# Patient Record
Sex: FEMALE | Race: WHITE | NOT HISPANIC OR LATINO | URBAN - METROPOLITAN AREA
[De-identification: names, ages, dates, MRNs, and addresses within clinical notes are randomized per-mention and may not be internally consistent; named-entity substitution may affect disease eponyms.]

---

## 2011-09-01 RX ORDER — DUREZOL 0.5 MG/ML
1 EMULSION OPHTHALMIC
Qty: 0 | Refills: 0 | DISCHARGE
Start: 2011-09-01

## 2011-09-01 RX ORDER — DIAZEPAM 5 MG
0.5 TABLET ORAL
Qty: 0 | Refills: 0 | DISCHARGE
Start: 2011-09-01

## 2017-01-18 ENCOUNTER — OUTPATIENT (OUTPATIENT)
Dept: OUTPATIENT SERVICES | Facility: HOSPITAL | Age: 82
LOS: 1 days | Discharge: HOME | End: 2017-01-18

## 2017-01-18 DIAGNOSIS — C50.912 MALIGNANT NEOPLASM OF UNSPECIFIED SITE OF LEFT FEMALE BREAST: ICD-10-CM

## 2017-01-19 ENCOUNTER — RESULT REVIEW (OUTPATIENT)
Age: 82
End: 2017-01-19

## 2017-01-20 ENCOUNTER — LABORATORY RESULT (OUTPATIENT)
Age: 82
End: 2017-01-20

## 2017-01-20 ENCOUNTER — APPOINTMENT (OUTPATIENT)
Dept: HEMATOLOGY ONCOLOGY | Facility: CLINIC | Age: 82
End: 2017-01-20

## 2017-01-20 VITALS
BODY MASS INDEX: 23.58 KG/M2 | OXYGEN SATURATION: 96 % | HEART RATE: 63 BPM | RESPIRATION RATE: 16 BRPM | DIASTOLIC BLOOD PRESSURE: 84 MMHG | SYSTOLIC BLOOD PRESSURE: 170 MMHG | WEIGHT: 150.58 LBS | TEMPERATURE: 208.22 F

## 2017-01-20 DIAGNOSIS — E78.5 HYPERLIPIDEMIA, UNSPECIFIED: ICD-10-CM

## 2017-01-20 DIAGNOSIS — R73.9 HYPERGLYCEMIA, UNSPECIFIED: ICD-10-CM

## 2017-01-20 DIAGNOSIS — F32.9 MAJOR DEPRESSIVE DISORDER, SINGLE EPISODE, UNSPECIFIED: ICD-10-CM

## 2017-01-20 LAB
25(OH)D3 SERPL-MCNC: 41.9 NG/ML
ALBUMIN SERPL ELPH-MCNC: 3.8 G/DL
ALP BLD-CCNC: 103 U/L
ALT SERPL-CCNC: 20 U/L
ANION GAP SERPL CALC-SCNC: 11 MMOL/L
AST SERPL-CCNC: 23 U/L
BILIRUB SERPL-MCNC: 0.4 MG/DL
BUN SERPL-MCNC: 21 MG/DL
CALCIUM SERPL-MCNC: 9.3 MG/DL
CHLORIDE SERPL-SCNC: 98 MMOL/L
CHOLEST SERPL-MCNC: 199 MG/DL
CHOLEST/HDLC SERPL: 3.4 RATIO
CO2 SERPL-SCNC: 28 MMOL/L
CREAT SERPL-MCNC: 0.72 MG/DL
GLUCOSE BS SERPL-MCNC: 83 MG/DL
GLUCOSE SERPL-MCNC: 90 MG/DL
HCT VFR BLD CALC: 33.9 % — LOW (ref 34.5–45)
HDLC SERPL-MCNC: 59 MG/DL
HGB BLD-MCNC: 11.2 G/DL — LOW (ref 11.5–15.5)
LDLC SERPL CALC-MCNC: 128 MG/DL
MCHC RBC-ENTMCNC: 29.2 PG — SIGNIFICANT CHANGE UP (ref 27–34)
MCHC RBC-ENTMCNC: 32.9 GM/DL — SIGNIFICANT CHANGE UP (ref 32–36)
MCV RBC AUTO: 88.8 FL — SIGNIFICANT CHANGE UP (ref 80–100)
PLATELET # BLD AUTO: 219 K/UL — SIGNIFICANT CHANGE UP (ref 150–400)
POTASSIUM SERPL-SCNC: 4.6 MMOL/L
PROT SERPL-MCNC: 7.2 G/DL
RBC # BLD: 3.82 M/UL — SIGNIFICANT CHANGE UP (ref 3.8–5.2)
RBC # FLD: 13.2 % — SIGNIFICANT CHANGE UP (ref 10.3–14.5)
SODIUM SERPL-SCNC: 137 MMOL/L
T3RU NFR SERPL: 1.01 INDEX
T4 SERPL-MCNC: 8.6 UG/DL
TRIGL SERPL-MCNC: 60 MG/DL
TSH SERPL-ACNC: 1.31 UIU/ML
VIT B12 SERPL-MCNC: 576 PG/ML
WBC # BLD: 5.4 K/UL — SIGNIFICANT CHANGE UP (ref 3.8–10.5)
WBC # FLD AUTO: 5.4 K/UL — SIGNIFICANT CHANGE UP (ref 3.8–10.5)

## 2017-01-23 LAB — HBA1C MFR BLD HPLC: 5.6 %

## 2017-01-28 ENCOUNTER — APPOINTMENT (OUTPATIENT)
Dept: CT IMAGING | Facility: IMAGING CENTER | Age: 82
End: 2017-01-28

## 2017-01-28 ENCOUNTER — OUTPATIENT (OUTPATIENT)
Dept: OUTPATIENT SERVICES | Facility: HOSPITAL | Age: 82
LOS: 1 days | End: 2017-01-28
Payer: MEDICARE

## 2017-01-28 DIAGNOSIS — R94.8 ABNORMAL RESULTS OF FUNCTION STUDIES OF OTHER ORGANS AND SYSTEMS: ICD-10-CM

## 2017-01-28 DIAGNOSIS — J45.909 UNSPECIFIED ASTHMA, UNCOMPLICATED: ICD-10-CM

## 2017-01-28 PROCEDURE — 71250 CT THORAX DX C-: CPT

## 2017-02-03 ENCOUNTER — OTHER (OUTPATIENT)
Age: 82
End: 2017-02-03

## 2017-05-23 ENCOUNTER — FORM ENCOUNTER (OUTPATIENT)
Age: 82
End: 2017-05-23

## 2017-05-23 ENCOUNTER — OUTPATIENT (OUTPATIENT)
Dept: OUTPATIENT SERVICES | Facility: HOSPITAL | Age: 82
LOS: 1 days | End: 2017-05-23
Payer: MEDICARE

## 2017-05-23 ENCOUNTER — APPOINTMENT (OUTPATIENT)
Dept: ULTRASOUND IMAGING | Facility: IMAGING CENTER | Age: 82
End: 2017-05-23

## 2017-05-23 ENCOUNTER — APPOINTMENT (OUTPATIENT)
Dept: MAMMOGRAPHY | Facility: IMAGING CENTER | Age: 82
End: 2017-05-23

## 2017-05-23 DIAGNOSIS — C50.912 MALIGNANT NEOPLASM OF UNSPECIFIED SITE OF LEFT FEMALE BREAST: ICD-10-CM

## 2017-05-23 PROCEDURE — G0279: CPT

## 2017-05-23 PROCEDURE — 76641 ULTRASOUND BREAST COMPLETE: CPT

## 2017-05-23 PROCEDURE — 77066 DX MAMMO INCL CAD BI: CPT

## 2017-07-25 ENCOUNTER — OUTPATIENT (OUTPATIENT)
Dept: OUTPATIENT SERVICES | Facility: HOSPITAL | Age: 82
LOS: 1 days | Discharge: HOME | End: 2017-07-25

## 2017-07-25 DIAGNOSIS — C50.912 MALIGNANT NEOPLASM OF UNSPECIFIED SITE OF LEFT FEMALE BREAST: ICD-10-CM

## 2017-07-28 ENCOUNTER — RESULT REVIEW (OUTPATIENT)
Age: 82
End: 2017-07-28

## 2017-07-28 ENCOUNTER — APPOINTMENT (OUTPATIENT)
Dept: HEMATOLOGY ONCOLOGY | Facility: CLINIC | Age: 82
End: 2017-07-28
Payer: MEDICARE

## 2017-07-28 VITALS
DIASTOLIC BLOOD PRESSURE: 80 MMHG | BODY MASS INDEX: 23.82 KG/M2 | WEIGHT: 152.12 LBS | TEMPERATURE: 207.14 F | SYSTOLIC BLOOD PRESSURE: 140 MMHG | HEART RATE: 60 BPM | OXYGEN SATURATION: 97 % | RESPIRATION RATE: 16 BRPM

## 2017-07-28 LAB
ALBUMIN SERPL ELPH-MCNC: 4 G/DL
ALP BLD-CCNC: 103 U/L
ALT SERPL-CCNC: 20 U/L
ANION GAP SERPL CALC-SCNC: 14 MMOL/L
AST SERPL-CCNC: 20 U/L
BILIRUB SERPL-MCNC: 0.4 MG/DL
BUN SERPL-MCNC: 18 MG/DL
CALCIUM SERPL-MCNC: 9.9 MG/DL
CEA SERPL-MCNC: 2.3 NG/ML
CHLORIDE SERPL-SCNC: 100 MMOL/L
CO2 SERPL-SCNC: 27 MMOL/L
CREAT SERPL-MCNC: 0.8 MG/DL
GLUCOSE SERPL-MCNC: 92 MG/DL
HCT VFR BLD CALC: 33.9 % — LOW (ref 34.5–45)
HGB BLD-MCNC: 11.7 G/DL — SIGNIFICANT CHANGE UP (ref 11.5–15.5)
MCHC RBC-ENTMCNC: 30.8 PG — SIGNIFICANT CHANGE UP (ref 27–34)
MCHC RBC-ENTMCNC: 34.5 G/DL — SIGNIFICANT CHANGE UP (ref 32–36)
MCV RBC AUTO: 89.3 FL — SIGNIFICANT CHANGE UP (ref 80–100)
PLATELET # BLD AUTO: 255 K/UL — SIGNIFICANT CHANGE UP (ref 150–400)
POTASSIUM SERPL-SCNC: 5.2 MMOL/L
PROT SERPL-MCNC: 7.7 G/DL
RBC # BLD: 3.79 M/UL — LOW (ref 3.8–5.2)
RBC # FLD: 13.2 % — SIGNIFICANT CHANGE UP (ref 10.3–14.5)
SODIUM SERPL-SCNC: 141 MMOL/L
WBC # BLD: 5.7 K/UL — SIGNIFICANT CHANGE UP (ref 3.8–10.5)
WBC # FLD AUTO: 5.7 K/UL — SIGNIFICANT CHANGE UP (ref 3.8–10.5)

## 2017-07-28 PROCEDURE — 99215 OFFICE O/P EST HI 40 MIN: CPT

## 2017-07-28 RX ORDER — CLARITHROMYCIN 500 MG/1
500 TABLET, FILM COATED ORAL
Qty: 20 | Refills: 0 | Status: DISCONTINUED | COMMUNITY
Start: 2017-04-13

## 2017-07-28 RX ORDER — CEFUROXIME AXETIL 500 MG/1
500 TABLET ORAL
Qty: 20 | Refills: 0 | Status: DISCONTINUED | COMMUNITY
Start: 2017-07-14

## 2017-08-01 LAB — CANCER AG27-29 SERPL-ACNC: 27.9 U/ML

## 2017-08-14 ENCOUNTER — FORM ENCOUNTER (OUTPATIENT)
Age: 82
End: 2017-08-14

## 2017-08-15 ENCOUNTER — APPOINTMENT (OUTPATIENT)
Dept: MRI IMAGING | Facility: IMAGING CENTER | Age: 82
End: 2017-08-15
Payer: MEDICARE

## 2017-08-15 ENCOUNTER — OUTPATIENT (OUTPATIENT)
Dept: OUTPATIENT SERVICES | Facility: HOSPITAL | Age: 82
LOS: 1 days | End: 2017-08-15
Payer: MEDICARE

## 2017-08-15 DIAGNOSIS — C50.912 MALIGNANT NEOPLASM OF UNSPECIFIED SITE OF LEFT FEMALE BREAST: ICD-10-CM

## 2017-08-15 DIAGNOSIS — M54.2 CERVICALGIA: ICD-10-CM

## 2017-08-15 DIAGNOSIS — R51 HEADACHE: ICD-10-CM

## 2017-08-15 PROCEDURE — 72156 MRI NECK SPINE W/O & W/DYE: CPT

## 2017-08-15 PROCEDURE — A9585: CPT

## 2017-08-15 PROCEDURE — 72156 MRI NECK SPINE W/O & W/DYE: CPT | Mod: 26

## 2017-08-15 PROCEDURE — 70553 MRI BRAIN STEM W/O & W/DYE: CPT | Mod: 26

## 2017-08-15 PROCEDURE — 70553 MRI BRAIN STEM W/O & W/DYE: CPT

## 2017-08-22 DIAGNOSIS — C50.919 MALIGNANT NEOPLASM OF UNSPECIFIED SITE OF UNSPECIFIED FEMALE BREAST: ICD-10-CM

## 2017-08-22 DIAGNOSIS — R51 HEADACHE: ICD-10-CM

## 2017-08-29 ENCOUNTER — INPATIENT (INPATIENT)
Facility: HOSPITAL | Age: 82
LOS: 2 days | Discharge: HOME | DRG: 392 | End: 2017-09-01
Attending: INTERNAL MEDICINE | Admitting: INTERNAL MEDICINE
Payer: MEDICARE

## 2017-08-29 VITALS
WEIGHT: 149.91 LBS | RESPIRATION RATE: 16 BRPM | OXYGEN SATURATION: 99 % | TEMPERATURE: 97 F | SYSTOLIC BLOOD PRESSURE: 185 MMHG | DIASTOLIC BLOOD PRESSURE: 65 MMHG | HEART RATE: 63 BPM

## 2017-08-29 PROCEDURE — 99285 EMERGENCY DEPT VISIT HI MDM: CPT | Mod: 25

## 2017-08-29 RX ORDER — SODIUM CHLORIDE 9 MG/ML
1000 INJECTION INTRAMUSCULAR; INTRAVENOUS; SUBCUTANEOUS ONCE
Qty: 0 | Refills: 0 | Status: COMPLETED | OUTPATIENT
Start: 2017-08-29 | End: 2017-08-29

## 2017-08-29 NOTE — ED PROVIDER NOTE - PSH
History of Breast Biopsy  [place] lymph node biopsy  History of Cataract Surgery  Left eye  S/P Breast Lumpectomy    S/P Lumpectomy of Breast  Left Breast  S/P Nasal Polypectomy    Status Post Tonsillectomy

## 2017-08-29 NOTE — ED ADULT NURSE NOTE - PSH
History of Breast Biopsy  [place] lymph node biopsy  History of Cataract Surgery  Left eye  S/P Breast Lumpectomy    S/P Lumpectomy of Breast  Left Breast  S/P Nasal Polypectomy    Status Post Tonsillectomy

## 2017-08-29 NOTE — ED PROVIDER NOTE - MEDICAL DECISION MAKING DETAILS
81yo F with PMH of IBS, Breast CA, Asthma, h/o C. diff p/w diarrhea x6days. Patient has h/o past c.diff infections and recent abx use. Patient endorsing abd cramps but abd exam is benign. Will check CBC/CMP, C. diff/Stool Cx 81yo F with PMH of IBS, Breast CA, Asthma, h/o C. diff p/w diarrhea x6days. Patient has h/o past c.diff infections and recent abx use. Patient endorsing abd cramps but no other alarm symnptoms. Will check CBC/CMP, C. diff/Stool Cx. Given Abd tenderness, will check CT A/P and reassess

## 2017-08-29 NOTE — ED PROVIDER NOTE - OBJECTIVE STATEMENT
Diarrhea x6 days,   normally BM every other day  decreased appetite  initially water now soft  abd cramping, chills    10 day abx course, finished one month ago  h/o C. diff, several years ago 83yo F with PMH of IBS, Asthma, h/o Breast CA, Anxiety, and GERD presenting with Diarrhea x6 days. Symptoms have been on and off, assoiciated with abd cramping, chills, and decreased appetite. BMs initially watery now soft. Patient denies melena, BRBPR, N/V. Crow has IBS, normally BMs are every other day. Patient states she has had C.diff infection in the past (several years ago). She has not been hospitalized recently but had a 10day course of Ceftin one month ago for a respiratory infection.

## 2017-08-29 NOTE — ED PROVIDER NOTE - ATTENDING CONTRIBUTION TO CARE
82F hx IBS, prior C. diff, anxiety, GERD, Asthma, Breast Ca presents with multiple episodes of watery stool x approx 6 days associated with mild left-sided abdominal pain.  Stool not foul-smelling.  Denies fever/chills, melena/hematochezia, nausea/vomiting, urinary symptoms.  No recent hospitalization, but recently completed course of ceftin.  VSS.  dry mucus membranes.  mild left-sided ttp, no rebound or guarding.  concern for diverticulitis v colitis +/- c diff.  will obtain labs including lytes, stool for c. diff.  ivf, contact isolation.  obtain ct a/p, reassess

## 2017-08-29 NOTE — ED ADULT NURSE NOTE - PMH
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

## 2017-08-29 NOTE — ED ADULT NURSE NOTE - OBJECTIVE STATEMENT
82 y.o. Female presents to the ED accompanied by daughter c/o diarrhea. Hx Breast CA - s/p L lumpectomy - pink band on; IBS, mitral valve prolapse. Pt reports having diarrhea since last sat. Today pt had diarrhea x3 - denies any blood in stool. Pt's daughter reports pt had some soup on Wednesday and felt sick after soup. Pt's daughter is unsure if related. Pt reports having "cramp spasms" that "comes and goes" in lower abdomen. Denies any CP, SOB, N/V, urinary complications, fever/chills. A&Ox3. Pt is in no current distress. Comfort and safety provided.

## 2017-08-29 NOTE — ED PROVIDER NOTE - PROGRESS NOTE DETAILS
CT A/P showing mild thickening of colon concerning for distention vs colitis. Given ongoing diarrhea, will check Bld Cx's and start ABx. [facility], accepted by Ada HERNANDEZ

## 2017-08-30 DIAGNOSIS — F41.9 ANXIETY DISORDER, UNSPECIFIED: ICD-10-CM

## 2017-08-30 DIAGNOSIS — I34.1 NONRHEUMATIC MITRAL (VALVE) PROLAPSE: ICD-10-CM

## 2017-08-30 DIAGNOSIS — H20.9 UNSPECIFIED IRIDOCYCLITIS: ICD-10-CM

## 2017-08-30 DIAGNOSIS — R19.7 DIARRHEA, UNSPECIFIED: ICD-10-CM

## 2017-08-30 DIAGNOSIS — F32.9 MAJOR DEPRESSIVE DISORDER, SINGLE EPISODE, UNSPECIFIED: ICD-10-CM

## 2017-08-30 DIAGNOSIS — K52.9 NONINFECTIVE GASTROENTERITIS AND COLITIS, UNSPECIFIED: ICD-10-CM

## 2017-08-30 DIAGNOSIS — Z29.9 ENCOUNTER FOR PROPHYLACTIC MEASURES, UNSPECIFIED: ICD-10-CM

## 2017-08-30 LAB
ALBUMIN SERPL ELPH-MCNC: 4.1 G/DL — SIGNIFICANT CHANGE UP (ref 3.3–5)
ALP SERPL-CCNC: 90 U/L — SIGNIFICANT CHANGE UP (ref 40–120)
ALT FLD-CCNC: 15 U/L RC — SIGNIFICANT CHANGE UP (ref 10–45)
ANION GAP SERPL CALC-SCNC: 12 MMOL/L — SIGNIFICANT CHANGE UP (ref 5–17)
APPEARANCE UR: CLEAR — SIGNIFICANT CHANGE UP
AST SERPL-CCNC: 19 U/L — SIGNIFICANT CHANGE UP (ref 10–40)
BASOPHILS # BLD AUTO: 0.1 K/UL — SIGNIFICANT CHANGE UP (ref 0–0.2)
BASOPHILS NFR BLD AUTO: 1.2 % — SIGNIFICANT CHANGE UP (ref 0–2)
BILIRUB SERPL-MCNC: 0.5 MG/DL — SIGNIFICANT CHANGE UP (ref 0.2–1.2)
BILIRUB UR-MCNC: NEGATIVE — SIGNIFICANT CHANGE UP
BUN SERPL-MCNC: 11 MG/DL — SIGNIFICANT CHANGE UP (ref 7–23)
CALCIUM SERPL-MCNC: 9.5 MG/DL — SIGNIFICANT CHANGE UP (ref 8.4–10.5)
CHLORIDE SERPL-SCNC: 99 MMOL/L — SIGNIFICANT CHANGE UP (ref 96–108)
CO2 SERPL-SCNC: 26 MMOL/L — SIGNIFICANT CHANGE UP (ref 22–31)
COLOR SPEC: SIGNIFICANT CHANGE UP
COMMENT - URINE: SIGNIFICANT CHANGE UP
CREAT SERPL-MCNC: 0.66 MG/DL — SIGNIFICANT CHANGE UP (ref 0.5–1.3)
DIFF PNL FLD: ABNORMAL
EOSINOPHIL # BLD AUTO: 0.2 K/UL — SIGNIFICANT CHANGE UP (ref 0–0.5)
EOSINOPHIL NFR BLD AUTO: 2.9 % — SIGNIFICANT CHANGE UP (ref 0–6)
EPI CELLS # UR: SIGNIFICANT CHANGE UP /HPF
GAS PNL BLDV: SIGNIFICANT CHANGE UP
GLUCOSE SERPL-MCNC: 94 MG/DL — SIGNIFICANT CHANGE UP (ref 70–99)
GLUCOSE UR QL: NEGATIVE — SIGNIFICANT CHANGE UP
HCT VFR BLD CALC: 35 % — SIGNIFICANT CHANGE UP (ref 34.5–45)
HGB BLD-MCNC: 11.9 G/DL — SIGNIFICANT CHANGE UP (ref 11.5–15.5)
KETONES UR-MCNC: NEGATIVE — SIGNIFICANT CHANGE UP
LEUKOCYTE ESTERASE UR-ACNC: ABNORMAL
LIDOCAIN IGE QN: 37 U/L — SIGNIFICANT CHANGE UP (ref 7–60)
LYMPHOCYTES # BLD AUTO: 2.7 K/UL — SIGNIFICANT CHANGE UP (ref 1–3.3)
LYMPHOCYTES # BLD AUTO: 40.4 % — SIGNIFICANT CHANGE UP (ref 13–44)
MAGNESIUM SERPL-MCNC: 1.9 MG/DL — SIGNIFICANT CHANGE UP (ref 1.6–2.6)
MCHC RBC-ENTMCNC: 31.4 PG — SIGNIFICANT CHANGE UP (ref 27–34)
MCHC RBC-ENTMCNC: 34.1 GM/DL — SIGNIFICANT CHANGE UP (ref 32–36)
MCV RBC AUTO: 92.3 FL — SIGNIFICANT CHANGE UP (ref 80–100)
MONOCYTES # BLD AUTO: 0.7 K/UL — SIGNIFICANT CHANGE UP (ref 0–0.9)
MONOCYTES NFR BLD AUTO: 10.5 % — SIGNIFICANT CHANGE UP (ref 2–14)
NEUTROPHILS # BLD AUTO: 3 K/UL — SIGNIFICANT CHANGE UP (ref 1.8–7.4)
NEUTROPHILS NFR BLD AUTO: 45.1 % — SIGNIFICANT CHANGE UP (ref 43–77)
NITRITE UR-MCNC: NEGATIVE — SIGNIFICANT CHANGE UP
PH UR: 6 — SIGNIFICANT CHANGE UP (ref 5–8)
PHOSPHATE SERPL-MCNC: 3 MG/DL — SIGNIFICANT CHANGE UP (ref 2.5–4.5)
PLATELET # BLD AUTO: 222 K/UL — SIGNIFICANT CHANGE UP (ref 150–400)
POTASSIUM SERPL-MCNC: 3.8 MMOL/L — SIGNIFICANT CHANGE UP (ref 3.5–5.3)
POTASSIUM SERPL-SCNC: 3.8 MMOL/L — SIGNIFICANT CHANGE UP (ref 3.5–5.3)
PROT SERPL-MCNC: 8.2 G/DL — SIGNIFICANT CHANGE UP (ref 6–8.3)
PROT UR-MCNC: NEGATIVE — SIGNIFICANT CHANGE UP
RBC # BLD: 3.79 M/UL — LOW (ref 3.8–5.2)
RBC # FLD: 13.1 % — SIGNIFICANT CHANGE UP (ref 10.3–14.5)
RBC CASTS # UR COMP ASSIST: SIGNIFICANT CHANGE UP /HPF (ref 0–2)
SODIUM SERPL-SCNC: 137 MMOL/L — SIGNIFICANT CHANGE UP (ref 135–145)
SP GR SPEC: 1 — LOW (ref 1.01–1.02)
UROBILINOGEN FLD QL: NEGATIVE — SIGNIFICANT CHANGE UP
WBC # BLD: 6.7 K/UL — SIGNIFICANT CHANGE UP (ref 3.8–10.5)
WBC # FLD AUTO: 6.7 K/UL — SIGNIFICANT CHANGE UP (ref 3.8–10.5)
WBC UR QL: SIGNIFICANT CHANGE UP /HPF (ref 0–5)

## 2017-08-30 PROCEDURE — 99223 1ST HOSP IP/OBS HIGH 75: CPT | Mod: AI

## 2017-08-30 PROCEDURE — 74177 CT ABD & PELVIS W/CONTRAST: CPT | Mod: 26

## 2017-08-30 RX ORDER — ONDANSETRON 8 MG/1
4 TABLET, FILM COATED ORAL EVERY 6 HOURS
Qty: 0 | Refills: 0 | Status: DISCONTINUED | OUTPATIENT
Start: 2017-08-30 | End: 2017-09-01

## 2017-08-30 RX ORDER — FAMOTIDINE 10 MG/ML
20 INJECTION INTRAVENOUS DAILY
Qty: 0 | Refills: 0 | Status: DISCONTINUED | OUTPATIENT
Start: 2017-08-30 | End: 2017-09-01

## 2017-08-30 RX ORDER — ACETAMINOPHEN 500 MG
650 TABLET ORAL EVERY 6 HOURS
Qty: 0 | Refills: 0 | Status: DISCONTINUED | OUTPATIENT
Start: 2017-08-30 | End: 2017-09-01

## 2017-08-30 RX ORDER — METRONIDAZOLE 500 MG
500 TABLET ORAL ONCE
Qty: 0 | Refills: 0 | Status: DISCONTINUED | OUTPATIENT
Start: 2017-08-30 | End: 2017-08-30

## 2017-08-30 RX ORDER — PIPERACILLIN AND TAZOBACTAM 4; .5 G/20ML; G/20ML
3.38 INJECTION, POWDER, LYOPHILIZED, FOR SOLUTION INTRAVENOUS ONCE
Qty: 0 | Refills: 0 | Status: COMPLETED | OUTPATIENT
Start: 2017-08-30 | End: 2017-08-30

## 2017-08-30 RX ORDER — NADOLOL 80 MG/1
10 TABLET ORAL AT BEDTIME
Qty: 0 | Refills: 0 | Status: DISCONTINUED | OUTPATIENT
Start: 2017-08-30 | End: 2017-09-01

## 2017-08-30 RX ORDER — FLUOXETINE HCL 10 MG
60 CAPSULE ORAL DAILY
Qty: 0 | Refills: 0 | Status: DISCONTINUED | OUTPATIENT
Start: 2017-08-30 | End: 2017-09-01

## 2017-08-30 RX ORDER — HEPARIN SODIUM 5000 [USP'U]/ML
5000 INJECTION INTRAVENOUS; SUBCUTANEOUS EVERY 12 HOURS
Qty: 0 | Refills: 0 | Status: DISCONTINUED | OUTPATIENT
Start: 2017-08-30 | End: 2017-09-01

## 2017-08-30 RX ORDER — FLUTICASONE PROPIONATE 50 MCG
1 SPRAY, SUSPENSION NASAL DAILY
Qty: 0 | Refills: 0 | Status: DISCONTINUED | OUTPATIENT
Start: 2017-08-30 | End: 2017-09-01

## 2017-08-30 RX ORDER — SODIUM CHLORIDE 9 MG/ML
1000 INJECTION INTRAMUSCULAR; INTRAVENOUS; SUBCUTANEOUS
Qty: 0 | Refills: 0 | Status: COMPLETED | OUTPATIENT
Start: 2017-08-30 | End: 2017-08-30

## 2017-08-30 RX ORDER — METRONIDAZOLE 500 MG
500 TABLET ORAL ONCE
Qty: 0 | Refills: 0 | Status: COMPLETED | OUTPATIENT
Start: 2017-08-30 | End: 2017-08-30

## 2017-08-30 RX ADMIN — HEPARIN SODIUM 5000 UNIT(S): 5000 INJECTION INTRAVENOUS; SUBCUTANEOUS at 17:43

## 2017-08-30 RX ADMIN — NADOLOL 10 MILLIGRAM(S): 80 TABLET ORAL at 21:09

## 2017-08-30 RX ADMIN — PIPERACILLIN AND TAZOBACTAM 200 GRAM(S): 4; .5 INJECTION, POWDER, LYOPHILIZED, FOR SOLUTION INTRAVENOUS at 05:53

## 2017-08-30 RX ADMIN — SODIUM CHLORIDE 125 MILLILITER(S): 9 INJECTION INTRAMUSCULAR; INTRAVENOUS; SUBCUTANEOUS at 06:47

## 2017-08-30 RX ADMIN — SODIUM CHLORIDE 1000 MILLILITER(S): 9 INJECTION INTRAMUSCULAR; INTRAVENOUS; SUBCUTANEOUS at 00:19

## 2017-08-30 RX ADMIN — Medication 100 MILLIGRAM(S): at 04:54

## 2017-08-30 RX ADMIN — Medication 60 MILLIGRAM(S): at 12:57

## 2017-08-30 RX ADMIN — FAMOTIDINE 20 MILLIGRAM(S): 10 INJECTION INTRAVENOUS at 12:58

## 2017-08-30 NOTE — H&P ADULT - HISTORY OF PRESENT ILLNESS
Patient is an 82 year old female with PMH of IBS, Asthma, h/o Breast CA s/p lumpectomy and radiation, Anxiety, and GERD presenting with Diarrhea for the past six days.   Patient reports  intermittent loose stools , at times more formed and at times watery, fouls smelling, non bloody , associated with left sided abdominal cramping, moderate in severity, non radiating.  She has poor appetites and decreased po intake during this time. Symptoms are different than her usual IBS symptoms , and were not relieved with valium. she has no epigastric pain, no melena. She has no fevers no chills.  She reports no sick contacts and no recent travel.  She reports s a remote history of C.diff infection (several years ago) no recent  hospitalization , but recently completed a 10day course of Ceftin  for a UI about one month prior to admission.     istop reviewed Reference #: [id number] patient on valium 5mg last filled in June '17

## 2017-08-30 NOTE — ED ADULT NURSE REASSESSMENT NOTE - NS ED NURSE REASSESS COMMENT FT1
Ambulated to bathroom with 1 assist
As per Dr. Palomo, hold on drawing blood cultures.
Pt was given CT contrast drink.
Pt is in no current distress. Comfort and safety provided.

## 2017-08-30 NOTE — CHART NOTE - NSCHARTNOTEFT_GEN_A_CORE
Pt seen and examined at bedside.   Feeling a little better. On clear liquid diet.    Admitted for 1 week of intermittent lower abdominal cramps, diarrhea, foul smelling. Started after eating Chicken soup from a restaurant last week.    This am, diarrhea improving. Ate clear liquid diet.   C.diff neg. CT abdomen with mild left-sided colitis.  Hx of IBS. In the past, seen by Dr. Campbell GI at [facility].   Will consult GI [facility] [place].     Diet advance as tolerated.  Restart home Prilosec for GERD.     Please refer to the H&P done today for details.    - Dr. JODY Jackman ([facility])  - [phone] Pt seen and examined at bedside.   Feeling a little better. On clear liquid diet.    Admitted for 1 week of intermittent lower abdominal cramps, diarrhea, foul smelling. Started after eating Chicken soup from a restaurant last week.    This am, diarrhea improving. Ate clear liquid diet.   C.diff neg. CT abdomen with mild left-sided colitis.  Hx of IBS. In the past, seen by Dr. Campbell GI at [facility].   Will consult GI [facility].     Dx: Gastroenteritis vs. IBS vs. colitis    Diet advance as tolerated.  Restart home Prilosec for GERD.     Please refer to the H&P done today for details.    - Dr. JODY Jackman ([facility])  - [phone]

## 2017-08-30 NOTE — H&P ADULT - ASSESSMENT
82F w/ Anxiety, IBS , breast can s/p lumpectomy and radiation, MVP , p/w diarrhea x 6 day, CT abdomen with prominent wall of transverse colon.

## 2017-08-30 NOTE — H&P ADULT - NSHPPHYSICALEXAM_GEN_ALL_CORE
Vital Signs Last 24 Hrs  T(C): 36.4 (30 Aug 2017 05:57), Max: 36.5 (29 Aug 2017 23:02)  T(F): 97.6 (30 Aug 2017 05:57), Max: 97.7 (29 Aug 2017 23:02)  HR: 58 (30 Aug 2017 05:57) (58 - 63)  BP: 128/88 (30 Aug 2017 05:57) (128/88 - 185/65)  BP(mean): --  RR: 18 (30 Aug 2017 05:57) (16 - 18)  SpO2: 95% (30 Aug 2017 05:57) (95% - 99%)    GENERAL: No acute distress, well-developed  HEAD:  Atraumatic, Normocephalic  ENT: EOMI, PERRLA, conjunctiva and sclera clear,  moist mucosa no pharyngeal erythema or exudates   NECK: supple , no JVD   CHEST/LUNG: Clear to auscultation bilaterally; No wheeze, equal breath sounds bilaterally   BACK: No spinal tenderness,  No CVA tenderness   HEART: Regular rate and rhythm; No murmurs, rubs, or gallops  ABDOMEN: Soft, Nontender, Nondistended; Bowel sounds present  EXTREMITIES:  No clubbing, cyanosis, or edema  MSK: No joint swelling or effusions, ROM intact   Vasc: 2+ pulses throughout   PSYCH: Normal behavior/affect  NEUROLOGY: AAOx3, non-focal, cranial nerves intact  SKIN: Normal color, No rashes or lesions

## 2017-08-30 NOTE — CONSULT NOTE ADULT - SUBJECTIVE AND OBJECTIVE BOX
82 year old female with PMH of anxiety, IBS, and remote history of CDiff states she was in [facility] until one week ago when she developed intermittent abdominal cramps followed by foul smelling loose stools. Initially she felt diarrhea was related to her Irritable Bowel Syndrome however quickly realized it was more prolonged in duration and foul smelling than her usual diarrhea. She took some immodium but cramps and diarrhea recurred several days later so she came to ED for evaluation. + recent abx: Ceftin for a respiratory infection.  She has no fevers no chills.  She reports no sick contacts and no recent travel. No n/v. No blood in stool  She reports s a remote history of C.diff infection (several years ago) no recent  hospitalization , but recently completed a 10day course of Ceftin  for a UI about one month prior to admission.     Last colonoscopy ~ 4-5 years ago but does not recall details      PAST MEDICAL & SURGICAL HISTORY:  Hypertension  Nasal Polyp  Hiatal Hernia  Irritable Bowel Syndrome  Uveitis  Asthma  Clinical Depression  Anxiety  Mitral Valve Prolapse  Irritable Bowel Syndrome  GERD (Gastroesophageal Reflux Disease)  Breast Cancer: 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  History of Breast Biopsy: [place] lymph node biopsy  History of Cataract Surgery: Left eye  S/P Nasal Polypectomy  Status Post Tonsillectomy  S/P Lumpectomy of Breast: Left Breast  S/P Breast Lumpectomy      MEDICATIONS  (STANDING):  heparin  Injectable 5000 Unit(s) SubCutaneous every 12 hours  famotidine    Tablet 20 milliGRAM(s) Oral daily  nadolol 10 milliGRAM(s) Oral at bedtime  FLUoxetine 60 milliGRAM(s) Oral daily    MEDICATIONS  (PRN):  acetaminophen   Tablet. 650 milliGRAM(s) Oral every 6 hours PRN Mild Pain (1 - 3)  ondansetron Injectable 4 milliGRAM(s) IV Push every 6 hours PRN Nausea  diazepam    Tablet 5 milliGRAM(s) Oral daily PRN anxiety, spasms      Allergies    ciprofloxacin (Other)  codeine (Nausea; Other)  fluorescein ophthalmic (Hives; Rash; Flushing (Skin))  latex (Anaphylaxis)  Levaquin (Nausea; Other)  SULFA (Anaphylaxis)  tetracycline (Anaphylaxis)    Intolerances        Review of Systems:    General:  No wt loss, fevers, chills, night sweats,fatigue,   CV:  No pain, palpitatioins, hypo/hypertension  Resp:  No dyspnea, cough, tachypnea, wheezing  GI:  see HPI  :  No pain, bleeding, incontinence, nocturia  Muscle:  No pain, weakness  Neuro:  No weakness, tingling, memory problems  Psych:  No fatigue, insomnia, mood problems, depression  Endocrine:  No polyuria, polydypsia, cold/heat intolerance  Heme:  No petechiae, ecchymosis, easy bruisability  Skin:  No rash, tattoos, scars, edema      Vital Signs Last 24 Hrs  T(C): 37.1 (30 Aug 2017 07:31), Max: 37.1 (30 Aug 2017 07:31)  T(F): 98.7 (30 Aug 2017 07:), Max: 98.7 (30 Aug 2017 07:)  HR: 58 (30 Aug 2017 07:) (58 - 63)  BP: 144/79 (30 Aug 2017 07:) (128/88 - 185/65)  BP(mean): --  RR: 18 (30 Aug 2017 07:) (16 - 18)  SpO2: 99% (30 Aug 2017 07:) (95% - 99%)    PHYSICAL EXAM:    Constitutional: NAD, elderly female  Neck: No LAD, supple  Respiratory: CTA and P  Cardiovascular: S1 and S2, RRR, no M  Gastrointestinal: BS+, soft, NT/ND, neg HSM,  Extremities: No peripheral edema, neg clubing, cyanosis  Vascular: 2+ peripheral pulses  Neurological: A/O x 3, no focal deficits  Psychiatric: Normal mood, normal affect  Skin: No rashes      LABS:                        11.9   6.7   )-----------( 222      ( 30 Aug 2017 00:18 )             35.0     08-30    137  |  99  |  11  ----------------------------<  94  3.8   |  26  |  0.66    Ca    9.5      30 Aug 2017 00:18  Phos  3.0     08-30  Mg     1.9     08-30    TPro  8.2  /  Alb  4.1  /  TBili  0.5  /  DBili  x   /  AST  19  /  ALT  15  /  AlkPhos  90  08-30      Urinalysis Basic - ( 30 Aug 2017 00:26 )    Color: x / Appearance: Clear / S.005 / pH: x  Gluc: x / Ketone: Negative  / Bili: Negative / Urobili: Negative   Blood: x / Protein: Negative / Nitrite: Negative   Leuk Esterase: Small / RBC: 0-2 /HPF / WBC 3-5 /HPF   Sq Epi: x / Non Sq Epi: OCC /HPF / Bacteria: x      LIVER FUNCTIONS - ( 30 Aug 2017 00:18 )  Alb: 4.1 g/dL / Pro: 8.2 g/dL / ALK PHOS: 90 U/L / ALT: 15 U/L RC / AST: 19 U/L / GGT: x           Lipase, Serum: 37 U/L (17 @ 00:18)        RADIOLOGY & ADDITIONAL TESTS:

## 2017-08-30 NOTE — CONSULT NOTE ADULT - ASSESSMENT
83 yo F w/ sigmoid colitis likely infectious  F/U stool studies  Agree w/ antibiotics  Diet as tolerated - lactose free  Avoid Immodium  Will follow 81 yo F w/ sigmoid colitis likely infectious  F/U stool studies  Diet as tolerated - lactose free  Avoid Immodium  Will follow 81 yo F w/ L sided colitis likely infectious vs ? ischemic. CT findings not definitive   F/U stool studies  Diet as tolerated - lactose free  Avoid Immodium  Will follow

## 2017-08-30 NOTE — H&P ADULT - NSHPSOCIALHISTORY_GEN_ALL_CORE
Social History:    Marital Status:  (   )    (   ) Single    (   )    (  x)   Occupation:   Lives with: ( x ) alone  (  ) children   (  ) spouse   (  ) parents  (  ) other    Substance Use (street drugs): ( x ) never used  (  ) other:  Tobacco Usage:  ( x  ) never smoked   (   ) former smoker   (   ) current smoker  (     ) pack year  (        ) last cigarette date  Alcohol Usage: no etoh use

## 2017-08-30 NOTE — H&P ADULT - PSH
History of Breast Biopsy  [place] lymph node biopsy  History of Cataract Surgery  Left eye  S/P Breast Lumpectomy    S/P Lumpectomy of Breast  Left Breast  S/P Nasal Polypectomy    Status Post Tonsillectomy

## 2017-08-30 NOTE — H&P ADULT - PROBLEM SELECTOR PLAN 1
c diff negative, labs without  leukocytosis,  findings on CT scan not definitive, patient received zosyn and flagyl in ED , will hold further antibiotics and monitor clinically, if symptoms persist would obtain GI input.  Will provide IV hydration until tolerating Po intake   - GI consult to be arranged by day team   - advance diet as tolerated   - f/u stool cultures   - f/u  blood cultures obtained by ED c diff negative, labs without  leukocytosis,  findings on CT scan not definitive, patient received zosyn and flagyl in ED , will hold further antibiotics and monitor clinically, if symptoms persist would obtain GI input.  Will provide IV hydration until tolerating Po intake; Given recent diagnosed with uveitis would consider IBD ,will therefore  check inflammatory markers      - GI consult to be arranged by day team   - advance diet as tolerated   - f/u stool cultures   - f/u  blood cultures obtained by ED  - f/u CRP and sed rate

## 2017-08-30 NOTE — H&P ADULT - NSHPLABSRESULTS_GEN_ALL_CORE
Labs personally reviewed:                          11.9   6.7   )-----------( 222      ( 30 Aug 2017 00:18 )             35.0     08-30    137  |  99  |  11  ----------------------------<  94  3.8   |  26  |  0.66    Ca    9.5      30 Aug 2017 00:18  Phos  3.0     08-  Mg     1.9     -    TPro  8.2  /  Alb  4.1  /  TBili  0.5  /  DBili  x   /  AST  19  /  ALT  15  /  AlkPhos  90  08-        LIVER FUNCTIONS - ( 30 Aug 2017 00:18 )  Alb: 4.1 g/dL / Pro: 8.2 g/dL / ALK PHOS: 90 U/L / ALT: 15 U/L RC / AST: 19 U/L / GGT: x             Urinalysis Basic - ( 30 Aug 2017 00:26 )    Color: x / Appearance: Clear / S.005 / pH: x  Gluc: x / Ketone: Negative  / Bili: Negative / Urobili: Negative   Blood: x / Protein: Negative / Nitrite: Negative   Leuk Esterase: Small / RBC: 0-2 /HPF / WBC 3-5 /HPF   Sq Epi: x / Non Sq Epi: OCC /HPF / Bacteria: x      CAPILLARY BLOOD GLUCOSE          Imaging:  CT abdomen pelvis personally - Prominent wall of the distal transverse, descending and rectosigmoid colon without significant  inflammatory change, which may represent partial distention versus mild  colitis.      EKG ordered

## 2017-08-30 NOTE — H&P ADULT - NSHPREVIEWOFSYSTEMS_GEN_ALL_CORE
CONSTITUTIONAL: No weakness, fevers or chills  EYES/ENT: No visual changes;  No dysphagia  NECK: No pain or stiffness  RESPIRATORY: No cough, wheezing, hemoptysis; No shortness of breath  CARDIOVASCULAR: No chest pain or palpitations; No lower extremity edema  EXTREMITIES: no le edema, cyanosis, clubbing  GASTROINTESTINAL:+ abdominal pain. No nausea, vomiting, or hematemesis; + diarrhea . No melena or hematochezia.  BACK: No back pain  GENITOURINARY: No dysuria, frequency or hematuria  NEUROLOGICAL: No numbness or weakness  MSK: no joint swelling or pain  SKIN: No itching, burning, rashes, or lesions   PSYCH: no agitation  All other review of systems is negative unless indicated above.

## 2017-08-31 DIAGNOSIS — T78.40XA ALLERGY, UNSPECIFIED, INITIAL ENCOUNTER: ICD-10-CM

## 2017-08-31 LAB
ALBUMIN SERPL ELPH-MCNC: 3.6 G/DL — SIGNIFICANT CHANGE UP (ref 3.3–5)
ALP SERPL-CCNC: 85 U/L — SIGNIFICANT CHANGE UP (ref 40–120)
ALT FLD-CCNC: 14 U/L RC — SIGNIFICANT CHANGE UP (ref 10–45)
ANION GAP SERPL CALC-SCNC: 13 MMOL/L — SIGNIFICANT CHANGE UP (ref 5–17)
AST SERPL-CCNC: 19 U/L — SIGNIFICANT CHANGE UP (ref 10–40)
BILIRUB SERPL-MCNC: 0.5 MG/DL — SIGNIFICANT CHANGE UP (ref 0.2–1.2)
BUN SERPL-MCNC: 6 MG/DL — LOW (ref 7–23)
CALCIUM SERPL-MCNC: 9.2 MG/DL — SIGNIFICANT CHANGE UP (ref 8.4–10.5)
CHLORIDE SERPL-SCNC: 100 MMOL/L — SIGNIFICANT CHANGE UP (ref 96–108)
CO2 SERPL-SCNC: 24 MMOL/L — SIGNIFICANT CHANGE UP (ref 22–31)
CREAT SERPL-MCNC: 0.64 MG/DL — SIGNIFICANT CHANGE UP (ref 0.5–1.3)
GLUCOSE SERPL-MCNC: 97 MG/DL — SIGNIFICANT CHANGE UP (ref 70–99)
HCT VFR BLD CALC: 35.1 % — SIGNIFICANT CHANGE UP (ref 34.5–45)
HGB BLD-MCNC: 11.8 G/DL — SIGNIFICANT CHANGE UP (ref 11.5–15.5)
MCHC RBC-ENTMCNC: 30.9 PG — SIGNIFICANT CHANGE UP (ref 27–34)
MCHC RBC-ENTMCNC: 33.6 GM/DL — SIGNIFICANT CHANGE UP (ref 32–36)
MCV RBC AUTO: 91.9 FL — SIGNIFICANT CHANGE UP (ref 80–100)
PLATELET # BLD AUTO: 207 K/UL — SIGNIFICANT CHANGE UP (ref 150–400)
POTASSIUM SERPL-MCNC: 3.7 MMOL/L — SIGNIFICANT CHANGE UP (ref 3.5–5.3)
POTASSIUM SERPL-SCNC: 3.7 MMOL/L — SIGNIFICANT CHANGE UP (ref 3.5–5.3)
PROT SERPL-MCNC: 7.5 G/DL — SIGNIFICANT CHANGE UP (ref 6–8.3)
RBC # BLD: 3.82 M/UL — SIGNIFICANT CHANGE UP (ref 3.8–5.2)
RBC # FLD: 13 % — SIGNIFICANT CHANGE UP (ref 10.3–14.5)
SODIUM SERPL-SCNC: 137 MMOL/L — SIGNIFICANT CHANGE UP (ref 135–145)
WBC # BLD: 5 K/UL — SIGNIFICANT CHANGE UP (ref 3.8–10.5)
WBC # FLD AUTO: 5 K/UL — SIGNIFICANT CHANGE UP (ref 3.8–10.5)

## 2017-08-31 RX ORDER — DIPHENHYDRAMINE HCL 50 MG
25 CAPSULE ORAL ONCE
Qty: 0 | Refills: 0 | Status: COMPLETED | OUTPATIENT
Start: 2017-08-31 | End: 2017-08-31

## 2017-08-31 RX ADMIN — NADOLOL 10 MILLIGRAM(S): 80 TABLET ORAL at 21:14

## 2017-08-31 RX ADMIN — HEPARIN SODIUM 5000 UNIT(S): 5000 INJECTION INTRAVENOUS; SUBCUTANEOUS at 04:59

## 2017-08-31 RX ADMIN — Medication 25 MILLIGRAM(S): at 08:21

## 2017-08-31 RX ADMIN — Medication 40 MILLIGRAM(S): at 13:23

## 2017-08-31 NOTE — PROGRESS NOTE ADULT - ASSESSMENT
82 F w/ Anxiety, IBS , breast can s/p lumpectomy and radiation, MVP, p/w diarrhea x 6 day, CT abdomen with prominent wall of transverse colon.
83 yo F w/ possible L sided colitis likely infectious- diarrhea abd. pain now resolved.  currently asymptomatic.  Stool Cx, C diff negative  Possible allergic reaction to Abx- already D/C'd  No need for further Abx at this time.  Regular diet.  Will sign off- please call with additional questions.

## 2017-08-31 NOTE — PROGRESS NOTE ADULT - SUBJECTIVE AND OBJECTIVE BOX
Patient is a 82y old  Female who presents with a chief complaint of Diarrhea x 6 days (30 Aug 2017 06:34)      SUBJECTIVE / OVERNIGHT EVENTS:  abdominal pain and diarrhea improving, but feels throat swelling, lips swelling.  no cp, no sob.  overall feels weak.  no nv/d.       Vital Signs Last 24 Hrs  T(C): 36.7 (31 Aug 2017 07:52), Max: 36.7 (30 Aug 2017 21:13)  T(F): 98.1 (31 Aug 2017 07:52), Max: 98.1 (31 Aug 2017 07:52)  HR: 64 (31 Aug 2017 07:52) (58 - 65)  BP: 164/85 (31 Aug 2017 07:52) (153/77 - 164/85)  BP(mean): --  RR: 18 (31 Aug 2017 07:52) (18 - 18)  SpO2: 96% (31 Aug 2017 07:52) (96% - 97%)  I&O's Summary    30 Aug 2017 07:01  -  31 Aug 2017 07:00  --------------------------------------------------------  IN: 360 mL / OUT: 0 mL / NET: 360 mL        PHYSICAL EXAM:  GENERAL: NAD, well-developed, AAOx3  HEAD:  Atraumatic, Normocephalic  EYES: EOMI, PERRLA, conjunctiva and sclera clear  NECK: Supple, No JVD  CHEST/LUNG: Clear to auscultation bilaterally; No wheeze  HEART: Regular rate and rhythm; No murmurs, rubs, or gallops  ABDOMEN: Soft, Nontender, Nondistended; Bowel sounds present  EXTREMITIES:  2+ Peripheral Pulses, No clubbing, cyanosis, or edema  SKIN: No rashes or lesions, lip with mild edema     LABS:                        11.9   6.7   )-----------( 222      ( 30 Aug 2017 00:18 )             35.0         137  |  99  |  11  ----------------------------<  94  3.8   |  26  |  0.66    Ca    9.5      30 Aug 2017 00:18  Phos  3.0     08-30  Mg     1.9     08-30    TPro  8.2  /  Alb  4.1  /  TBili  0.5  /  DBili  x   /  AST  19  /  ALT  15  /  AlkPhos  90  08-30      CAPILLARY BLOOD GLUCOSE            Urinalysis Basic - ( 30 Aug 2017 00:26 )    Color: x / Appearance: Clear / S.005 / pH: x  Gluc: x / Ketone: Negative  / Bili: Negative / Urobili: Negative   Blood: x / Protein: Negative / Nitrite: Negative   Leuk Esterase: Small / RBC: 0-2 /HPF / WBC 3-5 /HPF   Sq Epi: x / Non Sq Epi: OCC /HPF / Bacteria: x        RADIOLOGY & ADDITIONAL TESTS:    Imaging Personally Reviewed:  [x] YES  [ ] NO    Consultant(s) Notes Reviewed:  [x] YES  [ ] NO      MEDICATIONS  (STANDING):  heparin  Injectable 5000 Unit(s) SubCutaneous every 12 hours  famotidine    Tablet 20 milliGRAM(s) Oral daily  nadolol 10 milliGRAM(s) Oral at bedtime  FLUoxetine 60 milliGRAM(s) Oral daily  fluticasone propionate 50 MICROgram(s)/spray Nasal Spray 1 Spray(s) Both Nostrils daily  methylPREDNISolone sodium succinate Injectable 40 milliGRAM(s) IV Push once    MEDICATIONS  (PRN):  acetaminophen   Tablet. 650 milliGRAM(s) Oral every 6 hours PRN Mild Pain (1 - 3)  ondansetron Injectable 4 milliGRAM(s) IV Push every 6 hours PRN Nausea  diazepam    Tablet 5 milliGRAM(s) Oral daily PRN anxiety, spasms      Care Discussed with Consultants/Other Providers [x] YES  [ ] NO    HEALTH ISSUES - PROBLEM Dx:  Need for prophylactic measure: Need for prophylactic measure  Uveitis: Uveitis  Depression: Depression  Anxiety: Anxiety  Mitral Valve Prolapse: Mitral Valve Prolapse  Diarrhea: Diarrhea Patient is a 82y old  Female who presents with a chief complaint of Diarrhea x 6 days (30 Aug 2017 06:34)      SUBJECTIVE / OVERNIGHT EVENTS:  abdominal pain and diarrhea improving, but feels throat swelling, lips swelling.  no cp, no sob.  overall feels weak.  no nv/d.       Vital Signs Last 24 Hrs  T(C): 36.7 (31 Aug 2017 07:52), Max: 36.7 (30 Aug 2017 21:13)  T(F): 98.1 (31 Aug 2017 07:52), Max: 98.1 (31 Aug 2017 07:52)  HR: 64 (31 Aug 2017 07:52) (58 - 65)  BP: 164/85 (31 Aug 2017 07:52) (153/77 - 164/85)  BP(mean): --  RR: 18 (31 Aug 2017 07:52) (18 - 18)  SpO2: 96% (31 Aug 2017 07:52) (96% - 97%)  I&O's Summary    30 Aug 2017 07:01  -  31 Aug 2017 07:00  --------------------------------------------------------  IN: 360 mL / OUT: 0 mL / NET: 360 mL        PHYSICAL EXAM:  GENERAL: NAD, well-developed, AAOx3  HEAD:  Atraumatic, Normocephalic  EYES: EOMI, PERRLA, conjunctiva and sclera clear  NECK: Supple, No JVD, no throat erythema, no throat edema, no tongue edema  CHEST/LUNG: Clear to auscultation bilaterally; No wheeze  HEART: Regular rate and rhythm; No murmurs, rubs, or gallops  ABDOMEN: Soft, Nontender, Nondistended; Bowel sounds present  EXTREMITIES:  2+ Peripheral Pulses, No clubbing, cyanosis, or edema  SKIN: No rashes or lesions, lip with mild edema     LABS:                        11.9   6.7   )-----------( 222      ( 30 Aug 2017 00:18 )             35.0     08-30    137  |  99  |  11  ----------------------------<  94  3.8   |  26  |  0.66    Ca    9.5      30 Aug 2017 00:18  Phos  3.0     08-30  Mg     1.9     08-30    TPro  8.2  /  Alb  4.1  /  TBili  0.5  /  DBili  x   /  AST  19  /  ALT  15  /  AlkPhos  90  08-30      CAPILLARY BLOOD GLUCOSE            Urinalysis Basic - ( 30 Aug 2017 00:26 )    Color: x / Appearance: Clear / S.005 / pH: x  Gluc: x / Ketone: Negative  / Bili: Negative / Urobili: Negative   Blood: x / Protein: Negative / Nitrite: Negative   Leuk Esterase: Small / RBC: 0-2 /HPF / WBC 3-5 /HPF   Sq Epi: x / Non Sq Epi: OCC /HPF / Bacteria: x        RADIOLOGY & ADDITIONAL TESTS:    Imaging Personally Reviewed:  [x] YES  [ ] NO    Consultant(s) Notes Reviewed:  [x] YES  [ ] NO      MEDICATIONS  (STANDING):  heparin  Injectable 5000 Unit(s) SubCutaneous every 12 hours  famotidine    Tablet 20 milliGRAM(s) Oral daily  nadolol 10 milliGRAM(s) Oral at bedtime  FLUoxetine 60 milliGRAM(s) Oral daily  fluticasone propionate 50 MICROgram(s)/spray Nasal Spray 1 Spray(s) Both Nostrils daily  methylPREDNISolone sodium succinate Injectable 40 milliGRAM(s) IV Push once    MEDICATIONS  (PRN):  acetaminophen   Tablet. 650 milliGRAM(s) Oral every 6 hours PRN Mild Pain (1 - 3)  ondansetron Injectable 4 milliGRAM(s) IV Push every 6 hours PRN Nausea  diazepam    Tablet 5 milliGRAM(s) Oral daily PRN anxiety, spasms      Care Discussed with Consultants/Other Providers [x] YES  [ ] NO    HEALTH ISSUES - PROBLEM Dx:  Need for prophylactic measure: Need for prophylactic measure  Uveitis: Uveitis  Depression: Depression  Anxiety: Anxiety  Mitral Valve Prolapse: Mitral Valve Prolapse  Diarrhea: Diarrhea

## 2017-08-31 NOTE — PROGRESS NOTE ADULT - PROBLEM SELECTOR PLAN 2
continue nadolol sensation throat edema, lip swelling.   No new meds other than Zosyn she received in ED and contrast for CT chest.  pt s/p Benadryl. Will give Solumedrol x 1. Monitor closely.

## 2017-08-31 NOTE — PROGRESS NOTE ADULT - SUBJECTIVE AND OBJECTIVE BOX
INTERVAL HPI/OVERNIGHT EVENTS:  No further abdominal pain or diarrhea.  Tolerating PO diet.  Reports lip swelling and tight throat overnight. s/p IV benadryl    MEDICATIONS  (STANDING):  heparin  Injectable 5000 Unit(s) SubCutaneous every 12 hours  famotidine    Tablet 20 milliGRAM(s) Oral daily  nadolol 10 milliGRAM(s) Oral at bedtime  FLUoxetine 60 milliGRAM(s) Oral daily  fluticasone propionate 50 MICROgram(s)/spray Nasal Spray 1 Spray(s) Both Nostrils daily  methylPREDNISolone sodium succinate Injectable 40 milliGRAM(s) IV Push once    MEDICATIONS  (PRN):  acetaminophen   Tablet. 650 milliGRAM(s) Oral every 6 hours PRN Mild Pain (1 - 3)  ondansetron Injectable 4 milliGRAM(s) IV Push every 6 hours PRN Nausea  diazepam    Tablet 5 milliGRAM(s) Oral daily PRN anxiety, spasms      Allergies    ciprofloxacin (Other)  codeine (Nausea; Other)  fluorescein ophthalmic (Hives; Rash; Flushing (Skin))  latex (Anaphylaxis)  Levaquin (Nausea; Other)  SULFA (Anaphylaxis)  tetracycline (Anaphylaxis)            Vital Signs Last 24 Hrs  T(C): 37 (31 Aug 2017 12:30), Max: 37 (31 Aug 2017 12:30)  T(F): 98.6 (31 Aug 2017 12:30), Max: 98.6 (31 Aug 2017 12:30)  HR: 61 (31 Aug 2017 12:30) (58 - 65)  BP: 146/72 (31 Aug 2017 12:30) (146/72 - 164/85)  BP(mean): --  RR: 18 (31 Aug 2017 12:30) (18 - 18)  SpO2: 95% (31 Aug 2017 12:30) (95% - 97%)    PHYSICAL EXAM:      Constitutional: NAD, well-developed  HEENT: anicteric  Respiratory: clear anteriorly; no stridor  Gastrointestinal: soft; NTND  Extremities: No peripheral edema, neg clubing, cyanosis  Neurological: A/O x 3, no focal deficits  Psychiatric: Normal mood, normal affect    LABS:                        11.9   6.7   )-----------( 222      ( 30 Aug 2017 00:18 )             35.0     Hemoglobin: 11.9 g/dL (08-30 @ 00:18)      08-30    137  |  99  |  11  ----------------------------<  94  3.8   |  26  |  0.66    Ca    9.5      30 Aug 2017 00:18  Phos  3.0     08-30  Mg     1.9     08-30    TPro  8.2  /  Alb  4.1  /  TBili  0.5  /  DBili  x   /  AST  19  /  ALT  15  /  AlkPhos  90  08-30    Bilirubin Total, Serum: 0.5 mg/dL (08-30 @ 00:18)      Aspartate Aminotransferase (AST/SGOT): 19 U/L (08-30 @ 00:18)    Alanine Aminotransferase (ALT/SGPT): 15 U/L RC (08-30 @ 00:18)        Lipase, Serum: 37 U/L (08-30 @ 00:18)    RADIOLOGY & ADDITIONAL TESTS:

## 2017-08-31 NOTE — PROGRESS NOTE ADULT - PROBLEM SELECTOR PLAN 1
c diff negative, labs without  leukocytosis,  findings on CT scan not definitive, patient received zosyn and flagyl in ED, will hold further antibiotics and monitor clinically.  GI eval appreciated.  advance diet as tolerated   f/u stool cultures   f/u  blood cultures obtained by ED

## 2017-09-01 ENCOUNTER — TRANSCRIPTION ENCOUNTER (OUTPATIENT)
Age: 82
End: 2017-09-01

## 2017-09-01 VITALS
RESPIRATION RATE: 18 BRPM | OXYGEN SATURATION: 97 % | TEMPERATURE: 97 F | DIASTOLIC BLOOD PRESSURE: 64 MMHG | SYSTOLIC BLOOD PRESSURE: 113 MMHG | HEART RATE: 57 BPM

## 2017-09-01 PROCEDURE — 83690 ASSAY OF LIPASE: CPT

## 2017-09-01 PROCEDURE — 99285 EMERGENCY DEPT VISIT HI MDM: CPT | Mod: 25

## 2017-09-01 PROCEDURE — 87046 STOOL CULTR AEROBIC BACT EA: CPT

## 2017-09-01 PROCEDURE — 96374 THER/PROPH/DIAG INJ IV PUSH: CPT | Mod: XU

## 2017-09-01 PROCEDURE — 80053 COMPREHEN METABOLIC PANEL: CPT

## 2017-09-01 PROCEDURE — 84132 ASSAY OF SERUM POTASSIUM: CPT

## 2017-09-01 PROCEDURE — 85014 HEMATOCRIT: CPT

## 2017-09-01 PROCEDURE — 84295 ASSAY OF SERUM SODIUM: CPT

## 2017-09-01 PROCEDURE — 82435 ASSAY OF BLOOD CHLORIDE: CPT

## 2017-09-01 PROCEDURE — 82947 ASSAY GLUCOSE BLOOD QUANT: CPT

## 2017-09-01 PROCEDURE — 87449 NOS EACH ORGANISM AG IA: CPT

## 2017-09-01 PROCEDURE — 87045 FECES CULTURE AEROBIC BACT: CPT

## 2017-09-01 PROCEDURE — 87324 CLOSTRIDIUM AG IA: CPT

## 2017-09-01 PROCEDURE — 83605 ASSAY OF LACTIC ACID: CPT

## 2017-09-01 PROCEDURE — 81001 URINALYSIS AUTO W/SCOPE: CPT

## 2017-09-01 PROCEDURE — 82803 BLOOD GASES ANY COMBINATION: CPT

## 2017-09-01 PROCEDURE — G0378: CPT

## 2017-09-01 PROCEDURE — 87040 BLOOD CULTURE FOR BACTERIA: CPT

## 2017-09-01 PROCEDURE — 82330 ASSAY OF CALCIUM: CPT

## 2017-09-01 PROCEDURE — 85027 COMPLETE CBC AUTOMATED: CPT

## 2017-09-01 PROCEDURE — 74177 CT ABD & PELVIS W/CONTRAST: CPT

## 2017-09-01 PROCEDURE — 84100 ASSAY OF PHOSPHORUS: CPT

## 2017-09-01 PROCEDURE — 83735 ASSAY OF MAGNESIUM: CPT

## 2017-09-01 RX ORDER — DIPHENHYDRAMINE HCL 50 MG
1 CAPSULE ORAL
Qty: 9 | Refills: 0
Start: 2017-09-01 | End: 2017-09-04

## 2017-09-01 RX ORDER — DIPHENHYDRAMINE HCL 50 MG
25 CAPSULE ORAL EVERY 6 HOURS
Qty: 0 | Refills: 0 | Status: DISCONTINUED | OUTPATIENT
Start: 2017-09-01 | End: 2017-09-01

## 2017-09-01 RX ADMIN — FAMOTIDINE 20 MILLIGRAM(S): 10 INJECTION INTRAVENOUS at 11:21

## 2017-09-01 RX ADMIN — Medication 25 MILLIGRAM(S): at 11:21

## 2017-09-01 NOTE — CHART NOTE - NSCHARTNOTEFT_GEN_A_CORE
Pt seen and examined at bedside. Feels well.   No chest pain, no shortness of breath.   No overnight event.   No N/V/D. No abdominal pain.   Tolerating diet. Resolved diarrhea.   lip edema resolved.   Want to go home.    d/c planning.    - Dr. JODY Jackman ([facility])  - [phone]

## 2017-09-01 NOTE — DISCHARGE NOTE ADULT - PLAN OF CARE
improved resolved with conservative measures . follow up with PMD stable with valium stable with flonase stable with Nadol

## 2017-09-01 NOTE — DISCHARGE NOTE ADULT - CARE PROVIDER_API CALL
Criselda Cardona), Internal Medicine  [address] [place] [address]  Phone: [phone]  Fax: [phone]

## 2017-09-01 NOTE — DISCHARGE NOTE ADULT - HOSPITAL COURSE
81 y/o F w/ PMHx of IBS, HTN, Asthma, h/o Breast CA s/p lumpectomy and radiation, Anxiety, and GERD presenting with Diarrhea for the past 6 days.  8/30: abd pain possible due to colitis --f/u stool studies ( CT [place] showing possible colitis)           GI following.     8/31: ABD pain resolved --diet advanced.            Allergic reaction possible from Zosyn vs contast ---IV steriod +benadryl given  9/1/17 pt feeling better and follow up with pmd [facility] Discharge Summary     82 F w/ Anxiety, IBS, breast Ca s/p lumpectomy and radiation, MVP, p/w diarrhea x 6 day, CT abdomen with prominent wall of transverse colon.   GI symptoms started after eating chicken soup.  Suspect gastroenteritis.   c diff negative, labs without leukocytosis. Findings on CT scan not definitive. Pt was monitored off antibiotics. [facility] was on the case.   Advance diet as tolerated. Pt was able to tolerate regular diet prior to discharge.    Of note, pt was noted to have sensation of throat edema/itchness, lower lip swelling. Suspected etiology could be either IV contrast nor Zosyn.   No new meds other than Zosyn she received in ED and contrast for CT chest.  Pt was treated with Benadryl and one dose of IV Solumedrolcontinue nadolol with resolution of symptoms. Pt was advised to visit Allergy/Immunologist given numerous allergies in her profile.    More than 30 mins were spent evaluating patient and coordinating care for discharge.

## 2017-09-01 NOTE — DISCHARGE NOTE ADULT - CARE PLAN
Principal Discharge DX:	Colitis  Goal:	improved  Instructions for follow-up, activity and diet:	resolved with conservative measures .  Secondary Diagnosis:	Breast cancer  Instructions for follow-up, activity and diet:	follow up with PMD  Secondary Diagnosis:	Anxiety  Instructions for follow-up, activity and diet:	stable with valium  Secondary Diagnosis:	Asthma  Instructions for follow-up, activity and diet:	stable with flonase  Secondary Diagnosis:	Essential hypertension  Instructions for follow-up, activity and diet:	stable with Nadol

## 2017-09-01 NOTE — DISCHARGE NOTE ADULT - PATIENT PORTAL LINK FT
“You can access the [facility] Patient Portal, offered by [facility], by registering with the following website: [email or web address]”

## 2017-09-01 NOTE — DISCHARGE NOTE ADULT - MEDICATION SUMMARY - MEDICATIONS TO TAKE
I will START or STAY ON the medications listed below when I get home from the hospital:    Prilosec 20 mg oral delayed release capsule  -- 1   once a day   -- Indication: For gerd    Nasacort AQ 55 mcg/inh nasal spray  -- 1   2 times a day   -- Indication: For Allergies    diazepam 5 mg oral tablet  -- 1 tab(s) by mouth once a day, As Needed  -- 1/2 tablet 3 times a day as needed  -- Indication: For Anxiety    Durezol  -- 1 gtt optical  2 times a day   -- one drop each eye  -- Indication: For Eye    Tylenol 325 mg oral tablet  -- 2 tab(s) by mouth every 4 hours, As Needed  -- Indication: For Mild pain    FLUoxetine 20 mg oral capsule  -- 3 cap(s) by mouth once a day  -- Indication: For Depression    diphenhydrAMINE 25 mg oral capsule  -- 1 cap(s) by mouth every 8 hours, As Needed, Rash and/or Itching  -- Indication: For Allergic state, initial encounter    nadolol  -- 10 milligram(s) by mouth once a day (at bedtime)  -- Indication: For htn

## 2017-09-02 LAB
CULTURE RESULTS: SIGNIFICANT CHANGE UP
SPECIMEN SOURCE: SIGNIFICANT CHANGE UP

## 2017-09-04 LAB
CULTURE RESULTS: SIGNIFICANT CHANGE UP
CULTURE RESULTS: SIGNIFICANT CHANGE UP
SPECIMEN SOURCE: SIGNIFICANT CHANGE UP
SPECIMEN SOURCE: SIGNIFICANT CHANGE UP

## 2018-01-10 ENCOUNTER — OUTPATIENT (OUTPATIENT)
Dept: OUTPATIENT SERVICES | Facility: HOSPITAL | Age: 83
LOS: 1 days | Discharge: HOME | End: 2018-01-10

## 2018-01-10 DIAGNOSIS — C50.912 MALIGNANT NEOPLASM OF UNSPECIFIED SITE OF LEFT FEMALE BREAST: ICD-10-CM

## 2018-01-12 ENCOUNTER — RESULT REVIEW (OUTPATIENT)
Age: 83
End: 2018-01-12

## 2018-01-12 ENCOUNTER — APPOINTMENT (OUTPATIENT)
Dept: HEMATOLOGY ONCOLOGY | Facility: CLINIC | Age: 83
End: 2018-01-12
Payer: MEDICARE

## 2018-01-12 VITALS
WEIGHT: 152.34 LBS | DIASTOLIC BLOOD PRESSURE: 66 MMHG | BODY MASS INDEX: 23.86 KG/M2 | OXYGEN SATURATION: 98 % | TEMPERATURE: 208.94 F | SYSTOLIC BLOOD PRESSURE: 132 MMHG | HEART RATE: 57 BPM | RESPIRATION RATE: 16 BRPM

## 2018-01-12 DIAGNOSIS — D17.1 BENIGN LIPOMATOUS NEOPLASM OF SKIN AND SUBCUTANEOUS TISSUE OF TRUNK: ICD-10-CM

## 2018-01-12 LAB
HCT VFR BLD CALC: 32.4 % — LOW (ref 34.5–45)
HGB BLD-MCNC: 11.2 G/DL — LOW (ref 11.5–15.5)
MCHC RBC-ENTMCNC: 30.8 PG — SIGNIFICANT CHANGE UP (ref 27–34)
MCHC RBC-ENTMCNC: 34.5 G/DL — SIGNIFICANT CHANGE UP (ref 32–36)
MCV RBC AUTO: 89.4 FL — SIGNIFICANT CHANGE UP (ref 80–100)
PLATELET # BLD AUTO: 215 K/UL — SIGNIFICANT CHANGE UP (ref 150–400)
RBC # BLD: 3.62 M/UL — LOW (ref 3.8–5.2)
RBC # FLD: 12.9 % — SIGNIFICANT CHANGE UP (ref 10.3–14.5)
WBC # BLD: 5.7 K/UL — SIGNIFICANT CHANGE UP (ref 3.8–10.5)
WBC # FLD AUTO: 5.7 K/UL — SIGNIFICANT CHANGE UP (ref 3.8–10.5)

## 2018-01-12 PROCEDURE — 99214 OFFICE O/P EST MOD 30 MIN: CPT

## 2018-01-16 LAB
ALBUMIN SERPL ELPH-MCNC: 3.8 G/DL
ALP BLD-CCNC: 102 U/L
ALT SERPL-CCNC: 27 U/L
ANION GAP SERPL CALC-SCNC: 11 MMOL/L
AST SERPL-CCNC: 29 U/L
BILIRUB SERPL-MCNC: 0.4 MG/DL
BUN SERPL-MCNC: 21 MG/DL
CALCIUM SERPL-MCNC: 9.1 MG/DL
CHLORIDE SERPL-SCNC: 98 MMOL/L
CO2 SERPL-SCNC: 27 MMOL/L
CREAT SERPL-MCNC: 0.71 MG/DL
GLUCOSE SERPL-MCNC: 85 MG/DL
POTASSIUM SERPL-SCNC: 4.5 MMOL/L
PROT SERPL-MCNC: 7.1 G/DL
SODIUM SERPL-SCNC: 136 MMOL/L

## 2018-06-19 ENCOUNTER — FORM ENCOUNTER (OUTPATIENT)
Age: 83
End: 2018-06-19

## 2018-06-20 ENCOUNTER — OUTPATIENT (OUTPATIENT)
Dept: OUTPATIENT SERVICES | Facility: HOSPITAL | Age: 83
LOS: 1 days | End: 2018-06-20
Payer: MEDICARE

## 2018-06-20 ENCOUNTER — APPOINTMENT (OUTPATIENT)
Dept: MAMMOGRAPHY | Facility: IMAGING CENTER | Age: 83
End: 2018-06-20
Payer: MEDICARE

## 2018-06-20 DIAGNOSIS — Z17.1 ESTROGEN RECEPTOR NEGATIVE STATUS [ER-]: ICD-10-CM

## 2018-06-20 DIAGNOSIS — C50.912 MALIGNANT NEOPLASM OF UNSPECIFIED SITE OF LEFT FEMALE BREAST: ICD-10-CM

## 2018-06-20 PROCEDURE — 77067 SCR MAMMO BI INCL CAD: CPT | Mod: 26

## 2018-06-20 PROCEDURE — 77063 BREAST TOMOSYNTHESIS BI: CPT

## 2018-06-20 PROCEDURE — 77067 SCR MAMMO BI INCL CAD: CPT

## 2018-06-20 PROCEDURE — 77063 BREAST TOMOSYNTHESIS BI: CPT | Mod: 26

## 2018-06-20 NOTE — ED ADULT NURSE NOTE - CAS DISCH CONDITION
Pharmacy needs clarification of the medication and dosage please.  Thank you.  
Spoke to pharmacy and clarified Flagyl is BID with #14      
Stable

## 2018-07-13 ENCOUNTER — OUTPATIENT (OUTPATIENT)
Dept: OUTPATIENT SERVICES | Facility: HOSPITAL | Age: 83
LOS: 1 days | Discharge: HOME | End: 2018-07-13

## 2018-07-13 DIAGNOSIS — C50.912 MALIGNANT NEOPLASM OF UNSPECIFIED SITE OF LEFT FEMALE BREAST: ICD-10-CM

## 2018-07-19 ENCOUNTER — LABORATORY RESULT (OUTPATIENT)
Age: 83
End: 2018-07-19

## 2018-07-19 ENCOUNTER — RESULT REVIEW (OUTPATIENT)
Age: 83
End: 2018-07-19

## 2018-07-19 ENCOUNTER — APPOINTMENT (OUTPATIENT)
Dept: HEMATOLOGY ONCOLOGY | Facility: CLINIC | Age: 83
End: 2018-07-19
Payer: MEDICARE

## 2018-07-19 VITALS
WEIGHT: 154.32 LBS | BODY MASS INDEX: 24.17 KG/M2 | HEART RATE: 58 BPM | SYSTOLIC BLOOD PRESSURE: 120 MMHG | TEMPERATURE: 208.76 F | DIASTOLIC BLOOD PRESSURE: 70 MMHG | RESPIRATION RATE: 16 BRPM

## 2018-07-19 DIAGNOSIS — R49.0 DYSPHONIA: ICD-10-CM

## 2018-07-19 LAB
HCT VFR BLD CALC: 32 % — LOW (ref 34.5–45)
HGB BLD-MCNC: 10.8 G/DL — LOW (ref 11.5–15.5)
MCHC RBC-ENTMCNC: 30.1 PG — SIGNIFICANT CHANGE UP (ref 27–34)
MCHC RBC-ENTMCNC: 33.7 G/DL — SIGNIFICANT CHANGE UP (ref 32–36)
MCV RBC AUTO: 89.2 FL — SIGNIFICANT CHANGE UP (ref 80–100)
PLATELET # BLD AUTO: 186 K/UL — SIGNIFICANT CHANGE UP (ref 150–400)
RBC # BLD: 3.59 M/UL — LOW (ref 3.8–5.2)
RBC # FLD: 12.7 % — SIGNIFICANT CHANGE UP (ref 10.3–14.5)
WBC # BLD: 4.8 K/UL — SIGNIFICANT CHANGE UP (ref 3.8–10.5)
WBC # FLD AUTO: 4.8 K/UL — SIGNIFICANT CHANGE UP (ref 3.8–10.5)

## 2018-07-19 PROCEDURE — 99215 OFFICE O/P EST HI 40 MIN: CPT

## 2018-07-23 LAB
ALBUMIN SERPL ELPH-MCNC: 3.7 G/DL
ALP BLD-CCNC: 92 U/L
ALT SERPL-CCNC: 15 U/L
ANION GAP SERPL CALC-SCNC: 13 MMOL/L
AST SERPL-CCNC: 19 U/L
BILIRUB SERPL-MCNC: 0.4 MG/DL
BUN SERPL-MCNC: 14 MG/DL
CALCIUM SERPL-MCNC: 9.1 MG/DL
CANCER AG27-29 SERPL-ACNC: 22.4 U/ML
CEA SERPL-MCNC: 2.1 NG/ML
CHLORIDE SERPL-SCNC: 100 MMOL/L
CO2 SERPL-SCNC: 27 MMOL/L
CREAT SERPL-MCNC: 0.64 MG/DL
GLUCOSE SERPL-MCNC: 71 MG/DL
POTASSIUM SERPL-SCNC: 4.3 MMOL/L
PROT SERPL-MCNC: 7 G/DL
SODIUM SERPL-SCNC: 140 MMOL/L

## 2018-09-04 ENCOUNTER — APPOINTMENT (OUTPATIENT)
Dept: PHYSICAL MEDICINE AND REHAB | Facility: CLINIC | Age: 83
End: 2018-09-04

## 2018-11-20 ENCOUNTER — APPOINTMENT (OUTPATIENT)
Dept: PHYSICAL MEDICINE AND REHAB | Facility: CLINIC | Age: 83
End: 2018-11-20
Payer: MEDICARE

## 2018-11-20 VITALS — HEART RATE: 57 BPM | SYSTOLIC BLOOD PRESSURE: 160 MMHG | DIASTOLIC BLOOD PRESSURE: 80 MMHG | OXYGEN SATURATION: 94 %

## 2018-11-20 DIAGNOSIS — M54.2 CERVICALGIA: ICD-10-CM

## 2018-11-20 PROCEDURE — 99204 OFFICE O/P NEW MOD 45 MIN: CPT

## 2018-11-30 ENCOUNTER — FORM ENCOUNTER (OUTPATIENT)
Age: 83
End: 2018-11-30

## 2018-12-01 ENCOUNTER — APPOINTMENT (OUTPATIENT)
Dept: MRI IMAGING | Facility: IMAGING CENTER | Age: 83
End: 2018-12-01
Payer: MEDICARE

## 2018-12-01 ENCOUNTER — OUTPATIENT (OUTPATIENT)
Dept: OUTPATIENT SERVICES | Facility: HOSPITAL | Age: 83
LOS: 1 days | End: 2018-12-01
Payer: MEDICARE

## 2018-12-01 DIAGNOSIS — M54.2 CERVICALGIA: ICD-10-CM

## 2018-12-01 DIAGNOSIS — R51 HEADACHE: ICD-10-CM

## 2018-12-01 PROCEDURE — 72141 MRI NECK SPINE W/O DYE: CPT | Mod: 26

## 2018-12-01 PROCEDURE — 70551 MRI BRAIN STEM W/O DYE: CPT | Mod: 26

## 2018-12-01 PROCEDURE — 72141 MRI NECK SPINE W/O DYE: CPT

## 2018-12-01 PROCEDURE — 70551 MRI BRAIN STEM W/O DYE: CPT

## 2018-12-10 ENCOUNTER — OTHER (OUTPATIENT)
Age: 83
End: 2018-12-10

## 2019-01-04 ENCOUNTER — APPOINTMENT (OUTPATIENT)
Dept: PHYSICAL MEDICINE AND REHAB | Facility: CLINIC | Age: 84
End: 2019-01-04

## 2019-01-12 ENCOUNTER — OUTPATIENT (OUTPATIENT)
Dept: OUTPATIENT SERVICES | Facility: HOSPITAL | Age: 84
LOS: 1 days | Discharge: HOME | End: 2019-01-12

## 2019-01-12 DIAGNOSIS — C50.912 MALIGNANT NEOPLASM OF UNSPECIFIED SITE OF LEFT FEMALE BREAST: ICD-10-CM

## 2019-01-17 ENCOUNTER — APPOINTMENT (OUTPATIENT)
Dept: HEMATOLOGY ONCOLOGY | Facility: CLINIC | Age: 84
End: 2019-01-17
Payer: MEDICARE

## 2019-01-17 VITALS
DIASTOLIC BLOOD PRESSURE: 72 MMHG | OXYGEN SATURATION: 99 % | SYSTOLIC BLOOD PRESSURE: 180 MMHG | TEMPERATURE: 208.22 F | WEIGHT: 157.63 LBS | RESPIRATION RATE: 16 BRPM | BODY MASS INDEX: 24.69 KG/M2 | HEART RATE: 57 BPM

## 2019-01-17 PROCEDURE — 99213 OFFICE O/P EST LOW 20 MIN: CPT

## 2019-01-17 NOTE — REVIEW OF SYSTEMS
[Shortness Of Breath] : shortness of breath [Negative] : Allergic/Immunologic [Abdominal Pain] : no abdominal pain [FreeTextEntry2] : Still  tired and weak occasionally, depression has improved,  still has  occasional spurts of energy. S/P flu vaccination 12/2018. [FreeTextEntry3] : Saw ophthalmologist Dr Maldonado 11/15/2018 for evaluation of her bilateral cataract. Surgery on hold. Next f/u 03/2018 [FreeTextEntry4] : Last saw  ENT Dr Sood 04/2018 for f/u  and management of sinus infection, plus gait and balance, exam stable [FreeTextEntry6] : Occasional  cough she attributes to seasonal allergies and her sinuses. [FreeTextEntry7] : Last colonoscopy 7 years 6 months  ago, no polyps found. Appetite has improved since last seen. [FreeTextEntry8] : Last GYN exam 2/2014, no bleeding or spotting. [FreeTextEntry9] : H/O arthritis. Worsening pain posterior neck radiating to skull has improved on PT [de-identified] : Last whole body skin check was 7 years  ago. [de-identified] : Depressed over 's death has improved since last seen.

## 2019-01-17 NOTE — HISTORY OF PRESENT ILLNESS
[Disease: _____________________] : Disease: [unfilled] [T: ___] : T[unfilled] [N: ___] : N[unfilled] [M: ___] : M[unfilled] [AJCC Stage: ____] : AJCC Stage: [unfilled] [de-identified] : The patient presented in 2011, age 75, any mammogram with an abnormal screening mammogram.\par \par \par \par In April 21, 2011 patient had an ultrasound-guided biopsy of the left breast which was positive for poorly differentiated infiltrating ductal carcinoma with a Bre score of 9/9 which was healing negative, DC negative, HER-2/radha positive by fish (3.4).\par \par \par \par On May 11, 2011 Dr. Hilton Koroma performed a lumpectomy and sentinel lymph node biopsy at [facility]. The patient had a single focus of infiltrating ductal carcinoma measuring 0.4 cm with a [place] score of 9/9. There were 2 negative sentinel lymph nodes\par \par \par \par As part of the patient's staging she underwent a PET CT scan performed by Dr. Cooper Nagy which demonstrated metabolically active round densities in left para-aortic region felt to be suspicious for possible lymphoma. There was also a tiny right axillary lymph node adjacent to the rib cage and activity in the subpectoral region of the left.\par \par \par \par The patient received one cycle of Taxotere Cytoxan and Herceptin on June 22, 2011. This led to admission to [facility] with neutropenia and fever. The patient one additional dose of single agent Herceptin.\par \par \par \par The patient transferred her care to this office.On September 20, 2011 FNA of the right axillary lymph node demonstrated a pleomorphic population of lymphocytes and was negative for carcinoma. On October 19, 2011 she started weekly Taxol Herceptin which she was unable to tolerate after the second dose. She continued Herceptin every 3 weeks through June 25, 2012  \par The patient received radiation therapy under the supervision of Dr. Margaret Durbin at [facility] which was completed on January 31, 2012.  .\par \par \par \par The PET/CT findings have remained unchanged. [de-identified] : Poorly  differentiated infiltrating ductal carcinoma ER negative MA negative HER-2/radha positive [de-identified] : The patient has been 7  years 9  months post breast cancer diagnosis.\par \par 06/20/2018 - BILATERAL BREAST MAMMOGRAM AND SONOGRAM - BI RADS 2\par \par Last saw her PCP Criselda Rodrigues 11/1/2018 for treatment  of now resolved  pneumonia, treated with Z-pack..  Was again treated  for "head cold" 11/13/2018, seen again for f/u 12/14/2018 was  treated with a second round of antibiotics which gave her abdominal pain.The patient state she was advised by her PCP to make a f/u appointment with her pulmonologist.\par \par Last saw pulmonologist Dr Ofelia Mcgowan 09/20/2016 for f/u RUL nodule seen on PET CT dated back  01/04/2016.The patient states  she was advised 02/03/2017 that her  f/u  CT CHEST W/O CON dated 01/28/2017 was stable.The patient states she will make a f/u appointment.\par \par Last saw cardiologist  Dr Whitfield 02/12/2018 for f/u and management of MVP, exam stable. The patient is S/P Herceptin. Has f/u appointment  02/2019\par \par The patient report  decreased  pain in her neck since last seen,  currently in  pt twice weekly, to complete 02/2019.\par The patient is associating her neck pain  to her fall 02/2016, when she sustained injury to head and neck, also feels her arthritis is exacerbating the pain.\par \par The patient states her  intermittent hoarse voice since [id number] has improved, feels it's from her allergies and her sinuses. \par \par Still  mildly depressed  over the death of her  and death  of son in law 10/31/2016.\par \par Overall the patient feels depression has improved since last seen.\par \par No other interval event since last visit.\par \par \par

## 2019-01-17 NOTE — PHYSICAL EXAM
[Ambulatory and capable of all self care but unable to carry out any work activities] : Status 2- Ambulatory and capable of all self care but unable to carry out any work activities. Up and about more than 50% of waking hours [Normal] : normal appearance, no rash, nodules, vesicles, ulcers, erythema [de-identified] : Elderly, appears chronically ill [de-identified] : 5/6 JIMMY, loudest at [employer] [de-identified] : Bilateral varicose veins,no pretibial  edema bilaterally, no calf tenderness. [de-identified] : Asymmetry, right breast larger than left. Right breast: no masses.  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 [de-identified] : Appears depressed

## 2019-03-16 ENCOUNTER — EMERGENCY (EMERGENCY)
Facility: HOSPITAL | Age: 84
LOS: 1 days | End: 2019-03-16
Attending: EMERGENCY MEDICINE
Payer: MEDICARE

## 2019-03-16 VITALS
HEART RATE: 60 BPM | RESPIRATION RATE: 16 BRPM | SYSTOLIC BLOOD PRESSURE: 180 MMHG | OXYGEN SATURATION: 98 % | DIASTOLIC BLOOD PRESSURE: 95 MMHG

## 2019-03-16 VITALS
TEMPERATURE: 98 F | OXYGEN SATURATION: 99 % | DIASTOLIC BLOOD PRESSURE: 91 MMHG | HEART RATE: 75 BPM | SYSTOLIC BLOOD PRESSURE: 180 MMHG | RESPIRATION RATE: 20 BRPM

## 2019-03-16 LAB
ALBUMIN SERPL ELPH-MCNC: 3.8 G/DL — SIGNIFICANT CHANGE UP (ref 3.3–5)
ALP SERPL-CCNC: 86 U/L — SIGNIFICANT CHANGE UP (ref 40–120)
ALT FLD-CCNC: 39 U/L — SIGNIFICANT CHANGE UP (ref 10–45)
ANION GAP SERPL CALC-SCNC: 18 MMOL/L — HIGH (ref 5–17)
APPEARANCE UR: CLEAR — SIGNIFICANT CHANGE UP
AST SERPL-CCNC: 49 U/L — HIGH (ref 10–40)
BACTERIA # UR AUTO: 0 — SIGNIFICANT CHANGE UP
BASE EXCESS BLDV CALC-SCNC: 2.9 MMOL/L — HIGH (ref -2–2)
BASOPHILS # BLD AUTO: 0 K/UL — SIGNIFICANT CHANGE UP (ref 0–0.2)
BASOPHILS NFR BLD AUTO: 0.1 % — SIGNIFICANT CHANGE UP (ref 0–2)
BILIRUB SERPL-MCNC: 0.6 MG/DL — SIGNIFICANT CHANGE UP (ref 0.2–1.2)
BILIRUB UR-MCNC: NEGATIVE — SIGNIFICANT CHANGE UP
BUN SERPL-MCNC: 15 MG/DL — SIGNIFICANT CHANGE UP (ref 7–23)
CA-I SERPL-SCNC: 1.15 MMOL/L — SIGNIFICANT CHANGE UP (ref 1.12–1.3)
CALCIUM SERPL-MCNC: 9.5 MG/DL — SIGNIFICANT CHANGE UP (ref 8.4–10.5)
CHLORIDE BLDV-SCNC: 104 MMOL/L — SIGNIFICANT CHANGE UP (ref 96–108)
CHLORIDE SERPL-SCNC: 98 MMOL/L — SIGNIFICANT CHANGE UP (ref 96–108)
CO2 BLDV-SCNC: 26 MMOL/L — SIGNIFICANT CHANGE UP (ref 22–30)
CO2 SERPL-SCNC: 21 MMOL/L — LOW (ref 22–31)
COLOR SPEC: SIGNIFICANT CHANGE UP
CREAT SERPL-MCNC: 0.65 MG/DL — SIGNIFICANT CHANGE UP (ref 0.5–1.3)
DIFF PNL FLD: ABNORMAL
EOSINOPHIL # BLD AUTO: 0 K/UL — SIGNIFICANT CHANGE UP (ref 0–0.5)
EOSINOPHIL NFR BLD AUTO: 0.6 % — SIGNIFICANT CHANGE UP (ref 0–6)
EPI CELLS # UR: 0 /HPF — SIGNIFICANT CHANGE UP
GAS PNL BLDV: 133 MMOL/L — LOW (ref 136–145)
GAS PNL BLDV: SIGNIFICANT CHANGE UP
GAS PNL BLDV: SIGNIFICANT CHANGE UP
GLUCOSE BLDV-MCNC: 85 MG/DL — SIGNIFICANT CHANGE UP (ref 70–99)
GLUCOSE SERPL-MCNC: 89 MG/DL — SIGNIFICANT CHANGE UP (ref 70–99)
GLUCOSE UR QL: NEGATIVE — SIGNIFICANT CHANGE UP
HCO3 BLDV-SCNC: 25 MMOL/L — SIGNIFICANT CHANGE UP (ref 21–29)
HCT VFR BLD CALC: 34.7 % — SIGNIFICANT CHANGE UP (ref 34.5–45)
HCT VFR BLDA CALC: 36 % — LOW (ref 39–50)
HGB BLD CALC-MCNC: 11.7 G/DL — SIGNIFICANT CHANGE UP (ref 11.5–15.5)
HGB BLD-MCNC: 11.6 G/DL — SIGNIFICANT CHANGE UP (ref 11.5–15.5)
HYALINE CASTS # UR AUTO: 0 /LPF — SIGNIFICANT CHANGE UP (ref 0–2)
KETONES UR-MCNC: ABNORMAL
LACTATE BLDV-MCNC: 2 MMOL/L — SIGNIFICANT CHANGE UP (ref 0.7–2)
LEUKOCYTE ESTERASE UR-ACNC: NEGATIVE — SIGNIFICANT CHANGE UP
LYMPHOCYTES # BLD AUTO: 1.8 K/UL — SIGNIFICANT CHANGE UP (ref 1–3.3)
LYMPHOCYTES # BLD AUTO: 34.5 % — SIGNIFICANT CHANGE UP (ref 13–44)
MAGNESIUM SERPL-MCNC: 1.7 MG/DL — SIGNIFICANT CHANGE UP (ref 1.6–2.6)
MCHC RBC-ENTMCNC: 29.5 PG — SIGNIFICANT CHANGE UP (ref 27–34)
MCHC RBC-ENTMCNC: 33.3 GM/DL — SIGNIFICANT CHANGE UP (ref 32–36)
MCV RBC AUTO: 88.6 FL — SIGNIFICANT CHANGE UP (ref 80–100)
MONOCYTES # BLD AUTO: 0.6 K/UL — SIGNIFICANT CHANGE UP (ref 0–0.9)
MONOCYTES NFR BLD AUTO: 11.4 % — SIGNIFICANT CHANGE UP (ref 2–14)
NEUTROPHILS # BLD AUTO: 2.9 K/UL — SIGNIFICANT CHANGE UP (ref 1.8–7.4)
NEUTROPHILS NFR BLD AUTO: 53.5 % — SIGNIFICANT CHANGE UP (ref 43–77)
NITRITE UR-MCNC: NEGATIVE — SIGNIFICANT CHANGE UP
PCO2 BLDV: 30 MMHG — LOW (ref 35–50)
PH BLDV: 7.53 — HIGH (ref 7.35–7.45)
PH UR: 6 — SIGNIFICANT CHANGE UP (ref 5–8)
PHOSPHATE SERPL-MCNC: 2.4 MG/DL — LOW (ref 2.5–4.5)
PLATELET # BLD AUTO: 231 K/UL — SIGNIFICANT CHANGE UP (ref 150–400)
PO2 BLDV: 24 MMHG — LOW (ref 25–45)
POTASSIUM BLDV-SCNC: 3.8 MMOL/L — SIGNIFICANT CHANGE UP (ref 3.5–5.3)
POTASSIUM SERPL-MCNC: 3.8 MMOL/L — SIGNIFICANT CHANGE UP (ref 3.5–5.3)
POTASSIUM SERPL-SCNC: 3.8 MMOL/L — SIGNIFICANT CHANGE UP (ref 3.5–5.3)
PROT SERPL-MCNC: 7.6 G/DL — SIGNIFICANT CHANGE UP (ref 6–8.3)
PROT UR-MCNC: NEGATIVE — SIGNIFICANT CHANGE UP
RAPID RVP RESULT: SIGNIFICANT CHANGE UP
RBC # BLD: 3.92 M/UL — SIGNIFICANT CHANGE UP (ref 3.8–5.2)
RBC # FLD: 12.7 % — SIGNIFICANT CHANGE UP (ref 10.3–14.5)
RBC CASTS # UR COMP ASSIST: 2 /HPF — SIGNIFICANT CHANGE UP (ref 0–4)
SAO2 % BLDV: 47 % — LOW (ref 67–88)
SODIUM SERPL-SCNC: 137 MMOL/L — SIGNIFICANT CHANGE UP (ref 135–145)
SP GR SPEC: 1.01 — SIGNIFICANT CHANGE UP (ref 1.01–1.02)
UROBILINOGEN FLD QL: NEGATIVE — SIGNIFICANT CHANGE UP
WBC # BLD: 5.4 K/UL — SIGNIFICANT CHANGE UP (ref 3.8–10.5)
WBC # FLD AUTO: 5.4 K/UL — SIGNIFICANT CHANGE UP (ref 3.8–10.5)
WBC UR QL: 1 /HPF — SIGNIFICANT CHANGE UP (ref 0–5)

## 2019-03-16 PROCEDURE — 81001 URINALYSIS AUTO W/SCOPE: CPT

## 2019-03-16 PROCEDURE — 82330 ASSAY OF CALCIUM: CPT

## 2019-03-16 PROCEDURE — 93005 ELECTROCARDIOGRAM TRACING: CPT

## 2019-03-16 PROCEDURE — 71045 X-RAY EXAM CHEST 1 VIEW: CPT

## 2019-03-16 PROCEDURE — 96374 THER/PROPH/DIAG INJ IV PUSH: CPT

## 2019-03-16 PROCEDURE — 71045 X-RAY EXAM CHEST 1 VIEW: CPT | Mod: 26

## 2019-03-16 PROCEDURE — 83605 ASSAY OF LACTIC ACID: CPT

## 2019-03-16 PROCEDURE — 87086 URINE CULTURE/COLONY COUNT: CPT

## 2019-03-16 PROCEDURE — 84100 ASSAY OF PHOSPHORUS: CPT

## 2019-03-16 PROCEDURE — 80053 COMPREHEN METABOLIC PANEL: CPT

## 2019-03-16 PROCEDURE — 84132 ASSAY OF SERUM POTASSIUM: CPT

## 2019-03-16 PROCEDURE — 82435 ASSAY OF BLOOD CHLORIDE: CPT

## 2019-03-16 PROCEDURE — 85014 HEMATOCRIT: CPT

## 2019-03-16 PROCEDURE — 87486 CHLMYD PNEUM DNA AMP PROBE: CPT

## 2019-03-16 PROCEDURE — 83735 ASSAY OF MAGNESIUM: CPT

## 2019-03-16 PROCEDURE — 99284 EMERGENCY DEPT VISIT MOD MDM: CPT | Mod: 25

## 2019-03-16 PROCEDURE — 84295 ASSAY OF SERUM SODIUM: CPT

## 2019-03-16 PROCEDURE — 87633 RESP VIRUS 12-25 TARGETS: CPT

## 2019-03-16 PROCEDURE — 74176 CT ABD & PELVIS W/O CONTRAST: CPT | Mod: 26

## 2019-03-16 PROCEDURE — 85027 COMPLETE CBC AUTOMATED: CPT

## 2019-03-16 PROCEDURE — 87581 M.PNEUMON DNA AMP PROBE: CPT

## 2019-03-16 PROCEDURE — 82803 BLOOD GASES ANY COMBINATION: CPT

## 2019-03-16 PROCEDURE — 87798 DETECT AGENT NOS DNA AMP: CPT

## 2019-03-16 PROCEDURE — 74176 CT ABD & PELVIS W/O CONTRAST: CPT

## 2019-03-16 PROCEDURE — 82947 ASSAY GLUCOSE BLOOD QUANT: CPT

## 2019-03-16 PROCEDURE — 93010 ELECTROCARDIOGRAM REPORT: CPT

## 2019-03-16 RX ORDER — ONDANSETRON 8 MG/1
1 TABLET, FILM COATED ORAL
Qty: 12 | Refills: 0
Start: 2019-03-16 | End: 2019-03-18

## 2019-03-16 RX ORDER — IPRATROPIUM/ALBUTEROL SULFATE 18-103MCG
3 AEROSOL WITH ADAPTER (GRAM) INHALATION ONCE
Qty: 0 | Refills: 0 | Status: COMPLETED | OUTPATIENT
Start: 2019-03-16 | End: 2019-03-16

## 2019-03-16 RX ORDER — ONDANSETRON 8 MG/1
4 TABLET, FILM COATED ORAL ONCE
Qty: 0 | Refills: 0 | Status: COMPLETED | OUTPATIENT
Start: 2019-03-16 | End: 2019-03-16

## 2019-03-16 RX ORDER — SODIUM CHLORIDE 9 MG/ML
1000 INJECTION, SOLUTION INTRAVENOUS ONCE
Qty: 0 | Refills: 0 | Status: COMPLETED | OUTPATIENT
Start: 2019-03-16 | End: 2019-03-16

## 2019-03-16 RX ADMIN — ONDANSETRON 4 MILLIGRAM(S): 8 TABLET, FILM COATED ORAL at 17:17

## 2019-03-16 RX ADMIN — SODIUM CHLORIDE 1000 MILLILITER(S): 9 INJECTION, SOLUTION INTRAVENOUS at 14:58

## 2019-03-16 NOTE — ED PROVIDER NOTE - OBJECTIVE STATEMENT
82 yo female with PMHx of Asthma, GERD, MVP, breast CA, Depression/anxiety p/w fevers/chills, vomiting and diarrhea.  The patient reports that she was woke out of sleep 3 days ago with nausea/vomiting.  Nausea/vomiting associated with subjective fevers/chills, generalized weakness and diarrhea.  Diarrhea started 2 days ago and was initially watery, but has since become more formed.  Patient endorses that most of her family has been sick with similar symptoms.  Denies recent travel or antibiotic use. Denies CP, dysuria, but does endorse some mild SOB, no cough.

## 2019-03-16 NOTE — ED ADULT NURSE NOTE - NSIMPLEMENTINTERV_GEN_ALL_ED
Implemented All Fall Risk Interventions:  [place] to call system. Call bell, personal items and telephone within reach. Instruct patient to call for assistance. Room bathroom lighting operational. Non-slip footwear when patient is off stretcher. Physically safe environment: no spills, clutter or unnecessary equipment. Stretcher in lowest position, wheels locked, appropriate side rails in place. Provide visual cue, wrist band, yellow gown, etc. Monitor gait and stability. Monitor for mental status changes and reorient to person, place, and time. Review medications for side effects contributing to fall risk. Reinforce activity limits and safety measures with patient and family.

## 2019-03-16 NOTE — ED ADULT NURSE NOTE - OBJECTIVE STATEMENT
82 y/o female with PMH MVP IBS HTN depression anxiety BIBEMS presenting to ED for N/V/D x 2 days. As per family: "Flu like symptoms have been going around the entire family. She's been having green vomit & diarrhea since thursday. She hasn't been able to keep food down." As per pt, "I get a pressure in my belly every once in a while and then I pass gas or go to the bathroom and it feels better. A week ago, I got up to go to the kitchen in the middle of the night and felt like I was going to pass out so I lowered myself to the floor." Upon exam pt A&Ox3 gross neuro intact, lungs cta bilaterally, no difficulty speaking in complete sentences, pt anxious at this time, breathing tachypneic,  s1s2 heart sounds heard, abdomen soft nontender nondistended, skin intact +bowel sounds in all 4 quadrants, bruising noted to left eyebrow, . Pt denies chest pain, sob, ha, urinary symptoms, hematuria 82 y/o female with PMH MVP IBS HTN depression anxiety BIBEMS presenting to ED for N/V/D x 2 days. As per family: "Flu like symptoms have been going around the entire family. She's been having green vomit & diarrhea since thursday. She hasn't been able to keep food down." As per pt, "I get a pressure in my belly every once in a while and then I pass gas or go to the bathroom and it feels better. A week ago, I got up to go to the kitchen in the middle of the night and felt like I was going to pass out so I lowered myself to the floor." Upon exam pt A&Ox3 gross neuro intact, lungs cta bilaterally, no difficulty speaking in complete sentences, pt anxious at this time, breathing tachypneic,  s1s2 heart sounds heard, abdomen soft nontender nondistended, skin intact +bowel sounds in all 4 quadrants, bruising noted to left eyebrow, . Pt denies blood in stool/vomit, dark stools/vomit, chest pain, sob, ha, urinary symptoms, hematuria

## 2019-03-16 NOTE — ED PROVIDER NOTE - CLINICAL SUMMARY MEDICAL DECISION MAKING FREE TEXT BOX
82 yo female with PMHx of Asthma, GERD, MVP, breast CA, Depression/anxiety p/w fevers/chills, vomiting and diarrhea.  +Sick contacts at home with similar symptoms.  No recent travel.  No recent abx.  Looks nontoxic.  Generalized abd tenderness.  Will obtain bloodwork, CT abd, IV hydration and re-evaluation ZR 84 yo female with PMHx of Asthma, GERD, MVP, breast CA, Depression/anxiety p/w fevers/chills, vomiting and diarrhea.  +Sick contacts at home with similar symptoms.  No recent travel.  No recent abx.  Looks nontoxic.  Generalized abd tenderness.  Will obtain blood work, CT abd, IV hydration and re-evaluation ZR

## 2019-03-16 NOTE — ED PROVIDER NOTE - PHYSICAL EXAMINATION
Gen: AAO x 3, NAD  Skin: No rashes or lesions  HEENT: NC/AT, PERRLA, EOMI, MMM  Resp: unlabored CTAB  Cardiac: rrr s1s2, +systolic murmur  GI: ND, +BS, Soft, diffusely TTP, worse LLQ.    Ext: no pedal edema, FROM in all extremities  Neuro: no focal deficits

## 2019-03-16 NOTE — ED PROVIDER NOTE - NSFOLLOWUPINSTRUCTIONS_ED_ALL_ED_FT
1.  Stay well hydrated  2.  Eat a bland diet (toast, rice, banana etc)  3.  Follow up with your PMD in 2-3 days.  Bring a copy of your results with you to your appointment  4.  Take Zofran 4mgs every 6 hrs as needed for nausea  5.  Return to the ER for worsening abdominal pain, persistent diarrhea/nausea/vomiting, fevers/chills or any other concerning symptoms

## 2019-03-16 NOTE — ED PROVIDER NOTE - CARE PLAN
Principal Discharge DX:	Generalized abdominal pain Principal Discharge DX:	Generalized abdominal pain  Assessment and plan of treatment:	1.  Stay well hydrated  2.  Eat a bland diet (toast, rice, banana etc)  3.  Follow up with your PMD in 2-3 days.  Bring a copy of your results with you to your appointment  4.  Take Zofran 4mgs every 6 hrs as needed for nausea  5.  Return to the ER for worsening abdominal pain, persistent diarrhea/nausea/vomiting, fevers/chills or any other concerning symptoms

## 2019-03-16 NOTE — ED PROVIDER NOTE - PROGRESS NOTE DETAILS
Patient's labs unremarkable.  CT without acute intra-abdominal pathology.  mild edema/fibrotic process and lung nodule on CT noted, patient made aware and reports that this is known and she has a follow up with a pulmonologist already scheduled.  Patient feeling well.  Tolerating PO.  Agreeable with plan for discharge with close outpatient follow up.  Strict return precautions provided. -Castillo Luz PA-C

## 2019-03-16 NOTE — ED ADULT NURSE REASSESSMENT NOTE - NS ED NURSE REASSESS COMMENT FT1
Pt remains stable in ED, breathing even unlabored at this time, pt reports "I'm feeling more at ease with the TV on." NAD. Awaits CT abdomen.

## 2019-03-17 LAB
CULTURE RESULTS: NO GROWTH — SIGNIFICANT CHANGE UP
SPECIMEN SOURCE: SIGNIFICANT CHANGE UP

## 2019-03-28 ENCOUNTER — APPOINTMENT (OUTPATIENT)
Dept: PULMONOLOGY | Facility: CLINIC | Age: 84
End: 2019-03-28
Payer: MEDICARE

## 2019-03-28 VITALS
HEART RATE: 69 BPM | SYSTOLIC BLOOD PRESSURE: 170 MMHG | TEMPERATURE: 97.3 F | BODY MASS INDEX: 23.81 KG/M2 | WEIGHT: 152 LBS | OXYGEN SATURATION: 99 % | RESPIRATION RATE: 16 BRPM | DIASTOLIC BLOOD PRESSURE: 85 MMHG

## 2019-03-28 PROCEDURE — 99214 OFFICE O/P EST MOD 30 MIN: CPT

## 2019-03-28 NOTE — DISCUSSION/SUMMARY
[FreeTextEntry1] : Ms. Santana  has  a  recent  diagnosis  of stage I   left breast cancer treated  with  chemotherapy  and  radiation with recent  PET scan  without  evidence  of local and distant recurrence.\par \par Physical exam positive  for  oropharynx erythema  and  edematous  nasal mucosae. Lungs are clear.\par \par CT Chest  in October 2015 found  to have  a stable anterior  mediastinal lymph node which has been in similar   appearance since  6/24/2011. Mild  new patchy opacity  in  the  posterior  right  upper  lobe  new  compared  with prior  study done  in 12/2014.  There  is  mild  bilateral  dependent  atelectasis.  A  5  mm  right lower  lobe  pulmonary  nodule  is  not  significantly  changed  in  appearance  when compared  with  the  prior  study 12/2014. Patient  does not have  any current   clinical  evidence  suggestive of lung  involvement. Her chronic  cough has been related  to  long  lasting   nasal polyps  with post- nasal drip as well as  gastroesophageal reflux.\par Plan.\par \par 1.-Right lower  lobe  5 mm pulmonary lobe \par   No further follow up required\par 2.- New patchy opacity posterior  right upper  lobe \par -F/U in 3 months\par 3.-Nasal Polyps  with  postnasal drip\par - Continue fluticasone 50 mcg nasal twice a day.\par 4.- Gastroesophageal reflux.\par -Continue  Omeprazol 20 mg  daily\par 5.- Breast cancer  stage  I\par - Continue  Hem/Onc  follow up\par \par f/U appt in 3 months.

## 2019-03-28 NOTE — REVIEW OF SYSTEMS
[Fatigue] : fatigue [Poor Appetite] : poor appetite [Nasal Congestion] : nasal congestion [Postnasal Drip] : postnasal drip [Sinus Problems] : sinus problems [Cough] : cough [Negative] : Endocrine [Fever] : no fever [Chills] : no chills [Recent Wt Gain (___ Lbs)] : no recent weight gain [Recent Wt Loss (___ Lbs)] : no recent weight loss [Dry Eyes] : no dryness of the eyes [Eye Irritation] : no ~T irritation of the eyes [Ear Disturbance] : no ear disturbance [Epistaxis] : no nosebleeds [Sore Throat] : no sore throat [Dry Mouth] : no dry mouth [Mouth Ulcers] : no mouth ulcers [Sputum] : not coughing up ~M sputum [Hemoptysis] : no hemoptysis [Dyspnea] : no dyspnea [Chest Tightness] : no chest tightness [Pleuritic Pain] : no pleuritic pain [Frequent URIs] : no frequent upper respiratory infections [Wheezing] : no wheezing [Hypertension] : no ~T hypertension [Orthopnea] : no orthopnea [Dysrhythmia] : no dysrhythmia [Murmurs] : no murmurs were heard [Palpitations] : no palpitations [Hay Fever] : no hay fever [Nocturia] : no nocturia [Frequency] : no change in urinary frequency [Urgency] : no feelings of urinary urgency [Dysuria] : no dysuria [Trauma] : no ~T physical trauma [Fracture] : no fracture [Back Pain] : ~T no back pain [Myalgias] : no myalgias [Arthralgias] : no arthralgias [Raynaud] : no Raynaud's phenomenon was observed [Scleroderma] : no scleroderma

## 2019-03-28 NOTE — PHYSICAL EXAM
[General Appearance - Well Developed] : well developed [Normal Appearance] : normal appearance [General Appearance - Well Nourished] : well nourished [General Appearance - In No Acute Distress] : no acute distress [Normal Conjunctiva] : the conjunctiva exhibited no abnormalities [Neck Appearance] : the appearance of the neck was normal [Neck Cervical Mass (___cm)] : no neck mass was observed [Jugular Venous Distention Increased] : there was no jugular-venous distention [Heart Rate And Rhythm] : heart rate and rhythm were normal [Heart Sounds] : normal S1 and S2 [Murmurs] : no murmurs present [Edema] : no peripheral edema present [] : no respiratory distress [Auscultation Breath Sounds / Voice Sounds] : lungs were clear to auscultation bilaterally [Chest Palpation] : palpation of the chest revealed no abnormalities [Lungs Percussion] : the lungs were normal to percussion [Bowel Sounds] : normal bowel sounds [Abdomen Soft] : soft [Abdomen Tenderness] : non-tender [Abdomen Mass (___ Cm)] : no abdominal mass palpated [Abnormal Walk] : normal gait [Skin Color & Pigmentation] : normal skin color and pigmentation [Skin Turgor] : normal skin turgor [Normal Oral Mucosa] : normal oral mucosa [No Oral Cyanosis] : no oral cyanosis [Nail Clubbing] : no clubbing of the fingernails [Cyanosis, Localized] : no localized cyanosis [Motor Exam] : the motor exam was normal [No Focal Deficits] : no focal deficits [Oriented To Time, Place, And Person] : oriented to person, place, and time [FreeTextEntry1] : Cobblestone  appearance  in the oropharynx

## 2019-03-28 NOTE — ASSESSMENT
[FreeTextEntry1] : 83F hx IBS, Breast CA s/p lumpectomy and chemoRT (2011), anxiety, GERD, who comes for follow up visit. Recently had CXR done in 3/2019, which was clear. Has some patchy opacities in left lower. TIDWELL may be multifactorial, including possible obstructive lung disease (had history of asthma as child) and anxiety.\par - will obtain CT Chest\par - PFTs\par \par d/w Dr. Mcgowan

## 2019-03-28 NOTE — END OF VISIT
[] : Fellow [Time Spent: ___ minutes] : I have spent [unfilled] minutes of face to face time with the patient [FreeTextEntry2] : I confirmed relevant history, physical exam findings and reviewed data with fellow. [FreeTextEntry1] : es

## 2019-03-28 NOTE — HISTORY OF PRESENT ILLNESS
[FreeTextEntry1] : 83F hx IBS, Breast CA s/p lumpectomy and chemoRT (2011), anxiety, GERD, who comes for follow up visit. In November 2018 had been feeling fatigued, unwell and had nonproductive cough (though pt attributes cough to post-nasal drip). CXR done by PMD, which showed patchy L sided opacities. Repeat CXR done one month later, which was read as similar to prior. Pt unsure if antibiotics were given. Has had flu and stomach virus, which prompted a visit to the ED. CT Abd at that time was done, which showed  subsegmental bibasilar atelectasis. Peripheral linear opacities which may be pulm edema vs. ?fibrosis. 3 mm RLL nodule. Repeat CXR done around 3/2019 also shows clear lungs.\par \par Pt states that TIDWELL has gotten worse over the past year. She also recently has had several stressors causing anxiety such as moving in with her daughter. States that dyspnea happens when she feels "rushed." Otherwise denies fevers, chills, chest pain, abdominal pain, wheezing, SOB at rest, nausea/vomiting, diarrhea, b/l LE edema, dizziness. Had asthma as a child. Does not have any inhalers at home.\par \par CT Chest 1/2017: RUL 1.2 cm mixed/solid GGO opacity (unchanged compared to 1/4/2016) but demonstrates decreased solid component compared to 10/6/2015. Subpleural reticular opacities no traction or honeycombing.  \par PET 5/2016: patchy mixed solid/GGO in post RUL, not significantly changed from prior (1/4/2016) \par PFTs 11/2015: FEV1/FVC 75%, FEV1 81%, FVC 81%, no bronchodilatory testing, TLC 72%, DLCO 65%

## 2019-04-04 ENCOUNTER — OUTPATIENT (OUTPATIENT)
Dept: OUTPATIENT SERVICES | Facility: HOSPITAL | Age: 84
LOS: 1 days | End: 2019-04-04
Payer: MEDICARE

## 2019-04-04 ENCOUNTER — APPOINTMENT (OUTPATIENT)
Dept: CT IMAGING | Facility: IMAGING CENTER | Age: 84
End: 2019-04-04
Payer: MEDICARE

## 2019-04-04 DIAGNOSIS — J18.9 PNEUMONIA, UNSPECIFIED ORGANISM: ICD-10-CM

## 2019-04-04 PROCEDURE — 71250 CT THORAX DX C-: CPT | Mod: 26

## 2019-04-04 PROCEDURE — 71250 CT THORAX DX C-: CPT

## 2019-04-08 ENCOUNTER — APPOINTMENT (OUTPATIENT)
Dept: PULMONOLOGY | Facility: CLINIC | Age: 84
End: 2019-04-08
Payer: MEDICARE

## 2019-04-08 PROCEDURE — 94010 BREATHING CAPACITY TEST: CPT

## 2019-04-08 PROCEDURE — 94729 DIFFUSING CAPACITY: CPT

## 2019-04-08 PROCEDURE — 94726 PLETHYSMOGRAPHY LUNG VOLUMES: CPT

## 2019-06-26 ENCOUNTER — FORM ENCOUNTER (OUTPATIENT)
Age: 84
End: 2019-06-26

## 2019-06-27 ENCOUNTER — OUTPATIENT (OUTPATIENT)
Dept: OUTPATIENT SERVICES | Facility: HOSPITAL | Age: 84
LOS: 1 days | End: 2019-06-27
Payer: MEDICARE

## 2019-06-27 ENCOUNTER — APPOINTMENT (OUTPATIENT)
Dept: MAMMOGRAPHY | Facility: IMAGING CENTER | Age: 84
End: 2019-06-27
Payer: MEDICARE

## 2019-06-27 DIAGNOSIS — C50.912 MALIGNANT NEOPLASM OF UNSPECIFIED SITE OF LEFT FEMALE BREAST: ICD-10-CM

## 2019-06-27 PROCEDURE — 77067 SCR MAMMO BI INCL CAD: CPT

## 2019-06-27 PROCEDURE — 77063 BREAST TOMOSYNTHESIS BI: CPT

## 2019-06-27 PROCEDURE — 77063 BREAST TOMOSYNTHESIS BI: CPT | Mod: 26

## 2019-06-27 PROCEDURE — 77067 SCR MAMMO BI INCL CAD: CPT | Mod: 26

## 2019-10-22 ENCOUNTER — APPOINTMENT (OUTPATIENT)
Dept: CT IMAGING | Facility: IMAGING CENTER | Age: 84
End: 2019-10-22
Payer: MEDICARE

## 2019-10-22 ENCOUNTER — OUTPATIENT (OUTPATIENT)
Dept: OUTPATIENT SERVICES | Facility: HOSPITAL | Age: 84
LOS: 1 days | End: 2019-10-22
Payer: MEDICARE

## 2019-10-22 DIAGNOSIS — R91.8 OTHER NONSPECIFIC ABNORMAL FINDING OF LUNG FIELD: ICD-10-CM

## 2019-10-22 PROCEDURE — 71250 CT THORAX DX C-: CPT

## 2019-10-22 PROCEDURE — 71250 CT THORAX DX C-: CPT | Mod: 26

## 2019-12-05 ENCOUNTER — APPOINTMENT (OUTPATIENT)
Dept: PULMONOLOGY | Facility: CLINIC | Age: 84
End: 2019-12-05
Payer: MEDICARE

## 2019-12-05 VITALS
DIASTOLIC BLOOD PRESSURE: 74 MMHG | HEART RATE: 63 BPM | SYSTOLIC BLOOD PRESSURE: 166 MMHG | BODY MASS INDEX: 24.8 KG/M2 | RESPIRATION RATE: 16 BRPM | WEIGHT: 158 LBS | OXYGEN SATURATION: 94 % | HEIGHT: 67 IN

## 2019-12-05 VITALS — DIASTOLIC BLOOD PRESSURE: 80 MMHG | SYSTOLIC BLOOD PRESSURE: 150 MMHG

## 2019-12-05 PROCEDURE — 99214 OFFICE O/P EST MOD 30 MIN: CPT

## 2019-12-05 NOTE — REVIEW OF SYSTEMS
[Fatigue] : fatigue [Poor Appetite] : poor appetite [Nasal Congestion] : nasal congestion [Postnasal Drip] : postnasal drip [Sinus Problems] : sinus problems [Cough] : cough [Negative] : Sleep Disorder [Fever] : no fever [Chills] : no chills [Recent Wt Gain (___ Lbs)] : no recent weight gain [Dry Eyes] : no dryness of the eyes [Recent Wt Loss (___ Lbs)] : no recent weight loss [Eye Irritation] : no ~T irritation of the eyes [Ear Disturbance] : no ear disturbance [Epistaxis] : no nosebleeds [Sore Throat] : no sore throat [Dry Mouth] : no dry mouth [Mouth Ulcers] : no mouth ulcers [Sputum] : not coughing up ~M sputum [Hemoptysis] : no hemoptysis [Dyspnea] : no dyspnea [Chest Tightness] : no chest tightness [Pleuritic Pain] : no pleuritic pain [Wheezing] : no wheezing [Frequent URIs] : no frequent upper respiratory infections [Hypertension] : no ~T hypertension [Orthopnea] : no orthopnea [Dysrhythmia] : no dysrhythmia [Murmurs] : no murmurs were heard [Palpitations] : no palpitations [Hay Fever] : no hay fever [Nocturia] : no nocturia [Frequency] : no change in urinary frequency [Dysuria] : no dysuria [Urgency] : no feelings of urinary urgency [Trauma] : no ~T physical trauma [Fracture] : no fracture [Back Pain] : ~T no back pain [Myalgias] : no myalgias [Arthralgias] : no arthralgias [Raynaud] : no Raynaud's phenomenon was observed [Scleroderma] : no scleroderma

## 2019-12-05 NOTE — ASSESSMENT
[FreeTextEntry1] : 84 year old woman with history of breast cancer status post lumpectomy and chemo/RT in 2011, anxiety, GERD and postnasal congestion with abnormal chest CT and persistent cough.  Cough is most likely secondary to postnasal drip and nasal polyps and GERD.  CT is stable.\par \par Plan:\par \par Continue fluticasone, Prilosec.  F/U with ENT.\par Repeat Chest CT in May, 2020.  (ordered)

## 2019-12-05 NOTE — HISTORY OF PRESENT ILLNESS
[FreeTextEntry1] : 84F hx IBS, Breast CA s/p lumpectomy and chemoRT (2011), anxiety, GERD, abnormal chest CT that I am following, who comes for follow up visit for evaluation of persistent cough. \par \par CT Chest 1/2017: RUL 1.2 cm mixed/solid GGO opacity (unchanged compared to 1/4/2016) but demonstrates decreased solid component compared to 10/6/2015. Subpleural reticular opacities no traction or honeycombing.  \par PET 5/2016: patchy mixed solid/GGO in post RUL, not significantly changed from prior (1/4/2016) \par PFTs 11/2015: FEV1/FVC 75%, FEV1 81%, FVC 81%, no bronchodilatory testing, TLC 72%, DLCO 65% \par \par Most recent CT Chest shows slight decrease in size of cystic lesion- reviewed by me personally and test performed 10/22/19.\par \par still experiencing cough, which is likely postnasal, has nasal polyps and surgery was recommended by ENT. Still taking Prilosec daily.  Accompanied by daughter today.

## 2019-12-05 NOTE — PHYSICAL EXAM
[General Appearance - Well Nourished] : well nourished [General Appearance - Well Developed] : well developed [Normal Appearance] : normal appearance [General Appearance - In No Acute Distress] : no acute distress [Neck Appearance] : the appearance of the neck was normal [Normal Conjunctiva] : the conjunctiva exhibited no abnormalities [Neck Cervical Mass (___cm)] : no neck mass was observed [Jugular Venous Distention Increased] : there was no jugular-venous distention [Murmurs] : no murmurs present [Heart Sounds] : normal S1 and S2 [Heart Rate And Rhythm] : heart rate and rhythm were normal [Edema] : no peripheral edema present [Auscultation Breath Sounds / Voice Sounds] : lungs were clear to auscultation bilaterally [] : no respiratory distress [Lungs Percussion] : the lungs were normal to percussion [Chest Palpation] : palpation of the chest revealed no abnormalities [Abdomen Tenderness] : non-tender [Abdomen Soft] : soft [Bowel Sounds] : normal bowel sounds [Abdomen Mass (___ Cm)] : no abdominal mass palpated [Abnormal Walk] : normal gait [Motor Exam] : the motor exam was normal [No Focal Deficits] : no focal deficits [Oriented To Time, Place, And Person] : oriented to person, place, and time [Skin Color & Pigmentation] : normal skin color and pigmentation [Skin Turgor] : normal skin turgor [Normal Oral Mucosa] : normal oral mucosa [No Oral Cyanosis] : no oral cyanosis [Nail Clubbing] : no clubbing of the fingernails [Cyanosis, Localized] : no localized cyanosis [I] : I [Erythema] : erythema of the pharynx [FreeTextEntry1] : Cobblestone  appearance  in the oropharynx

## 2019-12-05 NOTE — CONSULT LETTER
[Dear  ___] : Dear  [unfilled], [Consult Letter:] : I had the pleasure of evaluating your patient, [unfilled]. [Please see my note below.] : Please see my note below. [Consult Closing:] : Thank you very much for allowing me to participate in the care of this patient.  If you have any questions, please do not hesitate to contact me. [Sincerely,] : Sincerely, [FreeTextEntry2] : Criselda Cardona MD\par  [phone]

## 2020-06-19 ENCOUNTER — OUTPATIENT (OUTPATIENT)
Dept: OUTPATIENT SERVICES | Facility: HOSPITAL | Age: 85
LOS: 1 days | Discharge: HOME | End: 2020-06-19

## 2020-06-19 DIAGNOSIS — C50.912 MALIGNANT NEOPLASM OF UNSPECIFIED SITE OF LEFT FEMALE BREAST: ICD-10-CM

## 2020-06-25 ENCOUNTER — APPOINTMENT (OUTPATIENT)
Dept: HEMATOLOGY ONCOLOGY | Facility: CLINIC | Age: 85
End: 2020-06-25

## 2020-07-27 NOTE — ED PROVIDER NOTE - PRINCIPAL DIAGNOSIS
"  Woke up with symptoms?: no    Recent bleeding noted: no  Does the patient take any Blood Thinners? no  Medications: No relevant medications      Past Medical History:   Past Medical History:   Diagnosis Date    Arthritis     Hyperlipidemia     Hypertension     Open angle with borderline findings, low risk, bilateral 8/3/2018       Past Surgical History: no major surgeries within the last 2 weeks    Family History: no relevant history    Social History: no smoking, no drinking, no drugs    Allergies: Aspirin No relevant allergies    Review of Systems   Constitutional: Negative for appetite change, chills and fever.   HENT: Negative for congestion and sore throat.    Eyes: Negative for discharge and itching.   Respiratory: Negative for apnea and shortness of breath.    Cardiovascular: Negative for chest pain and palpitations.   Gastrointestinal: Negative for abdominal pain and anal bleeding.   Endocrine: Negative for cold intolerance and polydipsia.   Genitourinary: Negative for dysuria and hematuria.   Musculoskeletal: Negative for joint swelling and myalgias.   Skin: Negative for color change and rash.   Neurological: Negative for tremors.   Psychiatric/Behavioral: Negative for hallucinations and self-injury.     Objective:   Vitals: Blood pressure (!) 186/90, pulse 91, temperature 98.1 °F (36.7 °C), temperature source Oral, resp. rate 16, height 6' 2" (1.88 m), SpO2 98 %.     CT READ: No . Not available.    Physical Exam  Constitutional:       General: He is not in acute distress.  HENT:      Head: Atraumatic.      Right Ear: External ear normal.      Left Ear: External ear normal.   Eyes:      General: No scleral icterus.     Conjunctiva/sclera: Conjunctivae normal.   Neck:      Musculoskeletal: Normal range of motion.   Pulmonary:      Effort: Pulmonary effort is normal.   Abdominal:      General: There is no distension.      Tenderness: There is no guarding.   Musculoskeletal: Normal range of motion.       "   General: No deformity.   Skin:     General: Skin is warm and dry.   Neurological:      Mental Status: He is alert.            Generalized abdominal pain

## 2020-10-11 NOTE — REVIEW OF SYSTEMS
[Shortness Of Breath] : shortness of breath [Negative] : Allergic/Immunologic [Abdominal Pain] : no abdominal pain [FreeTextEntry2] : Still  tired and weak occasionally, depression has improved,  still has  occasional spurts of energy. S/P flu vaccination 12/2018. [FreeTextEntry3] : Saw ophthalmologist Dr Maldonado 11/15/2018 for evaluation of her bilateral cataract. Surgery on hold. Next f/u 03/2018 [FreeTextEntry4] : Last saw  ENT Dr Sood 04/2018 for f/u  and management of sinus infection, plus gait and balance, exam stable [FreeTextEntry6] : Occasional  cough she attributes to seasonal allergies and her sinuses. [FreeTextEntry7] : Last colonoscopy 7 years 6 months  ago, no polyps found. Appetite has improved since last seen. [FreeTextEntry8] : Last GYN exam 2/2014, no bleeding or spotting. [FreeTextEntry9] : H/O arthritis. Worsening pain posterior neck radiating to skull has improved on PT [de-identified] : Last whole body skin check was 7 years  ago. [de-identified] : Depressed over 's death has improved since last seen.

## 2020-10-11 NOTE — HISTORY OF PRESENT ILLNESS
[Disease: _____________________] : Disease: [unfilled] [N: ___] : N[unfilled] [T: ___] : T[unfilled] [M: ___] : M[unfilled] [AJCC Stage: ____] : AJCC Stage: [unfilled] [de-identified] : The patient presented in 2011, age 75, any mammogram with an abnormal screening mammogram.\par \par \par \par In April 21, 2011 patient had an ultrasound-guided biopsy of the left breast which was positive for poorly differentiated infiltrating ductal carcinoma with a Bre score of 9/9 which was healing negative, AL negative, HER-2/radha positive by fish (3.4).\par \par \par \par On May 11, 2011 Dr. Hilton Koroma performed a lumpectomy and sentinel lymph node biopsy at [facility]. The patient had a single focus of infiltrating ductal carcinoma measuring 0.4 cm with a [place] score of 9/9. There were 2 negative sentinel lymph nodes\par \par \par \par As part of the patient's staging she underwent a PET CT scan performed by Dr. Cooper Nagy which demonstrated metabolically active round densities in left para-aortic region felt to be suspicious for possible lymphoma. There was also a tiny right axillary lymph node adjacent to the rib cage and activity in the subpectoral region of the left.\par \par \par \par The patient received one cycle of Taxotere Cytoxan and Herceptin on June 22, 2011. This led to admission to [facility] with neutropenia and fever. The patient one additional dose of single agent Herceptin.\par \par \par \par The patient transferred her care to this office.On September 20, 2011 FNA of the right axillary lymph node demonstrated a pleomorphic population of lymphocytes and was negative for carcinoma. On October 19, 2011 she started weekly Taxol Herceptin which she was unable to tolerate after the second dose. She continued Herceptin every 3 weeks through June 25, 2012  \par The patient received radiation therapy under the supervision of Dr. Margaret Durbin at [facility] which was completed on January 31, 2012.  .\par \par \par \par The PET/CT findings have remained unchanged. [de-identified] : Poorly  differentiated infiltrating ductal carcinoma ER negative [place] negative HER-2/radha positive [de-identified] : The patient has been 9  years  2  months post breast cancer diagnosis.\par \par 06/27/2018 - BILATERAL BREAST MAMMOGRAM  BI RADS 2. Next f/u --------------\par \par Last saw her PCP Criselda Rodrigues 11/1/2018 for treatment  of now resolved  pneumonia, treated with Z-pack..  Was again treated  for "head cold" 11/13/2018, seen again for f/u 12/14/2018 was  treated with a second round of antibiotics which gave her abdominal pain.The patient state she was advised by her PCP to make a f/u appointment with her pulmonologist.----------------------------\par \par Last saw pulmonologist Dr Ofelia Mcgowan 12/05/2019  for f/u abnormal chest CT  dated 10/22/2029 which shows slight decrease in size of cystic lesion and persistent cough. The patient was advised to Continue fluticasone, Prilosec,  F/U with ENT, and to Repeat Chest CT in May, 2020,(ordered)----------------\par \par Last saw cardiologist  Dr Whitfield 02/12/2018 for f/u and management of MVP, exam stable. The patient is S/P Herceptin. Has f/u appointment  02/2019-------------------\par \par The patient report  decreased  pain in her neck since last seen,  currently in  pt twice weekly, to complete 02/2019.------------\par \par The patient is associating her neck pain  to her fall 02/2016, when she sustained injury to head and neck, also feels her arthritis is exacerbating the pain.-----------------\par \par The patient states her  intermittent hoarse voice since 042018 has improved, feels it's from her allergies and her sinuses. ----------\par \par Still  mildly depressed  over the death of her  and death  of son in law 10/31/2016.-------------\par \par Overall the patient feels depression has improved since last seen.\par \par No other interval event since last visit.\par \par \par

## 2020-11-05 ENCOUNTER — OUTPATIENT (OUTPATIENT)
Dept: OUTPATIENT SERVICES | Facility: HOSPITAL | Age: 85
LOS: 1 days | End: 2020-11-05
Payer: MEDICARE

## 2020-11-05 ENCOUNTER — RESULT REVIEW (OUTPATIENT)
Age: 85
End: 2020-11-05

## 2020-11-05 ENCOUNTER — APPOINTMENT (OUTPATIENT)
Dept: MAMMOGRAPHY | Facility: IMAGING CENTER | Age: 85
End: 2020-11-05
Payer: MEDICARE

## 2020-11-05 ENCOUNTER — APPOINTMENT (OUTPATIENT)
Dept: CT IMAGING | Facility: IMAGING CENTER | Age: 85
End: 2020-11-05
Payer: MEDICARE

## 2020-11-05 DIAGNOSIS — Z00.8 ENCOUNTER FOR OTHER GENERAL EXAMINATION: ICD-10-CM

## 2020-11-05 DIAGNOSIS — R91.8 OTHER NONSPECIFIC ABNORMAL FINDING OF LUNG FIELD: ICD-10-CM

## 2020-11-05 DIAGNOSIS — C50.912 MALIGNANT NEOPLASM OF UNSPECIFIED SITE OF LEFT FEMALE BREAST: ICD-10-CM

## 2020-11-05 PROCEDURE — 77067 SCR MAMMO BI INCL CAD: CPT

## 2020-11-05 PROCEDURE — 77063 BREAST TOMOSYNTHESIS BI: CPT | Mod: 26

## 2020-11-05 PROCEDURE — 77067 SCR MAMMO BI INCL CAD: CPT | Mod: 26

## 2020-11-05 PROCEDURE — 71250 CT THORAX DX C-: CPT | Mod: 26

## 2020-11-05 PROCEDURE — 71250 CT THORAX DX C-: CPT

## 2020-11-05 PROCEDURE — 77063 BREAST TOMOSYNTHESIS BI: CPT

## 2020-11-19 ENCOUNTER — APPOINTMENT (OUTPATIENT)
Dept: PULMONOLOGY | Facility: CLINIC | Age: 85
End: 2020-11-19
Payer: MEDICARE

## 2020-11-19 ENCOUNTER — LABORATORY RESULT (OUTPATIENT)
Age: 85
End: 2020-11-19

## 2020-11-19 VITALS
OXYGEN SATURATION: 96 % | HEART RATE: 64 BPM | WEIGHT: 145 LBS | TEMPERATURE: 97.2 F | DIASTOLIC BLOOD PRESSURE: 83 MMHG | BODY MASS INDEX: 22.71 KG/M2 | SYSTOLIC BLOOD PRESSURE: 166 MMHG | RESPIRATION RATE: 17 BRPM

## 2020-11-19 PROCEDURE — 99214 OFFICE O/P EST MOD 30 MIN: CPT

## 2020-11-19 NOTE — HISTORY OF PRESENT ILLNESS
[FreeTextEntry1] : 84F hx IBS, Breast CA s/p lumpectomy and chemoRT (2011), anxiety, GERD, abnormal chest CT that I am following, who comes for follow up visit for evaluation of persistent cough. \par \par CT Chest 1/2017: RUL 1.2 cm mixed/solid GGO opacity (unchanged compared to 1/4/2016) but demonstrates decreased solid component compared to 10/6/2015. Subpleural reticular opacities no traction or honeycombing.  \par PET 5/2016: patchy mixed solid/GGO in post RUL, not significantly changed from prior (1/4/2016) \par PFTs 11/2015: FEV1/FVC 75%, FEV1 81%, FVC 81%, no bronchodilatory testing, TLC 72%, DLCO 65% \par \par Most recent CT Chest shows slight decrease in size of cystic lesion- reviewed by me personally and test performed 10/22/19.\par \par still experiencing cough, which is likely postnasal, has nasal polyps and surgery was recommended by ENT. Still taking Prilosec daily.  Accompanied by daughter today.  \par \connor Comes in for follow up today accompanied by daughter.  She had a follow up chest Ct in 11/2010 which showed increase in RUL cavitary opacity.  IN addition she has persistent cough which is productive.  She has lost about 10lbs in the last year.  Also experiencing night sweats.  Does not know of history of positive PPD or TB exposure.  She has slightly worse anemia at 10 on recent labs.

## 2020-11-19 NOTE — PHYSICAL EXAM
[No Acute Distress] : no acute distress [Normal Appearance] : normal appearance [No Neck Mass] : no neck mass [Normal Rate/Rhythm] : normal rate/rhythm [Normal S1, S2] : normal s1, s2 [No Murmurs] : no murmurs [No Resp Distress] : no resp distress [Clear to Auscultation Bilaterally] : clear to auscultation bilaterally [No Abnormalities] : no abnormalities [Normal Gait] : normal gait [No Clubbing] : no clubbing [No Cyanosis] : no cyanosis [No Edema] : no edema [Normal Color/ Pigmentation] : normal color/ pigmentation [Oriented x3] : oriented x3 [Normal Affect] : normal affect

## 2020-11-19 NOTE — ASSESSMENT
[FreeTextEntry1] : 84 year old woman with history of breast cancer status post lumpectomy and chemo/RT in 2011, anxiety, GERD and postnasal congestion with increasing RUL cavity, persistent cough, weight loss and night sweats.  I am concerned about an infectious process, potentially TB.  Malignancy is also in the differential.\par \par ON Exam today BP is slightly elevated.  Oxygen saturation is 96% at room air at rest.  LUngs are clear.  there is no cervical, supraclavicular adenopathy.  HEart sounds WNL.\par \par Plan:\par 1- Sputum for AFB and c and S requested.  Gave her 2 addition sputum cups for samples in event first one is negative.\par 2- quant gold and CBC sent\par If sputum is negative x 3 she will require a bronchoscopy for sampling.  Discussed with patient and daughter and they understand and agree.

## 2020-11-20 LAB
BASOPHILS # BLD AUTO: 0.03 K/UL
BASOPHILS NFR BLD AUTO: 0.4 %
EOSINOPHIL # BLD AUTO: 0.08 K/UL
EOSINOPHIL NFR BLD AUTO: 1.1 %
HCT VFR BLD CALC: 32.6 %
HGB BLD-MCNC: 10.1 G/DL
IMM GRANULOCYTES NFR BLD AUTO: 0.1 %
LYMPHOCYTES # BLD AUTO: 2.41 K/UL
LYMPHOCYTES NFR BLD AUTO: 33.8 %
MAN DIFF?: NORMAL
MCHC RBC-ENTMCNC: 28.2 PG
MCHC RBC-ENTMCNC: 31 GM/DL
MCV RBC AUTO: 91.1 FL
MONOCYTES # BLD AUTO: 0.79 K/UL
MONOCYTES NFR BLD AUTO: 11.1 %
NEUTROPHILS # BLD AUTO: 3.81 K/UL
NEUTROPHILS NFR BLD AUTO: 53.5 %
PLATELET # BLD AUTO: 258 K/UL
RBC # BLD: 3.58 M/UL
RBC # FLD: 14.7 %
WBC # FLD AUTO: 7.13 K/UL

## 2020-12-01 LAB
BACTERIA SPT CULT: ABNORMAL
M TB IFN-G BLD-IMP: ABNORMAL
QUANTIFERON TB PLUS MITOGEN MINUS NIL: 0.1 IU/ML
QUANTIFERON TB PLUS NIL: 0.02 IU/ML
QUANTIFERON TB PLUS TB1 MINUS NIL: 0 IU/ML
QUANTIFERON TB PLUS TB2 MINUS NIL: 0.01 IU/ML

## 2020-12-14 ENCOUNTER — APPOINTMENT (OUTPATIENT)
Dept: INFECTIOUS DISEASE | Facility: CLINIC | Age: 85
End: 2020-12-14
Payer: MEDICARE

## 2020-12-14 VITALS
HEART RATE: 60 BPM | RESPIRATION RATE: 18 BRPM | BODY MASS INDEX: 22.76 KG/M2 | HEIGHT: 67 IN | OXYGEN SATURATION: 96 % | WEIGHT: 145 LBS | DIASTOLIC BLOOD PRESSURE: 84 MMHG | TEMPERATURE: 97.3 F | SYSTOLIC BLOOD PRESSURE: 154 MMHG

## 2020-12-14 DIAGNOSIS — H20.9 UNSPECIFIED IRIDOCYCLITIS: ICD-10-CM

## 2020-12-14 DIAGNOSIS — Z85.3 PERSONAL HISTORY OF MALIGNANT NEOPLASM OF BREAST: ICD-10-CM

## 2020-12-14 DIAGNOSIS — A49.01 METHICILLIN SUSCEPTIBLE STAPHYLOCOCCUS AUREUS INFECTION, UNSPECIFIED SITE: ICD-10-CM

## 2020-12-14 PROCEDURE — 99072 ADDL SUPL MATRL&STAF TM PHE: CPT

## 2020-12-14 PROCEDURE — 99204 OFFICE O/P NEW MOD 45 MIN: CPT

## 2020-12-14 NOTE — ASSESSMENT
[FreeTextEntry1] : This is a 84 yo F with pmh Breast cancerm s/p lumpectomy and chemo/RT, anxiety, GERD, IBS, nasal polyps, who presents today for chronic cough, and positive sputum cultures for MSSA and SEAMUS.  \par \par She multiple drug allergies which makes treating the MSSA challenging.\par \par She is not able to tolerate any oral options. Zyvox would be an option but she is on fluoxetine and I would be hesitant to use the two together.\par \par IV vancomycin would be an option for her as well but I'm not sure she would be willing to do home IV antibiotics.\par \par I would like to start by repeating her cultures to start.\par \par SEAMUS should likely be treated as she has  cavitary disease.  She seems very sensitive to medications though and she may have a hard time tolerating SEAMUS meds.\par \par She would need sensitivities checked to see if amikacin remains an option.\par I will discuss this treatment with her pulmonologist first to determine treatment plan. \par \par Time spent 45 minutes. \par \par

## 2020-12-14 NOTE — HISTORY OF PRESENT ILLNESS
[FreeTextEntry1] : This is a 86 yo F with pmh Breast cancerm s/p lumpectomy and chemo/RT, anxiety, GERD, IBS, nasal polyps, who presents today for chronic cough, and positive sputum cultures.\par \par She presents with her daughter, Juan Daniel, today.\par \par She was referred to pulmonary for abnormal chest xray.  She was seen in the ER for GI illness and CT a/p was done which showed bibasilar atelectasis.  \par \par She was referred to pulmonary for this abnormal CT scan. \par \par She feels weak, has daily cough of productive sputum.  She has night sweats and 10 lb weight loss.  \par \par She was referred to ID today b/c sputum was collected and grew MSSA and SEAMUS.\par \par CT chest 1/2017: RUL 1.2 cm mixed/solid GGO (unchanged from 1/4/2016), but decreased solid component compared to 10/6/2015. \par \par CT chest 4/2019: 1.8 cm opacity containing cystic component in RUL, increased in size.\par \par CT  chest 10/2019: RUL peripheral segment cystic nodule measures 1.6 x 1.1 cm, not significantly changed from 4/2019 but mildly increased from 1/20/2017.  On thin section imaging, this has the appearance of focal dilatation of an airway with impaction rather than a solid nodule.  \par \par CT chest 11/5/2020: 2.1 x 2 cm nodular opacity containing cavitation in the RUL has increased in size when compared to previous exams. Patchy ground glass and reticular opacities in the peripheral aspect of both lungs are unchanged when compared to previous exam. Finding suggests pulmonary fibrosis of uncertain etiology. \par \par Multiple drug allergies including zosyn, cephalosporins, sulfa, quinolones, tetracycline\par

## 2020-12-14 NOTE — REVIEW OF SYSTEMS
[Feeling Tired] : feeling tired [Recent Weight Loss (___ Lbs)] : recent [unfilled] ~Ulb weight loss [As Noted in HPI] : as noted in HPI [Cough] : cough [Sputum] : coughing up ~M sputum [Negative] : Psychiatric [FreeTextEntry3] : uveitis

## 2020-12-14 NOTE — CONSULT LETTER
[Dear  ___] : Dear  [unfilled], [Consult Letter:] : I had the pleasure of evaluating your patient, [unfilled]. [Please see my note below.] : Please see my note below. [Consult Closing:] : Thank you very much for allowing me to participate in the care of this patient.  If you have any questions, please do not hesitate to contact me. [Sincerely,] : Sincerely, [FreeTextEntry2] : Dr. Ofelia Mcgowan [FreeTextEntry3] : \par Criselda Handy MD\par  of Medicine\par Division of Infectious Diseases\par [facility] [employer] [facility] [employer] [facility]\par [address] [place] [address] DrFrancesco\[place] [address]\par Tel: [phone]\par Fax: [phone]\par

## 2020-12-14 NOTE — PHYSICAL EXAM
[General Appearance - Alert] : alert [General Appearance - In No Acute Distress] : in no acute distress [General Appearance - Well-Appearing] : healthy appearing [Sclera] : the sclera and conjunctiva were normal [PERRL With Normal Accommodation] : pupils were equal in size, round, reactive to light [Outer Ear] : the ears and nose were normal in appearance [Neck Appearance] : the appearance of the neck was normal [] : no respiratory distress [Auscultation Breath Sounds / Voice Sounds] : lungs were clear to auscultation bilaterally [Heart Rate And Rhythm] : heart rate was normal and rhythm regular [Heart Sounds] : normal S1 and S2 [FreeTextEntry1] : +murmur [Edema] : there was no peripheral edema [Bowel Sounds] : normal bowel sounds [Abdomen Soft] : soft [Abdomen Tenderness] : non-tender [Costovertebral Angle Tenderness] : no CVA tenderness [Cervical Lymph Nodes Enlarged Posterior Bilaterally] : posterior cervical [Cervical Lymph Nodes Enlarged Anterior Bilaterally] : anterior cervical [Supraclavicular Lymph Nodes Enlarged Bilaterally] : supraclavicular [Musculoskeletal - Swelling] : no joint swelling [Skin Lesions] : no skin lesions [No Focal Deficits] : no focal deficits [Oriented To Time, Place, And Person] : oriented to person, place, and time [Affect] : the affect was normal

## 2020-12-15 LAB
ALBUMIN SERPL ELPH-MCNC: 3.9 G/DL
ALP BLD-CCNC: 103 U/L
ALT SERPL-CCNC: 14 U/L
ANION GAP SERPL CALC-SCNC: 11 MMOL/L
AST SERPL-CCNC: 18 U/L
BILIRUB SERPL-MCNC: 0.5 MG/DL
BUN SERPL-MCNC: 18 MG/DL
CALCIUM SERPL-MCNC: 9.2 MG/DL
CHLORIDE SERPL-SCNC: 98 MMOL/L
CO2 SERPL-SCNC: 27 MMOL/L
CREAT SERPL-MCNC: 0.96 MG/DL
GLUCOSE SERPL-MCNC: 89 MG/DL
M TB IFN-G BLD-IMP: ABNORMAL
POTASSIUM SERPL-SCNC: 4.8 MMOL/L
PROT SERPL-MCNC: 7.3 G/DL
QUANTIFERON TB PLUS MITOGEN MINUS NIL: 0.1 IU/ML
QUANTIFERON TB PLUS NIL: 0.01 IU/ML
QUANTIFERON TB PLUS TB1 MINUS NIL: 0 IU/ML
QUANTIFERON TB PLUS TB2 MINUS NIL: 0 IU/ML
SODIUM SERPL-SCNC: 136 MMOL/L

## 2020-12-21 ENCOUNTER — LABORATORY RESULT (OUTPATIENT)
Age: 85
End: 2020-12-21

## 2020-12-29 LAB — BACTERIA SPT CULT: ABNORMAL

## 2021-01-15 LAB
ACID FAST STN SPT: ABNORMAL
ACID FAST STN SPT: ABNORMAL

## 2021-01-22 LAB — FUNGUS SPT CULT: ABNORMAL

## 2021-01-25 ENCOUNTER — APPOINTMENT (OUTPATIENT)
Dept: INFECTIOUS DISEASE | Facility: CLINIC | Age: 86
End: 2021-01-25
Payer: MEDICARE

## 2021-01-25 VITALS
BODY MASS INDEX: 23.07 KG/M2 | SYSTOLIC BLOOD PRESSURE: 152 MMHG | DIASTOLIC BLOOD PRESSURE: 75 MMHG | HEART RATE: 63 BPM | WEIGHT: 147 LBS | OXYGEN SATURATION: 96 % | RESPIRATION RATE: 18 BRPM | TEMPERATURE: 97 F | HEIGHT: 67 IN

## 2021-01-25 PROCEDURE — 99072 ADDL SUPL MATRL&STAF TM PHE: CPT

## 2021-01-25 PROCEDURE — 99215 OFFICE O/P EST HI 40 MIN: CPT

## 2021-01-25 NOTE — HISTORY OF PRESENT ILLNESS
[FreeTextEntry1] : This is a 86 yo F with pmh Breast cancer s/p lumpectomy and chemo/RT 2011, anxiety, GERD, IBS, nasal polyps, who presents today for chronic cough, and positive sputum cultures for MAC and MSSA. \par \par She presents with her daughter, Juan Daniel, today for f/up. \par \par Initially in 2015, she was referred to pulmonary for abnormal chest imaging.  She was seen in the ER for GI illness and CT a/p was done which showed bibasilar atelectasis.  \par \par She was referred to pulmonary for this abnormal CT scan. \par \par She feels weak, had daily cough of productive sputum.  She has night sweats and 10 lb weight loss. Since last visit gained 2 lbs and cough is improved. Doesn't have as many fits. Does not cough every day now. \par \par She was referred to ID today b/c sputum was collected and grew MSSA and SEAMUS.\par \par CT chest 1/2017: RUL 1.2 cm mixed/solid GGO (unchanged from 1/4/2016), but decreased solid component compared to 10/6/2015. \par \par CT chest 4/2019: 1.8 cm opacity containing cystic component in RUL, increased in size.\par \par CT  chest 10/2019: RUL peripheral segment cystic nodule measures 1.6 x 1.1 cm, not significantly changed from 4/2019 but mildly increased from 1/20/2017.  On thin section imaging, this has the appearance of focal dilatation of an airway with impaction rather than a solid nodule.  \par \par CT chest 11/5/2020: 2.1 x 2 cm nodular opacity containing cavitation in the RUL has increased in size when compared to previous exams. Patchy ground glass and reticular opacities in the peripheral aspect of both lungs are unchanged when compared to previous exam. Finding suggests pulmonary fibrosis of uncertain etiology. \par \par Multiple drug allergies including zosyn, cephalosporins, sulfa, quinolones, tetracycline\par \connor Missed an appt a few weeks ago because she felt nausea.\connor Was too weak to come back then. 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

## 2021-01-25 NOTE — ASSESSMENT
[FreeTextEntry1] : This is a 86 yo F with pmh Breast cancerm s/p lumpectomy and chemo/RT, anxiety, GERD, IBS, nasal polyps, who presents today for chronic cough, and positive sputum cultures for MSSA and SEAMUS.  \par \par She multiple drug allergies which makes treating the MSSA challenging.\par \par She is not able to tolerate any oral options. 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  She seems very sensitive to medications though and she may have a hard time tolerating MAC meds. Spent extended time going over meds and side effects. She is anxious and tearful about taking medications and says she is not sure it is suitable for her quality of life.  She is very hesitant, but will consider the treatment after speaking with her primary physician and pulmonary physician. \par \par She would need sensitivities checked to see if amikacin remains an option. Have asked micro lab to check sensitivity.\par \par If she does not want treatment, a focus on mucus clearing may be beneficial.\par \par I discussed with Dr. Mcgowan of pulmonary. \par Pt will think about it and d/s her primary doctor and Dr. Mcgowan as well. \par \par Doses would be:\par 147 lbs\par azithro 250 mg po daily\par ethambutol 800 (slightly under dosed)\par rifampin 600 daily - start 300 mg and tirate up.\par Possible IV amikacin if sensitive\par \par Time spent 45 minutes.

## 2021-01-25 NOTE — PHYSICAL EXAM
[General Appearance - Alert] : alert [General Appearance - In No Acute Distress] : in no acute distress [General Appearance - Well-Appearing] : healthy appearing [Sclera] : the sclera and conjunctiva were normal [PERRL With Normal Accommodation] : pupils were equal in size, round, reactive to light [Outer Ear] : the ears and nose were normal in appearance [Neck Appearance] : the appearance of the neck was normal [] : no respiratory distress [Auscultation Breath Sounds / Voice Sounds] : lungs were clear to auscultation bilaterally [Heart Rate And Rhythm] : heart rate was normal and rhythm regular [Heart Sounds] : normal S1 and S2 [Edema] : there was no peripheral edema [Bowel Sounds] : normal bowel sounds [Abdomen Soft] : soft [Abdomen Tenderness] : non-tender [Costovertebral Angle Tenderness] : no CVA tenderness [Cervical Lymph Nodes Enlarged Posterior Bilaterally] : posterior cervical [Cervical Lymph Nodes Enlarged Anterior Bilaterally] : anterior cervical [Supraclavicular Lymph Nodes Enlarged Bilaterally] : supraclavicular [Musculoskeletal - Swelling] : no joint swelling [Skin Lesions] : no skin lesions [No Focal Deficits] : no focal deficits [Oriented To Time, Place, And Person] : oriented to person, place, and time [Affect] : the affect was normal [FreeTextEntry1] : +murmur

## 2021-01-25 NOTE — CONSULT LETTER
[Dear  ___] : Dear  [unfilled], [Consult Letter:] : I had the pleasure of evaluating your patient, [unfilled]. [Please see my note below.] : Please see my note below. [Consult Closing:] : Thank you very much for allowing me to participate in the care of this patient.  If you have any questions, please do not hesitate to contact me. [Sincerely,] : Sincerely, [FreeTextEntry3] : \par Criselda Handy MD\par  of Medicine\par Division of Infectious Diseases\par [facility] [employer] [facility]\par [address] [place] [address] DrFrancesco\[place] [address]\par Tel: [phone]\par Fax: [phone]\par  [FreeTextEntry2] : Dr. Ofelia Mcgowan [DrFrancesco  ___] : Dr. ALDRIDGE

## 2021-02-04 ENCOUNTER — APPOINTMENT (OUTPATIENT)
Dept: PULMONOLOGY | Facility: CLINIC | Age: 86
End: 2021-02-04
Payer: MEDICARE

## 2021-02-04 VITALS
WEIGHT: 148.6 LBS | HEART RATE: 60 BPM | BODY MASS INDEX: 23.32 KG/M2 | DIASTOLIC BLOOD PRESSURE: 87 MMHG | HEIGHT: 67 IN | TEMPERATURE: 97.8 F | SYSTOLIC BLOOD PRESSURE: 151 MMHG | OXYGEN SATURATION: 98 %

## 2021-02-04 PROCEDURE — 99072 ADDL SUPL MATRL&STAF TM PHE: CPT

## 2021-02-04 PROCEDURE — 99214 OFFICE O/P EST MOD 30 MIN: CPT

## 2021-02-04 PROCEDURE — 94799 UNLISTED PULMONARY SVC/PX: CPT

## 2021-02-04 NOTE — HISTORY OF PRESENT ILLNESS
[FreeTextEntry1] : 84F hx IBS, Breast CA s/p lumpectomy and chemoRT (2011), anxiety, GERD, abnormal chest CT that I am following, who comes for follow up visit for evaluation of persistent cough and persistent RUL cavitary opacity in the lung which was noted to be enlarging.  \par Data:\par CT Chest 1/2017: RUL 1.2 cm mixed/solid GGO opacity (unchanged compared to 1/4/2016) but demonstrates decreased solid component compared to 10/6/2015. Subpleural reticular opacities no traction or honeycombing.  \par PET 5/2016: patchy mixed solid/GGO in post RUL, not significantly changed from prior (1/4/2016) \par PFTs 11/2015: FEV1/FVC 75%, FEV1 81%, FVC 81%, no bronchodilatory testing, TLC 72%, DLCO 65% \par CT Chest 11/15/20:  2.1x2cm nodular opacity with central cavitation in the posterior segment of the RUL that has increased in size when compared with prior CT.  Bilateral GG and reticular opacities are without change.  \par Sputum culture 12/21/20: numerous staph aureus and MAC by DNA probe.\par Quant gold has been indeterminate x 2\par \par on VS today afebrile and weight is stable to increased slightly.  continues to have fatigue, poor appetite, sweats, coughing  producing mucous.  No fever.  She does not want to start antibiotics for MAC.   She is willing to try airway clearance techniques.  \par

## 2021-02-04 NOTE — CONSULT LETTER
[Dear  ___] : Dear  [unfilled], [Consult Letter:] : I had the pleasure of evaluating your patient, [unfilled]. [Please see my note below.] : Please see my note below. [Consult Closing:] : Thank you very much for allowing me to participate in the care of this patient.  If you have any questions, please do not hesitate to contact me. [Sincerely,] : Sincerely, [FreeTextEntry2] : Dr. Criselda Cardona\par  [phone]

## 2021-02-04 NOTE — PHYSICAL EXAM
[No Acute Distress] : no acute distress [Normal Appearance] : normal appearance [No Neck Mass] : no neck mass [Normal Rate/Rhythm] : normal rate/rhythm [No Murmurs] : no murmurs [No Resp Distress] : no resp distress [Rales] : rales [No Abnormalities] : no abnormalities [Normal Gait] : normal gait [No Clubbing] : no clubbing [No Cyanosis] : no cyanosis [No Edema] : no edema [Normal Color/ Pigmentation] : normal color/ pigmentation [No Focal Deficits] : no focal deficits [Oriented x3] : oriented x3 [Normal Affect] : normal affect [TextBox_68] : azra ochoa

## 2021-02-04 NOTE — ASSESSMENT
[FreeTextEntry1] : 84 year old woman with history of breast cancer status post lumpectomy and chemo/RT in 2011, anxiety, GERD and postnasal congestion with increasing RUL cavity, persistent cough, weight loss and night sweats.  She has grown staph and MAC on sputum culture, most recently in December.  Malignancy is also in the differential.\par \par ON Exam today BP is slightly elevated.  Oxygen saturation is 98% at room air at rest.  LUngs are mostly clear.  there is no cervical, supraclavicular adenopathy.  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  STaph is likely a colonizer as she has not clinically worsened and she has not been treated for several weeks.  She is intolerant of many antibiotics that would be used to treat the STaph.\par She is hesitant to start treatment for MAC as it involves at least 3 antibioitcs and for a prolonged course- 1 year or more.  She appears stable clinically without further weight loss although she is experiencing night sweats.  She met with Dr. Handy from ID, and has been fully informed about the regimen and side effects.\par \par 2- mucous production/bronchiectasis- have ordered hypertonic saline with albuterol and airway clearance valve twice daily\par \par F/U in 3 months to reassess effects of airway clearance.  Will likely order repeat CT at that time.  \par 
67 yo female hx of anxiety and facial tick since age 18 BIBA 2/2 brief confusion started around 930 pm and lasted for about 3 hours as per EMS and . As per , patient was confused and kept repeating herself. EMS also noted patient kept asking the same questions multiple when they saw her. Patient is back to her baseline upon ED arrival.  also denies incontinence and seizure like activity at home.  Denies HA/Dizziness/slur speech/extremities weakness and numbness/ facial numbness and weakness. Denies Fever/chill/recent illness/coughing/chest pain/sob/abd pain/n/v/d/extremities pain/urinary sxs. Denies SI/HI/Hallucinations

## 2021-03-22 ENCOUNTER — OUTPATIENT (OUTPATIENT)
Dept: OUTPATIENT SERVICES | Facility: HOSPITAL | Age: 86
LOS: 1 days | Discharge: HOME | End: 2021-03-22

## 2021-03-22 DIAGNOSIS — C50.912 MALIGNANT NEOPLASM OF UNSPECIFIED SITE OF LEFT FEMALE BREAST: ICD-10-CM

## 2021-03-25 ENCOUNTER — APPOINTMENT (OUTPATIENT)
Dept: HEMATOLOGY ONCOLOGY | Facility: CLINIC | Age: 86
End: 2021-03-25
Payer: MEDICARE

## 2021-03-25 VITALS
SYSTOLIC BLOOD PRESSURE: 153 MMHG | DIASTOLIC BLOOD PRESSURE: 75 MMHG | HEIGHT: 66.97 IN | WEIGHT: 146.14 LBS | TEMPERATURE: 97.7 F | BODY MASS INDEX: 22.94 KG/M2 | RESPIRATION RATE: 17 BRPM | OXYGEN SATURATION: 97 % | HEART RATE: 66 BPM

## 2021-03-25 PROCEDURE — 99072 ADDL SUPL MATRL&STAF TM PHE: CPT

## 2021-03-25 PROCEDURE — 99215 OFFICE O/P EST HI 40 MIN: CPT

## 2021-04-19 NOTE — ED ADULT NURSE NOTE - NS ED NURSE DISCH DISPOSITION
[facility] Neurology Outpatient Progress Note  Date of service: 4/19/2021    Patient here to follow up regarding seizures. Has no seizure since last visit, she has been on lamictal 200 mg bid (took both together nightly). .   no convulsive seizure reported since 2 Motor strength: 5/5 all extremities  Tone: normal  DTRs: 2+ symmetric  Coordination: normal  Sensory: intact  Gait: normal  Romberg: negative  Neck: supple    Test reviewed on 4/19/2021    A/P:   Seizure disorder (HCC)  (primary encounter diagnosis): com Admitted

## 2021-05-06 ENCOUNTER — APPOINTMENT (OUTPATIENT)
Dept: PULMONOLOGY | Facility: CLINIC | Age: 86
End: 2021-05-06
Payer: MEDICARE

## 2021-05-06 VITALS
TEMPERATURE: 97.3 F | HEIGHT: 66 IN | OXYGEN SATURATION: 98 % | WEIGHT: 147 LBS | HEART RATE: 64 BPM | BODY MASS INDEX: 23.63 KG/M2 | SYSTOLIC BLOOD PRESSURE: 164 MMHG | DIASTOLIC BLOOD PRESSURE: 82 MMHG

## 2021-05-06 VITALS — DIASTOLIC BLOOD PRESSURE: 84 MMHG | SYSTOLIC BLOOD PRESSURE: 154 MMHG

## 2021-05-06 PROCEDURE — 99072 ADDL SUPL MATRL&STAF TM PHE: CPT

## 2021-05-06 PROCEDURE — 99215 OFFICE O/P EST HI 40 MIN: CPT

## 2021-05-06 NOTE — HISTORY OF PRESENT ILLNESS
[FreeTextEntry1] : 86F hx IBS, Breast CA s/p lumpectomy and chemoRT (2011), anxiety, GERD, abnormal chest CT that I am following, who comes for follow up visit for evaluation of persistent cough and persistent RUL cavitary opacity in the lung which was noted to be enlarging. \par Comes in for follow up today.  Last seen in 11/2020- at that time she was started on an airway clearance regimen which has helped her.  She initially coughed up a lot of mucous and now she no longer has a cough.  She denies wheezing.  WEight is stable.  NO infectious symptoms.  \par  \par Data:\par CT Chest 1/2017: RUL 1.2 cm mixed/solid GGO opacity (unchanged compared to 1/4/2016) but demonstrates decreased solid component compared to 10/6/2015. Subpleural reticular opacities no traction or honeycombing.  \par PET 5/2016: patchy mixed solid/GGO in post RUL, not significantly changed from prior (1/4/2016) \par PFTs 11/2015: FEV1/FVC 75%, FEV1 81%, FVC 81%, no bronchodilatory testing, TLC 72%, DLCO 65% \par CT Chest 11/15/20:  2.1x2cm nodular opacity with central cavitation in the posterior segment of the RUL that has increased in size when compared with prior CT.  Bilateral GG and reticular opacities are without change.  \par Sputum culture 12/21/20: numerous staph aureus and MAC by DNA probe.\par Quant gold has been indeterminate x 2\par \par

## 2021-05-06 NOTE — PHYSICAL EXAM
[No Acute Distress] : no acute distress [Normal Appearance] : normal appearance [No Neck Mass] : no neck mass [Normal Rate/Rhythm] : normal rate/rhythm [Normal S1, S2] : normal s1, s2 [No Murmurs] : no murmurs [No Resp Distress] : no resp distress [Clear to Auscultation Bilaterally] : clear to auscultation bilaterally [No Abnormalities] : no abnormalities [Normal Gait] : normal gait [No Clubbing] : no clubbing [No Cyanosis] : no cyanosis [No Edema] : no edema

## 2021-05-06 NOTE — ASSESSMENT
[FreeTextEntry1] : 86 year old woman with history of breast cancer status post lumpectomy and chemo/RT in 2011, anxiety, GERD and postnasal congestion with increasing RUL cavity in an area of focal bronchiectasis.  She is improved after a clearance regimen inving albuterol, hypertonic saline and the Aerobika valve.    She has grown staph and MAC on sputum culture, most recently in December.  Malignancy is also in the differential. She has been hesitant to undergo diagnostic workup and has not wanted to start treatment for MAC.  \par \par ON Exam today BP is slightly elevated.  Oxygen saturation is 98% at room air at rest.  LUngs are clear.  there is no cervical, supraclavicular adenopathy.  HEart sounds WNL. There is no edema.  \par \par Plan:\par 1- RUL cavity- She is clinically stable at this time, no longer coughing or producing sputum. \par \par 2- mucous production/bronchiectasis- Recommended that she continue airway clearance techniques.  REviewed protocol and have asked Renate Lee from PFT lab to sit with her today to reinforce the regimen.  \par \par 3- F/U Chest CT ordered.  \par

## 2021-05-21 LAB — ACID FAST STN SPT: ABNORMAL

## 2021-05-25 ENCOUNTER — APPOINTMENT (OUTPATIENT)
Dept: CT IMAGING | Facility: IMAGING CENTER | Age: 86
End: 2021-05-25
Payer: MEDICARE

## 2021-05-25 ENCOUNTER — OUTPATIENT (OUTPATIENT)
Dept: OUTPATIENT SERVICES | Facility: HOSPITAL | Age: 86
LOS: 1 days | End: 2021-05-25
Payer: MEDICARE

## 2021-05-25 DIAGNOSIS — J47.9 BRONCHIECTASIS, UNCOMPLICATED: ICD-10-CM

## 2021-05-25 DIAGNOSIS — R93.89 ABNORMAL FINDINGS ON DIAGNOSTIC IMAGING OF OTHER SPECIFIED BODY STRUCTURES: ICD-10-CM

## 2021-05-25 PROCEDURE — 71250 CT THORAX DX C-: CPT | Mod: 26

## 2021-05-25 PROCEDURE — 71250 CT THORAX DX C-: CPT

## 2021-07-08 ENCOUNTER — APPOINTMENT (OUTPATIENT)
Dept: PULMONOLOGY | Facility: CLINIC | Age: 86
End: 2021-07-08
Payer: MEDICARE

## 2021-07-08 VITALS
DIASTOLIC BLOOD PRESSURE: 77 MMHG | SYSTOLIC BLOOD PRESSURE: 156 MMHG | HEART RATE: 60 BPM | OXYGEN SATURATION: 99 % | TEMPERATURE: 97.3 F | BODY MASS INDEX: 23.78 KG/M2 | HEIGHT: 66 IN | WEIGHT: 148 LBS

## 2021-07-08 DIAGNOSIS — Z86.19 PERSONAL HISTORY OF OTHER INFECTIOUS AND PARASITIC DISEASES: ICD-10-CM

## 2021-07-08 PROCEDURE — 99214 OFFICE O/P EST MOD 30 MIN: CPT

## 2021-07-08 PROCEDURE — 99072 ADDL SUPL MATRL&STAF TM PHE: CPT

## 2021-07-08 NOTE — ASSESSMENT
[FreeTextEntry1] : 86 year old woman with history of breast cancer status post lumpectomy and chemo/RT in 2011, anxiety, GERD and postnasal congestion with increasing RUL cavity in an area of focal bronchiectasis.  She is improved after a clearance regimen inving albuterol, hypertonic saline and the Aerobika valve.    She has grown staph and MAC on sputum culture, most recently in December.  Malignancy is also in the differential. She has been hesitant to undergo diagnostic workup and has not wanted to start treatment for MAC. Recent Chest CT  from 5/25/21 shows increasing lung opacity with cavitation and dilated airway which is increased in size and density.  She does not have infectious symptoms.  \par \par ON Exam today BP is slightly elevated.  Oxygen saturation is 98% at room air at rest.  LUngs are clear.  there is no cervical, supraclavicular adenopathy.  HEart sounds WNL. There is no edema.  \par \par Plan:\par 1- RUL cavity- concerned that this process represents malignancy.  Discussed at length with patient and two daughters and she wants to proceed with bronchoscopy.  Have reached out to IP Dr. Mccallum to review CT and schedule procedure.  \par \par 2- mucous production/bronchiectasis- Recommended that she continue airway clearance techniques.  \par \par Will advise further after procedure.

## 2021-07-08 NOTE — HISTORY OF PRESENT ILLNESS
[FreeTextEntry1] : 86F hx IBS, Breast CA s/p lumpectomy and chemoRT (2011), anxiety, GERD, abnormal chest CT that I am following, who comes for follow up visit for evaluation of persistent cough and persistent RUL cavitary opacity in the lung which was noted to be enlarging. \par Last seen in the office on 5/6/21- at that time felt better after using airway clearance regimen.  \par  \par Data:\par CT Chest 1/2017: RUL 1.2 cm mixed/solid GGO opacity (unchanged compared to 1/4/2016) but demonstrates decreased solid component compared to 10/6/2015. Subpleural reticular opacities no traction or honeycombing.  \par PET 5/2016: patchy mixed solid/GGO in post RUL, not significantly changed from prior (1/4/2016) \par PFTs 11/2015: FEV1/FVC 75%, FEV1 81%, FVC 81%, no bronchodilatory testing, TLC 72%, DLCO 65% \par CT Chest 11/15/20:  2.1x2cm nodular opacity with central cavitation in the posterior segment of the RUL that has increased in size when compared with prior CT.  Bilateral GG and reticular opacities are without change.  \par Recent CT Chest performed on 5/25/21 and shows 2.7 x 2 cm opacity in the posterior segment of the RUL which has increased in size compared to prior exams.  I personally reviewed the CT and agree with description and impression.  \par Sputum culture 12/21/20: numerous staph aureus and MAC by DNA probe.\par Quant gold has been indeterminate x 2\par \par Accompanied by both daughters today.  She is feeling well overall.  Using her clearance device with albuterol and hypertonic slaine.  Advised to use it while nebulizing both meds to eliminate one step.  Has been hyperventilating.  Denies fever, no purulent sputum.  Balance issues

## 2021-07-26 ENCOUNTER — APPOINTMENT (OUTPATIENT)
Dept: PULMONOLOGY | Facility: CLINIC | Age: 86
End: 2021-07-26
Payer: MEDICARE

## 2021-07-26 VITALS
RESPIRATION RATE: 16 BRPM | HEIGHT: 66 IN | OXYGEN SATURATION: 98 % | WEIGHT: 148 LBS | HEART RATE: 58 BPM | SYSTOLIC BLOOD PRESSURE: 143 MMHG | DIASTOLIC BLOOD PRESSURE: 78 MMHG | BODY MASS INDEX: 23.78 KG/M2 | TEMPERATURE: 97.2 F

## 2021-07-26 DIAGNOSIS — R94.8 ABNORMAL RESULTS OF FUNCTION STUDIES OF OTHER ORGANS AND SYSTEMS: ICD-10-CM

## 2021-07-26 PROCEDURE — 99215 OFFICE O/P EST HI 40 MIN: CPT

## 2021-08-03 NOTE — END OF VISIT
[] : Fellow [FreeTextEntry3] : \par Interventional Pulmonology Attending Addendum\par \par Patient seen and examined during the office visit with the fellow. In summary, this is a 85 yo F w/ IBS, Breast CA s/p lumpectomy and chemoRT (2011), anxiety, GERD, abnormal chest CT who presents to IP clinic for further evaluation of abnormal Chest CT demonstrate growing RUL cavitary lesion. Patient was referred for josé miguel bronch with biopsy of the lesion. \par \par The diagnostic yield of navigation assisted bronchoscopy with biopsy as well as EBUS with biopsy was discussed with the patient. The risk and benefits of bronchoscopy with navigation assisted transbronchial biopsy including risk of bleeding and  risk pneumothorax was discussed with the patient and they demonstrated understanding. In case the lesion is suspicious for malignancy on preliminary or there is mediastinal or hilar adenopathy, the patient will need staging of the mediastinum with EBUS guided TBNA in the same procedure. The risk and benefits of EBUS including the risk of bleeding and risk of pneumothorax was discussed with the patient and he demonstrated understanding. We will also send the tissue/BAL for culture to assess for infectious etiology during the procedure. The patient is agreeable to proceed with a navigation assisted bronchoscopy with biopsy of the lung lesion and EBUS with TBNA. \par \par Patient will discuss with family further and let us know if she agrees to proceed. If agree, Will need COVID swab or proof of covid vaccine and presurgical testing prior to scheduling the procedure. The office will co-ordinate testing and pre-surgical appointment.\par \par Slava Angeles MD\par Director of Interventional Pulmonology & Bronchoscopy\par . \par Division of Pulmonary, Critical Care & Sleep Medicine\par [facility].\par \par \par \par \par \par \par \par  [Time Spent: ___ minutes] : I have spent [unfilled] minutes of time on the encounter. [>50% of the face to face encounter time was spent on counseling and/or coordination of care for ___] : Greater than 50% of the face to face encounter time was spent on counseling and/or coordination of care for [unfilled]

## 2021-08-03 NOTE — HISTORY OF PRESENT ILLNESS
[TextBox_4] : Interventional Pulmonology Note\par \par Patient is an 85 yo F w/ IBS, Breast CA s/p lumpectomy and chemoRT (2011), anxiety, GERD, abnormal chest CT who presents to IP clinic for further evaluation of abnormal Chest CT demonstrate growing RUL cavitary lesion. \par \par CT Chest 1/2017: RUL 1.2 cm mixed/solid GGO opacity (unchanged compared to 1/4/2016) but demonstrates decreased solid component compared to 10/6/2015. Subpleural reticular opacities no traction or honeycombing. \par CT Chest 11/15/20: 2.1x2cm nodular opacity with central cavitation in the posterior segment of the RUL that has increased in size when compared with prior CT. Bilateral GG and reticular opacities are without change. \par Recent CT Chest performed on 5/25/21 and shows 2.7 x 2 cm opacity in the posterior segment of the RUL which has increased in size compared to prior exams.\par \par Sputum cultures 12/21/20 with numerous staph aureus and MAC by DNA probe. Quant Gold Indeterminate x 2\par \par Denies any fevers, chill. Endorses stable respiratory status and sputum with compliance of airway clearance and nebulizer treatments.

## 2021-08-03 NOTE — ASSESSMENT
[FreeTextEntry1] : Patient is an 87 yo F w/ IBS, Breast CA s/p lumpectomy and chemoRT (2011), anxiety, GERD, abnormal chest CT who presents to IP clinic for further evaluation of abnormal Chest CT demonstrate growing RUL cavitary lesion. \par \par # RUL cavitary lesion - Recent CT Chest performed on 5/25/21 and shows 2.7 x 2 cm opacity with cavitation in the posterior segment of the RUL which has increased in size compared to prior exams. Ddx includes malignancy vs MAC\par - WIll arrange for Navigational bronchoscopy/BUS and BAL\par - Procedure as well as risks/benefits explained to patient in depth\par - Patient instructed to obtain medial clearance for procedure\par \par Jorden Shau, F3\par d/w Dr. Angeles

## 2021-10-07 ENCOUNTER — APPOINTMENT (OUTPATIENT)
Dept: PULMONOLOGY | Facility: CLINIC | Age: 86
End: 2021-10-07
Payer: MEDICARE

## 2021-10-07 VITALS
BODY MASS INDEX: 23.78 KG/M2 | DIASTOLIC BLOOD PRESSURE: 66 MMHG | HEART RATE: 65 BPM | OXYGEN SATURATION: 95 % | HEIGHT: 66 IN | TEMPERATURE: 97.2 F | SYSTOLIC BLOOD PRESSURE: 138 MMHG | WEIGHT: 148 LBS

## 2021-10-07 DIAGNOSIS — J06.9 ACUTE UPPER RESPIRATORY INFECTION, UNSPECIFIED: ICD-10-CM

## 2021-10-07 PROCEDURE — 99215 OFFICE O/P EST HI 40 MIN: CPT

## 2021-10-07 NOTE — HISTORY OF PRESENT ILLNESS
[FreeTextEntry1] : 86F hx IBS, Breast CA s/p lumpectomy and chemoRT (2011), anxiety, GERD, abnormal chest CT that I am following, who comes for follow up visit for evaluation of persistent cough and persistent RUL cavitary opacity in the lung which was noted to be enlarging. \par Last seen in the office on 5/6/21- at that time felt better after using airway clearance regimen.  \par  \par Data:\par CT Chest 1/2017: RUL 1.2 cm mixed/solid GGO opacity (unchanged compared to 1/4/2016) but demonstrates decreased solid component compared to 10/6/2015. Subpleural reticular opacities no traction or honeycombing.  \par PET 5/2016: patchy mixed solid/GGO in post RUL, not significantly changed from prior (1/4/2016) \par PFTs 11/2015: FEV1/FVC 75%, FEV1 81%, FVC 81%, no bronchodilatory testing, TLC 72%, DLCO 65% \par CT Chest 11/15/20:  2.1x2cm nodular opacity with central cavitation in the posterior segment of the RUL that has increased in size when compared with prior CT.  Bilateral GG and reticular opacities are without change.  \par Recent CT Chest performed on 5/25/21 and shows 2.7 x 2 cm opacity in the posterior segment of the RUL which has increased in size compared to prior exams.  I personally reviewed the CT and agree with description and impression.  \par Sputum culture 12/21/20: numerous staph aureus and MAC by DNA probe.\par Quant gold has been indeterminate x 2\par \par Accompanied by  daughter today.  She is not feeling well for last week.  Reports subjective fever, chills, body aches, cough mucous production.  Started an Azithromycin yesterday by PCP.  Could not be seen and came to see me for urgent visit.

## 2021-10-07 NOTE — ASSESSMENT
[FreeTextEntry1] : 86 year old woman with history of breast cancer status post lumpectomy and chemo/RT in 2011, anxiety, GERD and postnasal congestion with increasing RUL cavity in an area of focal bronchiectasis.  She is improved after a clearance regimen inving albuterol, hypertonic saline and the Aerobika valve.    She has grown staph and MAC on sputum culture, most recently in December.  Malignancy is also in the differential. She has been hesitant to undergo diagnostic workup and has not wanted to start treatment for MAC. Recent Chest CT  from 5/25/21 shows increasing lung opacity with cavitation and dilated airway which is increased in size and density. Recently saw Dr. Angeles for EBUS biopsy.\par \par Comes in today for acute visit for URI symptoms\par \par ON Exam today VSS.  Oxygen saturation is 97% at room air at rest.  LUngs are clear.  there is no cervical, supraclavicular adenopathy.  HEart sounds WNL. There is no edema.  \par \par Plan:\par continue Azithromycin,  tolerating it.  Doubt pneumonia as lungs are clear VSS.\par LIkely viral infection- rule out COVID\par RVP sent, CBC, metabolic profile.\par ADvised to go to eD if symptoms worsen.

## 2021-10-07 NOTE — PHYSICAL EXAM
[Normal Appearance] : normal appearance [No Neck Mass] : no neck mass [Normal Rate/Rhythm] : normal rate/rhythm [Normal S1, S2] : normal s1, s2 [No Murmurs] : no murmurs [No Resp Distress] : no resp distress [No Acc Muscle Use] : no acc muscle use [Clear to Auscultation Bilaterally] : clear to auscultation bilaterally [Normal Gait] : normal gait [No Clubbing] : no clubbing [No Cyanosis] : no cyanosis [No Edema] : no edema [Normal Color/ Pigmentation] : normal color/ pigmentation [No Focal Deficits] : no focal deficits [Oriented x3] : oriented x3 [Normal Affect] : normal affect [TextBox_2] : appears ill

## 2021-10-08 LAB
ALBUMIN SERPL ELPH-MCNC: 3.6 G/DL
ALP BLD-CCNC: 109 U/L
ALT SERPL-CCNC: 11 U/L
ANION GAP SERPL CALC-SCNC: 15 MMOL/L
ANION GAP SERPL CALC-SCNC: 15 MMOL/L
APTT BLD: 30 SEC
AST SERPL-CCNC: 16 U/L
BASOPHILS # BLD AUTO: 0.03 K/UL
BASOPHILS # BLD AUTO: 0.04 K/UL
BASOPHILS NFR BLD AUTO: 0.6 %
BASOPHILS NFR BLD AUTO: 0.6 %
BILIRUB SERPL-MCNC: 0.5 MG/DL
BUN SERPL-MCNC: 17 MG/DL
BUN SERPL-MCNC: 22 MG/DL
CALCIUM SERPL-MCNC: 9.1 MG/DL
CALCIUM SERPL-MCNC: 9.4 MG/DL
CHLORIDE SERPL-SCNC: 97 MMOL/L
CHLORIDE SERPL-SCNC: 98 MMOL/L
CO2 SERPL-SCNC: 22 MMOL/L
CO2 SERPL-SCNC: 23 MMOL/L
CREAT SERPL-MCNC: 0.81 MG/DL
CREAT SERPL-MCNC: 0.88 MG/DL
EOSINOPHIL # BLD AUTO: 0.12 K/UL
EOSINOPHIL # BLD AUTO: 0.18 K/UL
EOSINOPHIL NFR BLD AUTO: 2.2 %
EOSINOPHIL NFR BLD AUTO: 2.5 %
GLUCOSE SERPL-MCNC: 83 MG/DL
GLUCOSE SERPL-MCNC: 94 MG/DL
HCT VFR BLD CALC: 30.9 %
HCT VFR BLD CALC: 33.6 %
HGB BLD-MCNC: 10.1 G/DL
HGB BLD-MCNC: 10.4 G/DL
IMM GRANULOCYTES NFR BLD AUTO: 0.2 %
IMM GRANULOCYTES NFR BLD AUTO: 0.3 %
INR PPP: 1.08 RATIO
LYMPHOCYTES # BLD AUTO: 1.72 K/UL
LYMPHOCYTES # BLD AUTO: 1.81 K/UL
LYMPHOCYTES NFR BLD AUTO: 24.1 %
LYMPHOCYTES NFR BLD AUTO: 33.3 %
MAN DIFF?: NORMAL
MAN DIFF?: NORMAL
MCHC RBC-ENTMCNC: 28.2 PG
MCHC RBC-ENTMCNC: 28.9 PG
MCHC RBC-ENTMCNC: 31 GM/DL
MCHC RBC-ENTMCNC: 32.7 GM/DL
MCV RBC AUTO: 88.5 FL
MCV RBC AUTO: 91.1 FL
MONOCYTES # BLD AUTO: 0.59 K/UL
MONOCYTES # BLD AUTO: 0.67 K/UL
MONOCYTES NFR BLD AUTO: 10.9 %
MONOCYTES NFR BLD AUTO: 9.4 %
NEUTROPHILS # BLD AUTO: 2.87 K/UL
NEUTROPHILS # BLD AUTO: 4.5 K/UL
NEUTROPHILS NFR BLD AUTO: 52.8 %
NEUTROPHILS NFR BLD AUTO: 63.1 %
PLATELET # BLD AUTO: 243 K/UL
PLATELET # BLD AUTO: 258 K/UL
POTASSIUM SERPL-SCNC: 4.5 MMOL/L
POTASSIUM SERPL-SCNC: 5.3 MMOL/L
PROT SERPL-MCNC: 7.5 G/DL
PT BLD: 12.7 SEC
RAPID RVP RESULT: NOT DETECTED
RBC # BLD: 3.49 M/UL
RBC # BLD: 3.69 M/UL
RBC # FLD: 14.6 %
RBC # FLD: 14.6 %
SARS-COV-2 RNA PNL RESP NAA+PROBE: NOT DETECTED
SODIUM SERPL-SCNC: 135 MMOL/L
SODIUM SERPL-SCNC: 135 MMOL/L
WBC # FLD AUTO: 5.43 K/UL
WBC # FLD AUTO: 7.13 K/UL

## 2022-01-24 ENCOUNTER — NON-APPOINTMENT (OUTPATIENT)
Age: 87
End: 2022-01-24

## 2022-01-26 ENCOUNTER — NON-APPOINTMENT (OUTPATIENT)
Age: 87
End: 2022-01-26

## 2022-02-14 ENCOUNTER — RX RENEWAL (OUTPATIENT)
Age: 87
End: 2022-02-14

## 2022-02-18 ENCOUNTER — INPATIENT (INPATIENT)
Facility: HOSPITAL | Age: 87
LOS: 6 days | Discharge: HOME HEALTH | DRG: 312 | End: 2022-02-25
Attending: INTERNAL MEDICINE | Admitting: INTERNAL MEDICINE
Payer: MEDICARE

## 2022-02-18 ENCOUNTER — APPOINTMENT (OUTPATIENT)
Dept: ULTRASOUND IMAGING | Facility: IMAGING CENTER | Age: 87
End: 2022-02-18

## 2022-02-18 ENCOUNTER — APPOINTMENT (OUTPATIENT)
Dept: MAMMOGRAPHY | Facility: IMAGING CENTER | Age: 87
End: 2022-02-18

## 2022-02-18 VITALS
DIASTOLIC BLOOD PRESSURE: 70 MMHG | WEIGHT: 147.05 LBS | TEMPERATURE: 97 F | HEART RATE: 61 BPM | RESPIRATION RATE: 20 BRPM | SYSTOLIC BLOOD PRESSURE: 151 MMHG | OXYGEN SATURATION: 99 % | HEIGHT: 67 IN

## 2022-02-18 DIAGNOSIS — A31.0 PULMONARY MYCOBACTERIAL INFECTION: ICD-10-CM

## 2022-02-18 DIAGNOSIS — R91.8 OTHER NONSPECIFIC ABNORMAL FINDING OF LUNG FIELD: ICD-10-CM

## 2022-02-18 DIAGNOSIS — R13.10 DYSPHAGIA, UNSPECIFIED: ICD-10-CM

## 2022-02-18 DIAGNOSIS — R55 SYNCOPE AND COLLAPSE: ICD-10-CM

## 2022-02-18 DIAGNOSIS — F41.9 ANXIETY DISORDER, UNSPECIFIED: ICD-10-CM

## 2022-02-18 DIAGNOSIS — R62.7 ADULT FAILURE TO THRIVE: ICD-10-CM

## 2022-02-18 DIAGNOSIS — R78.81 BACTEREMIA: ICD-10-CM

## 2022-02-18 DIAGNOSIS — J45.909 UNSPECIFIED ASTHMA, UNCOMPLICATED: ICD-10-CM

## 2022-02-18 DIAGNOSIS — Z29.9 ENCOUNTER FOR PROPHYLACTIC MEASURES, UNSPECIFIED: ICD-10-CM

## 2022-02-18 LAB
ALBUMIN SERPL ELPH-MCNC: 3.5 G/DL — SIGNIFICANT CHANGE UP (ref 3.3–5)
ALP SERPL-CCNC: 90 U/L — SIGNIFICANT CHANGE UP (ref 40–120)
ALT FLD-CCNC: 7 U/L — LOW (ref 10–45)
ANION GAP SERPL CALC-SCNC: 12 MMOL/L — SIGNIFICANT CHANGE UP (ref 5–17)
APPEARANCE UR: CLEAR — SIGNIFICANT CHANGE UP
APTT BLD: 27.9 SEC — SIGNIFICANT CHANGE UP (ref 27.5–35.5)
AST SERPL-CCNC: 12 U/L — SIGNIFICANT CHANGE UP (ref 10–40)
BACTERIA # UR AUTO: NEGATIVE — SIGNIFICANT CHANGE UP
BASE EXCESS BLDV CALC-SCNC: 4 MMOL/L — HIGH (ref -2–2)
BASOPHILS # BLD AUTO: 0.03 K/UL — SIGNIFICANT CHANGE UP (ref 0–0.2)
BASOPHILS NFR BLD AUTO: 0.6 % — SIGNIFICANT CHANGE UP (ref 0–2)
BILIRUB SERPL-MCNC: 0.4 MG/DL — SIGNIFICANT CHANGE UP (ref 0.2–1.2)
BILIRUB UR-MCNC: NEGATIVE — SIGNIFICANT CHANGE UP
BUN SERPL-MCNC: 18 MG/DL — SIGNIFICANT CHANGE UP (ref 7–23)
CA-I SERPL-SCNC: 1.25 MMOL/L — SIGNIFICANT CHANGE UP (ref 1.15–1.33)
CALCIUM SERPL-MCNC: 9.4 MG/DL — SIGNIFICANT CHANGE UP (ref 8.4–10.5)
CHLORIDE BLDV-SCNC: 101 MMOL/L — SIGNIFICANT CHANGE UP (ref 96–108)
CHLORIDE SERPL-SCNC: 102 MMOL/L — SIGNIFICANT CHANGE UP (ref 96–108)
CO2 BLDV-SCNC: 32 MMOL/L — HIGH (ref 22–26)
CO2 SERPL-SCNC: 23 MMOL/L — SIGNIFICANT CHANGE UP (ref 22–31)
COLOR SPEC: SIGNIFICANT CHANGE UP
CREAT SERPL-MCNC: 0.75 MG/DL — SIGNIFICANT CHANGE UP (ref 0.5–1.3)
CRP SERPL-MCNC: 35 MG/L — HIGH (ref 0–4)
D DIMER BLD IA.RAPID-MCNC: 363 NG/ML DDU — HIGH
DIFF PNL FLD: ABNORMAL
EOSINOPHIL # BLD AUTO: 0.05 K/UL — SIGNIFICANT CHANGE UP (ref 0–0.5)
EOSINOPHIL NFR BLD AUTO: 1 % — SIGNIFICANT CHANGE UP (ref 0–6)
EPI CELLS # UR: 1 /HPF — SIGNIFICANT CHANGE UP
GAS PNL BLDV: 134 MMOL/L — LOW (ref 136–145)
GAS PNL BLDV: SIGNIFICANT CHANGE UP
GAS PNL BLDV: SIGNIFICANT CHANGE UP
GLUCOSE BLDV-MCNC: 94 MG/DL — SIGNIFICANT CHANGE UP (ref 70–99)
GLUCOSE SERPL-MCNC: 91 MG/DL — SIGNIFICANT CHANGE UP (ref 70–99)
GLUCOSE UR QL: NEGATIVE — SIGNIFICANT CHANGE UP
HCO3 BLDV-SCNC: 30 MMOL/L — HIGH (ref 22–29)
HCT VFR BLD CALC: 31.4 % — LOW (ref 34.5–45)
HCT VFR BLDA CALC: 31 % — LOW (ref 34.5–46.5)
HGB BLD CALC-MCNC: 10.3 G/DL — LOW (ref 11.7–16.1)
HGB BLD-MCNC: 9.8 G/DL — LOW (ref 11.5–15.5)
HYALINE CASTS # UR AUTO: 1 /LPF — SIGNIFICANT CHANGE UP (ref 0–2)
IMM GRANULOCYTES NFR BLD AUTO: 0.2 % — SIGNIFICANT CHANGE UP (ref 0–1.5)
INR BLD: 1.18 RATIO — HIGH (ref 0.88–1.16)
KETONES UR-MCNC: NEGATIVE — SIGNIFICANT CHANGE UP
LACTATE BLDV-MCNC: 0.8 MMOL/L — SIGNIFICANT CHANGE UP (ref 0.7–2)
LEUKOCYTE ESTERASE UR-ACNC: ABNORMAL
LYMPHOCYTES # BLD AUTO: 1.7 K/UL — SIGNIFICANT CHANGE UP (ref 1–3.3)
LYMPHOCYTES # BLD AUTO: 33.9 % — SIGNIFICANT CHANGE UP (ref 13–44)
MAGNESIUM SERPL-MCNC: 2 MG/DL — SIGNIFICANT CHANGE UP (ref 1.6–2.6)
MCHC RBC-ENTMCNC: 28.3 PG — SIGNIFICANT CHANGE UP (ref 27–34)
MCHC RBC-ENTMCNC: 31.2 GM/DL — LOW (ref 32–36)
MCV RBC AUTO: 90.8 FL — SIGNIFICANT CHANGE UP (ref 80–100)
MONOCYTES # BLD AUTO: 0.48 K/UL — SIGNIFICANT CHANGE UP (ref 0–0.9)
MONOCYTES NFR BLD AUTO: 9.6 % — SIGNIFICANT CHANGE UP (ref 2–14)
NEUTROPHILS # BLD AUTO: 2.74 K/UL — SIGNIFICANT CHANGE UP (ref 1.8–7.4)
NEUTROPHILS NFR BLD AUTO: 54.7 % — SIGNIFICANT CHANGE UP (ref 43–77)
NITRITE UR-MCNC: NEGATIVE — SIGNIFICANT CHANGE UP
NRBC # BLD: 0 /100 WBCS — SIGNIFICANT CHANGE UP (ref 0–0)
PCO2 BLDV: 52 MMHG — HIGH (ref 39–42)
PH BLDV: 7.37 — SIGNIFICANT CHANGE UP (ref 7.32–7.43)
PH UR: 6.5 — SIGNIFICANT CHANGE UP (ref 5–8)
PHOSPHATE SERPL-MCNC: 3.7 MG/DL — SIGNIFICANT CHANGE UP (ref 2.5–4.5)
PLATELET # BLD AUTO: 223 K/UL — SIGNIFICANT CHANGE UP (ref 150–400)
PO2 BLDV: 18 MMHG — LOW (ref 25–45)
POTASSIUM BLDV-SCNC: 4.2 MMOL/L — SIGNIFICANT CHANGE UP (ref 3.5–5.1)
POTASSIUM SERPL-MCNC: 4.3 MMOL/L — SIGNIFICANT CHANGE UP (ref 3.5–5.3)
POTASSIUM SERPL-SCNC: 4.3 MMOL/L — SIGNIFICANT CHANGE UP (ref 3.5–5.3)
PROCALCITONIN SERPL-MCNC: 0.04 NG/ML — SIGNIFICANT CHANGE UP (ref 0.02–0.1)
PROT SERPL-MCNC: 7.6 G/DL — SIGNIFICANT CHANGE UP (ref 6–8.3)
PROT UR-MCNC: NEGATIVE — SIGNIFICANT CHANGE UP
PROTHROM AB SERPL-ACNC: 14.1 SEC — HIGH (ref 10.6–13.6)
RBC # BLD: 3.46 M/UL — LOW (ref 3.8–5.2)
RBC # FLD: 14.1 % — SIGNIFICANT CHANGE UP (ref 10.3–14.5)
RBC CASTS # UR COMP ASSIST: 1 /HPF — SIGNIFICANT CHANGE UP (ref 0–4)
SAO2 % BLDV: 28.9 % — LOW (ref 67–88)
SARS-COV-2 RNA SPEC QL NAA+PROBE: SIGNIFICANT CHANGE UP
SODIUM SERPL-SCNC: 137 MMOL/L — SIGNIFICANT CHANGE UP (ref 135–145)
SP GR SPEC: 1.01 — SIGNIFICANT CHANGE UP (ref 1.01–1.02)
TROPONIN T, HIGH SENSITIVITY RESULT: 15 NG/L — SIGNIFICANT CHANGE UP (ref 0–51)
TROPONIN T, HIGH SENSITIVITY RESULT: 17 NG/L — SIGNIFICANT CHANGE UP (ref 0–51)
UROBILINOGEN FLD QL: NEGATIVE — SIGNIFICANT CHANGE UP
WBC # BLD: 5.01 K/UL — SIGNIFICANT CHANGE UP (ref 3.8–10.5)
WBC # FLD AUTO: 5.01 K/UL — SIGNIFICANT CHANGE UP (ref 3.8–10.5)
WBC UR QL: 3 /HPF — SIGNIFICANT CHANGE UP (ref 0–5)

## 2022-02-18 PROCEDURE — 71045 X-RAY EXAM CHEST 1 VIEW: CPT | Mod: 26

## 2022-02-18 PROCEDURE — 93010 ELECTROCARDIOGRAM REPORT: CPT

## 2022-02-18 PROCEDURE — 99285 EMERGENCY DEPT VISIT HI MDM: CPT | Mod: 25

## 2022-02-18 PROCEDURE — G1004: CPT

## 2022-02-18 PROCEDURE — 71250 CT THORAX DX C-: CPT | Mod: 26

## 2022-02-18 PROCEDURE — 74176 CT ABD & PELVIS W/O CONTRAST: CPT | Mod: 26

## 2022-02-18 PROCEDURE — 70450 CT HEAD/BRAIN W/O DYE: CPT | Mod: 26,ME

## 2022-02-18 PROCEDURE — 99223 1ST HOSP IP/OBS HIGH 75: CPT

## 2022-02-18 RX ORDER — SODIUM CHLORIDE 9 MG/ML
500 INJECTION INTRAMUSCULAR; INTRAVENOUS; SUBCUTANEOUS ONCE
Refills: 0 | Status: COMPLETED | OUTPATIENT
Start: 2022-02-18 | End: 2022-02-18

## 2022-02-18 RX ORDER — NADOLOL 80 MG/1
10 TABLET ORAL DAILY
Refills: 0 | Status: DISCONTINUED | OUTPATIENT
Start: 2022-02-18 | End: 2022-02-20

## 2022-02-18 RX ORDER — LANOLIN ALCOHOL/MO/W.PET/CERES
3 CREAM (GRAM) TOPICAL AT BEDTIME
Refills: 0 | Status: DISCONTINUED | OUTPATIENT
Start: 2022-02-18 | End: 2022-02-25

## 2022-02-18 RX ORDER — ACETAMINOPHEN 500 MG
650 TABLET ORAL EVERY 6 HOURS
Refills: 0 | Status: DISCONTINUED | OUTPATIENT
Start: 2022-02-18 | End: 2022-02-25

## 2022-02-18 RX ORDER — ONDANSETRON 8 MG/1
4 TABLET, FILM COATED ORAL EVERY 8 HOURS
Refills: 0 | Status: DISCONTINUED | OUTPATIENT
Start: 2022-02-18 | End: 2022-02-25

## 2022-02-18 RX ORDER — FLUOXETINE HCL 10 MG
60 CAPSULE ORAL DAILY
Refills: 0 | Status: DISCONTINUED | OUTPATIENT
Start: 2022-02-18 | End: 2022-02-25

## 2022-02-18 RX ORDER — HEPARIN SODIUM 5000 [USP'U]/ML
5000 INJECTION INTRAVENOUS; SUBCUTANEOUS EVERY 8 HOURS
Refills: 0 | Status: DISCONTINUED | OUTPATIENT
Start: 2022-02-18 | End: 2022-02-25

## 2022-02-18 RX ORDER — CHOLECALCIFEROL (VITAMIN D3) 125 MCG
1000 CAPSULE ORAL
Refills: 0 | Status: DISCONTINUED | OUTPATIENT
Start: 2022-02-18 | End: 2022-02-25

## 2022-02-18 RX ORDER — SODIUM CHLORIDE 9 MG/ML
1000 INJECTION, SOLUTION INTRAVENOUS
Refills: 0 | Status: COMPLETED | OUTPATIENT
Start: 2022-02-18 | End: 2022-02-19

## 2022-02-18 RX ORDER — PREDNISOLONE SODIUM PHOSPHATE 1 %
1 DROPS OPHTHALMIC (EYE)
Refills: 0 | Status: DISCONTINUED | OUTPATIENT
Start: 2022-02-18 | End: 2022-02-25

## 2022-02-18 RX ADMIN — SODIUM CHLORIDE 1000 MILLILITER(S): 9 INJECTION INTRAMUSCULAR; INTRAVENOUS; SUBCUTANEOUS at 16:47

## 2022-02-18 NOTE — ED ADULT NURSE REASSESSMENT NOTE - NS ED NURSE REASSESS COMMENT FT1
Report taken from KENYATTA Ramos, pt assessed, sitting comfortably, NSR on tele, no complaints at this time, vitals stable, pending floor bed

## 2022-02-18 NOTE — CONSULT NOTE ADULT - ASSESSMENT
87 yo F w/ PMH breast cancer s/p lumpectomy, chemoradiation, MVP, MAC lung disease untreated who presents w/ syncope    #Syncope  Unclear etiology, could be orthostatic given patient did not eat on Saturday and felt weak throughout the day. However, she did have her meals today but still had pre-syncope event. No EKG changes noted. CT head showed possible sinusitis, otherwise unremarkable   -Recommend echo  -Tele   -Recommend orthostatics  87 yo F w/ PMH breast cancer s/p lumpectomy, chemoradiation, MVP, MAC lung disease untreated who presents w/ syncope    #Syncope  Unclear etiology, could be orthostatic given patient did not eat on Saturday and felt weak throughout the day. However, she did have her meals today but still had pre-syncope event. No EKG changes noted. CT head showed possible sinusitis, otherwise unremarkable. Concern for possible PE given patient/family endorsed intermittent SOB, will get V/Q scan given contrast allergy   -Recommend echo  -Tele   -Recommend orthostatics

## 2022-02-18 NOTE — ED PROVIDER NOTE - ATTENDING CONTRIBUTION TO CARE
Dr. Goldberg (Attending Physician)  I performed a history and physical exam of the patient and discussed their management with the resident. I reviewed the resident's note and agree with the documented findings and plan of care. My medical decision making and observations are found above.

## 2022-02-18 NOTE — H&P ADULT - PROBLEM SELECTOR PLAN 2
- CXR on admission revealing increasing peripheral opacifications as well as a new linear lung opacity in the RML  - unclear etiology at this time, however ddx includes post radiation changes, vs less likely bacterial pneumonia (no fevers or leukocytosis) vs less likely ABPA (given history of asthma)  - CT chest pending  - sputum cx*** - CXR on admission revealing increasing peripheral opacifications as well as a new linear lung opacity in the RML  - patient reports she has been previously diagnosed with SEAMUS and is supposed to be on antibiotics, however is intolerant of the regimen  - f/u CT chest   - she reports that Dr. Mcgowan is her Pulmonologist  - House Pulm consult in the AM

## 2022-02-18 NOTE — ED ADULT TRIAGE NOTE - CHIEF COMPLAINT QUOTE
Unwitnessed fall 2/13 in the AM, hit head did not go to hospital, hit L shoulder and L side of head, + LOC, -AC. Ongoing weakness since fall. R sided headache today

## 2022-02-18 NOTE — ED ADULT NURSE NOTE - NSICDXPASTSURGICALHX_GEN_ALL_CORE_FT
PAST SURGICAL HISTORY:  History of Breast Biopsy [place] lymph node biopsy    History of Cataract Surgery Left eye    S/P Breast Lumpectomy     S/P Lumpectomy of Breast Left Breast    S/P Nasal Polypectomy     Status Post Tonsillectomy

## 2022-02-18 NOTE — H&P ADULT - PROBLEM SELECTOR PLAN 3
- patient reporting a sensation of food/pills getting stuck in her throat  - unclear etiology at this time, however, there is concern for a possible dysmotility disorder vs ?achalasia  - will consult GI and S/S in the AM   - keep NPO for now   - will start maintenance fluids with D5 1/2 NS at 50cc/hr while NPO

## 2022-02-18 NOTE — PROVIDER CONTACT NOTE (OTHER) - ACTION/TREATMENT ORDERED:
As per provider, orthos were recorded and pt. is positive for orthostatic htn. BP meds held as per provider

## 2022-02-18 NOTE — H&P ADULT - PROBLEM SELECTOR PLAN 1
***  - although PE is on the differential, it is less likely given that patient is saturating well on room air and is not tachycardic   - will obtain TTE   - monitor on tele - patient reporting a syncopal episode last week and multiple presyncopal episodes since then   - although PE is on the differential, it is less likely given that she is saturating well on room air and is not tachycardic   - D-dimer is also 363 which is negative for her age  - will obtain TTE as she has a significant systolic murmur and a valvular problem may be contributing  - EKG revealed a mild sinus bradycardia but was otherwise unremarkable.   - check orthostatics   - check VQ scan   - monitor on tele

## 2022-02-18 NOTE — CONSULT NOTE ADULT - ATTENDING COMMENTS
85 yo F w/ PMH breast cancer s/p lumpectomy, chemoradiation, MVP, MAC lung disease untreated who presents with multiple presyncope and syncope episodes. Found to be orthostatics here. No evidence of conduction disease on EKG or tele. Would stop Nadolol. Please obtain TTE.

## 2022-02-18 NOTE — H&P ADULT - ASSESSMENT
Patient is an 87 yo F with PMHx of IBS, Asthma, h/o Breast CA s/p lumpectomy and radiation, Anxiety, and GERD who presented s/p syncopal episode  Patient is an 85 yo F with PMHx of IBS, Asthma, h/o L Breast CA (s/p lumpectomy and radiation), Anxiety/Depression, SEAMUS (not currently on treatment), MVP and GERD who presented s/p syncopal episode, found to have a linear consolidation in the RML, admitted for syncopal workup. Patient also reporting sensation of food getting stuck in her lower esophagus.

## 2022-02-18 NOTE — ED PROVIDER NOTE - PROGRESS NOTE DETAILS
Daughter, Juan Daniel Cameron ([phone]): Pt ate something new last fri, had terrible indigestion, next day increasing gas/bloating, feeling weak. Saturday night into Sunday AM, pt had terrible feeling, started sweating, fell, was confused afterwards. Mom has Hx of balance issues, can't get up after falling, legs weak 2/2 chemo, has b/l LE neuropathy. After EMS evaluated pt on Sat/Sun, she did not want to go to hospital. Since, having feelings like she's going to fall, seen by PMD earlier this week, hasn't been eating, feels weak, felt it again today and c/o HA so daughters decided to bring her to ED. - Brad Goetz, PGY-1 Attending MD [facility].  Pt signed out to me in stable condition pending trauma scans, CTA scans, TBA for syncope w/u if all neg, 87 yo fem with hx of breast CA who's been feeling SOB with any activity x 1 wk, had syncopal episode and fall 6 days ago, keeps feeling pre-syncopal, + AC?. Defer CTA as pt w/IV contrast allergy. Discussed case w/Dr. Criselda Cardona, will admit to [facility]. Pt stable. - Brad Goetz, PGY-1 Attending MD [facility].  PT with hx of angioedema to contrast dye. There is lingering concern for PE 2/2 reported intermittent SOB and syncopal episode.  Will admit for VQ scan and syncopal eval.

## 2022-02-18 NOTE — CONSULT NOTE ADULT - SUBJECTIVE AND OBJECTIVE BOX
Patient seen and evaluated at bedside    Chief Complaint:    HPI:  87 yo F w/ PMH breast cancer s/p lumpectomy, chemoradiation, MVP, MAC lung disease untreated who presents w/ syncope. Said on Friday after eating, had chest pain/esophageal spasms. Went away eventually but came back after breakfast the next day. Felt generalized weakness throughout the day and did not eat at all given what she described as spasms in the epigastric/midsternal chest region that was mild but constant. Passed out in her dining room, felt dizzy/lighteaded before syncope event. Woke up with her daughter finding her, said she hit her head. Did not feel confused afterwards. EMS came and said her vitals were stable and did not want to go to hospital. Went to her PCP for evaluation who referred her to see a cariologist. Today, she felt pre-syncope symptoms in the shower so decided to come into the hospital. She denied any lightheadedness, dizzinesss, chest pain, SOB, palpitations at this time. She denied smoking, drinking, drug use. Takes her meds as prescribed.     In the ED, VSS.     EKG shows sinus bradycardia, no notable ST changes or ischemic changes     PMHx:   Breast Cancer    GERD (Gastroesophageal Reflux Disease)    Irritable Bowel Syndrome    Mitral Valve Prolapse    Anxiety    Clinical Depression    Asthma    Uveitis    Irritable Bowel Syndrome    Hiatal Hernia    Nasal Polyp    History of Left Breast Biopsy    History of Cataract Surgery    S/P Nasal Polypectomy    Status Post Tonsillectomy    Hypertension        PSHx:   S/P Breast Lumpectomy    S/P Lumpectomy of Breast    Status Post Tonsillectomy    S/P Nasal Polypectomy    History of Cataract Surgery    History of Breast Biopsy        Allergies:  ciprofloxacin (Other)  codeine (Nausea; Other)  contrast media (iodine-based) (Angioedema)  fluorescein ophthalmic (Hives; Rash; Flushing)  latex (Anaphylaxis)  Levaquin (Nausea; Other)  SULFA (Anaphylaxis)  tetracycline (Anaphylaxis)      Home Meds:    Current Medications:       FAMILY HISTORY:      Social History:  Smoking History:  Alcohol Use:  Drug Use:    REVIEW OF SYSTEMS:  Constitutional:     [x ] negative [ ] fevers [ ] chills [ ] weight loss [ ] weight gain  HEENT:                  [x ] negative [ ] dry eyes [ ] eye irritation [ ] postnasal drip [ ] nasal congestion  CV:                         [ x] negative  [ ] chest pain [ ] orthopnea [ ] palpitations [ ] murmur  Resp:                     [x ] negative [ ] cough [ ] shortness of breath [ ] dyspnea [ ] wheezing [ ] sputum [ ]hemoptysis  GI:                          [ x] negative [ ] nausea [ ] vomiting [ ] diarrhea [ ] constipation [ ] abd pain [ ] dysphagia   :                        [ x] negative [ ] dysuria [ ] nocturia [ ] hematuria [ ] increased urinary frequency  Musculoskeletal: [x ] negative [ ] back pain [ ] myalgias [ ] arthralgias [ ] fracture  Skin:                       [ x] negative [ ] rash [ ] itch  Neurological:        [ x] negative [ ] headache [ ] dizziness [ ] syncope [ ] weakness [ ] numbness  Psychiatric:           [ x] negative [ ] anxiety [ ] depression  Endocrine:            [ x] negative [ ] diabetes [ ] thyroid problem  Heme/Lymph:      [ x] negative [ ] anemia [ ] bleeding problem  Allergic/Immune: [ x] negative [ ] itchy eyes [ ] nasal discharge [ ] hives [ ] angioedema    [ x] All other systems negative  [ ] Unable to assess ROS due to      Physical Exam:  T(F): 97.5 (02-18), Max: 97.8 (02-18)  HR: 61 (02-18) (55 - 61)  BP: 172/80 (02-18) (151/70 - 175/69)  RR: 16 (02-18)  SpO2: 100% (02-18)  GENERAL: No acute distress   HEAD:  Atraumatic, Normocephalic  ENT: EOMI, conjunctiva and sclera clear, Neck supple, No JVD, moist mucosa  CHEST/LUNG: Clear to auscultation bilaterally; No wheeze, equal breath sounds bilaterally   HEART: Regular rate and rhythm; No murmurs, rubs, or gallops  ABDOMEN: Soft, Nontender, Nondistended   EXTREMITIES:  No clubbing, cyanosis, or edema  PSYCH: Nl behavior, nl affect  NEUROLOGY: AAOx3, non-focal   SKIN: Normal color, No rashes or lesions  LINES:    Cardiovascular Diagnostic Testing:    ECG: Personally reviewed:    Echo: Personally reviewed:    Stress Testing:    Cath:    Imaging:    CXR: Personally reviewed    Labs: Personally reviewed                        9.8    5.01  )-----------( 223      ( 18 Feb 2022 16:57 )             31.4     02-18    137  |  102  |  18  ----------------------------<  91  4.3   |  23  |  0.75    Ca    9.4      18 Feb 2022 17:02  Phos  3.7     02-18  Mg     2.0     02-18    TPro  7.6  /  Alb  3.5  /  TBili  0.4  /  DBili  x   /  AST  12  /  ALT  7<L>  /  AlkPhos  90  02-18    PT/INR - ( 18 Feb 2022 17:02 )   PT: 14.1 sec;   INR: 1.18 ratio         PTT - ( 18 Feb 2022 17:02 )  PTT:27.9 sec

## 2022-02-18 NOTE — H&P ADULT - NSHPSOCIALHISTORY_GEN_ALL_CORE
Patient denies any tobacco, EtOH, or recreational drug use. Patient denies any current tobacco, EtOH, or recreational drug use.    She does endorse a remote history of cigarette use between the age of [age] when she would smoke 1/2 ppd. No further tobacco use since that time

## 2022-02-18 NOTE — H&P ADULT - HISTORY OF PRESENT ILLNESS
In the ED, patient was found to have HR of 57 and BP of 184/77. Labs revealed a Hgb of 9.8 (from prior of 11.6) but were otherwise unremarkable. CT of the brain was performed which was negative for traumatic injury, however did have findings consistent with sinusitis. A CXR revealed b/l peripheral lung opacities, increased from prior and a linear opacity in the RML. UA obtained was overall unremarkable. Patient was given a 500cc NS bolus and admitted to medicine for further management.  Patient is an 87 yo F with PMHx of IBS, Asthma, h/o L Breast CA (s/p lumpectomy and radiation), Anxiety/Depression, SEAMUS (not currently on treatment), MVP and GERD who presented s/p syncopal episode. Patient reports feeling unwell starting approx 10 days ago. 7 days prior to presentation, she was walking in the kitchen and subsequently synopsized. She awoke with her daughter over her and states she was unconscious for about 1 minute. She did strike the right side of her head. She did not have incontinence of subsequent confusion. SHe was evaluated by EMS and declined transfer to the hospital. Two days later she went to see her PCP and she had a presyncopal episode after leaving their office. She again had a presyncopal episode today which prompted her to present to the ED.     In the ED, patient was found to have HR of 57 and BP of 184/77. Labs revealed a Hgb of 9.8 (from prior of 11.6) but were otherwise unremarkable. CT of the brain was performed which was negative for traumatic injury, however did have findings consistent with sinusitis. A CXR revealed b/l peripheral lung opacities, increased from prior and a linear opacity in the RML. UA obtained was overall unremarkable. Patient was given a 500cc NS bolus and admitted to medicine for further management.

## 2022-02-18 NOTE — ED PROVIDER NOTE - NSICDXPASTMEDICALHX_GEN_ALL_CORE_FT
PAST MEDICAL HISTORY:  Anxiety     Asthma     Breast Cancer 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    Clinical Depression     GERD (Gastroesophageal Reflux Disease)     Hiatal Hernia     Hypertension     Irritable Bowel Syndrome     Irritable Bowel Syndrome     Mitral Valve Prolapse     Nasal Polyp     Uveitis

## 2022-02-18 NOTE — H&P ADULT - PROBLEM SELECTOR PLAN 4
- Diet:   - DVT ppx: Lovenox  - Dispo: pending clinical improvement - CT head revealing opacification of the sinuses with associated polyps in the anterior sinuses  - she states that she was recommended to get surgery, however she declined  - will continue with nasal saline spray and flonase

## 2022-02-18 NOTE — H&P ADULT - NSICDXPASTSURGICALHX_GEN_ALL_CORE_FT
PAST SURGICAL HISTORY:  History of Breast Biopsy [place] lymph node biopsy    History of Cataract Surgery Left eye    S/P Breast Lumpectomy     S/P Lumpectomy of Breast Left Breast    S/P Nasal Polypectomy     Status Post Tonsillectomy

## 2022-02-18 NOTE — ED ADULT NURSE NOTE - OBJECTIVE STATEMENT
85 y/o F w/ pmh of breast ca s/p chemo and lumpectomy, IBS, GERD, hiatal hernia, "balance issues," MAC lung disease untreated, present to ED for weakness and headache. Pt developed chest pain intermittent last Saturday, Saturday night into Sunday morning pt was not feeling well, felt like she was going to pass out, then lost consciousness, hit head in the process, no ACs. Pt was evaluated by EMS and vitals were normal, declined transport to hospital. Since then pt has been feeling increasingly weak, fatigued, unable to complete ADLs, and several moments during the week pt felt like she was going to pass out. Today pt felt like she was going to pass out in the shower, also c/o L-sided HA/head numbness so daughters brought her to ED, on exam pt A&Ox3, breathing spontaneous, unlabored w/o distress on room, generalized weakness, MAEx4, equal sensation, peripheral strong and present, denies fevers, SOB, CP, abd pain, n/v/d/c, hematuria, hematochezia/melena, swelling, seen and eval by adeline HERNANDEZ placed, labs drawn and sent, placed CM NSR

## 2022-02-18 NOTE — ED PROVIDER NOTE - CLINICAL SUMMARY MEDICAL DECISION MAKING FREE TEXT BOX
Dr. Goldberg (Attending Physician)  Pt. with ho breast ca, IBS, htn, MVP pw syncope 6 days ago with fall, multiple episodes of near syncope since. Denies chest pain, shortness of breath. Will CT head, check labs, trop, ua, cxr, likely admit for frequent falls/syncope.

## 2022-02-18 NOTE — ED PROVIDER NOTE - PHYSICAL EXAMINATION
Gen: AAOx3, non-toxic, skinny woman in NAD  Head: NCAT  HEENT: EOMI, oral mucosa moist, normal conjunctiva  Lung: CTAB, no respiratory distress, no wheezes/rhonchi/rales B/L, speaking in full sentences  CV: RRR, no murmurs, rubs or gallops  Abd: soft, NTND, no guarding, no CVA tenderness  MSK: no visible deformities  Neuro: No focal sensory or motor deficits  Skin: Warm, well perfused, no rash  Psych: normal affect.   ~Brad Goetz M.D. Resident

## 2022-02-18 NOTE — ED ADULT NURSE NOTE - NSIMPLEMENTINTERV_GEN_ALL_ED
Implemented All Fall with Harm Risk Interventions:  [place] to call system. Call bell, personal items and telephone within reach. Instruct patient to call for assistance. Room bathroom lighting operational. Non-slip footwear when patient is off stretcher. Physically safe environment: no spills, clutter or unnecessary equipment. Stretcher in lowest position, wheels locked, appropriate side rails in place. Provide visual cue, wrist band, yellow gown, etc. Monitor gait and stability. Monitor for mental status changes and reorient to person, place, and time. Review medications for side effects contributing to fall risk. Reinforce activity limits and safety measures with patient and family. Provide visual clues: red socks.

## 2022-02-18 NOTE — H&P ADULT - NSICDXFAMILYHX_GEN_ALL_CORE_FT
FAMILY HISTORY:  Father  Still living? No  FH: CAD (coronary artery disease), Age at diagnosis: Age Unknown    Mother  Still living? No  FH: CAD (coronary artery disease), Age at diagnosis: Age Unknown  FH: lung cancer, Age at diagnosis: Age Unknown    Sibling  Still living? Unknown  FH: CAD (coronary artery disease), Age at diagnosis: Age Unknown    Aunt  Still living? Unknown  FH: CAD (coronary artery disease), Age at diagnosis: Age Unknown

## 2022-02-18 NOTE — H&P ADULT - PROBLEM SELECTOR PLAN 6
- patient with reported history of Asthma with occasional flares, however she states that she is intolerant of albuterol and is well controlled off of inhalers  - no wheeze or decreased breath sounds on exam  - continue to monitor  - f/u pulm recs

## 2022-02-18 NOTE — ED PROVIDER NOTE - OBJECTIVE STATEMENT
Pt is a 86yoF w/Hx of breast ca s/p chemo and lumpectomy, IBS, GERD, hiatal hernia, "balance issues," MAC lung disease untreated, p/w fall. Pt developed chest pain intermittent last Saturday, Saturday night into Sunday morning pt was not feeling well, felt like she was going to pass out, then lost consciousness, hit head in the process, no ACs. Pt was evaluated by EMS and vitals were normal, declined transport to hospital. Since then pt has been feeling increasingly weak, fatigued, unable to complete ADLs, and several moments during the week pt felt like she was going to pass out. Today pt felt like she was going to pass out in the shower, also c/o L-sided HA/head numbness so daughters brought her to ED for evaluation. She denies fevers, SOB, CP, abd pain, n/v/d/c, hematuria, hematochezia/melena, swelling, focal weakness.     PMD: Criselda Cardona [phone]

## 2022-02-18 NOTE — ED PROVIDER NOTE - NSICDXPASTSURGICALHX_GEN_ALL_CORE_FT
PAST SURGICAL HISTORY:  History of Breast Biopsy [place] lymph node biopsy    History of Cataract Surgery Left eye    S/P Breast Lumpectomy     S/P Lumpectomy of Breast Left Breast    S/P Nasal Polypectomy     Status Post Tonsillectomy

## 2022-02-18 NOTE — ED PROVIDER NOTE - NS ED ROS FT
GENERAL: No fever or chills, EYES: no change in vision, HEENT: no trouble swallowing or speaking, CARDIAC: no chest pain, PULMONARY: no cough or SOB, GI: no abdominal pain, no nausea, no vomiting, no diarrhea or constipation, : No changes in urination, SKIN: no rashes, NEURO: +headache,  MSK: No joint pain     All other ROS negative unless otherwise specified in HPI.     ~Brad Goetz M.D. Resident

## 2022-02-18 NOTE — H&P ADULT - NSHPLABSRESULTS_GEN_ALL_CORE
ICU Vital Signs Last 24 Hrs  T(C): 36.6 (18 Feb 2022 21:50), Max: 36.6 (18 Feb 2022 16:27)  T(F): 97.8 (18 Feb 2022 21:50), Max: 97.8 (18 Feb 2022 16:27)  HR: 57 (18 Feb 2022 21:50) (55 - 61)  BP: 184/77 (18 Feb 2022 21:50) (151/70 - 184/77)  BP(mean): --  ABP: --  ABP(mean): --  RR: 17 (18 Feb 2022 21:50) (16 - 20)  SpO2: 98% (18 Feb 2022 21:50) (98% - 100%)        Personally reviewed available labs, imaging and ekg  [1]  CBC Full  -  ( 18 Feb 2022 16:57 )  WBC Count : 5.01 K/uL  RBC Count : 3.46 M/uL  Hemoglobin : 9.8 g/dL  Hematocrit : 31.4 %  Platelet Count - Automated : 223 K/uL  Mean Cell Volume : 90.8 fl  Mean Cell Hemoglobin : 28.3 pg  Mean Cell Hemoglobin Concentration : 31.2 gm/dL  Auto Neutrophil # : 2.74 K/uL  Auto Lymphocyte # : 1.70 K/uL  Auto Monocyte # : 0.48 K/uL  Auto Eosinophil # : 0.05 K/uL  Auto Basophil # : 0.03 K/uL  Auto Neutrophil % : 54.7 %  Auto Lymphocyte % : 33.9 %  Auto Monocyte % : 9.6 %  Auto Eosinophil % : 1.0 %  Auto Basophil % : 0.6 %    02-18    137  |  102  |  18  ----------------------------<  91  4.3   |  23  |  0.75    Ca    9.4      18 Feb 2022 17:02  Phos  3.7     02-18  Mg     2.0     02-18    TPro  7.6  /  Alb  3.5  /  TBili  0.4  /  DBili  x   /  AST  12  /  ALT  7<L>  /  AlkPhos  90  02-18    PT/INR - ( 18 Feb 2022 17:02 )   PT: 14.1 sec;   INR: 1.18 ratio         PTT - ( 18 Feb 2022 17:02 )  PTT:27.9 sec  Imaging:  [ 2] I independently reviewed CXR and no lobar opacificaiton is appreciated  [ 2] I independetly reviewed CT head and no territorial hemorrhage is appreciated  EKG:  [2] I independently reviewed EKG/cardiac tracing and NSR appreciated    Review of old records:  [2] I personally reviewed previous records which identified ICU Vital Signs Last 24 Hrs  T(C): 36.6 (18 Feb 2022 21:50), Max: 36.6 (18 Feb 2022 16:27)  T(F): 97.8 (18 Feb 2022 21:50), Max: 97.8 (18 Feb 2022 16:27)  HR: 57 (18 Feb 2022 21:50) (55 - 61)  BP: 184/77 (18 Feb 2022 21:50) (151/70 - 184/77)  BP(mean): --  ABP: --  ABP(mean): --  RR: 17 (18 Feb 2022 21:50) (16 - 20)  SpO2: 98% (18 Feb 2022 21:50) (98% - 100%)        Personally reviewed available labs, imaging and ekg  [1]  CBC Full  -  ( 18 Feb 2022 16:57 )  WBC Count : 5.01 K/uL  RBC Count : 3.46 M/uL  Hemoglobin : 9.8 g/dL  Hematocrit : 31.4 %  Platelet Count - Automated : 223 K/uL  Mean Cell Volume : 90.8 fl  Mean Cell Hemoglobin : 28.3 pg  Mean Cell Hemoglobin Concentration : 31.2 gm/dL  Auto Neutrophil # : 2.74 K/uL  Auto Lymphocyte # : 1.70 K/uL  Auto Monocyte # : 0.48 K/uL  Auto Eosinophil # : 0.05 K/uL  Auto Basophil # : 0.03 K/uL  Auto Neutrophil % : 54.7 %  Auto Lymphocyte % : 33.9 %  Auto Monocyte % : 9.6 %  Auto Eosinophil % : 1.0 %  Auto Basophil % : 0.6 %    02-18    137  |  102  |  18  ----------------------------<  91  4.3   |  23  |  0.75    Ca    9.4      18 Feb 2022 17:02  Phos  3.7     02-18  Mg     2.0     02-18    TPro  7.6  /  Alb  3.5  /  TBili  0.4  /  DBili  x   /  AST  12  /  ALT  7<L>  /  AlkPhos  90  02-18    PT/INR - ( 18 Feb 2022 17:02 )   PT: 14.1 sec;   INR: 1.18 ratio         PTT - ( 18 Feb 2022 17:02 )  PTT:27.9 sec  Imaging:  [ 2] I independently reviewed CXR and a linear opacification is present in the RML   [ 2] I independetly reviewed CT head and no territorial hemorrhage is appreciated  EKG:  [2] I independently reviewed EKG/cardiac tracing and sinus bradycardia is appreciated    Review of old records:  [2] I personally reviewed previous records

## 2022-02-19 DIAGNOSIS — J32.9 CHRONIC SINUSITIS, UNSPECIFIED: ICD-10-CM

## 2022-02-19 LAB
ALBUMIN SERPL ELPH-MCNC: 3.5 G/DL — SIGNIFICANT CHANGE UP (ref 3.3–5)
ALP SERPL-CCNC: 91 U/L — SIGNIFICANT CHANGE UP (ref 40–120)
ALT FLD-CCNC: 8 U/L — LOW (ref 10–45)
ANION GAP SERPL CALC-SCNC: 12 MMOL/L — SIGNIFICANT CHANGE UP (ref 5–17)
APTT BLD: 28.6 SEC — SIGNIFICANT CHANGE UP (ref 27.5–35.5)
AST SERPL-CCNC: 14 U/L — SIGNIFICANT CHANGE UP (ref 10–40)
BILIRUB SERPL-MCNC: 0.4 MG/DL — SIGNIFICANT CHANGE UP (ref 0.2–1.2)
BUN SERPL-MCNC: 12 MG/DL — SIGNIFICANT CHANGE UP (ref 7–23)
CALCIUM SERPL-MCNC: 9.3 MG/DL — SIGNIFICANT CHANGE UP (ref 8.4–10.5)
CHLORIDE SERPL-SCNC: 101 MMOL/L — SIGNIFICANT CHANGE UP (ref 96–108)
CO2 SERPL-SCNC: 24 MMOL/L — SIGNIFICANT CHANGE UP (ref 22–31)
CREAT SERPL-MCNC: 0.66 MG/DL — SIGNIFICANT CHANGE UP (ref 0.5–1.3)
CULTURE RESULTS: SIGNIFICANT CHANGE UP
ERYTHROCYTE [SEDIMENTATION RATE] IN BLOOD: 80 MM/HR — HIGH (ref 0–20)
GLUCOSE SERPL-MCNC: 94 MG/DL — SIGNIFICANT CHANGE UP (ref 70–99)
HCT VFR BLD CALC: 30.1 % — LOW (ref 34.5–45)
HGB BLD-MCNC: 9.7 G/DL — LOW (ref 11.5–15.5)
INR BLD: 1.37 RATIO — HIGH (ref 0.88–1.16)
MCHC RBC-ENTMCNC: 28.2 PG — SIGNIFICANT CHANGE UP (ref 27–34)
MCHC RBC-ENTMCNC: 32.2 GM/DL — SIGNIFICANT CHANGE UP (ref 32–36)
MCV RBC AUTO: 87.5 FL — SIGNIFICANT CHANGE UP (ref 80–100)
NRBC # BLD: 0 /100 WBCS — SIGNIFICANT CHANGE UP (ref 0–0)
PLATELET # BLD AUTO: 203 K/UL — SIGNIFICANT CHANGE UP (ref 150–400)
POTASSIUM SERPL-MCNC: 4 MMOL/L — SIGNIFICANT CHANGE UP (ref 3.5–5.3)
POTASSIUM SERPL-SCNC: 4 MMOL/L — SIGNIFICANT CHANGE UP (ref 3.5–5.3)
PROT SERPL-MCNC: 7.7 G/DL — SIGNIFICANT CHANGE UP (ref 6–8.3)
PROTHROM AB SERPL-ACNC: 16.2 SEC — HIGH (ref 10.6–13.6)
RBC # BLD: 3.44 M/UL — LOW (ref 3.8–5.2)
RBC # FLD: 14 % — SIGNIFICANT CHANGE UP (ref 10.3–14.5)
SODIUM SERPL-SCNC: 137 MMOL/L — SIGNIFICANT CHANGE UP (ref 135–145)
SPECIMEN SOURCE: SIGNIFICANT CHANGE UP
WBC # BLD: 4.69 K/UL — SIGNIFICANT CHANGE UP (ref 3.8–10.5)
WBC # FLD AUTO: 4.69 K/UL — SIGNIFICANT CHANGE UP (ref 3.8–10.5)

## 2022-02-19 PROCEDURE — 78582 LUNG VENTILAT&PERFUS IMAGING: CPT | Mod: 26

## 2022-02-19 PROCEDURE — 99233 SBSQ HOSP IP/OBS HIGH 50: CPT

## 2022-02-19 PROCEDURE — 93306 TTE W/DOPPLER COMPLETE: CPT | Mod: 26

## 2022-02-19 PROCEDURE — 99223 1ST HOSP IP/OBS HIGH 75: CPT

## 2022-02-19 RX ORDER — FLUTICASONE PROPIONATE 50 MCG
1 SPRAY, SUSPENSION NASAL
Refills: 0 | Status: DISCONTINUED | OUTPATIENT
Start: 2022-02-19 | End: 2022-02-25

## 2022-02-19 RX ORDER — SODIUM CHLORIDE 0.65 %
1 AEROSOL, SPRAY (ML) NASAL
Refills: 0 | Status: DISCONTINUED | OUTPATIENT
Start: 2022-02-19 | End: 2022-02-25

## 2022-02-19 RX ORDER — DUREZOL 0.5 MG/ML
1 EMULSION OPHTHALMIC
Refills: 0 | Status: DISCONTINUED | OUTPATIENT
Start: 2022-02-19 | End: 2022-02-25

## 2022-02-19 RX ADMIN — Medication 1000 UNIT(S): at 06:00

## 2022-02-19 RX ADMIN — SODIUM CHLORIDE 50 MILLILITER(S): 9 INJECTION, SOLUTION INTRAVENOUS at 00:41

## 2022-02-19 RX ADMIN — HEPARIN SODIUM 5000 UNIT(S): 5000 INJECTION INTRAVENOUS; SUBCUTANEOUS at 06:01

## 2022-02-19 RX ADMIN — DUREZOL 1 DROP(S): 0.5 EMULSION OPHTHALMIC at 18:20

## 2022-02-19 RX ADMIN — Medication 1 SPRAY(S): at 06:01

## 2022-02-19 RX ADMIN — HEPARIN SODIUM 5000 UNIT(S): 5000 INJECTION INTRAVENOUS; SUBCUTANEOUS at 21:13

## 2022-02-19 RX ADMIN — HEPARIN SODIUM 5000 UNIT(S): 5000 INJECTION INTRAVENOUS; SUBCUTANEOUS at 15:25

## 2022-02-19 RX ADMIN — Medication 1 SPRAY(S): at 18:14

## 2022-02-19 RX ADMIN — Medication 1 SPRAY(S): at 18:15

## 2022-02-19 RX ADMIN — Medication 60 MILLIGRAM(S): at 18:13

## 2022-02-19 NOTE — CONSULT NOTE ADULT - SUBJECTIVE AND OBJECTIVE BOX
[facility]        Patient is a 86y old  Female who presents with a chief complaint of Syncopal Episode (2022 11:44)    Excerpt from H&P,"  HPI:  Patient is an 85 yo F with PMHx of IBS, Asthma, h/o L Breast CA (s/p lumpectomy and radiation), Anxiety/Depression, SEAMUS (not currently on treatment), MVP and GERD who presented s/p syncopal episode. Patient reports feeling unwell starting approx 10 days ago. 7 days prior to presentation, she was walking in the kitchen and subsequently synopsized. She awoke with her daughter over her and states she was unconscious for about 1 minute. She did strike the right side of her head. She did not have incontinence of subsequent confusion. SHe was evaluated by EMS and declined transfer to the hospital. Two days later she went to see her PCP and she had a presyncopal episode after leaving their office. She again had a presyncopal episode today which prompted her to present to the ED.     In the ED, patient was found to have HR of 57 and BP of 184/77. Labs revealed a Hgb of 9.8 (from prior of 11.6) but were otherwise unremarkable. CT of the brain was performed which was negative for traumatic injury, however did have findings consistent with sinusitis. A CXR revealed b/l peripheral lung opacities, increased from prior and a linear opacity in the RML. UA obtained was overall unremarkable. Patient was given a 500cc NS bolus and admitted to medicine for further management.  (2022 22:13)           *****PAST MEDICAL / Surgical  HISTORY:  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    GERD (Gastroesophageal Reflux Disease)    Irritable Bowel Syndrome    Mitral Valve Prolapse    Anxiety    Clinical Depression    Asthma    Uveitis    Irritable Bowel Syndrome    Hiatal Hernia    Nasal Polyp    Hypertension    S/P Breast Lumpectomy    S/P Lumpectomy of Breast  Left Breast    Status Post Tonsillectomy    S/P Nasal Polypectomy    History of Cataract Surgery  Left eye    History of Breast Biopsy  [place] lymph node biopsy             *****FAMILY HISTORY:  FAMILY HISTORY:  FH: CAD (coronary artery disease) (Father, Mother, Sibling, Aunt)    FH: lung cancer (Mother)             *****SOCIAL HISTORY:  Alcohol: None  Smoking: None         *****ALLERGIES:   Allergies    cefdinir (Unknown)  ciprofloxacin (Other)  codeine (Nausea; Other)  contrast media (iodine-based) (Angioedema)  fluorescein ophthalmic (Hives; Rash; Flushing)  lactose (Unknown)  latex (Anaphylaxis)  Levaquin (Nausea; Other)  lidocaine (Unknown)  SULFA (Anaphylaxis)  tetracycline (Anaphylaxis)    Intolerances    Augmentin (Stomach Upset)           *****MEDICATIONS: current medication reviewed and documented.   MEDICATIONS  (STANDING):  cholecalciferol 1000 Unit(s) Oral two times a day  difluprednate 0.05% Ophthalmic Emulsion 1 Drop(s) Left EYE two times a day  FLUoxetine 60 milliGRAM(s) Oral daily  fluticasone propionate 50 MICROgram(s)/spray Nasal Spray 1 Spray(s) Both Nostrils two times a day  heparin   Injectable 5000 Unit(s) SubCutaneous every 8 hours  nadolol 10 milliGRAM(s) Oral daily  prednisoLONE acetate 1% Suspension 1 Drop(s) Left EYE two times a day  sodium chloride 0.65% Nasal 1 Spray(s) Both Nostrils four times a day    MEDICATIONS  (PRN):  acetaminophen     Tablet .. 650 milliGRAM(s) Oral every 6 hours PRN Temp greater or equal to 38C (100.4F), Mild Pain (1 - 3)  aluminum hydroxide/magnesium hydroxide/simethicone Suspension 30 milliLiter(s) Oral every 4 hours PRN Dyspepsia  melatonin 3 milliGRAM(s) Oral at bedtime PRN Insomnia  ondansetron Injectable 4 milliGRAM(s) IV Push every 8 hours PRN Nausea and/or Vomiting           *****REVIEW OF SYSTEM:  GEN: no fever, no chills, no pain  RESP: no SOB, no cough, no sputum  CVS: no chest pain, no palpitations, no edema  GI: no abdominal pain, no nausea, no vomiting, no constipation, no diarrhea  : no dysurea, no frequency, no hematurea  Neuro: no headache, no dizziness  PSYCH: no anxiety, no depression  Derm : no itching, no rash         *****VITAL SIGNS:  T(C): 36.7 (22 @ 11:41), Max: 36.7 (22 @ 11:41)  HR: 54 (22 @ 11:41) (54 - 61)  BP: 154/69 (22 @ 11:41) (153/80 - 184/77)  RR: 18 (22 @ 11:41) (16 - 18)  SpO2: 97% (22 @ 11:41) (96% - 100%)  Wt(kg): --     @ 07:01  -   @ 07:00  --------------------------------------------------------  IN: 350 mL / OUT: 0 mL / NET: 350 mL     @ 07:01  -   @ 17:20  --------------------------------------------------------  IN: 480 mL / OUT: 0 mL / NET: 480 mL             *****PHYSICAL EXAM:   Alert oriented x 3   Attention comprehension are fair. Able to name, repeat, read without any difficulty.   Able to follow 3 step commands.     EOMI fundi not visualized,  VFF to confrontration  No facial asymmetry   Tongue is midline   Palate elevates symmetrically   Moving all 4 ext symmetrically no pronator drift   Reflexes are symmetric throughout   sensation is grossly symmetric  Gait : not assessed.  B/L down going toes               *****LAB AND IMAGIN.7    4.69  )-----------( 203      ( 2022 06:52 )             30.1                   137  |  101  |  12  ----------------------------<  94  4.0   |  24  |  0.66    Ca    9.3      2022 06:52  Phos  3.7       Mg     2.0         TPro  7.7  /  Alb  3.5  /  TBili  0.4  /  DBili  x   /  AST  14  /  ALT  8<L>  /  AlkPhos  91      PT/INR - ( 2022 06:52 )   PT: 16.2 sec;   INR: 1.37 ratio         PTT - ( 2022 06:52 )  PTT:28.6 sec                        Urinalysis Basic - ( 2022 17:48 )    Color: Light Yellow / Appearance: Clear / S.014 / pH: x  Gluc: x / Ketone: Negative  / Bili: Negative / Urobili: Negative   Blood: x / Protein: Negative / Nitrite: Negative   Leuk Esterase: Small / RBC: 1 /hpf / WBC 3 /HPF   Sq Epi: x / Non Sq Epi: 1 /hpf / Bacteria: Negative        [All pertinent recent Imaging reports reviewed]         *****A S S E S S M E N T   A N D   P L A N :        Problem/Recommendations 1:        Problem/Recommendations 2:         pt at risk for developing delirium, therefore please institute the following preventative measures if possible          - initiating early mobilization          -minimizing "tethers" - IV, oxygen, catheters, etc          -avoiding   sedatives          -maintaining hydration/nutrition          -avoid anticholinergics - diphenhydramine, etc          -pain control          -sleep wake cycle regulation; avoid day time somnolence           -supportive environment    ___________________________  Will follow with you.  Thank you,  Melissa Pérez MD  Diplomate of the American Board of Neurology and Psychiatry.  Diplomate of the American Board of Vascular Neurology.   [facility] ([facility]), [facility]   Ph: [phone]    Differential diagnosis and plan of care discussed with patient after the evaluation.   Advanced care planning options discussed.   Pain assessed and judicious use of narcotics when appropriate was discussed.  Importance of Fall prevention discussed.  Counseling on Smoking and Alcohol cessation was offered when appropriate.  Counseling on Diet, exercise, and medication compliance was done.   83 minutes spent on the total encounter;  more than 50 % of the visit was spent on counseling  and or coordinating care by the attending physician.    Thank you for allowing me to participate in the care of this delbert patient. Please do not hesitate to call me if you have any questions.     This and subsequent notes  will  inherently be subject to errors including those of syntax and substitutions which may escape proofreading. In such instances original meaning may be extrapolated by contextual derivation.    [facility]        Patient is a 86y old  Female who presents with a chief complaint of Syncopal Episode (2022 11:44)    Excerpt from H&P,"  Patient is an 87 yo F with PMHx of IBS, Asthma, h/o L Breast CA (s/p lumpectomy and radiation), Anxiety/Depression, SEAMUS (not currently on treatment), MVP and GERD who presented s/p syncopal episode. Patient reports feeling unwell starting approx 10 days ago. 7 days prior to presentation, she was walking in the kitchen and subsequently synopsized. She awoke with her daughter over her and states she was unconscious for about 1 minute. She did strike the right side of her head. She did not have incontinence of subsequent confusion. SHe was evaluated by EMS and declined transfer to the hospital. Two days later she went to see her PCP and she had a presyncopal episode after leaving their office. She again had a presyncopal episode today which prompted her to present to the ED.     In the ED, patient was found to have HR of 57 and BP of 184/77. Labs revealed a Hgb of 9.8 (from prior of 11.6) but were otherwise unremarkable. CT of the brain was performed which was negative for traumatic injury, however did have findings consistent with sinusitis. A CXR revealed b/l peripheral lung opacities, increased from prior and a linear opacity in the RML. UA obtained was overall unremarkable. Patient was given a 500cc NS bolus and admitted to medicine for further management.  (2022 22:13)           *****PAST MEDICAL / Surgical  HISTORY:  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    GERD (Gastroesophageal Reflux Disease)    Irritable Bowel Syndrome    Mitral Valve Prolapse    Anxiety    Clinical Depression    Asthma    Uveitis    Irritable Bowel Syndrome    Hiatal Hernia    Nasal Polyp    Hypertension    S/P Breast Lumpectomy    S/P Lumpectomy of Breast  Left Breast    Status Post Tonsillectomy    S/P Nasal Polypectomy    History of Cataract Surgery  Left eye    History of Breast Biopsy  [place] lymph node biopsy             *****FAMILY HISTORY:  FAMILY HISTORY:  FH: CAD (coronary artery disease) (Father, Mother, Sibling, Aunt)    FH: lung cancer (Mother)             *****SOCIAL HISTORY:  Alcohol: None  Smoking: None         *****ALLERGIES:   Allergies    cefdinir (Unknown)  ciprofloxacin (Other)  codeine (Nausea; Other)  contrast media (iodine-based) (Angioedema)  fluorescein ophthalmic (Hives; Rash; Flushing)  lactose (Unknown)  latex (Anaphylaxis)  Levaquin (Nausea; Other)  lidocaine (Unknown)  SULFA (Anaphylaxis)  tetracycline (Anaphylaxis)    Intolerances    Augmentin (Stomach Upset)           *****MEDICATIONS: current medication reviewed and documented.   MEDICATIONS  (STANDING):  cholecalciferol 1000 Unit(s) Oral two times a day  difluprednate 0.05% Ophthalmic Emulsion 1 Drop(s) Left EYE two times a day  FLUoxetine 60 milliGRAM(s) Oral daily  fluticasone propionate 50 MICROgram(s)/spray Nasal Spray 1 Spray(s) Both Nostrils two times a day  heparin   Injectable 5000 Unit(s) SubCutaneous every 8 hours  nadolol 10 milliGRAM(s) Oral daily  prednisoLONE acetate 1% Suspension 1 Drop(s) Left EYE two times a day  sodium chloride 0.65% Nasal 1 Spray(s) Both Nostrils four times a day    MEDICATIONS  (PRN):  acetaminophen     Tablet .. 650 milliGRAM(s) Oral every 6 hours PRN Temp greater or equal to 38C (100.4F), Mild Pain (1 - 3)  aluminum hydroxide/magnesium hydroxide/simethicone Suspension 30 milliLiter(s) Oral every 4 hours PRN Dyspepsia  melatonin 3 milliGRAM(s) Oral at bedtime PRN Insomnia  ondansetron Injectable 4 milliGRAM(s) IV Push every 8 hours PRN Nausea and/or Vomiting           *****REVIEW OF SYSTEM:  GEN: no fever, no chills, no pain  RESP: no SOB, no cough, no sputum  CVS: no chest pain, no palpitations, no edema  GI: no abdominal pain, no nausea, no vomiting, no constipation, no diarrhea  : no dysurea, no frequency, no hematurea  Neuro: no headache, no dizziness  PSYCH: no anxiety, no depression  Derm : no itching, no rash         *****VITAL SIGNS:  T(C): 36.7 (22 @ 11:41), Max: 36.7 (22 @ 11:41)  HR: 54 (22 @ 11:41) (54 - 61)  BP: 154/69 (22 @ 11:41) (153/80 - 184/77)  RR: 18 (22 @ 11:41) (16 - 18)  SpO2: 97% (22 @ 11:41) (96% - 100%)  Wt(kg): --     @ 07:01  -   @ 07:00  --------------------------------------------------------  IN: 350 mL / OUT: 0 mL / NET: 350 mL     @ 07:01  -   @ 17:20  --------------------------------------------------------  IN: 480 mL / OUT: 0 mL / NET: 480 mL             *****PHYSICAL EXAM:   Alert oriented x 3   Attention comprehension are fair. Able to name, repeat, read without any difficulty.   Able to follow 3 step commands.     EOMI fundi not visualized,  VFF to confrontation  No facial asymmetry   Tongue is midline   Moving all 4 ext symmetrically no pronator drift   le with brisk reflexes   Reflexes are symmetric throughout   sensation is grossly symmetric  Gait : not assessed.  B/L down going toes               *****LAB AND IMAGIN.7    4.69  )-----------( 203      ( 2022 06:52 )             30.1                   137  |  101  |  12  ----------------------------<  94  4.0   |  24  |  0.66    Ca    9.3      2022 06:52  Phos  3.7       Mg     2.0         TPro  7.7  /  Alb  3.5  /  TBili  0.4  /  DBili  x   /  AST  14  /  ALT  8<L>  /  AlkPhos  91      PT/INR - ( 2022 06:52 )   PT: 16.2 sec;   INR: 1.37 ratio         PTT - ( 2022 06:52 )  PTT:28.6 sec                        Urinalysis Basic - ( 2022 17:48 )    Color: Light Yellow / Appearance: Clear / S.014 / pH: x  Gluc: x / Ketone: Negative  / Bili: Negative / Urobili: Negative   Blood: x / Protein: Negative / Nitrite: Negative   Leuk Esterase: Small / RBC: 1 /hpf / WBC 3 /HPF   Sq Epi: x / Non Sq Epi: 1 /hpf / Bacteria: Negative        [All pertinent recent Imaging reports reviewed]         *****A S S E S S M E N T   A N D   P L A N :     Excerpt from H&P,"  Patient is an 87 yo F with PMHx of IBS, Asthma, h/o L Breast CA (s/p lumpectomy and radiation), Anxiety/Depression, SEAMUS (not currently on treatment), MVP and GERD who presented s/p syncopal episode. Patient reports feeling unwell starting approx 10 days ago. 7 days prior to presentation, she was walking in the kitchen and subsequently synopsized. She awoke with her daughter over her and states she was unconscious for about 1 minute. She did strike the right side of her head. She did not have incontinence of subsequent confusion. SHe was evaluated by EMS and declined transfer to the hospital. Two days later she went to see her PCP and she had a presyncopal episode after leaving their office. She again had a presyncopal episode today which prompted her to present to the ED.     In the ED, patient was found to have HR of 57 and BP of 184/77. Labs revealed a Hgb of 9.8 (from prior of 11.6) but were otherwise unremarkable. CT of the brain was performed which was negative for traumatic injury, however did have findings consistent with sinusitis. A CXR revealed b/l peripheral lung opacities, increased from prior and a linear opacity in the RML. UA obtained was overall unremarkable. Patient was given a 500cc NS bolus and admitted to medicine for further management.      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   pt tried gabapentin in the past   she doesnt want to try anything else   b12 /folate /tsh      pt at risk for developing delirium, therefore please institute the following preventative measures if possible          - initiating early mobilization          -minimizing "tethers" - IV, oxygen, catheters, etc          -avoiding   sedatives          -maintaining hydration/nutrition          -avoid anticholinergics - diphenhydramine, etc          -pain control          -sleep wake cycle regulation; avoid day time somnolence           -supportive environment    ___________________________  Will follow with you.  Thank you,  Melissa Pérez MD  Diplomate of the American Board of Neurology and Psychiatry.  Diplomate of the American Board of Vascular Neurology.   [facility] [place] [facility] ([facility]), [facility]   Ph: [phone]    Differential diagnosis and plan of care discussed with patient after the evaluation.   Advanced care planning options discussed.   Pain assessed and judicious use of narcotics when appropriate was discussed.  Importance of Fall prevention discussed.  Counseling on Smoking and Alcohol cessation was offered when appropriate.  Counseling on Diet, exercise, and medication compliance was done.   83 minutes spent on the total encounter;  more than 50 % of the visit was spent on counseling  and or coordinating care by the attending physician.    Thank you for allowing me to participate in the care of this delbert patient. Please do not hesitate to call me if you have any questions.     This and subsequent notes  will  inherently be subject to errors including those of syntax and substitutions which may escape proofreading. In such instances original meaning may be extrapolated by contextual derivation.

## 2022-02-19 NOTE — CONSULT NOTE ADULT - TIME BILLING
review of inpatient/outpatient records discussion with daughter, pateint and medical team.
Agree with above NP note.  85 yo F with PMHx of IBS, Asthma, h/o L Breast CA (s/p lumpectomy and radiation), Anxiety/Depression, SEAMUS (not currently on treatment), MVP and GERD who presented s/p syncopal episode    #syncope  -recurrent episodes   -likely secondary to orthostatic hypotension in setting of decreased po intake, hypovolemia, infection  -Check Echo  + orthostatic hypotension 2/18-- sp IVF   -monitor orthostatic bp   -Decrease nadolol to 5 mg daily  -eps f/u    #MVP  -check echo     #SEAMUS   -pulm eval     dvt ppx

## 2022-02-19 NOTE — PROGRESS NOTE ADULT - ASSESSMENT
Patient is an 85 yo F with PMHx of IBS, Asthma, h/o L Breast CA (s/p lumpectomy and radiation), Anxiety/Depression, SEAMUS (not currently on treatment), MVP and GERD who presented s/p syncopal episode, found to have a linear consolidation in the RML, admitted for syncopal workup. Patient also reporting sensation of food getting stuck in her lower esophagus.

## 2022-02-19 NOTE — SWALLOW BEDSIDE ASSESSMENT ADULT - ASR SWALLOW ASPIRATION MONITOR
Monitor for s/s aspiration/laryngeal penetration. If noted:  D/C p.o. intake, provide non-oral nutrition/hydration/meds, and contact this service @ [phone]/change of breathing pattern/cough/gurgly voice/fever/pneumonia/throat clearing/upper respiratory infection

## 2022-02-19 NOTE — SWALLOW BEDSIDE ASSESSMENT ADULT - SWALLOW EVAL: PATIENT/FAMILY GOALS STATEMENT
Patient endorsed having a FEES 20+ years ago with ENT for evaluation of ?nasal polyps. Pt endorsed no changes to diet following instrumental exam.

## 2022-02-19 NOTE — SWALLOW BEDSIDE ASSESSMENT ADULT - H & P REVIEW
Allergies: cefdinir, ciprofloxacin, codeine, latex, sulfa, lactose, lidocaine, levaquin, contrast-media (iodine based), fluorescein, augmentin,/yes

## 2022-02-19 NOTE — SWALLOW BEDSIDE ASSESSMENT ADULT - SLP PERTINENT HISTORY OF CURRENT PROBLEM
Patient is an 85 yo F with PMHx of IBS, Asthma, h/o L Breast CA (s/p lumpectomy and radiation), Anxiety/Depression, SEAMUS (not currently on treatment), MVP and GERD who presented s/p syncopal episode. Patient reports feeling unwell starting approx 10 days ago. 7 days prior to presentation, she was walking in the kitchen and subsequently synopsized. She awoke with her daughter over her and states she was unconscious for about 1 minute. She did strike the right side of her head. She did not have incontinence of subsequent confusion. SHe was evaluated by EMS and declined transfer to the hospital. Two days later she went to see her PCP and she had a presyncopal episode after leaving their office. She again had a presyncopal episode today which prompted her to present to the ED.

## 2022-02-19 NOTE — PHYSICAL THERAPY INITIAL EVALUATION ADULT - PERTINENT HX OF CURRENT PROBLEM, REHAB EVAL
86 y.o. F PMH IBS, Asthma, h/o L Breast CA (s/p lumpectomy & radiation), Anxiety/Depression, SEAMUS, MVP & GERD who presented s/p syncopal episode. Reports feeling unwell starting approx 10 days ago. She was walking in the kitchen & synopsized. Awoke to daughter over her, was unconscious for about 1 minute. She did strike the right side of her head. EMS came & pt declined transfer to hospital. Two days later she went to see her PCP & had presyncopal episode after leaving their office.

## 2022-02-19 NOTE — SWALLOW BEDSIDE ASSESSMENT ADULT - ASR SWALLOW REFERRAL
GI consult pending. Pt endorsed h/o hiatal hernia and GERD. Currently on PPI at home but not helping with globus sensation.

## 2022-02-19 NOTE — PHYSICAL THERAPY INITIAL EVALUATION ADULT - ADDITIONAL COMMENTS
Pt resides in an elevator apartment w/ daughter, no steps to negotiate. PTA pt was independent w/ mobility & used a RW for ambulation.

## 2022-02-19 NOTE — SWALLOW BEDSIDE ASSESSMENT ADULT - SWALLOW EVAL: CURRENT DIET
Clear liquids. D/W NP Selin and RN Merna, unknown reason for clear liquid diet. Cleared to have liquids + solids for swallow evaluation.

## 2022-02-19 NOTE — SWALLOW BEDSIDE ASSESSMENT ADULT - SLP GENERAL OBSERVATIONS
Patient received sitting upright on edge of bed, daughter (Louis) present, on room air, awake/alert, Aox3, and communicative at conversational level. Pt endorsed consuming regular solid/thin liquid diet at home with months of worsening globus sensation across food/pills (pointed to pharynx and sternum). Pt with known hiatal hernia and GERD, on PPI at home. No h/o of dysphagia workup. Pt with a baseline cough not associated with PO intake.

## 2022-02-19 NOTE — CONSULT NOTE ADULT - ASSESSMENT
87 yo F with PMHx of IBS, Asthma, h/o L Breast CA (s/p lumpectomy and radiation), Anxiety/Depression, SEAMUS (not currently on treatment), MVP and GERD who presented s/p syncopal episode 87 yo F with PMHx of IBS, Asthma, h/o L Breast CA (s/p lumpectomy and radiation), Anxiety/Depression, SEAMUS (not currently on treatment), MVP and GERD who presented s/p syncopal episode    #syncope  -possibly secondary to decrease po intake, hypovolemia, infection  -Chest xray noted   -CT head with no cva, + Complete opacification of the visualized maxillary sinuses. Bilateral  anterior ethmoid sinus mucosal thickening.  -Check Echo to eval MVP, lv fx   -ECG with no acute ischemic changes, SB noted with no evidence of HB  -+ orthostatic hypotension 2/18-- sp IVF   -monitor orthostatic bp   -Decrease nadolol to 5 mg daily    #MVP  -check echo     #SEAMUS   -chest xray noted-- pulm eval     dvt ppx

## 2022-02-19 NOTE — SWALLOW BEDSIDE ASSESSMENT ADULT - SWALLOW EVAL: DIAGNOSIS
Patient is an 85 yo F with PMHx of IBS, Asthma, h/o L Breast CA (s/p lumpectomy and radiation), Anxiety/Depression, SEAMUS (not currently on treatment), MVP and GERD who presented s/p syncopal episode. Patient seen today for bedside swallow evaluation due to complaints of occasional worsening globus sensation across food/pills in pharynx and sternum. Pt presents with an oropharyngeal swallow sequence that appears WFL to tolerate a regular texture diet with no overt s/s of laryngeal penetration or aspiration. No reports of globus sensation at time of evaluation. However, due to reports of worsening globus sensation, objective testing warranted.

## 2022-02-19 NOTE — CONSULT NOTE ADULT - SUBJECTIVE AND OBJECTIVE BOX
[facility] DIVISION OF PULMONARY, CRITICAL CARE AND SLEEP MEDICINE  PULMONARY CONSULTATION NOTE  OFFICE NUMBER: [phone]    NAME: ADA ROLLE  MEDICAL RECORD NUMBER: [id number]    CHIEF COMPLAINT: Patient is a 86y old  Female who presents with a chief complaint of Syncopal Episode (19 Feb 2022 17:20)      HISTORY OF PRESENT ILLNESS:   Patient is an 85 yo F with PMHx of IBS, Asthma, h/o L Breast CA (s/p lumpectomy and radiation), Anxiety/Depression, SEAMUS (not currently on treatment), MVP and GERD who presented s/p syncopal episode one week earlier.  Evaluated by EMS, decision made not to bring her to hospital.  She has felt weak at home, short of breath after any activity.  Also reports coughing increasing amounts of sputum that is purulent.  Deneis fever, but has had sweats.      In the ED, patient was found to have HR of 57 and BP of 184/77. Labs revealed a Hgb of 9.8 (from prior of 11.6) but were otherwise unremarkable. CT of the brain was performed which was negative for traumatic injury, however did have findings consistent with sinusitis. A CXR revealed b/l peripheral lung opacities, increased from prior and a linear opacity in the RML. UA obtained was overall unremarkable. Patient was given a 500cc NS bolus and admitted to medicine for further management.     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    GERD (Gastroesophageal Reflux Disease)    Irritable Bowel Syndrome    Mitral Valve Prolapse    Anxiety    Clinical Depression    Asthma    Uveitis    Irritable Bowel Syndrome    Hiatal Hernia    Nasal Polyp    Hypertension    S/P Breast Lumpectomy    S/P Lumpectomy of Breast  Left Breast    Status Post Tonsillectomy    S/P Nasal Polypectomy    History of Cataract Surgery  Left eye    History of Breast Biopsy  [place] lymph node biopsy        ALLERGIES:Allergies    cefdinir (Unknown)  ciprofloxacin (Other)  codeine (Nausea; Other)  contrast media (iodine-based) (Angioedema)  fluorescein ophthalmic (Hives; Rash; Flushing)  lactose (Unknown)  latex (Anaphylaxis)  Levaquin (Nausea; Other)  lidocaine (Unknown)  SULFA (Anaphylaxis)  tetracycline (Anaphylaxis)    Intolerances    Augmentin (Stomach Upset)      HOME MEDICATIONS:     OUTPATIENT PULMONARY DOCTOR:     SOCIAL HISTORY:  TOBACCO USE:  remote as a teen.  no smoking for 70 years    ====================REVIEW OF SYSTEMS=====================================  CONSTITUTIONAL: fatigue  CARDIOVASCULAR: reports palpitations with activity  PULMONARY: cough, productive of purulent sputum, short of breath  GASTROINTESTINAL: has had distention, burping, and feels as if food gets stuck  [x] ALL OTHER REVIEW OF SYSTEMS ARE NEGATIVE   [] UNABLE TO OBTAIN REVIEW OF SYSTEMS DUE TO ______________      ====================PHYSICAL EXAM=========================================    VITALS: ICU Vital Signs Last 24 Hrs  T(C): 36.7 (19 Feb 2022 11:41), Max: 36.7 (19 Feb 2022 11:41)  T(F): 98 (19 Feb 2022 11:41), Max: 98 (19 Feb 2022 11:41)  HR: 54 (19 Feb 2022 11:41) (54 - 61)  BP: 154/69 (19 Feb 2022 11:41) (153/80 - 184/77)  BP(mean): --  ABP: --  ABP(mean): --  RR: 18 (19 Feb 2022 11:41) (16 - 18)  SpO2: 97% (19 Feb 2022 11:41) (96% - 100%)      INTAKE and OUTPUT: I&O's Summary    18 Feb 2022 07:01  -  19 Feb 2022 07:00  --------------------------------------------------------  IN: 350 mL / OUT: 0 mL / NET: 350 mL    19 Feb 2022 07:01  -  19 Feb 2022 18:07  --------------------------------------------------------  IN: 480 mL / OUT: 0 mL / NET: 480 mL    GENERAL: appears weak and fatigued  HEENT:  NECK: no adenopathy  LYMPH NODES:   CARDIOVASCULAR: rrr normal S1S2  PULMONARY: rales in upper right lung,, otherwise clear.  ABDOMEN: soft nontender  SKIN: warm, dry  EXTREMITIES: without edema  NEUROLOGIC: awake alert  PSYCHIATRIC:    ====================MEDICATIONS===========================================  MEDICATIONS  (STANDING):  cholecalciferol 1000 Unit(s) Oral two times a day  difluprednate 0.05% Ophthalmic Emulsion 1 Drop(s) Left EYE two times a day  FLUoxetine 60 milliGRAM(s) Oral daily  fluticasone propionate 50 MICROgram(s)/spray Nasal Spray 1 Spray(s) Both Nostrils two times a day  heparin   Injectable 5000 Unit(s) SubCutaneous every 8 hours  nadolol 10 milliGRAM(s) Oral daily  prednisoLONE acetate 1% Suspension 1 Drop(s) Left EYE two times a day  sodium chloride 0.65% Nasal 1 Spray(s) Both Nostrils four times a day      MEDICATIONS  (PRN):  acetaminophen     Tablet .. 650 milliGRAM(s) Oral every 6 hours PRN Temp greater or equal to 38C (100.4F), Mild Pain (1 - 3)  aluminum hydroxide/magnesium hydroxide/simethicone Suspension 30 milliLiter(s) Oral every 4 hours PRN Dyspepsia  melatonin 3 milliGRAM(s) Oral at bedtime PRN Insomnia  ondansetron Injectable 4 milliGRAM(s) IV Push every 8 hours PRN Nausea and/or Vomiting      ====================LABORATORY RESULTS====================================  CBC Full  -  ( 19 Feb 2022 06:52 )  WBC Count : 4.69 K/uL  RBC Count : 3.44 M/uL  Hemoglobin : 9.7 g/dL  Hematocrit : 30.1 %  Platelet Count - Automated : 203 K/uL  Mean Cell Volume : 87.5 fl  Mean Cell Hemoglobin : 28.2 pg  Mean Cell Hemoglobin Concentration : 32.2 gm/dL  Auto Neutrophil # : x  Auto Lymphocyte # : x  Auto Monocyte # : x  Auto Eosinophil # : x  Auto Basophil # : x  Auto Neutrophil % : x  Auto Lymphocyte % : x  Auto Monocyte % : x  Auto Eosinophil % : x  Auto Basophil % : x                                    02-19    137  |  101  |  12  ----------------------------<  94  4.0   |  24  |  0.66    Ca    9.3      19 Feb 2022 06:52  Phos  3.7     02-18  Mg     2.0     02-18    TPro  7.7  /  Alb  3.5  /  TBili  0.4  /  DBili  x   /  AST  14  /  ALT  8<L>  /  AlkPhos  91  02-19        PT/INR - ( 19 Feb 2022 06:52 )   PT: 16.2 sec;   INR: 1.37 ratio         PTT - ( 19 Feb 2022 06:52 )  PTT:28.6 sec    Creatinine Trend: 0.66<--, 0.75<--      ====================RADIOLOGY and ECHOCARDIOGRAPHY=======================    V/Q low probability for PE  CT CHEST: personally reviewed by me and compared with prior CT from 5/2021.  She has increased reticular markings peripherally unchanged.  opacity in RUL sorrounding dilated airways has progressed.  No new nodules noted.    ECHOCARDIOGRAPHY: LVH, normal RV/LV size and function    POINT OF CARE ULTRASOUND:

## 2022-02-19 NOTE — SWALLOW BEDSIDE ASSESSMENT ADULT - COMMENTS
Continued:  Dysphagia.  - patient reporting a sensation of food/pills getting stuck in her throat  - unclear etiology at this time, however, there is concern for a possible dysmotility disorder vs ?achalasia  - will consult GI and S/S in the AM   - keep NPO for now   Cardiology consult for syncope. possibly secondary to decrease po intake, hypovolemia, infection. Chest xray noted   -CT head with no cva, + Complete opacification of the visualized maxillary sinuses. Bilateral  anterior ethmoid sinus mucosal thickening.    2/18 CXR IMPRESSION: Increased bilateral peripheral lung markings and linear opacity in the right middle lung of uncertain etiology.  CT HEAD IMPRESSION:  No hydrocephalus, acute intracranial hemorrhage, mass effect, or brain   edema.  Complete opacification of the visualized maxillary sinuses. Bilateral   anterior ethmoid sinus mucosal thickening. Correlate for the presence of   sinusitis    *Pt is unknown to this service. Continued:  Dysphagia.  - patient reporting a sensation of food/pills getting stuck in her throat  - unclear etiology at this time, however, there is concern for a possible dysmotility disorder vs ?achalasia  - will consult GI and S/S in the AM   - keep NPO for now   Cardiology consult for syncope. possibly secondary to decrease po intake, hypovolemia, infection. Chest xray noted   -CT head with no cva, + Complete opacification of the visualized maxillary sinuses. Bilateral  anterior ethmoid sinus mucosal thickening.  Sinusitis with nasal polyps. - CT head revealing opacification of the sinuses with associated polyps in the anterior sinuses  - she states that she was recommended to get surgery, however she declined    2/18 CXR IMPRESSION: Increased bilateral peripheral lung markings and linear opacity in the right middle lung of uncertain etiology.  CT HEAD IMPRESSION: No hydrocephalus, acute intracranial hemorrhage, mass effect, or brain edema. Complete opacification of the visualized maxillary sinuses. Bilateral anterior ethmoid sinus mucosal thickening. Correlate for the presence of sinusitis    *Pt is unknown to this service.

## 2022-02-19 NOTE — CONSULT NOTE ADULT - SUBJECTIVE AND OBJECTIVE BOX
CARDIOLOGY CONSULT - Dr. Lee         HPI:  Patient is an 85 yo F with PMHx of IBS, Asthma, h/o L Breast CA (s/p lumpectomy and radiation), Anxiety/Depression, SEAMUS (not currently on treatment), MVP and GERD who presented s/p syncopal episode. Patient reports feeling unwell starting approx 10 days ago. 7 days prior to presentation, she was walking in the kitchen and subsequently synopsized. She awoke with her daughter over her and states she was unconscious for about 1 minute. She did strike the right side of her head. She did not have incontinence of subsequent confusion. SHe was evaluated by EMS and declined transfer to the hospital. Two days later she went to see her PCP and she had a presyncopal episode after leaving their office. She again had a presyncopal episode today which prompted her to present to the ED.     In the ED, patient was found to have HR of 57 and BP of 184/77. Labs revealed a Hgb of 9.8 (from prior of 11.6) but were otherwise unremarkable. CT of the brain was performed which was negative for traumatic injury, however did have findings consistent with sinusitis. A CXR revealed b/l peripheral lung opacities, increased from prior and a linear opacity in the RML. UA obtained was overall unremarkable. Patient was given a 500cc NS bolus and admitted to medicine for further management.        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    GERD (Gastroesophageal Reflux Disease)    Irritable Bowel Syndrome    Mitral Valve Prolapse    Anxiety    Clinical Depression    Asthma    Uveitis    Irritable Bowel Syndrome    Hiatal Hernia    Nasal Polyp    Hypertension    S/P Breast Lumpectomy    S/P Lumpectomy of Breast  Left Breast    Status Post Tonsillectomy    S/P Nasal Polypectomy    History of Cataract Surgery  Left eye    History of Breast Biopsy  [place] lymph node biopsy            PREVIOUS DIAGNOSTIC TESTING:    [ ] Echocardiogram:  [ ]  Catheterization:  [ ] Stress Test:  	    MEDICATIONS:  Home Medications:  diazepam 5 mg oral tablet: 0.5 tab(s) orally once a day    NOTE: Last fill 11/11/2021. Patient has own med supply  (18 Feb 2022 23:33)  Durezol: 1 drop(s) in the left eye 2 times a day (18 Feb 2022 23:33)  Durezol 0.05% ophthalmic emulsion: 1 drop(s) to each affected eye 2 times a day (18 Feb 2022 23:33)  FLUoxetine 20 mg oral capsule: 3 cap(s) orally once a day (18 Feb 2022 23:33)  nadolol: 10 milligram(s) orally once a day (at bedtime)    NOTE: Unable to confirm with secondary source. Patient stated she  has own med supply  (18 Feb 2022 23:33)  Nasacort AQ 55 mcg/inh nasal spray: 1   2 times a day  (18 Feb 2022 23:33)  Prilosec 20 mg oral delayed release capsule: 1   once a day  (18 Feb 2022 23:33)  Vitamin D3 25 mcg (1000 intl units) oral tablet: 1 tab(s) orally 2 times a day (18 Feb 2022 23:33)      MEDICATIONS  (STANDING):  cholecalciferol 1000 Unit(s) Oral two times a day  FLUoxetine 60 milliGRAM(s) Oral daily  fluticasone propionate 50 MICROgram(s)/spray Nasal Spray 1 Spray(s) Both Nostrils two times a day  heparin   Injectable 5000 Unit(s) SubCutaneous every 8 hours  nadolol 10 milliGRAM(s) Oral daily  prednisoLONE acetate 1% Suspension 1 Drop(s) Left EYE two times a day  sodium chloride 0.65% Nasal 1 Spray(s) Both Nostrils four times a day      FAMILY HISTORY:  FH: CAD (coronary artery disease) (Father, Mother, Sibling, Aunt)    FH: lung cancer (Mother)        SOCIAL HISTORY:    [ ] Non-smoker  [ ] Smoker  [ ] Alcohol    Allergies    cefdinir (Unknown)  ciprofloxacin (Other)  codeine (Nausea; Other)  contrast media (iodine-based) (Angioedema)  fluorescein ophthalmic (Hives; Rash; Flushing)  lactose (Unknown)  latex (Anaphylaxis)  Levaquin (Nausea; Other)  lidocaine (Unknown)  SULFA (Anaphylaxis)  tetracycline (Anaphylaxis)    Intolerances    Augmentin (Stomach Upset)  	    REVIEW OF SYSTEMS:  CONSTITUTIONAL: No fever, weight loss, or fatigue  EYES: No eye pain, visual disturbances, or discharge  ENMT:  No difficulty hearing, tinnitus, vertigo; No sinus or throat pain  NECK: No pain or stiffness  RESPIRATORY: No cough, wheezing, chills or hemoptysis; No Shortness of Breath  CARDIOVASCULAR: No chest pain, palpitations, passing out, dizziness, or leg swelling  GASTROINTESTINAL: No abdominal or epigastric pain. No nausea, vomiting, or hematemesis; No diarrhea or constipation. No melena or hematochezia.  GENITOURINARY: No dysuria, frequency, hematuria, or incontinence  NEUROLOGICAL: No headaches, memory loss, loss of strength, numbness, or tremors  SKIN: No itching, burning, rashes, or lesions   	    [ ] All others negative	  [ ] Unable to obtain    PHYSICAL EXAM:  T(C): 36.7 (02-19-22 @ 11:41), Max: 36.7 (02-19-22 @ 11:41)  HR: 54 (02-19-22 @ 11:41) (54 - 61)  BP: 154/69 (02-19-22 @ 11:41) (151/70 - 184/77)  RR: 18 (02-19-22 @ 11:41) (16 - 20)  SpO2: 97% (02-19-22 @ 11:41) (96% - 100%)  Wt(kg): --  I&O's Summary    18 Feb 2022 07:01  -  19 Feb 2022 07:00  --------------------------------------------------------  IN: 350 mL / OUT: 0 mL / NET: 350 mL        Appearance: Normal	  Psychiatry: A & O x 3, Mood & affect appropriate  HEENT:   Normal oral mucosa, PERRL, EOMI	  Lymphatic: No lymphadenopathy  Cardiovascular: Normal S1 S2,RRR, No JVD, No murmurs  Respiratory: Lungs clear to auscultation	  Gastrointestinal:  Soft, Non-tender, + BS	  Skin: No rashes, No ecchymoses, No cyanosis	  Neurologic: Non-focal  Extremities: Normal range of motion, No clubbing, cyanosis or edema  Vascular: Peripheral pulses palpable 2+ bilaterally    TELEMETRY: 	    ECG:  	  RADIOLOGY:  OTHER: 	  	  LABS:	 	    CARDIAC MARKERS:  Troponin T, High Sensitivity Result: 15 ng/L (02-18 @ 18:49)  Troponin T, High Sensitivity Result: 17 ng/L (02-18 @ 17:02)                                  9.7    4.69  )-----------( 203      ( 19 Feb 2022 06:52 )             30.1     02-19    137  |  101  |  12  ----------------------------<  94  4.0   |  24  |  0.66    Ca    9.3      19 Feb 2022 06:52  Phos  3.7     02-18  Mg     2.0     02-18    TPro  7.7  /  Alb  3.5  /  TBili  0.4  /  DBili  x   /  AST  14  /  ALT  8<L>  /  AlkPhos  91  02-19    PT/INR - ( 19 Feb 2022 06:52 )   PT: 16.2 sec;   INR: 1.37 ratio         PTT - ( 19 Feb 2022 06:52 )  PTT:28.6 sec  proBNP:   Lipid Profile:   HgA1c:   TSH:        CARDIOLOGY CONSULT - Dr. Lee         HPI:  Patient is an 85 yo F with PMHx of IBS, Asthma, h/o L Breast CA (s/p lumpectomy and radiation), Anxiety/Depression, SEAMUS (not currently on treatment), MVP and GERD who presented s/p syncopal episode. Patient reports feeling unwell starting approx 10 days ago. 7 days prior to presentation, she was walking in the kitchen and subsequently synopsized. She reported feeling diaphoretic and weak prior to her passing out. She awoke with her daughter over her and states she was unconscious for about 1 minute. She did strike the right side of her head. She did not have incontinence of subsequent confusion. SHe was evaluated by EMS and declined transfer to the hospital. Two days later she went to see her PCP and she had a presyncopal episode after leaving their office. She again had a presyncopal episode today which prompted her to present to the ED.     In the ED, patient was found to have HR of 57 and BP of 184/77. Labs revealed a Hgb of 9.8 (from prior of 11.6) but were otherwise unremarkable. CT of the brain was performed which was negative for traumatic injury, however did have findings consistent with sinusitis. A CXR revealed b/l peripheral lung opacities, increased from prior and a linear opacity in the RML. UA obtained was overall unremarkable. Patient was given a 500cc NS bolus and admitted to medicine for further management.  she reports hx of MVP, denies hx of CHF, CAD or arrhytmias. continues to report weakness , ROS otherwise neg       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    GERD (Gastroesophageal Reflux Disease)    Irritable Bowel Syndrome    Mitral Valve Prolapse    Anxiety    Clinical Depression    Asthma    Uveitis    Irritable Bowel Syndrome    Hiatal Hernia    Nasal Polyp    Hypertension    S/P Breast Lumpectomy    S/P Lumpectomy of Breast  Left Breast    Status Post Tonsillectomy    S/P Nasal Polypectomy    History of Cataract Surgery  Left eye    History of Breast Biopsy  [place] lymph node biopsy            PREVIOUS DIAGNOSTIC TESTING:    [ ] Echocardiogram:  [ ]  Catheterization:  [ ] Stress Test:  	    MEDICATIONS:  Home Medications:  diazepam 5 mg oral tablet: 0.5 tab(s) orally once a day    NOTE: Last fill 11/11/2021. Patient has own med supply  (18 Feb 2022 23:33)  Durezol: 1 drop(s) in the left eye 2 times a day (18 Feb 2022 23:33)  Durezol 0.05% ophthalmic emulsion: 1 drop(s) to each affected eye 2 times a day (18 Feb 2022 23:33)  FLUoxetine 20 mg oral capsule: 3 cap(s) orally once a day (18 Feb 2022 23:33)  nadolol: 10 milligram(s) orally once a day (at bedtime)    NOTE: Unable to confirm with secondary source. Patient stated she  has own med supply  (18 Feb 2022 23:33)  Nasacort AQ 55 mcg/inh nasal spray: 1   2 times a day  (18 Feb 2022 23:33)  Prilosec 20 mg oral delayed release capsule: 1   once a day  (18 Feb 2022 23:33)  Vitamin D3 25 mcg (1000 intl units) oral tablet: 1 tab(s) orally 2 times a day (18 Feb 2022 23:33)      MEDICATIONS  (STANDING):  cholecalciferol 1000 Unit(s) Oral two times a day  FLUoxetine 60 milliGRAM(s) Oral daily  fluticasone propionate 50 MICROgram(s)/spray Nasal Spray 1 Spray(s) Both Nostrils two times a day  heparin   Injectable 5000 Unit(s) SubCutaneous every 8 hours  nadolol 10 milliGRAM(s) Oral daily  prednisoLONE acetate 1% Suspension 1 Drop(s) Left EYE two times a day  sodium chloride 0.65% Nasal 1 Spray(s) Both Nostrils four times a day      FAMILY HISTORY:  FH: CAD (coronary artery disease) (Father, Mother, Sibling, Aunt)    FH: lung cancer (Mother)        SOCIAL HISTORY:    [x ] Non-smoker  [ ] Smoker  [ ] Alcohol    Allergies    cefdinir (Unknown)  ciprofloxacin (Other)  codeine (Nausea; Other)  contrast media (iodine-based) (Angioedema)  fluorescein ophthalmic (Hives; Rash; Flushing)  lactose (Unknown)  latex (Anaphylaxis)  Levaquin (Nausea; Other)  lidocaine (Unknown)  SULFA (Anaphylaxis)  tetracycline (Anaphylaxis)    Intolerances    Augmentin (Stomach Upset)  	    REVIEW OF SYSTEMS:  CONSTITUTIONAL: No fever, weight loss, or fatigue  EYES: No eye pain, visual disturbances, or discharge  ENMT:  No difficulty hearing, tinnitus, vertigo; No sinus or throat pain  NECK: No pain or stiffness  RESPIRATORY: No cough, wheezing, chills or hemoptysis; No Shortness of Breath  CARDIOVASCULAR: No chest pain, palpitations, passing out, dizziness, or leg swelling  GASTROINTESTINAL: No abdominal or epigastric pain. No nausea, vomiting, or hematemesis; No diarrhea or constipation. No melena or hematochezia.  GENITOURINARY: No dysuria, frequency, hematuria, or incontinence  NEUROLOGICAL: No headaches, memory loss, loss of strength, numbness, or tremors  SKIN: No itching, burning, rashes, or lesions   	    [x ] All others negative	  [ ] Unable to obtain    PHYSICAL EXAM:  T(C): 36.7 (02-19-22 @ 11:41), Max: 36.7 (02-19-22 @ 11:41)  HR: 54 (02-19-22 @ 11:41) (54 - 61)  BP: 154/69 (02-19-22 @ 11:41) (151/70 - 184/77)  RR: 18 (02-19-22 @ 11:41) (16 - 20)  SpO2: 97% (02-19-22 @ 11:41) (96% - 100%)  Wt(kg): --  I&O's Summary    18 Feb 2022 07:01  -  19 Feb 2022 07:00  --------------------------------------------------------  IN: 350 mL / OUT: 0 mL / NET: 350 mL        Appearance: Normal	  Psychiatry: A & O x 3, Mood & affect appropriate  HEENT:   Normal oral mucosa, PERRL, EOMI	  Lymphatic: No lymphadenopathy  Cardiovascular: Normal S1 S2,RR + murmur   Respiratory: Lungs clear to auscultation	  Gastrointestinal:  Soft, Non-tender, + BS	  Skin: No rashes, No ecchymoses, No cyanosis	  Neurologic: Non-focal  Extremities: Normal range of motion, No clubbing, cyanosis or edema  Vascular: Peripheral pulses palpable 2+ bilaterally    TELEMETRY: SB /NSR hr 50-60 pac	    ECG:  SB LVH hr 57 bpm	  RADIOLOGY:      < from: Xray Chest 1 View- PORTABLE-Urgent (Xray Chest 1 View- PORTABLE-Urgent .) (02.18.22 @ 15:42) >  IMPRESSION:    Increased bilateral peripheral lung markings and linear opacity in the   right middle lung of uncertain etiology.          < from: CT Head No Cont (02.18.22 @ 15:56) >    IMPRESSION:    No hydrocephalus, acute intracranial hemorrhage, mass effect, or brain   edema.    Complete opacification of the visualized maxillary sinuses. Bilateral   anterior ethmoid sinus mucosal thickening. Correlate for the presence of   sinusitis      < end of copied text >    OTHER: 	  	  LABS:	 	    CARDIAC MARKERS:  Troponin T, High Sensitivity Result: 15 ng/L (02-18 @ 18:49)  Troponin T, High Sensitivity Result: 17 ng/L (02-18 @ 17:02)                                  9.7    4.69  )-----------( 203      ( 19 Feb 2022 06:52 )             30.1     02-19    137  |  101  |  12  ----------------------------<  94  4.0   |  24  |  0.66    Ca    9.3      19 Feb 2022 06:52  Phos  3.7     02-18  Mg     2.0     02-18    TPro  7.7  /  Alb  3.5  /  TBili  0.4  /  DBili  x   /  AST  14  /  ALT  8<L>  /  AlkPhos  91  02-19    PT/INR - ( 19 Feb 2022 06:52 )   PT: 16.2 sec;   INR: 1.37 ratio         PTT - ( 19 Feb 2022 06:52 )  PTT:28.6 sec  proBNP:   Lipid Profile:   HgA1c:   TSH:

## 2022-02-19 NOTE — PROGRESS NOTE ADULT - SUBJECTIVE AND OBJECTIVE BOX
Patient is a 86y old  Female who presents with a chief complaint of Syncopal Episode (2022 18:07)      SUBJECTIVE / OVERNIGHT EVENTS: ptn feels well, has no c/o, VQ neg for PE    MEDICATIONS  (STANDING):  cholecalciferol 1000 Unit(s) Oral two times a day  difluprednate 0.05% Ophthalmic Emulsion 1 Drop(s) Left EYE two times a day  FLUoxetine 60 milliGRAM(s) Oral daily  fluticasone propionate 50 MICROgram(s)/spray Nasal Spray 1 Spray(s) Both Nostrils two times a day  heparin   Injectable 5000 Unit(s) SubCutaneous every 8 hours  nadolol 10 milliGRAM(s) Oral daily  prednisoLONE acetate 1% Suspension 1 Drop(s) Left EYE two times a day  sodium chloride 0.65% Nasal 1 Spray(s) Both Nostrils four times a day    MEDICATIONS  (PRN):  acetaminophen     Tablet .. 650 milliGRAM(s) Oral every 6 hours PRN Temp greater or equal to 38C (100.4F), Mild Pain (1 - 3)  aluminum hydroxide/magnesium hydroxide/simethicone Suspension 30 milliLiter(s) Oral every 4 hours PRN Dyspepsia  melatonin 3 milliGRAM(s) Oral at bedtime PRN Insomnia  ondansetron Injectable 4 milliGRAM(s) IV Push every 8 hours PRN Nausea and/or Vomiting      Vital Signs Last 24 Hrs  T(F): 98 (22 @ 20:00), Max: 98 (22 @ 11:41)  HR: 64 (22 @ 20:00) (54 - 64)  BP: 151/78 (22 @ 20:00) (151/78 - 184/77)  RR: 18 (22 @ 20:00) (17 - 18)  SpO2: 94% (22 @ 20:00) (94% - 98%)  Telemetry:   CAPILLARY BLOOD GLUCOSE        I&O's Summary    2022 07:01  -  2022 07:00  --------------------------------------------------------  IN: 350 mL / OUT: 0 mL / NET: 350 mL    2022 07:01  -  2022 21:25  --------------------------------------------------------  IN: 700 mL / OUT: 0 mL / NET: 700 mL        PHYSICAL EXAM:  GENERAL: NAD, well-developed  HEAD:  Atraumatic, Normocephalic  EYES: EOMI, PERRLA, conjunctiva and sclera clear  NECK: Supple, No JVD  CHEST/LUNG: Clear to auscultation bilaterally; No wheeze  HEART: Regular rate and rhythm; No murmurs, rubs, or gallops  ABDOMEN: Soft, Nontender, Nondistended; Bowel sounds present  EXTREMITIES:  2+ Peripheral Pulses, No clubbing, cyanosis, or edema  PSYCH: AAOx3  NEUROLOGY: non-focal  SKIN: No rashes or lesions    LABS:                        9.7    4.69  )-----------( 203      ( 2022 06:52 )             30.1     02-19    137  |  101  |  12  ----------------------------<  94  4.0   |  24  |  0.66    Ca    9.3      2022 06:52  Phos  3.7     02-18  Mg     2.0     02-18    TPro  7.7  /  Alb  3.5  /  TBili  0.4  /  DBili  x   /  AST  14  /  ALT  8<L>  /  AlkPhos  91  02-19    PT/INR - ( 2022 06:52 )   PT: 16.2 sec;   INR: 1.37 ratio         PTT - ( 2022 06:52 )  PTT:28.6 sec      Urinalysis Basic - ( 2022 17:48 )    Color: Light Yellow / Appearance: Clear / S.014 / pH: x  Gluc: x / Ketone: Negative  / Bili: Negative / Urobili: Negative   Blood: x / Protein: Negative / Nitrite: Negative   Leuk Esterase: Small / RBC: 1 /hpf / WBC 3 /HPF   Sq Epi: x / Non Sq Epi: 1 /hpf / Bacteria: Negative        RADIOLOGY & ADDITIONAL TESTS:    Imaging Personally Reviewed:    Consultant(s) Notes Reviewed:      Care Discussed with Consultants/Other Providers:

## 2022-02-19 NOTE — PATIENT PROFILE ADULT - FALL HARM RISK - HARM RISK INTERVENTIONS

## 2022-02-19 NOTE — CONSULT NOTE ADULT - ASSESSMENT
Mrs. Santana is well known to me from outpatient setting. She is an 86 year old woman with IBS, breast cancer status post lumpectomy and cheoRT, anxiety, gERD.   I have been following her for management of shortness of breath and for RUL process.  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 She also grew MAC on two cultures in november of 2020.  She has refused bronchoscopy in the past.  Could not tolerate an airway clearance regimen.  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  Also grew Staph aureus on the latest culture.  Has been evaluated by Dr. Handy as an outpaitnet.  We agreed to defer treatment given her hesitancy and numerous drug allergies to antibiotics and advanced age.    Now it appears that this process is progressing in the lung.  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    Recommend:  1- sputum for AFB x 3 ( does not need to be isolated as she has grown MAC which is not contagious in the past)  2- sputum for C and s  3- agree with cardiac and neuro evaluation.    Will advise further after above.

## 2022-02-20 LAB
ANION GAP SERPL CALC-SCNC: 12 MMOL/L — SIGNIFICANT CHANGE UP (ref 5–17)
BUN SERPL-MCNC: 14 MG/DL — SIGNIFICANT CHANGE UP (ref 7–23)
CALCIUM SERPL-MCNC: 9.3 MG/DL — SIGNIFICANT CHANGE UP (ref 8.4–10.5)
CHLORIDE SERPL-SCNC: 98 MMOL/L — SIGNIFICANT CHANGE UP (ref 96–108)
CO2 SERPL-SCNC: 25 MMOL/L — SIGNIFICANT CHANGE UP (ref 22–31)
CREAT SERPL-MCNC: 0.82 MG/DL — SIGNIFICANT CHANGE UP (ref 0.5–1.3)
GLUCOSE SERPL-MCNC: 94 MG/DL — SIGNIFICANT CHANGE UP (ref 70–99)
GRAM STN FLD: SIGNIFICANT CHANGE UP
MAGNESIUM SERPL-MCNC: 1.9 MG/DL — SIGNIFICANT CHANGE UP (ref 1.6–2.6)
POTASSIUM SERPL-MCNC: 4.4 MMOL/L — SIGNIFICANT CHANGE UP (ref 3.5–5.3)
POTASSIUM SERPL-SCNC: 4.4 MMOL/L — SIGNIFICANT CHANGE UP (ref 3.5–5.3)
SODIUM SERPL-SCNC: 135 MMOL/L — SIGNIFICANT CHANGE UP (ref 135–145)
SPECIMEN SOURCE: SIGNIFICANT CHANGE UP

## 2022-02-20 PROCEDURE — 99232 SBSQ HOSP IP/OBS MODERATE 35: CPT

## 2022-02-20 PROCEDURE — 99222 1ST HOSP IP/OBS MODERATE 55: CPT | Mod: GC

## 2022-02-20 PROCEDURE — 99233 SBSQ HOSP IP/OBS HIGH 50: CPT

## 2022-02-20 PROCEDURE — 93010 ELECTROCARDIOGRAM REPORT: CPT

## 2022-02-20 RX ORDER — SODIUM CHLORIDE 9 MG/ML
1000 INJECTION INTRAMUSCULAR; INTRAVENOUS; SUBCUTANEOUS
Refills: 0 | Status: DISCONTINUED | OUTPATIENT
Start: 2022-02-20 | End: 2022-02-25

## 2022-02-20 RX ADMIN — Medication 1 SPRAY(S): at 00:21

## 2022-02-20 RX ADMIN — Medication 60 MILLIGRAM(S): at 12:31

## 2022-02-20 RX ADMIN — DUREZOL 1 DROP(S): 0.5 EMULSION OPHTHALMIC at 17:06

## 2022-02-20 RX ADMIN — Medication 650 MILLIGRAM(S): at 16:41

## 2022-02-20 RX ADMIN — HEPARIN SODIUM 5000 UNIT(S): 5000 INJECTION INTRAVENOUS; SUBCUTANEOUS at 21:20

## 2022-02-20 RX ADMIN — HEPARIN SODIUM 5000 UNIT(S): 5000 INJECTION INTRAVENOUS; SUBCUTANEOUS at 05:35

## 2022-02-20 RX ADMIN — Medication 1 SPRAY(S): at 12:32

## 2022-02-20 RX ADMIN — Medication 1 SPRAY(S): at 05:35

## 2022-02-20 RX ADMIN — Medication 1 SPRAY(S): at 16:42

## 2022-02-20 RX ADMIN — Medication 1000 UNIT(S): at 05:35

## 2022-02-20 RX ADMIN — HEPARIN SODIUM 5000 UNIT(S): 5000 INJECTION INTRAVENOUS; SUBCUTANEOUS at 15:02

## 2022-02-20 RX ADMIN — DUREZOL 1 DROP(S): 0.5 EMULSION OPHTHALMIC at 05:35

## 2022-02-20 RX ADMIN — Medication 1 SPRAY(S): at 16:44

## 2022-02-20 RX ADMIN — SODIUM CHLORIDE 100 MILLILITER(S): 9 INJECTION INTRAMUSCULAR; INTRAVENOUS; SUBCUTANEOUS at 11:22

## 2022-02-20 NOTE — PROGRESS NOTE ADULT - ASSESSMENT
Mrs. Santana is well known to me from outpatient setting. She is an 86 year old woman with IBS, breast cancer status post lumpectomy and cheoRT, anxiety, gERD.   I have been following her for management of shortness of breath and for RUL process.  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 She also grew MAC on two cultures in november of 2020.  She has refused bronchoscopy in the past.  Could not tolerate an airway clearance regimen.  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  Also grew Staph aureus on the latest culture.  Has been evaluated by Dr. Handy as an outpaitnet.  We agreed to defer treatment given her hesitancy and numerous drug allergies to antibiotics and advanced age.    Now it appears that this process is progressing in the lung.  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  Admitted with syncopal episode, fatigue and weakness.  Need to rule out cardiac etiology.  Echo shows aortic stenosis which may explain the syncope.        Recommend:  1- sputum for AFB x 3 ( does not need to be isolated as she has grown MAC which is not contagious in the past)  2- sputum for C and s  3-Management of AS as per cardiology.      Will advise further after above.

## 2022-02-20 NOTE — PROGRESS NOTE ADULT - SUBJECTIVE AND OBJECTIVE BOX
EPS Progress Note    S: No overnight events. Still feels dizzy and off.     T(C): 36.7 (02-20-22 @ 11:25), Max: 36.7 (02-19-22 @ 20:00)  HR: 67 (02-20-22 @ 11:25) (62 - 67)  BP: 147/93 (02-20-22 @ 11:25) (145/71 - 151/78)  RR: 18 (02-20-22 @ 11:25) (17 - 18)  SpO2: 99% (02-20-22 @ 11:25) (94% - 99%)  Wt(kg): --     Telemetry: SR          General:  No acute distress      Chest:  Chest is clear to auscultation bilaterally without wheezes, crackles, or rhonchi  Cardiac:  Regular rate and rhythm.  2/6 systolic murmur, no rubs, or gallops heard.  Abdomen:  Soft without rebound or guarding.  Bowel sounds are presnt in all 4 quadrants.  No hepatosplenomegaly  Extremities:  No lower extremity edema is present.  No cyanosis or clubbing       MEDICATIONS  (STANDING):  cholecalciferol 1000 Unit(s) Oral two times a day  difluprednate 0.05% Ophthalmic Emulsion 1 Drop(s) Left EYE two times a day  FLUoxetine 60 milliGRAM(s) Oral daily  fluticasone propionate 50 MICROgram(s)/spray Nasal Spray 1 Spray(s) Both Nostrils two times a day  heparin   Injectable 5000 Unit(s) SubCutaneous every 8 hours  nadolol 10 milliGRAM(s) Oral daily  prednisoLONE acetate 1% Suspension 1 Drop(s) Left EYE two times a day  sodium chloride 0.65% Nasal 1 Spray(s) Both Nostrils four times a day  sodium chloride 0.9%. 1000 milliLiter(s) (100 mL/Hr) IV Continuous <Continuous>    MEDICATIONS  (PRN):  acetaminophen     Tablet .. 650 milliGRAM(s) Oral every 6 hours PRN Temp greater or equal to 38C (100.4F), Mild Pain (1 - 3)  aluminum hydroxide/magnesium hydroxide/simethicone Suspension 30 milliLiter(s) Oral every 4 hours PRN Dyspepsia  melatonin 3 milliGRAM(s) Oral at bedtime PRN Insomnia  ondansetron Injectable 4 milliGRAM(s) IV Push every 8 hours PRN Nausea and/or Vomiting                                                         9.7    4.69  )-----------( 203      ( 19 Feb 2022 06:52 )             30.1     02-20    135  |  98  |  14  ----------------------------<  94  4.4   |  25  |  0.82    Ca    9.3      20 Feb 2022 04:59  Phos  3.7     02-18  Mg     1.9     02-20    TPro  7.7  /  Alb  3.5  /  TBili  0.4  /  DBili  x   /  AST  14  /  ALT  8<L>  /  AlkPhos  91  02-19    PT/INR - ( 19 Feb 2022 06:52 )   PT: 16.2 sec;   INR: 1.37 ratio         PTT - ( 19 Feb 2022 06:52 )  PTT:28.6 sec

## 2022-02-20 NOTE — PROGRESS NOTE ADULT - SUBJECTIVE AND OBJECTIVE BOX
CARDIOLOGY FOLLOW UP NOTE - DR. BRANTLEY    Patient Name: ADA ROLLE  Date of Service: 02-20-22     Patient seen and examined    Subjective:    cv: denies chest pain, dyspnea, palpitations, dizziness  pulmonary: denies cough  GI: denies abdominal pain, nausea, vomiting  vascular/legs: no edema   skin: no rash  ROS: otherwise negative   overnight events:      PHYSICAL EXAM:  T(C): 36.4 (02-20-22 @ 04:00), Max: 36.7 (02-19-22 @ 11:41)  HR: 62 (02-20-22 @ 04:00) (54 - 64)  BP: 145/71 (02-20-22 @ 04:00) (145/71 - 154/69)  RR: 17 (02-20-22 @ 04:00) (17 - 18)  SpO2: 98% (02-20-22 @ 04:00) (94% - 98%)  Wt(kg): --  I&O's Summary    19 Feb 2022 07:01  -  20 Feb 2022 07:00  --------------------------------------------------------  IN: 700 mL / OUT: 0 mL / NET: 700 mL      Daily     Daily     Appearance: Normal	  Cardiovascular: Normal S1 S2,RRR, No JVD,sm  Respiratory: Lungs clear to auscultation	  Gastrointestinal:  Soft, Non-tender, + BS	  Extremities: Normal range of motion, No clubbing, cyanosis or edema      Home Medications:  diazepam 5 mg oral tablet: 0.5 tab(s) orally once a day    NOTE: Last fill 11/11/2021. Patient has own med supply  (18 Feb 2022 23:33)  Durezol: 1 drop(s) in the left eye 2 times a day (18 Feb 2022 23:33)  Durezol 0.05% ophthalmic emulsion: 1 drop(s) to each affected eye 2 times a day (18 Feb 2022 23:33)  FLUoxetine 20 mg oral capsule: 3 cap(s) orally once a day (18 Feb 2022 23:33)  nadolol: 10 milligram(s) orally once a day (at bedtime)    NOTE: Unable to confirm with secondary source. Patient stated she  has own med supply  (18 Feb 2022 23:33)  Nasacort AQ 55 mcg/inh nasal spray: 1   2 times a day  (18 Feb 2022 23:33)  Prilosec 20 mg oral delayed release capsule: 1   once a day  (18 Feb 2022 23:33)  Vitamin D3 25 mcg (1000 intl units) oral tablet: 1 tab(s) orally 2 times a day (18 Feb 2022 23:33)      MEDICATIONS  (STANDING):  cholecalciferol 1000 Unit(s) Oral two times a day  difluprednate 0.05% Ophthalmic Emulsion 1 Drop(s) Left EYE two times a day  FLUoxetine 60 milliGRAM(s) Oral daily  fluticasone propionate 50 MICROgram(s)/spray Nasal Spray 1 Spray(s) Both Nostrils two times a day  heparin   Injectable 5000 Unit(s) SubCutaneous every 8 hours  nadolol 10 milliGRAM(s) Oral daily  prednisoLONE acetate 1% Suspension 1 Drop(s) Left EYE two times a day  sodium chloride 0.65% Nasal 1 Spray(s) Both Nostrils four times a day      TELEMETRY: 	    ECG:  	  RADIOLOGY:   DIAGNOSTIC TESTING:  [ ] Echocardiogram:  [ ] Catheterization:  [ ] Stress Test:    OTHER: 	    LABS:	 	    CARDIAC MARKERS:        Troponin T, High Sensitivity Result: 15 ng/L (02-18 @ 18:49)  Troponin T, High Sensitivity Result: 17 ng/L (02-18 @ 17:02)                                9.7    4.69  )-----------( 203      ( 19 Feb 2022 06:52 )             30.1     02-20    135  |  98  |  14  ----------------------------<  94  4.4   |  25  |  0.82    Ca    9.3      20 Feb 2022 04:59  Phos  3.7     02-18  Mg     1.9     02-20    TPro  7.7  /  Alb  3.5  /  TBili  0.4  /  DBili  x   /  AST  14  /  ALT  8<L>  /  AlkPhos  91  02-19    proBNP:   PT/INR - ( 19 Feb 2022 06:52 )   PT: 16.2 sec;   INR: 1.37 ratio         PTT - ( 19 Feb 2022 06:52 )  PTT:28.6 sec  Lipid Profile:   HgA1c:     Creatinine, Serum: 0.82 mg/dL (02-20-22 @ 04:59)  Creatinine, Serum: 0.66 mg/dL (02-19-22 @ 06:52)  Creatinine, Serum: 0.75 mg/dL (02-18-22 @ 17:02)

## 2022-02-20 NOTE — PROGRESS NOTE ADULT - ASSESSMENT
85 yo F with PMHx of IBS, Asthma, h/o L Breast CA (s/p lumpectomy and radiation), Anxiety/Depression, SEAMUS (not currently on treatment), MVP and GERD who presented s/p syncopal episode    #syncope  -secondary to decrease po intake, hypovolemia, infection  -CT head with no cva  -echo with mod AS, nl lv fxn  + orthostatic hypotension 2/18  -sp IVF, cont hydration  -trend orthostatic bp   -Decrease nadolol to 5 mg daily    #MVP  -echo noted    #SEAMUS   -chest xray noted-- pulm f/u      dvt ppx     35 minutes spent on total encounter; more than 50% of the visit was spent counseling and/or coordinating care by the attending physician.

## 2022-02-20 NOTE — CONSULT NOTE ADULT - ASSESSMENT
87 yo F with PMHx of IBS, Asthma, h/o L Breast CA (s/p lumpectomy and radiation), Anxiety/Depression, SEAMUS (not currently on treatment), MVP and GERD who presented s/p syncopal episode. Echo found to have moderate AS.  ID consult for hx of MAC in sputum AFB*3 in 11, 12/2020. Also had MSSA in sputum culture. Seen by Dr. Handy in ID clinic in 01/2021, decision was made to deferred MAC treatment given pt's multiple drug allergies and questionable improvement quality of life by the treatment.  She has no fever or chills. Chronic cough is stable, however sputum is thicker with more color (green/yellow).  No leukocytosis. Procal=0.04.  CT chest: Interval increase in size and density of posterior segment of the right upper lobe opacity with additional new small nodular opacities. These findings are of uncertain etiology.    #Pulmonary MAC:  - Please send AFB*3 (looking for MAC, no need airbone isolation)  - Please also send sputum culture (given hx of bacterial MSSA in the sputum)  - Hold off MAC treatment    Kris Cordova MD, AAHIVS, PGY5  ID fellow  Please reach out on Teams  [facility] pager ID: [phone] (would prefer to text page for any new consult or question, please include name/location and best call back number)  After 5pm/weekends call ID office directly    d/w Dr. Handy  Recs conveyed to primary team   87 yo F with PMHx of IBS, Asthma, h/o L Breast CA (s/p lumpectomy and radiation), Anxiety/Depression, SEAMUS (not currently on treatment), MVP and GERD who presented s/p syncopal episode. Echo found to have moderate AS.  ID consult for hx of MAC in sputum AFB*3 in 11, 12/2020. Also had MSSA in sputum culture. Seen by Dr. Handy in ID clinic in 01/2021, decision was made to deferred MAC treatment given pt's multiple drug allergies and questionable improvement quality of life by the treatment.  She has no fever or chills. Chronic cough is stable, however sputum is thicker with more color (green/yellow).  No leukocytosis. Procal=0.04.  CT chest: Interval increase in size and density of posterior segment of the right upper lobe opacity with additional new small nodular opacities. These findings are of uncertain etiology.    #Pulmonary MAC:  - Please send AFB*3 (looking for MAC, no need airbone isolation)  - Please also send sputum culture (given hx of bacterial MSSA in the sputum)  - Hold off MAC treatment    Kris Cordova MD, AAHIVS, PGY5  ID fellow  Please reach out on Teams  [facility] pager ID: [phone] (would prefer to text page for any new consult or question, please include name/location and best call back number)  After 5pm/weekends call ID office directly    d/w Dr. Handy  Recs conveyed to primary team

## 2022-02-20 NOTE — PROVIDER CONTACT NOTE (CHANGE IN STATUS NOTIFICATION) - BACKGROUND
85 yo F admitted  for syncope, orthostatic hypotension, dyphagia 87 yo F admitted  for syncope, orthostatic hypotension, dysphagia

## 2022-02-20 NOTE — PROGRESS NOTE ADULT - ASSESSMENT
85 yo F w/ PMH breast cancer s/p lumpectomy, chemoradiation, MVP, MAC lung disease untreated who presents w/ syncope in the setting of positive orthostasis.     #Syncope  - Stop Nadolol  - Moderate AS on Echo  - May benefit from event monitor as outpatient.

## 2022-02-20 NOTE — PROGRESS NOTE ADULT - SUBJECTIVE AND OBJECTIVE BOX
Patient is a 86y old  Female who presents with a chief complaint of Syncopal Episode (2022 13:25)      SUBJECTIVE / OVERNIGHT EVENTS: on and off dizziness , EP recs w DC Nadolol    MEDICATIONS  (STANDING):  cholecalciferol 1000 Unit(s) Oral two times a day  difluprednate 0.05% Ophthalmic Emulsion 1 Drop(s) Left EYE two times a day  FLUoxetine 60 milliGRAM(s) Oral daily  fluticasone propionate 50 MICROgram(s)/spray Nasal Spray 1 Spray(s) Both Nostrils two times a day  heparin   Injectable 5000 Unit(s) SubCutaneous every 8 hours  nadolol 10 milliGRAM(s) Oral daily  prednisoLONE acetate 1% Suspension 1 Drop(s) Left EYE two times a day  sodium chloride 0.65% Nasal 1 Spray(s) Both Nostrils four times a day  sodium chloride 0.9%. 1000 milliLiter(s) (100 mL/Hr) IV Continuous <Continuous>    MEDICATIONS  (PRN):  acetaminophen     Tablet .. 650 milliGRAM(s) Oral every 6 hours PRN Temp greater or equal to 38C (100.4F), Mild Pain (1 - 3)  aluminum hydroxide/magnesium hydroxide/simethicone Suspension 30 milliLiter(s) Oral every 4 hours PRN Dyspepsia  melatonin 3 milliGRAM(s) Oral at bedtime PRN Insomnia  ondansetron Injectable 4 milliGRAM(s) IV Push every 8 hours PRN Nausea and/or Vomiting      Vital Signs Last 24 Hrs  T(F): 98 (22 @ 11:25), Max: 98 (22 @ 20:00)  HR: 67 (22 @ 11:25) (62 - 67)  BP: 147/93 (22 @ 11:25) (145/71 - 151/78)  RR: 18 (22 @ 11:25) (17 - 18)  SpO2: 99% (22 @ 11:25) (94% - 99%)  Telemetry:   CAPILLARY BLOOD GLUCOSE        I&O's Summary    2022 07:01  -  2022 07:00  --------------------------------------------------------  IN: 700 mL / OUT: 0 mL / NET: 700 mL    2022 07:01  -  2022 14:30  --------------------------------------------------------  IN: 480 mL / OUT: 0 mL / NET: 480 mL        PHYSICAL EXAM:  GENERAL: NAD, well-developed  HEAD:  Atraumatic, Normocephalic  EYES: EOMI, PERRLA, conjunctiva and sclera clear  NECK: Supple, No JVD  CHEST/LUNG: Clear to auscultation bilaterally; No wheeze  HEART: Regular rate and rhythm; No murmurs, rubs, or gallops  ABDOMEN: Soft, Nontender, Nondistended; Bowel sounds present  EXTREMITIES:  2+ Peripheral Pulses, No clubbing, cyanosis, or edema  PSYCH: AAOx3  NEUROLOGY: non-focal  SKIN: No rashes or lesions    LABS:                        9.7    4.69  )-----------( 203      ( 2022 06:52 )             30.1     02-20    135  |  98  |  14  ----------------------------<  94  4.4   |  25  |  0.82    Ca    9.3      2022 04:59  Phos  3.7     02-18  Mg     1.9     -20    TPro  7.7  /  Alb  3.5  /  TBili  0.4  /  DBili  x   /  AST  14  /  ALT  8<L>  /  AlkPhos  91  02-19    PT/INR - ( 2022 06:52 )   PT: 16.2 sec;   INR: 1.37 ratio         PTT - ( 2022 06:52 )  PTT:28.6 sec      Urinalysis Basic - ( 2022 17:48 )    Color: Light Yellow / Appearance: Clear / S.014 / pH: x  Gluc: x / Ketone: Negative  / Bili: Negative / Urobili: Negative   Blood: x / Protein: Negative / Nitrite: Negative   Leuk Esterase: Small / RBC: 1 /hpf / WBC 3 /HPF   Sq Epi: x / Non Sq Epi: 1 /hpf / Bacteria: Negative        RADIOLOGY & ADDITIONAL TESTS:    Imaging Personally Reviewed:    Consultant(s) Notes Reviewed:      Care Discussed with Consultants/Other Providers:

## 2022-02-20 NOTE — CONSULT NOTE ADULT - ATTENDING COMMENTS
85 yo F with PMHx of IBS, Asthma, h/o L Breast CA (s/p lumpectomy and radiation), Anxiety/Depression, SEAMUS (not currently on treatment), MVP and GERD who presented s/p syncopal episode. Echo found to have moderate AS.    Progression on CT chest of chronic lung disease. Increased cavitation.  Likely due to SEAMUS.  Also h/o MSSA.    Would check sputm afb x 3 and sputum for culture and sensi.  If MSSA, could consider treating while in hospital.  She has many abx allergies and intolerances and was difficult to treat as outpt.  Can hold off on antimicrobials for now pending cultures.    New AS - w/up as per primary team.    D/w pulmonary as well.  Daughter at bedside.    Criselda Handy MD  [phone] (pager)  [phone] (office)

## 2022-02-20 NOTE — CHART NOTE - NSCHARTNOTEFT_GEN_A_CORE
Notified by RN patient with 7 beats of WCT, patient without c/o CP, palpitation of SOB   /79 HR 65, 02 sat 100% RR 18   EKG was ordered Notified by RN patient with 7 beats of WCT, patient without c/o CP, palpitation of SOB   /79 HR 65, 02 sat 100% RR 18   EKG was ordered, no acute ischemic changes noted   Will continue to monitor

## 2022-02-20 NOTE — PROGRESS NOTE ADULT - SUBJECTIVE AND OBJECTIVE BOX
[facility] DIVISION OF PULMONARY, CRITICAL CARE and SLEEP MEDICINE  PULMONARY PROGRESS NOTE  OFFICE NUMBER: [phone]    PATIENT INFORMATION:  NAME: ADA ROLLE:  MRN: [id number]    CHIEF COMPLAINT: Patient is a 86y old  Female who presents with a chief complaint of Syncopal Episode (20 Feb 2022 10:56)      [x] INITIAL CONSULT, H&P, FAMILY HISTORY and PAST MEDICAL AND SURGICAL HISTORY REVIEWED    OVERNIGHT EVENTS or CHANGES TO HPI:   Having PAT when she walks to the bathroom.  Out of bed, no other events  ========================REVIEW OF SYSTEMS========================  CONSTITUTIONAL: feels not herself, head in a fog, didn't sleep well  CARDIOVASCULAR: denies chest pressure  PULMONARY: coughing up sputum   x[] REMAINING REVIEW OF SYSTEMS NEGATIVE  [] UNABLE TO OBTAIN REVIEW OF SYSTEMS DUE TO _______________    ========================MEDICATIONS=============================  MEDICATIONS  (STANDING):  cholecalciferol 1000 Unit(s) Oral two times a day  difluprednate 0.05% Ophthalmic Emulsion 1 Drop(s) Left EYE two times a day  FLUoxetine 60 milliGRAM(s) Oral daily  fluticasone propionate 50 MICROgram(s)/spray Nasal Spray 1 Spray(s) Both Nostrils two times a day  heparin   Injectable 5000 Unit(s) SubCutaneous every 8 hours  nadolol 10 milliGRAM(s) Oral daily  prednisoLONE acetate 1% Suspension 1 Drop(s) Left EYE two times a day  sodium chloride 0.65% Nasal 1 Spray(s) Both Nostrils four times a day  sodium chloride 0.9%. 1000 milliLiter(s) (100 mL/Hr) IV Continuous <Continuous>      MEDICATIONS  (PRN):  acetaminophen     Tablet .. 650 milliGRAM(s) Oral every 6 hours PRN Temp greater or equal to 38C (100.4F), Mild Pain (1 - 3)  aluminum hydroxide/magnesium hydroxide/simethicone Suspension 30 milliLiter(s) Oral every 4 hours PRN Dyspepsia  melatonin 3 milliGRAM(s) Oral at bedtime PRN Insomnia  ondansetron Injectable 4 milliGRAM(s) IV Push every 8 hours PRN Nausea and/or Vomiting      ========================PHYSICAL EXAM============================    VITALS: ICU Vital Signs Last 24 Hrs  T(C): 36.7 (20 Feb 2022 11:25), Max: 36.7 (19 Feb 2022 20:00)  T(F): 98 (20 Feb 2022 11:25), Max: 98 (19 Feb 2022 20:00)  HR: 67 (20 Feb 2022 11:25) (62 - 67)  BP: 147/93 (20 Feb 2022 11:25) (145/71 - 151/78)  BP(mean): --  ABP: --  ABP(mean): --  RR: 18 (20 Feb 2022 11:25) (17 - 18)  SpO2: 99% (20 Feb 2022 11:25) (94% - 99%)      INTAKE and OUTPUT: I&O's Summary    19 Feb 2022 07:01  -  20 Feb 2022 07:00  --------------------------------------------------------  IN: 700 mL / OUT: 0 mL / NET: 700 mL    GENERAL: appears fatigued  EYES:  EAR/NOSE/MOUTH/THROAT:  NECK:  LYMPH NODES:  CARDIOVASCULAR: RRR  RESPIRATORY: mid right lung field rales posteriorly.  no wheezing  ABDOMEN:  EXTREMITIES without edema  SKIN:  MUSCULOSKELETAL:   NEUROLOGIC: nonfocal  PSYCHIATRIC awake,alert at her baseline    ========================LABORATORY RESULTS AND IMAGING=============                        9.7    4.69  )-----------( 203      ( 19 Feb 2022 06:52 )             30.1                                                    02-20    135  |  98  |  14  ----------------------------<  94  4.4   |  25  |  0.82    Ca    9.3      20 Feb 2022 04:59  Phos  3.7     02-18  Mg     1.9     02-20    TPro  7.7  /  Alb  3.5  /  TBili  0.4  /  DBili  x   /  AST  14  /  ALT  8<L>  /  AlkPhos  91  02-19          Creatinine Trend: 0.82<--, 0.66<--, 0.75<--    No new chest imaging to review    Echo:  Conclusions:  1. Calcified trileaflet aortic valve with decreased  opening. Peak transaortic valve gradient equals 23 mm Hg,  mean transaortic valve gradient equals 10 mm Hg, estimated  aortic valve area equals 1.1 sqcm (by continuity equation  and planimetry), aortic valve velocity time integral equals  56 cm, consistent with moderate aortic stenosis. Minimal  aortic regurgitation.  2. Moderate concentric left ventricular hypertrophy.  3. Endocardium not well visualized; grossly normal left  ventricular systolic function.  4. Normal right ventricular size and systolic function.  ------------------------------------------------------------------------      THANK YOU FOR ALLOWING US TO PARTICIPATE IN THE CARE OF THIS PATIENT

## 2022-02-20 NOTE — CONSULT NOTE ADULT - SUBJECTIVE AND OBJECTIVE BOX
Patient is a 86y old  Female who presents with a chief complaint of Syncopal Episode (2022 14:30)    HPI:  Patient is an 85 yo F with PMHx of IBS, Asthma, h/o L Breast CA (s/p lumpectomy and radiation), Anxiety/Depression, SEAMUS (not currently on treatment), MVP and GERD who presented s/p syncopal episode. Patient reports feeling unwell starting approx 10 days ago. 7 days prior to presentation, she was walking in the kitchen and subsequently synopsized. She awoke with her daughter over her and states she was unconscious for about 1 minute. She did strike the right side of her head. She did not have incontinence of subsequent confusion. SHe was evaluated by EMS and declined transfer to the hospital. Two days later she went to see her PCP and she had a presyncopal episode after leaving their office. She again had a presyncopal episode today which prompted her to present to the ED.     In the ED, patient was found to have HR of 57 and BP of 184/77. Labs revealed a Hgb of 9.8 (from prior of 11.6) but were otherwise unremarkable. CT of the brain was performed which was negative for traumatic injury, however did have findings consistent with sinusitis. A CXR revealed b/l peripheral lung opacities, increased from prior and a linear opacity in the RML. UA obtained was overall unremarkable. Patient was given a 500cc NS bolus and admitted to medicine for further management.  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 Also had MSSA in sputum culture. Seen by Dr. Handy in ID clinic in 2021, decision was made to deferred MAC treatment given pt's multiple drug allergies and questionable improvement quality of life by the treatment.    She has no fever or chills.  Chronic cough is stable, however sputum is thicker with more color (green/yellow).      REVIEW OF SYSTEMS  [  ] ROS unobtainable because:    [ X ] All other systems negative except as noted below    Constitutional:  [ ] fever [ ] chills  [ ] weight loss  [ ]night sweat  [ ]poor appetite/PO intake [ ]fatigue   Skin:  [ ] rash [ ] phlebitis	  Eyes: [ ] icterus [ ] pain  [ ] discharge	  ENMT: [ ] sore throat  [ ] thrush [ ] ulcers [ ] exudates [ ]anosmia  Respiratory: [ ] dyspnea [ ] hemoptysis [ X] cough [X ] sputum	  Cardiovascular:  [ ] chest pain [ ] palpitations [ ] edema	  Gastrointestinal:  [ ] nausea [ ] vomiting [ ] diarrhea [ ] constipation [ ] pain	  Genitourinary:  [ ] dysuria [ ] frequency [ ] hematuria [ ] discharge [ ] flank pain  [ ] incontinence  Musculoskeletal:  [ ] myalgias [ ] arthralgias [ ] arthritis  [ ] back pain  Neurological:  [ ] headache [ ] weakness [ ] seizures  [ ] confusion/altered mental status    prior hospital charts reviewed [V]  primary team notes reviewed [V]  other consultant notes reviewed [V]    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    GERD (Gastroesophageal Reflux Disease)    Irritable Bowel Syndrome    Mitral Valve Prolapse    Anxiety    Clinical Depression    Asthma    Uveitis    Irritable Bowel Syndrome    Hiatal Hernia    Nasal Polyp    Hypertension    S/P Breast Lumpectomy    S/P Lumpectomy of Breast  Left Breast    Status Post Tonsillectomy    S/P Nasal Polypectomy    History of Cataract Surgery  Left eye    History of Breast Biopsy  [place] lymph node biopsy        SOCIAL HISTORY:  - Denied smoking/vaping/alcohol/recreational drug use  - Lives with daughter, no pet  - [place], no recent travel hx    FAMILY HISTORY:  FH: CAD (coronary artery disease) (Father, Mother, Sibling, Aunt)  FH: lung cancer (Mother)      Allergies  cefdinir (Unknown)  ciprofloxacin (Other)  codeine (Nausea; Other)  contrast media (iodine-based) (Angioedema)  fluorescein ophthalmic (Hives; Rash; Flushing)  lactose (Unknown)  latex (Anaphylaxis)  Levaquin (Nausea; Other)  lidocaine (Unknown)  SULFA (Anaphylaxis)  tetracycline (Anaphylaxis)        ANTIMICROBIALS: Off    OTHER MEDS:   MEDICATIONS  (STANDING):  acetaminophen     Tablet .. 650 every 6 hours PRN  aluminum hydroxide/magnesium hydroxide/simethicone Suspension 30 every 4 hours PRN  FLUoxetine 60 daily  heparin   Injectable 5000 every 8 hours  melatonin 3 at bedtime PRN  ondansetron Injectable 4 every 8 hours PRN      VITALS:  Vital Signs Last 24 Hrs  T(F): 98 (22 @ 11:25), Max: 98 (22 @ 11:41)    Vital Signs Last 24 Hrs  HR: 67 (22 @ 11:25) (62 - 67)  BP: 147/93 (22 @ 11:25) (145/71 - 151/78)  RR: 18 (22 @ 11:25)  SpO2: 99% (22 @ 11:25) (94% - 99%)  Wt(kg): --    EXAM:  Physical Exam:  Constitutional:  well preserved, comfortable, on room air  Head/Eyes: no icterus, PERRL, EOMI  ENT:  supple; no thrush  LUNGS:  scattered crackles  CVS:  normal S1, S2, faint systolic murmur?  Abd:  soft, non-tender; non-distended  Ext:  no edema  Vascular:  IV site no erythema tenderness or discharge  MSK:  joints without swelling  Neuro: AAO X 3, non- focal    Labs:                        9.7    4.69  )-----------( 203      ( 2022 06:52 )             30.1         135  |  98  |  14  ----------------------------<  94  4.4   |  25  |  0.82    Ca    9.3      2022 04:59  Phos  3.7       Mg     1.9         TPro  7.7  /  Alb  3.5  /  TBili  0.4  /  DBili  x   /  AST  14  /  ALT  8<L>  /  AlkPhos  91        WBC Trend:  WBC Count: 4.69 (22 @ 06:52)  WBC Count: 5.01 (22 @ 16:57)      Auto Neutrophil #: 2.74 K/uL (22 @ 16:57)      Creatine Trend:  Creatinine, Serum: 0.82 ()  Creatinine, Serum: 0.66 ()  Creatinine, Serum: 0.75 ()      Liver Biochemical Testing Trend:  Alanine Aminotransferase (ALT/SGPT): 8 *L* ()  Alanine Aminotransferase (ALT/SGPT): 7 *L* ()  Aspartate Aminotransferase (AST/SGOT): 14 (22 @ 06:52)  Aspartate Aminotransferase (AST/SGOT): 12 (22 @ 17:02)  Bilirubin Total, Serum: 0.4 ()  Bilirubin Total, Serum: 0.4 ()    Urinalysis Basic - ( 2022 17:48 )  Color: Light Yellow / Appearance: Clear / S.014 / pH: x  Gluc: x / Ketone: Negative  / Bili: Negative / Urobili: Negative   Blood: x / Protein: Negative / Nitrite: Negative   Leuk Esterase: Small / RBC: 1 /hpf / WBC 3 /HPF   Sq Epi: x / Non Sq Epi: 1 /hpf / Bacteria: Negative    MICROBIOLOGY:    Culture - Urine (collected 2022 22:51)  Source: Clean Catch Clean Catch (Midstream)  Final Report:    <10,000 CFU/mL Normal Urogenital Hien      COVID-19 PCR: NotDetec (22 @ 17:02)    Procalcitonin, Serum: 0.04 ()    C-Reactive Protein, Serum: 35 ()    D-Dimer Assay, Quantitative: 363 ()    Troponin T, High Sensitivity Result: 15 ()  Troponin T, High Sensitivity Result: 17 ()    Blood Gas Venous - Lactate: 0.8 ( @ 16:39)      RADIOLOGY:  imaging below personally reviewed    < from: Transthoracic Echocardiogram (22 @ 10:24) >  Conclusions:  1. Calcified trileaflet aortic valve with decreased  opening. Peak transaortic valve gradient equals 23 mm Hg,  mean transaortic valve gradient equals 10 mm Hg, estimated  aortic valve area equals 1.1 sqcm (by continuity equation  and planimetry), aortic valve velocity time integral equals  56 cm, consistent with moderate aortic stenosis. Minimal  aortic regurgitation.  2. Moderate concentric left ventricular hypertrophy.  3. Endocardium not well visualized; grossly normal left  ventricular systolic function.  4. Normal right ventricular size and systolic function.  < end of copied text >      < from: NM Pulmonary Ventilation/Perfusion Scan (22 @ 14:12) >  IMPRESSION: Abnormal ventilation/perfusion lung scan. Low probability of   pulmonary embolus.  < end of copied text >    < from: CT Abdomen and Pelvis w/ Oral Cont (22 @ 23:04) >  IMPRESSION:    Interval increase in size and density of posterior segment of the right   upper lobe opacity with additional new small nodular opacities. These   findings are of uncertain etiology.    No acute findings in the abdomen and pelvis.    < end of copied text >       Patient is a 86y old  Female who presents with a chief complaint of Syncopal Episode (2022 14:30)    HPI:  Patient is an 87 yo F with PMHx of IBS, Asthma, h/o L Breast CA (s/p lumpectomy and radiation), Anxiety/Depression, SEAMUS (not currently on treatment), MVP and GERD who presented s/p syncopal episode. Patient reports feeling unwell starting approx 10 days ago. 7 days prior to presentation, she was walking in the kitchen and subsequently syncopized. She awoke with her daughter over her and states she was unconscious for about 1 minute. She did strike the right side of her head. She did not have incontinence of subsequent confusion. SHe was evaluated by EMS and declined transfer to the hospital. Two days later she went to see her PCP and she had a presyncopal episode after leaving their office. She again had a presyncopal episode today which prompted her to present to the ED.     In the ED, patient was found to have HR of 57 and BP of 184/77. Labs revealed a Hgb of 9.8 (from prior of 11.6) but were otherwise unremarkable. CT of the brain was performed which was negative for traumatic injury, however did have findings consistent with sinusitis. A CXR revealed b/l peripheral lung opacities, increased from prior and a linear opacity in the RML. UA obtained was overall unremarkable. Patient was given a 500cc NS bolus and admitted to medicine for further management.  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 Also had MSSA in sputum culture. Seen by Dr. Handy in ID clinic in 2021, decision was made to deferred MAC treatment given pt's multiple drug allergies and questionable improvement quality of life by the treatment.    She has no fever or chills.  Chronic cough is stable, however sputum is thicker with more color (green/yellow).      REVIEW OF SYSTEMS  [  ] ROS unobtainable because:    [ X ] All other systems negative except as noted below    Constitutional:  [ ] fever [ ] chills  [ ] weight loss  [ ]night sweat  [ ]poor appetite/PO intake [ ]fatigue   Skin:  [ ] rash [ ] phlebitis	  Eyes: [ ] icterus [ ] pain  [ ] discharge	  ENMT: [ ] sore throat  [ ] thrush [ ] ulcers [ ] exudates [ ]anosmia  Respiratory: [ ] dyspnea [ ] hemoptysis [ X] cough [X ] sputum	  Cardiovascular:  [ ] chest pain [ ] palpitations [ ] edema	  Gastrointestinal:  [ ] nausea [ ] vomiting [ ] diarrhea [ ] constipation [ ] pain	  Genitourinary:  [ ] dysuria [ ] frequency [ ] hematuria [ ] discharge [ ] flank pain  [ ] incontinence  Musculoskeletal:  [ ] myalgias [ ] arthralgias [ ] arthritis  [ ] back pain  Neurological:  [ ] headache [ ] weakness [ ] seizures  [ ] confusion/altered mental status    prior hospital charts reviewed [V]  primary team notes reviewed [V]  other consultant notes reviewed [V]    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    GERD (Gastroesophageal Reflux Disease)    Irritable Bowel Syndrome    Mitral Valve Prolapse    Anxiety    Clinical Depression    Asthma    Uveitis    Irritable Bowel Syndrome    Hiatal Hernia    Nasal Polyp    Hypertension    S/P Breast Lumpectomy    S/P Lumpectomy of Breast  Left Breast    Status Post Tonsillectomy    S/P Nasal Polypectomy    History of Cataract Surgery  Left eye    History of Breast Biopsy  [place] lymph node biopsy        SOCIAL HISTORY:  - Denied smoking/vaping/alcohol/recreational drug use  - Lives with daughter, no pet  - [place], no recent travel hx    FAMILY HISTORY:  FH: CAD (coronary artery disease) (Father, Mother, Sibling, Aunt)  FH: lung cancer (Mother)      Allergies  cefdinir (Unknown)  ciprofloxacin (Other)  codeine (Nausea; Other)  contrast media (iodine-based) (Angioedema)  fluorescein ophthalmic (Hives; Rash; Flushing)  lactose (Unknown)  latex (Anaphylaxis)  Levaquin (Nausea; Other)  lidocaine (Unknown)  SULFA (Anaphylaxis)  tetracycline (Anaphylaxis)        ANTIMICROBIALS: Off    OTHER MEDS:   MEDICATIONS  (STANDING):  acetaminophen     Tablet .. 650 every 6 hours PRN  aluminum hydroxide/magnesium hydroxide/simethicone Suspension 30 every 4 hours PRN  FLUoxetine 60 daily  heparin   Injectable 5000 every 8 hours  melatonin 3 at bedtime PRN  ondansetron Injectable 4 every 8 hours PRN      VITALS:  Vital Signs Last 24 Hrs  T(F): 98 (22 @ 11:25), Max: 98 (22 @ 11:41)    Vital Signs Last 24 Hrs  HR: 67 (22 @ 11:25) (62 - 67)  BP: 147/93 (22 @ 11:25) (145/71 - 151/78)  RR: 18 (22 @ 11:25)  SpO2: 99% (22 @ 11:25) (94% - 99%)  Wt(kg): --    EXAM:  Physical Exam:  Constitutional:  well preserved, comfortable, on room air  Head/Eyes: no icterus, PERRL, EOMI  ENT:  supple; no thrush  LUNGS:  scattered crackles  CVS:  normal S1, S2, faint systolic murmur?  Abd:  soft, non-tender; non-distended  Ext:  no edema  Vascular:  IV site no erythema tenderness or discharge  MSK:  joints without swelling  Neuro: AAO X 3, non- focal    Labs:                        9.7    4.69  )-----------( 203      ( 2022 06:52 )             30.1         135  |  98  |  14  ----------------------------<  94  4.4   |  25  |  0.82    Ca    9.3      2022 04:59  Phos  3.7       Mg     1.9         TPro  7.7  /  Alb  3.5  /  TBili  0.4  /  DBili  x   /  AST  14  /  ALT  8<L>  /  AlkPhos  91        WBC Trend:  WBC Count: 4.69 (22 @ 06:52)  WBC Count: 5.01 (22 @ 16:57)      Auto Neutrophil #: 2.74 K/uL (22 @ 16:57)      Creatine Trend:  Creatinine, Serum: 0.82 ()  Creatinine, Serum: 0.66 ()  Creatinine, Serum: 0.75 ()      Liver Biochemical Testing Trend:  Alanine Aminotransferase (ALT/SGPT): 8 *L* ()  Alanine Aminotransferase (ALT/SGPT): 7 *L* ()  Aspartate Aminotransferase (AST/SGOT): 14 (22 @ 06:52)  Aspartate Aminotransferase (AST/SGOT): 12 (22 @ 17:02)  Bilirubin Total, Serum: 0.4 ()  Bilirubin Total, Serum: 0.4 ()    Urinalysis Basic - ( 2022 17:48 )  Color: Light Yellow / Appearance: Clear / S.014 / pH: x  Gluc: x / Ketone: Negative  / Bili: Negative / Urobili: Negative   Blood: x / Protein: Negative / Nitrite: Negative   Leuk Esterase: Small / RBC: 1 /hpf / WBC 3 /HPF   Sq Epi: x / Non Sq Epi: 1 /hpf / Bacteria: Negative    MICROBIOLOGY:    Culture - Urine (collected 2022 22:51)  Source: Clean Catch Clean Catch (Midstream)  Final Report:    <10,000 CFU/mL Normal Urogenital Hien      COVID-19 PCR: NotDetec (22 @ 17:02)    Procalcitonin, Serum: 0.04 ()    C-Reactive Protein, Serum: 35 ()    D-Dimer Assay, Quantitative: 363 ()    Troponin T, High Sensitivity Result: 15 ()  Troponin T, High Sensitivity Result: 17 ()    Blood Gas Venous - Lactate: 0.8 ( @ 16:39)      RADIOLOGY:  imaging below personally reviewed    < from: Transthoracic Echocardiogram (22 @ 10:24) >  Conclusions:  1. Calcified trileaflet aortic valve with decreased  opening. Peak transaortic valve gradient equals 23 mm Hg,  mean transaortic valve gradient equals 10 mm Hg, estimated  aortic valve area equals 1.1 sqcm (by continuity equation  and planimetry), aortic valve velocity time integral equals  56 cm, consistent with moderate aortic stenosis. Minimal  aortic regurgitation.  2. Moderate concentric left ventricular hypertrophy.  3. Endocardium not well visualized; grossly normal left  ventricular systolic function.  4. Normal right ventricular size and systolic function.  < end of copied text >      < from: NM Pulmonary Ventilation/Perfusion Scan (22 @ 14:12) >  IMPRESSION: Abnormal ventilation/perfusion lung scan. Low probability of   pulmonary embolus.  < end of copied text >    < from: CT Abdomen and Pelvis w/ Oral Cont (22 @ 23:04) >  IMPRESSION:    Interval increase in size and density of posterior segment of the right   upper lobe opacity with additional new small nodular opacities. These   findings are of uncertain etiology.    No acute findings in the abdomen and pelvis.    < end of copied text >

## 2022-02-21 LAB
ANION GAP SERPL CALC-SCNC: 9 MMOL/L — SIGNIFICANT CHANGE UP (ref 5–17)
BUN SERPL-MCNC: 15 MG/DL — SIGNIFICANT CHANGE UP (ref 7–23)
CALCIUM SERPL-MCNC: 9 MG/DL — SIGNIFICANT CHANGE UP (ref 8.4–10.5)
CHLORIDE SERPL-SCNC: 102 MMOL/L — SIGNIFICANT CHANGE UP (ref 96–108)
CO2 SERPL-SCNC: 25 MMOL/L — SIGNIFICANT CHANGE UP (ref 22–31)
CREAT SERPL-MCNC: 0.71 MG/DL — SIGNIFICANT CHANGE UP (ref 0.5–1.3)
GLUCOSE SERPL-MCNC: 93 MG/DL — SIGNIFICANT CHANGE UP (ref 70–99)
HCT VFR BLD CALC: 30.1 % — LOW (ref 34.5–45)
HGB BLD-MCNC: 9.5 G/DL — LOW (ref 11.5–15.5)
MAGNESIUM SERPL-MCNC: 1.9 MG/DL — SIGNIFICANT CHANGE UP (ref 1.6–2.6)
MCHC RBC-ENTMCNC: 28.3 PG — SIGNIFICANT CHANGE UP (ref 27–34)
MCHC RBC-ENTMCNC: 31.6 GM/DL — LOW (ref 32–36)
MCV RBC AUTO: 89.6 FL — SIGNIFICANT CHANGE UP (ref 80–100)
NIGHT BLUE STAIN TISS: SIGNIFICANT CHANGE UP
NRBC # BLD: 0 /100 WBCS — SIGNIFICANT CHANGE UP (ref 0–0)
PHOSPHATE SERPL-MCNC: 3.8 MG/DL — SIGNIFICANT CHANGE UP (ref 2.5–4.5)
PLATELET # BLD AUTO: 228 K/UL — SIGNIFICANT CHANGE UP (ref 150–400)
POTASSIUM SERPL-MCNC: 4 MMOL/L — SIGNIFICANT CHANGE UP (ref 3.5–5.3)
POTASSIUM SERPL-SCNC: 4 MMOL/L — SIGNIFICANT CHANGE UP (ref 3.5–5.3)
RBC # BLD: 3.36 M/UL — LOW (ref 3.8–5.2)
RBC # FLD: 14 % — SIGNIFICANT CHANGE UP (ref 10.3–14.5)
SODIUM SERPL-SCNC: 136 MMOL/L — SIGNIFICANT CHANGE UP (ref 135–145)
SPECIMEN SOURCE: SIGNIFICANT CHANGE UP
TSH SERPL-MCNC: 0.72 UIU/ML — SIGNIFICANT CHANGE UP (ref 0.27–4.2)
VIT B12 SERPL-MCNC: 667 PG/ML — SIGNIFICANT CHANGE UP (ref 232–1245)
WBC # BLD: 4.58 K/UL — SIGNIFICANT CHANGE UP (ref 3.8–10.5)
WBC # FLD AUTO: 4.58 K/UL — SIGNIFICANT CHANGE UP (ref 3.8–10.5)

## 2022-02-21 RX ADMIN — HEPARIN SODIUM 5000 UNIT(S): 5000 INJECTION INTRAVENOUS; SUBCUTANEOUS at 05:22

## 2022-02-21 RX ADMIN — HEPARIN SODIUM 5000 UNIT(S): 5000 INJECTION INTRAVENOUS; SUBCUTANEOUS at 13:53

## 2022-02-21 RX ADMIN — DUREZOL 1 DROP(S): 0.5 EMULSION OPHTHALMIC at 05:15

## 2022-02-21 RX ADMIN — Medication 1 SPRAY(S): at 05:22

## 2022-02-21 RX ADMIN — Medication 1 SPRAY(S): at 17:15

## 2022-02-21 RX ADMIN — Medication 1 SPRAY(S): at 05:23

## 2022-02-21 RX ADMIN — DUREZOL 1 DROP(S): 0.5 EMULSION OPHTHALMIC at 17:20

## 2022-02-21 RX ADMIN — Medication 1 SPRAY(S): at 11:10

## 2022-02-21 RX ADMIN — Medication 60 MILLIGRAM(S): at 11:10

## 2022-02-21 RX ADMIN — Medication 1 SPRAY(S): at 23:58

## 2022-02-21 RX ADMIN — HEPARIN SODIUM 5000 UNIT(S): 5000 INJECTION INTRAVENOUS; SUBCUTANEOUS at 21:12

## 2022-02-21 NOTE — PROGRESS NOTE ADULT - SUBJECTIVE AND OBJECTIVE BOX
CARDIOLOGY FOLLOW UP - Dr. Lee  Date of Service: 2/21/22  CC: no cp/sob     Review of Systems:  Constitutional: No fever, weight loss, or fatigue  Respiratory: No cough, wheezing, or hemoptysis, no shortness of breath  Cardiovascular: No chest pain, palpitations, passing out, dizziness, or leg swelling  Gastrointestinal: No abd or epigastric pain.  No nausea, vomiting, or hematemesis; no diarrhea or constipation, no melena or hematochezia  Vascular: no edema       PHYSICAL EXAM:  T(C): 36.8 (02-21-22 @ 11:18), Max: 36.8 (02-21-22 @ 11:18)  HR: 61 (02-21-22 @ 11:18) (58 - 65)  BP: 130/67 (02-21-22 @ 11:18) (130/67 - 167/79)  RR: 18 (02-21-22 @ 11:18) (17 - 18)  SpO2: 98% (02-21-22 @ 11:18) (97% - 100%)  Wt(kg): --  I&O's Summary    20 Feb 2022 07:01  -  21 Feb 2022 07:00  --------------------------------------------------------  IN: 1380 mL / OUT: 0 mL / NET: 1380 mL    21 Feb 2022 07:01  -  21 Feb 2022 12:33  --------------------------------------------------------  IN: 240 mL / OUT: 0 mL / NET: 240 mL        Appearance: Normal	  Cardiovascular: Normal S1 S2,RRR, No JVD, No murmurs  Respiratory: Lungs clear to auscultation	  Gastrointestinal:  Soft, Non-tender, + BS	  Extremities: Normal range of motion, No clubbing, cyanosis or edema      Home Medications:  diazepam 5 mg oral tablet: 0.5 tab(s) orally once a day    NOTE: Last fill 11/11/2021. Patient has own med supply  (18 Feb 2022 23:33)  Durezol: 1 drop(s) in the left eye 2 times a day (18 Feb 2022 23:33)  Durezol 0.05% ophthalmic emulsion: 1 drop(s) to each affected eye 2 times a day (18 Feb 2022 23:33)  FLUoxetine 20 mg oral capsule: 3 cap(s) orally once a day (18 Feb 2022 23:33)  nadolol: 10 milligram(s) orally once a day (at bedtime)    NOTE: Unable to confirm with secondary source. Patient stated she  has own med supply  (18 Feb 2022 23:33)  Nasacort AQ 55 mcg/inh nasal spray: 1   2 times a day  (18 Feb 2022 23:33)  Prilosec 20 mg oral delayed release capsule: 1   once a day  (18 Feb 2022 23:33)  Vitamin D3 25 mcg (1000 intl units) oral tablet: 1 tab(s) orally 2 times a day (18 Feb 2022 23:33)      MEDICATIONS  (STANDING):  cholecalciferol 1000 Unit(s) Oral two times a day  difluprednate 0.05% Ophthalmic Emulsion 1 Drop(s) Left EYE two times a day  FLUoxetine 60 milliGRAM(s) Oral daily  fluticasone propionate 50 MICROgram(s)/spray Nasal Spray 1 Spray(s) Both Nostrils two times a day  heparin   Injectable 5000 Unit(s) SubCutaneous every 8 hours  prednisoLONE acetate 1% Suspension 1 Drop(s) Left EYE two times a day  sodium chloride 0.65% Nasal 1 Spray(s) Both Nostrils four times a day  sodium chloride 0.9%. 1000 milliLiter(s) (100 mL/Hr) IV Continuous <Continuous>      TELEMETRY: NSR, 7 beat WCT noted last night   ECG:  	  RADIOLOGY:   DIAGNOSTIC TESTING:  [ ] Echocardiogram:  [ ]  Catheterization:  [ ] Stress Test:    OTHER: 	    LABS:	 	    Troponin T, High Sensitivity Result: 15 ng/L [0 - 51] (02-18 @ 18:49)  Troponin T, High Sensitivity Result: 17 ng/L [0 - 51] (02-18 @ 17:02)                          9.5    4.58  )-----------( 228      ( 21 Feb 2022 05:16 )             30.1     02-21    136  |  102  |  15  ----------------------------<  93  4.0   |  25  |  0.71    Ca    9.0      21 Feb 2022 05:16  Phos  3.8     02-21  Mg     1.9     02-21

## 2022-02-21 NOTE — PROGRESS NOTE ADULT - ASSESSMENT
87 yo F with PMHx of IBS, Asthma, h/o L Breast CA (s/p lumpectomy and radiation), Anxiety/Depression, SEAMUS (not currently on treatment), MVP and GERD who presented s/p syncopal episode    #syncope  -secondary to decrease po intake, hypovolemia, infection  -CT head with no cva  -echo with mod AS, nl lv fxn  + orthostatic hypotension 2/18  -sp IVF, cont hydration  -trend orthostatic bp   -BB held per EP    #MVP  -echo noted    #SEAMUS   -chest xray noted-- pulm f/u  -pending AFB  -ID f/u       dvt ppx     plan discussed with ACP

## 2022-02-21 NOTE — PROGRESS NOTE ADULT - SUBJECTIVE AND OBJECTIVE BOX
Patient is a 86y old  Female who presents with a chief complaint of Syncopal Episode (20 Feb 2022 15:21)      SUBJECTIVE / OVERNIGHT EVENTS:    MEDICATIONS  (STANDING):  cholecalciferol 1000 Unit(s) Oral two times a day  difluprednate 0.05% Ophthalmic Emulsion 1 Drop(s) Left EYE two times a day  FLUoxetine 60 milliGRAM(s) Oral daily  fluticasone propionate 50 MICROgram(s)/spray Nasal Spray 1 Spray(s) Both Nostrils two times a day  heparin   Injectable 5000 Unit(s) SubCutaneous every 8 hours  prednisoLONE acetate 1% Suspension 1 Drop(s) Left EYE two times a day  sodium chloride 0.65% Nasal 1 Spray(s) Both Nostrils four times a day  sodium chloride 0.9%. 1000 milliLiter(s) (100 mL/Hr) IV Continuous <Continuous>    MEDICATIONS  (PRN):  acetaminophen     Tablet .. 650 milliGRAM(s) Oral every 6 hours PRN Temp greater or equal to 38C (100.4F), Mild Pain (1 - 3)  aluminum hydroxide/magnesium hydroxide/simethicone Suspension 30 milliLiter(s) Oral every 4 hours PRN Dyspepsia  melatonin 3 milliGRAM(s) Oral at bedtime PRN Insomnia  ondansetron Injectable 4 milliGRAM(s) IV Push every 8 hours PRN Nausea and/or Vomiting      Vital Signs Last 24 Hrs  T(F): 97.5 (02-21-22 @ 04:00), Max: 98 (02-20-22 @ 11:25)  HR: 58 (02-21-22 @ 04:00) (58 - 67)  BP: 148/67 (02-21-22 @ 04:00) (147/93 - 167/79)  RR: 17 (02-21-22 @ 04:00) (17 - 18)  SpO2: 97% (02-20-22 @ 20:36) (97% - 100%)  Telemetry:   CAPILLARY BLOOD GLUCOSE        I&O's Summary    20 Feb 2022 07:01  -  21 Feb 2022 07:00  --------------------------------------------------------  IN: 1380 mL / OUT: 0 mL / NET: 1380 mL        PHYSICAL EXAM:  GENERAL: NAD, well-developed  HEAD:  Atraumatic, Normocephalic  EYES: EOMI, PERRLA, conjunctiva and sclera clear  NECK: Supple, No JVD  CHEST/LUNG: Clear to auscultation bilaterally; No wheeze  HEART: Regular rate and rhythm; No murmurs, rubs, or gallops  ABDOMEN: Soft, Nontender, Nondistended; Bowel sounds present  EXTREMITIES:  2+ Peripheral Pulses, No clubbing, cyanosis, or edema  PSYCH: AAOx3  NEUROLOGY: non-focal  SKIN: No rashes or lesions    LABS:                        9.5    4.58  )-----------( 228      ( 21 Feb 2022 05:16 )             30.1     02-21    136  |  102  |  15  ----------------------------<  93  4.0   |  25  |  0.71    Ca    9.0      21 Feb 2022 05:16  Phos  3.8     02-21  Mg     1.9     02-21

## 2022-02-22 LAB
HCT VFR BLD CALC: 31.1 % — LOW (ref 34.5–45)
HGB BLD-MCNC: 9.8 G/DL — LOW (ref 11.5–15.5)
MCHC RBC-ENTMCNC: 28.1 PG — SIGNIFICANT CHANGE UP (ref 27–34)
MCHC RBC-ENTMCNC: 31.5 GM/DL — LOW (ref 32–36)
MCV RBC AUTO: 89.1 FL — SIGNIFICANT CHANGE UP (ref 80–100)
NRBC # BLD: 0 /100 WBCS — SIGNIFICANT CHANGE UP (ref 0–0)
PLATELET # BLD AUTO: 263 K/UL — SIGNIFICANT CHANGE UP (ref 150–400)
RBC # BLD: 3.49 M/UL — LOW (ref 3.8–5.2)
RBC # FLD: 14.1 % — SIGNIFICANT CHANGE UP (ref 10.3–14.5)
WBC # BLD: 5.83 K/UL — SIGNIFICANT CHANGE UP (ref 3.8–10.5)
WBC # FLD AUTO: 5.83 K/UL — SIGNIFICANT CHANGE UP (ref 3.8–10.5)

## 2022-02-22 PROCEDURE — 74230 X-RAY XM SWLNG FUNCJ C+: CPT | Mod: 26

## 2022-02-22 PROCEDURE — 99233 SBSQ HOSP IP/OBS HIGH 50: CPT | Mod: GC

## 2022-02-22 PROCEDURE — 99232 SBSQ HOSP IP/OBS MODERATE 35: CPT

## 2022-02-22 PROCEDURE — 99233 SBSQ HOSP IP/OBS HIGH 50: CPT

## 2022-02-22 RX ADMIN — DUREZOL 1 DROP(S): 0.5 EMULSION OPHTHALMIC at 18:24

## 2022-02-22 RX ADMIN — Medication 60 MILLIGRAM(S): at 12:08

## 2022-02-22 RX ADMIN — Medication 1 SPRAY(S): at 17:46

## 2022-02-22 RX ADMIN — Medication 1 SPRAY(S): at 23:02

## 2022-02-22 RX ADMIN — Medication 1000 UNIT(S): at 17:45

## 2022-02-22 RX ADMIN — Medication 650 MILLIGRAM(S): at 15:35

## 2022-02-22 RX ADMIN — Medication 1 SPRAY(S): at 17:47

## 2022-02-22 RX ADMIN — HEPARIN SODIUM 5000 UNIT(S): 5000 INJECTION INTRAVENOUS; SUBCUTANEOUS at 15:08

## 2022-02-22 RX ADMIN — Medication 650 MILLIGRAM(S): at 15:08

## 2022-02-22 RX ADMIN — DUREZOL 1 DROP(S): 0.5 EMULSION OPHTHALMIC at 05:08

## 2022-02-22 RX ADMIN — HEPARIN SODIUM 5000 UNIT(S): 5000 INJECTION INTRAVENOUS; SUBCUTANEOUS at 22:20

## 2022-02-22 RX ADMIN — Medication 1 SPRAY(S): at 05:08

## 2022-02-22 RX ADMIN — HEPARIN SODIUM 5000 UNIT(S): 5000 INJECTION INTRAVENOUS; SUBCUTANEOUS at 05:09

## 2022-02-22 RX ADMIN — ONDANSETRON 4 MILLIGRAM(S): 8 TABLET, FILM COATED ORAL at 10:06

## 2022-02-22 RX ADMIN — Medication 1 SPRAY(S): at 12:08

## 2022-02-22 NOTE — SWALLOW VFSS/MBS ASSESSMENT ADULT - DELAYED INITIATION OF THE PHARYNGEAL SWALLOW (IN SECONDS)
level of valleculae for cup sips/level of pyriform sinuses for straw sips timely level of valleculae

## 2022-02-22 NOTE — SWALLOW VFSS/MBS ASSESSMENT ADULT - COMMENTS
Continued:  Dysphagia.  - patient reporting a sensation of food/pills getting stuck in her throat  - unclear etiology at this time, however, there is concern for a possible dysmotility disorder vs ?achalasia  - will consult GI and S/S in the AM   - keep NPO for now   Cardiology consult for syncope. possibly secondary to decrease po intake, hypovolemia, infection. Chest xray noted   -CT head with no cva, + Complete opacification of the visualized maxillary sinuses. Bilateral  anterior ethmoid sinus mucosal thickening.  Sinusitis with nasal polyps. - CT head revealing opacification of the sinuses with associated polyps in the anterior sinuses  - she states that she was recommended to get surgery, however she declined    2/18 CXR IMPRESSION: Increased bilateral peripheral lung markings and linear opacity in the right middle lung of uncertain etiology.  CT HEAD IMPRESSION: No hydrocephalus, acute intracranial hemorrhage, mass effect, or brain edema. Complete opacification of the visualized maxillary sinuses. Bilateral anterior ethmoid sinus mucosal thickening. Correlate for the presence of sinusitis.    Pt seen for bedside swallow evaluation 2/19 with recommendations for regular solids/thin liquids. MBS recommended to assess swallow function in setting of worsening globus sensation and occasional "choking" sensation.

## 2022-02-22 NOTE — PROGRESS NOTE ADULT - SUBJECTIVE AND OBJECTIVE BOX
[facility]      Seen earlier today, and examined.  - Today, patient is without complaints.           *****MEDICATIONS: Current medication reviewed and documented.    MEDICATIONS  (STANDING):  cholecalciferol 1000 Unit(s) Oral two times a day  difluprednate 0.05% Ophthalmic Emulsion 1 Drop(s) Left EYE two times a day  FLUoxetine 60 milliGRAM(s) Oral daily  fluticasone propionate 50 MICROgram(s)/spray Nasal Spray 1 Spray(s) Both Nostrils two times a day  heparin   Injectable 5000 Unit(s) SubCutaneous every 8 hours  prednisoLONE acetate 1% Suspension 1 Drop(s) Left EYE two times a day  sodium chloride 0.65% Nasal 1 Spray(s) Both Nostrils four times a day  sodium chloride 0.9%. 1000 milliLiter(s) (100 mL/Hr) IV Continuous <Continuous>    MEDICATIONS  (PRN):  acetaminophen     Tablet .. 650 milliGRAM(s) Oral every 6 hours PRN Temp greater or equal to 38C (100.4F), Mild Pain (1 - 3)  aluminum hydroxide/magnesium hydroxide/simethicone Suspension 30 milliLiter(s) Oral every 4 hours PRN Dyspepsia  melatonin 3 milliGRAM(s) Oral at bedtime PRN Insomnia  ondansetron Injectable 4 milliGRAM(s) IV Push every 8 hours PRN Nausea and/or Vomiting          ***** VITAL SIGNS:  T(F): 97.8 (22 @ 20:09), Max: 98.5 (22 @ 11:17)  HR: 60 (22 @ 20:09) (60 - 71)  BP: 110/62 (22 @ 20:09) (110/62 - 150/74)  RR: 18 (22 @ 20:09) (18 - 18)  SpO2: 98% (22 @ 20:09) (94% - 98%)  Wt(kg): --  ,   I&O's Summary    2022 07:01  -  2022 07:00  --------------------------------------------------------  IN: 790 mL / OUT: 0 mL / NET: 790 mL    2022 07:01  -  2022 20:40  --------------------------------------------------------  IN: 720 mL / OUT: 0 mL / NET: 720 mL             *****PHYSICAL EXAM:   alert oriented x 2 attention comprehension are fair.  Able to name, repeat.   EOmi fundi not visualized   no nystagmus VFF to confrontation  Tongue is midline   Moving all 4 ext spontaneously no drift appreciated    Gait not assessed.            *****LAB AND IMAGIN.8    5.83  )-----------( 263      ( 2022 06:27 )             31.1               02-21    136  |  102  |  15  ----------------------------<  93  4.0   |  25  |  0.71    Ca    9.0      2022 05:16  Phos  3.8     02-  Mg     1.9     -                           [All pertinent recent Imaging/Reports reviewed]           *****A S S E S S M E N T   A N D   P L A N :         Excerpt from H&P,"  Patient is an 87 yo F with PMHx of IBS, Asthma, h/o L Breast CA (s/p lumpectomy and radiation), Anxiety/Depression, SEAMUS (not currently on treatment), MVP and GERD who presented s/p syncopal episode. Patient reports feeling unwell starting approx 10 days ago. 7 days prior to presentation, she was walking in the kitchen and subsequently synopsized. She awoke with her daughter over her and states she was unconscious for about 1 minute. She did strike the right side of her head. She did not have incontinence of subsequent confusion. SHe was evaluated by EMS and declined transfer to the hospital. Two days later she went to see her PCP and she had a presyncopal episode after leaving their office. She again had a presyncopal episode today which prompted her to present to the ED.     In the ED, patient was found to have HR of 57 and BP of 184/77. Labs revealed a Hgb of 9.8 (from prior of 11.6) but were otherwise unremarkable. CT of the brain was performed which was negative for traumatic injury, however did have findings consistent with sinusitis. A CXR revealed b/l peripheral lung opacities, increased from prior and a linear opacity in the RML. UA obtained was overall unremarkable. Patient was given a 500cc NS bolus and admitted to medicine for further management.      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   pt tried gabapentin in the past   she doesnt want to try anything else   b12 /folate /tsh wnl     pt at risk for developing delirium, therefore please institute the following preventative measures if possible          - initiating early mobilization          -minimizing "tethers" - IV, oxygen, catheters, etc          -avoiding   sedatives          -maintaining hydration/nutrition          -avoid anticholinergics - diphenhydramine, etc          -pain control          -sleep wake cycle regulation; avoid day time somnolence           -supportive environment    Thank you for allowing me to participate in the care of this patient. Will continue to follow patient periodically. Please do not hesitate to call me if you have any  questions or if there has been a change in patients neurological status     ________________  Melissa Pérez MD  [facility] Neurological [facility] ([facility]  [phone]      33 minutes spent on total encounter; more than 50 % of the visit was  spent counseling about plan of care, compliance to diet/exercise and medication regimen and or  coordinating care by the attending physician.      It is advised that stroke patients follow up with RIZWAN Jorgensen @ [phone] in 1- 2 weeks.   Others please follow up with Dr. Michael Nissenbaum [phone]

## 2022-02-22 NOTE — PROGRESS NOTE ADULT - SUBJECTIVE AND OBJECTIVE BOX
Patient seen and examined at bedside.    Overnight Events:   No AEON     Denies any chest pain, SOB, lighteadedness, dizziness. No syncope or presyncope events since admission per patient.            Current Meds:  acetaminophen     Tablet .. 650 milliGRAM(s) Oral every 6 hours PRN  aluminum hydroxide/magnesium hydroxide/simethicone Suspension 30 milliLiter(s) Oral every 4 hours PRN  cholecalciferol 1000 Unit(s) Oral two times a day  difluprednate 0.05% Ophthalmic Emulsion 1 Drop(s) Left EYE two times a day  FLUoxetine 60 milliGRAM(s) Oral daily  fluticasone propionate 50 MICROgram(s)/spray Nasal Spray 1 Spray(s) Both Nostrils two times a day  heparin   Injectable 5000 Unit(s) SubCutaneous every 8 hours  melatonin 3 milliGRAM(s) Oral at bedtime PRN  ondansetron Injectable 4 milliGRAM(s) IV Push every 8 hours PRN  prednisoLONE acetate 1% Suspension 1 Drop(s) Left EYE two times a day  sodium chloride 0.65% Nasal 1 Spray(s) Both Nostrils four times a day  sodium chloride 0.9%. 1000 milliLiter(s) IV Continuous <Continuous>      Vitals:  T(F): 98.1 (02-22), Max: 98.5 (02-21)  HR: 65 (02-22) (61 - 65)  BP: 135/70 (02-22) (130/67 - 136/76)  RR: 18 (02-22)  SpO2: 94% (02-22)  I&O's Summary    21 Feb 2022 07:01  -  22 Feb 2022 07:00  --------------------------------------------------------  IN: 790 mL / OUT: 0 mL / NET: 790 mL        Physical Exam:  Appearance: No acute distress   Eyes: EOMI, pink conjunctiva  HEENT: Normal oral mucosa  Cardiovascular: RRR, S1, S2, no murmurs, rubs, or gallops; no edema; no JVD  Respiratory: Clear to auscultation bilaterally  Gastrointestinal: soft, non-tender, non-distended   Musculoskeletal: No clubbing; no joint deformity   Neurologic: Non-focal  Psychiatry: AAOx3, mood & affect appropriate  Skin: No rashes, ecchymoses, or cyanosis                          9.8    5.83  )-----------( 263      ( 22 Feb 2022 06:27 )             31.1     02-21    136  |  102  |  15  ----------------------------<  93  4.0   |  25  |  0.71    Ca    9.0      21 Feb 2022 05:16  Phos  3.8     02-21  Mg     1.9     02-21        CARDIAC MARKERS ( 18 Feb 2022 18:49 )  15 ng/L / x     / x     / x     / x     / x      CARDIAC MARKERS ( 18 Feb 2022 17:02 )  17 ng/L / x     / x     / x     / x     / x                  New ECG(s): Personally reviewed    Echo:    Stress Testing:     Cath:    Imaging:    Interpretation of Telemetry:

## 2022-02-22 NOTE — PROGRESS NOTE ADULT - SUBJECTIVE AND OBJECTIVE BOX
Patient is a 86y old  Female who presents with a chief complaint of Syncopal Episode (22 Feb 2022 13:34)      SUBJECTIVE / OVERNIGHT EVENTS: no new c/o, dizziness has resolved, bradycardia has resolved off BB, orthostasis has resolved, had MBS today, can cont eating a regular diet w thin liquids. DC planning to OTF.     MEDICATIONS  (STANDING):  cholecalciferol 1000 Unit(s) Oral two times a day  difluprednate 0.05% Ophthalmic Emulsion 1 Drop(s) Left EYE two times a day  FLUoxetine 60 milliGRAM(s) Oral daily  fluticasone propionate 50 MICROgram(s)/spray Nasal Spray 1 Spray(s) Both Nostrils two times a day  heparin   Injectable 5000 Unit(s) SubCutaneous every 8 hours  prednisoLONE acetate 1% Suspension 1 Drop(s) Left EYE two times a day  sodium chloride 0.65% Nasal 1 Spray(s) Both Nostrils four times a day  sodium chloride 0.9%. 1000 milliLiter(s) (100 mL/Hr) IV Continuous <Continuous>    MEDICATIONS  (PRN):  acetaminophen     Tablet .. 650 milliGRAM(s) Oral every 6 hours PRN Temp greater or equal to 38C (100.4F), Mild Pain (1 - 3)  aluminum hydroxide/magnesium hydroxide/simethicone Suspension 30 milliLiter(s) Oral every 4 hours PRN Dyspepsia  melatonin 3 milliGRAM(s) Oral at bedtime PRN Insomnia  ondansetron Injectable 4 milliGRAM(s) IV Push every 8 hours PRN Nausea and/or Vomiting      Vital Signs Last 24 Hrs  T(F): 97.6 (02-22-22 @ 14:03), Max: 98.5 (02-21-22 @ 20:32)  HR: 71 (02-22-22 @ 14:03) (64 - 71)  BP: 150/74 (02-22-22 @ 14:03) (123/67 - 150/74)  RR: 18 (02-22-22 @ 14:03) (18 - 18)  SpO2: 96% (02-22-22 @ 14:03) (94% - 97%)  Telemetry:   CAPILLARY BLOOD GLUCOSE        I&O's Summary    21 Feb 2022 07:01  -  22 Feb 2022 07:00  --------------------------------------------------------  IN: 790 mL / OUT: 0 mL / NET: 790 mL    22 Feb 2022 07:01  -  22 Feb 2022 17:45  --------------------------------------------------------  IN: 720 mL / OUT: 0 mL / NET: 720 mL        PHYSICAL EXAM:  GENERAL: NAD, well-developed  HEAD:  Atraumatic, Normocephalic  EYES: EOMI, PERRLA, conjunctiva and sclera clear  NECK: Supple, No JVD  CHEST/LUNG: Clear to auscultation bilaterally; No wheeze  HEART: Regular rate and rhythm; No murmurs, rubs, or gallops  ABDOMEN: Soft, Nontender, Nondistended; Bowel sounds present  EXTREMITIES:  2+ Peripheral Pulses, No clubbing, cyanosis, or edema  PSYCH: AAOx3  NEUROLOGY: non-focal  SKIN: No rashes or lesions    LABS:                        9.8    5.83  )-----------( 263      ( 22 Feb 2022 06:27 )             31.1     02-21    136  |  102  |  15  ----------------------------<  93  4.0   |  25  |  0.71    Ca    9.0      21 Feb 2022 05:16  Phos  3.8     02-21  Mg     1.9     02-21                RADIOLOGY & ADDITIONAL TESTS:    Imaging Personally Reviewed:    Consultant(s) Notes Reviewed:      Care Discussed with Consultants/Other Providers:

## 2022-02-22 NOTE — PROGRESS NOTE ADULT - ASSESSMENT
85 yo F with PMHx of IBS, Asthma, h/o L Breast CA (s/p lumpectomy and radiation), Anxiety/Depression, SEAMUS (not currently on treatment), MVP and GERD who presented s/p syncopal episode. Echo found to have moderate AS.    Progression on CT chest of chronic lung disease. Increased cavitation.  Likely due to SEAMUS.  Also h/o MSSA in sputum.    Would check sputm afb x 2 more samples. 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    Sputum culture with staph. Sensi pending.  Could consider treating while in hospital though she does seem stable and is w/o pneumonia and non toxic.  This is likely colonization.  PICC at home may not be ideal for her.  She has many abx allergies and intolerances and was difficult to treat as outpt.  She is allergic to cephalosporins, tetracycline, sulfa.  Would have to use IV vanco if treated this.    Can hold off on antimicrobials for now.      May need to attempt treatment for MAC but this would be done as outpt.  Would need help from cardiology to be sure pt could tolerate azithromycin. Had some changes on telemetry.  Doubt syncope from MAC or this staph.    New AS - w/up as per primary team and cardiology.      Pt is aware to f/up with me as outpt on discharge.  ID will see intermittently while in hospital.     Criselda Handy MD  [phone] (office) .

## 2022-02-22 NOTE — PROGRESS NOTE ADULT - SUBJECTIVE AND OBJECTIVE BOX
CHIEF COMPLAINT:    Interval Events:    REVIEW OF SYSTEMS:  Constitutional: [ ] negative [ ] fevers [ ] chills [ ] weight loss [ ] weight gain  HEENT: [ ] negative [ ] dry eyes [ ] eye irritation [ ] postnasal drip [ ] nasal congestion  CV: [ ] negative  [ ] chest pain [ ] orthopnea [ ] palpitations [ ] murmur  Resp: [ ] negative [ ] cough [ ] shortness of breath [ ] dyspnea [ ] wheezing [ ] sputum [ ] hemoptysis  GI: [ ] negative [ ] nausea [ ] vomiting [ ] diarrhea [ ] constipation [ ] abd pain [ ] dysphagia   : [ ] negative [ ] dysuria [ ] nocturia [ ] hematuria [ ] increased urinary frequency  Musculoskeletal: [ ] negative [ ] back pain [ ] myalgias [ ] arthralgias [ ] fracture  Skin: [ ] negative [ ] rash [ ] itch  Neurological: [ ] negative [ ] headache [ ] dizziness [ ] syncope [ ] weakness [ ] numbness  Psychiatric: [ ] negative [ ] anxiety [ ] depression  Endocrine: [ ] negative [ ] diabetes [ ] thyroid problem  Hematologic/Lymphatic: [ ] negative [ ] anemia [ ] bleeding problem  Allergic/Immunologic: [ ] negative [ ] itchy eyes [ ] nasal discharge [ ] hives [ ] angioedema  [ ] All other systems negative  [ ] Unable to assess ROS because ________    OBJECTIVE:  ICU Vital Signs Last 24 Hrs  T(C): 36.7 (22 Feb 2022 04:24), Max: 36.9 (21 Feb 2022 20:32)  T(F): 98.1 (22 Feb 2022 04:24), Max: 98.5 (21 Feb 2022 20:32)  HR: 65 (22 Feb 2022 04:24) (61 - 65)  BP: 135/70 (22 Feb 2022 04:24) (130/67 - 136/76)  BP(mean): --  ABP: --  ABP(mean): --  RR: 18 (22 Feb 2022 04:24) (18 - 18)  SpO2: 94% (22 Feb 2022 04:24) (94% - 98%)        02-21 @ 07:01  -  02-22 @ 07:00  --------------------------------------------------------  IN: 790 mL / OUT: 0 mL / NET: 790 mL      CAPILLARY BLOOD GLUCOSE          PHYSICAL EXAM:  General:   HEENT:   Lymph Nodes:  Neck:   Respiratory:   Cardiovascular:   Abdomen:   Extremities:   Skin:   Neurological:  Psychiatry:    HOSPITAL MEDICATIONS:  MEDICATIONS  (STANDING):  cholecalciferol 1000 Unit(s) Oral two times a day  difluprednate 0.05% Ophthalmic Emulsion 1 Drop(s) Left EYE two times a day  FLUoxetine 60 milliGRAM(s) Oral daily  fluticasone propionate 50 MICROgram(s)/spray Nasal Spray 1 Spray(s) Both Nostrils two times a day  heparin   Injectable 5000 Unit(s) SubCutaneous every 8 hours  prednisoLONE acetate 1% Suspension 1 Drop(s) Left EYE two times a day  sodium chloride 0.65% Nasal 1 Spray(s) Both Nostrils four times a day  sodium chloride 0.9%. 1000 milliLiter(s) (100 mL/Hr) IV Continuous <Continuous>    MEDICATIONS  (PRN):  acetaminophen     Tablet .. 650 milliGRAM(s) Oral every 6 hours PRN Temp greater or equal to 38C (100.4F), Mild Pain (1 - 3)  aluminum hydroxide/magnesium hydroxide/simethicone Suspension 30 milliLiter(s) Oral every 4 hours PRN Dyspepsia  melatonin 3 milliGRAM(s) Oral at bedtime PRN Insomnia  ondansetron Injectable 4 milliGRAM(s) IV Push every 8 hours PRN Nausea and/or Vomiting      LABS:                        9.8    5.83  )-----------( 263      ( 22 Feb 2022 06:27 )             31.1     Hgb Trend: 9.8<--, 9.5<--, 9.7<--, 9.8<--  02-21    136  |  102  |  15  ----------------------------<  93  4.0   |  25  |  0.71    Ca    9.0      21 Feb 2022 05:16  Phos  3.8     02-21  Mg     1.9     02-21      Creatinine Trend: 0.71<--, 0.82<--, 0.66<--, 0.75<--            MICROBIOLOGY:     Culture - Sputum (collected 20 Feb 2022 14:06)  Source: .Sputum Sputum  Gram Stain (20 Feb 2022 17:06):    Few polymorphonuclear leukocytes per low power field    Rare Squamous epithelial cells per low power field    Moderate Gram positive cocci in pairs per oil power field  Preliminary Report (21 Feb 2022 07:58):    Normal Respiratory Hien present    Culture - Acid Fast - Sputum w/Smear (collected 20 Feb 2022 12:38)  Source: .Sputum Sputum        RADIOLOGY:  [ ] Reviewed and interpreted by me    PULMONARY FUNCTION TESTS:    EKG: CHIEF COMPLAINT: Syncope    Interval Events: Pt continues to report fatigue. She is pending swallow eval today. She denies any pulmonary issues at this time    REVIEW OF SYSTEMS:  Constitutional: [ ] negative [ ] fevers [ ] chills [ ] weight loss [x ] fatigue  HEENT: [ ] negative [ ] dry eyes [ ] eye irritation [ ] postnasal drip [ ] nasal congestion  CV: [ ] negative  [ ] chest pain [ ] orthopnea [ ] palpitations [ ] murmur  Resp: [ ] negative [ ] cough [ ] shortness of breath [ ] dyspnea [ ] wheezing [ ] sputum [ ] hemoptysis  GI: [ ] negative [ ] nausea [ ] vomiting [ ] diarrhea [ ] constipation [ ] abd pain [ ] dysphagia   : [ ] negative [ ] dysuria [ ] nocturia [ ] hematuria [ ] increased urinary frequency  Musculoskeletal: [ ] negative [ ] back pain [ ] myalgias [ ] arthralgias [ ] fracture  Skin: [ ] negative [ ] rash [ ] itch  Neurological: [ ] negative [ ] headache [ ] dizziness [ ] syncope [ ] weakness [ ] numbness  Psychiatric: [ ] negative [ ] anxiety [ ] depression  Endocrine: [ ] negative [ ] diabetes [ ] thyroid problem  Hematologic/Lymphatic: [ ] negative [ ] anemia [ ] bleeding problem  Allergic/Immunologic: [ ] negative [ ] itchy eyes [ ] nasal discharge [ ] hives [ ] angioedema  [x ] All other systems negative  [ ] Unable to assess ROS because ________    OBJECTIVE:  ICU Vital Signs Last 24 Hrs  T(C): 36.7 (22 Feb 2022 04:24), Max: 36.9 (21 Feb 2022 20:32)  T(F): 98.1 (22 Feb 2022 04:24), Max: 98.5 (21 Feb 2022 20:32)  HR: 65 (22 Feb 2022 04:24) (61 - 65)  BP: 135/70 (22 Feb 2022 04:24) (130/67 - 136/76)  BP(mean): --  ABP: --  ABP(mean): --  RR: 18 (22 Feb 2022 04:24) (18 - 18)  SpO2: 94% (22 Feb 2022 04:24) (94% - 98%)        02-21 @ 07:01  -  02-22 @ 07:00  --------------------------------------------------------  IN: 790 mL / OUT: 0 mL / NET: 790 mL      CAPILLARY BLOOD GLUCOSE      PHYSICAL EXAM:  General: Thin elderly appearing female sitting comfortably in chair  HEENT: Normal sclera  Lymph Nodes: No LAD  Respiratory: Right apex crackles  Cardiovascular: Regular rate and rhythm, systolic ejection murmur  Abdomen: Nontender nondistended  Extremities: No peripheral edema  Skin: No rashes  Neurological: No appreciable neurologic deficits  Psychiatry: Appropriate mood and affect      HOSPITAL MEDICATIONS:  MEDICATIONS  (STANDING):  cholecalciferol 1000 Unit(s) Oral two times a day  difluprednate 0.05% Ophthalmic Emulsion 1 Drop(s) Left EYE two times a day  FLUoxetine 60 milliGRAM(s) Oral daily  fluticasone propionate 50 MICROgram(s)/spray Nasal Spray 1 Spray(s) Both Nostrils two times a day  heparin   Injectable 5000 Unit(s) SubCutaneous every 8 hours  prednisoLONE acetate 1% Suspension 1 Drop(s) Left EYE two times a day  sodium chloride 0.65% Nasal 1 Spray(s) Both Nostrils four times a day  sodium chloride 0.9%. 1000 milliLiter(s) (100 mL/Hr) IV Continuous <Continuous>    MEDICATIONS  (PRN):  acetaminophen     Tablet .. 650 milliGRAM(s) Oral every 6 hours PRN Temp greater or equal to 38C (100.4F), Mild Pain (1 - 3)  aluminum hydroxide/magnesium hydroxide/simethicone Suspension 30 milliLiter(s) Oral every 4 hours PRN Dyspepsia  melatonin 3 milliGRAM(s) Oral at bedtime PRN Insomnia  ondansetron Injectable 4 milliGRAM(s) IV Push every 8 hours PRN Nausea and/or Vomiting      LABS:                        9.8    5.83  )-----------( 263      ( 22 Feb 2022 06:27 )             31.1     Hgb Trend: 9.8<--, 9.5<--, 9.7<--, 9.8<--  02-21    136  |  102  |  15  ----------------------------<  93  4.0   |  25  |  0.71    Ca    9.0      21 Feb 2022 05:16  Phos  3.8     02-21  Mg     1.9     02-21      Creatinine Trend: 0.71<--, 0.82<--, 0.66<--, 0.75<--            MICROBIOLOGY:     Culture - Sputum (collected 20 Feb 2022 14:06)  Source: .Sputum Sputum  Gram Stain (20 Feb 2022 17:06):    Few polymorphonuclear leukocytes per low power field    Rare Squamous epithelial cells per low power field    Moderate Gram positive cocci in pairs per oil power field  Preliminary Report (21 Feb 2022 07:58):    Normal Respiratory Hien present    Culture - Acid Fast - Sputum w/Smear (collected 20 Feb 2022 12:38)  Source: .Sputum Sputum        RADIOLOGY:  [ ] Reviewed and interpreted by me    PULMONARY FUNCTION TESTS:    EKG:

## 2022-02-22 NOTE — PROGRESS NOTE ADULT - ASSESSMENT
Patient is an 87 yo F with PMHx of IBS, Asthma, h/o L Breast CA (s/p lumpectomy and radiation), Anxiety/Depression, SEAMUS (not currently on treatment), MVP and GERD who presented s/p syncopal episode, found to have a linear consolidation in the RML, admitted for syncopal workup. Patient also reporting sensation of food getting stuck in her lower esophagus.

## 2022-02-22 NOTE — PROGRESS NOTE ADULT - SUBJECTIVE AND OBJECTIVE BOX
CARDIOLOGY FOLLOW UP - Dr. Lee  Date of Service: 2/22/22  CC: oob to chair  family at bedside  ambulated in bhatia without dizziness  denies cp, sob, and palpitations     Review of Systems:  Constitutional: No fever, weight loss, or fatigue  Respiratory: No cough, wheezing, or hemoptysis, no shortness of breath  Cardiovascular: No chest pain, palpitations, passing out, dizziness, or leg swelling  Gastrointestinal: No abd or epigastric pain.  No nausea, vomiting, or hematemesis; no diarrhea or constipation, no melena or hematochezia  Vascular: no edema       PHYSICAL EXAM:  T(C): 36.9 (02-22-22 @ 11:17), Max: 36.9 (02-21-22 @ 20:32)  HR: 65 (02-22-22 @ 11:17) (64 - 65)  BP: 123/67 (02-22-22 @ 11:17) (123/67 - 136/76)  RR: 18 (02-22-22 @ 11:17) (18 - 18)  SpO2: 97% (02-22-22 @ 11:17) (94% - 97%)  Wt(kg): --  I&O's Summary    21 Feb 2022 07:01  -  22 Feb 2022 07:00  --------------------------------------------------------  IN: 790 mL / OUT: 0 mL / NET: 790 mL        Appearance: Normal	  Cardiovascular: Normal S1 S2,RRR, No JVD, No murmurs  Respiratory: Lungs clear to auscultation	  Gastrointestinal:  Soft, Non-tender, + BS	  Extremities: Normal range of motion, No clubbing, cyanosis or edema      Home Medications:  diazepam 5 mg oral tablet: 0.5 tab(s) orally once a day    NOTE: Last fill 11/11/2021. Patient has own med supply  (18 Feb 2022 23:33)  Durezol: 1 drop(s) in the left eye 2 times a day (18 Feb 2022 23:33)  Durezol 0.05% ophthalmic emulsion: 1 drop(s) to each affected eye 2 times a day (18 Feb 2022 23:33)  FLUoxetine 20 mg oral capsule: 3 cap(s) orally once a day (18 Feb 2022 23:33)  nadolol: 10 milligram(s) orally once a day (at bedtime)    NOTE: Unable to confirm with secondary source. Patient stated she  has own med supply  (18 Feb 2022 23:33)  Nasacort AQ 55 mcg/inh nasal spray: 1   2 times a day  (18 Feb 2022 23:33)  Prilosec 20 mg oral delayed release capsule: 1   once a day  (18 Feb 2022 23:33)  Vitamin D3 25 mcg (1000 intl units) oral tablet: 1 tab(s) orally 2 times a day (18 Feb 2022 23:33)      MEDICATIONS  (STANDING):  cholecalciferol 1000 Unit(s) Oral two times a day  difluprednate 0.05% Ophthalmic Emulsion 1 Drop(s) Left EYE two times a day  FLUoxetine 60 milliGRAM(s) Oral daily  fluticasone propionate 50 MICROgram(s)/spray Nasal Spray 1 Spray(s) Both Nostrils two times a day  heparin   Injectable 5000 Unit(s) SubCutaneous every 8 hours  prednisoLONE acetate 1% Suspension 1 Drop(s) Left EYE two times a day  sodium chloride 0.65% Nasal 1 Spray(s) Both Nostrils four times a day  sodium chloride 0.9%. 1000 milliLiter(s) (100 mL/Hr) IV Continuous <Continuous>      TELEMETRY: NSR w/ blocked PACs and run of PAT   ECG:  	  RADIOLOGY:   DIAGNOSTIC TESTING:  [ ] Echocardiogram:  [ ]  Catheterization:  [ ] Stress Test:    OTHER: 	    LABS:	 	    Troponin T, High Sensitivity Result: 15 ng/L [0 - 51] (02-18 @ 18:49)  Troponin T, High Sensitivity Result: 17 ng/L [0 - 51] (02-18 @ 17:02)                          9.8    5.83  )-----------( 263      ( 22 Feb 2022 06:27 )             31.1     02-21    136  |  102  |  15  ----------------------------<  93  4.0   |  25  |  0.71    Ca    9.0      21 Feb 2022 05:16  Phos  3.8     02-21  Mg     1.9     02-21

## 2022-02-22 NOTE — SWALLOW VFSS/MBS ASSESSMENT ADULT - LARYNGEAL PENETRATION DURING THE SWALLOW - SILENT
Consecutive cup/straw sips - trace silent shallow laryngeal penetration over the laryngeal surface of the epiglottis with FULL retrieval.

## 2022-02-22 NOTE — SWALLOW VFSS/MBS ASSESSMENT ADULT - SLP GENERAL OBSERVATIONS
Patient received upright in YOMAIRA chair, on room air, awake/alert, Aox3, and communicative at conversational level. Pt able to answer open ended questions and follow multistep directives. Pt noted with nonproductive dry baseline cough.

## 2022-02-22 NOTE — PROGRESS NOTE ADULT - SUBJECTIVE AND OBJECTIVE BOX
86yPatient is a 86y old  Female who presents with a chief complaint of Syncopal Episode (22 Feb 2022 09:37)      Interval history:  Seen in chair.  Feeling okay.  Ate this am.  For speech and swallow eval today  sputum culture negative initially but now reveals staph aureus      Antimicrobials:    MEDICATIONS  (STANDING):  acetaminophen     Tablet .. 650 every 6 hours PRN  aluminum hydroxide/magnesium hydroxide/simethicone Suspension 30 every 4 hours PRN  FLUoxetine 60 daily  heparin   Injectable 5000 every 8 hours  melatonin 3 at bedtime PRN  ondansetron Injectable 4 every 8 hours PRN      Vital Signs Last 24 Hrs  T(F): 98.5 (02-22-22 @ 11:17), Max: 98.5 (02-21-22 @ 20:32)  HR: 65 (02-22-22 @ 11:17)  BP: 123/67 (02-22-22 @ 11:17)  RR: 18 (02-22-22 @ 11:17)  SpO2: 97% (02-22-22 @ 11:17) (94% - 97%)  Wt(kg): --      Physical Exam:  Constitutional:  well preserved, comfortable, on room air  Head/Eyes: no icterus, PERRL, EOMI  ENT:  supple; no thrush  LUNGS:  scattered crackles  CVS:  normal S1, S2, faint systolic murmur?  Abd:  soft, non-tender; non-distended  Ext:  no edema  Vascular:  IV site no erythema tenderness or discharge  MSK:  joints without swelling  Neuro: AAO X 3, non- focal                        9.8    5.83  )-----------( 263      ( 22 Feb 2022 06:27 )             31.1   02-21    136  |  102  |  15  ----------------------------<  93  4.0   |  25  |  0.71    Ca    9.0      21 Feb 2022 05:16  Phos  3.8     02-21  Mg     1.9     02-21            RECENT CULTURES:    Culture - Sputum (collected 20 Feb 2022 14:06)  Source: .Sputum Sputum  Gram Stain (20 Feb 2022 17:06):    Few polymorphonuclear leukocytes per low power field    Rare Squamous epithelial cells per low power field    Moderate Gram positive cocci in pairs per oil power field  Preliminary Report (22 Feb 2022 09:20):    Moderate Staphylococcus aureus    Normal Respiratory Hien present    Culture - Acid Fast - Sputum w/Smear (collected 20 Feb 2022 12:38)  Source: .Sputum Sputum      Radiology:  < from: CT Chest No Cont (02.18.22 @ 22:47) >  IMPRESSION:    Interval increase in size and density of posterior segment of the right   upper lobe opacity with additional new small nodular opacities. These   findings are of uncertain etiology.    No acute findings in the abdomen and pelvis.    < end of copied text >

## 2022-02-22 NOTE — SWALLOW VFSS/MBS ASSESSMENT ADULT - DIAGNOSTIC IMPRESSIONS
Patient is an 87 yo F with PMHx of IBS, Asthma, h/o L Breast CA (s/p lumpectomy and radiation), Anxiety/Depression, SEAMUS (not currently on treatment), MVP and GERD who presented s/p syncopal episode. Patient seen today for MBS due to complaints of occasional worsening globus sensation across food/pills in pharynx and sternum. Pt presents with an oropharyngeal swallow sequence that appears WFL to tolerate a regular texture diet.  Swallow sequence remarkable for slowed, however efficient mastication, delayed oral transit time, mild delay in pharyngeal trigger, and mildly reduced hyolaryngeal elevation. Trace silent shallow laryngeal penetration with full retrieval notable during consecutive cup/straw sips. Esophageal phase notable for mild retention in cervical esophagus. Swallow safety and efficiency preserved. Pt appears to be a low risk for malnutrition/dehydration and low risk for aspiration PNA. Diet modification and behavioral swallow intervention is not indicated at this time. Swallow prognosis is excellent. Recommend Gastroenterology consult to assess suspected esophageal dysmotility.

## 2022-02-22 NOTE — SWALLOW VFSS/MBS ASSESSMENT ADULT - NS SWALLOW VFSS REC ASPIR MON
Monitor for s/s aspiration/laryngeal penetration. If noted:  D/C p.o. intake, provide non-oral nutrition/hydration/meds, and contact this service @ [phone]/change of breathing pattern/cough/gurgly voice/fever/pneumonia/throat clearing/upper respiratory infection

## 2022-02-22 NOTE — PROGRESS NOTE ADULT - ASSESSMENT
85 yo F with PMHx of IBS, Asthma, h/o L Breast CA (s/p lumpectomy and radiation), Anxiety/Depression, SEAMUS (not currently on treatment), MVP and GERD who presented s/p syncopal episode    #syncope  -secondary to decrease po intake, hypovolemia, infection  -CT head with no cva  -echo with mod AS, nl lv fxn  + orthostatic hypotension 2/18  -sp IVF, cont hydration  -pending repeat today   -BB held per EP    #MVP  -echo noted    #SEAMUS   -chest xray noted-- pulm f/u  - AFB x 1 neg   -Sputum culture negative  -ID f/u     dvt ppx     plan discussed with ACP and family at bedside

## 2022-02-22 NOTE — PROGRESS NOTE ADULT - ASSESSMENT
87YO Female PMH IBS, breast cancer s/p lumpectomy and chemo/RT, anxiety, GERD, hx of SEAMUS growth in culture 11 and 12/2020 and recent MSSA in sputum who refused bronch and treatment for SEAMUS. Presenting with syncope, fatigue and weakness. 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      Royal Aguilera DO PGY-5  Pulmonary/Critical Care Fellow   Pager: [phone] (MARILEE), [phone] (NS)  Pulmonary Spectra #[phone] (NS) / [phone] (MARILEE)   85YO Female PMH IBS, breast cancer s/p lumpectomy and chemo/RT, anxiety, GERD, hx of SEAMUS growth in culture 11 and 12/2020 and recent MSSA in sputum who refused bronch and treatment for SEAMUS. Presenting with syncope, fatigue and weakness. 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 Juan M Aguilera DO PGY-5  Pulmonary/Critical Care Fellow   Pager: [phone] (MARILEE), [phone] (NS)  Pulmonary Spectra #[id number] (NS) / [phone] (MARILEE)

## 2022-02-23 ENCOUNTER — TRANSCRIPTION ENCOUNTER (OUTPATIENT)
Age: 87
End: 2022-02-23

## 2022-02-23 LAB
-  AMPICILLIN/SULBACTAM: SIGNIFICANT CHANGE UP
-  CEFAZOLIN: SIGNIFICANT CHANGE UP
-  CLINDAMYCIN: SIGNIFICANT CHANGE UP
-  ERYTHROMYCIN: SIGNIFICANT CHANGE UP
-  GENTAMICIN: SIGNIFICANT CHANGE UP
-  OXACILLIN: SIGNIFICANT CHANGE UP
-  RIFAMPIN: SIGNIFICANT CHANGE UP
-  TETRACYCLINE: SIGNIFICANT CHANGE UP
-  TRIMETHOPRIM/SULFAMETHOXAZOLE: SIGNIFICANT CHANGE UP
-  VANCOMYCIN: SIGNIFICANT CHANGE UP
METHOD TYPE: SIGNIFICANT CHANGE UP
NIGHT BLUE STAIN TISS: SIGNIFICANT CHANGE UP
SARS-COV-2 RNA SPEC QL NAA+PROBE: SIGNIFICANT CHANGE UP
SPECIMEN SOURCE: SIGNIFICANT CHANGE UP

## 2022-02-23 PROCEDURE — 99233 SBSQ HOSP IP/OBS HIGH 50: CPT | Mod: GC

## 2022-02-23 PROCEDURE — 99232 SBSQ HOSP IP/OBS MODERATE 35: CPT

## 2022-02-23 RX ORDER — PANTOPRAZOLE SODIUM 20 MG/1
40 TABLET, DELAYED RELEASE ORAL
Refills: 0 | Status: DISCONTINUED | OUTPATIENT
Start: 2022-02-23 | End: 2022-02-25

## 2022-02-23 RX ADMIN — Medication 1 SPRAY(S): at 06:05

## 2022-02-23 RX ADMIN — HEPARIN SODIUM 5000 UNIT(S): 5000 INJECTION INTRAVENOUS; SUBCUTANEOUS at 06:06

## 2022-02-23 RX ADMIN — DUREZOL 1 DROP(S): 0.5 EMULSION OPHTHALMIC at 06:06

## 2022-02-23 RX ADMIN — Medication 650 MILLIGRAM(S): at 17:22

## 2022-02-23 RX ADMIN — HEPARIN SODIUM 5000 UNIT(S): 5000 INJECTION INTRAVENOUS; SUBCUTANEOUS at 15:19

## 2022-02-23 RX ADMIN — Medication 1 SPRAY(S): at 12:08

## 2022-02-23 RX ADMIN — Medication 1 SPRAY(S): at 17:20

## 2022-02-23 RX ADMIN — DUREZOL 1 DROP(S): 0.5 EMULSION OPHTHALMIC at 17:44

## 2022-02-23 RX ADMIN — Medication 60 MILLIGRAM(S): at 12:07

## 2022-02-23 RX ADMIN — Medication 1000 UNIT(S): at 17:21

## 2022-02-23 NOTE — CONSULT NOTE ADULT - CONSULT REQUESTED DATE/TIME
18-Feb-2022
19-Feb-2022 11:44
23-Feb-2022 16:21
19-Feb-2022 18:08
20-Feb-2022 15:06
20-Feb-2022 15:21

## 2022-02-23 NOTE — CONSULT NOTE ADULT - SUBJECTIVE AND OBJECTIVE BOX
[facility]  Bennie Savage PA-C  [facility] [place] [address]  [phone]      Chief Complaint:  Patient is a 86y old  Female who presents with a chief complaint of Syncopal Episode (23 Feb 2022 14:07)      HPI:Patient is an 85 yo F with PMHx of IBS, Asthma, h/o L Breast CA (s/p lumpectomy and radiation), Anxiety/Depression, SEAMUS (not currently on treatment), MVP and GERD who presented s/p syncopal episode, found to have a linear consolidation in the RML, admitted for syncopal workup. Patient also reporting sensation of food getting stuck in her lower esophagus.    Allergies:  Augmentin (Stomach Upset)  cefdinir (Unknown)  ciprofloxacin (Other)  codeine (Nausea; Other)  contrast media (iodine-based) (Angioedema)  fluorescein ophthalmic (Hives; Rash; Flushing)  lactose (Unknown)  latex (Anaphylaxis)  Levaquin (Nausea; Other)  lidocaine (Unknown)  SULFA (Anaphylaxis)  tetracycline (Anaphylaxis)      Medications:  acetaminophen     Tablet .. 650 milliGRAM(s) Oral every 6 hours PRN  aluminum hydroxide/magnesium hydroxide/simethicone Suspension 30 milliLiter(s) Oral every 4 hours PRN  cholecalciferol 1000 Unit(s) Oral two times a day  difluprednate 0.05% Ophthalmic Emulsion 1 Drop(s) Left EYE two times a day  FLUoxetine 60 milliGRAM(s) Oral daily  fluticasone propionate 50 MICROgram(s)/spray Nasal Spray 1 Spray(s) Both Nostrils two times a day  heparin   Injectable 5000 Unit(s) SubCutaneous every 8 hours  melatonin 3 milliGRAM(s) Oral at bedtime PRN  ondansetron Injectable 4 milliGRAM(s) IV Push every 8 hours PRN  pantoprazole    Tablet 40 milliGRAM(s) Oral two times a day  prednisoLONE acetate 1% Suspension 1 Drop(s) Left EYE two times a day  sodium chloride 0.65% Nasal 1 Spray(s) Both Nostrils four times a day  sodium chloride 0.9%. 1000 milliLiter(s) IV Continuous <Continuous>      PMHX/PSHX:  Breast Cancer    GERD (Gastroesophageal Reflux Disease)    Irritable Bowel Syndrome    Mitral Valve Prolapse    Anxiety    Clinical Depression    Asthma    Uveitis    Irritable Bowel Syndrome    Hiatal Hernia    Nasal Polyp    History of Left Breast Biopsy    History of Cataract Surgery    S/P Nasal Polypectomy    Status Post Tonsillectomy    Hypertension    S/P Breast Lumpectomy    S/P Lumpectomy of Breast    Status Post Tonsillectomy    S/P Nasal Polypectomy    History of Cataract Surgery    History of Breast Biopsy        Family history:  No pertinent family history in first degree relatives    FH: CAD (coronary artery disease) (Father, Mother, Sibling, Aunt)    FH: lung cancer (Mother)        Social History:     ROS:     General:  No wt loss, fevers, chills, night sweats, fatigue,   Eyes:  Good vision, no reported pain  ENT:  No sore throat, pain, runny nose, dysphagia  CV:  No pain, palpitations, hypo/hypertension  Resp:  No dyspnea, cough, tachypnea, wheezing  GI:  No pain, No nausea, No vomiting, No diarrhea, No constipation, No weight loss, No fever, No pruritis, No rectal bleeding, No tarry stools, + dysphagia,  :  No pain, bleeding, incontinence, nocturia  Muscle:  No pain, weakness  Neuro:  No weakness, tingling, memory problems  Psych:  No fatigue, insomnia, mood problems, depression  Endocrine:  No polyuria, polydipsia, cold/heat intolerance  Heme:  No petechiae, ecchymosis, easy bruisability  Skin:  No rash, tattoos, scars, edema      PHYSICAL EXAM:   Vital Signs:  Vital Signs Last 24 Hrs  T(C): 36.8 (23 Feb 2022 11:14), Max: 36.8 (23 Feb 2022 11:14)  T(F): 98.3 (23 Feb 2022 11:14), Max: 98.3 (23 Feb 2022 11:14)  HR: 86 (23 Feb 2022 11:14) (59 - 86)  BP: 116/81 (23 Feb 2022 11:14) (110/62 - 150/69)  BP(mean): --  RR: 18 (23 Feb 2022 11:14) (18 - 18)  SpO2: 98% (23 Feb 2022 11:14) (98% - 98%)  Daily     Daily     GENERAL:  Appears stated age,   HEENT:  NC/AT,    CHEST:  Full & symmetric excursion,   HEART:  Regular rhythm  ABDOMEN:  Soft, non-tender, non-distended,   EXTEREMITIES:  no cyanosis,clubbing or edema  SKIN:  No rash  NEURO:  Alert,    LABS:                        9.8    5.83  )-----------( 263      ( 22 Feb 2022 06:27 )             31.1                     Imaging:

## 2022-02-23 NOTE — PROGRESS NOTE ADULT - SUBJECTIVE AND OBJECTIVE BOX
86yPatient is a 86y old  Female who presents with a chief complaint of Syncopal Episode (22 Feb 2022 09:37)      Interval history:  Seen in chair.  Feeling okay.  sputum reveals staph aureus  Had speech and swallow eval - rec GI eval for possible delayed motility.     Antimicrobials:    MEDICATIONS  (STANDING):  acetaminophen     Tablet .. 650 every 6 hours PRN  aluminum hydroxide/magnesium hydroxide/simethicone Suspension 30 every 4 hours PRN  FLUoxetine 60 daily  heparin   Injectable 5000 every 8 hours  melatonin 3 at bedtime PRN  ondansetron Injectable 4 every 8 hours PRN  pantoprazole    Tablet 40 two times a day        Vital Signs Last 24 Hrs  T(F): 98.3 (02-23-22 @ 11:14), Max: 98.3 (02-23-22 @ 11:14)  HR: 86 (02-23-22 @ 11:14)  BP: 116/81 (02-23-22 @ 11:14)  RR: 18 (02-23-22 @ 11:14)  SpO2: 98% (02-23-22 @ 11:14) (98% - 98%)  Wt(kg): --    Physical Exam:  Constitutional:  well preserved, comfortable, on room air  Head/Eyes: no icterus, PERRL, EOMI  ENT:  supple; no thrush  LUNGS:  scattered crackles  CVS:  normal S1, S2, faint systolic murmur?  Abd:  soft, non-tender; non-distended  Ext:  no edema  Vascular:  IV site no erythema tenderness or discharge  MSK:  joints without swelling  Neuro: AAO X 3, non- focal                                              9.8    5.83  )-----------( 263      ( 22 Feb 2022 06:27 )             31.1   02-21    136  |  102  |  15  ----------------------------<  93  4.0   |  25  |  0.71    Ca    9.0      21 Feb 2022 05:16  Phos  3.8     02-21  Mg     1.9     02-21            RECENT CULTURES:        Culture - Sputum (collected 20 Feb 2022 14:06)  Source: .Sputum Sputum  Gram Stain (20 Feb 2022 17:06):    Few polymorphonuclear leukocytes per low power field    Rare Squamous epithelial cells per low power field    Moderate Gram positive cocci in pairs per oil power field  Preliminary Report (22 Feb 2022 09:20):    Moderate Staphylococcus aureus    Normal Respiratory Hien present    Culture - Acid Fast - Sputum w/Smear (collected 20 Feb 2022 12:38)  Source: .Sputum Sputum      Radiology:  < from: CT Chest No Cont (02.18.22 @ 22:47) >  IMPRESSION:    Interval increase in size and density of posterior segment of the right   upper lobe opacity with additional new small nodular opacities. These   findings are of uncertain etiology.    No acute findings in the abdomen and pelvis.    < end of copied text >

## 2022-02-23 NOTE — PROGRESS NOTE ADULT - SUBJECTIVE AND OBJECTIVE BOX
CARDIOLOGY FOLLOW UP - Dr. Lee  Date of Service: 2/23/22  CC: denies cp, sob, and palpitations   oob to chair no dizziness     Review of Systems:  Constitutional: No fever, weight loss, or fatigue  Respiratory: No cough, wheezing, or hemoptysis, no shortness of breath  Cardiovascular: No chest pain, palpitations, passing out, dizziness, or leg swelling  Gastrointestinal: No abd or epigastric pain.  No nausea, vomiting, or hematemesis; no diarrhea or constipation, no melena or hematochezia  Vascular: no edema       PHYSICAL EXAM:  T(C): 36.8 (02-23-22 @ 11:14), Max: 36.8 (02-23-22 @ 11:14)  HR: 86 (02-23-22 @ 11:14) (59 - 86)  BP: 116/81 (02-23-22 @ 11:14) (110/62 - 150/74)  RR: 18 (02-23-22 @ 11:14) (18 - 18)  SpO2: 98% (02-23-22 @ 11:14) (96% - 98%)  Wt(kg): --  I&O's Summary    22 Feb 2022 07:01  -  23 Feb 2022 07:00  --------------------------------------------------------  IN: 720 mL / OUT: 400 mL / NET: 320 mL    23 Feb 2022 07:01  -  23 Feb 2022 12:13  --------------------------------------------------------  IN: 240 mL / OUT: 0 mL / NET: 240 mL        Appearance: Normal	  Cardiovascular: Normal S1 S2,RRR, No JVD, No murmurs  Respiratory: Lungs clear to auscultation	  Gastrointestinal:  Soft, Non-tender, + BS	  Extremities: Normal range of motion, No clubbing, cyanosis or edema      Home Medications:  diazepam 5 mg oral tablet: 0.5 tab(s) orally once a day    NOTE: Last fill 11/11/2021. Patient has own med supply  (18 Feb 2022 23:33)  Durezol: 1 drop(s) in the left eye 2 times a day (18 Feb 2022 23:33)  Durezol 0.05% ophthalmic emulsion: 1 drop(s) to each affected eye 2 times a day (18 Feb 2022 23:33)  FLUoxetine 20 mg oral capsule: 3 cap(s) orally once a day (18 Feb 2022 23:33)  nadolol: 10 milligram(s) orally once a day (at bedtime)    NOTE: Unable to confirm with secondary source. Patient stated she  has own med supply  (18 Feb 2022 23:33)  Nasacort AQ 55 mcg/inh nasal spray: 1   2 times a day  (18 Feb 2022 23:33)  Prilosec 20 mg oral delayed release capsule: 1   once a day  (18 Feb 2022 23:33)  Vitamin D3 25 mcg (1000 intl units) oral tablet: 1 tab(s) orally 2 times a day (18 Feb 2022 23:33)      MEDICATIONS  (STANDING):  cholecalciferol 1000 Unit(s) Oral two times a day  difluprednate 0.05% Ophthalmic Emulsion 1 Drop(s) Left EYE two times a day  FLUoxetine 60 milliGRAM(s) Oral daily  fluticasone propionate 50 MICROgram(s)/spray Nasal Spray 1 Spray(s) Both Nostrils two times a day  heparin   Injectable 5000 Unit(s) SubCutaneous every 8 hours  prednisoLONE acetate 1% Suspension 1 Drop(s) Left EYE two times a day  sodium chloride 0.65% Nasal 1 Spray(s) Both Nostrils four times a day  sodium chloride 0.9%. 1000 milliLiter(s) (100 mL/Hr) IV Continuous <Continuous>      TELEMETRY: SB/NSR 40-60s 	    ECG:  	  RADIOLOGY:   DIAGNOSTIC TESTING:  [ ] Echocardiogram:  [ ]  Catheterization:  [ ] Stress Test:    OTHER: 	    LABS:	 	    Troponin T, High Sensitivity Result: 15 ng/L [0 - 51] (02-18 @ 18:49)  Troponin T, High Sensitivity Result: 17 ng/L [0 - 51] (02-18 @ 17:02)                          9.8    5.83  )-----------( 263      ( 22 Feb 2022 06:27 )             31.1

## 2022-02-23 NOTE — PROGRESS NOTE ADULT - ATTENDING COMMENTS
85 yo F w/ PMH breast cancer s/p lumpectomy, chemoradiation, MVP, MAC lung disease untreated who presents with multiple presyncope and syncope episodes. Found to be orthostatics here. No evidence of conduction disease on EKG or tele. Would stop Nadolol. Please obtain TTE.
syncopal episodes in setting of GI issues and dehydration. Since episodes were just in this setting I do not think a short term monitor would yield anything (also nothing significant on telemetry while in hospital). If recurrent would consider ILR
Mrs. Santana passed her swallowing assessment today, remains afebrile, without orthostasis after hydration.  Beta blocker discontinued secondary to bradycardia.  One sputum is showing Staph aureus, and AFB smear is negative.  Denies shortness of breath.  Two additional AFB smears are pending and culture may take a few weeks to grow.  Doubt Staph pneumonia as I would expect her to have more infectious signs.  She has many medication intolerances.  Once cultures are final will consider initiating treatment for MAC if she is willing.  Agree with plan as outlined above.
Mrs. Santana passed her swallowing assessment today, remains afebrile, without orthostasis after hydration.  Beta blocker discontinued secondary to bradycardia.  One sputum is showing Staph aureus, and AFB smear is negative.  Denies shortness of breath.  Two additional AFB smears are pending and culture may take a few weeks to grow.  Doubt Staph pneumonia as I would expect her to have more infectious signs.  She has many medication intolerances.  Once cultures are final will consider initiating treatment for MAC if she is willing.  Agree with plan as outlined above.

## 2022-02-23 NOTE — PROGRESS NOTE ADULT - ASSESSMENT
87 yo F with PMHx of IBS, Asthma, h/o L Breast CA (s/p lumpectomy and radiation), Anxiety/Depression, SEAMUS (not currently on treatment), MVP and GERD who presented s/p syncopal episode    #syncope  -secondary to decrease po intake, hypovolemia, infection  -CT head with no cva  -echo with mod AS, nl lv fxn  + orthostatic hypotension 2/18  -sp IVF  -repeat orthostatics much improved, negative   -BB held per EP    #MVP  -echo noted    #SEAMUS   -chest xray noted-- pulm f/u  - AFB x 1 neg   -Sputum culture negative  -ID f/u     dvt ppx     no cv objection for DC  plan discussed with ACP

## 2022-02-23 NOTE — PROGRESS NOTE ADULT - SUBJECTIVE AND OBJECTIVE BOX
[facility]      Seen earlier today, and examined.  - Today, patient is without complaints.           *****MEDICATIONS: Current medication reviewed and documented.    MEDICATIONS  (STANDING):  cholecalciferol 1000 Unit(s) Oral two times a day  difluprednate 0.05% Ophthalmic Emulsion 1 Drop(s) Left EYE two times a day  FLUoxetine 60 milliGRAM(s) Oral daily  fluticasone propionate 50 MICROgram(s)/spray Nasal Spray 1 Spray(s) Both Nostrils two times a day  heparin   Injectable 5000 Unit(s) SubCutaneous every 8 hours  prednisoLONE acetate 1% Suspension 1 Drop(s) Left EYE two times a day  sodium chloride 0.65% Nasal 1 Spray(s) Both Nostrils four times a day  sodium chloride 0.9%. 1000 milliLiter(s) (100 mL/Hr) IV Continuous <Continuous>    MEDICATIONS  (PRN):  acetaminophen     Tablet .. 650 milliGRAM(s) Oral every 6 hours PRN Temp greater or equal to 38C (100.4F), Mild Pain (1 - 3)  aluminum hydroxide/magnesium hydroxide/simethicone Suspension 30 milliLiter(s) Oral every 4 hours PRN Dyspepsia  melatonin 3 milliGRAM(s) Oral at bedtime PRN Insomnia  ondansetron Injectable 4 milliGRAM(s) IV Push every 8 hours PRN Nausea and/or Vomiting          ***** VITAL SIGNS:  T(F): 98.3 (22 @ 11:14), Max: 98.3 (22 @ 11:14)  HR: 86 (22 @ 11:14) (59 - 86)  BP: 116/81 (22 @ 11:14) (110/62 - 150/74)  RR: 18 (22 @ 11:14) (18 - 18)  SpO2: 98% (22 @ 11:14) (96% - 98%)  Wt(kg): --  ,   I&O's Summary    2022 07:01  -  2022 07:00  --------------------------------------------------------  IN: 720 mL / OUT: 400 mL / NET: 320 mL    2022 07:01  -  2022 12:01  --------------------------------------------------------  IN: 240 mL / OUT: 0 mL / NET: 240 mL             *****PHYSICAL EXAM:   alert oriented x 3 attention comprehension are fair.  Able to name, repeat.   EOmi fundi not visualized   no nystagmus VFF to confrontation  Tongue is midline  Palate elevates symmetrically   Moving all 4 ext spontaneously no drift appreciated    Gait not assessed.            *****LAB AND IMAGIN.8    5.83  )-----------( 263      ( 2022 06:27 )             31.1                                          [All pertinent recent Imaging/Reports reviewed]           *****A S S E S S M E N T   A N D   P L A N :   Excerpt from H&P,"  Patient is an 85 yo F with PMHx of IBS, Asthma, h/o L Breast CA (s/p lumpectomy and radiation), Anxiety/Depression, SEAMUS (not currently on treatment), MVP and GERD who presented s/p syncopal episode. Patient reports feeling unwell starting approx 10 days ago. 7 days prior to presentation, she was walking in the kitchen and subsequently synopsized. She awoke with her daughter over her and states she was unconscious for about 1 minute. She did strike the right side of her head. She did not have incontinence of subsequent confusion. SHe was evaluated by EMS and declined transfer to the hospital. Two days later she went to see her PCP and she had a presyncopal episode after leaving their office. She again had a presyncopal episode today which prompted her to present to the ED.     In the ED, patient was found to have HR of 57 and BP of 184/77. Labs revealed a Hgb of 9.8 (from prior of 11.6) but were otherwise unremarkable. CT of the brain was performed which was negative for traumatic injury, however did have findings consistent with sinusitis. A CXR revealed b/l peripheral lung opacities, increased from prior and a linear opacity in the RML. UA obtained was overall unremarkable. Patient was given a 500cc NS bolus and admitted to medicine for further management.      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 Will continue to follow patient periodically. Please do not hesitate to call me if you have any  questions or if there has been a change in patients neurological status     ________________  Melissa Pérez MD  [facility] ([facility]  [phone]      33 minutes spent on total encounter; more than 50 % of the visit was  spent counseling about plan of care, compliance to diet/exercise and medication regimen and or  coordinating care by the attending physician.      It is advised that stroke patients follow up with RIZWAN Jorgensen @ [phone] in 1- 2 weeks.   Others please follow up with Dr. Michael Nissenbaum [phone]

## 2022-02-23 NOTE — PROGRESS NOTE ADULT - SUBJECTIVE AND OBJECTIVE BOX
CHIEF COMPLAINT:    Interval Events:    REVIEW OF SYSTEMS:  Constitutional: [ ] negative [ ] fevers [ ] chills [ ] weight loss [ ] weight gain  HEENT: [ ] negative [ ] dry eyes [ ] eye irritation [ ] postnasal drip [ ] nasal congestion  CV: [ ] negative  [ ] chest pain [ ] orthopnea [ ] palpitations [ ] murmur  Resp: [ ] negative [ ] cough [ ] shortness of breath [ ] dyspnea [ ] wheezing [ ] sputum [ ] hemoptysis  GI: [ ] negative [ ] nausea [ ] vomiting [ ] diarrhea [ ] constipation [ ] abd pain [ ] dysphagia   : [ ] negative [ ] dysuria [ ] nocturia [ ] hematuria [ ] increased urinary frequency  Musculoskeletal: [ ] negative [ ] back pain [ ] myalgias [ ] arthralgias [ ] fracture  Skin: [ ] negative [ ] rash [ ] itch  Neurological: [ ] negative [ ] headache [ ] dizziness [ ] syncope [ ] weakness [ ] numbness  Psychiatric: [ ] negative [ ] anxiety [ ] depression  Endocrine: [ ] negative [ ] diabetes [ ] thyroid problem  Hematologic/Lymphatic: [ ] negative [ ] anemia [ ] bleeding problem  Allergic/Immunologic: [ ] negative [ ] itchy eyes [ ] nasal discharge [ ] hives [ ] angioedema  [ ] All other systems negative  [ ] Unable to assess ROS because ________    OBJECTIVE:  ICU Vital Signs Last 24 Hrs  T(C): 36.7 (23 Feb 2022 04:28), Max: 36.9 (22 Feb 2022 11:17)  T(F): 98.1 (23 Feb 2022 04:28), Max: 98.5 (22 Feb 2022 11:17)  HR: 59 (23 Feb 2022 04:28) (59 - 71)  BP: 150/69 (23 Feb 2022 04:28) (110/62 - 150/74)  BP(mean): --  ABP: --  ABP(mean): --  RR: 18 (23 Feb 2022 04:28) (18 - 18)  SpO2: 98% (23 Feb 2022 04:28) (96% - 98%)        02-22 @ 07:01  -  02-23 @ 07:00  --------------------------------------------------------  IN: 720 mL / OUT: 400 mL / NET: 320 mL      CAPILLARY BLOOD GLUCOSE          PHYSICAL EXAM:  General:   HEENT:   Lymph Nodes:  Neck:   Respiratory:   Cardiovascular:   Abdomen:   Extremities:   Skin:   Neurological:  Psychiatry:    HOSPITAL MEDICATIONS:  MEDICATIONS  (STANDING):  cholecalciferol 1000 Unit(s) Oral two times a day  difluprednate 0.05% Ophthalmic Emulsion 1 Drop(s) Left EYE two times a day  FLUoxetine 60 milliGRAM(s) Oral daily  fluticasone propionate 50 MICROgram(s)/spray Nasal Spray 1 Spray(s) Both Nostrils two times a day  heparin   Injectable 5000 Unit(s) SubCutaneous every 8 hours  prednisoLONE acetate 1% Suspension 1 Drop(s) Left EYE two times a day  sodium chloride 0.65% Nasal 1 Spray(s) Both Nostrils four times a day  sodium chloride 0.9%. 1000 milliLiter(s) (100 mL/Hr) IV Continuous <Continuous>    MEDICATIONS  (PRN):  acetaminophen     Tablet .. 650 milliGRAM(s) Oral every 6 hours PRN Temp greater or equal to 38C (100.4F), Mild Pain (1 - 3)  aluminum hydroxide/magnesium hydroxide/simethicone Suspension 30 milliLiter(s) Oral every 4 hours PRN Dyspepsia  melatonin 3 milliGRAM(s) Oral at bedtime PRN Insomnia  ondansetron Injectable 4 milliGRAM(s) IV Push every 8 hours PRN Nausea and/or Vomiting      LABS:                        9.8    5.83  )-----------( 263      ( 22 Feb 2022 06:27 )             31.1     Hgb Trend: 9.8<--, 9.5<--, 9.7<--, 9.8<--        Creatinine Trend: 0.71<--, 0.82<--, 0.66<--, 0.75<--            MICROBIOLOGY:     Culture - Sputum (collected 20 Feb 2022 14:06)  Source: .Sputum Sputum  Gram Stain (20 Feb 2022 17:06):    Few polymorphonuclear leukocytes per low power field    Rare Squamous epithelial cells per low power field    Moderate Gram positive cocci in pairs per oil power field  Preliminary Report (22 Feb 2022 09:20):    Moderate Staphylococcus aureus    Normal Respiratory Hien present  Organism: Staphylococcus aureus (23 Feb 2022 08:21)  Organism: Staphylococcus aureus (23 Feb 2022 08:21)    Culture - Acid Fast - Sputum w/Smear (collected 20 Feb 2022 12:38)  Source: .Sputum Sputum        RADIOLOGY:  [ ] Reviewed and interpreted by me    PULMONARY FUNCTION TESTS:    EKG: CHIEF COMPLAINT:    Interval Events:  There were no events overnight.    REVIEW OF SYSTEMS:  Constitutional: [ ] negative [ ] fevers [ ] chills [ ] weight loss [ ] weight gain  HEENT: [ ] negative [ ] dry eyes [ ] eye irritation [ ] postnasal drip [ ] nasal congestion  CV: [ ] negative  [ ] chest pain [ ] orthopnea [ ] palpitations [ ] murmur  Resp: [ ] negative [ ] cough [ ] shortness of breath [ ] dyspnea [ ] wheezing [ ] sputum [ ] hemoptysis  GI: [ ] negative [ ] nausea [ ] vomiting [ ] diarrhea [ ] constipation [x ] abd pain [ ] dysphagia  complaining of abdominal discomfort  : [ ] negative [ ] dysuria [ ] nocturia [ ] hematuria [ ] increased urinary frequency  Musculoskeletal: [ ] negative [ ] back pain [ ] myalgias [ ] arthralgias [ ] fracture  Skin: [ ] negative [ ] rash [ ] itch  Neurological: [ ] negative [ ] headache [ ] dizziness [ ] syncope [ ] weakness [ ] numbness  Psychiatric: [ ] negative [ ] anxiety [ ] depression  Endocrine: [ ] negative [ ] diabetes [ ] thyroid problem  Hematologic/Lymphatic: [ ] negative [ ] anemia [ ] bleeding problem  Allergic/Immunologic: [ ] negative [ ] itchy eyes [ ] nasal discharge [ ] hives [ ] angioedema  [x ] All other systems negative  [ ] Unable to assess ROS because ________    OBJECTIVE:  ICU Vital Signs Last 24 Hrs  T(C): 36.7 (23 Feb 2022 04:28), Max: 36.9 (22 Feb 2022 11:17)  T(F): 98.1 (23 Feb 2022 04:28), Max: 98.5 (22 Feb 2022 11:17)  HR: 59 (23 Feb 2022 04:28) (59 - 71)  BP: 150/69 (23 Feb 2022 04:28) (110/62 - 150/74)  BP(mean): --  ABP: --  ABP(mean): --  RR: 18 (23 Feb 2022 04:28) (18 - 18)  SpO2: 98% (23 Feb 2022 04:28) (96% - 98%)        02-22 @ 07:01  -  02-23 @ 07:00  --------------------------------------------------------  IN: 720 mL / OUT: 400 mL / NET: 320 mL      PHYSICAL EXAM:  General: Sitting up in chair, appears well  HEENT:   Lymph Nodes:  Neck:   Respiratory: clear  without wheezing, right mid lung rales  Cardiovascular: RRR  Abdomen:   Extremities: without edema  Skin:   Neurological:  Psychiatry: awake alert    HOSPITAL MEDICATIONS:  MEDICATIONS  (STANDING):  cholecalciferol 1000 Unit(s) Oral two times a day  difluprednate 0.05% Ophthalmic Emulsion 1 Drop(s) Left EYE two times a day  FLUoxetine 60 milliGRAM(s) Oral daily  fluticasone propionate 50 MICROgram(s)/spray Nasal Spray 1 Spray(s) Both Nostrils two times a day  heparin   Injectable 5000 Unit(s) SubCutaneous every 8 hours  prednisoLONE acetate 1% Suspension 1 Drop(s) Left EYE two times a day  sodium chloride 0.65% Nasal 1 Spray(s) Both Nostrils four times a day  sodium chloride 0.9%. 1000 milliLiter(s) (100 mL/Hr) IV Continuous <Continuous>    MEDICATIONS  (PRN):  acetaminophen     Tablet .. 650 milliGRAM(s) Oral every 6 hours PRN Temp greater or equal to 38C (100.4F), Mild Pain (1 - 3)  aluminum hydroxide/magnesium hydroxide/simethicone Suspension 30 milliLiter(s) Oral every 4 hours PRN Dyspepsia  melatonin 3 milliGRAM(s) Oral at bedtime PRN Insomnia  ondansetron Injectable 4 milliGRAM(s) IV Push every 8 hours PRN Nausea and/or Vomiting      LABS:                        9.8    5.83  )-----------( 263      ( 22 Feb 2022 06:27 )             31.1     Hgb Trend: 9.8<--, 9.5<--, 9.7<--, 9.8<--        Creatinine Trend: 0.71<--, 0.82<--, 0.66<--, 0.75<--            MICROBIOLOGY:     Culture - Sputum (collected 20 Feb 2022 14:06)  Source: .Sputum Sputum  Gram Stain (20 Feb 2022 17:06):    Few polymorphonuclear leukocytes per low power field    Rare Squamous epithelial cells per low power field    Moderate Gram positive cocci in pairs per oil power field  Preliminary Report (22 Feb 2022 09:20):    Moderate Staphylococcus aureus    Normal Respiratory Hien present  Organism: Staphylococcus aureus (23 Feb 2022 08:21)  Organism: Staphylococcus aureus (23 Feb 2022 08:21)    Culture - Acid Fast - Sputum w/Smear (collected 20 Feb 2022 12:38)  Source: .Sputum Sputum        RADIOLOGY:  [ ] Reviewed and interpreted by me    PULMONARY FUNCTION TESTS:    EKG:

## 2022-02-23 NOTE — DISCHARGE NOTE PROVIDER - NSDCFUADDAPPT_GEN_ALL_CORE_FT
APPTS ARE READY TO BE MADE: [ x] YES    Best Family or Patient Contact (if needed):    Additional Information about above appointments (if needed):    1:   2:   3:     Other comments or requests:    APPTS ARE READY TO BE MADE: [ x] YES    Best Family or Patient Contact (if needed):    Additional Information about above appointments (if needed):    1:   2:   3:     Other comments or requests:     Patient was provided with Dr. Handy [phone], Dr. Cardona [phone], and Dr. [phone] and was advised to call to schedule follow up within specified time frame. At this time patient declined scheduling assistance.

## 2022-02-23 NOTE — DISCHARGE NOTE PROVIDER - CARE PROVIDER_API CALL
Criselda Handy)  Infectious Disease; Internal Medicine  [address] [place] [address]  Phone: [phone]  Fax: [phone]  Follow Up Time: 1 week   Criselda Handy)  Infectious Disease; Internal Medicine  [address] [place] [address]  Phone: [phone]  Fax: [phone]  Follow Up Time: 1 week    Criselda Cardona)  Internal Medicine  [address]  [place] [address]  Phone: [phone]  Fax: [phone]  Follow Up Time: 1 week    Ofelia Mcgowan)  Critical Care Medicine; Pulmonary Disease  [address] [facility] [address]  [place] [address]  Phone: [phone]  Fax: [phone]  Follow Up Time: 1 week

## 2022-02-23 NOTE — DISCHARGE NOTE PROVIDER - PROVIDER TOKENS
PROVIDER:[TOKEN:[[id number]],FOLLOWUP:[1 week]] PROVIDER:[TOKEN:[[id number]],FOLLOWUP:[1 week]],PROVIDER:[TOKEN:[[id number]],FOLLOWUP:[1 week]],PROVIDER:[TOKEN:[[id number]],FOLLOWUP:[1 week]]

## 2022-02-23 NOTE — DISCHARGE NOTE PROVIDER - CARE PROVIDERS DIRECT ADDRESSES
[email or web address]

## 2022-02-23 NOTE — DISCHARGE NOTE PROVIDER - HOSPITAL COURSE
85 yo F with PMHx of IBS, Asthma, h/o L Breast CA (s/p lumpectomy and radiation), Anxiety/Depression, SEAMUS (not currently on treatment), MVP and GERD who presented s/p syncopal episode. Echo found to have moderate AS.    Progression on CT chest of chronic lung disease. Increased cavitation.  Likely due to SEAMUS.  Also h/o MSSA in sputum.    Would check sputm afb x 2 more samples. 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    Sputum culture with MSSA. She does seem stable and is w/o pneumonia and non toxic.  This is likely colonization.  PICC at home may not be ideal for her.  She has many abx allergies and intolerances and was difficult to treat as outpt.  She is allergic to cephalosporins, tetracycline, sulfa.  Would have to use IV vanco if treated this.    Can hold off on antimicrobials for now.      May need to attempt treatment for MAC but this would be done as outpt.  Would need help from cardiology to be sure pt could tolerate azithromycin. Had some changes on telemetry.  Doubt syncope from MAC or this staph.    New AS - w/up as per primary team and cardiology.    #syncope  -secondary to decrease po intake, hypovolemia, infection  -CT head with no cva  -echo with mod AS, nl lv fxn  + orthostatic hypotension 2/18  -sp IVF  -repeat orthostatics much improved, negative   -BB held per EP    #MVP  -echo noted    #SEAMUS   -chest xray noted-- pulm f/u  - AFB x 2 neg   -Sputum culture negative  -ID f/u     # Dysphagia  -esophagram today  -gi f/u     dvt ppx     no cv objection for DC

## 2022-02-23 NOTE — PROGRESS NOTE ADULT - ASSESSMENT
85YO Female PMH IBS, breast cancer s/p lumpectomy and chemo/RT, anxiety, GERD, hx of SEAMUS growth in culture 11 and 12/2020 and recent MSSA in sputum who refused bronch and treatment for SEAMUS. Presenting with syncope, fatigue and weakness. 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 Juan M Aguilera DO PGY-5  Pulmonary/Critical Care Fellow   Pager: [phone] (MARILEE), [phone] (NS)  Pulmonary Spectra #[id number] (NS) / [phone] (MARILEE)   87YO Female PMH IBS, breast cancer s/p lumpectomy and chemo/RT, anxiety, GERD, hx of SEAMUS growth in culture 11 and 12/2020 and recent MSSA in sputum who refused bronch and treatment for SEAMUS. Presenting with syncope, fatigue and weakness. CT showing progression of opacity in the RUL sorrounding bronchiectatic airways, presumably secondary to MAC    Recs:  - Sputum for AFB negative x1 pending x 2 ( does not need to be isolated as she has grown MAC which is not contagious in the past)  - Please continue to collect sputum culture and AFB  - Sputum culture shows staph aureus, one AFB is negative on smear, culture pending  - Progression of RUL opacities and nodularity seen on most recent CT chest  - Pt remains on room air without leukocytosis and negative procal  - Management of AS per cardiology    Case discussed with Dr. Juan M Aguilera DO PGY-5  Pulmonary/Critical Care Fellow   Pager: [phone] (MARILEE), [phone] (NS)  Pulmonary Spectra #[id number] (NS) / [phone] (MARILEE)

## 2022-02-23 NOTE — CONSULT NOTE ADULT - ASSESSMENT
dysphagia    cont diet as tolerated  SLP harley noted  was on proton pump inhibitor at home, would resume here  check esophagram  d/w patient at length  d/w floor np

## 2022-02-23 NOTE — PROGRESS NOTE ADULT - SUBJECTIVE AND OBJECTIVE BOX
Patient is a 86y old  Female who presents with a chief complaint of Syncopal Episode (23 Feb 2022 16:21)      SUBJECTIVE / OVERNIGHT EVENTS: seen by GI for odynophagia, esophagram is negative, passed MBS for swallow eval. awaiting pulm clearance for DC, orthostatics resolved post IVF, bradycardia resolved post DC BB. this was d/w PCP dr Criselda castañeda.     MEDICATIONS  (STANDING):  cholecalciferol 1000 Unit(s) Oral two times a day  difluprednate 0.05% Ophthalmic Emulsion 1 Drop(s) Left EYE two times a day  FLUoxetine 60 milliGRAM(s) Oral daily  fluticasone propionate 50 MICROgram(s)/spray Nasal Spray 1 Spray(s) Both Nostrils two times a day  heparin   Injectable 5000 Unit(s) SubCutaneous every 8 hours  pantoprazole    Tablet 40 milliGRAM(s) Oral two times a day  prednisoLONE acetate 1% Suspension 1 Drop(s) Left EYE two times a day  sodium chloride 0.65% Nasal 1 Spray(s) Both Nostrils four times a day  sodium chloride 0.9%. 1000 milliLiter(s) (100 mL/Hr) IV Continuous <Continuous>    MEDICATIONS  (PRN):  acetaminophen     Tablet .. 650 milliGRAM(s) Oral every 6 hours PRN Temp greater or equal to 38C (100.4F), Mild Pain (1 - 3)  aluminum hydroxide/magnesium hydroxide/simethicone Suspension 30 milliLiter(s) Oral every 4 hours PRN Dyspepsia  melatonin 3 milliGRAM(s) Oral at bedtime PRN Insomnia  ondansetron Injectable 4 milliGRAM(s) IV Push every 8 hours PRN Nausea and/or Vomiting      Vital Signs Last 24 Hrs  T(F): 98.3 (02-23-22 @ 11:14), Max: 98.3 (02-23-22 @ 11:14)  HR: 86 (02-23-22 @ 11:14) (59 - 86)  BP: 116/81 (02-23-22 @ 11:14) (110/62 - 150/69)  RR: 18 (02-23-22 @ 11:14) (18 - 18)  SpO2: 98% (02-23-22 @ 11:14) (98% - 98%)  Telemetry:   CAPILLARY BLOOD GLUCOSE        I&O's Summary    22 Feb 2022 07:01  -  23 Feb 2022 07:00  --------------------------------------------------------  IN: 720 mL / OUT: 400 mL / NET: 320 mL    23 Feb 2022 07:01  -  23 Feb 2022 16:48  --------------------------------------------------------  IN: 640 mL / OUT: 0 mL / NET: 640 mL        PHYSICAL EXAM:  GENERAL: NAD, well-developed  HEAD:  Atraumatic, Normocephalic  EYES: EOMI, PERRLA, conjunctiva and sclera clear  NECK: Supple, No JVD  CHEST/LUNG: Clear to auscultation bilaterally; No wheeze  HEART: Regular rate and rhythm; No murmurs, rubs, or gallops  ABDOMEN: Soft, Nontender, Nondistended; Bowel sounds present  EXTREMITIES:  2+ Peripheral Pulses, No clubbing, cyanosis, or edema  PSYCH: AAOx3  NEUROLOGY: non-focal  SKIN: No rashes or lesions    LABS:                        9.8    5.83  )-----------( 263      ( 22 Feb 2022 06:27 )             31.1                     RADIOLOGY & ADDITIONAL TESTS:    Imaging Personally Reviewed:    Consultant(s) Notes Reviewed:      Care Discussed with Consultants/Other Providers:

## 2022-02-23 NOTE — PROGRESS NOTE ADULT - ASSESSMENT
85 yo F with PMHx of IBS, Asthma, h/o L Breast CA (s/p lumpectomy and radiation), Anxiety/Depression, SEAMUS (not currently on treatment), MVP and GERD who presented s/p syncopal episode. Echo found to have moderate AS.    Progression on CT chest of chronic lung disease. Increased cavitation.  Likely due to SEAMUS.  Also h/o MSSA in sputum.    Would check sputm afb x 2 more samples. 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    Sputum culture with MSSA. She does seem stable and is w/o pneumonia and non toxic.  This is likely colonization.  PICC at home may not be ideal for her.  She has many abx allergies and intolerances and was difficult to treat as outpt.  She is allergic to cephalosporins, tetracycline, sulfa.  Would have to use IV vanco if treated this.    Can hold off on antimicrobials for now.      May need to attempt treatment for MAC but this would be done as outpt.  Would need help from cardiology to be sure pt could tolerate azithromycin. Had some changes on telemetry.  Doubt syncope from MAC or this staph.    New AS - w/up as per primary team and cardiology.    GI eval as per speech and swallow  Pt is aware to f/up with me as outpt on discharge.      I will sign off at this time but reconsult if needed.     Criselda Handy MD  [phone] (office) .

## 2022-02-23 NOTE — DISCHARGE NOTE PROVIDER - NSDCMRMEDTOKEN_GEN_ALL_CORE_FT
diazepam 5 mg oral tablet: 0.5 tab(s) orally once a day    NOTE: Last fill 11/11/2021. Patient has own med supply   Durezol: 1 drop(s) in the left eye 2 times a day  Durezol 0.05% ophthalmic emulsion: 1 drop(s) to each affected eye 2 times a day  FLUoxetine 20 mg oral capsule: 3 cap(s) orally once a day  nadolol: 10 milligram(s) orally once a day (at bedtime)    NOTE: Unable to confirm with secondary source. Patient stated she  has own med supply   Nasacort AQ 55 mcg/inh nasal spray: 1   2 times a day   Prilosec 20 mg oral delayed release capsule: 1   once a day   Vitamin D3 25 mcg (1000 intl units) oral tablet: 1 tab(s) orally 2 times a day   acetaminophen 325 mg oral tablet: 2 tab(s) orally every 6 hours, As needed, Temp greater or equal to 38C (100.4F), Mild Pain (1 - 3)  aluminum hydroxide-magnesium hydroxide 200 mg-200 mg/5 mL oral suspension: 30 milliliter(s) orally every 4 hours, As needed, Dyspepsia  diazepam 5 mg oral tablet: 0.5 tab(s) orally once a day    NOTE: Last fill 11/11/2021. Patient has own med supply   Durezol 0.05% ophthalmic emulsion: 1 drop(s) to each affected eye 2 times a day  FLUoxetine 20 mg oral capsule: 3 cap(s) orally once a day  fluticasone 50 mcg/inh nasal spray: 1 spray(s) nasal 2 times a day  losartan 25 mg oral tablet: 1 tab(s) orally once a day  melatonin 3 mg oral tablet: 1 tab(s) orally once a day (at bedtime), As needed, Insomnia  Nasacort AQ 55 mcg/inh nasal spray: 1   2 times a day   pantoprazole 40 mg oral delayed release tablet: 1 tab(s) orally 2 times a day  Vitamin D3 25 mcg (1000 intl units) oral tablet: 1 tab(s) orally 2 times a day

## 2022-02-23 NOTE — DISCHARGE NOTE PROVIDER - NSDCCPCAREPLAN_GEN_ALL_CORE_FT
PRINCIPAL DISCHARGE DIAGNOSIS  Diagnosis: Adult failure to thrive  Assessment and Plan of Treatment: Continue current medications      SECONDARY DISCHARGE DIAGNOSES  Diagnosis: Syncope  Assessment and Plan of Treatment: HOME CARE INSTRUCTIONS  Have someone stay with you until you feel stable.  Do not drive, operate machinery, or play sports until your caregiver says it is okay.  Keep all follow-up appointments as directed by your caregiver.   Lie down right away if you start feeling like you might faint. Breathe deeply and steadily. Wait until all the symptoms have passed.Drink enough fluids to keep your urine clear or pale yellow.  If you are taking blood pressure or heart medicine, get up slowly, taking several minutes to sit and then stand. This can reduce dizziness.  SEEK IMMEDIATE MEDICAL CARE IF:  You have a severe headache.  You have unusual pain in the chest, abdomen, or back.  You are bleeding from the mouth or rectum, or you have black or tarry stool.  You have an irregular or very fast heartbeat.  You have pain with breathing.  You have repeated fainting or seizure-like jerking during an episode.  You faint when sitting or lying down.  You have confusion.  You have difficulty walking.  You have severe weakness.  You have vision problems.  If you fainted, call your local emergency services. Do not drive yourself to the hospital    Diagnosis: Mycobacterium avium complex  Assessment and Plan of Treatment: Follow up with pulmonary out patint

## 2022-02-24 DIAGNOSIS — I10 ESSENTIAL (PRIMARY) HYPERTENSION: ICD-10-CM

## 2022-02-24 LAB
CULTURE RESULTS: SIGNIFICANT CHANGE UP
HCT VFR BLD CALC: 29.8 % — LOW (ref 34.5–45)
HGB BLD-MCNC: 9.3 G/DL — LOW (ref 11.5–15.5)
MCHC RBC-ENTMCNC: 28.2 PG — SIGNIFICANT CHANGE UP (ref 27–34)
MCHC RBC-ENTMCNC: 31.2 GM/DL — LOW (ref 32–36)
MCV RBC AUTO: 90.3 FL — SIGNIFICANT CHANGE UP (ref 80–100)
NRBC # BLD: 0 /100 WBCS — SIGNIFICANT CHANGE UP (ref 0–0)
ORGANISM # SPEC MICROSCOPIC CNT: SIGNIFICANT CHANGE UP
ORGANISM # SPEC MICROSCOPIC CNT: SIGNIFICANT CHANGE UP
PLATELET # BLD AUTO: 242 K/UL — SIGNIFICANT CHANGE UP (ref 150–400)
RBC # BLD: 3.3 M/UL — LOW (ref 3.8–5.2)
RBC # FLD: 14.2 % — SIGNIFICANT CHANGE UP (ref 10.3–14.5)
SPECIMEN SOURCE: SIGNIFICANT CHANGE UP
WBC # BLD: 4.74 K/UL — SIGNIFICANT CHANGE UP (ref 3.8–10.5)
WBC # FLD AUTO: 4.74 K/UL — SIGNIFICANT CHANGE UP (ref 3.8–10.5)

## 2022-02-24 PROCEDURE — 74221 X-RAY XM ESOPHAGUS 2CNTRST: CPT | Mod: 26

## 2022-02-24 RX ORDER — LOSARTAN POTASSIUM 100 MG/1
25 TABLET, FILM COATED ORAL DAILY
Refills: 0 | Status: DISCONTINUED | OUTPATIENT
Start: 2022-02-24 | End: 2022-02-25

## 2022-02-24 RX ADMIN — DUREZOL 1 DROP(S): 0.5 EMULSION OPHTHALMIC at 05:28

## 2022-02-24 RX ADMIN — Medication 1 SPRAY(S): at 05:28

## 2022-02-24 RX ADMIN — Medication 1 DROP(S): at 05:29

## 2022-02-24 RX ADMIN — Medication 1 SPRAY(S): at 00:48

## 2022-02-24 RX ADMIN — Medication 1 SPRAY(S): at 23:13

## 2022-02-24 RX ADMIN — PANTOPRAZOLE SODIUM 40 MILLIGRAM(S): 20 TABLET, DELAYED RELEASE ORAL at 11:38

## 2022-02-24 RX ADMIN — HEPARIN SODIUM 5000 UNIT(S): 5000 INJECTION INTRAVENOUS; SUBCUTANEOUS at 18:03

## 2022-02-24 RX ADMIN — Medication 650 MILLIGRAM(S): at 12:07

## 2022-02-24 RX ADMIN — Medication 1 SPRAY(S): at 18:00

## 2022-02-24 RX ADMIN — HEPARIN SODIUM 5000 UNIT(S): 5000 INJECTION INTRAVENOUS; SUBCUTANEOUS at 05:30

## 2022-02-24 RX ADMIN — PANTOPRAZOLE SODIUM 40 MILLIGRAM(S): 20 TABLET, DELAYED RELEASE ORAL at 18:03

## 2022-02-24 RX ADMIN — Medication 1 SPRAY(S): at 11:37

## 2022-02-24 RX ADMIN — DUREZOL 1 DROP(S): 0.5 EMULSION OPHTHALMIC at 18:00

## 2022-02-24 RX ADMIN — Medication 60 MILLIGRAM(S): at 11:35

## 2022-02-24 RX ADMIN — Medication 650 MILLIGRAM(S): at 11:37

## 2022-02-24 RX ADMIN — Medication 1 SPRAY(S): at 18:02

## 2022-02-24 NOTE — PROGRESS NOTE ADULT - SUBJECTIVE AND OBJECTIVE BOX
[facility]  Bennie Savage PA-C  [address] [place] [address]  [phone]      INTERVAL HPI/OVERNIGHT EVENTS:  pt seen and examined, no new events  esophogram showing esophageal dysmotility without stricture or mass    MEDICATIONS  (STANDING):  cholecalciferol 1000 Unit(s) Oral two times a day  difluprednate 0.05% Ophthalmic Emulsion 1 Drop(s) Left EYE two times a day  FLUoxetine 60 milliGRAM(s) Oral daily  fluticasone propionate 50 MICROgram(s)/spray Nasal Spray 1 Spray(s) Both Nostrils two times a day  heparin   Injectable 5000 Unit(s) SubCutaneous every 8 hours  pantoprazole    Tablet 40 milliGRAM(s) Oral two times a day  prednisoLONE acetate 1% Suspension 1 Drop(s) Left EYE two times a day  sodium chloride 0.65% Nasal 1 Spray(s) Both Nostrils four times a day  sodium chloride 0.9%. 1000 milliLiter(s) (100 mL/Hr) IV Continuous <Continuous>    MEDICATIONS  (PRN):  acetaminophen     Tablet .. 650 milliGRAM(s) Oral every 6 hours PRN Temp greater or equal to 38C (100.4F), Mild Pain (1 - 3)  aluminum hydroxide/magnesium hydroxide/simethicone Suspension 30 milliLiter(s) Oral every 4 hours PRN Dyspepsia  melatonin 3 milliGRAM(s) Oral at bedtime PRN Insomnia  ondansetron Injectable 4 milliGRAM(s) IV Push every 8 hours PRN Nausea and/or Vomiting      Allergies    cefdinir (Unknown)  ciprofloxacin (Other)  codeine (Nausea; Other)  contrast media (iodine-based) (Angioedema)  fluorescein ophthalmic (Hives; Rash; Flushing)  lactose (Unknown)  latex (Anaphylaxis)  Levaquin (Nausea; Other)  lidocaine (Unknown)  SULFA (Anaphylaxis)  tetracycline (Anaphylaxis)    Intolerances    Augmentin (Stomach Upset)      ROS:   General:  No wt loss, fevers, chills, night sweats, fatigue,   Eyes:  Good vision, no reported pain  ENT:  No sore throat, pain, runny nose, dysphagia  CV:  No pain, palpitations, hypo/hypertension  Resp:  No dyspnea, cough, tachypnea, wheezing  GI:  No pain, No nausea, No vomiting, No diarrhea, No constipation, No weight loss, No fever, No pruritis, No rectal bleeding, No tarry stools, + dysphagia,  :  No pain, bleeding, incontinence, nocturia  Muscle:  No pain, weakness  Neuro:  No weakness, tingling, memory problems  Psych:  No fatigue, insomnia, mood problems, depression  Endocrine:  No polyuria, polydipsia, cold/heat intolerance  Heme:  No petechiae, ecchymosis, easy bruisability  Skin:  No rash, tattoos, scars, edema      PHYSICAL EXAM:   Vital Signs:  Vital Signs Last 24 Hrs  T(C): 36.6 (24 Feb 2022 11:25), Max: 36.6 (23 Feb 2022 20:02)  T(F): 97.9 (24 Feb 2022 11:25), Max: 97.9 (23 Feb 2022 20:02)  HR: 71 (24 Feb 2022 11:25) (61 - 71)  BP: 176/69 (24 Feb 2022 11:25) (142/61 - 176/69)  BP(mean): --  RR: 18 (24 Feb 2022 11:25) (18 - 18)  SpO2: 97% (24 Feb 2022 11:25) (96% - 98%)  Daily     Daily     GENERAL:  Appears stated age,   HEENT:  NC/AT,    CHEST:  Full & symmetric excursion,   HEART:  Regular rhythm,  ABDOMEN:  Soft, non-tender, non-distended,  EXTEREMITIES:  no cyanosis  SKIN:  No rash  NEURO:  Alert,       LABS:                        9.3    4.74  )-----------( 242      ( 24 Feb 2022 06:26 )             29.8                 RADIOLOGY & ADDITIONAL TESTS:  < from: Xray Esophagram Double Contrast (02.24.22 @ 09:33) >    ACC: [id number] EXAM:  XR ESOPH DBL CON STUDY                          PROCEDURE DATE:  02/24/2022          INTERPRETATION:  CLINICAL INFORMATION: Globus sensation in lower   esophagus. Painful spasms of the esophagus.    TECHNIQUE: Double contrast esophagram.    FLUOROSCOPY: The exam was performed with a low dose, pulsed fluoroscopy   unit.  Time: 2.3 minutes.    FINDINGS:    A  view of the chest demonstrates a right middle lung linear   opacity, a normal cardiac silhouette, and unremarkable bones and soft   tissues.    The patient was given a liquid barium suspension and fluoroscopic images   were obtained.    The esophagus showed irregular contraction indicating dysmotility.    There were no esophageal strictures or structural abnormalities.    A barium tablet was swallowed with delay in the distal esophagus. The   tablet eventually passed with administration of water.      IMPRESSION:    Esophageal dysmotility. No definite stricture or mass

## 2022-02-24 NOTE — PROGRESS NOTE ADULT - SUBJECTIVE AND OBJECTIVE BOX
Patient is a 86y old  Female who presents with a chief complaint of Syncopal Episode (24 Feb 2022 15:56)      SUBJECTIVE / OVERNIGHT EVENTS: no new events, ptn has some esophageal delay, cleared by GI for DC, no intercetion planned. case d/w Dr. Mcgowan from [facility], cleared for DC, case d/w Dr. Handy, ID, cleared for DC. orthostasis resolved, bradycardia resolved, BP noted to be slightly high. will place on Losartan 25 mg daily    MEDICATIONS  (STANDING):  cholecalciferol 1000 Unit(s) Oral two times a day  difluprednate 0.05% Ophthalmic Emulsion 1 Drop(s) Left EYE two times a day  FLUoxetine 60 milliGRAM(s) Oral daily  fluticasone propionate 50 MICROgram(s)/spray Nasal Spray 1 Spray(s) Both Nostrils two times a day  heparin   Injectable 5000 Unit(s) SubCutaneous every 8 hours  pantoprazole    Tablet 40 milliGRAM(s) Oral two times a day  prednisoLONE acetate 1% Suspension 1 Drop(s) Left EYE two times a day  sodium chloride 0.65% Nasal 1 Spray(s) Both Nostrils four times a day  sodium chloride 0.9%. 1000 milliLiter(s) (100 mL/Hr) IV Continuous <Continuous>    MEDICATIONS  (PRN):  acetaminophen     Tablet .. 650 milliGRAM(s) Oral every 6 hours PRN Temp greater or equal to 38C (100.4F), Mild Pain (1 - 3)  aluminum hydroxide/magnesium hydroxide/simethicone Suspension 30 milliLiter(s) Oral every 4 hours PRN Dyspepsia  melatonin 3 milliGRAM(s) Oral at bedtime PRN Insomnia  ondansetron Injectable 4 milliGRAM(s) IV Push every 8 hours PRN Nausea and/or Vomiting      Vital Signs Last 24 Hrs  T(F): 97.9 (02-24-22 @ 11:25), Max: 97.9 (02-23-22 @ 20:02)  HR: 71 (02-24-22 @ 11:25) (61 - 71)  BP: 176/69 (02-24-22 @ 11:25) (142/61 - 176/69)  RR: 18 (02-24-22 @ 11:25) (18 - 18)  SpO2: 97% (02-24-22 @ 11:25) (96% - 98%)  Telemetry:   CAPILLARY BLOOD GLUCOSE        I&O's Summary    23 Feb 2022 07:01  -  24 Feb 2022 07:00  --------------------------------------------------------  IN: 880 mL / OUT: 0 mL / NET: 880 mL    24 Feb 2022 07:01  -  24 Feb 2022 16:26  --------------------------------------------------------  IN: 240 mL / OUT: 0 mL / NET: 240 mL        PHYSICAL EXAM:  GENERAL: NAD, well-developed  HEAD:  Atraumatic, Normocephalic  EYES: EOMI, PERRLA, conjunctiva and sclera clear  NECK: Supple, No JVD  CHEST/LUNG: Clear to auscultation bilaterally; No wheeze  HEART: Regular rate and rhythm; No murmurs, rubs, or gallops  ABDOMEN: Soft, Nontender, Nondistended; Bowel sounds present  EXTREMITIES:  2+ Peripheral Pulses, No clubbing, cyanosis, or edema  PSYCH: AAOx3  NEUROLOGY: non-focal  SKIN: No rashes or lesions    LABS:                        9.3    4.74  )-----------( 242      ( 24 Feb 2022 06:26 )             29.8

## 2022-02-24 NOTE — PROGRESS NOTE ADULT - ASSESSMENT
dysphagia    cont diet as tolerated  SLP eval noted  was on proton pump inhibitor at home, would resume here  esophagram noted for esophogeal dysmotility, no mass or stricture   d/w patient at length  d/w floor np   dysphagia    cont diet as tolerated  SLP eval noted  was on proton pump inhibitor at home, would resume here  esophagram noted for esophogeal dysmotility, no mass or stricture   no GI objection to dc planning with out patient follow up   d/w patient at length  d/w floor np

## 2022-02-24 NOTE — PROGRESS NOTE ADULT - SUBJECTIVE AND OBJECTIVE BOX
[facility]      Seen earlier today, and examined.  - Today, patient is without complaints.           *****MEDICATIONS: Current medication reviewed and documented.    MEDICATIONS  (STANDING):  cholecalciferol 1000 Unit(s) Oral two times a day  difluprednate 0.05% Ophthalmic Emulsion 1 Drop(s) Left EYE two times a day  FLUoxetine 60 milliGRAM(s) Oral daily  fluticasone propionate 50 MICROgram(s)/spray Nasal Spray 1 Spray(s) Both Nostrils two times a day  heparin   Injectable 5000 Unit(s) SubCutaneous every 8 hours  pantoprazole    Tablet 40 milliGRAM(s) Oral two times a day  prednisoLONE acetate 1% Suspension 1 Drop(s) Left EYE two times a day  sodium chloride 0.65% Nasal 1 Spray(s) Both Nostrils four times a day  sodium chloride 0.9%. 1000 milliLiter(s) (100 mL/Hr) IV Continuous <Continuous>    MEDICATIONS  (PRN):  acetaminophen     Tablet .. 650 milliGRAM(s) Oral every 6 hours PRN Temp greater or equal to 38C (100.4F), Mild Pain (1 - 3)  aluminum hydroxide/magnesium hydroxide/simethicone Suspension 30 milliLiter(s) Oral every 4 hours PRN Dyspepsia  melatonin 3 milliGRAM(s) Oral at bedtime PRN Insomnia  ondansetron Injectable 4 milliGRAM(s) IV Push every 8 hours PRN Nausea and/or Vomiting          ***** VITAL SIGNS:  T(F): 97.9 (22 @ 11:25), Max: 97.9 (22 @ 20:02)  HR: 71 (22 @ 11:25) (61 - 71)  BP: 176/69 (22 @ 11:25) (142/61 - 176/69)  RR: 18 (22 @ 11:25) (18 - 18)  SpO2: 97% (22 @ 11:25) (96% - 98%)  Wt(kg): --  ,   I&O's Summary    2022 07:01  -  2022 07:00  --------------------------------------------------------  IN: 880 mL / OUT: 0 mL / NET: 880 mL    2022 07:01  -  2022 15:56  --------------------------------------------------------  IN: 240 mL / OUT: 0 mL / NET: 240 mL             *****PHYSICAL EXAM:   alert oriented x 3 attention comprehension are fair.  Able to name, repeat.   EOmi fundi not visualized   no nystagmus VFF to confrontation  Tongue is midline  Palate elevates symmetrically   Moving all 4 ext spontaneously no drift appreciated    Gait not assessed.            *****LAB AND IMAGIN.3    4.74  )-----------( 242      ( 2022 06:26 )             29.8                   < from: Xray Esophagram Double Contrast (22 @ 09:33) >  The esophagus showed irregular contraction indicating dysmotility.    < end of copied text >                         [All pertinent recent Imaging/Reports reviewed]           *****A S S E S S M E N T   A N D   P L A N :      Excerpt from H&P,"  Patient is an 85 yo F with PMHx of IBS, Asthma, h/o L Breast CA (s/p lumpectomy and radiation), Anxiety/Depression, SEAMUS (not currently on treatment), MVP and GERD who presented s/p syncopal episode. Patient reports feeling unwell starting approx 10 days ago. 7 days prior to presentation, she was walking in the kitchen and subsequently synopsized. She awoke with her daughter over her and states she was unconscious for about 1 minute. She did strike the right side of her head. She did not have incontinence of subsequent confusion. SHe was evaluated by EMS and declined transfer to the hospital. Two days later she went to see her PCP and she had a presyncopal episode after leaving their office. She again had a presyncopal episode today which prompted her to present to the ED.     In the ED, patient was found to have HR of 57 and BP of 184/77. Labs revealed a Hgb of 9.8 (from prior of 11.6) but were otherwise unremarkable. CT of the brain was performed which was negative for traumatic injury, however did have findings consistent with sinusitis. A CXR revealed b/l peripheral lung opacities, increased from prior and a linear opacity in the RML. UA obtained was overall unremarkable. Patient was given a 500cc NS bolus and admitted to medicine for further management.      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 Will continue to follow patient periodically. Please do not hesitate to call me if you have any  questions or if there has been a change in patients neurological status     ________________  Melissa Pérez MD  [facility] ([facility]  [phone]      33 minutes spent on total encounter; more than 50 % of the visit was  spent counseling about plan of care, compliance to diet/exercise and medication regimen and or  coordinating care by the attending physician.      It is advised that stroke patients follow up with RIZWAN Jorgensen @ [phone] in 1- 2 weeks.   Others please follow up with Dr. Michael Nissenbaum [phone]

## 2022-02-24 NOTE — PROGRESS NOTE ADULT - SUBJECTIVE AND OBJECTIVE BOX
CARDIOLOGY FOLLOW UP - Dr. Lee  Date of Service: 2/24/22  CC: no cp/sob     Review of Systems:  Constitutional: No fever, weight loss, or fatigue  Respiratory: No cough, wheezing, or hemoptysis, no shortness of breath  Cardiovascular: No chest pain, palpitations, passing out, dizziness, or leg swelling  Gastrointestinal: No abd or epigastric pain.  No nausea, vomiting, or hematemesis; no diarrhea or constipation, no melena or hematochezia  Vascular: no edema       PHYSICAL EXAM:  T(C): 36.6 (02-24-22 @ 11:25), Max: 36.6 (02-23-22 @ 20:02)  HR: 71 (02-24-22 @ 11:25) (61 - 71)  BP: 142/61 (02-24-22 @ 04:24) (142/61 - 151/79)  RR: 18 (02-24-22 @ 11:25) (18 - 18)  SpO2: 97% (02-24-22 @ 11:25) (96% - 98%)  Wt(kg): --  I&O's Summary    23 Feb 2022 07:01  -  24 Feb 2022 07:00  --------------------------------------------------------  IN: 880 mL / OUT: 0 mL / NET: 880 mL        Appearance: Normal	  Cardiovascular: Normal S1 S2,RRR, No JVD, No murmurs  Respiratory: Lungs clear to auscultation	  Gastrointestinal:  Soft, Non-tender, + BS	  Extremities: Normal range of motion, No clubbing, cyanosis or edema      Home Medications:  diazepam 5 mg oral tablet: 0.5 tab(s) orally once a day    NOTE: Last fill 11/11/2021. Patient has own med supply  (18 Feb 2022 23:33)  Durezol: 1 drop(s) in the left eye 2 times a day (18 Feb 2022 23:33)  Durezol 0.05% ophthalmic emulsion: 1 drop(s) to each affected eye 2 times a day (18 Feb 2022 23:33)  FLUoxetine 20 mg oral capsule: 3 cap(s) orally once a day (18 Feb 2022 23:33)  nadolol: 10 milligram(s) orally once a day (at bedtime)    NOTE: Unable to confirm with secondary source. Patient stated she  has own med supply  (18 Feb 2022 23:33)  Nasacort AQ 55 mcg/inh nasal spray: 1   2 times a day  (18 Feb 2022 23:33)  Prilosec 20 mg oral delayed release capsule: 1   once a day  (18 Feb 2022 23:33)  Vitamin D3 25 mcg (1000 intl units) oral tablet: 1 tab(s) orally 2 times a day (18 Feb 2022 23:33)      MEDICATIONS  (STANDING):  cholecalciferol 1000 Unit(s) Oral two times a day  difluprednate 0.05% Ophthalmic Emulsion 1 Drop(s) Left EYE two times a day  FLUoxetine 60 milliGRAM(s) Oral daily  fluticasone propionate 50 MICROgram(s)/spray Nasal Spray 1 Spray(s) Both Nostrils two times a day  heparin   Injectable 5000 Unit(s) SubCutaneous every 8 hours  pantoprazole    Tablet 40 milliGRAM(s) Oral two times a day  prednisoLONE acetate 1% Suspension 1 Drop(s) Left EYE two times a day  sodium chloride 0.65% Nasal 1 Spray(s) Both Nostrils four times a day  sodium chloride 0.9%. 1000 milliLiter(s) (100 mL/Hr) IV Continuous <Continuous>      TELEMETRY: 	    ECG:  	  RADIOLOGY:   DIAGNOSTIC TESTING:  [ ] Echocardiogram:  [ ]  Catheterization:  [ ] Stress Test:    OTHER: 	    LABS:	 	    Troponin T, High Sensitivity Result: 15 ng/L [0 - 51] (02-18 @ 18:49)  Troponin T, High Sensitivity Result: 17 ng/L [0 - 51] (02-18 @ 17:02)                          9.3    4.74  )-----------( 242      ( 24 Feb 2022 06:26 )             29.8

## 2022-02-24 NOTE — PROGRESS NOTE ADULT - ASSESSMENT
85 yo F with PMHx of IBS, Asthma, h/o L Breast CA (s/p lumpectomy and radiation), Anxiety/Depression, SEAMUS (not currently on treatment), MVP and GERD who presented s/p syncopal episode    #syncope  -secondary to decrease po intake, hypovolemia, infection  -CT head with no cva  -echo with mod AS, nl lv fxn  + orthostatic hypotension 2/18  -sp IVF  -repeat orthostatics much improved, negative   -BB held per EP    #MVP  -echo noted    #SEAMUS   -chest xray noted-- pulm f/u  - AFB x 2 neg   -Sputum culture negative  -ID f/u     # Dysphagia  -esophagram today  -gi f/u     dvt ppx     no cv objection for DC  plan discussed with ACP

## 2022-02-25 ENCOUNTER — TRANSCRIPTION ENCOUNTER (OUTPATIENT)
Age: 87
End: 2022-02-25

## 2022-02-25 VITALS
HEART RATE: 65 BPM | RESPIRATION RATE: 18 BRPM | OXYGEN SATURATION: 97 % | TEMPERATURE: 98 F | SYSTOLIC BLOOD PRESSURE: 110 MMHG | DIASTOLIC BLOOD PRESSURE: 61 MMHG

## 2022-02-25 LAB
ANION GAP SERPL CALC-SCNC: 10 MMOL/L — SIGNIFICANT CHANGE UP (ref 5–17)
BUN SERPL-MCNC: 18 MG/DL — SIGNIFICANT CHANGE UP (ref 7–23)
CALCIUM SERPL-MCNC: 9.4 MG/DL — SIGNIFICANT CHANGE UP (ref 8.4–10.5)
CHLORIDE SERPL-SCNC: 99 MMOL/L — SIGNIFICANT CHANGE UP (ref 96–108)
CO2 SERPL-SCNC: 26 MMOL/L — SIGNIFICANT CHANGE UP (ref 22–31)
CREAT SERPL-MCNC: 0.93 MG/DL — SIGNIFICANT CHANGE UP (ref 0.5–1.3)
GLUCOSE SERPL-MCNC: 92 MG/DL — SIGNIFICANT CHANGE UP (ref 70–99)
HCT VFR BLD CALC: 30.6 % — LOW (ref 34.5–45)
HGB BLD-MCNC: 9.7 G/DL — LOW (ref 11.5–15.5)
MCHC RBC-ENTMCNC: 28.4 PG — SIGNIFICANT CHANGE UP (ref 27–34)
MCHC RBC-ENTMCNC: 31.7 GM/DL — LOW (ref 32–36)
MCV RBC AUTO: 89.5 FL — SIGNIFICANT CHANGE UP (ref 80–100)
NRBC # BLD: 0 /100 WBCS — SIGNIFICANT CHANGE UP (ref 0–0)
PLATELET # BLD AUTO: 265 K/UL — SIGNIFICANT CHANGE UP (ref 150–400)
POTASSIUM SERPL-MCNC: 4.1 MMOL/L — SIGNIFICANT CHANGE UP (ref 3.5–5.3)
POTASSIUM SERPL-SCNC: 4.1 MMOL/L — SIGNIFICANT CHANGE UP (ref 3.5–5.3)
RBC # BLD: 3.42 M/UL — LOW (ref 3.8–5.2)
RBC # FLD: 14.5 % — SIGNIFICANT CHANGE UP (ref 10.3–14.5)
SODIUM SERPL-SCNC: 135 MMOL/L — SIGNIFICANT CHANGE UP (ref 135–145)
WBC # BLD: 5.25 K/UL — SIGNIFICANT CHANGE UP (ref 3.8–10.5)
WBC # FLD AUTO: 5.25 K/UL — SIGNIFICANT CHANGE UP (ref 3.8–10.5)

## 2022-02-25 PROCEDURE — 86140 C-REACTIVE PROTEIN: CPT

## 2022-02-25 PROCEDURE — G1004: CPT

## 2022-02-25 PROCEDURE — 78582 LUNG VENTILAT&PERFUS IMAGING: CPT

## 2022-02-25 PROCEDURE — 82330 ASSAY OF CALCIUM: CPT

## 2022-02-25 PROCEDURE — 84100 ASSAY OF PHOSPHORUS: CPT

## 2022-02-25 PROCEDURE — 71250 CT THORAX DX C-: CPT

## 2022-02-25 PROCEDURE — 85730 THROMBOPLASTIN TIME PARTIAL: CPT

## 2022-02-25 PROCEDURE — 81001 URINALYSIS AUTO W/SCOPE: CPT

## 2022-02-25 PROCEDURE — U0003: CPT

## 2022-02-25 PROCEDURE — 87206 SMEAR FLUORESCENT/ACID STAI: CPT

## 2022-02-25 PROCEDURE — 85027 COMPLETE CBC AUTOMATED: CPT

## 2022-02-25 PROCEDURE — 97116 GAIT TRAINING THERAPY: CPT

## 2022-02-25 PROCEDURE — 87186 SC STD MICRODIL/AGAR DIL: CPT

## 2022-02-25 PROCEDURE — 82803 BLOOD GASES ANY COMBINATION: CPT

## 2022-02-25 PROCEDURE — 84484 ASSAY OF TROPONIN QUANT: CPT

## 2022-02-25 PROCEDURE — 70450 CT HEAD/BRAIN W/O DYE: CPT | Mod: ME

## 2022-02-25 PROCEDURE — 92610 EVALUATE SWALLOWING FUNCTION: CPT

## 2022-02-25 PROCEDURE — 74176 CT ABD & PELVIS W/O CONTRAST: CPT

## 2022-02-25 PROCEDURE — 74221 X-RAY XM ESOPHAGUS 2CNTRST: CPT

## 2022-02-25 PROCEDURE — 83735 ASSAY OF MAGNESIUM: CPT

## 2022-02-25 PROCEDURE — 87086 URINE CULTURE/COLONY COUNT: CPT

## 2022-02-25 PROCEDURE — 97161 PT EVAL LOW COMPLEX 20 MIN: CPT

## 2022-02-25 PROCEDURE — 87015 SPECIMEN INFECT AGNT CONCNTJ: CPT

## 2022-02-25 PROCEDURE — 82607 VITAMIN B-12: CPT

## 2022-02-25 PROCEDURE — 84295 ASSAY OF SERUM SODIUM: CPT

## 2022-02-25 PROCEDURE — 99285 EMERGENCY DEPT VISIT HI MDM: CPT | Mod: 25

## 2022-02-25 PROCEDURE — 84443 ASSAY THYROID STIM HORMONE: CPT

## 2022-02-25 PROCEDURE — 97110 THERAPEUTIC EXERCISES: CPT

## 2022-02-25 PROCEDURE — 82947 ASSAY GLUCOSE BLOOD QUANT: CPT

## 2022-02-25 PROCEDURE — 85652 RBC SED RATE AUTOMATED: CPT

## 2022-02-25 PROCEDURE — 85379 FIBRIN DEGRADATION QUANT: CPT

## 2022-02-25 PROCEDURE — 85025 COMPLETE CBC W/AUTO DIFF WBC: CPT

## 2022-02-25 PROCEDURE — 83605 ASSAY OF LACTIC ACID: CPT

## 2022-02-25 PROCEDURE — A9567: CPT

## 2022-02-25 PROCEDURE — 85014 HEMATOCRIT: CPT

## 2022-02-25 PROCEDURE — 87070 CULTURE OTHR SPECIMN AEROBIC: CPT

## 2022-02-25 PROCEDURE — 71045 X-RAY EXAM CHEST 1 VIEW: CPT

## 2022-02-25 PROCEDURE — 85610 PROTHROMBIN TIME: CPT

## 2022-02-25 PROCEDURE — 80053 COMPREHEN METABOLIC PANEL: CPT

## 2022-02-25 PROCEDURE — 93005 ELECTROCARDIOGRAM TRACING: CPT

## 2022-02-25 PROCEDURE — 85018 HEMOGLOBIN: CPT

## 2022-02-25 PROCEDURE — 36415 COLL VENOUS BLD VENIPUNCTURE: CPT

## 2022-02-25 PROCEDURE — 94640 AIRWAY INHALATION TREATMENT: CPT

## 2022-02-25 PROCEDURE — A9540: CPT

## 2022-02-25 PROCEDURE — U0005: CPT

## 2022-02-25 PROCEDURE — 87116 MYCOBACTERIA CULTURE: CPT

## 2022-02-25 PROCEDURE — 84145 PROCALCITONIN (PCT): CPT

## 2022-02-25 PROCEDURE — 80048 BASIC METABOLIC PNL TOTAL CA: CPT

## 2022-02-25 PROCEDURE — 93306 TTE W/DOPPLER COMPLETE: CPT

## 2022-02-25 PROCEDURE — 82435 ASSAY OF BLOOD CHLORIDE: CPT

## 2022-02-25 PROCEDURE — 84132 ASSAY OF SERUM POTASSIUM: CPT

## 2022-02-25 PROCEDURE — 74230 X-RAY XM SWLNG FUNCJ C+: CPT

## 2022-02-25 RX ORDER — PANTOPRAZOLE SODIUM 20 MG/1
1 TABLET, DELAYED RELEASE ORAL
Qty: 60 | Refills: 0
Start: 2022-02-25 | End: 2022-03-26

## 2022-02-25 RX ORDER — LOSARTAN POTASSIUM 100 MG/1
1 TABLET, FILM COATED ORAL
Qty: 30 | Refills: 0
Start: 2022-02-25 | End: 2022-03-26

## 2022-02-25 RX ORDER — FLUTICASONE PROPIONATE 50 MCG
1 SPRAY, SUSPENSION NASAL
Qty: 3 | Refills: 0
Start: 2022-02-25 | End: 2022-03-26

## 2022-02-25 RX ORDER — LANOLIN ALCOHOL/MO/W.PET/CERES
1 CREAM (GRAM) TOPICAL
Qty: 0 | Refills: 0 | DISCHARGE
Start: 2022-02-25

## 2022-02-25 RX ORDER — ACETAMINOPHEN 500 MG
2 TABLET ORAL
Qty: 0 | Refills: 0 | DISCHARGE
Start: 2022-02-25

## 2022-02-25 RX ADMIN — Medication 1 SPRAY(S): at 12:07

## 2022-02-25 RX ADMIN — Medication 60 MILLIGRAM(S): at 11:46

## 2022-02-25 RX ADMIN — PANTOPRAZOLE SODIUM 40 MILLIGRAM(S): 20 TABLET, DELAYED RELEASE ORAL at 05:38

## 2022-02-25 RX ADMIN — Medication 1000 UNIT(S): at 05:36

## 2022-02-25 RX ADMIN — LOSARTAN POTASSIUM 25 MILLIGRAM(S): 100 TABLET, FILM COATED ORAL at 05:37

## 2022-02-25 RX ADMIN — Medication 1 SPRAY(S): at 05:35

## 2022-02-25 RX ADMIN — DUREZOL 1 DROP(S): 0.5 EMULSION OPHTHALMIC at 06:11

## 2022-02-25 RX ADMIN — HEPARIN SODIUM 5000 UNIT(S): 5000 INJECTION INTRAVENOUS; SUBCUTANEOUS at 05:36

## 2022-02-25 RX ADMIN — HEPARIN SODIUM 5000 UNIT(S): 5000 INJECTION INTRAVENOUS; SUBCUTANEOUS at 13:31

## 2022-02-25 RX ADMIN — Medication 1 DROP(S): at 05:37

## 2022-02-25 NOTE — PROGRESS NOTE ADULT - SUBJECTIVE AND OBJECTIVE BOX
Patient is a 86y old  Female who presents with a chief complaint of Syncopal Episode (25 Feb 2022 13:10)      SUBJECTIVE / OVERNIGHT EVENTS: ptn is awaiting DC home today. BP improved w Losartan 25 mg    MEDICATIONS  (STANDING):  cholecalciferol 1000 Unit(s) Oral two times a day  difluprednate 0.05% Ophthalmic Emulsion 1 Drop(s) Left EYE two times a day  FLUoxetine 60 milliGRAM(s) Oral daily  fluticasone propionate 50 MICROgram(s)/spray Nasal Spray 1 Spray(s) Both Nostrils two times a day  heparin   Injectable 5000 Unit(s) SubCutaneous every 8 hours  losartan 25 milliGRAM(s) Oral daily  pantoprazole    Tablet 40 milliGRAM(s) Oral two times a day  prednisoLONE acetate 1% Suspension 1 Drop(s) Left EYE two times a day  sodium chloride 0.65% Nasal 1 Spray(s) Both Nostrils four times a day  sodium chloride 0.9%. 1000 milliLiter(s) (100 mL/Hr) IV Continuous <Continuous>    MEDICATIONS  (PRN):  acetaminophen     Tablet .. 650 milliGRAM(s) Oral every 6 hours PRN Temp greater or equal to 38C (100.4F), Mild Pain (1 - 3)  aluminum hydroxide/magnesium hydroxide/simethicone Suspension 30 milliLiter(s) Oral every 4 hours PRN Dyspepsia  melatonin 3 milliGRAM(s) Oral at bedtime PRN Insomnia  ondansetron Injectable 4 milliGRAM(s) IV Push every 8 hours PRN Nausea and/or Vomiting      Vital Signs Last 24 Hrs  T(F): 98 (02-25-22 @ 14:43), Max: 98.2 (02-25-22 @ 12:00)  HR: 65 (02-25-22 @ 14:43) (62 - 65)  BP: 110/61 (02-25-22 @ 14:43) (110/61 - 144/78)  RR: 18 (02-25-22 @ 14:43) (18 - 18)  SpO2: 97% (02-25-22 @ 14:43) (95% - 98%)  Telemetry:   CAPILLARY BLOOD GLUCOSE        I&O's Summary    24 Feb 2022 07:01  -  25 Feb 2022 07:00  --------------------------------------------------------  IN: 360 mL / OUT: 0 mL / NET: 360 mL        PHYSICAL EXAM:  GENERAL: NAD, well-developed  HEAD:  Atraumatic, Normocephalic  EYES: EOMI, PERRLA, conjunctiva and sclera clear  NECK: Supple, No JVD  CHEST/LUNG: Clear to auscultation bilaterally; No wheeze  HEART: Regular rate and rhythm; No murmurs, rubs, or gallops  ABDOMEN: Soft, Nontender, Nondistended; Bowel sounds present  EXTREMITIES:  2+ Peripheral Pulses, No clubbing, cyanosis, or edema  PSYCH: AAOx3  NEUROLOGY: non-focal  SKIN: No rashes or lesions    LABS:                        9.7    5.25  )-----------( 265      ( 25 Feb 2022 05:57 )             30.6     02-25    135  |  99  |  18  ----------------------------<  92  4.1   |  26  |  0.93    Ca    9.4      25 Feb 2022 05:57                RADIOLOGY & ADDITIONAL TESTS:    Imaging Personally Reviewed:    Consultant(s) Notes Reviewed:      Care Discussed with Consultants/Other Providers:

## 2022-02-25 NOTE — PROGRESS NOTE ADULT - PROBLEM SELECTOR PROBLEM 2
Mycobacterium avium complex

## 2022-02-25 NOTE — PROGRESS NOTE ADULT - ASSESSMENT
dysphagia    cont diet as tolerated  SLP eval noted  was on proton pump inhibitor at home, would resume here  esophagram noted for esophogeal dysmotility, no mass or stricture   no GI objection to dc planning with out patient follow up   d/w patient at length  d/w floor np

## 2022-02-25 NOTE — PROGRESS NOTE ADULT - TIME-BASED BILLING (NON-CRITICAL CARE)
Time-based billing (NON-critical care)
Note Text (......Xxx Chief Complaint.): This diagnosis correlates with the
Time-based billing (NON-critical care)
Other (Free Text): Recommended general surgeon to remove
Time-based billing (NON-critical care)
Detail Level: Simple

## 2022-02-25 NOTE — PROGRESS NOTE ADULT - PROBLEM SELECTOR PLAN 1
- patient reporting a syncopal episode last week and multiple presyncopal episodes since then   - VQ neg for PE  - D-dimer is also 363 which is negative for her age  - TTE c/w nl EF, MOD AS  - EKG revealed a mild sinus bradycardia but was otherwise unremarkable.   - a single orthostatic check was POSITIVE on 2/18,   - seen by card and EP, now off nadolol 2/2 bradycardia  - no new c/o, dizziness has resolved, bradycardia has resolved off BB, orthostasis has resolved, had MBS today, can cont eating a regular diet w thin liquids. DC planning to OTF.
- patient reporting a syncopal episode last week and multiple presyncopal episodes since then   - VQ neg for PE  - D-dimer is also 363 which is negative for her age  - TTE c/w nl EF, MOD AS  - EKG revealed a mild sinus bradycardia but was otherwise unremarkable.   - a single orthostatic check was POSITIVE on 2/18, need to rpt post IVF  - seen by card and EP, now off nadolol 2/2 bradycardia  - monitor on tele
- patient reporting a syncopal episode last week and multiple presyncopal episodes since then , etiology: volume depletion and bradycardia  - VQ neg for PE  - D-dimer is also 363 which is negative for her age  - TTE c/w nl EF, MOD AS, will need F/U  - EKG revealed a mild sinus bradycardia but was otherwise unremarkable.   - a single orthostatic check was POSITIVE on 2/18, resolved post IVF   - seen by card and EP, now off nadolol 2/2 bradycardia, HR is stable  - no new c/o, dizziness has resolved, bradycardia has resolved off BB, orthostasis has resolved, had MBS, can cont eating a regular diet w thin liquids.   - seen by GI for r/o dysmotility, awaiting Esophagram in am  - DC planning to OTF.
- patient reporting a syncopal episode last week and multiple presyncopal episodes since then   - VQ neg for PE  - D-dimer is also 363 which is negative for her age  - TTE pending  - EKG revealed a mild sinus bradycardia but was otherwise unremarkable.   - check orthostatics   - seen by card and EP  - monitor on tele
- patient reporting a syncopal episode last week and multiple presyncopal episodes since then , etiology: volume depletion and bradycardia  - VQ neg for PE  - D-dimer is also 363 which is negative for her age  - TTE c/w nl EF, MOD AS, will need F/U  - EKG revealed a mild sinus bradycardia but was otherwise unremarkable.   - a single orthostatic check was POSITIVE on 2/18, resolved post IVF   - seen by card and EP, now off nadolol 2/2 bradycardia, HR is stable  - no new c/o, dizziness has resolved, bradycardia has resolved off BB, orthostasis has resolved, had MBS, can cont eating a regular diet w thin liquids.   - seen by GI for r/o dysmotility, s/p Esophagram, has mild dysmotility, no intervention planned  - DC planning to home
- patient reporting a syncopal episode last week and multiple presyncopal episodes since then   - VQ neg for PE  - D-dimer is also 363 which is negative for her age  - TTE c/w nl EF, MOD AS  - EKG revealed a mild sinus bradycardia but was otherwise unremarkable.   - a single orthostatic check was POSITIVE on 2/18, need to rpt post IVF  - seen by card and EP, now off nadolol 2/2 bradycardia  - monitor on tele
- patient reporting a syncopal episode last week and multiple presyncopal episodes since then , etiology: volume depletion and bradycardia  - VQ neg for PE  - D-dimer is also 363 which is negative for her age  - TTE c/w nl EF, MOD AS, will need F/U  - EKG revealed a mild sinus bradycardia but was otherwise unremarkable.   - a single orthostatic check was POSITIVE on 2/18, resolved post IVF   - seen by card and EP, now off nadolol 2/2 bradycardia, HR is stable  - no new c/o, dizziness has resolved, bradycardia has resolved off BB, orthostasis has resolved, had MBS, can cont eating a regular diet w thin liquids.   - seen by GI for r/o dysmotility, s/p Esophagram, has mild dysmotility, no intervention planned  - DC planning to home

## 2022-02-25 NOTE — PROGRESS NOTE ADULT - ASSESSMENT
87 yo F with PMHx of IBS, Asthma, h/o L Breast CA (s/p lumpectomy and radiation), Anxiety/Depression, SEAMUS (not currently on treatment), MVP and GERD who presented s/p syncopal episode    #syncope  -secondary to decrease po intake, hypovolemia, infection  -CT head with no cva  -echo with mod AS, nl lv fxn  + orthostatic hypotension 2/18  -sp IVF  -repeat orthostatics much improved, negative   -BB held per EP    #MVP  -echo noted    #SEAMUS   -chest xray noted-- pulm f/u  - AFB x 2 neg   -Sputum culture negative  -ID f/u     # Dysphagia  -sp esophagram gi f/u     dvt ppx     no cv objection for DC

## 2022-02-25 NOTE — DISCHARGE NOTE NURSING/CASE MANAGEMENT/SOCIAL WORK - NSDCPEFALRISK_GEN_ALL_CORE
For information on Fall & Injury Prevention, visit: [email or web address] OR  [email or web address] OR  https://www.cdc.gov/steadi/patient.html

## 2022-02-25 NOTE — PROGRESS NOTE ADULT - TIME BILLING
review of inpatient/outpatient records discussion with daughter, pateint and medical team.
Agree with above NP note.  cv stable  bb on hold   monitor orthostatics
Agree with above NP note.  cv stable  bb on hold   orthostatics improved  clinically improved  d/c planning
Agree with above NP note.  cv stable  bb on hold   orthostatics improved  clinically improved  d/c planning
Agree with above NP note.  cv stable  bb on hold   echo noted as above  med/eps f/u  hydrate  trend orthostatics
Agree with above NP note.  cv stable  bb on hold   orthostatics improved  clinically improved  d/c planning
personal review of data, discussion with medical team and family at bedside.

## 2022-02-25 NOTE — PROGRESS NOTE ADULT - PROBLEM SELECTOR PLAN 7
- Diet: regular  - DVT ppx: HSQ  - Dispo: pending clinical improvement

## 2022-02-25 NOTE — PROGRESS NOTE ADULT - PROBLEM SELECTOR PROBLEM 5
Anxiety and depression

## 2022-02-25 NOTE — PROGRESS NOTE ADULT - REASON FOR ADMISSION
Syncopal Episode

## 2022-02-25 NOTE — PROGRESS NOTE ADULT - SUBJECTIVE AND OBJECTIVE BOX
[facility]  Bennie Savage PA-C  [address] [place] [address]  [phone]      INTERVAL HPI/OVERNIGHT EVENTS:  pt seen and examined, no new events  tolerating diet but has to eat slowly  no complaints     MEDICATIONS  (STANDING):  cholecalciferol 1000 Unit(s) Oral two times a day  difluprednate 0.05% Ophthalmic Emulsion 1 Drop(s) Left EYE two times a day  FLUoxetine 60 milliGRAM(s) Oral daily  fluticasone propionate 50 MICROgram(s)/spray Nasal Spray 1 Spray(s) Both Nostrils two times a day  heparin   Injectable 5000 Unit(s) SubCutaneous every 8 hours  pantoprazole    Tablet 40 milliGRAM(s) Oral two times a day  prednisoLONE acetate 1% Suspension 1 Drop(s) Left EYE two times a day  sodium chloride 0.65% Nasal 1 Spray(s) Both Nostrils four times a day  sodium chloride 0.9%. 1000 milliLiter(s) (100 mL/Hr) IV Continuous <Continuous>    MEDICATIONS  (PRN):  acetaminophen     Tablet .. 650 milliGRAM(s) Oral every 6 hours PRN Temp greater or equal to 38C (100.4F), Mild Pain (1 - 3)  aluminum hydroxide/magnesium hydroxide/simethicone Suspension 30 milliLiter(s) Oral every 4 hours PRN Dyspepsia  melatonin 3 milliGRAM(s) Oral at bedtime PRN Insomnia  ondansetron Injectable 4 milliGRAM(s) IV Push every 8 hours PRN Nausea and/or Vomiting      Allergies    cefdinir (Unknown)  ciprofloxacin (Other)  codeine (Nausea; Other)  contrast media (iodine-based) (Angioedema)  fluorescein ophthalmic (Hives; Rash; Flushing)  lactose (Unknown)  latex (Anaphylaxis)  Levaquin (Nausea; Other)  lidocaine (Unknown)  SULFA (Anaphylaxis)  tetracycline (Anaphylaxis)    Intolerances    Augmentin (Stomach Upset)      ROS:   General:  No wt loss, fevers, chills, night sweats, fatigue,   Eyes:  Good vision, no reported pain  ENT:  No sore throat, pain, runny nose, dysphagia  CV:  No pain, palpitations, hypo/hypertension  Resp:  No dyspnea, cough, tachypnea, wheezing  GI:  No pain, No nausea, No vomiting, No diarrhea, No constipation, No weight loss, No fever, No pruritis, No rectal bleeding, No tarry stools, + dysphagia,  :  No pain, bleeding, incontinence, nocturia  Muscle:  No pain, weakness  Neuro:  No weakness, tingling, memory problems  Psych:  No fatigue, insomnia, mood problems, depression  Endocrine:  No polyuria, polydipsia, cold/heat intolerance  Heme:  No petechiae, ecchymosis, easy bruisability  Skin:  No rash, tattoos, scars, edema      PHYSICAL EXAM:   Vital Signs Last 24 Hrs  T(C): 36.8 (25 Feb 2022 12:00), Max: 36.8 (25 Feb 2022 12:00)  T(F): 98.2 (25 Feb 2022 12:00), Max: 98.2 (25 Feb 2022 12:00)  HR: 62 (25 Feb 2022 12:00) (62 - 65)  BP: 144/78 (25 Feb 2022 12:00) (116/61 - 144/78)  BP(mean): --  RR: 18 (25 Feb 2022 12:00) (18 - 18)  SpO2: 95% (25 Feb 2022 12:00) (95% - 98%)  Daily     Daily     GENERAL:  Appears stated age,   HEENT:  NC/AT,    CHEST:  Full & symmetric excursion,   HEART:  Regular rhythm,  ABDOMEN:  Soft, non-tender, non-distended,  EXTEREMITIES:  no cyanosis  SKIN:  No rash  NEURO:  Alert,       LABS:                        9.7    5.25  )-----------( 265      ( 25 Feb 2022 05:57 )             30.6   02-25    135  |  99  |  18  ----------------------------<  92  4.1   |  26  |  0.93    Ca    9.4      25 Feb 2022 05:57              RADIOLOGY & ADDITIONAL TESTS:  < from: Xray Esophagram Double Contrast (02.24.22 @ 09:33) >    ACC: [id number] EXAM:  XR ESOPH DBL CON STUDY                          PROCEDURE DATE:  02/24/2022          INTERPRETATION:  CLINICAL INFORMATION: Globus sensation in lower   esophagus. Painful spasms of the esophagus.    TECHNIQUE: Double contrast esophagram.    FLUOROSCOPY: The exam was performed with a low dose, pulsed fluoroscopy   unit.  Time: 2.3 minutes.    FINDINGS:    A  view of the chest demonstrates a right middle lung linear   opacity, a normal cardiac silhouette, and unremarkable bones and soft   tissues.    The patient was given a liquid barium suspension and fluoroscopic images   were obtained.    The esophagus showed irregular contraction indicating dysmotility.    There were no esophageal strictures or structural abnormalities.    A barium tablet was swallowed with delay in the distal esophagus. The   tablet eventually passed with administration of water.      IMPRESSION:    Esophageal dysmotility. No definite stricture or mass

## 2022-02-25 NOTE — PROGRESS NOTE ADULT - PROBLEM SELECTOR PROBLEM 4
Sinusitis with nasal polyps

## 2022-02-25 NOTE — PROGRESS NOTE ADULT - PROBLEM SELECTOR PLAN 3
- patient reporting a sensation of food/pills getting stuck in her throat  - seen by S&S, on regular diet
- patient reporting a sensation of food/pills getting stuck in her throat  - seen by S&S, on regular diet
- patient reporting a sensation of food/pills getting stuck in her throat  - passed MBS, seen by GI, awaiting esophagram
- patient reporting a sensation of food/pills getting stuck in her throat  - seen by S&S, on regular diet
- patient reporting a sensation of food/pills getting stuck in her throat  - passed MBS
- patient reporting a sensation of food/pills getting stuck in her throat  - passed MBS, has mild dysmotility, no intervention planned
- patient reporting a sensation of food/pills getting stuck in her throat  - passed MBS, has mild dysmotility, no intervention planned

## 2022-02-25 NOTE — PROGRESS NOTE ADULT - PROBLEM SELECTOR PLAN 5
- continue home fluoxetine 60

## 2022-02-25 NOTE — PROGRESS NOTE ADULT - PROBLEM SELECTOR PLAN 8
BP slightly elevated, started Losartan 25 mg daily, BP improved.   all above findings and treatment was shared w Dr. Criselda Cardona, ptn's PMD
BP slightly elevated, start Losartan 25 mg daily

## 2022-02-25 NOTE — PROGRESS NOTE ADULT - SUBJECTIVE AND OBJECTIVE BOX
CARDIOLOGY FOLLOW UP - Dr. Lee  DATE OF SERVICE: 2/25/22    CC no cp or sob       REVIEW OF SYSTEMS:  CONSTITUTIONAL: No fever, weight loss, or fatigue  RESPIRATORY: No cough, wheezing, chills or hemoptysis; No Shortness of Breath  CARDIOVASCULAR: No chest pain, palpitations, passing out, dizziness, or leg swelling  GASTROINTESTINAL: No abdominal or epigastric pain. No nausea, vomiting, or hematemesis; No diarrhea or constipation. No melena or hematochezia.  VASCULAR: No edema     PHYSICAL EXAM:  T(C): 36.8 (02-25-22 @ 12:00), Max: 36.8 (02-25-22 @ 12:00)  HR: 62 (02-25-22 @ 12:00) (62 - 65)  BP: 144/78 (02-25-22 @ 12:00) (116/61 - 144/78)  RR: 18 (02-25-22 @ 12:00) (18 - 18)  SpO2: 95% (02-25-22 @ 12:00) (95% - 98%)  Wt(kg): --  I&O's Summary    24 Feb 2022 07:01  -  25 Feb 2022 07:00  --------------------------------------------------------  IN: 360 mL / OUT: 0 mL / NET: 360 mL        Appearance: Normal	  Cardiovascular: Normal S1 S2,RRR +  murmurs  Respiratory: Lungs clear to auscultation	  Gastrointestinal:  Soft, Non-tender, + BS	  Extremities: Normal range of motion, No clubbing, cyanosis or edema      Home Medications:  diazepam 5 mg oral tablet: 0.5 tab(s) orally once a day    NOTE: Last fill 11/11/2021. Patient has own med supply  (18 Feb 2022 23:33)  Durezol: 1 drop(s) in the left eye 2 times a day (18 Feb 2022 23:33)  Durezol 0.05% ophthalmic emulsion: 1 drop(s) to each affected eye 2 times a day (18 Feb 2022 23:33)  FLUoxetine 20 mg oral capsule: 3 cap(s) orally once a day (18 Feb 2022 23:33)  nadolol: 10 milligram(s) orally once a day (at bedtime)    NOTE: Unable to confirm with secondary source. Patient stated she  has own med supply  (18 Feb 2022 23:33)  Nasacort AQ 55 mcg/inh nasal spray: 1   2 times a day  (18 Feb 2022 23:33)  Prilosec 20 mg oral delayed release capsule: 1   once a day  (18 Feb 2022 23:33)  Vitamin D3 25 mcg (1000 intl units) oral tablet: 1 tab(s) orally 2 times a day (18 Feb 2022 23:33)      MEDICATIONS  (STANDING):  cholecalciferol 1000 Unit(s) Oral two times a day  difluprednate 0.05% Ophthalmic Emulsion 1 Drop(s) Left EYE two times a day  FLUoxetine 60 milliGRAM(s) Oral daily  fluticasone propionate 50 MICROgram(s)/spray Nasal Spray 1 Spray(s) Both Nostrils two times a day  heparin   Injectable 5000 Unit(s) SubCutaneous every 8 hours  losartan 25 milliGRAM(s) Oral daily  pantoprazole    Tablet 40 milliGRAM(s) Oral two times a day  prednisoLONE acetate 1% Suspension 1 Drop(s) Left EYE two times a day  sodium chloride 0.65% Nasal 1 Spray(s) Both Nostrils four times a day  sodium chloride 0.9%. 1000 milliLiter(s) (100 mL/Hr) IV Continuous <Continuous>      TELEMETRY: 	    ECG:  	  RADIOLOGY:   DIAGNOSTIC TESTING:  [ ] Echocardiogram:  [ ]  Catheterization:  [ ] Stress Test:    OTHER: 	    LABS:	 	    Troponin T, High Sensitivity Result: 15 ng/L [0 - 51] (02-18 @ 18:49)  Troponin T, High Sensitivity Result: 17 ng/L [0 - 51] (02-18 @ 17:02)                          9.7    5.25  )-----------( 265      ( 25 Feb 2022 05:57 )             30.6     02-25    135  |  99  |  18  ----------------------------<  92  4.1   |  26  |  0.93    Ca    9.4      25 Feb 2022 05:57

## 2022-02-25 NOTE — DISCHARGE NOTE NURSING/CASE MANAGEMENT/SOCIAL WORK - PATIENT PORTAL LINK FT
You can access the FollowMyHealth Patient Portal offered by [facility] by registering at the following website: [email or web address]. By joining [facility]’s FollowMyHealth portal, you will also be able to view your health information using other applications (apps) compatible with our system.

## 2022-02-25 NOTE — PROGRESS NOTE ADULT - PROBLEM SELECTOR PLAN 2
- CXR on admission revealing increasing peripheral opacifications as well as a new linear lung opacity in the RML  - patient reports she has been previously diagnosed with SEAMUS and is supposed to be on antibiotics, however is intolerant of the regimen  - eval by pulm; Dr. Mcgowan appreciated and reviewed.   - ptn is interested in SEAMUS treatment now  - presently no HA, no fever, no SOB
- CXR on admission revealing increasing peripheral opacifications as well as a new linear lung opacity in the RML  - patient reports she has been previously diagnosed with SEAMUS and is supposed to be on antibiotics, however is intolerant of the regimen  - eval by pulm; Dr. Mcgowan appreciated and reviewed.   - ptn is interested in SEAMUS treatment now, AFB x 3 ordered  - presently no HA, no fever, no SOB
- CXR on admission revealing increasing peripheral opacifications as well as a new linear lung opacity in the RML  - patient reports she has been previously diagnosed with SEAMUS and is supposed to be on antibiotics, however is intolerant of the regimen  - eval by pulm and ID appreciated , will need cardiology clearance for outptn treatment with AZITHROMYCIN as per ID  - rpt sputum Cx pos for MSSA, as per ID this doesn't require ABx treatment   - ptn is interested in SEAMUS treatment now, AFB x 3 ordered  - DC planning once cleared by PULM and ID
- CXR on admission revealing increasing peripheral opacifications as well as a new linear lung opacity in the RML  - patient reports she has been previously diagnosed with SEAMUS and is supposed to be on antibiotics, however is intolerant of the regimen  - eval by pulm and ID appreciated , will need cardiology clearance for outptn treatment with AZITHROMYCIN as per ID  - rpt sputum Cx pos for MSSA, as per ID this doesn't require ABx treatment   - ptn is interested in SEAMUS treatment now, sputum for AFB ordered, awaiting 2 more sputums as per PULM. cleared by ID for DC  - DC planning , ptn cleared by Dr. Mcgowan, pulm
- CXR on admission revealing increasing peripheral opacifications as well as a new linear lung opacity in the RML  - patient reports she has been previously diagnosed with SEAMUS and is supposed to be on antibiotics, however is intolerant of the regimen  - eval by pulm; Dr. Mcgowan appreciated and reviewed.   - ptn is interested in SEAMUS treatment now, AFB x 3 ordered  - presently no HA, no fever, no SOB
- CXR on admission revealing increasing peripheral opacifications as well as a new linear lung opacity in the RML  - patient reports she has been previously diagnosed with SEAMUS and is supposed to be on antibiotics, however is intolerant of the regimen  - eval by pulm and ID appreciated , will need cardiology clearance for outptn treatment with AZITHROMYCIN as per ID  - rpt sputum Cx pos for MSSA, as per ID this doesn't require ABx treatment   - ptn is interested in SEAMUS treatment now, sputum for AFB ordered, awaiting 2 more sputums as per PULM. cleared by ID for DC  - DC planning once cleared by PULM
- CXR on admission revealing increasing peripheral opacifications as well as a new linear lung opacity in the RML  - patient reports she has been previously diagnosed with SEAMUS and is supposed to be on antibiotics, however is intolerant of the regimen  - eval by pulm and ID appreciated , will need cardiology clearance for outptn treatment with AZITHROMYCIN as per ID  - rpt sputum Cx pos for MSSA, as per ID this doesn't require ABx treatment   - ptn is interested in SEAMUS treatment now, sputum for AFB ordered, awaiting 2 more sputums as per PULM. cleared by ID for DC  - DC planning , ptn cleared by Dr. Mcgowan, pulm

## 2022-02-25 NOTE — PROGRESS NOTE ADULT - PROBLEM SELECTOR PLAN 4
- CT head revealing opacification of the sinuses with associated polyps in the anterior sinuses  - she states that she was recommended to get surgery, however she declined  - will continue with nasal saline spray and flonase

## 2022-03-21 ENCOUNTER — RESULT REVIEW (OUTPATIENT)
Age: 87
End: 2022-03-21

## 2022-03-21 ENCOUNTER — APPOINTMENT (OUTPATIENT)
Dept: ULTRASOUND IMAGING | Facility: IMAGING CENTER | Age: 87
End: 2022-03-21
Payer: MEDICARE

## 2022-03-21 ENCOUNTER — APPOINTMENT (OUTPATIENT)
Dept: MAMMOGRAPHY | Facility: IMAGING CENTER | Age: 87
End: 2022-03-21
Payer: MEDICARE

## 2022-03-21 ENCOUNTER — OUTPATIENT (OUTPATIENT)
Dept: OUTPATIENT SERVICES | Facility: HOSPITAL | Age: 87
LOS: 1 days | End: 2022-03-21
Payer: MEDICARE

## 2022-03-21 DIAGNOSIS — Z00.8 ENCOUNTER FOR OTHER GENERAL EXAMINATION: ICD-10-CM

## 2022-03-21 PROCEDURE — 77067 SCR MAMMO BI INCL CAD: CPT | Mod: 26

## 2022-03-21 PROCEDURE — 76641 ULTRASOUND BREAST COMPLETE: CPT | Mod: 26,50

## 2022-03-21 PROCEDURE — 76641 ULTRASOUND BREAST COMPLETE: CPT

## 2022-03-21 PROCEDURE — 77063 BREAST TOMOSYNTHESIS BI: CPT | Mod: 26

## 2022-03-21 PROCEDURE — 77067 SCR MAMMO BI INCL CAD: CPT

## 2022-03-21 PROCEDURE — 77063 BREAST TOMOSYNTHESIS BI: CPT

## 2022-03-23 ENCOUNTER — APPOINTMENT (OUTPATIENT)
Dept: HEMATOLOGY ONCOLOGY | Facility: CLINIC | Age: 87
End: 2022-03-23

## 2022-05-27 ENCOUNTER — APPOINTMENT (OUTPATIENT)
Dept: PULMONOLOGY | Facility: CLINIC | Age: 87
End: 2022-05-27
Payer: MEDICARE

## 2022-05-27 ENCOUNTER — TRANSCRIPTION ENCOUNTER (OUTPATIENT)
Age: 87
End: 2022-05-27

## 2022-05-27 ENCOUNTER — LABORATORY RESULT (OUTPATIENT)
Age: 87
End: 2022-05-27

## 2022-05-27 ENCOUNTER — NON-APPOINTMENT (OUTPATIENT)
Age: 87
End: 2022-05-27

## 2022-05-27 DIAGNOSIS — R05.9 COUGH, UNSPECIFIED: ICD-10-CM

## 2022-05-27 DIAGNOSIS — U07.1 COVID-19: ICD-10-CM

## 2022-05-27 PROCEDURE — 99442: CPT

## 2022-05-28 ENCOUNTER — INPATIENT (INPATIENT)
Facility: HOSPITAL | Age: 87
LOS: 4 days | Discharge: HOME | DRG: 178 | End: 2022-06-02
Attending: INTERNAL MEDICINE | Admitting: INTERNAL MEDICINE
Payer: MEDICARE

## 2022-05-28 VITALS
TEMPERATURE: 98 F | HEIGHT: 67 IN | RESPIRATION RATE: 15 BRPM | OXYGEN SATURATION: 95 % | DIASTOLIC BLOOD PRESSURE: 80 MMHG | WEIGHT: 147.05 LBS | SYSTOLIC BLOOD PRESSURE: 150 MMHG | HEART RATE: 70 BPM

## 2022-05-28 DIAGNOSIS — R11.2 NAUSEA WITH VOMITING, UNSPECIFIED: ICD-10-CM

## 2022-05-28 DIAGNOSIS — U07.1 COVID-19: ICD-10-CM

## 2022-05-28 DIAGNOSIS — K21.9 GASTRO-ESOPHAGEAL REFLUX DISEASE WITHOUT ESOPHAGITIS: ICD-10-CM

## 2022-05-28 DIAGNOSIS — F41.9 ANXIETY DISORDER, UNSPECIFIED: ICD-10-CM

## 2022-05-28 LAB
ALBUMIN SERPL ELPH-MCNC: 3.2 G/DL — LOW (ref 3.3–5)
ALP SERPL-CCNC: 108 U/L — SIGNIFICANT CHANGE UP (ref 40–120)
ALT FLD-CCNC: 21 U/L — SIGNIFICANT CHANGE UP (ref 10–45)
ANION GAP SERPL CALC-SCNC: 13 MMOL/L — SIGNIFICANT CHANGE UP (ref 5–17)
AST SERPL-CCNC: 37 U/L — SIGNIFICANT CHANGE UP (ref 10–40)
BASE EXCESS BLDV CALC-SCNC: 3 MMOL/L — HIGH (ref -2–2)
BASOPHILS # BLD AUTO: 0.03 K/UL — SIGNIFICANT CHANGE UP (ref 0–0.2)
BASOPHILS NFR BLD AUTO: 0.7 % — SIGNIFICANT CHANGE UP (ref 0–2)
BILIRUB SERPL-MCNC: 0.3 MG/DL — SIGNIFICANT CHANGE UP (ref 0.2–1.2)
BUN SERPL-MCNC: 20 MG/DL — SIGNIFICANT CHANGE UP (ref 7–23)
CA-I SERPL-SCNC: 1.2 MMOL/L — SIGNIFICANT CHANGE UP (ref 1.15–1.33)
CALCIUM SERPL-MCNC: 8.5 MG/DL — SIGNIFICANT CHANGE UP (ref 8.4–10.5)
CHLORIDE BLDV-SCNC: 98 MMOL/L — SIGNIFICANT CHANGE UP (ref 96–108)
CHLORIDE SERPL-SCNC: 97 MMOL/L — SIGNIFICANT CHANGE UP (ref 96–108)
CO2 BLDV-SCNC: 29 MMOL/L — HIGH (ref 22–26)
CO2 SERPL-SCNC: 23 MMOL/L — SIGNIFICANT CHANGE UP (ref 22–31)
CREAT SERPL-MCNC: 0.72 MG/DL — SIGNIFICANT CHANGE UP (ref 0.5–1.3)
EGFR: 81 ML/MIN/1.73M2 — SIGNIFICANT CHANGE UP
EOSINOPHIL # BLD AUTO: 0 K/UL — SIGNIFICANT CHANGE UP (ref 0–0.5)
EOSINOPHIL NFR BLD AUTO: 0 % — SIGNIFICANT CHANGE UP (ref 0–6)
FLUAV AG NPH QL: SIGNIFICANT CHANGE UP
FLUBV AG NPH QL: SIGNIFICANT CHANGE UP
GAS PNL BLDV: 129 MMOL/L — LOW (ref 136–145)
GAS PNL BLDV: SIGNIFICANT CHANGE UP
GAS PNL BLDV: SIGNIFICANT CHANGE UP
GLUCOSE BLDV-MCNC: 100 MG/DL — HIGH (ref 70–99)
GLUCOSE SERPL-MCNC: 101 MG/DL — HIGH (ref 70–99)
HCO3 BLDV-SCNC: 28 MMOL/L — SIGNIFICANT CHANGE UP (ref 22–29)
HCT VFR BLD CALC: 29.5 % — LOW (ref 34.5–45)
HCT VFR BLDA CALC: 30 % — LOW (ref 34.5–46.5)
HGB BLD CALC-MCNC: 9.9 G/DL — LOW (ref 11.7–16.1)
HGB BLD-MCNC: 9.2 G/DL — LOW (ref 11.5–15.5)
IMM GRANULOCYTES NFR BLD AUTO: 0.2 % — SIGNIFICANT CHANGE UP (ref 0–1.5)
LACTATE BLDV-MCNC: 0.8 MMOL/L — SIGNIFICANT CHANGE UP (ref 0.7–2)
LYMPHOCYTES # BLD AUTO: 0.91 K/UL — LOW (ref 1–3.3)
LYMPHOCYTES # BLD AUTO: 22.6 % — SIGNIFICANT CHANGE UP (ref 13–44)
MCHC RBC-ENTMCNC: 27.5 PG — SIGNIFICANT CHANGE UP (ref 27–34)
MCHC RBC-ENTMCNC: 31.2 GM/DL — LOW (ref 32–36)
MCV RBC AUTO: 88.1 FL — SIGNIFICANT CHANGE UP (ref 80–100)
MONOCYTES # BLD AUTO: 0.6 K/UL — SIGNIFICANT CHANGE UP (ref 0–0.9)
MONOCYTES NFR BLD AUTO: 14.9 % — HIGH (ref 2–14)
NEUTROPHILS # BLD AUTO: 2.47 K/UL — SIGNIFICANT CHANGE UP (ref 1.8–7.4)
NEUTROPHILS NFR BLD AUTO: 61.6 % — SIGNIFICANT CHANGE UP (ref 43–77)
NRBC # BLD: 0 /100 WBCS — SIGNIFICANT CHANGE UP (ref 0–0)
PCO2 BLDV: 43 MMHG — HIGH (ref 39–42)
PH BLDV: 7.42 — SIGNIFICANT CHANGE UP (ref 7.32–7.43)
PLATELET # BLD AUTO: 163 K/UL — SIGNIFICANT CHANGE UP (ref 150–400)
PO2 BLDV: 48 MMHG — HIGH (ref 25–45)
POTASSIUM BLDV-SCNC: 4.2 MMOL/L — SIGNIFICANT CHANGE UP (ref 3.5–5.1)
POTASSIUM SERPL-MCNC: 4.2 MMOL/L — SIGNIFICANT CHANGE UP (ref 3.5–5.3)
POTASSIUM SERPL-SCNC: 4.2 MMOL/L — SIGNIFICANT CHANGE UP (ref 3.5–5.3)
PROT SERPL-MCNC: 7.3 G/DL — SIGNIFICANT CHANGE UP (ref 6–8.3)
RBC # BLD: 3.35 M/UL — LOW (ref 3.8–5.2)
RBC # FLD: 14.7 % — HIGH (ref 10.3–14.5)
RSV RNA NPH QL NAA+NON-PROBE: SIGNIFICANT CHANGE UP
SAO2 % BLDV: 81.1 % — SIGNIFICANT CHANGE UP (ref 67–88)
SARS-COV-2 RNA SPEC QL NAA+PROBE: DETECTED
SODIUM SERPL-SCNC: 133 MMOL/L — LOW (ref 135–145)
WBC # BLD: 4.02 K/UL — SIGNIFICANT CHANGE UP (ref 3.8–10.5)
WBC # FLD AUTO: 4.02 K/UL — SIGNIFICANT CHANGE UP (ref 3.8–10.5)

## 2022-05-28 PROCEDURE — 99285 EMERGENCY DEPT VISIT HI MDM: CPT

## 2022-05-28 PROCEDURE — 71045 X-RAY EXAM CHEST 1 VIEW: CPT | Mod: 26

## 2022-05-28 PROCEDURE — 99223 1ST HOSP IP/OBS HIGH 75: CPT

## 2022-05-28 RX ORDER — FLUOXETINE HCL 10 MG
60 CAPSULE ORAL DAILY
Refills: 0 | Status: DISCONTINUED | OUTPATIENT
Start: 2022-05-29 | End: 2022-06-02

## 2022-05-28 RX ORDER — CHOLECALCIFEROL (VITAMIN D3) 125 MCG
1000 CAPSULE ORAL
Refills: 0 | Status: DISCONTINUED | OUTPATIENT
Start: 2022-05-28 | End: 2022-06-02

## 2022-05-28 RX ORDER — SODIUM CHLORIDE 9 MG/ML
1000 INJECTION INTRAMUSCULAR; INTRAVENOUS; SUBCUTANEOUS ONCE
Refills: 0 | Status: COMPLETED | OUTPATIENT
Start: 2022-05-28 | End: 2022-05-28

## 2022-05-28 RX ORDER — DIAZEPAM 5 MG
0.5 TABLET ORAL
Qty: 0 | Refills: 0 | DISCHARGE

## 2022-05-28 RX ORDER — ACETAMINOPHEN 500 MG
650 TABLET ORAL EVERY 8 HOURS
Refills: 0 | Status: DISCONTINUED | OUTPATIENT
Start: 2022-05-28 | End: 2022-05-29

## 2022-05-28 RX ORDER — ENOXAPARIN SODIUM 100 MG/ML
40 INJECTION SUBCUTANEOUS EVERY 24 HOURS
Refills: 0 | Status: DISCONTINUED | OUTPATIENT
Start: 2022-05-28 | End: 2022-06-02

## 2022-05-28 RX ORDER — KETOROLAC TROMETHAMINE 30 MG/ML
15 SYRINGE (ML) INJECTION ONCE
Refills: 0 | Status: DISCONTINUED | OUTPATIENT
Start: 2022-05-28 | End: 2022-05-28

## 2022-05-28 RX ORDER — PANTOPRAZOLE SODIUM 20 MG/1
40 TABLET, DELAYED RELEASE ORAL
Refills: 0 | Status: DISCONTINUED | OUTPATIENT
Start: 2022-05-28 | End: 2022-06-02

## 2022-05-28 RX ORDER — DUREZOL 0.5 MG/ML
1 EMULSION OPHTHALMIC
Qty: 0 | Refills: 0 | DISCHARGE

## 2022-05-28 RX ORDER — METOPROLOL TARTRATE 50 MG
12.5 TABLET ORAL DAILY
Refills: 0 | Status: DISCONTINUED | OUTPATIENT
Start: 2022-05-29 | End: 2022-06-02

## 2022-05-28 RX ADMIN — Medication 1000 UNIT(S): at 18:21

## 2022-05-28 RX ADMIN — SODIUM CHLORIDE 1000 MILLILITER(S): 9 INJECTION INTRAMUSCULAR; INTRAVENOUS; SUBCUTANEOUS at 10:51

## 2022-05-28 RX ADMIN — ENOXAPARIN SODIUM 40 MILLIGRAM(S): 100 INJECTION SUBCUTANEOUS at 21:44

## 2022-05-28 RX ADMIN — Medication 15 MILLIGRAM(S): at 10:23

## 2022-05-28 RX ADMIN — Medication 15 MILLIGRAM(S): at 10:51

## 2022-05-28 RX ADMIN — SODIUM CHLORIDE 1000 MILLILITER(S): 9 INJECTION INTRAMUSCULAR; INTRAVENOUS; SUBCUTANEOUS at 10:24

## 2022-05-28 NOTE — ED ADULT NURSE NOTE - NSICDXPASTSURGICALHX_GEN_ALL_CORE_FT
PAST SURGICAL HISTORY:  History of Breast Biopsy [place] lymph node biopsy    History of Cataract Surgery Left eye    S/P Breast Lumpectomy     S/P Lumpectomy of Breast Left Breast    S/P Nasal Polypectomy     Status Post Tonsillectomy

## 2022-05-28 NOTE — H&P ADULT - NSICDXPASTSURGICALHX_GEN_ALL_CORE_FT
PAST SURGICAL HISTORY:  History of Breast Biopsy [place] lymph node biopsy    History of Cataract Surgery Left eye    S/P Breast Lumpectomy     S/P Lumpectomy of Breast Left Breast    S/P Nasal Polypectomy     Status Post Tonsillectomy

## 2022-05-28 NOTE — ED PROVIDER NOTE - OBJECTIVE STATEMENT
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 Patient refers symptom onset 2 days ago. Started w body aches, nausea, HA. This AM presented w worsening nausea and emesis x2 non bloody. Diagnosed 1 day ago with covid. Has been experiencing low grade fevers at home and has been taking Tylenol. Last dose 1 hour ago. Denies abdominal pain but of note has been experiencing multiple stools but not diarrhea. Which is not normal for her. [place] she was gonna faint this AM but no LOC. Refers mild SOB.

## 2022-05-28 NOTE — ED PROVIDER NOTE - NS ED ROS FT
CONST: +fever, presyncope, body aches  EYES: no pain, no vision changes  ENT: no sore throat, no ear pain, no change in hearing  CV: no chest pain, no leg swelling  RESP: +cough, sob  ABD: no abdominal pain, no diarrhea. +n/v  : no dysuria, no flank pain, no hematuria  MSK: no back pain, no extremity pain  NEURO: +HA  HEME: no easy bleeding  SKIN:  no rash

## 2022-05-28 NOTE — ED PROVIDER NOTE - PHYSICAL EXAMINATION
GENERAL: Awake, alert, frail woman. O2 sat stable at RA  HEENT: NC/AT, moist mucous membranes  LUNGS: CTAB, no wheezes or crackles. some increased work of breathing while speaking.   CARDIAC: RRR, no m/r/g  ABDOMEN: Soft, non tender, non distended  BACK: No midline spinal tenderness  EXT: No edema, no calf tenderness   NEURO: A&Ox3. Moving all extremities.  SKIN: Warm and dry. No rash. feels warm to touch

## 2022-05-28 NOTE — ED PROVIDER NOTE - NSICDXPASTSURGICALHX_GEN_ALL_CORE_FT
PAST SURGICAL HISTORY:  History of Breast Biopsy [place] lymph node biopsy    History of Cataract Surgery Left eye    S/P Breast Lumpectomy     S/P Lumpectomy of Breast Left Breast    S/P Nasal Polypectomy     Status Post Tonsillectomy

## 2022-05-28 NOTE — H&P ADULT - NSHPLABSRESULTS_GEN_ALL_CORE
Labs and imaging data obtained by the ED personally reviewed.                        9.2    4.02  )-----------( 163      ( 28 May 2022 10:21 )             29.5   05-28  133<L>  |  97  |  20  ----------------------------<  101<H>  4.2   |  23  |  0.72    Ca    8.5      28 May 2022 10:21    TPro  7.3  /  Alb  3.2<L>  /  TBili  0.3  /  DBili  x   /  AST  37  /  ALT  21  /  AlkPhos  108  05-28    Blood Gas Venous - Lactate (05.28.22 @ 10:15)   Blood Gas Venous - Lactate: 0.8 mmol/L     RVP positive for SARS-CoV2    CXR as reported: No focal consolidation    ECG: NSR

## 2022-05-28 NOTE — ED ADULT NURSE REASSESSMENT NOTE - NS ED NURSE REASSESS COMMENT FT1
pt resting in bed, call bell in reach, vitals stable, bed pending, denies c/o, glasses/clothing with pt

## 2022-05-28 NOTE — H&P ADULT - HISTORY OF PRESENT ILLNESS
ED Presenting Vitals: 98.2F, 70bpm, 150/80, 15br/m, 95% on RA  ED Course: s/p NS-1L, Toradol 15mg IVP x1 87yoF w/ PMHx significant for breast Ca (s/p lumpectomy and radiation), MAC (not on tc), MVP, asthma, IBS, anxiety/depression, GERD, who presents from home with 2 days of complaints of myalgia, nausea, NBNB emesis x2, HA, and fever/chills, in setting of being diagnosed w/ COVID 19 as O/P 1 day ago, and symptoms progressively worsening despite self administration of Tylenol with patient describing feeling lightheaded, weakness, and nearly fainting, though no LOC or fall/hitting of her head. She denies CP, palpitations, focal neurologic deficits, vision changes, seizure like movements, bowel/bladder incontinence, tongue biting, SOB/TIDWELL, abd pain, constipation, or have urinary complaints, but does endorse mutiple episodes in a day of formed stools (not diarrhea), which is atypical for her.     ED Presenting Vitals: 98.2F, 70bpm, 150/80, 15br/m, 95% on RA  ED Course: s/p NS-1L, Toradol 15mg IVP x1

## 2022-05-28 NOTE — H&P ADULT - ASSESSMENT
87yoF w/ PMHx significant for breast Ca (s/p lumpectomy and radiation), MAC (not on tc), MVP, asthma, IBS, anxiety/depression, GERD, who presents from home with 2 days of complaints of myalgia, nausea, NBNB emesis x2, HA, and fever/chills, in setting of being diagnosed w/ COVID 19 as O/P 1 day ago, and symptoms progressively worsening despite self administration of Tylenol with patient describing feeling lightheaded, weakness, and nearly fainting.

## 2022-05-28 NOTE — H&P ADULT - NSHPPHYSICALEXAM_GEN_ALL_CORE
Vital Signs Last 24 Hrs  T(F): 98.2 (28 May 2022 14:07), Max: 100.4 (28 May 2022 10:24)  HR: 58 (28 May 2022 14:07) (58 - 70)  BP: 149/76 (28 May 2022 14:07) (124/80 - 150/80)  RR: 18 (28 May 2022 14:07) (15 - 19)  SpO2: 97% (28 May 2022 14:07) (95% - 98%) Vital Signs Last 24 Hrs  T(F): 98.2 (28 May 2022 14:07), Max: 100.4 (28 May 2022 10:24)  HR: 58 (28 May 2022 14:07) (58 - 70)  BP: 149/76 (28 May 2022 14:07) (124/80 - 150/80)  RR: 18 (28 May 2022 14:07) (15 - 19)  SpO2: 97% (28 May 2022 14:07) (95% - 98%)    GEN: elderly man, laying in stretcher in NAD  PSYCH: A&Ox3, mood and affect appear appropriate   SKIN: intact, no e/o rash  NEURO: no focal neurologic deficits appreciated  EYES: PERRL, anicteric  HEAD: NC, AT  NECK: supple, no e/o elevated JVP  RESPI: no accessory muscle use, B/L air entry, CTAB   CARDIO: regular rate/rhythm, no LE edema B/L  ABD: soft, NT, ND  EXT: patient able to move all extremities spontaneously  VASC: peripheral pulses palpated

## 2022-05-28 NOTE — ED PROVIDER NOTE - CLINICAL SUMMARY MEDICAL DECISION MAKING FREE TEXT BOX
86yo female pt who presents today w covid symptoms. +n/v, diarrhea, HA, fevers, body aches since 2 days ago and worsening since. Presyncope and SOB today. Will assess w labs, chest xray. Will give fluids and symptoms management. Will most likely admit

## 2022-05-28 NOTE — ED PROVIDER NOTE - NSICDXPASTMEDICALHX_GEN_ALL_CORE_FT
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

## 2022-05-28 NOTE — ED ADULT NURSE NOTE - OBJECTIVE STATEMENT
Patient  is  alert  and oriented x3. Color is good and  skin warm to touch.  She is c/o weakness, fever, cough and  body aches. Patient  is  tested positive for covid positive.  No  SOB or respiratory  distress noted.

## 2022-05-28 NOTE — H&P ADULT - PROBLEM SELECTOR PLAN 1
- prodrome of symptoms c/w SARS-CoV2 infection  - CXR w/o evidence of infiltrate, patient breathing comfortably on RA  - continue to closely monitor respiratory status, maintaining SpO2 > 92% avoiding hyperoxygenation  - obtain STAT labs to assess inflammatory markers for signs of severe COVID-19: D-dimer, ferritin, LDH, CRP, procalcitonin   - given patient's hx of malignancy she is high risk for VTE: continue Lovenox 40mg SC daily pending titration based on D-Dimer level once obtained  - monitor hemodynamics closely  - incentive spirometry  - monitor hyponatremia (likely hypovolemic in setting of fever and decreased PO intake) s/p IVF in ED; f/u [place]

## 2022-05-28 NOTE — ED PROVIDER NOTE - ATTENDING CONTRIBUTION TO CARE
Private Physician Andres Obregon. Last admit Gillian Bucio 2/22  87y female pmh Anemia, Arthritis, Asthma , Bronchiectasis/ Cavitary lesion of lung,  Gerd, Depression with anxiety, Diabetes ,Hepatitis B carrier , HTN, HLD, Breast Ca,(s/p lumpectomy and radiation),  MVP, Pna, IBS. Pt comes to ED via EMS c/o Private Physician Criselda Cardona pcp, Andres Obregon/[facility]  Last admit Gillian Bucio 2/22  87y female pmh Anemia, Arthritis, Asthma , Bronchiectasis/ Cavitary lesion of lung,  Gerd, Depression with anxiety, Diabetes ,Hepatitis B carrier , HTN, HLD, Breast Ca,(s/p lumpectomy and radiation),  MVP, Pna, IBS. Pt comes to ED via EMS c/o "feeling lously, two days ago, had positive covid test yesterday" Pt now c/o nvd w low grade temp 99.9. No hemoptysis, Near syncope w/o loc. "I had to sit down or I was going to faint" Private Physician Criselda Cardona pcp, Andres Obregon/[facility]  Last admit Gillian Bucio 2/22  87y female pmh Anemia, Arthritis, Asthma , Bronchiectasis/ Cavitary lesion of lung,  Gerd, Depression with anxiety, Diabetes ,Hepatitis B carrier , HTN, HLD, Breast Ca,(s/p lumpectomy and radiation),  MVP, Pna, IBS. Pt comes to ED via EMS c/o "feeling lously, two days ago, had positive covid test yesterday" Pt now c/o nvd w low grade temp 99.9. No hemoptysis, Near syncope w/o loc. "I had to sit down or I was going to faint" PE Eldelry female awake alert looking acutely ill heent normocephalic atraumatic neck supple chest clear anterior & posterior cv no rubs, gallops or murmurs abd soft +bs no mass guarding rebound, neruo no focal defects  Apollo Paniagua MD, Facep

## 2022-05-29 LAB
ALBUMIN SERPL ELPH-MCNC: 3.3 G/DL — SIGNIFICANT CHANGE UP (ref 3.3–5)
ALP SERPL-CCNC: 92 U/L — SIGNIFICANT CHANGE UP (ref 40–120)
ALT FLD-CCNC: 48 U/L — HIGH (ref 10–45)
ANION GAP SERPL CALC-SCNC: 14 MMOL/L — SIGNIFICANT CHANGE UP (ref 5–17)
AST SERPL-CCNC: 63 U/L — HIGH (ref 10–40)
BILIRUB DIRECT SERPL-MCNC: <0.1 MG/DL — SIGNIFICANT CHANGE UP (ref 0–0.3)
BILIRUB INDIRECT FLD-MCNC: SIGNIFICANT CHANGE UP MG/DL (ref 0.2–1)
BILIRUB SERPL-MCNC: 0.2 MG/DL — SIGNIFICANT CHANGE UP (ref 0.2–1.2)
BUN SERPL-MCNC: 14 MG/DL — SIGNIFICANT CHANGE UP (ref 7–23)
CALCIUM SERPL-MCNC: 8.7 MG/DL — SIGNIFICANT CHANGE UP (ref 8.4–10.5)
CHLORIDE SERPL-SCNC: 97 MMOL/L — SIGNIFICANT CHANGE UP (ref 96–108)
CO2 SERPL-SCNC: 23 MMOL/L — SIGNIFICANT CHANGE UP (ref 22–31)
CREAT SERPL-MCNC: 0.8 MG/DL — SIGNIFICANT CHANGE UP (ref 0.5–1.3)
CREAT SERPL-MCNC: 0.8 MG/DL — SIGNIFICANT CHANGE UP (ref 0.5–1.3)
CRP SERPL-MCNC: 7 MG/L — HIGH (ref 0–4)
EGFR: 72 ML/MIN/1.73M2 — SIGNIFICANT CHANGE UP
EGFR: 72 ML/MIN/1.73M2 — SIGNIFICANT CHANGE UP
GLUCOSE SERPL-MCNC: 89 MG/DL — SIGNIFICANT CHANGE UP (ref 70–99)
LDH SERPL L TO P-CCNC: 216 U/L — SIGNIFICANT CHANGE UP (ref 50–242)
MAGNESIUM SERPL-MCNC: 1.7 MG/DL — SIGNIFICANT CHANGE UP (ref 1.6–2.6)
PHOSPHATE SERPL-MCNC: 3 MG/DL — SIGNIFICANT CHANGE UP (ref 2.5–4.5)
POTASSIUM SERPL-MCNC: 3.4 MMOL/L — LOW (ref 3.5–5.3)
POTASSIUM SERPL-SCNC: 3.4 MMOL/L — LOW (ref 3.5–5.3)
PROCALCITONIN SERPL-MCNC: 0.06 NG/ML — SIGNIFICANT CHANGE UP (ref 0.02–0.1)
PROT SERPL-MCNC: 6.9 G/DL — SIGNIFICANT CHANGE UP (ref 6–8.3)
SODIUM SERPL-SCNC: 134 MMOL/L — LOW (ref 135–145)
TROPONIN T, HIGH SENSITIVITY RESULT: 21 NG/L — SIGNIFICANT CHANGE UP (ref 0–51)

## 2022-05-29 PROCEDURE — 99222 1ST HOSP IP/OBS MODERATE 55: CPT

## 2022-05-29 PROCEDURE — 99223 1ST HOSP IP/OBS HIGH 75: CPT | Mod: GC

## 2022-05-29 RX ORDER — REMDESIVIR 5 MG/ML
100 INJECTION INTRAVENOUS EVERY 24 HOURS
Refills: 0 | Status: DISCONTINUED | OUTPATIENT
Start: 2022-05-29 | End: 2022-05-29

## 2022-05-29 RX ORDER — POTASSIUM CHLORIDE 20 MEQ
20 PACKET (EA) ORAL ONCE
Refills: 0 | Status: COMPLETED | OUTPATIENT
Start: 2022-05-29 | End: 2022-05-30

## 2022-05-29 RX ORDER — REMDESIVIR 5 MG/ML
100 INJECTION INTRAVENOUS EVERY 24 HOURS
Refills: 0 | Status: ACTIVE | OUTPATIENT
Start: 2022-05-30 | End: 2022-06-02

## 2022-05-29 RX ORDER — CHLORHEXIDINE GLUCONATE 213 G/1000ML
1 SOLUTION TOPICAL DAILY
Refills: 0 | Status: DISCONTINUED | OUTPATIENT
Start: 2022-05-29 | End: 2022-06-02

## 2022-05-29 RX ORDER — REMDESIVIR 5 MG/ML
200 INJECTION INTRAVENOUS EVERY 24 HOURS
Refills: 0 | Status: COMPLETED | OUTPATIENT
Start: 2022-05-29 | End: 2022-05-29

## 2022-05-29 RX ORDER — REMDESIVIR 5 MG/ML
INJECTION INTRAVENOUS
Refills: 0 | Status: ACTIVE | OUTPATIENT
Start: 2022-05-29 | End: 2022-06-02

## 2022-05-29 RX ORDER — ACETAMINOPHEN 500 MG
650 TABLET ORAL EVERY 6 HOURS
Refills: 0 | Status: DISCONTINUED | OUTPATIENT
Start: 2022-05-29 | End: 2022-06-02

## 2022-05-29 RX ADMIN — CHLORHEXIDINE GLUCONATE 1 APPLICATION(S): 213 SOLUTION TOPICAL at 13:07

## 2022-05-29 RX ADMIN — PANTOPRAZOLE SODIUM 40 MILLIGRAM(S): 20 TABLET, DELAYED RELEASE ORAL at 05:56

## 2022-05-29 RX ADMIN — Medication 1000 UNIT(S): at 05:56

## 2022-05-29 RX ADMIN — Medication 650 MILLIGRAM(S): at 11:25

## 2022-05-29 RX ADMIN — Medication 650 MILLIGRAM(S): at 10:28

## 2022-05-29 RX ADMIN — Medication 650 MILLIGRAM(S): at 22:02

## 2022-05-29 RX ADMIN — REMDESIVIR 500 MILLIGRAM(S): 5 INJECTION INTRAVENOUS at 18:18

## 2022-05-29 RX ADMIN — Medication 650 MILLIGRAM(S): at 21:22

## 2022-05-29 RX ADMIN — ENOXAPARIN SODIUM 40 MILLIGRAM(S): 100 INJECTION SUBCUTANEOUS at 21:22

## 2022-05-29 NOTE — CONSULT NOTE ADULT - SUBJECTIVE AND OBJECTIVE BOX
CHIEF COMPLAINT: Nasuea and vomiting    HPI:  86YO Female PMH Breast Ca (s/p lumpectomy and radiation), MAC (not on tc), MVP, asthma (pt of Dr. Mcgowan), IBS, anxiety/depression, GERD, who presented from home 5/28 with 2 days of complaints of myalgia, nausea, NBNB emesis x2, HA, and fever/chills, in setting of being diagnosed w/ COVID 19 5/27.    She denies CP, palpitations, focal neurologic deficits, vision changes, seizure like movements, bowel/bladder incontinence, tongue biting, SOB/TIDWELL, abd pain, constipation, or have urinary complaints, but does endorse mutiple episodes in a day of formed stools (not diarrhea), which is atypical for her.     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  GERD (Gastroesophageal Reflux Disease)  Irritable Bowel Syndrome  Mitral Valve Prolapse  Anxiety  Clinical Depression  Asthma  Uveitis  Irritable Bowel Syndrome  Hiatal Hernia  Nasal Polyp  Hypertension  COVID-19 vaccine series completed- Moderna  2019 novel coronavirus disease (COVID-19) 5/27/2022  S/P Breast Lumpectomy  S/P Lumpectomy of Breast Left Breast  Status Post Tonsillectomy  S/P Nasal Polypectomy  History of Cataract Surgery Left eye  History of Breast Biopsy [place] lymph node biopsy      FAMILY HISTORY:  FH: CAD (coronary artery disease) (Father, Mother, Sibling, Aunt)  FH: lung cancer (Mother)        SOCIAL HISTORY:  Smoking: [ ] Never Smoked [ ] Former Smoker (__ packs x ___ years) [ ] Current Smoker  (__ packs x ___ years)  Substance Use: [ ] Never Used [ ] Used ____  EtOH Use:  Marital Status: [ ] Single [ ]  [ ]  [ ]   Sexual History:   Occupation:  Recent Travel:  Country of Birth:  Advance Directives:    Allergies    cefdinir (Unknown)  ciprofloxacin (Other)  codeine (Nausea; Other)  contrast media (iodine-based) (Angioedema)  fluorescein ophthalmic (Hives; Rash; Flushing)  lactose (Unknown)  latex (Anaphylaxis)  Levaquin (Nausea; Other)  lidocaine (Unknown)  SULFA (Anaphylaxis)  tetracycline (Anaphylaxis)    Intolerances    Augmentin (Stomach Upset)      HOME MEDICATIONS:  Home Medications:  diazePAM 5 mg oral tablet: 0.5 tab(s) orally , As Needed (28 May 2022 12:53)  Durezol 0.05% ophthalmic emulsion: 1 drop(s) in the left eye 3 times a day (28 May 2022 12:53)  FLUoxetine 20 mg oral capsule: 3 cap(s) orally once a day (28 May 2022 12:53)  Metoprolol Succinate ER 25 mg oral tablet, extended release: 0.5 tab(s) orally once a day (28 May 2022 12:53)  Nasacort AQ 55 mcg/inh nasal spray: 1   2 times a day  (28 May 2022 12:53)  PriLOSEC OTC 20 mg oral delayed release tablet: 1 tab(s) orally once a day (28 May 2022 12:53)  Vitamin D3 25 mcg (1000 intl units) oral tablet: 1 tab(s) orally 2 times a day (28 May 2022 12:53)      REVIEW OF SYSTEMS:  Constitutional: [ ] negative [ ] fevers [ ] chills [ ] weight loss [ ] weight gain  HEENT: [ ] negative [ ] dry eyes [ ] eye irritation [ ] postnasal drip [ ] nasal congestion  CV: [ ] negative  [ ] chest pain [ ] orthopnea [ ] palpitations [ ] murmur  Resp: [ ] negative [ ] cough [ ] shortness of breath [ ] dyspnea [ ] wheezing [ ] sputum [ ] hemoptysis  GI: [ ] negative [ ] nausea [ ] vomiting [ ] diarrhea [ ] constipation [ ] abd pain [ ] dysphagia   : [ ] negative [ ] dysuria [ ] nocturia [ ] hematuria [ ] increased urinary frequency  Musculoskeletal: [ ] negative [ ] back pain [ ] myalgias [ ] arthralgias [ ] fracture  Skin: [ ] negative [ ] rash [ ] itch  Neurological: [ ] negative [ ] headache [ ] dizziness [ ] syncope [ ] weakness [ ] numbness  Psychiatric: [ ] negative [ ] anxiety [ ] depression  Endocrine: [ ] negative [ ] diabetes [ ] thyroid problem  Hematologic/Lymphatic: [ ] negative [ ] anemia [ ] bleeding problem  Allergic/Immunologic: [ ] negative [ ] itchy eyes [ ] nasal discharge [ ] hives [ ] angioedema  [ ] All other systems negative  [ ] Unable to assess ROS because ________    OBJECTIVE:  ICU Vital Signs Last 24 Hrs  T(C): 37.1 (29 May 2022 13:08), Max: 37.1 (28 May 2022 21:29)  T(F): 98.7 (29 May 2022 13:08), Max: 98.7 (28 May 2022 21:29)  HR: 62 (29 May 2022 13:08) (60 - 62)  BP: 132/71 (29 May 2022 13:08) (132/71 - 152/87)  BP(mean): --  ABP: --  ABP(mean): --  RR: 18 (29 May 2022 13:08) (18 - 18)  SpO2: 95% (29 May 2022 13:08) (94% - 96%)        05-28 @ 07:01  -  05-29 @ 07:00  --------------------------------------------------------  IN: 240 mL / OUT: 0 mL / NET: 240 mL      CAPILLARY BLOOD GLUCOSE          PHYSICAL EXAM:  General:   HEENT:   Lymph Nodes:  Neck:   Respiratory:   Cardiovascular:   Abdomen:   Extremities:   Skin:   Neurological:  Psychiatry:    LINES:     HOSPITAL MEDICATIONS:  Standing Meds:  chlorhexidine 2% Cloths 1 Application(s) Topical daily  cholecalciferol 1000 Unit(s) Oral two times a day  enoxaparin Injectable 40 milliGRAM(s) SubCutaneous every 24 hours  FLUoxetine 60 milliGRAM(s) Oral daily  metoprolol succinate ER 12.5 milliGRAM(s) Oral daily  pantoprazole    Tablet 40 milliGRAM(s) Oral before breakfast  remdesivir  IVPB   IV Intermittent   remdesivir  IVPB 200 milliGRAM(s) IV Intermittent every 24 hours      PRN Meds:  acetaminophen     Tablet .. 650 milliGRAM(s) Oral every 6 hours PRN      LABS:                        9.2    4.02  )-----------( 163      ( 28 May 2022 10:21 )             29.5     Hgb Trend: 9.2<--  05-28    133<L>  |  97  |  20  ----------------------------<  101<H>  4.2   |  23  |  0.72    Ca    8.5      28 May 2022 10:21    TPro  7.3  /  Alb  3.2<L>  /  TBili  0.3  /  DBili  x   /  AST  37  /  ALT  21  /  AlkPhos  108  05-28    Creatinine Trend: 0.72<--        Venous Blood Gas:  05-28 @ 10:15  7.42/43/48/28/81.1  VBG Lactate: 0.8      MICROBIOLOGY:       RADIOLOGY:  [ ] Reviewed and interpreted by me    PULMONARY FUNCTION TESTS:    EKG: CHIEF COMPLAINT: Nasuea and vomiting    HPI:  86YO Female PMH Breast Ca (s/p lumpectomy and radiation), MVP, IBS, anxiety/depression, GERD, CT chest with RUL mass and hx of MAC (pt of Dr. Mcgowan, pt has previously declined tx) who presented from home 5/28 with 2 days of complaints of myalgia, nausea, NBNB emesis x2, HA, and fever/chills, in setting of being diagnosed w/ COVID 19 5/27.    She denies CP, palpitations, focal neurologic deficits, vision changes, seizure like movements, bowel/bladder incontinence, tongue biting, SOB/TIDWELL, abd pain, constipation, or have urinary complaints, but does endorse mutiple episodes in a day of formed stools (not diarrhea), which is atypical for her.     As an outpatient she was arranged for Paxlovid but declined as she reported she would likely have too much sensitivity to it as she reports numerous medication sensitivities. She was then set up for monoclonal infusion which was set up for Monday however she deemed her symptoms to much to wait.     In ED she was hemodynamically stable, afebrile and on room air. She was given NS-1L, Toradol 15mg IVP x1. No Leukocytosis was noted. CXR was negative. SARS-CoV-2 PCR was positive.     ID saw pt and deemed she warranted Remdesivir but not dexamethasone as she was not on supplemental o2.       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  GERD (Gastroesophageal Reflux Disease)  Irritable Bowel Syndrome  Mitral Valve Prolapse  Anxiety  Clinical Depression  Asthma  Uveitis  Irritable Bowel Syndrome  Hiatal Hernia  Nasal Polyp  Hypertension  COVID-19 vaccine series completed- Moderna  2019 novel coronavirus disease (COVID-19) 5/27/2022  S/P Breast Lumpectomy  S/P Lumpectomy of Breast Left Breast  Status Post Tonsillectomy  S/P Nasal Polypectomy  History of Cataract Surgery Left eye  History of Breast Biopsy [place] lymph node biopsy      FAMILY HISTORY:  FH: CAD (coronary artery disease) (Father, Mother, Sibling, Aunt)  FH: lung cancer (Mother)        SOCIAL HISTORY:  Smoking: [ ] Never Smoked [ ] Former Smoker (__ packs x ___ years) [ ] Current Smoker  (__ packs x ___ years)  Substance Use: [ ] Never Used [ ] Used ____  EtOH Use:  Marital Status: [ ] Single [ ]  [ ]  [ ]   Sexual History:   Occupation:  Recent Travel:  Country of Birth:  Advance Directives:    Allergies    cefdinir (Unknown)  ciprofloxacin (Other)  codeine (Nausea; Other)  contrast media (iodine-based) (Angioedema)  fluorescein ophthalmic (Hives; Rash; Flushing)  lactose (Unknown)  latex (Anaphylaxis)  Levaquin (Nausea; Other)  lidocaine (Unknown)  SULFA (Anaphylaxis)  tetracycline (Anaphylaxis)    Intolerances    Augmentin (Stomach Upset)      HOME MEDICATIONS:  Home Medications:  diazePAM 5 mg oral tablet: 0.5 tab(s) orally , As Needed (28 May 2022 12:53)  Durezol 0.05% ophthalmic emulsion: 1 drop(s) in the left eye 3 times a day (28 May 2022 12:53)  FLUoxetine 20 mg oral capsule: 3 cap(s) orally once a day (28 May 2022 12:53)  Metoprolol Succinate ER 25 mg oral tablet, extended release: 0.5 tab(s) orally once a day (28 May 2022 12:53)  Nasacort AQ 55 mcg/inh nasal spray: 1   2 times a day  (28 May 2022 12:53)  PriLOSEC OTC 20 mg oral delayed release tablet: 1 tab(s) orally once a day (28 May 2022 12:53)  Vitamin D3 25 mcg (1000 intl units) oral tablet: 1 tab(s) orally 2 times a day (28 May 2022 12:53)      REVIEW OF SYSTEMS:  Constitutional: [ ] negative [ ] fevers [ ] chills [ ] weight loss [ ] weight gain  HEENT: [ ] negative [ ] dry eyes [ ] eye irritation [ ] postnasal drip [ ] nasal congestion  CV: [ ] negative  [ ] chest pain [ ] orthopnea [ ] palpitations [ ] murmur  Resp: [ ] negative [ ] cough [ ] shortness of breath [ ] dyspnea [ ] wheezing [ ] sputum [ ] hemoptysis  GI: [ ] negative [ ] nausea [ ] vomiting [ ] diarrhea [ ] constipation [ ] abd pain [ ] dysphagia   : [ ] negative [ ] dysuria [ ] nocturia [ ] hematuria [ ] increased urinary frequency  Musculoskeletal: [ ] negative [ ] back pain [ ] myalgias [ ] arthralgias [ ] fracture  Skin: [ ] negative [ ] rash [ ] itch  Neurological: [ ] negative [ ] headache [ ] dizziness [ ] syncope [ ] weakness [ ] numbness  Psychiatric: [ ] negative [ ] anxiety [ ] depression  Endocrine: [ ] negative [ ] diabetes [ ] thyroid problem  Hematologic/Lymphatic: [ ] negative [ ] anemia [ ] bleeding problem  Allergic/Immunologic: [ ] negative [ ] itchy eyes [ ] nasal discharge [ ] hives [ ] angioedema  [ ] All other systems negative  [ ] Unable to assess ROS because ________    OBJECTIVE:  ICU Vital Signs Last 24 Hrs  T(C): 37.1 (29 May 2022 13:08), Max: 37.1 (28 May 2022 21:29)  T(F): 98.7 (29 May 2022 13:08), Max: 98.7 (28 May 2022 21:29)  HR: 62 (29 May 2022 13:08) (60 - 62)  BP: 132/71 (29 May 2022 13:08) (132/71 - 152/87)  BP(mean): --  ABP: --  ABP(mean): --  RR: 18 (29 May 2022 13:08) (18 - 18)  SpO2: 95% (29 May 2022 13:08) (94% - 96%)        05-28 @ 07:01  -  05-29 @ 07:00  --------------------------------------------------------  IN: 240 mL / OUT: 0 mL / NET: 240 mL      CAPILLARY BLOOD GLUCOSE          PHYSICAL EXAM:  General:   HEENT:   Lymph Nodes:  Neck:   Respiratory:   Cardiovascular:   Abdomen:   Extremities:   Skin:   Neurological:  Psychiatry:    LINES:     HOSPITAL MEDICATIONS:  Standing Meds:  chlorhexidine 2% Cloths 1 Application(s) Topical daily  cholecalciferol 1000 Unit(s) Oral two times a day  enoxaparin Injectable 40 milliGRAM(s) SubCutaneous every 24 hours  FLUoxetine 60 milliGRAM(s) Oral daily  metoprolol succinate ER 12.5 milliGRAM(s) Oral daily  pantoprazole    Tablet 40 milliGRAM(s) Oral before breakfast  remdesivir  IVPB   IV Intermittent   remdesivir  IVPB 200 milliGRAM(s) IV Intermittent every 24 hours      PRN Meds:  acetaminophen     Tablet .. 650 milliGRAM(s) Oral every 6 hours PRN      LABS:                        9.2    4.02  )-----------( 163      ( 28 May 2022 10:21 )             29.5     Hgb Trend: 9.2<--  05-28    133<L>  |  97  |  20  ----------------------------<  101<H>  4.2   |  23  |  0.72    Ca    8.5      28 May 2022 10:21    TPro  7.3  /  Alb  3.2<L>  /  TBili  0.3  /  DBili  x   /  AST  37  /  ALT  21  /  AlkPhos  108  05-28    Creatinine Trend: 0.72<--        Venous Blood Gas:  05-28 @ 10:15  7.42/43/48/28/81.1  VBG Lactate: 0.8      MICROBIOLOGY:       RADIOLOGY:  [ ] Reviewed and interpreted by me    PULMONARY FUNCTION TESTS:    EKG:

## 2022-05-29 NOTE — CONSULT NOTE ADULT - ATTENDING COMMENTS
86YO Female hx of MAC p/w covid. pt comfortable on ra and denies sx except for cough now. Cont rdv tx. dex if becomes hypoxic. monitor inflammatory markers. cont BD tx
87 f with breast Ca (s/p lumpectomy and radiation), MAC (not on tc), MVP, asthma, IBS, anxiety/depression, developed fever, chills, body ache, N/V, HA and sore throat, tested positive for COVID on 5/27, was told to go for the monocolonal but pt was not feeling well and came to the hospital   pt afebrile, preston WBC on RA, CXR negative    * pt is admitted for COVID so does not qualify for monoclona  * start remdesivir for up to a 5 day course  * monitor the renal function and LFTs while on remdesivir  * if hypoxia then start dexa    The above assessment and plan was discussed with the primary team    Em Li MD  contact on teams  After 5pm and on weekends call [phone]

## 2022-05-29 NOTE — CONSULT NOTE ADULT - ASSESSMENT
87yoF w/ PMHx significant for breast Ca (s/p lumpectomy and radiation), MAC (not on tc), MVP, asthma, IBS, anxiety/depression, GERD, who presents from home with 2 days of complaints of myalgia, nausea, NBNB emesis x2, HA, and fever/chills    ID is consulted for COVID infection  Positive on outpatient on 5/27  Patient is vaccinated and boosted with Moderna 11/2021  CXR no PNA    Afebrile and without leukocytosis  Not indicated for Remdesivir or Decadron as patient is not hypoxic  Not indicated for monoclonal Ab  LIMITATIONS OF AUTHORIZED USE FOR TREATMENT: All monoclonal antibody therapies listed above are not authorized for treatment use in patients:  a) who are hospitalized due to COVID-19, OR  b) who require oxygen therapy due to COVID-19, OR  c) who require an increase in baseline oxygen flow rate due to COVID-19 in those on chronic oxygen therapy due to underlying non-COVID-19 related co-morbidities.    IMPRESSION:    RECOMMENDATIONS:    * THIS IS AN INCOMPLETE NOTE. FINAL RECOMMENDATION WILL BE UPDATED AFTER DISCUSSING WITH ATTENDING *     87yoF w/ PMHx significant for breast Ca (s/p lumpectomy and radiation), MAC (not on tc), MVP, asthma, IBS, anxiety/depression, GERD, who presents from home with 2 days of complaints of myalgia, nausea, NBNB emesis x2, HA, and fever/chills    ID is consulted for COVID infection  Positive on outpatient on 5/27  Patient is vaccinated and boosted with Moderna 11/2021  CXR no PNA    Afebrile and without leukocytosis  Not indicated for Remdesivir or Decadron as patient is not hypoxic  Not indicated for monoclonal Ab  LIMITATIONS OF AUTHORIZED USE FOR TREATMENT: All monoclonal antibody therapies listed above are not authorized for treatment use in patients:  a) who are hospitalized due to COVID-19, OR  b) who require oxygen therapy due to COVID-19, OR  c) who require an increase in baseline oxygen flow rate due to COVID-19 in those on chronic oxygen therapy due to underlying non-COVID-19 related co-morbidities.    IMPRESSION:  COVID infection  Possible UTI    RECOMMENDATIONS:  - Monitor off antibiotics for now  - No indication for Remdesivir and Decadron at this time, can start if patient' O2 < 94% on room air  - No indication for MAB infusion  - Check UA and UCx  - Gentle fluid hydration  - Trend WBC, fever curve, transaminases, creatinine daily  - Will continue to follow      * THIS IS AN INCOMPLETE NOTE. FINAL RECOMMENDATION WILL BE UPDATED AFTER DISCUSSING WITH ATTENDING *     87yoF w/ PMHx significant for breast Ca (s/p lumpectomy and radiation), MAC (not on tc), MVP, asthma, IBS, anxiety/depression, GERD, who presents from home with 2 days of complaints of myalgia, nausea, NBNB emesis x2, HA, and fever/chills    ID is consulted for COVID infection  Positive on outpatient on 5/27  Patient is vaccinated and boosted with Moderna 11/2021  CXR no PNA    Afebrile and without leukocytosis  Will start Remdesivir due to advanced age  Not indicated for Decadron as patient is not hypoxic  Not indicated for monoclonal Ab  LIMITATIONS OF AUTHORIZED USE FOR TREATMENT: All monoclonal antibody therapies listed above are not authorized for treatment use in patients:  a) who are hospitalized due to COVID-19, OR  b) who require oxygen therapy due to COVID-19, OR  c) who require an increase in baseline oxygen flow rate due to COVID-19 in those on chronic oxygen therapy due to underlying non-COVID-19 related co-morbidities.    IMPRESSION:  COVID infection  Possible UTI    RECOMMENDATIONS:  - Start Remdesivir for 5 days  - Monitor transaminases, if > 10x ULN will need to hold Remdesivir  - Monitor off antibiotics for now  - No indication for Decadron at this time, can start if patient' O2 < 94% on room air  - No indication for MAB infusion  - Gentle fluid hydration  - Trend WBC, fever curve, transaminases, creatinine daily  - Will continue to follow      Patient is seen and examined with attending and case is discussed with primary team      Elsa Hinds, DO  PGY4 ID fellow  Please contact me via page or text through [employer] Teams  If after 5PM or on weekends, please call [phone]

## 2022-05-29 NOTE — CONSULT NOTE ADULT - ASSESSMENT
88YO Female PMH Breast Ca (s/p lumpectomy and radiation), MVP, IBS, anxiety/depression, GERD, CT chest with RUL mass and hx of MAC (pt of Dr. Mcgowan, pt has previously declined tx) who presented from home  with 2 days of complaints of myalgia, nausea, NBNB emesis x2, HA, and fever/chills, in setting of being diagnosed w/ COVID-19 .    #Asthma in the setting of COVID-19  - Pt remains stable and nontoxic on room air  - C/w Remdesivir per ID  - Pt is on Albuterol PRN at home can add while in patient  - Pt is on Airway clearance with Hypersal nebs at home for her MAC  - Can hold off on hypersal while inpatient   - Incentive spirometer  - Call with questions  - Pt should have follow up with Dr. Mcgowan on discharge  - Please e-mail [email or web address] to make an appointment for the patient.  Please include the name, , and a phone number for you and the patient.       Case discussed with Dr. Fili Aguilera,  PGY-5  Pulmonary/Critical Care Fellow   Pager: [phone] (MARILEE), [phone] (NS)  Pulmonary Spectra #[id number] (NS) / [phone] (MARILEE)   88YO Female PMH Breast Ca (s/p lumpectomy and radiation), MVP, IBS, anxiety/depression, GERD, CT chest with RUL mass and hx of MAC (pt of Dr. Mcgowan, pt has previously declined tx) who presented from home  with 2 days of complaints of myalgia, nausea, NBNB emesis x2, HA, and fever/chills, in setting of being diagnosed w/ COVID-19 .    #Asthma in the setting of COVID-19  - Pt remains stable and nontoxic on room air  - C/w Remdesivir per ID  - Pt is on Albuterol PRN at home can add while in patient  - Pt is on Airway clearance with Hypersal nebs at home for her MAC  - Can hold off on hypersal while inpatient, please restart with albuterol nebs upon discharge  - Incentive spirometer  - Call with questions  - Pt should have follow up with Dr. Mcgowan on discharge  - Please e-mail [email or web address] to make an appointment for the patient.  Please include the name, , and a phone number for you and the patient.       Case discussed with Dr. Fili Aguilera,  PGY-5  Pulmonary/Critical Care Fellow   Pager: [phone] (MARILEE), [phone] (NS)  Pulmonary Spectra #[id number] (NS) / [phone] (MARILEE)

## 2022-05-29 NOTE — CONSULT NOTE ADULT - SUBJECTIVE AND OBJECTIVE BOX
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 She denies CP, palpitations, focal neurologic deficits, vision changes, seizure like movements, bowel/bladder incontinence, tongue biting, SOB/TIDWELL, abd pain, constipation, or have urinary complaints, but does endorse mutiple episodes in a day of formed stools (not diarrhea), which is atypical for her.     ED Presenting Vitals: 98.2F, 70bpm, 150/80, 15br/m, 95% on RA  ED Course: s/p NS-1L, Toradol 15mg IVP x1 (28 May 2022 15:48)       REVIEW OF SYSTEMS:  CONSTITUTIONAL: No fever or chills  HEENT: No sore throat  RESPIRATORY: No cough, no shortness of breath  CARDIOVASCULAR: No chest pain or palpitations  GASTROINTESTINAL: No abdominal or epigastric pain  GENITOURINARY: No dysuria  NEUROLOGICAL: No headache/dizziness  MSK: No joint pain, erythema, or swelling; no back pain  SKIN: No itching, rashes  All other ROS negative except noted above    Prior hospital charts reviewed [Yes]  Primary team notes reviewed [Yes]  Other consultant notes reviewed [Yes]    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      GERD (Gastroesophageal Reflux Disease)      Irritable Bowel Syndrome      Mitral Valve Prolapse      Anxiety      Clinical Depression      Asthma      Uveitis      Irritable Bowel Syndrome      Hiatal Hernia      Nasal Polyp      Hypertension      COVID-19 vaccine series completed  Moderna      2019 novel coronavirus disease (COVID-19)  5/27/2022      S/P Breast Lumpectomy      S/P Lumpectomy of Breast  Left Breast      Status Post Tonsillectomy      S/P Nasal Polypectomy      History of Cataract Surgery  Left eye      History of Breast Biopsy  [place] lymph node biopsy          SOCIAL HISTORY:  - Born in [place], [place] in 19XX  - Currently working as / Retired  - Lives with _____; no pets  - No recent travel  - Denies tobacco use  - Denies alcohol use  - Denies illicit drug use  - Currently sexually active, has one male/female sexual partner    FAMILY HISTORY:  FH: CAD (coronary artery disease) (Father, Mother, Sibling, Aunt)    FH: lung cancer (Mother)        Allergies:  Augmentin (Stomach Upset)  cefdinir (Unknown)  ciprofloxacin (Other)  codeine (Nausea; Other)  contrast media (iodine-based) (Angioedema)  fluorescein ophthalmic (Hives; Rash; Flushing)  lactose (Unknown)  latex (Anaphylaxis)  Levaquin (Nausea; Other)  lidocaine (Unknown)  SULFA (Anaphylaxis)  tetracycline (Anaphylaxis)      ANTIMICROBIALS:      ANTIMICROBIALS (past 90 days):  MEDICATIONS  (STANDING):        OTHER MEDS:   MEDICATIONS  (STANDING):  acetaminophen     Tablet .. 650 every 8 hours PRN  enoxaparin Injectable 40 every 24 hours  FLUoxetine 60 daily  metoprolol succinate ER 12.5 daily  pantoprazole    Tablet 40 before breakfast      VITALS:  Vital Signs Last 24 Hrs  T(F): 98 (05-29-22 @ 06:20), Max: 100.4 (05-28-22 @ 10:24)    Vital Signs Last 24 Hrs  HR: 60 (05-29-22 @ 06:20) (58 - 70)  BP: 138/78 (05-29-22 @ 06:20) (124/80 - 150/80)  RR: 18 (05-29-22 @ 06:20)  SpO2: 96% (05-29-22 @ 06:20) (94% - 98%)  Wt(kg): --    EXAM:  GENERAL: NAD, lying in bed comfortably  HEAD: No head lesions  EYES: Conjunctiva pink and cornea white  ENT: Normal external ears and nose, no discharges; moist mucous membranes  NECK: Supple, nontender to palpation; no JVD  CHEST/LUNG: Clear to auscultation bilaterally  HEART: S1 S2  ABDOMEN: Soft, nontender, nondistended; normoactive bowel sounds  EXTREMITIES: No clubbing, cyanosis, or petal edema  NERVOUS SYSTEM: Alert and oriented to person, time, place and situation, speech clear. No focal deficits   MSK: No joint erythema, swelling or pain  SKIN: No rashes or lesions, no superficial thrombophlebitis    Labs:                        9.2    4.02  )-----------( 163      ( 28 May 2022 10:21 )             29.5     05-28    133<L>  |  97  |  20  ----------------------------<  101<H>  4.2   |  23  |  0.72    Ca    8.5      28 May 2022 10:21    TPro  7.3  /  Alb  3.2<L>  /  TBili  0.3  /  DBili  x   /  AST  37  /  ALT  21  /  AlkPhos  108  05-28      WBC Trend:  WBC Count: 4.02 (05-28-22 @ 10:21)      Auto Neutrophil #: 2.47 K/uL (05-28-22 @ 10:21)  Auto Neutrophil #: 2.74 K/uL (02-18-22 @ 16:57)      Creatine Trend:  Creatinine, Serum: 0.72 (05-28)      Liver Biochemical Testing Trend:  Alanine Aminotransferase (ALT/SGPT): 21 (05-28)  Alanine Aminotransferase (ALT/SGPT): 8 *L* (02-19)  Alanine Aminotransferase (ALT/SGPT): 7 *L* (02-18)  Aspartate Aminotransferase (AST/SGOT): 37 (05-28-22 @ 10:21)  Aspartate Aminotransferase (AST/SGOT): 14 (02-19-22 @ 06:52)  Aspartate Aminotransferase (AST/SGOT): 12 (02-18-22 @ 17:02)  Bilirubin Total, Serum: 0.3 (05-28)  Bilirubin Total, Serum: 0.4 (02-19)  Bilirubin Total, Serum: 0.4 (02-18)      Trend LDH      Auto Eosinophil %: 0.0 % (05-28-22 @ 10:21)          MICROBIOLOGY:        Culture - Acid Fast - Sputum w/Smear (collected 23 Feb 2022 14:36)  Source: .Sputum Sputum, cup  Final Report:    No acid fast bacilli isolated after 6 weeks.    Culture - Sputum (collected 20 Feb 2022 14:06)  Source: .Sputum Sputum  Final Report:    Moderate Staphylococcus aureus    Normal Respiratory Hien present  Organism: Staphylococcus aureus  Organism: Staphylococcus aureus    Sensitivities:      -  Ampicillin/Sulbactam: S <=8/4      -  Cefazolin: S <=4      -  Clindamycin: S <=0.25      -  Erythromycin: R >4      -  Gentamicin: S <=1 Should not be used as monotherapy      -  Oxacillin: S <=0.25      -  Rifampin: S <=1 Should not be used as monotherapy      -  Tetra/Doxy: S <=1      -  Trimethoprim/Sulfamethoxazole: S <=0.5/9.5      -  Vancomycin: S 2      Method Type: MORENO    Culture - Acid Fast - Sputum w/Smear (collected 20 Feb 2022 12:38)  Source: .Sputum Sputum  Final Report:    No acid fast bacilli isolated after 6 weeks.    Culture - Urine (collected 18 Feb 2022 22:51)  Source: Clean Catch Clean Catch (Midstream)  Final Report:    <10,000 CFU/mL Normal Urogenital Hien      Blood Gas Venous - Lactate: 0.8 (05-28 @ 10:15)        RADIOLOGY:  imaging below personally reviewed    < from: Xray Chest 1 View- PORTABLE-Urgent (Xray Chest 1 View- PORTABLE-Urgent .) (05.28.22 @ 10:08) >  FINDINGS:    Heart size cannot be appropriately assessed in this projection.  The lungs are mildly hyperaerated with interstitial prominence   bilaterally which is unchanged from the prior study. There are no focal   infiltrates.  No pleural effusion or pneumothorax.  No acute osseous abnormalities.    IMPRESSION:  No focal consolidation.    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 She denies CP, palpitations, focal neurologic deficits, vision changes, seizure like movements, bowel/bladder incontinence, tongue biting, SOB/TIDWELL, abd pain, constipation, or have urinary complaints, but does endorse mutiple episodes in a day of formed stools (not diarrhea), which is atypical for her.     ED Presenting Vitals: 98.2F, 70bpm, 150/80, 15br/m, 95% on RA  ED Course: s/p NS-1L, Toradol 15mg IVP x1 (28 May 2022 15:48)       REVIEW OF SYSTEMS:  CONSTITUTIONAL: No fever or chills  HEENT: + sore throat  RESPIRATORY: No cough, no shortness of breath  CARDIOVASCULAR: No chest pain or palpitations  GASTROINTESTINAL: + lower abdominal cramps  GENITOURINARY: No dysuria  NEUROLOGICAL: No headache/dizziness  MSK: No joint pain, erythema, or swelling; no back pain  SKIN: No itching, rashes    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      GERD (Gastroesophageal Reflux Disease)      Irritable Bowel Syndrome      Mitral Valve Prolapse      Anxiety      Clinical Depression      Asthma      Uveitis      Irritable Bowel Syndrome      Hiatal Hernia      Nasal Polyp      Hypertension      COVID-19 vaccine series completed  Moderna      2019 novel coronavirus disease (COVID-19)  5/27/2022      S/P Breast Lumpectomy      S/P Lumpectomy of Breast  Left Breast      Status Post Tonsillectomy      S/P Nasal Polypectomy      History of Cataract Surgery  Left eye      History of Breast Biopsy  [place] lymph node biopsy          SOCIAL HISTORY:  - Lives with her daughter  - No recent travel  - Denies tobacco use  - Denies alcohol use  - Denies illicit drug use    FAMILY HISTORY:  FH: CAD (coronary artery disease) (Father, Mother, Sibling, Aunt)    FH: lung cancer (Mother)        Allergies:  Augmentin (Stomach Upset)  cefdinir (Unknown)  ciprofloxacin (Other)  codeine (Nausea; Other)  contrast media (iodine-based) (Angioedema)  fluorescein ophthalmic (Hives; Rash; Flushing)  lactose (Unknown)  latex (Anaphylaxis)  Levaquin (Nausea; Other)  lidocaine (Unknown)  SULFA (Anaphylaxis)  tetracycline (Anaphylaxis)      ANTIMICROBIALS:      ANTIMICROBIALS (past 90 days):  MEDICATIONS  (STANDING):        OTHER MEDS:   MEDICATIONS  (STANDING):  acetaminophen     Tablet .. 650 every 8 hours PRN  enoxaparin Injectable 40 every 24 hours  FLUoxetine 60 daily  metoprolol succinate ER 12.5 daily  pantoprazole    Tablet 40 before breakfast      VITALS:  Vital Signs Last 24 Hrs  T(F): 98 (05-29-22 @ 06:20), Max: 100.4 (05-28-22 @ 10:24)    Vital Signs Last 24 Hrs  HR: 60 (05-29-22 @ 06:20) (58 - 70)  BP: 138/78 (05-29-22 @ 06:20) (124/80 - 150/80)  RR: 18 (05-29-22 @ 06:20)  SpO2: 96% (05-29-22 @ 06:20) (94% - 98%)  Wt(kg): --    EXAM:  GENERAL: lying in bed, very restless and anxious  HEAD: No head lesions  EYES: Conjunctiva pink and cornea white  ENT: Normal external ears and nose, no discharges; moist mucous membranes; erythematous posterior pharynx  NECK: Supple, nontender to palpation; no JVD  CHEST/LUNG: Clear to auscultation bilaterally  HEART: S1 S2  ABDOMEN: Soft, nondistended, soreness on lower abdomen; normoactive bowel sounds  EXTREMITIES: No clubbing, cyanosis, or petal edema  NERVOUS SYSTEM: Alert and oriented to person, time, place and situation, speech clear. No focal deficits   MSK: No joint erythema, swelling or pain  SKIN: No rashes or lesions, no superficial thrombophlebitis    Labs:                        9.2    4.02  )-----------( 163      ( 28 May 2022 10:21 )             29.5     05-28    133<L>  |  97  |  20  ----------------------------<  101<H>  4.2   |  23  |  0.72    Ca    8.5      28 May 2022 10:21    TPro  7.3  /  Alb  3.2<L>  /  TBili  0.3  /  DBili  x   /  AST  37  /  ALT  21  /  AlkPhos  108  05-28      WBC Trend:  WBC Count: 4.02 (05-28-22 @ 10:21)      Auto Neutrophil #: 2.47 K/uL (05-28-22 @ 10:21)  Auto Neutrophil #: 2.74 K/uL (02-18-22 @ 16:57)      Creatine Trend:  Creatinine, Serum: 0.72 (05-28)      Liver Biochemical Testing Trend:  Alanine Aminotransferase (ALT/SGPT): 21 (05-28)  Alanine Aminotransferase (ALT/SGPT): 8 *L* (02-19)  Alanine Aminotransferase (ALT/SGPT): 7 *L* (02-18)  Aspartate Aminotransferase (AST/SGOT): 37 (05-28-22 @ 10:21)  Aspartate Aminotransferase (AST/SGOT): 14 (02-19-22 @ 06:52)  Aspartate Aminotransferase (AST/SGOT): 12 (02-18-22 @ 17:02)  Bilirubin Total, Serum: 0.3 (05-28)  Bilirubin Total, Serum: 0.4 (02-19)  Bilirubin Total, Serum: 0.4 (02-18)      Trend LDH      Auto Eosinophil %: 0.0 % (05-28-22 @ 10:21)          MICROBIOLOGY:        Culture - Acid Fast - Sputum w/Smear (collected 23 Feb 2022 14:36)  Source: .Sputum Sputum, cup  Final Report:    No acid fast bacilli isolated after 6 weeks.    Culture - Sputum (collected 20 Feb 2022 14:06)  Source: .Sputum Sputum  Final Report:    Moderate Staphylococcus aureus    Normal Respiratory Hien present  Organism: Staphylococcus aureus  Organism: Staphylococcus aureus    Sensitivities:      -  Ampicillin/Sulbactam: S <=8/4      -  Cefazolin: S <=4      -  Clindamycin: S <=0.25      -  Erythromycin: R >4      -  Gentamicin: S <=1 Should not be used as monotherapy      -  Oxacillin: S <=0.25      -  Rifampin: S <=1 Should not be used as monotherapy      -  Tetra/Doxy: S <=1      -  Trimethoprim/Sulfamethoxazole: S <=0.5/9.5      -  Vancomycin: S 2      Method Type: MORENO    Culture - Acid Fast - Sputum w/Smear (collected 20 Feb 2022 12:38)  Source: .Sputum Sputum  Final Report:    No acid fast bacilli isolated after 6 weeks.    Culture - Urine (collected 18 Feb 2022 22:51)  Source: Clean Catch Clean Catch (Midstream)  Final Report:    <10,000 CFU/mL Normal Urogenital Hien      Blood Gas Venous - Lactate: 0.8 (05-28 @ 10:15)        RADIOLOGY:  imaging below personally reviewed    < from: Xray Chest 1 View- PORTABLE-Urgent (Xray Chest 1 View- PORTABLE-Urgent .) (05.28.22 @ 10:08) >  FINDINGS:    Heart size cannot be appropriately assessed in this projection.  The lungs are mildly hyperaerated with interstitial prominence   bilaterally which is unchanged from the prior study. There are no focal   infiltrates.  No pleural effusion or pneumothorax.  No acute osseous abnormalities.    IMPRESSION:  No focal consolidation.    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 She denies CP, palpitations, focal neurologic deficits, vision changes, seizure like movements, bowel/bladder incontinence, tongue biting, SOB/TIDWELL, abd pain, constipation, or have urinary complaints, but does endorse mutiple episodes in a day of formed stools (not diarrhea), which is atypical for her.     ED Presenting Vitals: 98.2F, 70bpm, 150/80, 15br/m, 95% on RA  ED Course: s/p NS-1L, Toradol 15mg IVP x1 (28 May 2022 15:48)       REVIEW OF SYSTEMS:  CONSTITUTIONAL: No fever or chills  Head: no trauma  Eyes: no eye pain  ENT: + sore throat  RESPIRATORY: + cough, no shortness of breath  CARDIOVASCULAR: No chest pain or palpitations  GASTROINTESTINAL: + lower abdominal cramps  GENITOURINARY: No dysuria  NEUROLOGICAL: No headache/dizziness  MSK: No joint pain, erythema, or swelling; no back pain  SKIN: No itching, rashes  Psych: anxiety     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      GERD (Gastroesophageal Reflux Disease)      Irritable Bowel Syndrome      Mitral Valve Prolapse      Anxiety      Clinical Depression      Asthma      Uveitis      Irritable Bowel Syndrome      Hiatal Hernia      Nasal Polyp      Hypertension      COVID-19 vaccine series completed  Moderna      2019 novel coronavirus disease (COVID-19)  5/27/2022      S/P Breast Lumpectomy      S/P Lumpectomy of Breast  Left Breast      Status Post Tonsillectomy      S/P Nasal Polypectomy      History of Cataract Surgery  Left eye      History of Breast Biopsy  [place] lymph node biopsy          SOCIAL HISTORY:  - Lives with her daughter  - No recent travel  - Denies tobacco use  - Denies alcohol use  - Denies illicit drug use    FAMILY HISTORY:  FH: CAD (coronary artery disease) (Father, Mother, Sibling, Aunt)    FH: lung cancer (Mother)        Allergies:  Augmentin (Stomach Upset)  cefdinir (Unknown)  ciprofloxacin (Other)  codeine (Nausea; Other)  contrast media (iodine-based) (Angioedema)  fluorescein ophthalmic (Hives; Rash; Flushing)  lactose (Unknown)  latex (Anaphylaxis)  Levaquin (Nausea; Other)  lidocaine (Unknown)  SULFA (Anaphylaxis)  tetracycline (Anaphylaxis)      ANTIMICROBIALS:      ANTIMICROBIALS (past 90 days):  MEDICATIONS  (STANDING):        OTHER MEDS:   MEDICATIONS  (STANDING):  acetaminophen     Tablet .. 650 every 8 hours PRN  enoxaparin Injectable 40 every 24 hours  FLUoxetine 60 daily  metoprolol succinate ER 12.5 daily  pantoprazole    Tablet 40 before breakfast      VITALS:  Vital Signs Last 24 Hrs  T(F): 98 (05-29-22 @ 06:20), Max: 100.4 (05-28-22 @ 10:24)    Vital Signs Last 24 Hrs  HR: 60 (05-29-22 @ 06:20) (58 - 70)  BP: 138/78 (05-29-22 @ 06:20) (124/80 - 150/80)  RR: 18 (05-29-22 @ 06:20)  SpO2: 96% (05-29-22 @ 06:20) (94% - 98%)  Wt(kg): --    EXAM:  GENERAL: lying in bed, very restless and anxious  HEAD: No head lesions  EYES: Conjunctiva pink and cornea white  ENT: Normal external ears and nose, no discharges; moist mucous membranes; erythematous posterior pharynx  NECK: Supple, nontender to palpation; no JVD  CHEST/LUNG: Clear to auscultation bilaterally  HEART: S1 S2  ABDOMEN: Soft, nondistended, soreness on lower abdomen; normoactive bowel sounds  : no suprapubic or CVA tednerness  EXTREMITIES: No clubbing, cyanosis, or petal edema  NERVOUS SYSTEM: Alert and oriented to person, time, place and situation, speech clear. No focal deficits   MSK: No joint erythema, swelling or pain  SKIN: No rashes or lesions, no superficial thrombophlebitis  Psych: appears anxious   Labs:                        9.2    4.02  )-----------( 163      ( 28 May 2022 10:21 )             29.5     05-28    133<L>  |  97  |  20  ----------------------------<  101<H>  4.2   |  23  |  0.72    Ca    8.5      28 May 2022 10:21    TPro  7.3  /  Alb  3.2<L>  /  TBili  0.3  /  DBili  x   /  AST  37  /  ALT  21  /  AlkPhos  108  05-28      WBC Trend:  WBC Count: 4.02 (05-28-22 @ 10:21)      Auto Neutrophil #: 2.47 K/uL (05-28-22 @ 10:21)  Auto Neutrophil #: 2.74 K/uL (02-18-22 @ 16:57)      Creatine Trend:  Creatinine, Serum: 0.72 (05-28)      Liver Biochemical Testing Trend:  Alanine Aminotransferase (ALT/SGPT): 21 (05-28)  Alanine Aminotransferase (ALT/SGPT): 8 *L* (02-19)  Alanine Aminotransferase (ALT/SGPT): 7 *L* (02-18)  Aspartate Aminotransferase (AST/SGOT): 37 (05-28-22 @ 10:21)  Aspartate Aminotransferase (AST/SGOT): 14 (02-19-22 @ 06:52)  Aspartate Aminotransferase (AST/SGOT): 12 (02-18-22 @ 17:02)  Bilirubin Total, Serum: 0.3 (05-28)  Bilirubin Total, Serum: 0.4 (02-19)  Bilirubin Total, Serum: 0.4 (02-18)      Trend LDH      Auto Eosinophil %: 0.0 % (05-28-22 @ 10:21)          MICROBIOLOGY:        Culture - Acid Fast - Sputum w/Smear (collected 23 Feb 2022 14:36)  Source: .Sputum Sputum, cup  Final Report:    No acid fast bacilli isolated after 6 weeks.    Culture - Sputum (collected 20 Feb 2022 14:06)  Source: .Sputum Sputum  Final Report:    Moderate Staphylococcus aureus    Normal Respiratory Hien present  Organism: Staphylococcus aureus  Organism: Staphylococcus aureus    Sensitivities:      -  Ampicillin/Sulbactam: S <=8/4      -  Cefazolin: S <=4      -  Clindamycin: S <=0.25      -  Erythromycin: R >4      -  Gentamicin: S <=1 Should not be used as monotherapy      -  Oxacillin: S <=0.25      -  Rifampin: S <=1 Should not be used as monotherapy      -  Tetra/Doxy: S <=1      -  Trimethoprim/Sulfamethoxazole: S <=0.5/9.5      -  Vancomycin: S 2      Method Type: MORENO    Culture - Acid Fast - Sputum w/Smear (collected 20 Feb 2022 12:38)  Source: .Sputum Sputum  Final Report:    No acid fast bacilli isolated after 6 weeks.    Culture - Urine (collected 18 Feb 2022 22:51)  Source: Clean Catch Clean Catch (Midstream)  Final Report:    <10,000 CFU/mL Normal Urogenital Hien      Blood Gas Venous - Lactate: 0.8 (05-28 @ 10:15)        RADIOLOGY:  imaging below personally reviewed    < from: Xray Chest 1 View- PORTABLE-Urgent (Xray Chest 1 View- PORTABLE-Urgent .) (05.28.22 @ 10:08) >  FINDINGS:    Heart size cannot be appropriately assessed in this projection.  The lungs are mildly hyperaerated with interstitial prominence   bilaterally which is unchanged from the prior study. There are no focal   infiltrates.  No pleural effusion or pneumothorax.  No acute osseous abnormalities.    IMPRESSION:  No focal consolidation.    < end of copied text >

## 2022-05-30 LAB
A1C WITH ESTIMATED AVERAGE GLUCOSE RESULT: 5.6 % — SIGNIFICANT CHANGE UP (ref 4–5.6)
ALBUMIN SERPL ELPH-MCNC: 3.3 G/DL — SIGNIFICANT CHANGE UP (ref 3.3–5)
ALBUMIN SERPL ELPH-MCNC: 3.3 G/DL — SIGNIFICANT CHANGE UP (ref 3.3–5)
ALP SERPL-CCNC: 96 U/L — SIGNIFICANT CHANGE UP (ref 40–120)
ALP SERPL-CCNC: 99 U/L — SIGNIFICANT CHANGE UP (ref 40–120)
ALT FLD-CCNC: 54 U/L — HIGH (ref 10–45)
ALT FLD-CCNC: 57 U/L — HIGH (ref 10–45)
ANION GAP SERPL CALC-SCNC: 10 MMOL/L — SIGNIFICANT CHANGE UP (ref 5–17)
AST SERPL-CCNC: 67 U/L — HIGH (ref 10–40)
AST SERPL-CCNC: 73 U/L — HIGH (ref 10–40)
BILIRUB DIRECT SERPL-MCNC: <0.1 MG/DL — SIGNIFICANT CHANGE UP (ref 0–0.3)
BILIRUB INDIRECT FLD-MCNC: >0.1 MG/DL — LOW (ref 0.2–1)
BILIRUB SERPL-MCNC: 0.2 MG/DL — SIGNIFICANT CHANGE UP (ref 0.2–1.2)
BILIRUB SERPL-MCNC: 0.2 MG/DL — SIGNIFICANT CHANGE UP (ref 0.2–1.2)
BUN SERPL-MCNC: 15 MG/DL — SIGNIFICANT CHANGE UP (ref 7–23)
CALCIUM SERPL-MCNC: 8.6 MG/DL — SIGNIFICANT CHANGE UP (ref 8.4–10.5)
CHLORIDE SERPL-SCNC: 98 MMOL/L — SIGNIFICANT CHANGE UP (ref 96–108)
CO2 SERPL-SCNC: 24 MMOL/L — SIGNIFICANT CHANGE UP (ref 22–31)
CREAT ?TM UR-MCNC: 68 MG/DL — SIGNIFICANT CHANGE UP
CREAT SERPL-MCNC: 0.75 MG/DL — SIGNIFICANT CHANGE UP (ref 0.5–1.3)
CREAT SERPL-MCNC: 0.83 MG/DL — SIGNIFICANT CHANGE UP (ref 0.5–1.3)
D DIMER BLD IA.RAPID-MCNC: 250 NG/ML DDU — HIGH
EGFR: 69 ML/MIN/1.73M2 — SIGNIFICANT CHANGE UP
EGFR: 77 ML/MIN/1.73M2 — SIGNIFICANT CHANGE UP
ESTIMATED AVERAGE GLUCOSE: 114 MG/DL — SIGNIFICANT CHANGE UP (ref 68–114)
FERRITIN SERPL-MCNC: 154 NG/ML — HIGH (ref 15–150)
GLUCOSE SERPL-MCNC: 83 MG/DL — SIGNIFICANT CHANGE UP (ref 70–99)
HCT VFR BLD CALC: 31.1 % — LOW (ref 34.5–45)
HGB BLD-MCNC: 10.2 G/DL — LOW (ref 11.5–15.5)
MCHC RBC-ENTMCNC: 28.6 PG — SIGNIFICANT CHANGE UP (ref 27–34)
MCHC RBC-ENTMCNC: 32.8 GM/DL — SIGNIFICANT CHANGE UP (ref 32–36)
MCV RBC AUTO: 87.1 FL — SIGNIFICANT CHANGE UP (ref 80–100)
NRBC # BLD: 0 /100 WBCS — SIGNIFICANT CHANGE UP (ref 0–0)
OSMOLALITY SERPL: 272 MOSMOL/KG — LOW (ref 280–301)
OSMOLALITY UR: 364 MOS/KG — SIGNIFICANT CHANGE UP (ref 300–900)
PLATELET # BLD AUTO: 160 K/UL — SIGNIFICANT CHANGE UP (ref 150–400)
POTASSIUM SERPL-MCNC: 4.1 MMOL/L — SIGNIFICANT CHANGE UP (ref 3.5–5.3)
POTASSIUM SERPL-SCNC: 4.1 MMOL/L — SIGNIFICANT CHANGE UP (ref 3.5–5.3)
PROT SERPL-MCNC: 7 G/DL — SIGNIFICANT CHANGE UP (ref 6–8.3)
PROT SERPL-MCNC: 7.3 G/DL — SIGNIFICANT CHANGE UP (ref 6–8.3)
RBC # BLD: 3.57 M/UL — LOW (ref 3.8–5.2)
RBC # FLD: 14.8 % — HIGH (ref 10.3–14.5)
SODIUM SERPL-SCNC: 132 MMOL/L — LOW (ref 135–145)
SODIUM UR-SCNC: 49 MMOL/L — SIGNIFICANT CHANGE UP
WBC # BLD: 3.11 K/UL — LOW (ref 3.8–10.5)
WBC # FLD AUTO: 3.11 K/UL — LOW (ref 3.8–10.5)

## 2022-05-30 RX ADMIN — Medication 12.5 MILLIGRAM(S): at 05:02

## 2022-05-30 RX ADMIN — Medication 650 MILLIGRAM(S): at 12:40

## 2022-05-30 RX ADMIN — Medication 650 MILLIGRAM(S): at 11:31

## 2022-05-30 RX ADMIN — ENOXAPARIN SODIUM 40 MILLIGRAM(S): 100 INJECTION SUBCUTANEOUS at 22:18

## 2022-05-30 RX ADMIN — Medication 1000 UNIT(S): at 05:02

## 2022-05-30 RX ADMIN — CHLORHEXIDINE GLUCONATE 1 APPLICATION(S): 213 SOLUTION TOPICAL at 11:31

## 2022-05-30 RX ADMIN — Medication 1000 UNIT(S): at 18:01

## 2022-05-30 RX ADMIN — REMDESIVIR 500 MILLIGRAM(S): 5 INJECTION INTRAVENOUS at 18:01

## 2022-05-30 RX ADMIN — Medication 20 MILLIEQUIVALENT(S): at 00:42

## 2022-05-30 RX ADMIN — PANTOPRAZOLE SODIUM 40 MILLIGRAM(S): 20 TABLET, DELAYED RELEASE ORAL at 05:03

## 2022-05-30 RX ADMIN — Medication 60 MILLIGRAM(S): at 11:30

## 2022-05-31 LAB
ALBUMIN SERPL ELPH-MCNC: 3.2 G/DL — LOW (ref 3.3–5)
ALP SERPL-CCNC: 95 U/L — SIGNIFICANT CHANGE UP (ref 40–120)
ALT FLD-CCNC: 60 U/L — HIGH (ref 10–45)
ANION GAP SERPL CALC-SCNC: 10 MMOL/L — SIGNIFICANT CHANGE UP (ref 5–17)
AST SERPL-CCNC: 74 U/L — HIGH (ref 10–40)
BILIRUB SERPL-MCNC: 0.2 MG/DL — SIGNIFICANT CHANGE UP (ref 0.2–1.2)
BUN SERPL-MCNC: 15 MG/DL — SIGNIFICANT CHANGE UP (ref 7–23)
CALCIUM SERPL-MCNC: 8.6 MG/DL — SIGNIFICANT CHANGE UP (ref 8.4–10.5)
CHLORIDE SERPL-SCNC: 99 MMOL/L — SIGNIFICANT CHANGE UP (ref 96–108)
CO2 SERPL-SCNC: 24 MMOL/L — SIGNIFICANT CHANGE UP (ref 22–31)
CREAT SERPL-MCNC: 0.86 MG/DL — SIGNIFICANT CHANGE UP (ref 0.5–1.3)
EGFR: 66 ML/MIN/1.73M2 — SIGNIFICANT CHANGE UP
GLUCOSE SERPL-MCNC: 95 MG/DL — SIGNIFICANT CHANGE UP (ref 70–99)
HCT VFR BLD CALC: 32.4 % — LOW (ref 34.5–45)
HGB BLD-MCNC: 10.2 G/DL — LOW (ref 11.5–15.5)
MCHC RBC-ENTMCNC: 27.9 PG — SIGNIFICANT CHANGE UP (ref 27–34)
MCHC RBC-ENTMCNC: 31.5 GM/DL — LOW (ref 32–36)
MCV RBC AUTO: 88.5 FL — SIGNIFICANT CHANGE UP (ref 80–100)
NRBC # BLD: 0 /100 WBCS — SIGNIFICANT CHANGE UP (ref 0–0)
PLATELET # BLD AUTO: 161 K/UL — SIGNIFICANT CHANGE UP (ref 150–400)
POTASSIUM SERPL-MCNC: 4.2 MMOL/L — SIGNIFICANT CHANGE UP (ref 3.5–5.3)
POTASSIUM SERPL-SCNC: 4.2 MMOL/L — SIGNIFICANT CHANGE UP (ref 3.5–5.3)
PROT SERPL-MCNC: 7 G/DL — SIGNIFICANT CHANGE UP (ref 6–8.3)
RBC # BLD: 3.66 M/UL — LOW (ref 3.8–5.2)
RBC # FLD: 15 % — HIGH (ref 10.3–14.5)
SODIUM SERPL-SCNC: 133 MMOL/L — LOW (ref 135–145)
WBC # BLD: 3.77 K/UL — LOW (ref 3.8–10.5)
WBC # FLD AUTO: 3.77 K/UL — LOW (ref 3.8–10.5)

## 2022-05-31 PROCEDURE — 99232 SBSQ HOSP IP/OBS MODERATE 35: CPT

## 2022-05-31 RX ADMIN — Medication 650 MILLIGRAM(S): at 18:13

## 2022-05-31 RX ADMIN — Medication 1000 UNIT(S): at 18:16

## 2022-05-31 RX ADMIN — Medication 1000 UNIT(S): at 05:14

## 2022-05-31 RX ADMIN — CHLORHEXIDINE GLUCONATE 1 APPLICATION(S): 213 SOLUTION TOPICAL at 12:04

## 2022-05-31 RX ADMIN — Medication 650 MILLIGRAM(S): at 18:50

## 2022-05-31 RX ADMIN — Medication 60 MILLIGRAM(S): at 11:42

## 2022-05-31 RX ADMIN — REMDESIVIR 500 MILLIGRAM(S): 5 INJECTION INTRAVENOUS at 18:15

## 2022-05-31 RX ADMIN — Medication 12.5 MILLIGRAM(S): at 05:14

## 2022-05-31 RX ADMIN — PANTOPRAZOLE SODIUM 40 MILLIGRAM(S): 20 TABLET, DELAYED RELEASE ORAL at 05:14

## 2022-05-31 RX ADMIN — ENOXAPARIN SODIUM 40 MILLIGRAM(S): 100 INJECTION SUBCUTANEOUS at 21:52

## 2022-06-01 ENCOUNTER — TRANSCRIPTION ENCOUNTER (OUTPATIENT)
Age: 87
End: 2022-06-01

## 2022-06-01 LAB
ALBUMIN SERPL ELPH-MCNC: 3 G/DL — LOW (ref 3.3–5)
ALP SERPL-CCNC: 93 U/L — SIGNIFICANT CHANGE UP (ref 40–120)
ALT FLD-CCNC: 48 U/L — HIGH (ref 10–45)
ANION GAP SERPL CALC-SCNC: 11 MMOL/L — SIGNIFICANT CHANGE UP (ref 5–17)
AST SERPL-CCNC: 56 U/L — HIGH (ref 10–40)
BILIRUB DIRECT SERPL-MCNC: <0.1 MG/DL — SIGNIFICANT CHANGE UP (ref 0–0.3)
BILIRUB INDIRECT FLD-MCNC: >0.1 MG/DL — LOW (ref 0.2–1)
BILIRUB SERPL-MCNC: 0.2 MG/DL — SIGNIFICANT CHANGE UP (ref 0.2–1.2)
BUN SERPL-MCNC: 16 MG/DL — SIGNIFICANT CHANGE UP (ref 7–23)
CALCIUM SERPL-MCNC: 8.6 MG/DL — SIGNIFICANT CHANGE UP (ref 8.4–10.5)
CHLORIDE SERPL-SCNC: 100 MMOL/L — SIGNIFICANT CHANGE UP (ref 96–108)
CO2 SERPL-SCNC: 23 MMOL/L — SIGNIFICANT CHANGE UP (ref 22–31)
CREAT SERPL-MCNC: 0.74 MG/DL — SIGNIFICANT CHANGE UP (ref 0.5–1.3)
EGFR: 79 ML/MIN/1.73M2 — SIGNIFICANT CHANGE UP
GLUCOSE SERPL-MCNC: 84 MG/DL — SIGNIFICANT CHANGE UP (ref 70–99)
HCT VFR BLD CALC: 31.5 % — LOW (ref 34.5–45)
HGB BLD-MCNC: 10.3 G/DL — LOW (ref 11.5–15.5)
MCHC RBC-ENTMCNC: 28.4 PG — SIGNIFICANT CHANGE UP (ref 27–34)
MCHC RBC-ENTMCNC: 32.7 GM/DL — SIGNIFICANT CHANGE UP (ref 32–36)
MCV RBC AUTO: 86.8 FL — SIGNIFICANT CHANGE UP (ref 80–100)
MRSA PCR RESULT.: SIGNIFICANT CHANGE UP
NRBC # BLD: 0 /100 WBCS — SIGNIFICANT CHANGE UP (ref 0–0)
PLATELET # BLD AUTO: 149 K/UL — LOW (ref 150–400)
POTASSIUM SERPL-MCNC: 3.8 MMOL/L — SIGNIFICANT CHANGE UP (ref 3.5–5.3)
POTASSIUM SERPL-SCNC: 3.8 MMOL/L — SIGNIFICANT CHANGE UP (ref 3.5–5.3)
PROT SERPL-MCNC: 6.7 G/DL — SIGNIFICANT CHANGE UP (ref 6–8.3)
RBC # BLD: 3.63 M/UL — LOW (ref 3.8–5.2)
RBC # FLD: 14.7 % — HIGH (ref 10.3–14.5)
S AUREUS DNA NOSE QL NAA+PROBE: DETECTED
SODIUM SERPL-SCNC: 134 MMOL/L — LOW (ref 135–145)
WBC # BLD: 4.23 K/UL — SIGNIFICANT CHANGE UP (ref 3.8–10.5)
WBC # FLD AUTO: 4.23 K/UL — SIGNIFICANT CHANGE UP (ref 3.8–10.5)

## 2022-06-01 RX ORDER — ONDANSETRON 8 MG/1
4 TABLET, FILM COATED ORAL EVERY 6 HOURS
Refills: 0 | Status: DISCONTINUED | OUTPATIENT
Start: 2022-06-01 | End: 2022-06-02

## 2022-06-01 RX ADMIN — Medication 1000 UNIT(S): at 05:02

## 2022-06-01 RX ADMIN — Medication 650 MILLIGRAM(S): at 12:30

## 2022-06-01 RX ADMIN — Medication 1000 UNIT(S): at 18:39

## 2022-06-01 RX ADMIN — CHLORHEXIDINE GLUCONATE 1 APPLICATION(S): 213 SOLUTION TOPICAL at 11:07

## 2022-06-01 RX ADMIN — Medication 12.5 MILLIGRAM(S): at 05:02

## 2022-06-01 RX ADMIN — Medication 60 MILLIGRAM(S): at 11:06

## 2022-06-01 RX ADMIN — PANTOPRAZOLE SODIUM 40 MILLIGRAM(S): 20 TABLET, DELAYED RELEASE ORAL at 05:02

## 2022-06-01 RX ADMIN — Medication 650 MILLIGRAM(S): at 11:06

## 2022-06-01 RX ADMIN — REMDESIVIR 500 MILLIGRAM(S): 5 INJECTION INTRAVENOUS at 18:38

## 2022-06-01 RX ADMIN — ENOXAPARIN SODIUM 40 MILLIGRAM(S): 100 INJECTION SUBCUTANEOUS at 22:15

## 2022-06-01 RX ADMIN — ONDANSETRON 4 MILLIGRAM(S): 8 TABLET, FILM COATED ORAL at 12:21

## 2022-06-01 NOTE — DISCHARGE NOTE NURSING/CASE MANAGEMENT/SOCIAL WORK - PATIENT PORTAL LINK FT
You can access the FollowMyHealth Patient Portal offered by [facility] by registering at the following website: [email or web address]. By joining [facility]’s FollowMyHealth portal, you will also be able to view your health information using other applications (apps) compatible with our system.

## 2022-06-01 NOTE — PROGRESS NOTE ADULT - PROBLEM SELECTOR PLAN 1
ID eval appreciated  IV RDV
ID/Pul f/up appreciated  IV RDV
ID f/up appreciated  Pul eval appreciated  IV RDV
ID/Pul f/up appreciated  IV RDV

## 2022-06-01 NOTE — PROGRESS NOTE ADULT - PROBLEM SELECTOR PLAN 2
- continue home medications

## 2022-06-01 NOTE — PROGRESS NOTE ADULT - PROBLEM SELECTOR PROBLEM 1
Infection due to severe acute respiratory syndrome coronavirus 2 (SARS-CoV-2)

## 2022-06-01 NOTE — DISCHARGE NOTE NURSING/CASE MANAGEMENT/SOCIAL WORK - NSDCPEFALRISK_GEN_ALL_CORE
For information on Fall & Injury Prevention, visit: [email or web address] OR  [email or web address] OR  https://www.cdc.gov/steadi/patient.html

## 2022-06-02 ENCOUNTER — TRANSCRIPTION ENCOUNTER (OUTPATIENT)
Age: 87
End: 2022-06-02

## 2022-06-02 VITALS
HEART RATE: 67 BPM | OXYGEN SATURATION: 97 % | SYSTOLIC BLOOD PRESSURE: 123 MMHG | RESPIRATION RATE: 18 BRPM | DIASTOLIC BLOOD PRESSURE: 76 MMHG | TEMPERATURE: 98 F

## 2022-06-02 LAB
ALBUMIN SERPL ELPH-MCNC: 3 G/DL — LOW (ref 3.3–5)
ALP SERPL-CCNC: 98 U/L — SIGNIFICANT CHANGE UP (ref 40–120)
ALT FLD-CCNC: 50 U/L — HIGH (ref 10–45)
ANION GAP SERPL CALC-SCNC: 11 MMOL/L — SIGNIFICANT CHANGE UP (ref 5–17)
AST SERPL-CCNC: 58 U/L — HIGH (ref 10–40)
BILIRUB DIRECT SERPL-MCNC: <0.1 MG/DL — SIGNIFICANT CHANGE UP (ref 0–0.3)
BILIRUB INDIRECT FLD-MCNC: >0.2 MG/DL — SIGNIFICANT CHANGE UP (ref 0.2–1)
BILIRUB SERPL-MCNC: 0.3 MG/DL — SIGNIFICANT CHANGE UP (ref 0.2–1.2)
BUN SERPL-MCNC: 19 MG/DL — SIGNIFICANT CHANGE UP (ref 7–23)
CALCIUM SERPL-MCNC: 8.6 MG/DL — SIGNIFICANT CHANGE UP (ref 8.4–10.5)
CHLORIDE SERPL-SCNC: 100 MMOL/L — SIGNIFICANT CHANGE UP (ref 96–108)
CO2 SERPL-SCNC: 23 MMOL/L — SIGNIFICANT CHANGE UP (ref 22–31)
CREAT SERPL-MCNC: 0.69 MG/DL — SIGNIFICANT CHANGE UP (ref 0.5–1.3)
EGFR: 84 ML/MIN/1.73M2 — SIGNIFICANT CHANGE UP
GLUCOSE SERPL-MCNC: 79 MG/DL — SIGNIFICANT CHANGE UP (ref 70–99)
HCT VFR BLD CALC: 31 % — LOW (ref 34.5–45)
HGB BLD-MCNC: 9.8 G/DL — LOW (ref 11.5–15.5)
MCHC RBC-ENTMCNC: 27.9 PG — SIGNIFICANT CHANGE UP (ref 27–34)
MCHC RBC-ENTMCNC: 31.6 GM/DL — LOW (ref 32–36)
MCV RBC AUTO: 88.3 FL — SIGNIFICANT CHANGE UP (ref 80–100)
NRBC # BLD: 0 /100 WBCS — SIGNIFICANT CHANGE UP (ref 0–0)
PLATELET # BLD AUTO: 143 K/UL — LOW (ref 150–400)
POTASSIUM SERPL-MCNC: 4.2 MMOL/L — SIGNIFICANT CHANGE UP (ref 3.5–5.3)
POTASSIUM SERPL-SCNC: 4.2 MMOL/L — SIGNIFICANT CHANGE UP (ref 3.5–5.3)
PROT SERPL-MCNC: 6.9 G/DL — SIGNIFICANT CHANGE UP (ref 6–8.3)
RBC # BLD: 3.51 M/UL — LOW (ref 3.8–5.2)
RBC # FLD: 14.9 % — HIGH (ref 10.3–14.5)
SODIUM SERPL-SCNC: 134 MMOL/L — LOW (ref 135–145)
WBC # BLD: 4.76 K/UL — SIGNIFICANT CHANGE UP (ref 3.8–10.5)
WBC # FLD AUTO: 4.76 K/UL — SIGNIFICANT CHANGE UP (ref 3.8–10.5)

## 2022-06-02 PROCEDURE — 80076 HEPATIC FUNCTION PANEL: CPT

## 2022-06-02 PROCEDURE — 82248 BILIRUBIN DIRECT: CPT

## 2022-06-02 PROCEDURE — 83935 ASSAY OF URINE OSMOLALITY: CPT

## 2022-06-02 PROCEDURE — 84145 PROCALCITONIN (PCT): CPT

## 2022-06-02 PROCEDURE — 87640 STAPH A DNA AMP PROBE: CPT

## 2022-06-02 PROCEDURE — 83036 HEMOGLOBIN GLYCOSYLATED A1C: CPT

## 2022-06-02 PROCEDURE — 82947 ASSAY GLUCOSE BLOOD QUANT: CPT

## 2022-06-02 PROCEDURE — 87637 SARSCOV2&INF A&B&RSV AMP PRB: CPT

## 2022-06-02 PROCEDURE — 82803 BLOOD GASES ANY COMBINATION: CPT

## 2022-06-02 PROCEDURE — 84295 ASSAY OF SERUM SODIUM: CPT

## 2022-06-02 PROCEDURE — 82728 ASSAY OF FERRITIN: CPT

## 2022-06-02 PROCEDURE — 82565 ASSAY OF CREATININE: CPT

## 2022-06-02 PROCEDURE — 87641 MR-STAPH DNA AMP PROBE: CPT

## 2022-06-02 PROCEDURE — 85018 HEMOGLOBIN: CPT

## 2022-06-02 PROCEDURE — 83735 ASSAY OF MAGNESIUM: CPT

## 2022-06-02 PROCEDURE — 99232 SBSQ HOSP IP/OBS MODERATE 35: CPT | Mod: GC

## 2022-06-02 PROCEDURE — 85014 HEMATOCRIT: CPT

## 2022-06-02 PROCEDURE — 82330 ASSAY OF CALCIUM: CPT

## 2022-06-02 PROCEDURE — 80053 COMPREHEN METABOLIC PANEL: CPT

## 2022-06-02 PROCEDURE — 85027 COMPLETE CBC AUTOMATED: CPT

## 2022-06-02 PROCEDURE — 80048 BASIC METABOLIC PNL TOTAL CA: CPT

## 2022-06-02 PROCEDURE — 85379 FIBRIN DEGRADATION QUANT: CPT

## 2022-06-02 PROCEDURE — 71045 X-RAY EXAM CHEST 1 VIEW: CPT

## 2022-06-02 PROCEDURE — 97116 GAIT TRAINING THERAPY: CPT

## 2022-06-02 PROCEDURE — 84100 ASSAY OF PHOSPHORUS: CPT

## 2022-06-02 PROCEDURE — 84300 ASSAY OF URINE SODIUM: CPT

## 2022-06-02 PROCEDURE — 36415 COLL VENOUS BLD VENIPUNCTURE: CPT

## 2022-06-02 PROCEDURE — 97161 PT EVAL LOW COMPLEX 20 MIN: CPT

## 2022-06-02 PROCEDURE — 97110 THERAPEUTIC EXERCISES: CPT

## 2022-06-02 PROCEDURE — 82570 ASSAY OF URINE CREATININE: CPT

## 2022-06-02 PROCEDURE — 84132 ASSAY OF SERUM POTASSIUM: CPT

## 2022-06-02 PROCEDURE — 83930 ASSAY OF BLOOD OSMOLALITY: CPT

## 2022-06-02 PROCEDURE — 85025 COMPLETE CBC W/AUTO DIFF WBC: CPT

## 2022-06-02 PROCEDURE — 86140 C-REACTIVE PROTEIN: CPT

## 2022-06-02 PROCEDURE — 96374 THER/PROPH/DIAG INJ IV PUSH: CPT

## 2022-06-02 PROCEDURE — 83605 ASSAY OF LACTIC ACID: CPT

## 2022-06-02 PROCEDURE — 83615 LACTATE (LD) (LDH) ENZYME: CPT

## 2022-06-02 PROCEDURE — 99285 EMERGENCY DEPT VISIT HI MDM: CPT | Mod: 25

## 2022-06-02 PROCEDURE — 82435 ASSAY OF BLOOD CHLORIDE: CPT

## 2022-06-02 PROCEDURE — 84484 ASSAY OF TROPONIN QUANT: CPT

## 2022-06-02 PROCEDURE — 93005 ELECTROCARDIOGRAM TRACING: CPT

## 2022-06-02 RX ORDER — ACETAMINOPHEN 500 MG
2 TABLET ORAL
Qty: 0 | Refills: 0 | DISCHARGE
Start: 2022-06-02

## 2022-06-02 RX ADMIN — Medication 1000 UNIT(S): at 06:07

## 2022-06-02 RX ADMIN — Medication 12.5 MILLIGRAM(S): at 06:07

## 2022-06-02 RX ADMIN — Medication 60 MILLIGRAM(S): at 12:46

## 2022-06-02 RX ADMIN — CHLORHEXIDINE GLUCONATE 1 APPLICATION(S): 213 SOLUTION TOPICAL at 12:43

## 2022-06-02 RX ADMIN — REMDESIVIR 500 MILLIGRAM(S): 5 INJECTION INTRAVENOUS at 12:43

## 2022-06-02 RX ADMIN — PANTOPRAZOLE SODIUM 40 MILLIGRAM(S): 20 TABLET, DELAYED RELEASE ORAL at 06:07

## 2022-06-02 NOTE — DISCHARGE NOTE PROVIDER - NSDCCPCAREPLAN_GEN_ALL_CORE_FT
PRINCIPAL DISCHARGE DIAGNOSIS  Diagnosis: Infection due to severe acute respiratory syndrome coronavirus 2 (SARS-CoV-2)  Assessment and Plan of Treatment: You have tested POSITIVE for the novel coronavirus (COVID-19).. Please wear a face mask if you are around other individuals.  Please follow up with your primary care physician within 2-3 weeks of your discharge from the hospital. Please take all medications as prescribed. If you experience any worsening or recurrence of your symptoms, particularly worsening or high fever, shortness of breathe, extreme fatigue, or bloody cough please call [phone] immediately or report to the nearest Emergency Department.      SECONDARY DISCHARGE DIAGNOSES  Diagnosis: Near syncope  Assessment and Plan of Treatment: Fainting usually is caused by fear, stress, pain, standing too long, over tired, overheated, going to bathroom, or coughing  Blood pressure can drop if you do not drink enough, blood pressure medication, alcohol, bleeding,  Consult with your doctor about driving  Avoid activity or condition that causes your syncope  Lay down with your feet up when you feel like you might faint  Stay well Hydrated

## 2022-06-02 NOTE — DISCHARGE NOTE PROVIDER - NSDCMRMEDTOKEN_GEN_ALL_CORE_FT
diazePAM 5 mg oral tablet: 0.5 tab(s) orally , As Needed  Durezol 0.05% ophthalmic emulsion: 1 drop(s) in the left eye 3 times a day  FLUoxetine 20 mg oral capsule: 3 cap(s) orally once a day  Metoprolol Succinate ER 25 mg oral tablet, extended release: 0.5 tab(s) orally once a day  Nasacort AQ 55 mcg/inh nasal spray: 1   2 times a day   PriLOSEC OTC 20 mg oral delayed release tablet: 1 tab(s) orally once a day  Vitamin D3 25 mcg (1000 intl units) oral tablet: 1 tab(s) orally 2 times a day   acetaminophen 325 mg oral tablet: 2 tab(s) orally every 6 hours, As needed, Temp greater or equal to 38C (100.4F), Mild Pain (1 - 3)  Durezol 0.05% ophthalmic emulsion: 1 drop(s) in the left eye 3 times a day  FLUoxetine 20 mg oral capsule: 3 cap(s) orally once a day  Metoprolol Succinate ER 25 mg oral tablet, extended release: 0.5 tab(s) orally once a day  Nasacort AQ 55 mcg/inh nasal spray: 1   2 times a day   PriLOSEC OTC 20 mg oral delayed release tablet: 1 tab(s) orally once a day  Vitamin D3 25 mcg (1000 intl units) oral tablet: 1 tab(s) orally 2 times a day

## 2022-06-02 NOTE — PROGRESS NOTE ADULT - PROVIDER SPECIALTY LIST ADULT
Infectious Disease
Pulmonology
Internal Medicine

## 2022-06-02 NOTE — DISCHARGE NOTE PROVIDER - CARE PROVIDER_API CALL
Andres Obregon)  Medicine  Pulmonary Medicine  [address] [place] [address], [place] [address]  Phone: [phone]  Fax: [phone]  Follow Up Time:     Primary Care Physician,   Phone: (   )    -  Fax: (   )    -  Follow Up Time:

## 2022-06-02 NOTE — DISCHARGE NOTE PROVIDER - PROVIDER TOKENS
PROVIDER:[TOKEN:[[id number]]],FREE:[LAST:[Primary Care Physician],PHONE:[(   )    -],FAX:[(   )    -]]

## 2022-06-02 NOTE — PROGRESS NOTE ADULT - REASON FOR ADMISSION
COVID 19 / near syncope

## 2022-06-02 NOTE — DISCHARGE NOTE PROVIDER - NSDCFUADDINST_GEN_ALL_CORE_FT
Make appointment(s) to follow up with your Healthcare Provider(s) - bring all discharge paperwork including discharge medication list to follow up appointment  You are being discharged home with [facility]

## 2022-06-02 NOTE — DISCHARGE NOTE PROVIDER - HOSPITAL COURSE
87 f with breast Ca (s/p lumpectomy and radiation), MAC (not on tc), MVP, asthma, IBS, anxiety/depression, developed fever, chills, body ache, N/V, HA and sore throat, tested positive for COVID on 5/27/22, was told to go for the monoclonal antibody treatment but pt was not feeling well and came to the hospital instead.   Patient was afebrile, normal WBC, no supplemental oxygen requirement, CXR negative  * pt was admitted for COVID so did not  qualify for monoclonal treatment.  * Completed 5 day course of remdesivir. Started 5/29/22  * renal function and LFTs where monitored while on remdesivir  Discharged home following completion of Remdesivir with stable LFT's and BMP

## 2022-06-02 NOTE — PROGRESS NOTE ADULT - SUBJECTIVE AND OBJECTIVE BOX
Patient is a 87y old  Female who presents with a chief complaint of COVID 19 / near syncope (31 May 2022 18:15)      SUBJECTIVE / OVERNIGHT EVENTS:    Events noted.  CONSTITUTIONAL: No fever,  or fatigue  RESPIRATORY: No cough, wheezing,  No shortness of breath  CARDIOVASCULAR: No chest pain, palpitations, dizziness, or leg swelling  GASTROINTESTINAL: No abdominal or epigastric pain. No nausea, vomiting.  NEUROLOGICAL: No headache    MEDICATIONS  (STANDING):  chlorhexidine 2% Cloths 1 Application(s) Topical daily  cholecalciferol 1000 Unit(s) Oral two times a day  enoxaparin Injectable 40 milliGRAM(s) SubCutaneous every 24 hours  FLUoxetine 60 milliGRAM(s) Oral daily  metoprolol succinate ER 12.5 milliGRAM(s) Oral daily  pantoprazole    Tablet 40 milliGRAM(s) Oral before breakfast  remdesivir  IVPB   IV Intermittent   remdesivir  IVPB 100 milliGRAM(s) IV Intermittent every 24 hours    MEDICATIONS  (PRN):  acetaminophen     Tablet .. 650 milliGRAM(s) Oral every 6 hours PRN Temp greater or equal to 38C (100.4F), Mild Pain (1 - 3)        CAPILLARY BLOOD GLUCOSE        I&O's Summary    30 May 2022 07:01  -  31 May 2022 07:00  --------------------------------------------------------  IN: 300 mL / OUT: 600 mL / NET: -300 mL    31 May 2022 07:01  -  31 May 2022 22:57  --------------------------------------------------------  IN: 480 mL / OUT: 0 mL / NET: 480 mL        T(C): 36.7 (05-31-22 @ 21:00), Max: 36.7 (05-31-22 @ 21:00)  HR: 54 (05-31-22 @ 21:00) (54 - 65)  BP: 150/70 (05-31-22 @ 21:00) (130/70 - 160/83)  RR: 18 (05-31-22 @ 21:00) (18 - 18)  SpO2: 97% (05-31-22 @ 21:00) (95% - 97%)    PHYSICAL EXAM:  GENERAL: NAD  NECK: Supple, No JVD  CHEST/LUNG: Clear to auscultation bilaterally; No wheezing.  HEART: Regular rate and rhythm; No murmurs, rubs, or gallops  ABDOMEN: Soft, Nontender, Nondistended; Bowel sounds present  EXTREMITIES:   No edema  NEUROLOGY: AAO X 3      LABS:                        10.2   3.77  )-----------( 161      ( 31 May 2022 07:29 )             32.4     05-31    133<L>  |  99  |  15  ----------------------------<  95  4.2   |  24  |  0.86    Ca    8.6      31 May 2022 07:29    TPro  7.0  /  Alb  3.2<L>  /  TBili  0.2  /  DBili  x   /  AST  74<H>  /  ALT  60<H>  /  AlkPhos  95  05-31            CAPILLARY BLOOD GLUCOSE            RADIOLOGY & ADDITIONAL TESTS:    Imaging Personally Reviewed:    Consultant(s) Notes Reviewed:      Care Discussed with Consultants/Other Providers:    Christ Bernard MD, CMD, FACP    [address] [place] [address]  Office Tel: [phone]  Cell: [phone]  
CHIEF COMPLAINT: COVID-19    Interval Events: Patient seen and examined earlier this AM prior to discharge. She reported that she was doing well. Denied respiratory complaints.    REVIEW OF SYSTEMS:  Constitutional: No fevers or chills.   Eyes: No itching or discharge from the eyes  ENT: No ear pain. No ear discharge. No nasal congestion.  CV: No chest pain. No palpitations. No lightheadedness or dizziness.   Resp: No dyspnea at rest.   GI: No nausea. No vomiting. No diarrhea.  MSK: No joint pain or pain in any extremities  Integumentary: No skin lesions. No pedal edema.  Neurological: No gross motor weakness. No sensory changes.  [x] All other systems negative  [ ] Unable to assess ROS because ________    OBJECTIVE:  ICU Vital Signs Last 24 Hrs  T(C): 36.8 (02 Jun 2022 10:43), Max: 36.9 (01 Jun 2022 21:29)  T(F): 98.3 (02 Jun 2022 10:43), Max: 98.5 (01 Jun 2022 21:29)  HR: 67 (02 Jun 2022 10:43) (63 - 67)  BP: 123/76 (02 Jun 2022 10:43) (123/76 - 158/69)  BP(mean): --  ABP: --  ABP(mean): --  RR: 18 (02 Jun 2022 10:43) (18 - 18)  SpO2: 97% (02 Jun 2022 10:43) (97% - 98%)        06-01 @ 07:01 - 06-02 @ 07:00  --------------------------------------------------------  IN: 700 mL / OUT: 200 mL / NET: 500 mL    06-02 @ 07:01  -  06-02 @ 19:27  --------------------------------------------------------  IN: 700 mL / OUT: 500 mL / NET: 200 mL      CAPILLARY BLOOD GLUCOSE          PHYSICAL EXAM:  General: Awake, alert, oriented X 3.   HEENT: Atraumatic, normocephalic.   Lymph Nodes: No palpable lymphadenopathy  Neck: No JVD. No carotid bruit.   Respiratory: Normal chest expansion  Cardiovascular: S1 S2 normal. No murmurs, rubs or gallops.   Abdomen: Soft, non-tender, non-distended. No organomegaly.  Extremities: Warm to touch. Peripheral pulse palpable. No pedal edema.   Skin: No rashes or skin lesions  Neurological: Motor and sensory examination equal and normal in all four extremities.  Psychiatry: Appropriate mood and affect.    HOSPITAL MEDICATIONS:  MEDICATIONS  (STANDING):  chlorhexidine 2% Cloths 1 Application(s) Topical daily  cholecalciferol 1000 Unit(s) Oral two times a day  enoxaparin Injectable 40 milliGRAM(s) SubCutaneous every 24 hours  FLUoxetine 60 milliGRAM(s) Oral daily  metoprolol succinate ER 12.5 milliGRAM(s) Oral daily  pantoprazole    Tablet 40 milliGRAM(s) Oral before breakfast    MEDICATIONS  (PRN):  acetaminophen     Tablet .. 650 milliGRAM(s) Oral every 6 hours PRN Temp greater or equal to 38C (100.4F), Mild Pain (1 - 3)  ondansetron Injectable 4 milliGRAM(s) IV Push every 6 hours PRN Nausea and/or Vomiting      LABS:                        9.8    4.76  )-----------( 143      ( 02 Jun 2022 06:26 )             31.0     06-02    134<L>  |  100  |  19  ----------------------------<  79  4.2   |  23  |  0.69    Ca    8.6      02 Jun 2022 07:53    TPro  6.9  /  Alb  3.0<L>  /  TBili  0.3  /  DBili  <0.1  /  AST  58<H>  /  ALT  50<H>  /  AlkPhos  98  06-02              MICROBIOLOGY:     RADIOLOGY:  [ ] Reviewed and interpreted by me    Point of Care Ultrasound Findings:    PFT:    EKG:
  Follow Up:  COVID    Interval History: pt afebrile and on RA    ROS:      All other systems negative    Constitutional: no fever, no chills  Cardiovascular:  no chest pain, no palpitation  Respiratory:  improved SOB  GI:  no abd pain, no vomiting, no diarrhea  urinary: no dysuria, no hematuria, no flank pain  musculoskeletal:  no joint pain, no joint swelling  skin:  no rash  neurology:  no headache, no seizure    Allergies  cefdinir (Unknown)  ciprofloxacin (Other)  codeine (Nausea; Other)  contrast media (iodine-based) (Angioedema)  fluorescein ophthalmic (Hives; Rash; Flushing)  lactose (Unknown)  latex (Anaphylaxis)  Levaquin (Nausea; Other)  lidocaine (Unknown)  SULFA (Anaphylaxis)  tetracycline (Anaphylaxis)        ANTIMICROBIALS:  remdesivir  IVPB    remdesivir  IVPB 100 every 24 hours      OTHER MEDS:  acetaminophen     Tablet .. 650 milliGRAM(s) Oral every 6 hours PRN  chlorhexidine 2% Cloths 1 Application(s) Topical daily  cholecalciferol 1000 Unit(s) Oral two times a day  enoxaparin Injectable 40 milliGRAM(s) SubCutaneous every 24 hours  FLUoxetine 60 milliGRAM(s) Oral daily  metoprolol succinate ER 12.5 milliGRAM(s) Oral daily  pantoprazole    Tablet 40 milliGRAM(s) Oral before breakfast      Vital Signs Last 24 Hrs  T(C): 36.6 (31 May 2022 10:56), Max: 36.9 (30 May 2022 20:55)  T(F): 97.9 (31 May 2022 10:56), Max: 98.4 (30 May 2022 20:55)  HR: 65 (31 May 2022 10:56) (58 - 89)  BP: 130/70 (31 May 2022 10:56) (130/70 - 160/83)  BP(mean): --  RR: 18 (31 May 2022 10:56) (18 - 18)  SpO2: 95% (31 May 2022 10:56) (94% - 96%)    Physical Exam:  General:    NAD,  non toxic  Cardio:     regular S1, S2  Respiratory:    clear b/l,    no wheezing  abd:     soft,   BS +,   no tenderness  :   no CVAT,  no suprapubic tenderness,   no  rivers  Musculoskeletal:   no joint swelling  vascular: no phlebitis  Skin:    no rash                        10.2   3.77  )-----------( 161      ( 31 May 2022 07:29 )             32.4       05-31    133<L>  |  99  |  15  ----------------------------<  95  4.2   |  24  |  0.86    Ca    8.6      31 May 2022 07:29    TPro  7.0  /  Alb  3.2<L>  /  TBili  0.2  /  DBili  x   /  AST  74<H>  /  ALT  60<H>  /  AlkPhos  95  05-31          MICROBIOLOGY:  v            RADIOLOGY:  Images independently visualized and reviewed personally, findings as below  < from: Xray Chest 1 View- PORTABLE-Urgent (Xray Chest 1 View- PORTABLE-Urgent .) (05.28.22 @ 10:08) >  IMPRESSION:  No focal consolidation.    < end of copied text >  
Patient is a 87y old  Female who presents with a chief complaint of COVID 19 / near syncope (29 May 2022 08:02)      SUBJECTIVE / OVERNIGHT EVENTS:    Events noted.  CONSTITUTIONAL: No fever,  or fatigue  RESPIRATORY: No cough, wheezing,  No shortness of breath  CARDIOVASCULAR: No chest pain, palpitations, dizziness, or leg swelling  GASTROINTESTINAL: No abdominal or epigastric pain. No nausea, vomiting.  NEUROLOGICAL: No headache    MEDICATIONS  (STANDING):  chlorhexidine 2% Cloths 1 Application(s) Topical daily  cholecalciferol 1000 Unit(s) Oral two times a day  enoxaparin Injectable 40 milliGRAM(s) SubCutaneous every 24 hours  FLUoxetine 60 milliGRAM(s) Oral daily  metoprolol succinate ER 12.5 milliGRAM(s) Oral daily  pantoprazole    Tablet 40 milliGRAM(s) Oral before breakfast  remdesivir  IVPB   IV Intermittent   remdesivir  IVPB 100 milliGRAM(s) IV Intermittent every 24 hours    MEDICATIONS  (PRN):  acetaminophen     Tablet .. 650 milliGRAM(s) Oral every 6 hours PRN Temp greater or equal to 38C (100.4F), Mild Pain (1 - 3)        CAPILLARY BLOOD GLUCOSE        I&O's Summary    28 May 2022 07:01  -  29 May 2022 07:00  --------------------------------------------------------  IN: 240 mL / OUT: 0 mL / NET: 240 mL        T(C): 37.1 (05-29-22 @ 13:08), Max: 37.1 (05-28-22 @ 21:29)  HR: 62 (05-29-22 @ 13:08) (60 - 62)  BP: 132/71 (05-29-22 @ 13:08) (132/71 - 152/87)  RR: 18 (05-29-22 @ 13:08) (18 - 18)  SpO2: 95% (05-29-22 @ 13:08) (94% - 96%)    PHYSICAL EXAM:  GENERAL: NAD  NECK: Supple, No JVD  CHEST/LUNG: Clear to auscultation bilaterally; No wheezing.  HEART: Regular rate and rhythm; No murmurs, rubs, or gallops  ABDOMEN: Soft, Nontender, Nondistended; Bowel sounds present  EXTREMITIES:   No edema  NEUROLOGY: AAO       LABS:                        9.2    4.02  )-----------( 163      ( 28 May 2022 10:21 )             29.5     05-28    133<L>  |  97  |  20  ----------------------------<  101<H>  4.2   |  23  |  0.72    Ca    8.5      28 May 2022 10:21    TPro  7.3  /  Alb  3.2<L>  /  TBili  0.3  /  DBili  x   /  AST  37  /  ALT  21  /  AlkPhos  108  05-28            CAPILLARY BLOOD GLUCOSE            RADIOLOGY & ADDITIONAL TESTS:    Imaging Personally Reviewed:    Consultant(s) Notes Reviewed:      Care Discussed with Consultants/Other Providers:    Christ Bernard MD, CMD, FACP    [address] [place] [address]  Office Tel: [phone]  Cell: [phone]  
Patient is a 87y old  Female who presents with a chief complaint of COVID 19 / near syncope (31 May 2022 18:15)      SUBJECTIVE / OVERNIGHT EVENTS:    Events noted.  CONSTITUTIONAL: No fever,  or fatigue  RESPIRATORY: No cough, wheezing,  No shortness of breath  CARDIOVASCULAR: No chest pain, palpitations, dizziness, or leg swelling  GASTROINTESTINAL: No abdominal or epigastric pain. No nausea, vomiting.  NEUROLOGICAL: No headache    MEDICATIONS  (STANDING):  chlorhexidine 2% Cloths 1 Application(s) Topical daily  cholecalciferol 1000 Unit(s) Oral two times a day  enoxaparin Injectable 40 milliGRAM(s) SubCutaneous every 24 hours  FLUoxetine 60 milliGRAM(s) Oral daily  metoprolol succinate ER 12.5 milliGRAM(s) Oral daily  pantoprazole    Tablet 40 milliGRAM(s) Oral before breakfast  remdesivir  IVPB   IV Intermittent   remdesivir  IVPB 100 milliGRAM(s) IV Intermittent every 24 hours    MEDICATIONS  (PRN):  acetaminophen     Tablet .. 650 milliGRAM(s) Oral every 6 hours PRN Temp greater or equal to 38C (100.4F), Mild Pain (1 - 3)        CAPILLARY BLOOD GLUCOSE        I&O's Summary    30 May 2022 07:01  -  31 May 2022 07:00  --------------------------------------------------------  IN: 300 mL / OUT: 600 mL / NET: -300 mL    31 May 2022 07:01  -  31 May 2022 22:57  --------------------------------------------------------  IN: 480 mL / OUT: 0 mL / NET: 480 mL        T(C): 36.7 (05-31-22 @ 21:00), Max: 36.7 (05-31-22 @ 21:00)  HR: 54 (05-31-22 @ 21:00) (54 - 65)  BP: 150/70 (05-31-22 @ 21:00) (130/70 - 160/83)  RR: 18 (05-31-22 @ 21:00) (18 - 18)  SpO2: 97% (05-31-22 @ 21:00) (95% - 97%)    PHYSICAL EXAM:  GENERAL: NAD  NECK: Supple, No JVD  CHEST/LUNG: Clear to auscultation bilaterally; No wheezing.  HEART: Regular rate and rhythm; No murmurs, rubs, or gallops  ABDOMEN: Soft, Nontender, Nondistended; Bowel sounds present  EXTREMITIES:   No edema  NEUROLOGY: AAO X 3      LABS:                        10.2   3.77  )-----------( 161      ( 31 May 2022 07:29 )             32.4     05-31    133<L>  |  99  |  15  ----------------------------<  95  4.2   |  24  |  0.86    Ca    8.6      31 May 2022 07:29    TPro  7.0  /  Alb  3.2<L>  /  TBili  0.2  /  DBili  x   /  AST  74<H>  /  ALT  60<H>  /  AlkPhos  95  05-31            CAPILLARY BLOOD GLUCOSE            RADIOLOGY & ADDITIONAL TESTS:    Imaging Personally Reviewed:    Consultant(s) Notes Reviewed:      Care Discussed with Consultants/Other Providers:    Christ Bernard MD, CMD, FACP    [address] [place] [address]  Office Tel: [phone]  Cell: [phone]  
Patient is a 87y old  Female who presents with a chief complaint of COVID 19 / near syncope (29 May 2022 14:35)      SUBJECTIVE / OVERNIGHT EVENTS:    Events noted.  CONSTITUTIONAL: No fever,  or fatigue  RESPIRATORY: Cold  CARDIOVASCULAR: No chest pain, palpitations, dizziness, or leg swelling  GASTROINTESTINAL: No abdominal or epigastric pain. No nausea, vomiting.  NEUROLOGICAL: No headache    MEDICATIONS  (STANDING):  chlorhexidine 2% Cloths 1 Application(s) Topical daily  cholecalciferol 1000 Unit(s) Oral two times a day  enoxaparin Injectable 40 milliGRAM(s) SubCutaneous every 24 hours  FLUoxetine 60 milliGRAM(s) Oral daily  metoprolol succinate ER 12.5 milliGRAM(s) Oral daily  pantoprazole    Tablet 40 milliGRAM(s) Oral before breakfast  remdesivir  IVPB   IV Intermittent   remdesivir  IVPB 100 milliGRAM(s) IV Intermittent every 24 hours    MEDICATIONS  (PRN):  acetaminophen     Tablet .. 650 milliGRAM(s) Oral every 6 hours PRN Temp greater or equal to 38C (100.4F), Mild Pain (1 - 3)        CAPILLARY BLOOD GLUCOSE        I&O's Summary    29 May 2022 07:01  -  30 May 2022 07:00  --------------------------------------------------------  IN: 165 mL / OUT: 800 mL / NET: -635 mL    30 May 2022 07:01  -  30 May 2022 19:40  --------------------------------------------------------  IN: 0 mL / OUT: 200 mL / NET: -200 mL        T(C): 36.8 (05-30-22 @ 10:33), Max: 36.8 (05-30-22 @ 04:30)  HR: 60 (05-30-22 @ 11:30) (58 - 70)  BP: 147/73 (05-30-22 @ 11:30) (138/76 - 167/75)  RR: 18 (05-30-22 @ 10:33) (18 - 18)  SpO2: 96% (05-30-22 @ 10:33) (95% - 96%)    PHYSICAL EXAM:    NECK: Supple, No JVD  CHEST/LUNG: Clear to auscultation bilaterally; No wheezing.  HEART: Regular rate and rhythm; No murmurs, rubs, or gallops  ABDOMEN: Soft, Nontender, Nondistended; Bowel sounds present  EXTREMITIES:   No edema  NEUROLOGY: AAO       LABS:                        10.2   3.11  )-----------( 160      ( 30 May 2022 06:25 )             31.1     05-30    x   |  x   |  x   ----------------------------<  x   x    |  x   |  0.75    Ca    8.6      30 May 2022 06:25  Phos  3.0     05-29  Mg     1.7     05-29    TPro  7.3  /  Alb  3.3  /  TBili  0.2  /  DBili  <0.1  /  AST  73<H>  /  ALT  57<H>  /  AlkPhos  99  05-30            CAPILLARY BLOOD GLUCOSE            RADIOLOGY & ADDITIONAL TESTS:    Imaging Personally Reviewed:    Consultant(s) Notes Reviewed:      Care Discussed with Consultants/Other Providers:    Christ Bernard MD, CMD, FACP    [address] [place] [address]  Office Tel: [phone]  Cell: [phone]

## 2022-06-02 NOTE — PROGRESS NOTE ADULT - ATTENDING COMMENTS
Clinically stable.  No pulmonary contraindication to discharge.  Outpatient follow up with Dr. Mcgowan.

## 2022-06-03 ENCOUNTER — TRANSCRIPTION ENCOUNTER (OUTPATIENT)
Age: 87
End: 2022-06-03

## 2022-07-02 ENCOUNTER — INPATIENT (INPATIENT)
Facility: HOSPITAL | Age: 87
LOS: 6 days | Discharge: NURSING, REHAB OR LONG-TERM CARE | DRG: 314 | End: 2022-07-09
Attending: INTERNAL MEDICINE | Admitting: INTERNAL MEDICINE
Payer: MEDICARE

## 2022-07-02 VITALS
RESPIRATION RATE: 16 BRPM | HEART RATE: 54 BPM | TEMPERATURE: 98 F | OXYGEN SATURATION: 98 % | HEIGHT: 67 IN | SYSTOLIC BLOOD PRESSURE: 145 MMHG | DIASTOLIC BLOOD PRESSURE: 78 MMHG

## 2022-07-02 DIAGNOSIS — R41.82 ALTERED MENTAL STATUS, UNSPECIFIED: ICD-10-CM

## 2022-07-02 PROBLEM — Z92.29 PERSONAL HISTORY OF OTHER DRUG THERAPY: Chronic | Status: ACTIVE | Noted: 2022-05-28

## 2022-07-02 PROBLEM — U07.1 COVID-19: Chronic | Status: ACTIVE | Noted: 2022-05-28

## 2022-07-02 LAB
ALBUMIN SERPL ELPH-MCNC: 3.1 G/DL — LOW (ref 3.3–5)
ALP SERPL-CCNC: 98 U/L — SIGNIFICANT CHANGE UP (ref 40–120)
ALT FLD-CCNC: 11 U/L — SIGNIFICANT CHANGE UP (ref 10–45)
ANION GAP SERPL CALC-SCNC: 13 MMOL/L — SIGNIFICANT CHANGE UP (ref 5–17)
APPEARANCE UR: CLEAR — SIGNIFICANT CHANGE UP
AST SERPL-CCNC: 26 U/L — SIGNIFICANT CHANGE UP (ref 10–40)
BACTERIA # UR AUTO: NEGATIVE — SIGNIFICANT CHANGE UP
BASOPHILS # BLD AUTO: 0.04 K/UL — SIGNIFICANT CHANGE UP (ref 0–0.2)
BASOPHILS NFR BLD AUTO: 0.7 % — SIGNIFICANT CHANGE UP (ref 0–2)
BILIRUB SERPL-MCNC: 0.5 MG/DL — SIGNIFICANT CHANGE UP (ref 0.2–1.2)
BILIRUB UR-MCNC: NEGATIVE — SIGNIFICANT CHANGE UP
BUN SERPL-MCNC: 16 MG/DL — SIGNIFICANT CHANGE UP (ref 7–23)
CALCIUM SERPL-MCNC: 9 MG/DL — SIGNIFICANT CHANGE UP (ref 8.4–10.5)
CHLORIDE SERPL-SCNC: 100 MMOL/L — SIGNIFICANT CHANGE UP (ref 96–108)
CO2 SERPL-SCNC: 23 MMOL/L — SIGNIFICANT CHANGE UP (ref 22–31)
COLOR SPEC: SIGNIFICANT CHANGE UP
CREAT SERPL-MCNC: 0.73 MG/DL — SIGNIFICANT CHANGE UP (ref 0.5–1.3)
DIFF PNL FLD: NEGATIVE — SIGNIFICANT CHANGE UP
EGFR: 80 ML/MIN/1.73M2 — SIGNIFICANT CHANGE UP
EOSINOPHIL # BLD AUTO: 0.14 K/UL — SIGNIFICANT CHANGE UP (ref 0–0.5)
EOSINOPHIL NFR BLD AUTO: 2.5 % — SIGNIFICANT CHANGE UP (ref 0–6)
EPI CELLS # UR: 0 /HPF — SIGNIFICANT CHANGE UP
GLUCOSE SERPL-MCNC: 98 MG/DL — SIGNIFICANT CHANGE UP (ref 70–99)
GLUCOSE UR QL: NEGATIVE — SIGNIFICANT CHANGE UP
HCT VFR BLD CALC: 30.7 % — LOW (ref 34.5–45)
HGB BLD-MCNC: 9.8 G/DL — LOW (ref 11.5–15.5)
HYALINE CASTS # UR AUTO: 0 /LPF — SIGNIFICANT CHANGE UP (ref 0–2)
IMM GRANULOCYTES NFR BLD AUTO: 0.4 % — SIGNIFICANT CHANGE UP (ref 0–1.5)
KETONES UR-MCNC: NEGATIVE — SIGNIFICANT CHANGE UP
LEUKOCYTE ESTERASE UR-ACNC: NEGATIVE — SIGNIFICANT CHANGE UP
LYMPHOCYTES # BLD AUTO: 1.76 K/UL — SIGNIFICANT CHANGE UP (ref 1–3.3)
LYMPHOCYTES # BLD AUTO: 31.1 % — SIGNIFICANT CHANGE UP (ref 13–44)
MAGNESIUM SERPL-MCNC: 1.9 MG/DL — SIGNIFICANT CHANGE UP (ref 1.6–2.6)
MCHC RBC-ENTMCNC: 27.9 PG — SIGNIFICANT CHANGE UP (ref 27–34)
MCHC RBC-ENTMCNC: 31.9 GM/DL — LOW (ref 32–36)
MCV RBC AUTO: 87.5 FL — SIGNIFICANT CHANGE UP (ref 80–100)
MONOCYTES # BLD AUTO: 0.77 K/UL — SIGNIFICANT CHANGE UP (ref 0–0.9)
MONOCYTES NFR BLD AUTO: 13.6 % — SIGNIFICANT CHANGE UP (ref 2–14)
NEUTROPHILS # BLD AUTO: 2.93 K/UL — SIGNIFICANT CHANGE UP (ref 1.8–7.4)
NEUTROPHILS NFR BLD AUTO: 51.7 % — SIGNIFICANT CHANGE UP (ref 43–77)
NITRITE UR-MCNC: NEGATIVE — SIGNIFICANT CHANGE UP
NRBC # BLD: 0 /100 WBCS — SIGNIFICANT CHANGE UP (ref 0–0)
PH UR: 6.5 — SIGNIFICANT CHANGE UP (ref 5–8)
PHOSPHATE SERPL-MCNC: 3.8 MG/DL — SIGNIFICANT CHANGE UP (ref 2.5–4.5)
PLATELET # BLD AUTO: 225 K/UL — SIGNIFICANT CHANGE UP (ref 150–400)
POTASSIUM SERPL-MCNC: 4.6 MMOL/L — SIGNIFICANT CHANGE UP (ref 3.5–5.3)
POTASSIUM SERPL-SCNC: 4.6 MMOL/L — SIGNIFICANT CHANGE UP (ref 3.5–5.3)
PROT SERPL-MCNC: 7.5 G/DL — SIGNIFICANT CHANGE UP (ref 6–8.3)
PROT UR-MCNC: NEGATIVE — SIGNIFICANT CHANGE UP
RBC # BLD: 3.51 M/UL — LOW (ref 3.8–5.2)
RBC # FLD: 14.7 % — HIGH (ref 10.3–14.5)
RBC CASTS # UR COMP ASSIST: 5 /HPF — HIGH (ref 0–4)
SARS-COV-2 RNA SPEC QL NAA+PROBE: SIGNIFICANT CHANGE UP
SODIUM SERPL-SCNC: 136 MMOL/L — SIGNIFICANT CHANGE UP (ref 135–145)
SP GR SPEC: 1.01 — SIGNIFICANT CHANGE UP (ref 1.01–1.02)
TSH SERPL-MCNC: 0.7 UIU/ML — SIGNIFICANT CHANGE UP (ref 0.27–4.2)
UROBILINOGEN FLD QL: NEGATIVE — SIGNIFICANT CHANGE UP
WBC # BLD: 5.66 K/UL — SIGNIFICANT CHANGE UP (ref 3.8–10.5)
WBC # FLD AUTO: 5.66 K/UL — SIGNIFICANT CHANGE UP (ref 3.8–10.5)
WBC UR QL: 0 /HPF — SIGNIFICANT CHANGE UP (ref 0–5)

## 2022-07-02 PROCEDURE — 71045 X-RAY EXAM CHEST 1 VIEW: CPT | Mod: 26

## 2022-07-02 PROCEDURE — 93010 ELECTROCARDIOGRAM REPORT: CPT

## 2022-07-02 PROCEDURE — 70450 CT HEAD/BRAIN W/O DYE: CPT | Mod: 26,MA

## 2022-07-02 PROCEDURE — 99285 EMERGENCY DEPT VISIT HI MDM: CPT | Mod: 25

## 2022-07-02 PROCEDURE — 99223 1ST HOSP IP/OBS HIGH 75: CPT

## 2022-07-02 RX ORDER — SODIUM CHLORIDE 9 MG/ML
1000 INJECTION INTRAMUSCULAR; INTRAVENOUS; SUBCUTANEOUS ONCE
Refills: 0 | Status: COMPLETED | OUTPATIENT
Start: 2022-07-02 | End: 2022-07-02

## 2022-07-02 RX ADMIN — SODIUM CHLORIDE 1000 MILLILITER(S): 9 INJECTION INTRAMUSCULAR; INTRAVENOUS; SUBCUTANEOUS at 16:00

## 2022-07-02 NOTE — H&P ADULT - NSICDXPASTSURGICALHX_GEN_ALL_CORE_FT
PAST SURGICAL HISTORY:  History of Breast Biopsy [place] lymph node biopsy    History of Cataract Surgery Left eye    S/P Breast Lumpectomy     S/P Lumpectomy of Breast Left Breast    S/P Nasal Polypectomy     Status Post Tonsillectomy

## 2022-07-02 NOTE — ED PROVIDER NOTE - OBJECTIVE STATEMENT
87 breast Ca (s/p lumpectomy and radiation), MAC (not on tc), MVP, asthma, IBS, anxiety/depression, developed fever, chills, body ache, N/V, HA p/w weakness. Patient sleeping, unable to provide hx, noncontributory, daughter bedside who takes care of patient states that since pt contracted covid 2 months ago she has been having decrease energy, unable to stand, sleeping, not eating. has periods of being ambulatory and talkative but sxs worsening  pt also had an episode of possible OD on her anxiety medication (valium) 3 days ago and was admitted in [facility] and discharged 1 day ago. family concerned given multiple episodes of confusions  otherwise pt denies any fever, abdominal pain, diarrhea, vomiting, rash 87 breast Ca (s/p lumpectomy and radiation), MAC (not on tc), MVP, asthma, IBS, anxiety/depression, Patient sleeping, unable to provide hx, noncontributory, daughter bedside who takes care of patient states that since pt contracted covid 2 months ago she has been having decrease energy, unable to stand, sleeping, not eating. has periods of being ambulatory and talkative but sxs worsening  pt also had an episode of possible OD on her anxiety medication (valium) 3 days ago and was admitted in [facility] and discharged 1 day ago. family concerned given multiple episodes of confusions  otherwise pt denies any fever, abdominal pain, diarrhea, vomiting, rash

## 2022-07-02 NOTE — H&P ADULT - NSHPLABSRESULTS_GEN_ALL_CORE
Personally reviewed old records.  Personally reviewed labs.  Personally reviewed imaging.                        9.8    5.66  )-----------( 225      ( 2022 16:10 )             30.7       07-02    136  |  100  |  16  ----------------------------<  98  4.6   |  23  |  0.73    Ca    9.0      2022 16:10  Phos  3.8     07-02  Mg     1.9     07-    TPro  7.5  /  Alb  3.1<L>  /  TBili  0.5  /  DBili  x   /  AST  26  /  ALT  11  /  AlkPhos  98  07-02            LIVER FUNCTIONS - ( 2022 16:10 )  Alb: 3.1 g/dL / Pro: 7.5 g/dL / ALK PHOS: 98 U/L / ALT: 11 U/L / AST: 26 U/L / GGT: x                 Urinalysis Basic - ( 2022 18:43 )    Color: Light Yellow / Appearance: Clear / S.010 / pH: x  Gluc: x / Ketone: Negative  / Bili: Negative / Urobili: Negative   Blood: x / Protein: Negative / Nitrite: Negative   Leuk Esterase: Negative / RBC: 5 /hpf / WBC 0 /HPF   Sq Epi: x / Non Sq Epi: 0 /hpf / Bacteria: Negative

## 2022-07-02 NOTE — ED ADULT NURSE REASSESSMENT NOTE - NS ED NURSE REASSESS COMMENT FT1
straight cath performed with 2 RN's present while performing sterile technique as per MD order. urine drained 900cc clear yellow urine. Will continue to monitor and assess while offering support and reassurance.

## 2022-07-02 NOTE — H&P ADULT - PROBLEM SELECTOR PLAN 1
- unclear why, poss from hypotension  - no obvious or localizing infectious symptoms  - BP improved after IVF  - CTH neg for acute changes  - cont to monitor for infection

## 2022-07-02 NOTE — ED PROVIDER NOTE - PHYSICAL EXAMINATION
Vital signs reviewed  GENERAL: Patient nontoxic appearing, NAD  HEAD: NCAT  EYES: Anicteric  ENT: MMM  NECK: Supple, non tender  RESPIRATORY: Normal respiratory effort.   CARDIOVASCULAR: Regular rate and rhythm  ABDOMEN: Soft. Nondistended. Nontender.  MUSCULOSKELETAL/EXTREMITIES: Brisk cap refill.   SKIN:  Warm and dry

## 2022-07-02 NOTE — ED PROVIDER NOTE - NSICDXPASTSURGICALHX_GEN_ALL_CORE_FT
PAST SURGICAL HISTORY:  History of Breast Biopsy [place] lymph node biopsy    History of Cataract Surgery Left eye    S/P Breast Lumpectomy     S/P Lumpectomy of Breast Left Breast    S/P Nasal Polypectomy     Status Post Tonsillectomy

## 2022-07-02 NOTE — H&P ADULT - NSHPSOCIALHISTORY_GEN_ALL_CORE
Social History:    Marital Status: (  ) , (  ) Single, (  ) , ( x ) , (  )   # of Children: 3  Lives with: (  ) alone, ( x ) children, (  ) spouse, (  ) parents, (  ) siblings, (  ) friends, (  ) other:   Occupation:     Substance Use/Illicit Drugs: (  ) never used vs other:   Tobacco Usage: ( x ) never smoked, (  ) former smoker, (  ) current smoker and Total Pack-Years:   Last Alcohol Usage/Frequency/Amount/Withdrawal/Hx of Abuse:  none  Foreign travel:   Animal exposure:

## 2022-07-02 NOTE — H&P ADULT - PROBLEM SELECTOR PLAN 5
- will need psych consult in AM, poss add a second agent  - pt also recently accidental valium toxicity. f/u psych as outpt

## 2022-07-02 NOTE — ED ADULT NURSE NOTE - NSIMPLEMENTINTERV_GEN_ALL_ED
Implemented All Fall with Harm Risk Interventions:  [place] to call system. Call bell, personal items and telephone within reach. Instruct patient to call for assistance. Room bathroom lighting operational. Non-slip footwear when patient is off stretcher. Physically safe environment: no spills, clutter or unnecessary equipment. Stretcher in lowest position, wheels locked, appropriate side rails in place. Provide visual cue, wrist band, yellow gown, etc. Monitor gait and stability. Monitor for mental status changes and reorient to person, place, and time. Review medications for side effects contributing to fall risk. Reinforce activity limits and safety measures with patient and family. Provide visual clues: red socks.

## 2022-07-02 NOTE — H&P ADULT - HISTORY OF PRESENT ILLNESS
87F c hx remote breast ca, MAC untreated, MVP, asthma, mod AS, orthostatic hypotension (Feb '22) c/b syncope, anxiety, depression, recent covid (May '22), recent accidental valium toxicity (d/c'd from [facility] 2 days ago), pw confusion, syncope, hypotension.    History from pt, pt's daughter Amaya Cameron at bedside.  Pt has been having progressive weakness, continued poor appetite since covid diagnosis.  87F c hx remote breast ca, MAC untreated, MVP, asthma, mod AS, paroxysmal tachycardia of unknown rhythm, orthostatic hypotension (Feb '22) c/b syncope, anxiety, depression, recent covid (May '22), recent accidental valium toxicity (d/c'd from [facility] 2 days ago), pw confusion, syncope, hypotension.    History from pt, pt's daughter Amaya Cameron at bedside.  Pt has been having continued weakness, poor appetite since covid diagnosis, but had been improving. At baseline, pt ambulates with walker, completes her own ADLs. About 5 days ago, pt was having trouble sleeping, so took additional valium tablets to help her sleep. The following day, pt's daughter arrived home to find pt on floor unarousable. Pt admitted to [facility] where pt was treated for valium toxicity, then discharged 2 days ago. Since discharge, pt has been drastically worse functional. Pt has been passing out on standing, confused, drowsy. Pt would slump and family would have to catch her. Today, pt slept all night, then again from 10AM to afternoon. Pt's daughter checked blood pressure and noted that the systolic number was like "98". Pt reports intermittent foot pain on standing. Pt denies fevers, chills.  Family states pt has not been having bowel movements and has not been urinating. Pt denies any relieving or exacerbating factors. Family also states pt has been having severe depression and was supposed to see a psychiatrist.

## 2022-07-02 NOTE — H&P ADULT - ASSESSMENT
87F c hx remote breast ca, MAC untreated, MVP, asthma, mod AS, paroxysmal tachycardia of unknown rhythm, orthostatic hypotension (Feb '22) c/b syncope, anxiety, depression, recent covid (May '22), recent accidental valium toxicity (d/c'd from [facility] 2 days ago), pw confusion, syncope, hypotension.

## 2022-07-02 NOTE — H&P ADULT - NSHPPHYSICALEXAM_GEN_ALL_CORE
PHYSICAL EXAM:   GENERAL: Alert. Not confused. No acute distress. +thin. Not cachectic. Not obese.  HEAD:  Atraumatic. Normocephalic.  EYES: EOMI. PERRLA. Normal conjunctiva/sclera.  ENT: Neck supple. No JVD. Moist oral mucosa. Not edentulous. No thrush.  LYMPH: Normal supraclavicular/cervical lymph nodes.   CARDIAC: Not tachy, Not alyssa. Regular rhythm. Not irregularly irregular. S1. S2.   LUNG/CHEST: CTAB. BS equal bilaterally. No wheezes. No rales. No rhonchi.  ABDOMEN: Soft. No tenderness. No distension. No fluid wave. Normal bowel sounds.  BACK: No midline/vertebral tenderness. No flank tenderness.  VASCULAR: +2 b/l radial or ulnar pulses. Palpable DP pulses.  EXTREMITIES:  No clubbing. No cyanosis. No edema. Moving all 4.  NEUROLOGY: A&Ox3. Non-focal exam. Cranial nerves intact. Normal speech. Sensation intact.  PSYCH: Normal behavior. Flat affect.  SKIN: No jaundice. No erythema. No rash/lesion.  Vascular Access:     ICU Vital Signs Last 24 Hrs  T(C): 36.7 (02 Jul 2022 14:19), Max: 36.7 (02 Jul 2022 14:19)  T(F): 98.1 (02 Jul 2022 14:19), Max: 98.1 (02 Jul 2022 14:19)  HR: 57 (02 Jul 2022 20:30) (54 - 57)  BP: 174/86 (02 Jul 2022 20:30) (145/78 - 174/86)  BP(mean): --  ABP: --  ABP(mean): --  RR: 16 (02 Jul 2022 20:30) (16 - 16)  SpO2: 96% (02 Jul 2022 20:30) (96% - 98%)      I&O's Summary

## 2022-07-02 NOTE — H&P ADULT - NSHPREVIEWOFSYSTEMS_GEN_ALL_CORE
REVIEW OF SYSTEMS:  CONSTITUTIONAL: +weakness. No fevers. No chills. No rigors. No weight loss. No night sweats. +poor appetite.  EYES: No blurry or double vision. No eye pain.  ENT: No hearing difficulty. No vertigo. No dysphagia. No sore throat.   NECK: No pain. No stiffness/rigidity.  CARDIAC: No chest pain. No palpitations. No lightheadedness. +syncope.  RESPIRATORY: No cough. No SOB.   GASTROINTESTINAL: No abdominal pain. No nausea. No vomiting. No hematemesis. No diarrhea. +constipation.   GENITOURINARY: No dysuria. No frequency. No hesitancy. No hematuria. No oliguria.  NEUROLOGICAL: No numbness/tingling. No focal weakness. No urinary or fecal incontinence. No headache. +unsteady gait.  BACK: No back pain. No flank pain.  EXTREMITIES: No lower extremity edema. Full ROM. No joint pain.  SKIN: No rashes. No itching. No other lesions.  PSYCHIATRIC: +depression. No anxiety.   ALLERGIC: No lip swelling. No hives.  All other review of systems is negative unless indicated above.  Unless indicated above, unable to assess ROS 2/2

## 2022-07-02 NOTE — ED ADULT NURSE NOTE - OBJECTIVE STATEMENT
87 year old female presented to ED via [facility] from home with daughter at bedside with c/o of 1 syncopal episode today witnessed by daughter. as per daughter, pt did not hit head, no LOC, hx frequent falls, recent admission on memorial day for covid. hx HTN, depression, anxiety, GERD, breast ca. pt shows no sign of CP, SOB, nausea/vomiting, numbness/tingling, fever, cough, chills, dizziness, headache, blurred vision, neuro intact. pt a&ox1 (baseline), lung sounds clear, heart rate regular, on cardiac monitor, abdomen soft nontender nondistended to palp. skin intact. IV in right forearm 18G and patent placed by EMS. Will continue to monitor and assess while offering support and reassurance.

## 2022-07-02 NOTE — ED PROVIDER NOTE - CLINICAL SUMMARY MEDICAL DECISION MAKING FREE TEXT BOX
P[place]: 86yo who presents with passing out. multiple medical problems. left the hospital Tuesday. passing out whenever she goes vertical. Need to evaluate for infection, dehydration. rt foot with pain redness at base of 5th, ?gout.

## 2022-07-03 DIAGNOSIS — G93.41 METABOLIC ENCEPHALOPATHY: ICD-10-CM

## 2022-07-03 DIAGNOSIS — F32.9 MAJOR DEPRESSIVE DISORDER, SINGLE EPISODE, UNSPECIFIED: ICD-10-CM

## 2022-07-03 DIAGNOSIS — I95.9 HYPOTENSION, UNSPECIFIED: ICD-10-CM

## 2022-07-03 DIAGNOSIS — R62.7 ADULT FAILURE TO THRIVE: ICD-10-CM

## 2022-07-03 DIAGNOSIS — R55 SYNCOPE AND COLLAPSE: ICD-10-CM

## 2022-07-03 LAB
ALBUMIN SERPL ELPH-MCNC: 3.3 G/DL — SIGNIFICANT CHANGE UP (ref 3.3–5)
ALP SERPL-CCNC: 100 U/L — SIGNIFICANT CHANGE UP (ref 40–120)
ALT FLD-CCNC: 10 U/L — SIGNIFICANT CHANGE UP (ref 10–45)
ANION GAP SERPL CALC-SCNC: 11 MMOL/L — SIGNIFICANT CHANGE UP (ref 5–17)
AST SERPL-CCNC: 16 U/L — SIGNIFICANT CHANGE UP (ref 10–40)
BASOPHILS # BLD AUTO: 0.04 K/UL — SIGNIFICANT CHANGE UP (ref 0–0.2)
BASOPHILS NFR BLD AUTO: 0.8 % — SIGNIFICANT CHANGE UP (ref 0–2)
BILIRUB SERPL-MCNC: 0.5 MG/DL — SIGNIFICANT CHANGE UP (ref 0.2–1.2)
BUN SERPL-MCNC: 13 MG/DL — SIGNIFICANT CHANGE UP (ref 7–23)
CALCIUM SERPL-MCNC: 9.1 MG/DL — SIGNIFICANT CHANGE UP (ref 8.4–10.5)
CHLORIDE SERPL-SCNC: 101 MMOL/L — SIGNIFICANT CHANGE UP (ref 96–108)
CO2 SERPL-SCNC: 24 MMOL/L — SIGNIFICANT CHANGE UP (ref 22–31)
CREAT SERPL-MCNC: 0.67 MG/DL — SIGNIFICANT CHANGE UP (ref 0.5–1.3)
CULTURE RESULTS: NO GROWTH — SIGNIFICANT CHANGE UP
EGFR: 85 ML/MIN/1.73M2 — SIGNIFICANT CHANGE UP
EOSINOPHIL # BLD AUTO: 0 K/UL — SIGNIFICANT CHANGE UP (ref 0–0.5)
EOSINOPHIL NFR BLD AUTO: 0 % — SIGNIFICANT CHANGE UP (ref 0–6)
GLUCOSE SERPL-MCNC: 85 MG/DL — SIGNIFICANT CHANGE UP (ref 70–99)
HCT VFR BLD CALC: 29.9 % — LOW (ref 34.5–45)
HGB BLD-MCNC: 9.5 G/DL — LOW (ref 11.5–15.5)
IMM GRANULOCYTES NFR BLD AUTO: 0.4 % — SIGNIFICANT CHANGE UP (ref 0–1.5)
LYMPHOCYTES # BLD AUTO: 1.37 K/UL — SIGNIFICANT CHANGE UP (ref 1–3.3)
LYMPHOCYTES # BLD AUTO: 26.2 % — SIGNIFICANT CHANGE UP (ref 13–44)
MAGNESIUM SERPL-MCNC: 1.7 MG/DL — SIGNIFICANT CHANGE UP (ref 1.6–2.6)
MCHC RBC-ENTMCNC: 28 PG — SIGNIFICANT CHANGE UP (ref 27–34)
MCHC RBC-ENTMCNC: 31.8 GM/DL — LOW (ref 32–36)
MCV RBC AUTO: 88.2 FL — SIGNIFICANT CHANGE UP (ref 80–100)
MONOCYTES # BLD AUTO: 0.58 K/UL — SIGNIFICANT CHANGE UP (ref 0–0.9)
MONOCYTES NFR BLD AUTO: 11.1 % — SIGNIFICANT CHANGE UP (ref 2–14)
NEUTROPHILS # BLD AUTO: 3.22 K/UL — SIGNIFICANT CHANGE UP (ref 1.8–7.4)
NEUTROPHILS NFR BLD AUTO: 61.5 % — SIGNIFICANT CHANGE UP (ref 43–77)
NRBC # BLD: 0 /100 WBCS — SIGNIFICANT CHANGE UP (ref 0–0)
PHOSPHATE SERPL-MCNC: 3.3 MG/DL — SIGNIFICANT CHANGE UP (ref 2.5–4.5)
PLATELET # BLD AUTO: 211 K/UL — SIGNIFICANT CHANGE UP (ref 150–400)
POTASSIUM SERPL-MCNC: 4 MMOL/L — SIGNIFICANT CHANGE UP (ref 3.5–5.3)
POTASSIUM SERPL-SCNC: 4 MMOL/L — SIGNIFICANT CHANGE UP (ref 3.5–5.3)
PROT SERPL-MCNC: 7.6 G/DL — SIGNIFICANT CHANGE UP (ref 6–8.3)
RBC # BLD: 3.39 M/UL — LOW (ref 3.8–5.2)
RBC # FLD: 14.4 % — SIGNIFICANT CHANGE UP (ref 10.3–14.5)
SODIUM SERPL-SCNC: 136 MMOL/L — SIGNIFICANT CHANGE UP (ref 135–145)
SPECIMEN SOURCE: SIGNIFICANT CHANGE UP
TROPONIN T, HIGH SENSITIVITY RESULT: 18 NG/L — SIGNIFICANT CHANGE UP (ref 0–51)
TSH SERPL-MCNC: 1.2 UIU/ML — SIGNIFICANT CHANGE UP (ref 0.27–4.2)
WBC # BLD: 5.23 K/UL — SIGNIFICANT CHANGE UP (ref 3.8–10.5)
WBC # FLD AUTO: 5.23 K/UL — SIGNIFICANT CHANGE UP (ref 3.8–10.5)

## 2022-07-03 RX ORDER — PANTOPRAZOLE SODIUM 20 MG/1
40 TABLET, DELAYED RELEASE ORAL
Refills: 0 | Status: DISCONTINUED | OUTPATIENT
Start: 2022-07-03 | End: 2022-07-09

## 2022-07-03 RX ORDER — OMEPRAZOLE 10 MG/1
1 CAPSULE, DELAYED RELEASE ORAL
Qty: 0 | Refills: 0 | DISCHARGE

## 2022-07-03 RX ORDER — ENOXAPARIN SODIUM 100 MG/ML
40 INJECTION SUBCUTANEOUS EVERY 24 HOURS
Refills: 0 | Status: DISCONTINUED | OUTPATIENT
Start: 2022-07-03 | End: 2022-07-09

## 2022-07-03 RX ORDER — NADOLOL 80 MG/1
0 TABLET ORAL
Qty: 0 | Refills: 0 | DISCHARGE

## 2022-07-03 RX ORDER — FLUOXETINE HCL 10 MG
60 CAPSULE ORAL DAILY
Refills: 0 | Status: DISCONTINUED | OUTPATIENT
Start: 2022-07-03 | End: 2022-07-09

## 2022-07-03 RX ORDER — AMLODIPINE BESYLATE 2.5 MG/1
5 TABLET ORAL ONCE
Refills: 0 | Status: DISCONTINUED | OUTPATIENT
Start: 2022-07-03 | End: 2022-07-08

## 2022-07-03 RX ORDER — AMLODIPINE BESYLATE 2.5 MG/1
5 TABLET ORAL DAILY
Refills: 0 | Status: DISCONTINUED | OUTPATIENT
Start: 2022-07-04 | End: 2022-07-05

## 2022-07-03 RX ADMIN — Medication 60 MILLIGRAM(S): at 16:00

## 2022-07-03 RX ADMIN — PANTOPRAZOLE SODIUM 40 MILLIGRAM(S): 20 TABLET, DELAYED RELEASE ORAL at 08:23

## 2022-07-03 RX ADMIN — ENOXAPARIN SODIUM 40 MILLIGRAM(S): 100 INJECTION SUBCUTANEOUS at 16:04

## 2022-07-03 NOTE — ED ADULT NURSE REASSESSMENT NOTE - NS ED NURSE REASSESS COMMENT FT1
placed on bed pan; unable to go; resting in bed; oriented x 3; daughter at bedside; await bed assignment

## 2022-07-03 NOTE — PROGRESS NOTE ADULT - SUBJECTIVE AND OBJECTIVE BOX
Patient is a 87y old  Female who presents with a chief complaint of weakness, syncope      SUBJECTIVE / OVERNIGHT EVENTS: ptn is s/p fall, admitted overnight. lives with her daughter, has chronic depression, needs help w some ADL, generally independent. spoke w daughter Renate, want to have PT eval for possible OTF eval once medically cleared. ptn is not verbal with spontaneous speech, but answers appropriately    MEDICATIONS  (STANDING):  amLODIPine   Tablet 5 milliGRAM(s) Oral once  amLODIPine   Tablet 5 milliGRAM(s) Oral daily  enoxaparin Injectable 40 milliGRAM(s) SubCutaneous every 24 hours  FLUoxetine 60 milliGRAM(s) Oral daily  pantoprazole    Tablet 40 milliGRAM(s) Oral before breakfast  senna 2 Tablet(s) Oral at bedtime    MEDICATIONS  (PRN):  polyethylene glycol 3350 17 Gram(s) Oral daily PRN Constipation    Vital Signs Last 24 Hrs        PHYSICAL EXAM:  GENERAL: NAD, well-developed  HEAD:  Atraumatic, Normocephalic  EYES: EOMI, PERRLA, conjunctiva and sclera clear  NECK: Supple, No JVD  CHEST/LUNG: Clear to auscultation bilaterally; No wheeze  HEART: Regular rate and rhythm; No murmurs, rubs, or gallops  ABDOMEN: Soft, Nontender, Nondistended; Bowel sounds present  EXTREMITIES:  2+ Peripheral Pulses, No clubbing, cyanosis, or edema  PSYCH: AAOx3  NEUROLOGY: non-focal  SKIN: No rashes or lesions    LABS:                        9.5    5.23  )-----------( 211      ( 2022 06:39 )             29.9     07-03    136  |  101  |  13  ----------------------------<  85  4.0   |  24  |  0.67    Ca    9.1      2022 06:39  Phos  3.3     07-03  Mg     1.7     07-03    TPro  7.6  /  Alb  3.3  /  TBili  0.5  /  DBili  x   /  AST  16  /  ALT  10  /  AlkPhos  100  07-03          Urinalysis Basic - ( 2022 18:43 )    Color: Light Yellow / Appearance: Clear / S.010 / pH: x  Gluc: x / Ketone: Negative  / Bili: Negative / Urobili: Negative   Blood: x / Protein: Negative / Nitrite: Negative   Leuk Esterase: Negative / RBC: 5 /hpf / WBC 0 /HPF   Sq Epi: x / Non Sq Epi: 0 /hpf / Bacteria: Negative

## 2022-07-04 LAB
ERYTHROCYTE [SEDIMENTATION RATE] IN BLOOD: 81 MM/HR — HIGH (ref 0–20)
T3 SERPL-MCNC: 71 NG/DL — LOW (ref 80–200)
T4 AB SER-ACNC: 7.8 UG/DL — SIGNIFICANT CHANGE UP (ref 4.6–12)
TSH SERPL-MCNC: 1.23 UIU/ML — SIGNIFICANT CHANGE UP (ref 0.27–4.2)

## 2022-07-04 PROCEDURE — 73630 X-RAY EXAM OF FOOT: CPT | Mod: 26,50

## 2022-07-04 PROCEDURE — 93880 EXTRACRANIAL BILAT STUDY: CPT | Mod: 26

## 2022-07-04 PROCEDURE — 72100 X-RAY EXAM L-S SPINE 2/3 VWS: CPT | Mod: 26

## 2022-07-04 RX ORDER — SODIUM CHLORIDE 9 MG/ML
1000 INJECTION, SOLUTION INTRAVENOUS
Refills: 0 | Status: DISCONTINUED | OUTPATIENT
Start: 2022-07-04 | End: 2022-07-06

## 2022-07-04 RX ORDER — POLYETHYLENE GLYCOL 3350 17 G/17G
17 POWDER, FOR SOLUTION ORAL ONCE
Refills: 0 | Status: COMPLETED | OUTPATIENT
Start: 2022-07-04 | End: 2022-07-04

## 2022-07-04 RX ORDER — SENNA PLUS 8.6 MG/1
2 TABLET ORAL AT BEDTIME
Refills: 0 | Status: DISCONTINUED | OUTPATIENT
Start: 2022-07-04 | End: 2022-07-09

## 2022-07-04 RX ORDER — THIAMINE MONONITRATE (VIT B1) 100 MG
100 TABLET ORAL ONCE
Refills: 0 | Status: COMPLETED | OUTPATIENT
Start: 2022-07-04 | End: 2022-07-04

## 2022-07-04 RX ORDER — GABAPENTIN 400 MG/1
100 CAPSULE ORAL
Refills: 0 | Status: DISCONTINUED | OUTPATIENT
Start: 2022-07-04 | End: 2022-07-09

## 2022-07-04 RX ORDER — QUETIAPINE FUMARATE 200 MG/1
25 TABLET, FILM COATED ORAL EVERY 6 HOURS
Refills: 0 | Status: DISCONTINUED | OUTPATIENT
Start: 2022-07-04 | End: 2022-07-09

## 2022-07-04 RX ORDER — POLYETHYLENE GLYCOL 3350 17 G/17G
17 POWDER, FOR SOLUTION ORAL DAILY
Refills: 0 | Status: DISCONTINUED | OUTPATIENT
Start: 2022-07-04 | End: 2022-07-09

## 2022-07-04 RX ORDER — ACETAMINOPHEN 500 MG
650 TABLET ORAL ONCE
Refills: 0 | Status: COMPLETED | OUTPATIENT
Start: 2022-07-04 | End: 2022-07-04

## 2022-07-04 RX ORDER — ACETAMINOPHEN 500 MG
1000 TABLET ORAL ONCE
Refills: 0 | Status: COMPLETED | OUTPATIENT
Start: 2022-07-04 | End: 2022-07-04

## 2022-07-04 RX ADMIN — SODIUM CHLORIDE 75 MILLILITER(S): 9 INJECTION, SOLUTION INTRAVENOUS at 20:58

## 2022-07-04 RX ADMIN — Medication 650 MILLIGRAM(S): at 20:57

## 2022-07-04 RX ADMIN — Medication 400 MILLIGRAM(S): at 05:31

## 2022-07-04 RX ADMIN — Medication 60 MILLIGRAM(S): at 13:19

## 2022-07-04 RX ADMIN — POLYETHYLENE GLYCOL 3350 17 GRAM(S): 17 POWDER, FOR SOLUTION ORAL at 13:21

## 2022-07-04 RX ADMIN — Medication 1000 MILLIGRAM(S): at 06:00

## 2022-07-04 RX ADMIN — ENOXAPARIN SODIUM 40 MILLIGRAM(S): 100 INJECTION SUBCUTANEOUS at 15:56

## 2022-07-04 RX ADMIN — Medication 650 MILLIGRAM(S): at 21:52

## 2022-07-04 RX ADMIN — SODIUM CHLORIDE 75 MILLILITER(S): 9 INJECTION, SOLUTION INTRAVENOUS at 15:56

## 2022-07-04 RX ADMIN — Medication 100 MILLIGRAM(S): at 17:30

## 2022-07-04 RX ADMIN — PANTOPRAZOLE SODIUM 40 MILLIGRAM(S): 20 TABLET, DELAYED RELEASE ORAL at 05:32

## 2022-07-04 RX ADMIN — GABAPENTIN 100 MILLIGRAM(S): 400 CAPSULE ORAL at 17:30

## 2022-07-04 RX ADMIN — AMLODIPINE BESYLATE 5 MILLIGRAM(S): 2.5 TABLET ORAL at 05:31

## 2022-07-04 NOTE — PHYSICAL THERAPY INITIAL EVALUATION ADULT - ADDITIONAL COMMENTS
Pt lives in a house with no stairs to enter and elevator access. Lives with daughter. Uses a rollator for ambulation. Patient was independent with all ADLs and IADLs prior to admission.

## 2022-07-04 NOTE — PROVIDER CONTACT NOTE (OTHER) - SITUATION
B/l dorsalis area redness, edema +1 and right hand cold to touch no discoloration at this time. Pulse in UE and LE are present. Daughter reported last BM on 6/27/22.
87 years old presents with altered mental status, brought in by daughters.

## 2022-07-04 NOTE — PATIENT PROFILE ADULT - BRAND OF FIRST COVID-19 BOOSTER
PPD Placement note  Low Daniels, 35 y.o. female is here today for placement of PPD test  Reason for PPD test: School  Pt taken PPD test before: yes  Verified in allergy area and with patient that they are not allergic to the products PPD is made of (Phenol or Tween). Yes  Is patient taking any oral or IV steroid medication now or have they taken it in the last month? no  Has the patient ever received the BCG vaccine?: no  Has the patient been in recent contact with anyone known or suspected of having active TB disease?: no       Date of exposure (if applicable): N/A       Name of person they were exposed to (if applicable): N/A  Patient's Country of origin?: [place]  O: Alert and oriented in NAD. P:  PPD placed on 3/12/2021. Patient advised to return for reading within 48-72 hours. Moderna

## 2022-07-04 NOTE — PHYSICAL THERAPY INITIAL EVALUATION ADULT - PERTINENT HX OF CURRENT PROBLEM, REHAB EVAL
Pt is a 87F with hx remote breast ca, MAC untreated, MVP, asthma, mod AS, paroxysmal tachycardia of unknown rhythm, orthostatic hypotension (Feb '22) c/b syncope, anxiety, depression, recent covid (May '22), recent accidental valium toxicity (d/c'd from [facility] 2 days ago), p/w confusion, syncope, hypotension. (-) CXR 7/2/22. (-) Head CT 7/2/22.

## 2022-07-04 NOTE — PATIENT PROFILE ADULT - FALL HARM RISK - HARM RISK INTERVENTIONS
Assistance with ambulation/Assistance OOB with selected safe patient handling equipment/Communicate Risk of Fall with Harm to all staff/Reinforce activity limits and safety measures with patient and family/Tailored Fall Risk Interventions/Visual Cue: Yellow wristband and red socks/Bed in lowest position, wheels locked, appropriate side rails in place/Call bell, personal items and telephone in reach/Instruct patient to call for assistance before getting out of bed or chair/Non-slip footwear when patient is out of bed/[place] to call system/Physically safe environment - no spills, clutter or unnecessary equipment/Purposeful Proactive Rounding/Room/bathroom lighting operational, light cord in reach Assistance with ambulation/Assistance OOB with selected safe patient handling equipment/Communicate Risk of Fall with Harm to all staff/Discuss with provider need for PT consult/Monitor gait and stability/Provide patient with walking aids - walker, cane, crutches/Reinforce activity limits and safety measures with patient and family/Tailored Fall Risk Interventions/Visual Cue: Yellow wristband and red socks/Bed in lowest position, wheels locked, appropriate side rails in place/Call bell, personal items and telephone in reach/Instruct patient to call for assistance before getting out of bed or chair/Non-slip footwear when patient is out of bed/[place] to call system/Physically safe environment - no spills, clutter or unnecessary equipment/Purposeful Proactive Rounding/Room/bathroom lighting operational, light cord in reach

## 2022-07-04 NOTE — PROGRESS NOTE ADULT - PROBLEM SELECTOR PLAN 1
- unclear why, poss from hypotension  - no obvious or localizing infectious symptoms  - BP improved after IVF  - CTH neg for acute changes  - both feet are tender on exam, no edema, mild erythema, no warmth. will call ID consult, get xrays, check ESR for R/O PMR

## 2022-07-04 NOTE — PROVIDER CONTACT NOTE (OTHER) - REASON
Pt Right hand is slighter coolder than the left hand. Pt L foot dorsal side has some redness and swelling which appears to have gotten worse per daughter at the bedside
B/l dorsalis area redness, edema +1 , pain to touch and right hand cold and pain to touch no discoloration at this time

## 2022-07-04 NOTE — PROGRESS NOTE ADULT - SUBJECTIVE AND OBJECTIVE BOX
Patient is a 87y old  Female who presents with a chief complaint of weakness, syncope (2022 08:38)      SUBJECTIVE / OVERNIGHT EVENTS: both daughter are present today, ptn still not verbal but responds appropriately. daughters state she has pain when her feet are touched, she always has cold hands and she is always cold. not eating or drinking much. hasnt been the same since covid 2022, has been chronically depressed for years and worse since covid. a week ago was still independent, and after a fall PTA has been delirious.     MEDICATIONS  (STANDING):  amLODIPine   Tablet 5 milliGRAM(s) Oral once  amLODIPine   Tablet 5 milliGRAM(s) Oral daily  dextrose 5% + sodium chloride 0.45%. 1000 milliLiter(s) (75 mL/Hr) IV Continuous <Continuous>  enoxaparin Injectable 40 milliGRAM(s) SubCutaneous every 24 hours  FLUoxetine 60 milliGRAM(s) Oral daily  gabapentin 100 milliGRAM(s) Oral two times a day  pantoprazole    Tablet 40 milliGRAM(s) Oral before breakfast  senna 2 Tablet(s) Oral at bedtime  thiamine Injectable 100 milliGRAM(s) IV Push once    MEDICATIONS  (PRN):  polyethylene glycol 3350 17 Gram(s) Oral daily PRN Constipation  QUEtiapine 25 milliGRAM(s) Oral every 6 hours PRN agitation/happucinations      Vital Signs Last 24 Hrs  T(F): 97.4 (22 @ 11:10), Max: 99.2 (22 @ 04:28)  HR: 58 (22 @ 11:10) (58 - 70)  BP: 142/79 (22 @ 11:10) (127/79 - 168/79)  RR: 17 (22 @ 11:10) (16 - 18)  SpO2: 98% (22 @ 11:10) (94% - 98%)  Telemetry:   CAPILLARY BLOOD GLUCOSE        I&O's Summary      PHYSICAL EXAM:  GENERAL: NAD, well-developed  HEAD:  Atraumatic, Normocephalic  EYES: EOMI, PERRLA, conjunctiva and sclera clear  NECK: Supple, No JVD  CHEST/LUNG: Clear to auscultation bilaterally; No wheeze  HEART: Regular rate and rhythm; No murmurs, rubs, or gallops  ABDOMEN: Soft, Nontender, Nondistended; Bowel sounds present  EXTREMITIES:  2+ Peripheral Pulses, No clubbing, cyanosis, or edema  PSYCH: AAOx3  NEUROLOGY: non-focal  SKIN: No rashes or lesions    LABS:                        9.5    5.23  )-----------( 211      ( 2022 06:39 )             29.9     07-03    136  |  101  |  13  ----------------------------<  85  4.0   |  24  |  0.67    Ca    9.1      2022 06:39  Phos  3.3     07-03  Mg     1.7     07-03    TPro  7.6  /  Alb  3.3  /  TBili  0.5  /  DBili  x   /  AST  16  /  ALT  10  /  AlkPhos  100  07-03          Urinalysis Basic - ( 2022 18:43 )    Color: Light Yellow / Appearance: Clear / S.010 / pH: x  Gluc: x / Ketone: Negative  / Bili: Negative / Urobili: Negative   Blood: x / Protein: Negative / Nitrite: Negative   Leuk Esterase: Negative / RBC: 5 /hpf / WBC 0 /HPF   Sq Epi: x / Non Sq Epi: 0 /hpf / Bacteria: Negative

## 2022-07-04 NOTE — PATIENT PROFILE ADULT - FALL HARM RISK - TYPE OF ASSESSMENT
"Patient : Nenita Rothman Age: 33year old Sex: female   MRN: [id number] Encounter Date: 2/11/2019    E16/16    History     Chief Complaint   Patient presents with   â¢ Dog Bite     HPI  2/11/2019  10:28 PM Nenita Rothman is a 33year old female who presents to the ED for evaluation of left foot injury that began when she was bit by her dog, a cattledog-mra mix. She was wearing socks at the time of the bite. The pt states that the dog has ""resource-sharing\"" issues, and when the pt moved toward the dog, was bit on the affected foot. She states that the dog held on for a time after biting down. She denies fever, chills, coughing, or diarrhea. The pt denies a chance of pregnancy, stating she takes birth control daily. She says her dog is up to date on all vaccinations. She's unsure when her last tetanus booster was. There are no further complaints or modifying factors at this time. Chart Review: I reviewed the patient's medications, allergies, and past medical and surgical history in Epic. Last tetanus booster was 1/10/18. PCP: Rock Pereira MD    Allergies   Allergen Reactions   â¢ Penicillins Other (See Comments)     unsure       Current Discharge Medication List      Prior to Admission Medications    Details   escitalopram (LEXAPRO) 20 MG tablet Take 20 mg by mouth daily. pseudoephedrine (SUDAFED 12 HOUR) 120 MG 12 hr tablet Take 1 tablet by mouth every 12 hours. Qty: 20 tablet, Refills: 0      norgestimate-ethinyl estradiol, triphasic, (TRINESSA, 28,) 0.18/0.215/0.25 MG-35 MCG tablet Take 1 tablet by mouth daily. Qty: 28 tablet, Refills: 12             No pertinent medical history.     Past Surgical History:   Procedure Laterality Date   â¢ KNEE ARTHROSCOPY W/ ACL RECONSTRUCTION Right 2007       Family History   Problem Relation Age of Onset   â¢ Thyroid Mother    â¢ Sleep Apnea Mother        Social History     Tobacco Use   â¢ Smoking status: Never Smoker   â¢ Smokeless tobacco: Never Used   Substance " "Use Topics   â¢ Alcohol use: No     Frequency: Never   â¢ Drug use: No       Review of Systems   Constitutional: Negative for chills and fever. HENT: Negative for congestion and sore throat. Eyes: Negative for visual disturbance. Respiratory: Negative for cough and shortness of breath. Cardiovascular: Negative for chest pain. Gastrointestinal: Negative for diarrhea, nausea and vomiting. Genitourinary: Negative for dysuria. Musculoskeletal: Negative for back pain. Skin: Positive for wound (left foot, dog bite). Negative for rash. Neurological: Negative for dizziness and headaches. Hematological: Does not bruise/bleed easily. Psychiatric/Behavioral: Negative. Negative for agitation. Physical Exam     ED Triage Vitals [02/11/19 2223]   ED Triage Vitals Group      Temp 98.6 Â°F (37 Â°C)      Pulse 111      Resp 16      BP (!) 162/102      SpO2 95 %      EtCO2 mmHg       Height 5' 2"" (1.575 m)      Weight 245 lb 9.5 oz (111.4 kg)      Weight Scale Used ED Estimate       Physical Exam   Constitutional: She is oriented to person, place, and time. She appears well-developed and well-nourished. No distress. HENT:   Head: Normocephalic and atraumatic. Neck: Normal range of motion. Neck supple. Musculoskeletal: Normal range of motion. Left foot: on dorsal surface there are superficial scrape marks, near great and 2nd toe and over the midfoot there is swelling, on sole of the foot there is 1 superficial bite kurtis near the distal arch as well as one puncture wound   Neurological: She is alert and oriented to person, place, and time. Skin: Skin is warm and dry. She is not diaphoretic. Psychiatric: She has a normal mood and affect. ED Course     Procedures    Lab Results     No results found for this visit on 02/11/19.       Radiology Results     Imaging Results          XR Foot 3 + View Left (Final result)  Result time 02/11/19 23:11:27    Final result                 Impression:    " "FINDINGS/IMPRESSION:      No distinct acute fracture or dislocation is visualized. There is no  evidence of radiopaque foreign body. Narrative:    EXAM: XR FOOT 3+ VW LEFT    INDICATION:  dog bite to midfoot (punture in distal arch) - eval for  fracture    COMPARISON:  None. ED Medication Orders (From admission, onward)    None               [facility]  Vitals  Vitals:    02/11/19 2223 02/11/19 2230   BP: (!) 162/102 142/89   Pulse: 111    Resp: 16    Temp: 98.6 Â°F (37 Â°C)    SpO2: 95% 94%   Weight: 111.4 kg    Height: 5' 2"" (1.575 m)        ED Course    11:26 PM  I rechecked the pt, who is resting comfortably after her wound was cleaned. I informed her that she has no evidence of fx per XR of her left foot. I will therefore send her with a 5 day course of prophylactic abx. She will have the wound dressed. I stated that she can shower as usual with the dressing in place, but should take care not to soak the wound. She should return to the ED if worsening redness of the wound presents or if the area becomes warm to the touch, as these are signs of infection. The patient understands and agrees with the current plan of care. All questions were addressed. [facility]  Critical Care time spent on this patient outside of billable procedures:  None    Clinical Impression  ED Diagnosis        Final diagnosis    Dog bite of left foot, initial encounter                The patient was provided with a recommendation to follow up with a primary care provider and obtain reassessment of his/her blood pressure within three months. Follow Up:  Richelle Rebolledo MD  [address]  [phone]      As needed          Summary of your Discharge Medications      Take these Medications      Details   sulfamethoxazole-trimethoprim 800-160 MG per tablet  Commonly known as:  BACTRIM DS   Take 1 tablet by mouth 2 times daily for 5 days.             Pt is discharged to " home/self care in stable condition.        I have reviewed the information recorded by the scribe for accuracy and agree with its contents.    ____________________________________________________________________    Kathryn Morales acting as a scribe for Dr. Logan Pettit # [id number]  Scribe: Kam Topete MD  02/13/19 2987 Admission

## 2022-07-04 NOTE — PROVIDER CONTACT NOTE (OTHER) - ACTION/TREATMENT ORDERED:
LUCY Rojas made aware and reported that will assessed pt at the bedside. LE was marked. Safety maintained.

## 2022-07-04 NOTE — CONSULT NOTE ADULT - SUBJECTIVE AND OBJECTIVE BOX
CARDIOLOGY CONSULT - Dr. Lee  Date of Service:       HPI:  87F c hx remote breast ca, MAC untreated, MVP, asthma, mod AS, paroxysmal tachycardia of unknown rhythm, orthostatic hypotension (Feb '22) c/b syncope, anxiety, depression, recent covid (May '22), recent accidental valium toxicity (d/c'd from [facility] 2 days ago), pw confusion, syncope, hypotension.    History from pt, pt's daughter Amaya Cameron at bedside.  Pt has been having continued weakness, poor appetite since covid diagnosis, but had been improving. At baseline, pt ambulates with walker, completes her own ADLs. About 5 days ago, pt was having trouble sleeping, so took additional valium tablets to help her sleep. The following day, pt's daughter arrived home to find pt on floor unarousable. Pt admitted to [facility] where pt was treated for valium toxicity, then discharged 2 days ago. Since discharge, pt has been drastically worse functional. Pt has been passing out on standing, confused, drowsy. Pt would slump and family would have to catch her. Today, pt slept all night, then again from 10AM to afternoon. Pt's daughter checked blood pressure and noted that the systolic number was like "98". Pt reports intermittent foot pain on standing. Pt denies fevers, chills.  Family states pt has not been having bowel movements and has not been urinating. Pt denies any relieving or exacerbating factors. Family also states pt has been having severe depression and was supposed to see a psychiatrist. (02 Jul 2022 23:00)      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      GERD (Gastroesophageal Reflux Disease)      Irritable Bowel Syndrome      Mitral Valve Prolapse      Anxiety      Clinical Depression      Asthma      Uveitis      Irritable Bowel Syndrome      Hiatal Hernia      Nasal Polyp      Hypertension      COVID-19 vaccine series completed  Moderna      2019 novel coronavirus disease (COVID-19)  5/27/2022      S/P Breast Lumpectomy      S/P Lumpectomy of Breast  Left Breast      Status Post Tonsillectomy      S/P Nasal Polypectomy      History of Cataract Surgery  Left eye      History of Breast Biopsy  [place] lymph node biopsy              PREVIOUS DIAGNOSTIC TESTING:    [ ] Echocardiogram:  [ ]  Catheterization:  [ ] Stress Test:  	    MEDICATIONS:  MEDICATIONS  (STANDING):  amLODIPine   Tablet 5 milliGRAM(s) Oral once  amLODIPine   Tablet 5 milliGRAM(s) Oral daily  enoxaparin Injectable 40 milliGRAM(s) SubCutaneous every 24 hours  FLUoxetine 60 milliGRAM(s) Oral daily  pantoprazole    Tablet 40 milliGRAM(s) Oral before breakfast      FAMILY HISTORY:  FH: CAD (coronary artery disease) (Father, Mother, Sibling, Aunt)    FH: lung cancer (Mother)        SOCIAL HISTORY:    [ ] Non-smoker  [ ] Smoker  [ ] Alcohol    Allergies    cefdinir (Unknown)  ciprofloxacin (Other)  codeine (Nausea; Other)  contrast media (iodine-based) (Angioedema)  fluorescein ophthalmic (Hives; Rash; Flushing)  lactose (Unknown)  latex (Anaphylaxis)  Levaquin (Nausea; Other)  lidocaine (Unknown)  SULFA (Anaphylaxis)  tetracycline (Anaphylaxis)    Intolerances    Augmentin (Stomach Upset)  	    REVIEW OF SYSTEMS:  CONSTITUTIONAL: No fever, weight loss, or fatigue  EYES: No eye pain, visual disturbances, or discharge  ENMT:  No difficulty hearing, tinnitus, vertigo; No sinus or throat pain  NECK: No pain or stiffness  RESPIRATORY: No cough, wheezing, chills or hemoptysis; No Shortness of Breath  CARDIOVASCULAR: No chest pain, palpitations, passing out, dizziness, or leg swelling  GASTROINTESTINAL: No abdominal or epigastric pain. No nausea, vomiting, or hematemesis; No diarrhea or constipation. No melena or hematochezia.  GENITOURINARY: No dysuria, frequency, hematuria, or incontinence  NEUROLOGICAL: No headaches, memory loss, loss of strength, numbness, or tremors  SKIN: No itching, burning, rashes, or lesions   	    [ ] All others negative	  [ ] Unable to obtain    PHYSICAL EXAM:  T(C): 37.3 (07-04-22 @ 04:28), Max: 37.3 (07-04-22 @ 04:28)  HR: 67 (07-04-22 @ 04:28) (67 - 70)  BP: 153/80 (07-04-22 @ 04:28) (127/79 - 168/79)  RR: 18 (07-04-22 @ 04:28) (16 - 18)  SpO2: 94% (07-04-22 @ 04:28) (94% - 98%)  Wt(kg): --  I&O's Summary      Appearance: Normal	  Psychiatry: A & O x 3, Mood & affect appropriate  HEENT:   Normal oral mucosa, PERRL, EOMI	  Lymphatic: No lymphadenopathy  Cardiovascular: Normal S1 S2,RRR, No JVD, No murmurs  Respiratory: Lungs clear to auscultation	  Gastrointestinal:  Soft, Non-tender, + BS	  Skin: No rashes, No ecchymoses, No cyanosis	  Neurologic: Non-focal  Extremities: Normal range of motion, No clubbing, cyanosis or edema  Vascular: Peripheral pulses palpable 2+ bilaterally    TELEMETRY: 	    ECG:  	  RADIOLOGY:  OTHER: 	  	  LABS:	 	    CARDIAC MARKERS:                                  9.5    5.23  )-----------( 211      ( 03 Jul 2022 06:39 )             29.9     07-03    136  |  101  |  13  ----------------------------<  85  4.0   |  24  |  0.67    Ca    9.1      03 Jul 2022 06:39  Phos  3.3     07-03  Mg     1.7     07-03    TPro  7.6  /  Alb  3.3  /  TBili  0.5  /  DBili  x   /  AST  16  /  ALT  10  /  AlkPhos  100  07-03      proBNP:   Lipid Profile:   HgA1c:   TSH:     ASSESSMENT/PLAN: 	        MEDICATIONS  (STANDING):  amLODIPine   Tablet 5 milliGRAM(s) Oral once  amLODIPine   Tablet 5 milliGRAM(s) Oral daily  enoxaparin Injectable 40 milliGRAM(s) SubCutaneous every 24 hours  FLUoxetine 60 milliGRAM(s) Oral daily  pantoprazole    Tablet 40 milliGRAM(s) Oral before breakfast        70 minutes spent on total encounter; more than 50% of the visit was spent counseling and/or coordinating care by the attending physician.

## 2022-07-04 NOTE — CONSULT NOTE ADULT - ASSESSMENT
87F c hx remote breast ca, MAC untreated, MVP, asthma, mod AS, paroxysmal tachycardia of unknown rhythm, orthostatic hypotension (Feb '22) c/b syncope, anxiety, depression, recent covid (May '22), recent accidental valium toxicity (d/c'd from [facility] 2 days ago), pw confusion, syncope, hypotension.    #Syncope-Orthostatic Hypotension  -check echo  -tele  -check orthostatics    # Moderate AS  -check echo    #Atach  -tele    #depression?  -psych eval    d/w daughter at bedside    70 minutes spent on total encounter; more than 50% of the visit was spent counseling and/or coordinating care by the attending physician.

## 2022-07-05 LAB
FOLATE SERPL-MCNC: 12.3 NG/ML — SIGNIFICANT CHANGE UP
VIT B12 SERPL-MCNC: 399 PG/ML — SIGNIFICANT CHANGE UP (ref 232–1245)

## 2022-07-05 PROCEDURE — 93306 TTE W/DOPPLER COMPLETE: CPT | Mod: 26

## 2022-07-05 PROCEDURE — 51703 INSERT BLADDER CATH COMPLEX: CPT

## 2022-07-05 RX ORDER — PREGABALIN 225 MG/1
1000 CAPSULE ORAL ONCE
Refills: 0 | Status: COMPLETED | OUTPATIENT
Start: 2022-07-05 | End: 2022-07-05

## 2022-07-05 RX ORDER — PREGABALIN 225 MG/1
1000 CAPSULE ORAL DAILY
Refills: 0 | Status: DISCONTINUED | OUTPATIENT
Start: 2022-07-05 | End: 2022-07-09

## 2022-07-05 RX ORDER — PHENYLEPHRINE-SHARK LIVER OIL-MINERAL OIL-PETROLATUM RECTAL OINTMENT
OINTMENT (GRAM) RECTAL
Refills: 0 | Status: DISCONTINUED | OUTPATIENT
Start: 2022-07-05 | End: 2022-07-09

## 2022-07-05 RX ADMIN — AMLODIPINE BESYLATE 5 MILLIGRAM(S): 2.5 TABLET ORAL at 06:04

## 2022-07-05 RX ADMIN — GABAPENTIN 100 MILLIGRAM(S): 400 CAPSULE ORAL at 06:04

## 2022-07-05 RX ADMIN — PREGABALIN 1000 MICROGRAM(S): 225 CAPSULE ORAL at 21:58

## 2022-07-05 RX ADMIN — GABAPENTIN 100 MILLIGRAM(S): 400 CAPSULE ORAL at 17:27

## 2022-07-05 RX ADMIN — Medication 60 MILLIGRAM(S): at 12:06

## 2022-07-05 RX ADMIN — SENNA PLUS 2 TABLET(S): 8.6 TABLET ORAL at 21:58

## 2022-07-05 RX ADMIN — ENOXAPARIN SODIUM 40 MILLIGRAM(S): 100 INJECTION SUBCUTANEOUS at 15:27

## 2022-07-05 RX ADMIN — Medication 60 MILLIGRAM(S): at 15:27

## 2022-07-05 RX ADMIN — PANTOPRAZOLE SODIUM 40 MILLIGRAM(S): 20 TABLET, DELAYED RELEASE ORAL at 06:04

## 2022-07-05 RX ADMIN — SODIUM CHLORIDE 75 MILLILITER(S): 9 INJECTION, SOLUTION INTRAVENOUS at 20:15

## 2022-07-05 NOTE — PROGRESS NOTE ADULT - PROBLEM SELECTOR PLAN 1
- resovled. unclear why, poss from hypotension  - no obvious or localizing infectious symptoms  - BP improved after IVF  - CTH neg for acute changes  - both feet are tender on exam, no edema, mild erythema, no warmth.   xrays are neg.  ESR is elev, cannot R/O PMR,  will start a trial of Prednisone and observe in the next 48 hrs  -ptn is awake, alert, MS at baseline, still has diffuse pain, has indwelling rivers for urinary retention, still volume depleted, will cont IVF.  vit b12 is on the low side, will start supplementation. TFTs in range. will d/c Norvasc 2/2 BP on low side

## 2022-07-05 NOTE — PROCEDURE NOTE - ADDITIONAL PROCEDURE DETAILS
called for rivers placement after failed attempt by the primary team. using aseptic technique 14f silicon rivers placed easily and aseptically without event. 400cc of yellow urine drained. tov per primary team

## 2022-07-05 NOTE — PROGRESS NOTE ADULT - SUBJECTIVE AND OBJECTIVE BOX
CARDIOLOGY FOLLOW UP - Dr. Lee  DATE OF SERVICE: 7/5/22     CC no cp or sob   family bedside       REVIEW OF SYSTEMS:  CONSTITUTIONAL: No fever, weight loss, or fatigue  RESPIRATORY: No cough, wheezing, chills or hemoptysis; No Shortness of Breath  CARDIOVASCULAR: No chest pain, palpitations, passing out, dizziness, or leg swelling  GASTROINTESTINAL: No abdominal or epigastric pain. No nausea, vomiting, or hematemesis; No diarrhea or constipation. No melena or hematochezia.  VASCULAR: No edema     PHYSICAL EXAM:  T(C): 37.2 (07-05-22 @ 05:51), Max: 37.6 (07-04-22 @ 21:01)  HR: 64 (07-05-22 @ 05:51) (64 - 68)  BP: 115/65 (07-05-22 @ 05:51) (115/65 - 123/79)  RR: 18 (07-05-22 @ 05:51) (18 - 18)  SpO2: 97% (07-05-22 @ 05:51) (97% - 98%)  Wt(kg): --  I&O's Summary    04 Jul 2022 07:01  -  05 Jul 2022 07:00  --------------------------------------------------------  IN: 1140 mL / OUT: 900 mL / NET: 240 mL        Appearance: Normal	  Cardiovascular: Normal S1 S2,RRR, +  murmurs  Respiratory: Lungs clear to auscultation	  Gastrointestinal:  Soft, Non-tender, + BS	  Extremities: Normal range of motion, No clubbing, cyanosis or edema      Home Medications:  acetaminophen 325 mg oral tablet: 2 tab(s) orally every 6 hours, As needed, Temp greater or equal to 38C (100.4F), Mild Pain (1 - 3) (03 Jul 2022 00:32)  Durezol 0.05% ophthalmic emulsion: 1 drop(s) in the left eye 3 times a day (03 Jul 2022 00:32)  FLUoxetine 20 mg oral capsule: 3 cap(s) orally once a day (03 Jul 2022 00:32)  Metoprolol Succinate ER 25 mg oral tablet, extended release: 0.5 tab(s) orally once a day (03 Jul 2022 00:32)  Nasacort AQ 55 mcg/inh nasal spray: 1   2 times a day  (03 Jul 2022 00:32)  Protonix 40 mg oral delayed release tablet: 1 tab(s) orally once a day (03 Jul 2022 00:32)  Vitamin D3 25 mcg (1000 intl units) oral tablet: 1 tab(s) orally 2 times a day (03 Jul 2022 00:32)      MEDICATIONS  (STANDING):  amLODIPine   Tablet 5 milliGRAM(s) Oral once  amLODIPine   Tablet 5 milliGRAM(s) Oral daily  dextrose 5% + sodium chloride 0.45%. 1000 milliLiter(s) (75 mL/Hr) IV Continuous <Continuous>  enoxaparin Injectable 40 milliGRAM(s) SubCutaneous every 24 hours  FLUoxetine 60 milliGRAM(s) Oral daily  gabapentin 100 milliGRAM(s) Oral two times a day  hemorrhoidal Ointment      pantoprazole    Tablet 40 milliGRAM(s) Oral before breakfast  senna 2 Tablet(s) Oral at bedtime      TELEMETRY: 	    ECG:  	  RADIOLOGY:   < from: VA Duplex Carotid, Bilat (07.04.22 @ 11:09) >  IMPRESSION: No significant hemodynamic stenosis ofeither internal   carotid artery.  Atherosclerosis of the carotid arteries.  Right ECA stenosis.      < end of copied text >    DIAGNOSTIC TESTING:  [ ] Echocardiogram: pENDING   [ ]  Catheterization:  [ ] Stress Test:    OTHER: 	    LABS:	 	    Troponin T, High Sensitivity Result: 18 ng/L [0 - 51] (07-03 @ 06:39)

## 2022-07-05 NOTE — CONSULT NOTE ADULT - SUBJECTIVE AND OBJECTIVE BOX
[facility], Division of Infectious Diseases  LILI Hanks, RUTHANN Perez, RANDALL Ghotra Casimir  [phone]  ([phone] - weekdays after 5pm and weekends)    ADA ROLLE  87y, Female  [id number]    HPI:  Patient is a 87 year old female with PMH of remote breast ca, MAC untreated, MVP, asthma, mod AS, paroxysmal tachycardia of unknown rhythm, orthostatic hypotension (Feb '22) c/b syncope, anxiety, depression, recent COVID (May '22), recent accidental valium toxicity (d/c'd from [facility] 2 days ago) hopresented with confusion, syncope, hypotension. History from pt, pt's daughter Amaya Cameron at bedside. Pt has been having continued weakness, poor appetite since covid diagnosis, but had been improving. At baseline, pt ambulates with walker, completes her own ADLs. About 5 days ago, pt was having trouble sleeping, so took additional valium tablets to help her sleep. The following day, pt's daughter arrived home to find pt on floor unarousable. Pt admitted to [facility] where pt was treated for valium toxicity, then discharged 2 days ago. Since discharge, pt has been drastically worse functional. Pt has been passing out on standing, confused, drowsy. Pt would slump and family would have to catch her. Today, pt slept all night, then again from 10AM to afternoon. Pt's daughter checked blood pressure and noted that the systolic number was like "98". Pt reports intermittent foot pain on standing. Pt denies fevers, chills.  Family states pt has not been having bowel movements and has not been urinating. Pt denies any relieving or exacerbating factors. Family also states pt has been having severe depression and was supposed to see a psychiatrist. (02 Jul 2022 23:00)  Patient seen and examined at bedside this morning. Patient nods to answer simple questions, patients daughter at bedside assisted with history. She states patient had erythema on bilateral feet with swelling and tender to palpation yesterday, noted swelling and pain have resolved today and erythema is nearly all resolved. Patient does not complain of any pain.   ROS: limited review of systems, pertinent positives and negatives as per HPI.    Allergies: cefdinir (Unknown)  ciprofloxacin (Other)  codeine (Nausea; Other)  contrast media (iodine-based) (Angioedema)  fluorescein ophthalmic (Hives; Rash; Flushing)  lactose (Unknown)  latex (Anaphylaxis)  Levaquin (Nausea; Other)  lidocaine (Unknown)  SULFA (Anaphylaxis)  tetracycline (Anaphylaxis)    PMH -- Breast Cancer  GERD (Gastroesophageal Reflux Disease)  Irritable Bowel Syndrome  Mitral Valve Prolapse  Anxiety  Clinical Depression  Asthma  Uveitis  Irritable Bowel Syndrome  Hiatal Hernia  Nasal Polyp  History of Left Breast Biopsy  History of Cataract Surgery  S/P Nasal Polypectomy  Status Post Tonsillectomy  Hypertension  COVID-19 vaccine series completed  2019 novel coronavirus disease (COVID-19)    PSH -- S/P Breast Lumpectomy  S/P Lumpectomy of Breast  Status Post Tonsillectomy  S/P Nasal Polypectomy  History of Cataract Surgery  History of Breast Biopsy    FH -- FH: CAD (coronary artery disease) (Father, Mother, Sibling, Aunt)  FH: lung cancer (Mother)    Social History -- denies tobacco, alcohol or illicit drug use    Physical Exam--  Vital Signs Last 24 Hrs  T(F): 99 (05 Jul 2022 05:51), Max: 99.6 (04 Jul 2022 21:01)  HR: 64 (05 Jul 2022 05:51) (58 - 68)  BP: 115/65 (05 Jul 2022 05:51) (115/65 - 142/79)  RR: 18 (05 Jul 2022 05:51) (17 - 18)  SpO2: 97% (05 Jul 2022 05:51) (97% - 98%)  General: no acute distress  HEENT: NC/AT, EOMI, anicteric, neck supple  Lungs: Clear bilaterally without rales, wheezing or rhonchi  Heart: S1, S2 present, regular rate and rhythm.   Abdomen: Soft. Nondistended. Nontender. BS present.  Neuro: AAOx0, nods intermittently to answer, follows some simple commands   Extremities: No cyanosis or clubbing. No edema. Hand tremor  Skin: Slight erythema on dorsal b/l feet with no TTP, warmth, induration, or swelling    Laboratory & Imaging Data--  CBC:   WBC Count: 5.23 K/uL (07-03-22 @ 06:39)  WBC Count: 5.66 K/uL (07-02-22 @ 16:10)    CMP:     Microbiology: reviewed   Culture - Urine (collected 07-02-22 @ 18:43)  Source: Clean Catch Clean Catch (Midstream)  Final Report (07-03-22 @ 20:39):    No growth    7/2 - COVID-19 PCR - negative    Radiology--reviewed  Xray Lumbar Spine AP + Lateral (07.04.22 @ 16:50) >IMPRESSION: Stable moderate wedge-shaped anterior compression deformity of T12.  Remaining vertebral body heights maintained.  Narrowed L4-L5 and L5-S1 disc spaces again noted. Relatively preserved  remaining vertebral disc spaces.  Unremarkable SI joints. Generalized osteopenia otherwise no discrete lytic or blastic lesions. Uterine fibroid related popcorn type pelvic calcifications again noted.< end of copied text >     Xray Foot AP + Lateral + Oblique, Bilat (07.04.22 @ 16:50) >IMPRESSION: No acute fractures or dislocations. Tarsometatarsal alignment maintained without evidence for a Lisfranc  injury.  Bilateral hammertoe deformities. Otherwise preserved joint spaces and no  joint margin erosions.  Generalized osteopenia otherwise no discrete suspicious lytic or blastic  lesions. Vascular calcifications.    VA Duplex Carotid, Bilat (07.04.22 @ 11:09) >IMPRESSION: No significant hemodynamic stenosis ofeither internal carotid artery. Atherosclerosis of the carotid arteries. Right ECA stenosis. Measurement of carotid stenosis is based on velocity parameters that correlate the residual internal carotid diameter with that of the more distal vessel in accordance with a method such as the [place] American  Symptomatic Carotid Endarterectomy Trial (NASCET).     CT Head No Cont (07.02.22 @ 16:29) >IMPRESSION: No acute intracranial pathology is noted. If the patient has new and persistent symptoms, consider short interval follow-up head CT or brain MRI follow-up if there are no MRI contraindications.     Xray Chest 1 View- PORTABLE-Urgent (Xray Chest 1 View- PORTABLE-Urgent .) (07.02.22 @ 15:40) >IMPRESSION: Clear lungs.    Active Medications--  amLODIPine   Tablet 5 milliGRAM(s) Oral once  amLODIPine   Tablet 5 milliGRAM(s) Oral daily  dextrose 5% + sodium chloride 0.45%. 1000 milliLiter(s) IV Continuous <Continuous>  enoxaparin Injectable 40 milliGRAM(s) SubCutaneous every 24 hours  FLUoxetine 60 milliGRAM(s) Oral daily  gabapentin 100 milliGRAM(s) Oral two times a day  hemorrhoidal Ointment      pantoprazole    Tablet 40 milliGRAM(s) Oral before breakfast  polyethylene glycol 3350 17 Gram(s) Oral daily PRN  QUEtiapine 25 milliGRAM(s) Oral every 6 hours PRN  senna 2 Tablet(s) Oral at bedtime

## 2022-07-05 NOTE — PROGRESS NOTE ADULT - SUBJECTIVE AND OBJECTIVE BOX
Patient is a 87y old  Female who presents with a chief complaint of weakness, syncope (05 Jul 2022 11:49)      SUBJECTIVE / OVERNIGHT EVENTS: ptn is awake, alert, MS at baseline, still has diffuse p[ain, has indwelling rivers for urinary retention, still volume depleted, will cont. esr is elevated. may have PMR, will start a trial of Prednisone and observe in the next 48 hrs. vit b12 is on the low side, will start supplementation. TFTs in range. will drop Norvasc BP on low side    MEDICATIONS  (STANDING):  amLODIPine   Tablet 5 milliGRAM(s) Oral once  amLODIPine   Tablet 5 milliGRAM(s) Oral daily  cyanocobalamin 1000 MICROGram(s) Oral daily  cyanocobalamin Injectable 1000 MICROGram(s) SubCutaneous once  dextrose 5% + sodium chloride 0.45%. 1000 milliLiter(s) (75 mL/Hr) IV Continuous <Continuous>  enoxaparin Injectable 40 milliGRAM(s) SubCutaneous every 24 hours  FLUoxetine 60 milliGRAM(s) Oral daily  gabapentin 100 milliGRAM(s) Oral two times a day  hemorrhoidal Ointment      pantoprazole    Tablet 40 milliGRAM(s) Oral before breakfast  predniSONE   Tablet 60 milliGRAM(s) Oral daily  senna 2 Tablet(s) Oral at bedtime    MEDICATIONS  (PRN):  polyethylene glycol 3350 17 Gram(s) Oral daily PRN Constipation  QUEtiapine 25 milliGRAM(s) Oral every 6 hours PRN agitation/happucinations      Vital Signs Last 24 Hrs  T(F): 98.1 (07-05-22 @ 11:45), Max: 99.6 (07-04-22 @ 21:01)  HR: 61 (07-05-22 @ 11:45) (61 - 68)  BP: 93/59 (07-05-22 @ 11:45) (93/59 - 123/79)  RR: 18 (07-05-22 @ 11:45) (18 - 18)  SpO2: 96% (07-05-22 @ 11:45) (96% - 98%)  Telemetry:   CAPILLARY BLOOD GLUCOSE        I&O's Summary    04 Jul 2022 07:01  -  05 Jul 2022 07:00  --------------------------------------------------------  IN: 1140 mL / OUT: 900 mL / NET: 240 mL    05 Jul 2022 07:01  -  05 Jul 2022 19:13  --------------------------------------------------------  IN: 675 mL / OUT: 450 mL / NET: 225 mL        PHYSICAL EXAM:  GENERAL: NAD, well-developed  HEAD:  Atraumatic, Normocephalic  EYES: EOMI, PERRLA, conjunctiva and sclera clear  NECK: Supple, No JVD  CHEST/LUNG: Clear to auscultation bilaterally; No wheeze  HEART: Regular rate and rhythm; No murmurs, rubs, or gallops  ABDOMEN: Soft, Nontender, Nondistended; Bowel sounds present  EXTREMITIES:  2+ Peripheral Pulses, No clubbing, cyanosis, or edema  PSYCH: AAOx3  NEUROLOGY: non-focal  SKIN: No rashes or lesions    LABS:                    RADIOLOGY & ADDITIONAL TESTS:    Imaging Personally Reviewed:    Consultant(s) Notes Reviewed:      Care Discussed with Consultants/Other Providers:

## 2022-07-05 NOTE — CONSULT NOTE ADULT - ASSESSMENT
Patient is a 87 year old female with PMH of remote breast ca, MAC untreated, MVP, asthma, mod AS, paroxysmal tachycardia of unknown rhythm, orthostatic hypotension (Feb '22) c/b syncope, anxiety, depression, recent COVID (May '22), recent accidental valium toxicity (d/c'd from [facility] 2 days ago) who presented with confusion, syncope, hypotension.  ID consulted to evaluate b/l LE for possible infection.     Foot pain and erythema rule out infection   - 7/4 noted with erythema on bilateral feet with swelling and TTP   - today 7/5 -- swelling and pain have resolved, slight erythema on dorsal feet with no sign of acute infection, no   - Foot xrays reviewed, no acute infectious pathology    - ESR elevated but stable since 2/2022   - would rule out other causes given above improved without antibiotics and has no s/o cellulitis/SSTI   - no other source of infection noted on review or exam    - continue to monitor off antibiotics     Syncope, orthostatic hypotension  Moderate AS   - management per Cardiology and primary teams       ID will sign off at this time but remains available for any further questions/concerns.  Kaushal Palomo M.D.  [facility], Division of Infectious Diseases  [phone]  After 5pm on weekdays and all day on weekends - please call [phone] Patient is a 87 year old female with PMH of remote breast ca, MAC untreated, MVP, asthma, mod AS, paroxysmal tachycardia of unknown rhythm, orthostatic hypotension (Feb '22) c/b syncope, anxiety, depression, recent COVID (May '22), recent accidental valium toxicity (d/c'd from [facility] 2 days ago) who presented with confusion, syncope, hypotension.  ID consulted to evaluate b/l LE for possible infection.     Foot pain and erythema rule out infection   - 7/4 noted with erythema on bilateral feet with swelling and TTP   - today 7/5 -- swelling and pain have resolved, slight erythema on dorsal feet with no sign of acute infection, no   - Foot xrays reviewed, no acute infectious pathology    - ESR elevated but stable since 2/2022   - would rule out other causes given above improved without antibiotics and has no s/o cellulitis/SSTI   - afebrile, WBC wnl, no infectious source noted on review or exam    - continue to monitor off antibiotics     Syncope, orthostatic hypotension  Moderate AS   - management per Cardiology and primary teams       Kaushal Palomo M.D.  [facility], Division of Infectious Diseases  [phone]  After 5pm on weekdays and all day on weekends - please call [phone] Patient is a 87 year old female with PMH of remote breast ca, MAC untreated, MVP, asthma, mod AS, paroxysmal tachycardia of unknown rhythm, orthostatic hypotension (Feb '22) c/b syncope, anxiety, depression, recent COVID (May '22), recent accidental valium toxicity (d/c'd from [facility] 2 days ago) who presented with confusion, syncope, hypotension.  ID consulted to evaluate b/l LE for possible infection.     Foot pain and erythema rule out infection   - 7/4 noted with erythema on bilateral feet with swelling and TTP   - today 7/5 -- swelling and pain have resolved, slight erythema on dorsal feet with no sign of acute infection   - imaging/xrays reviewed, no acute infectious pathology    - ESR elevated but stable since 2/2022   - would rule out other causes given above improved without antibiotics and has no s/o cellulitis/SSTI   - afebrile, WBC wnl, no infectious source noted on review or exam    - continue to monitor off antibiotics     Syncope, orthostatic hypotension  Moderate AS   - management per Cardiology and primary teams       D/w daughter at bedside.   Kaushal Palomo M.D.  [facility], Division of Infectious Diseases  [phone]  After 5pm on weekdays and all day on weekends - please call [phone]

## 2022-07-05 NOTE — CONSULT NOTE ADULT - SUBJECTIVE AND OBJECTIVE BOX
[facility]        Patient is a 87y old  Female who presents with a chief complaint of weakness, syncope (05 Jul 2022 08:32)    Excerpt from H&P,"  HPI:  87F c hx remote breast ca, MAC untreated, MVP, asthma, mod AS, paroxysmal tachycardia of unknown rhythm, orthostatic hypotension (Feb '22) c/b syncope, anxiety, depression, recent covid (May '22), recent accidental valium toxicity (d/c'd from [facility] 2 days ago), pw confusion, syncope, hypotension.    History from pt, pt's daughter Amaya Cameron at bedside.  Pt has been having continued weakness, poor appetite since covid diagnosis, but had been improving. At baseline, pt ambulates with walker, completes her own ADLs. About 5 days ago, pt was having trouble sleeping, so took additional valium tablets to help her sleep. The following day, pt's daughter arrived home to find pt on floor unarousable. Pt admitted to [facility] where pt was treated for valium toxicity, then discharged 2 days ago. Since discharge, pt has been drastically worse functional. Pt has been passing out on standing, confused, drowsy. Pt would slump and family would have to catch her. Today, pt slept all night, then again from 10AM to afternoon. Pt's daughter checked blood pressure and noted that the systolic number was like "98". Pt reports intermittent foot pain on standing. Pt denies fevers, chills.  Family states pt has not been having bowel movements and has not been urinating. Pt denies any relieving or exacerbating factors. Family also states pt has been having severe depression and was supposed to see a psychiatrist. (02 Jul 2022 23:00)           *****PAST MEDICAL / Surgical  HISTORY:  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      GERD (Gastroesophageal Reflux Disease)      Irritable Bowel Syndrome      Mitral Valve Prolapse      Anxiety      Clinical Depression      Asthma      Uveitis      Irritable Bowel Syndrome      Hiatal Hernia      Nasal Polyp      Hypertension      COVID-19 vaccine series completed  Moderna      2019 novel coronavirus disease (COVID-19)  5/27/2022      S/P Breast Lumpectomy      S/P Lumpectomy of Breast  Left Breast      Status Post Tonsillectomy      S/P Nasal Polypectomy      History of Cataract Surgery  Left eye      History of Breast Biopsy  [place] lymph node biopsy               *****FAMILY HISTORY:  FAMILY HISTORY:  FH: CAD (coronary artery disease) (Father, Mother, Sibling, Aunt)    FH: lung cancer (Mother)             *****SOCIAL HISTORY:  Alcohol: None  Smoking: None         *****ALLERGIES:   Allergies    cefdinir (Unknown)  ciprofloxacin (Other)  codeine (Nausea; Other)  contrast media (iodine-based) (Angioedema)  fluorescein ophthalmic (Hives; Rash; Flushing)  lactose (Unknown)  latex (Anaphylaxis)  Levaquin (Nausea; Other)  lidocaine (Unknown)  SULFA (Anaphylaxis)  tetracycline (Anaphylaxis)    Intolerances    Augmentin (Stomach Upset)           *****MEDICATIONS: current medication reviewed and documented.   MEDICATIONS  (STANDING):  amLODIPine   Tablet 5 milliGRAM(s) Oral once  amLODIPine   Tablet 5 milliGRAM(s) Oral daily  dextrose 5% + sodium chloride 0.45%. 1000 milliLiter(s) (75 mL/Hr) IV Continuous <Continuous>  enoxaparin Injectable 40 milliGRAM(s) SubCutaneous every 24 hours  FLUoxetine 60 milliGRAM(s) Oral daily  gabapentin 100 milliGRAM(s) Oral two times a day  hemorrhoidal Ointment      pantoprazole    Tablet 40 milliGRAM(s) Oral before breakfast  senna 2 Tablet(s) Oral at bedtime    MEDICATIONS  (PRN):  polyethylene glycol 3350 17 Gram(s) Oral daily PRN Constipation  QUEtiapine 25 milliGRAM(s) Oral every 6 hours PRN agitation/happucinations           *****REVIEW OF SYSTEM:  GEN: no fever, no chills, no pain  RESP: no SOB, no cough, no sputum  CVS: no chest pain, no palpitations, no edema  GI: no abdominal pain, no nausea, no vomiting, no constipation, no diarrhea  : no dysurea, no frequency, no hematurea  Neuro: no headache, no dizziness  PSYCH: no anxiety, no depression  Derm : no itching, no rash         *****VITAL SIGNS:  T(C): 37.2 (07-05-22 @ 05:51), Max: 37.6 (07-04-22 @ 21:01)  HR: 64 (07-05-22 @ 05:51) (64 - 68)  BP: 115/65 (07-05-22 @ 05:51) (115/65 - 123/79)  RR: 18 (07-05-22 @ 05:51) (18 - 18)  SpO2: 97% (07-05-22 @ 05:51) (97% - 98%)  Wt(kg): --    07-04 @ 07:01  -  07-05 @ 07:00  --------------------------------------------------------  IN: 1140 mL / OUT: 900 mL / NET: 240 mL             *****PHYSICAL EXAM:   Alert oriented x 3   Attention comprehension are fair. Able to name, repeat, read without any difficulty.   Able to follow 3 step commands.     EOMI fundi not visualized,  VFF to confrontration  No facial asymmetry   Tongue is midline   Palate elevates symmetrically   Moving all 4 ext symmetrically no pronator drift   Reflexes are symmetric throughout   sensation is grossly symmetric  Gait : not assessed.  B/L down going toes               *****LAB AND IMAGING:                                                  [All pertinent recent Imaging reports reviewed]         *****A S S E S S M E N T   A N D   P L A N :        Problem/Recommendations 1:        Problem/Recommendations 2:         pt at risk for developing delirium, therefore please institute the following preventative measures if possible          - initiating early mobilization          -minimizing "tethers" - IV, oxygen, catheters, etc          -avoiding   sedatives          -maintaining hydration/nutrition          -avoid anticholinergics - diphenhydramine, etc          -pain control          -sleep wake cycle regulation; avoid day time somnolence           -supportive environment    ___________________________  Will follow with you.  Thank you,  Melissa Pérez MD  Diplomate of the American Board of Neurology and Psychiatry.  Diplomate of the American Board of Vascular Neurology.   [facility] ([facility]), [facility]   Ph: [phone]    Differential diagnosis and plan of care discussed with patient after the evaluation.   Advanced care planning options discussed.   Pain assessed and judicious use of narcotics when appropriate was discussed.  Importance of Fall prevention discussed.  Counseling on Smoking and Alcohol cessation was offered when appropriate.  Counseling on Diet, exercise, and medication compliance was done.   83 minutes spent on the total encounter;  more than 50 % of the visit was spent on counseling  and or coordinating care by the attending physician.    Thank you for allowing me to participate in the care of this delbert patient. Please do not hesitate to call me if you have any questions.     This and subsequent notes  will  inherently be subject to errors including those of syntax and substitutions which may escape proofreading. In such instances original meaning may be extrapolated by contextual derivation.    [facility]), [facility]        Patient is a 87y old  Female who presents with a chief complaint of weakness, syncope (05 Jul 2022 08:32)    Excerpt from H&P,"   87F c hx remote breast ca, MAC untreated, MVP, asthma, mod AS, paroxysmal tachycardia of unknown rhythm, orthostatic hypotension (Feb '22) c/b syncope, anxiety, depression, recent covid (May '22), recent accidental valium toxicity (d/c'd from [facility] 2 days ago), pw confusion, syncope, hypotension.  pt also reports hx of fibromyalgia which was never treated.   pt has chronic hx of generalized pain, discoloration of her feet.   She reports also a remote hx of hepatitis B   pt also with hx of uveitis, spinal stenosis and chemotherapy for breast ca           *****PAST MEDICAL / Surgical  HISTORY:  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      GERD (Gastroesophageal Reflux Disease)      Irritable Bowel Syndrome      Mitral Valve Prolapse      Anxiety      Clinical Depression      Asthma      Uveitis      Irritable Bowel Syndrome      Hiatal Hernia      Nasal Polyp      Hypertension      COVID-19 vaccine series completed  Moderna      2019 novel coronavirus disease (COVID-19)  5/27/2022      S/P Breast Lumpectomy      S/P Lumpectomy of Breast  Left Breast      Status Post Tonsillectomy      S/P Nasal Polypectomy      History of Cataract Surgery  Left eye      History of Breast Biopsy  [place] lymph node biopsy               *****FAMILY HISTORY:  FAMILY HISTORY:  FH: CAD (coronary artery disease) (Father, Mother, Sibling, Aunt)    FH: lung cancer (Mother)             *****SOCIAL HISTORY:  Alcohol: None  Smoking: None         *****ALLERGIES:   Allergies    cefdinir (Unknown)  ciprofloxacin (Other)  codeine (Nausea; Other)  contrast media (iodine-based) (Angioedema)  fluorescein ophthalmic (Hives; Rash; Flushing)  lactose (Unknown)  latex (Anaphylaxis)  Levaquin (Nausea; Other)  lidocaine (Unknown)  SULFA (Anaphylaxis)  tetracycline (Anaphylaxis)    Intolerances    Augmentin (Stomach Upset)           *****MEDICATIONS: current medication reviewed and documented.   MEDICATIONS  (STANDING):  amLODIPine   Tablet 5 milliGRAM(s) Oral once  amLODIPine   Tablet 5 milliGRAM(s) Oral daily  dextrose 5% + sodium chloride 0.45%. 1000 milliLiter(s) (75 mL/Hr) IV Continuous <Continuous>  enoxaparin Injectable 40 milliGRAM(s) SubCutaneous every 24 hours  FLUoxetine 60 milliGRAM(s) Oral daily  gabapentin 100 milliGRAM(s) Oral two times a day  hemorrhoidal Ointment      pantoprazole    Tablet 40 milliGRAM(s) Oral before breakfast  senna 2 Tablet(s) Oral at bedtime    MEDICATIONS  (PRN):  polyethylene glycol 3350 17 Gram(s) Oral daily PRN Constipation  QUEtiapine 25 milliGRAM(s) Oral every 6 hours PRN agitation/happucinations           *****REVIEW OF SYSTEM:  GEN: no fever, no chills, no pain  RESP: no SOB, no cough, no sputum  CVS: no chest pain, no palpitations, no edema  GI: no abdominal pain, no nausea, no vomiting, no constipation, no diarrhea  : no dysurea, no frequency, no hematurea  Neuro: no headache, no dizziness  PSYCH: no anxiety, no depression  Derm : no itching, no rash         *****VITAL SIGNS:  T(C): 37.2 (07-05-22 @ 05:51), Max: 37.6 (07-04-22 @ 21:01)  HR: 64 (07-05-22 @ 05:51) (64 - 68)  BP: 115/65 (07-05-22 @ 05:51) (115/65 - 123/79)  RR: 18 (07-05-22 @ 05:51) (18 - 18)  SpO2: 97% (07-05-22 @ 05:51) (97% - 98%)  Wt(kg): --    07-04 @ 07:01  -  07-05 @ 07:00  --------------------------------------------------------  IN: 1140 mL / OUT: 900 mL / NET: 240 mL             *****PHYSICAL EXAM:   Alert oriented x 3   Attention comprehension are fair. Able to name, repeat, read without any difficulty.   Able to follow 3 step commands.     EOMI fundi not visualized,  VFF to confrontration  No facial asymmetry   Tongue is midline   Palate elevates symmetrically   Moving all 4 ext symmetrically no pronator drift   Reflexes are symmetric throughout   sensation is grossly symmetric  Gait : not assessed.  B/L down going toes               *****LAB AND IMAGING:                                                  [All pertinent recent Imaging reports reviewed]         *****A S S E S S M E N T   A N D   P L A N :   Excerpt from H&P,"   87F c hx remote breast ca, MAC untreated, MVP, asthma, mod AS, paroxysmal tachycardia of unknown rhythm, orthostatic hypotension (Feb '22) c/b syncope, anxiety, depression, recent covid (May '22), recent accidental valium toxicity (d/c'd from [facility] 2 days ago), pw confusion, syncope, hypotension.  pt also reports hx of fibromyalgia which was never treated.   pt has chronic hx of generalized pain, discoloration of her feet.   She reports also a remote hx of hepatitis B   pt also with hx of uveitis, spinal stenosis and chemotherapy for breast ca        Problem/Recommendations 1:  generalized pain of unclear etiology   currently on empiric eneida  cryoglobulin level   immunofxationn       Problem/Recommendations 2: geenralized numbnee  likely multifactorial related to prior hx of chem, spinal stenosis,  hepb related cryoglobulinemia   need emg/ncv outpt  cannot r/o paraneoplastic process givne long sn           ___________________________  Will follow with you.  Thank you,  Melissa Pérez MD  Diplomate of the American Board of Neurology and Psychiatry.  Diplomate of the American Board of Vascular Neurology.   [facility] ([facility]), [facility]   Ph: [phone]    Differential diagnosis and plan of care discussed with patient after the evaluation.   Advanced care planning options discussed.   Pain assessed and judicious use of narcotics when appropriate was discussed.  Importance of Fall prevention discussed.  Counseling on Smoking and Alcohol cessation was offered when appropriate.  Counseling on Diet, exercise, and medication compliance was done.   83 minutes spent on the total encounter;  more than 50 % of the visit was spent on counseling  and or coordinating care by the attending physician.    Thank you for allowing me to participate in the care of this delbert patient. Please do not hesitate to call me if you have any questions.     This and subsequent notes  will  inherently be subject to errors including those of syntax and substitutions which may escape proofreading. In such instances original meaning may be extrapolated by contextual derivation.

## 2022-07-06 LAB
ANION GAP SERPL CALC-SCNC: 10 MMOL/L — SIGNIFICANT CHANGE UP (ref 5–17)
ANION GAP SERPL CALC-SCNC: 9 MMOL/L — SIGNIFICANT CHANGE UP (ref 5–17)
BUN SERPL-MCNC: 12 MG/DL — SIGNIFICANT CHANGE UP (ref 7–23)
BUN SERPL-MCNC: 14 MG/DL — SIGNIFICANT CHANGE UP (ref 7–23)
CALCIUM SERPL-MCNC: 6.5 MG/DL — CRITICAL LOW (ref 8.4–10.5)
CALCIUM SERPL-MCNC: 9.2 MG/DL — SIGNIFICANT CHANGE UP (ref 8.4–10.5)
CHLORIDE SERPL-SCNC: 88 MMOL/L — LOW (ref 96–108)
CHLORIDE SERPL-SCNC: 95 MMOL/L — LOW (ref 96–108)
CO2 SERPL-SCNC: 17 MMOL/L — LOW (ref 22–31)
CO2 SERPL-SCNC: 24 MMOL/L — SIGNIFICANT CHANGE UP (ref 22–31)
CREAT SERPL-MCNC: 0.53 MG/DL — SIGNIFICANT CHANGE UP (ref 0.5–1.3)
CREAT SERPL-MCNC: 0.7 MG/DL — SIGNIFICANT CHANGE UP (ref 0.5–1.3)
EGFR: 84 ML/MIN/1.73M2 — SIGNIFICANT CHANGE UP
EGFR: 90 ML/MIN/1.73M2 — SIGNIFICANT CHANGE UP
GLUCOSE SERPL-MCNC: 1112 MG/DL — CRITICAL HIGH (ref 70–99)
GLUCOSE SERPL-MCNC: 133 MG/DL — HIGH (ref 70–99)
HCT VFR BLD CALC: 22.8 % — LOW (ref 34.5–45)
HCT VFR BLD CALC: 30.3 % — LOW (ref 34.5–45)
HGB BLD-MCNC: 7.1 G/DL — LOW (ref 11.5–15.5)
HGB BLD-MCNC: 9.7 G/DL — LOW (ref 11.5–15.5)
MCHC RBC-ENTMCNC: 27.6 PG — SIGNIFICANT CHANGE UP (ref 27–34)
MCHC RBC-ENTMCNC: 27.6 PG — SIGNIFICANT CHANGE UP (ref 27–34)
MCHC RBC-ENTMCNC: 31.1 GM/DL — LOW (ref 32–36)
MCHC RBC-ENTMCNC: 32 GM/DL — SIGNIFICANT CHANGE UP (ref 32–36)
MCV RBC AUTO: 86.1 FL — SIGNIFICANT CHANGE UP (ref 80–100)
MCV RBC AUTO: 88.7 FL — SIGNIFICANT CHANGE UP (ref 80–100)
NRBC # BLD: 0 /100 WBCS — SIGNIFICANT CHANGE UP (ref 0–0)
NRBC # BLD: 0 /100 WBCS — SIGNIFICANT CHANGE UP (ref 0–0)
PLATELET # BLD AUTO: 186 K/UL — SIGNIFICANT CHANGE UP (ref 150–400)
PLATELET # BLD AUTO: 223 K/UL — SIGNIFICANT CHANGE UP (ref 150–400)
POTASSIUM SERPL-MCNC: 3.1 MMOL/L — LOW (ref 3.5–5.3)
POTASSIUM SERPL-MCNC: 4.3 MMOL/L — SIGNIFICANT CHANGE UP (ref 3.5–5.3)
POTASSIUM SERPL-SCNC: 3.1 MMOL/L — LOW (ref 3.5–5.3)
POTASSIUM SERPL-SCNC: 4.3 MMOL/L — SIGNIFICANT CHANGE UP (ref 3.5–5.3)
RBC # BLD: 2.57 M/UL — LOW (ref 3.8–5.2)
RBC # BLD: 3.52 M/UL — LOW (ref 3.8–5.2)
RBC # FLD: 14.4 % — SIGNIFICANT CHANGE UP (ref 10.3–14.5)
RBC # FLD: 14.6 % — HIGH (ref 10.3–14.5)
SODIUM SERPL-SCNC: 115 MMOL/L — CRITICAL LOW (ref 135–145)
SODIUM SERPL-SCNC: 128 MMOL/L — LOW (ref 135–145)
WBC # BLD: 5.36 K/UL — SIGNIFICANT CHANGE UP (ref 3.8–10.5)
WBC # BLD: 6.32 K/UL — SIGNIFICANT CHANGE UP (ref 3.8–10.5)
WBC # FLD AUTO: 5.36 K/UL — SIGNIFICANT CHANGE UP (ref 3.8–10.5)
WBC # FLD AUTO: 6.32 K/UL — SIGNIFICANT CHANGE UP (ref 3.8–10.5)

## 2022-07-06 RX ADMIN — PREGABALIN 1000 MICROGRAM(S): 225 CAPSULE ORAL at 12:42

## 2022-07-06 RX ADMIN — Medication 60 MILLIGRAM(S): at 06:11

## 2022-07-06 RX ADMIN — SENNA PLUS 2 TABLET(S): 8.6 TABLET ORAL at 22:07

## 2022-07-06 RX ADMIN — PANTOPRAZOLE SODIUM 40 MILLIGRAM(S): 20 TABLET, DELAYED RELEASE ORAL at 06:11

## 2022-07-06 RX ADMIN — Medication 60 MILLIGRAM(S): at 12:42

## 2022-07-06 RX ADMIN — ENOXAPARIN SODIUM 40 MILLIGRAM(S): 100 INJECTION SUBCUTANEOUS at 17:13

## 2022-07-06 RX ADMIN — GABAPENTIN 100 MILLIGRAM(S): 400 CAPSULE ORAL at 17:12

## 2022-07-06 RX ADMIN — GABAPENTIN 100 MILLIGRAM(S): 400 CAPSULE ORAL at 06:11

## 2022-07-06 NOTE — DIETITIAN INITIAL EVALUATION ADULT - NS FNS DIET ORDER
Diet, Regular:   Supplement Feeding Modality:  Oral  Ensure Enlive Cans or Servings Per Day:  1       Frequency:  Two Times a day (07-03-22 @ 01:37) [Active]

## 2022-07-06 NOTE — PROGRESS NOTE ADULT - SUBJECTIVE AND OBJECTIVE BOX
[facility], Division of Infectious Diseases  LILI Hanks Y. Patel, S. Shah, G. Casimir  [phone]  ([phone] - weekdays after 5pm and weekends)    Name: ADA ROLLE  Age/Gender: 87y Female  MRN: [id number]    Interval History:  Patient seen and examined this morning.   Patient with no new complaints, feels hungry.   Notes reviewed. No concerning overnight events  Afebrile. Daughter at bedside.     Allergies: cefdinir (Unknown)  ciprofloxacin (Other)  codeine (Nausea; Other)  contrast media (iodine-based) (Angioedema)  fluorescein ophthalmic (Hives; Rash; Flushing)  lactose (Unknown)  latex (Anaphylaxis)  Levaquin (Nausea; Other)  lidocaine (Unknown)  SULFA (Anaphylaxis)  tetracycline (Anaphylaxis)      Objective:  Vitals:   T(F): 97.6 (07-06-22 @ 04:56), Max: 98.4 (07-05-22 @ 21:19)  HR: 59 (07-06-22 @ 04:56) (59 - 61)  BP: 124/67 (07-06-22 @ 04:56) (93/59 - 124/67)  RR: 18 (07-06-22 @ 04:56) (18 - 18)  SpO2: 100% (07-06-22 @ 04:56) (94% - 100%)  Physical Examination:  General: no acute distress  HEENT: NC/AT, anicteric, neck supple  Respiratory: no acc muscle use, breathing comfortably  Cardiovascular: S1 and S2 present  Gastrointestinal: normal appearing, nondistended  Extremities: no edema, no cyanosis  Skin: slight erythema on b/l dorsal foot, no TTP/warmth or induration    Laboratory Studies:  CBC:                       9.7    6.32  )-----------( 223      ( 06 Jul 2022 10:05 )             30.3     WBC Trend:  6.32 07-06-22 @ 10:05  5.36 07-06-22 @ 07:07  5.23 07-03-22 @ 06:39  5.66 07-02-22 @ 16:10    CMP: 07-06    115<LL>  |  88<L>  |  12  ----------------------------<  1112<HH>  3.1<L>   |  17<L>  |  0.53    Ca    6.5<LL>      06 Jul 2022 07:05      Creatinine, Serum: 0.53 mg/dL (07-06-22 @ 07:05)  Creatinine, Serum: 0.67 mg/dL (07-03-22 @ 06:39)  Creatinine, Serum: 0.73 mg/dL (07-02-22 @ 16:10)    Microbiology: reviewed   Culture - Urine (collected 07-02-22 @ 18:43)  Source: Clean Catch Clean Catch (Midstream)  Final Report (07-03-22 @ 20:39):    No growth    Radiology: reviewed     Medications:  amLODIPine   Tablet 5 milliGRAM(s) Oral once  cyanocobalamin 1000 MICROGram(s) Oral daily  dextrose 5% + sodium chloride 0.45%. 1000 milliLiter(s) IV Continuous <Continuous>  enoxaparin Injectable 40 milliGRAM(s) SubCutaneous every 24 hours  FLUoxetine 60 milliGRAM(s) Oral daily  gabapentin 100 milliGRAM(s) Oral two times a day  hemorrhoidal Ointment      pantoprazole    Tablet 40 milliGRAM(s) Oral before breakfast  polyethylene glycol 3350 17 Gram(s) Oral daily PRN  QUEtiapine 25 milliGRAM(s) Oral every 6 hours PRN  senna 2 Tablet(s) Oral at bedtime

## 2022-07-06 NOTE — DIETITIAN INITIAL EVALUATION ADULT - ORAL INTAKE PTA/DIET HISTORY
visited pt at bedside this morning. per daughter, pt with decreased PO intake since last Tuesday (~1 week). pt admits to be lactose intolerant.

## 2022-07-06 NOTE — DIETITIAN INITIAL EVALUATION ADULT - REASON FOR ADMISSION
Altered mental status  .  Per chart: "87F c hx remote breast ca, MAC untreated, MVP, asthma, mod AS, paroxysmal tachycardia of unknown rhythm, orthostatic hypotension (Feb '22) c/b syncope, anxiety, depression, recent covid (May '22), recent accidental valium toxicity (d/c'd from [facility] 2 days ago), pw confusion, syncope, hypotension."

## 2022-07-06 NOTE — DIETITIAN INITIAL EVALUATION ADULT - NSICDXPASTSURGICALHX_GEN_ALL_CORE_FT
PAST SURGICAL HISTORY:  History of Breast Biopsy [place] lymph node biopsy    History of Cataract Surgery Left eye    S/P Breast Lumpectomy     S/P Lumpectomy of Breast Left Breast    S/P Nasal Polypectomy     Status Post Tonsillectomy

## 2022-07-06 NOTE — DIETITIAN INITIAL EVALUATION ADULT - PERTINENT LABORATORY DATA
07-06    128<L>  |  95<L>  |  14  ----------------------------<  133<H>  4.3   |  24  |  0.70    Ca    9.2      06 Jul 2022 10:05    A1C with Estimated Average Glucose Result: 5.6 % (05-30-22 @ 10:42)    7/5 Vitamin B12 388 (low normal)

## 2022-07-06 NOTE — DIETITIAN INITIAL EVALUATION ADULT - PERSON TAUGHT/METHOD
encouraged PO intake at meals including Ensure oral supplements/verbal instruction/patient instructed/daughter instructed

## 2022-07-06 NOTE — DIETITIAN INITIAL EVALUATION ADULT - REASON INDICATOR FOR ASSESSMENT
consulted for significant decrease in PO intake >3 days PTA. information obtained from pt, pt daughter and granddaughter at bedside, RN and EMR.

## 2022-07-06 NOTE — DIETITIAN INITIAL EVALUATION ADULT - NS FNS WEIGHT CHANGE REASON
pounds ~ 2 months  current dosing weight 132 pounds 7/4/22  Weight per HIE:   5/29/22 146.7 pounds  RD will continue to monitor trends./unintentional

## 2022-07-06 NOTE — PROGRESS NOTE ADULT - PROBLEM SELECTOR PLAN 1
- resovled. unclear why, poss from hypotension  - no obvious or localizing infectious symptoms  - BP improved after IVF  - CTH neg for acute changes  - both feet are tender on exam, no edema, mild erythema, no warmth.   xrays are neg.  ESR is elev, cannot R/O PMR,  ptn feels much better after a single dose of prednisone, denies pain, more alert/awake, able to move her extremities without pain. will [facility] today for TOV  -euvolemic, will dc IVF  - vit b12 is on the low side, will start supplementation. TFTs in range. will d/c Norvasc 2/2 BP on low side

## 2022-07-06 NOTE — DIETITIAN INITIAL EVALUATION ADULT - PERTINENT MEDS FT
MEDICATIONS  (STANDING):  amLODIPine   Tablet 5 milliGRAM(s) Oral once  cyanocobalamin 1000 MICROGram(s) Oral daily  enoxaparin Injectable 40 milliGRAM(s) SubCutaneous every 24 hours  FLUoxetine 60 milliGRAM(s) Oral daily  gabapentin 100 milliGRAM(s) Oral two times a day  hemorrhoidal Ointment      pantoprazole    Tablet 40 milliGRAM(s) Oral before breakfast  senna 2 Tablet(s) Oral at bedtime    MEDICATIONS  (PRN):  polyethylene glycol 3350 17 Gram(s) Oral daily PRN Constipation  QUEtiapine 25 milliGRAM(s) Oral every 6 hours PRN agitation/happucinations

## 2022-07-06 NOTE — PROGRESS NOTE ADULT - SUBJECTIVE AND OBJECTIVE BOX
CARDIOLOGY FOLLOW UP - Dr. Lee  DATE OF SERVICE: 7/6/22     CC no cp or sob      REVIEW OF SYSTEMS:  CONSTITUTIONAL: No fever, weight loss, or fatigue  RESPIRATORY: No cough, wheezing, chills or hemoptysis; No Shortness of Breath  CARDIOVASCULAR: No chest pain, palpitations, passing out, dizziness, or leg swelling  GASTROINTESTINAL: No abdominal or epigastric pain. No nausea, vomiting, or hematemesis; No diarrhea or constipation. No melena or hematochezia.      PHYSICAL EXAM:  T(C): 36.4 (07-06-22 @ 04:56), Max: 36.9 (07-05-22 @ 21:19)  HR: 59 (07-06-22 @ 04:56) (59 - 61)  BP: 124/67 (07-06-22 @ 04:56) (93/59 - 124/67)  RR: 18 (07-06-22 @ 04:56) (18 - 18)  SpO2: 100% (07-06-22 @ 04:56) (94% - 100%)  Wt(kg): --  I&O's Summary    05 Jul 2022 07:01  -  06 Jul 2022 07:00  --------------------------------------------------------  IN: 1975 mL / OUT: 700 mL / NET: 1275 mL    06 Jul 2022 07:01  -  06 Jul 2022 10:56  --------------------------------------------------------  IN: 0 mL / OUT: 900 mL / NET: -900 mL        Appearance: Normal	  Cardiovascular: Normal S1 S2,RRR, No JVD, No murmurs  Respiratory: Lungs clear to auscultation	  Gastrointestinal:  Soft, Non-tender, + BS	  Extremities: Normal range of motion, No clubbing, cyanosis or edema      Home Medications:  acetaminophen 325 mg oral tablet: 2 tab(s) orally every 6 hours, As needed, Temp greater or equal to 38C (100.4F), Mild Pain (1 - 3) (03 Jul 2022 00:32)  Durezol 0.05% ophthalmic emulsion: 1 drop(s) in the left eye 3 times a day (03 Jul 2022 00:32)  FLUoxetine 20 mg oral capsule: 3 cap(s) orally once a day (03 Jul 2022 00:32)  Metoprolol Succinate ER 25 mg oral tablet, extended release: 0.5 tab(s) orally once a day (03 Jul 2022 00:32)  Nasacort AQ 55 mcg/inh nasal spray: 1   2 times a day  (03 Jul 2022 00:32)  Protonix 40 mg oral delayed release tablet: 1 tab(s) orally once a day (03 Jul 2022 00:32)  Vitamin D3 25 mcg (1000 intl units) oral tablet: 1 tab(s) orally 2 times a day (03 Jul 2022 00:32)      MEDICATIONS  (STANDING):  amLODIPine   Tablet 5 milliGRAM(s) Oral once  cyanocobalamin 1000 MICROGram(s) Oral daily  dextrose 5% + sodium chloride 0.45%. 1000 milliLiter(s) (75 mL/Hr) IV Continuous <Continuous>  enoxaparin Injectable 40 milliGRAM(s) SubCutaneous every 24 hours  FLUoxetine 60 milliGRAM(s) Oral daily  gabapentin 100 milliGRAM(s) Oral two times a day  hemorrhoidal Ointment      pantoprazole    Tablet 40 milliGRAM(s) Oral before breakfast  senna 2 Tablet(s) Oral at bedtime      TELEMETRY: 	    ECG:  	  RADIOLOGY:     DIAGNOSTIC TESTING:  [ ] Echocardiogram:  < from: Transthoracic Echocardiogram (07.05.22 @ 14:22) >  EF (Lara Rule): 68 %Doppler Peak Velocity (m/sec):  AoV=2.3  ------------------------------------------------------------------------  Observations:  Mitral Valve: Normal mitral valve.  Aortic Valve/Aorta: Calcified aortic valve with decreased  opening. Peak transaortic valve gradient equals 21 mm Hg,  mean transaortic valve gradient equals 10 mm Hg, estimated  aorticvalve area equals 1.8 sqcm (by continuity equation),  aortic valve velocity time integral equals 50 cm,  consistent with mild aortic stenosis. Minimal aortic  regurgitation.  Peak left ventricular outflow tract  gradient equals 4 mm Hg, mean gradientis equal to 2 mm Hg,  LVOT velocity time integral equals 20 cm.  Aortic Root: 3.6 cm.  LVOT diameter: 2.4 cm.  Left Atrium: Mildly dilated left atrium.  LA volume index =  40 cc/m2.  Left Ventricle: Normal left ventricular systolic function.  No segmental wall motion abnormalities. Basal septal  hypertrophy. Mild diastolic dysfunction (Stage I).  Right Heart: Moderate right atrial enlargement. Normal  right ventricular size and function. Normal tricuspid  valve. Mild tricuspid regurgitation. Normal pulmonic valve.  Pericardium/Pleura: Normal pericardium with no pericardial  effusion.  Hemodynamic: Estimated right atrial pressure is 8 mm Hg.  Estimated right ventricular systolic pressure equals 35 mm  Hg, assuming right atrial pressure equals 8 mm Hg,  consistent with borderline pulmonary hypertension.  ------------------------------------------------------------------------  Conclusions:  1. Calcified aortic valve with decreased opening. Peak  transaortic valve gradient equals 21 mm Hg, mean  transaortic valve gradient equals 10 mm Hg, estimated  aortic valve area equals 1.8 sqcm (by continuity equation),  aortic valve velocity time integral equals 50 cm,  consistent with mild aortic stenosis.  2. Basal septal hypertrophy.  3. Normal left ventricular systolic function. No segmental  wall motion abnormalities.  4. Moderate right atrial enlargement.  5. Normal right ventricular size and function.  ------------------------------------------------------------------------  Confirmed on 7/5/2022 - 17:02:10 by LIV Espino  ------------------------------------------------------------------------    < end of copied text >    [ ]  Catheterization:  [ ] Stress Test:    OTHER: 	    LABS:	 	    Troponin T, High Sensitivity Result: 18 ng/L [0 - 51] (07-03 @ 06:39)                          9.7    6.32  )-----------( 223      ( 06 Jul 2022 10:05 )             30.3     07-06    115<LL>  |  88<L>  |  12  ----------------------------<  1112<HH>  3.1<L>   |  17<L>  |  0.53    Ca    6.5<LL>      06 Jul 2022 07:05

## 2022-07-06 NOTE — PROGRESS NOTE ADULT - SUBJECTIVE AND OBJECTIVE BOX
Patient is a 87y old  Female who presents with a chief complaint of weakness, syncope (05 Jul 2022 19:13)      SUBJECTIVE / OVERNIGHT EVENTS:   MEDICATIONS  (STANDING):  amLODIPine   Tablet 5 milliGRAM(s) Oral once  cyanocobalamin 1000 MICROGram(s) Oral daily  dextrose 5% + sodium chloride 0.45%. 1000 milliLiter(s) (75 mL/Hr) IV Continuous <Continuous>  enoxaparin Injectable 40 milliGRAM(s) SubCutaneous every 24 hours  FLUoxetine 60 milliGRAM(s) Oral daily  gabapentin 100 milliGRAM(s) Oral two times a day  hemorrhoidal Ointment      pantoprazole    Tablet 40 milliGRAM(s) Oral before breakfast  senna 2 Tablet(s) Oral at bedtime    MEDICATIONS  (PRN):  polyethylene glycol 3350 17 Gram(s) Oral daily PRN Constipation  QUEtiapine 25 milliGRAM(s) Oral every 6 hours PRN agitation/happucinations      Vital Signs Last 24 Hrs  T(F): 97.6 (07-06-22 @ 04:56), Max: 98.4 (07-05-22 @ 21:19)  HR: 59 (07-06-22 @ 04:56) (59 - 61)  BP: 124/67 (07-06-22 @ 04:56) (93/59 - 124/67)  RR: 18 (07-06-22 @ 04:56) (18 - 18)  SpO2: 100% (07-06-22 @ 04:56) (94% - 100%)  Telemetry:   CAPILLARY BLOOD GLUCOSE        I&O's Summary    05 Jul 2022 07:01  -  06 Jul 2022 07:00  --------------------------------------------------------  IN: 1975 mL / OUT: 700 mL / NET: 1275 mL    06 Jul 2022 07:01  -  06 Jul 2022 09:48  --------------------------------------------------------  IN: 0 mL / OUT: 650 mL / NET: -650 mL        PHYSICAL EXAM:  GENERAL: NAD, well-developed  HEAD:  Atraumatic, Normocephalic  EYES: EOMI, PERRLA, conjunctiva and sclera clear  NECK: Supple, No JVD  CHEST/LUNG: Clear to auscultation bilaterally; No wheeze  HEART: Regular rate and rhythm; No murmurs, rubs, or gallops  ABDOMEN: Soft, Nontender, Nondistended; Bowel sounds present  EXTREMITIES:  2+ Peripheral Pulses, No clubbing, cyanosis, or edema  PSYCH: AAOx3  NEUROLOGY: non-focal  SKIN: No rashes or lesions    LABS:                        7.1    5.36  )-----------( 186      ( 06 Jul 2022 07:07 )             22.8     07-06    115<LL>  |  88<L>  |  12  ----------------------------<  1112<HH>  3.1<L>   |  17<L>  |  0.53    Ca    6.5<LL>      06 Jul 2022 07:05                RADIOLOGY & ADDITIONAL TESTS:    Imaging Personally Reviewed:    Consultant(s) Notes Reviewed:      Care Discussed with Consultants/Other Providers:   Patient is a 87y old  Female who presents with a chief complaint of weakness, syncope (05 Jul 2022 19:13)      SUBJECTIVE / OVERNIGHT EVENTS: ptn feels much better after a single dose of prednisone, denies pain, more alert/awake, able to move her extremities without pain. [place] [facility] [place] for TOV    MEDICATIONS  (STANDING):  amLODIPine   Tablet 5 milliGRAM(s) Oral once  cyanocobalamin 1000 MICROGram(s) Oral daily  dextrose 5% + sodium chloride 0.45%. 1000 milliLiter(s) (75 mL/Hr) IV Continuous <Continuous>  enoxaparin Injectable 40 milliGRAM(s) SubCutaneous every 24 hours  FLUoxetine 60 milliGRAM(s) Oral daily  gabapentin 100 milliGRAM(s) Oral two times a day  hemorrhoidal Ointment      pantoprazole    Tablet 40 milliGRAM(s) Oral before breakfast  senna 2 Tablet(s) Oral at bedtime    MEDICATIONS  (PRN):  polyethylene glycol 3350 17 Gram(s) Oral daily PRN Constipation  QUEtiapine 25 milliGRAM(s) Oral every 6 hours PRN agitation/happucinations      Vital Signs Last 24 Hrs  T(F): 97.6 (07-06-22 @ 04:56), Max: 98.4 (07-05-22 @ 21:19)  HR: 59 (07-06-22 @ 04:56) (59 - 61)  BP: 124/67 (07-06-22 @ 04:56) (93/59 - 124/67)  RR: 18 (07-06-22 @ 04:56) (18 - 18)  SpO2: 100% (07-06-22 @ 04:56) (94% - 100%)  Telemetry:   CAPILLARY BLOOD GLUCOSE        I&O's Summary    05 Jul 2022 07:01  -  06 Jul 2022 07:00  --------------------------------------------------------  IN: 1975 mL / OUT: 700 mL / NET: 1275 mL    06 Jul 2022 07:01  -  06 Jul 2022 09:48  --------------------------------------------------------  IN: 0 mL / OUT: 650 mL / NET: -650 mL        PHYSICAL EXAM:  GENERAL: NAD, well-developed  HEAD:  Atraumatic, Normocephalic  EYES: EOMI, PERRLA, conjunctiva and sclera clear  NECK: Supple, No JVD  CHEST/LUNG: Clear to auscultation bilaterally; No wheeze  HEART: Regular rate and rhythm; No murmurs, rubs, or gallops  ABDOMEN: Soft, Nontender, Nondistended; Bowel sounds present  EXTREMITIES:  2+ Peripheral Pulses, No clubbing, cyanosis, or edema  PSYCH: AAOx3  NEUROLOGY: non-focal  SKIN: No rashes or lesions    LABS:                        7.1    5.36  )-----------( 186      ( 06 Jul 2022 07:07 )             22.8     07-06    115<LL>  |  88<L>  |  12  ----------------------------<  1112<HH>  3.1<L>   |  17<L>  |  0.53    Ca    6.5<LL>      06 Jul 2022 07:05                RADIOLOGY & ADDITIONAL TESTS:    Imaging Personally Reviewed:    Consultant(s) Notes Reviewed:      Care Discussed with Consultants/Other Providers:

## 2022-07-06 NOTE — DIETITIAN INITIAL EVALUATION ADULT - ADD RECOMMEND
1) Will continue to monitor PO intake, weight, labs, skin, GI status and diet 2) continue ensure oral supplements due to fair PO intake 3) consider speech and swallow consult due to reported issues swallowing pills PTA 4) obtain food preferences PRN to optimize PO intake 5) nutrition risk placed in chart 6) continue vitamin B12 supplementation and consider obtaining repeat level  1) Will continue to monitor PO intake, weight, labs, skin, GI status and diet 2) continue ensure oral supplements due to fair PO intake 3) consider speech and swallow consult due to reported issues swallowing pills PTA 4) obtain food preferences PRN to optimize PO intake 5) nutrition risk placed in chart 6) continue vitamin B12 supplementation and consider obtaining repeat level 7) if PO intake does not improve, consider alternate means of nutrition

## 2022-07-07 LAB
ANION GAP SERPL CALC-SCNC: 8 MMOL/L — SIGNIFICANT CHANGE UP (ref 5–17)
BUN SERPL-MCNC: 18 MG/DL — SIGNIFICANT CHANGE UP (ref 7–23)
CALCIUM SERPL-MCNC: 9.4 MG/DL — SIGNIFICANT CHANGE UP (ref 8.4–10.5)
CHLORIDE SERPL-SCNC: 98 MMOL/L — SIGNIFICANT CHANGE UP (ref 96–108)
CO2 SERPL-SCNC: 25 MMOL/L — SIGNIFICANT CHANGE UP (ref 22–31)
CREAT SERPL-MCNC: 0.65 MG/DL — SIGNIFICANT CHANGE UP (ref 0.5–1.3)
EGFR: 86 ML/MIN/1.73M2 — SIGNIFICANT CHANGE UP
GLUCOSE SERPL-MCNC: 113 MG/DL — HIGH (ref 70–99)
HAV IGM SER-ACNC: SIGNIFICANT CHANGE UP
HBV CORE IGM SER-ACNC: SIGNIFICANT CHANGE UP
HBV SURFACE AG SER-ACNC: SIGNIFICANT CHANGE UP
HCT VFR BLD CALC: 29.1 % — LOW (ref 34.5–45)
HCV AB S/CO SERPL IA: 0.1 S/CO — SIGNIFICANT CHANGE UP (ref 0–0.99)
HCV AB SERPL-IMP: SIGNIFICANT CHANGE UP
HGB BLD-MCNC: 9.3 G/DL — LOW (ref 11.5–15.5)
MCHC RBC-ENTMCNC: 27.4 PG — SIGNIFICANT CHANGE UP (ref 27–34)
MCHC RBC-ENTMCNC: 32 GM/DL — SIGNIFICANT CHANGE UP (ref 32–36)
MCV RBC AUTO: 85.6 FL — SIGNIFICANT CHANGE UP (ref 80–100)
NRBC # BLD: 0 /100 WBCS — SIGNIFICANT CHANGE UP (ref 0–0)
PLATELET # BLD AUTO: 258 K/UL — SIGNIFICANT CHANGE UP (ref 150–400)
POTASSIUM SERPL-MCNC: 3.9 MMOL/L — SIGNIFICANT CHANGE UP (ref 3.5–5.3)
POTASSIUM SERPL-SCNC: 3.9 MMOL/L — SIGNIFICANT CHANGE UP (ref 3.5–5.3)
RBC # BLD: 3.4 M/UL — LOW (ref 3.8–5.2)
RBC # FLD: 14.2 % — SIGNIFICANT CHANGE UP (ref 10.3–14.5)
SARS-COV-2 RNA SPEC QL NAA+PROBE: SIGNIFICANT CHANGE UP
SODIUM SERPL-SCNC: 131 MMOL/L — LOW (ref 135–145)
WBC # BLD: 6.44 K/UL — SIGNIFICANT CHANGE UP (ref 3.8–10.5)
WBC # FLD AUTO: 6.44 K/UL — SIGNIFICANT CHANGE UP (ref 3.8–10.5)

## 2022-07-07 RX ADMIN — GABAPENTIN 100 MILLIGRAM(S): 400 CAPSULE ORAL at 06:39

## 2022-07-07 RX ADMIN — Medication 50 MILLIGRAM(S): at 06:40

## 2022-07-07 RX ADMIN — ENOXAPARIN SODIUM 40 MILLIGRAM(S): 100 INJECTION SUBCUTANEOUS at 17:45

## 2022-07-07 RX ADMIN — Medication 60 MILLIGRAM(S): at 12:17

## 2022-07-07 RX ADMIN — SENNA PLUS 2 TABLET(S): 8.6 TABLET ORAL at 21:17

## 2022-07-07 RX ADMIN — PREGABALIN 1000 MICROGRAM(S): 225 CAPSULE ORAL at 12:16

## 2022-07-07 RX ADMIN — GABAPENTIN 100 MILLIGRAM(S): 400 CAPSULE ORAL at 17:45

## 2022-07-07 RX ADMIN — PANTOPRAZOLE SODIUM 40 MILLIGRAM(S): 20 TABLET, DELAYED RELEASE ORAL at 06:39

## 2022-07-07 NOTE — PROGRESS NOTE ADULT - SUBJECTIVE AND OBJECTIVE BOX
CARDIOLOGY FOLLOW UP - Dr. Lee  DATE OF SERVICE: 7/7/22     CC no cp or sob       REVIEW OF SYSTEMS:  CONSTITUTIONAL: No fever, weight loss, or fatigue  RESPIRATORY: No cough, wheezing, chills or hemoptysis; No Shortness of Breath  CARDIOVASCULAR: No chest pain, palpitations, passing out, dizziness, or leg swelling  GASTROINTESTINAL: No abdominal or epigastric pain. No nausea, vomiting, or hematemesis; No diarrhea or constipation. No melena or hematochezia.  VASCULAR: No edema     PHYSICAL EXAM:  T(C): 36.4 (07-07-22 @ 11:45), Max: 36.7 (07-06-22 @ 14:07)  HR: 59 (07-07-22 @ 05:01) (59 - 62)  BP: 114/69 (07-07-22 @ 11:45) (114/69 - 147/69)  RR: 18 (07-07-22 @ 11:45) (18 - 18)  SpO2: 98% (07-07-22 @ 11:45) (98% - 100%)  Wt(kg): --  I&O's Summary    06 Jul 2022 07:01  -  07 Jul 2022 07:00  --------------------------------------------------------  IN: 120 mL / OUT: 1200 mL / NET: -1080 mL        Appearance: Normal	  Cardiovascular: Normal S1 S2,RRR, No JVD, No murmurs  Respiratory: Lungs clear to auscultation	  Gastrointestinal:  Soft, Non-tender, + BS	  Extremities: Normal range of motion, No clubbing, cyanosis or edema      Home Medications:  acetaminophen 325 mg oral tablet: 2 tab(s) orally every 6 hours, As needed, Temp greater or equal to 38C (100.4F), Mild Pain (1 - 3) (03 Jul 2022 00:32)  Durezol 0.05% ophthalmic emulsion: 1 drop(s) in the left eye 3 times a day (03 Jul 2022 00:32)  FLUoxetine 20 mg oral capsule: 3 cap(s) orally once a day (03 Jul 2022 00:32)  Metoprolol Succinate ER 25 mg oral tablet, extended release: 0.5 tab(s) orally once a day (03 Jul 2022 00:32)  Nasacort AQ 55 mcg/inh nasal spray: 1   2 times a day  (03 Jul 2022 00:32)  Protonix 40 mg oral delayed release tablet: 1 tab(s) orally once a day (03 Jul 2022 00:32)  Vitamin D3 25 mcg (1000 intl units) oral tablet: 1 tab(s) orally 2 times a day (03 Jul 2022 00:32)      MEDICATIONS  (STANDING):  amLODIPine   Tablet 5 milliGRAM(s) Oral once  cyanocobalamin 1000 MICROGram(s) Oral daily  enoxaparin Injectable 40 milliGRAM(s) SubCutaneous every 24 hours  FLUoxetine 60 milliGRAM(s) Oral daily  gabapentin 100 milliGRAM(s) Oral two times a day  hemorrhoidal Ointment      pantoprazole    Tablet 40 milliGRAM(s) Oral before breakfast  predniSONE   Tablet 50 milliGRAM(s) Oral daily  senna 2 Tablet(s) Oral at bedtime      TELEMETRY: 	    ECG:  	  RADIOLOGY:   DIAGNOSTIC TESTING:  [ ] Echocardiogram:  [ ]  Catheterization:  [ ] Stress Test:    OTHER: 	    LABS:	 	    Troponin T, High Sensitivity Result: 18 ng/L [0 - 51] (07-03 @ 06:39)                          9.3    6.44  )-----------( 258      ( 07 Jul 2022 07:13 )             29.1     07-07    131<L>  |  98  |  18  ----------------------------<  113<H>  3.9   |  25  |  0.65    Ca    9.4      07 Jul 2022 07:01

## 2022-07-07 NOTE — PROGRESS NOTE ADULT - SUBJECTIVE AND OBJECTIVE BOX
Patient is a 87y old  Female who presents with a chief complaint of weakness, syncope (07 Jul 2022 12:57)      SUBJECTIVE / OVERNIGHT EVENTS: ptn feels well, urinating on her own. awaiting DC to [facility]    MEDICATIONS  (STANDING):  amLODIPine   Tablet 5 milliGRAM(s) Oral once  cyanocobalamin 1000 MICROGram(s) Oral daily  enoxaparin Injectable 40 milliGRAM(s) SubCutaneous every 24 hours  FLUoxetine 60 milliGRAM(s) Oral daily  gabapentin 100 milliGRAM(s) Oral two times a day  hemorrhoidal Ointment      pantoprazole    Tablet 40 milliGRAM(s) Oral before breakfast  predniSONE   Tablet 50 milliGRAM(s) Oral daily  senna 2 Tablet(s) Oral at bedtime    MEDICATIONS  (PRN):  polyethylene glycol 3350 17 Gram(s) Oral daily PRN Constipation  QUEtiapine 25 milliGRAM(s) Oral every 6 hours PRN agitation/happucinations      Vital Signs Last 24 Hrs  T(F): 97.5 (07-07-22 @ 11:45), Max: 97.5 (07-07-22 @ 05:01)  HR: 59 (07-07-22 @ 05:01) (59 - 62)  BP: 114/69 (07-07-22 @ 11:45) (114/69 - 147/69)  RR: 18 (07-07-22 @ 11:45) (18 - 18)  SpO2: 98% (07-07-22 @ 11:45) (98% - 100%)  Telemetry:   CAPILLARY BLOOD GLUCOSE        I&O's Summary    06 Jul 2022 07:01  -  07 Jul 2022 07:00  --------------------------------------------------------  IN: 120 mL / OUT: 1200 mL / NET: -1080 mL        PHYSICAL EXAM:  GENERAL: NAD, well-developed  HEAD:  Atraumatic, Normocephalic  EYES: EOMI, PERRLA, conjunctiva and sclera clear  NECK: Supple, No JVD  CHEST/LUNG: Clear to auscultation bilaterally; No wheeze  HEART: Regular rate and rhythm; No murmurs, rubs, or gallops  ABDOMEN: Soft, Nontender, Nondistended; Bowel sounds present  EXTREMITIES:  2+ Peripheral Pulses, No clubbing, cyanosis, or edema  PSYCH: AAOx3  NEUROLOGY: non-focal  SKIN: No rashes or lesions    LABS:                        9.3    6.44  )-----------( 258      ( 07 Jul 2022 07:13 )             29.1     07-07    131<L>  |  98  |  18  ----------------------------<  113<H>  3.9   |  25  |  0.65    Ca    9.4      07 Jul 2022 07:01                RADIOLOGY & ADDITIONAL TESTS:    Imaging Personally Reviewed:    Consultant(s) Notes Reviewed:      Care Discussed with Consultants/Other Providers:

## 2022-07-07 NOTE — PROGRESS NOTE ADULT - SUBJECTIVE AND OBJECTIVE BOX
[facility]      Seen earlier today, and examined.  - Today, patient is without complaints.           *****MEDICATIONS: Current medication reviewed and documented.    MEDICATIONS  (STANDING):  cyanocobalamin 1000 MICROGram(s) Oral daily  enoxaparin Injectable 40 milliGRAM(s) SubCutaneous every 24 hours  FLUoxetine 60 milliGRAM(s) Oral daily  gabapentin 100 milliGRAM(s) Oral two times a day  hemorrhoidal Ointment      pantoprazole    Tablet 40 milliGRAM(s) Oral before breakfast  predniSONE   Tablet 50 milliGRAM(s) Oral daily  senna 2 Tablet(s) Oral at bedtime    MEDICATIONS  (PRN):  polyethylene glycol 3350 17 Gram(s) Oral daily PRN Constipation  QUEtiapine 25 milliGRAM(s) Oral every 6 hours PRN agitation/happucinations          ***** VITAL SIGNS:  T(F): 97.9 (22 @ 08:15), Max: 97.9 (22 @ 08:15)  HR: 55 (22 @ 08:15) (55 - 56)  BP: 132/66 (22 @ 08:15) (113/65 - 132/66)  RR: 18 (22 @ 08:15) (18 - 18)  SpO2: 98% (22 @ 08:15) (97% - 98%)  Wt(kg): --  ,   I&O's Summary    2022 07:01  -  2022 07:00  --------------------------------------------------------  IN: 360 mL / OUT: 750 mL / NET: -390 mL             *****PHYSICAL EXAM:   alert oriented x 2 attention comprehension are fair.  Able to name, repeat.   EOmi fundi not visualized   no nystagmus VFF to confrontation  Tongue is midline  Palate elevates symmetrically   Moving all 4 ext spontaneously no drift appreciated    Gait not assessed.            *****LAB AND IMAGIN.3    6.44  )-----------( 258      ( 2022 07:13 )             29.1               07-08    134<L>  |  101  |  28<H>  ----------------------------<  105<H>  4.1   |  24  |  0.96    Ca    9.5      2022 07:13                           [All pertinent recent Imaging/Reports reviewed]           *****A S S E S S M E N T   A N D   P L A N :   Excerpt from H&P,"   87F c hx remote breast ca, MAC untreated, MVP, asthma, mod AS, paroxysmal tachycardia of unknown rhythm, orthostatic hypotension () c/b syncope, anxiety, depression, recent covid (May '22), recent accidental valium toxicity (d/c'd from [facility] 2 days ago), pw confusion, syncope, hypotension.  pt also reports hx of fibromyalgia which was never treated.   pt has chronic hx of generalized pain, discoloration of her feet.   She reports also a remote hx of hepatitis B   pt also with hx of uveitis, spinal stenosis and chemotherapy for breast ca        Problem/Recommendations 1:  generalized pain of unclear etiology  likely consistent with pmr   currently on empiric eneida      Problem/Recommendations 2: generalized numbness  likely multifactorial related to prior hx of chem, spinal stenosis,  hepb related cryoglobulinemia   need emg/ncv outpt  cannot r/o paraneoplastic process given long sn    cryoglobulin level pending   immunofxationn pending   hep b neg        discussed with daughter and pt at bedside at length       Thank you for allowing me to participate in the care of this patient. Will continue to follow patient periodically. Please do not hesitate to call me if you have any  questions or if there has been a change in patients neurological status     ________________  Melissa Pérez MD  [facility] ([facility]  [phone]      33 minutes spent on total encounter; more than 50 % of the visit was  spent counseling about plan of care, compliance to diet/exercise and medication regimen and or  coordinating care by the attending physician.      It is advised that stroke patients follow up with RIZWAN Jorgensen @ [phone] in 1- 2 weeks.   Others please follow up with Dr. Michael Nissenbaum [phone]

## 2022-07-07 NOTE — PROGRESS NOTE ADULT - PROBLEM SELECTOR PLAN 1
- resovled  - ESR is elev, cannot R/O PMR,  ptn feels much better after a single dose of prednisone, denies pain, more alert/awake, able to move her extremities without pain. MS back at baseline, very chatty  -euvolemic, eating well  - urinating well on her own  - vit b12 is on the low side, will start supplementation. TFTs in range.   - will d/c Norvasc 2/2 BP on low side. may need to resume at [facility]

## 2022-07-08 ENCOUNTER — TRANSCRIPTION ENCOUNTER (OUTPATIENT)
Age: 87
End: 2022-07-08

## 2022-07-08 LAB
ANION GAP SERPL CALC-SCNC: 9 MMOL/L — SIGNIFICANT CHANGE UP (ref 5–17)
BUN SERPL-MCNC: 28 MG/DL — HIGH (ref 7–23)
CALCIUM SERPL-MCNC: 9.5 MG/DL — SIGNIFICANT CHANGE UP (ref 8.4–10.5)
CHLORIDE SERPL-SCNC: 101 MMOL/L — SIGNIFICANT CHANGE UP (ref 96–108)
CO2 SERPL-SCNC: 24 MMOL/L — SIGNIFICANT CHANGE UP (ref 22–31)
CREAT SERPL-MCNC: 0.96 MG/DL — SIGNIFICANT CHANGE UP (ref 0.5–1.3)
EGFR: 58 ML/MIN/1.73M2 — LOW
GLUCOSE SERPL-MCNC: 105 MG/DL — HIGH (ref 70–99)
POTASSIUM SERPL-MCNC: 4.1 MMOL/L — SIGNIFICANT CHANGE UP (ref 3.5–5.3)
POTASSIUM SERPL-SCNC: 4.1 MMOL/L — SIGNIFICANT CHANGE UP (ref 3.5–5.3)
SODIUM SERPL-SCNC: 134 MMOL/L — LOW (ref 135–145)

## 2022-07-08 RX ADMIN — PREGABALIN 1000 MICROGRAM(S): 225 CAPSULE ORAL at 13:17

## 2022-07-08 RX ADMIN — GABAPENTIN 100 MILLIGRAM(S): 400 CAPSULE ORAL at 17:52

## 2022-07-08 RX ADMIN — Medication 50 MILLIGRAM(S): at 05:19

## 2022-07-08 RX ADMIN — GABAPENTIN 100 MILLIGRAM(S): 400 CAPSULE ORAL at 05:18

## 2022-07-08 RX ADMIN — PANTOPRAZOLE SODIUM 40 MILLIGRAM(S): 20 TABLET, DELAYED RELEASE ORAL at 05:18

## 2022-07-08 RX ADMIN — ENOXAPARIN SODIUM 40 MILLIGRAM(S): 100 INJECTION SUBCUTANEOUS at 17:52

## 2022-07-08 RX ADMIN — Medication 60 MILLIGRAM(S): at 13:17

## 2022-07-08 RX ADMIN — SENNA PLUS 2 TABLET(S): 8.6 TABLET ORAL at 21:45

## 2022-07-08 NOTE — DISCHARGE NOTE PROVIDER - HOSPITAL COURSE
87F c hx remote breast ca, MAC untreated, MVP, asthma, mod AS, paroxysmal tachycardia of unknown rhythm, orthostatic hypotension (Feb '22) c/b syncope, anxiety, depression, recent covid (May '22), recent accidental valium toxicity (d/c'd from [facility] 2 days ago), pw confusion, syncope, hypotension.     Nutritional Assessment:  · Nutritional Assessment	This patient has been assessed with a concern for Malnutrition and has been determined to have a diagnosis/diagnoses of Severe protein-calorie malnutrition.    This patient is being managed with:   Diet Regular-  Supplement Feeding Modality:  Oral  Ensure Enlive Cans or Servings Per Day:  1       Frequency:  Two Times a day  Entered: Jul  3 2022  1:36AM     Problem/Plan - 1:  ·  Problem: Acute metabolic encephalopathy.   ·  Plan: - resovled  - ESR is elev, cannot R/O PMR,  ptn feels much better after a single dose of prednisone, denies pain, more alert/awake, able to move her extremities without pain. MS back at baseline, very chatty  -euvolemic, eating well  - urinating well on her own  - vit b12 is on the low side, will start supplementation. TFTs in range.   - will d/c Norvasc 2/2 BP on low side. may need to resume at [facility].     Problem/Plan - 2:  ·  Problem: Syncope.   ·  Plan: -2/2 volume depeltion.     Problem/Plan - 3:  ·  Problem: Hypotension.   ·  Plan: - 2/2 volume depletion.     Problem/Plan - 4:  ·  Problem: Failure to thrive in adult.   ·  Plan: - resolved. doing well on prednisone for PMR  - worse after covid in May 2022  - ensure supplementation  - PT/OT  - daughters agree on DNR/DNI, cannot decide on artificial nutrition for now, want acute treatment as necessary.     Problem/Plan - 5:  ·  Problem: Major depression.   ·  Plan: - chronic, cont Prozac 60 mg daily  - pt also recently accidental valium toxicity. ptn' s MS is at baseline, she is doing well after treatment initiated for PMR with Prednisone. no need for psych consult.

## 2022-07-08 NOTE — PROGRESS NOTE ADULT - TIME BILLING
agree with above  cv stable  echo with mild AS and normal LVEF  trend orthostatics  med f/u  dvt ppx
agree with above  cv stable  echo  check orthostatics
agree with above  cv stable  echo with mild AS and normal LVEF  check orthostatics
agree with above  cv stable  echo with mild AS and normal LVEF  trend orthostatics  med f/u  dvt ppx

## 2022-07-08 NOTE — DISCHARGE NOTE PROVIDER - NSDCCPCAREPLAN_GEN_ALL_CORE_FT
PRINCIPAL DISCHARGE DIAGNOSIS  Diagnosis: AMS (altered mental status)  Assessment and Plan of Treatment: Your workup was negative.  Seek urgent medical care for confusion.      SECONDARY DISCHARGE DIAGNOSES  Diagnosis: Syncope  Assessment and Plan of Treatment: No cause was found.  Seek urgent care for dizziness, palpitations or repeat syncope.

## 2022-07-08 NOTE — PROGRESS NOTE ADULT - SUBJECTIVE AND OBJECTIVE BOX
Patient is a 87y old  Female who presents with a chief complaint of weakness, syncope (07 Jul 2022 15:36)      SUBJECTIVE / OVERNIGHT EVENTS: cleared for DC    MEDICATIONS  (STANDING):  amLODIPine   Tablet 5 milliGRAM(s) Oral once  cyanocobalamin 1000 MICROGram(s) Oral daily  enoxaparin Injectable 40 milliGRAM(s) SubCutaneous every 24 hours  FLUoxetine 60 milliGRAM(s) Oral daily  gabapentin 100 milliGRAM(s) Oral two times a day  hemorrhoidal Ointment      pantoprazole    Tablet 40 milliGRAM(s) Oral before breakfast  predniSONE   Tablet 50 milliGRAM(s) Oral daily  senna 2 Tablet(s) Oral at bedtime    MEDICATIONS  (PRN):  polyethylene glycol 3350 17 Gram(s) Oral daily PRN Constipation  QUEtiapine 25 milliGRAM(s) Oral every 6 hours PRN agitation/happucinations      Vital Signs Last 24 Hrs  T(F): 97.4 (07-08-22 @ 01:55), Max: 97.6 (07-07-22 @ 21:05)  HR: 55 (07-08-22 @ 01:55) (55 - 56)  BP: 113/65 (07-08-22 @ 01:55) (113/65 - 118/71)  RR: 18 (07-08-22 @ 01:55) (18 - 18)  SpO2: 97% (07-08-22 @ 01:55) (97% - 98%)  Telemetry:   CAPILLARY BLOOD GLUCOSE        I&O's Summary    07 Jul 2022 07:01  -  08 Jul 2022 07:00  --------------------------------------------------------  IN: 360 mL / OUT: 750 mL / NET: -390 mL        PHYSICAL EXAM:  GENERAL: NAD, well-developed  HEAD:  Atraumatic, Normocephalic  EYES: EOMI, PERRLA, conjunctiva and sclera clear  NECK: Supple, No JVD  CHEST/LUNG: Clear to auscultation bilaterally; No wheeze  HEART: Regular rate and rhythm; No murmurs, rubs, or gallops  ABDOMEN: Soft, Nontender, Nondistended; Bowel sounds present  EXTREMITIES:  2+ Peripheral Pulses, No clubbing, cyanosis, or edema  PSYCH: AAOx3  NEUROLOGY: non-focal  SKIN: No rashes or lesions    LABS:                        9.3    6.44  )-----------( 258      ( 07 Jul 2022 07:13 )             29.1     07-08    134<L>  |  101  |  28<H>  ----------------------------<  105<H>  4.1   |  24  |  0.96    Ca    9.5      08 Jul 2022 07:13                RADIOLOGY & ADDITIONAL TESTS:    Imaging Personally Reviewed:    Consultant(s) Notes Reviewed:      Care Discussed with Consultants/Other Providers:

## 2022-07-08 NOTE — PROGRESS NOTE ADULT - SUBJECTIVE AND OBJECTIVE BOX
[facility]      Seen earlier today, and examined.  - Today, patient is without complaints.  as per daughter she was doing well, just ate lunch and dozed off          *****MEDICATIONS: Current medication reviewed and documented.    MEDICATIONS  (STANDING):  cyanocobalamin 1000 MICROGram(s) Oral daily  enoxaparin Injectable 40 milliGRAM(s) SubCutaneous every 24 hours  FLUoxetine 60 milliGRAM(s) Oral daily  gabapentin 100 milliGRAM(s) Oral two times a day  hemorrhoidal Ointment      pantoprazole    Tablet 40 milliGRAM(s) Oral before breakfast  predniSONE   Tablet 50 milliGRAM(s) Oral daily  senna 2 Tablet(s) Oral at bedtime    MEDICATIONS  (PRN):  polyethylene glycol 3350 17 Gram(s) Oral daily PRN Constipation  QUEtiapine 25 milliGRAM(s) Oral every 6 hours PRN agitation/happucinations          ***** VITAL SIGNS:  T(F): 97.9 (22 @ 08:15), Max: 97.9 (22 @ 08:15)  HR: 55 (22 @ 08:15) (55 - 56)  BP: 132/66 (22 @ 08:15) (113/65 - 132/66)  RR: 18 (22 @ 08:15) (18 - 18)  SpO2: 98% (22 @ 08:15) (97% - 98%)  Wt(kg): --  ,   I&O's Summary    2022 07:01  -  2022 07:00  --------------------------------------------------------  IN: 360 mL / OUT: 750 mL / NET: -390 mL             *****PHYSICAL EXAM: sleeping            *****LAB AND IMAGIN.3    6.44  )-----------( 258      ( 2022 07:13 )             29.1               07-08    134<L>  |  101  |  28<H>  ----------------------------<  105<H>  4.1   |  24  |  0.96    Ca    9.5      2022 07:13                           [All pertinent recent Imaging/Reports reviewed]           *****A S S E S S M E N T   A N D   P L A N :       Excerpt from H&P,"   87F c hx remote breast ca, MAC untreated, MVP, asthma, mod AS, paroxysmal tachycardia of unknown rhythm, orthostatic hypotension () c/b syncope, anxiety, depression, recent covid (May '22), recent accidental valium toxicity (d/c'd from [facility] 2 days ago), pw confusion, syncope, hypotension.  pt also reports hx of fibromyalgia which was never treated.   pt has chronic hx of generalized pain, discoloration of her feet.   She reports also a remote hx of hepatitis B   pt also with hx of uveitis, spinal stenosis and chemotherapy for breast ca        Problem/Recommendations 1:  generalized pain of unclear etiology  likely consistent with pmr   currently on empiric eneida      Problem/Recommendations 2: generalized numbness  likely multifactorial related to prior hx of chem, spinal stenosis,  hepb related cryoglobulinemia   need emg/ncv outpt  cannot r/o paraneoplastic process given long sn    cryoglobulin level pending   immunofxationn pending   hep b neg        discussed with  daughter and pt at bedside at length       Thank you for allowing me to participate in the care of this patient. Will continue to follow patient periodically. Please do not hesitate to call me if you have any  questions or if there has been a change in patients neurological status     ________________  Melissa Pérez MD  [facility] (PN)[facility]  [phone]      33 minutes spent on total encounter; more than 50 % of the visit was  spent counseling about plan of care, compliance to diet/exercise and medication regimen and or  coordinating care by the attending physician.      It is advised that stroke patients follow up with RIZWAN Jorgensen @ [phone] in 1- 2 weeks.   Others please follow up with Dr. Michael Nissenbaum [phone]

## 2022-07-08 NOTE — DISCHARGE NOTE PROVIDER - CARE PROVIDER_API CALL
Jennifer Roberts)  Internal Medicine  [address] [place] [address], [address]  [place] [address]  Phone: [phone]  Fax: [phone]  Follow Up Time: 1 week    Kenneth Lee)  Cardiovascular Disease; Interventional Cardiology; Nuclear Cardiology  [address] [facility] [address]  [place] [address]  Phone: [phone]  Fax: [phone]  Follow Up Time: 1 week    Melissa Pérez  NEUROLOGY  [address] [place] [address]  Phone: [phone]  Fax: [phone]  Follow Up Time: 1 week

## 2022-07-08 NOTE — DISCHARGE NOTE PROVIDER - NSDCMRMEDTOKEN_GEN_ALL_CORE_FT
acetaminophen 325 mg oral tablet: 2 tab(s) orally every 6 hours, As needed, Temp greater or equal to 38C (100.4F), Mild Pain (1 - 3)  Durezol 0.05% ophthalmic emulsion: 1 drop(s) in the left eye 3 times a day  FLUoxetine 20 mg oral capsule: 3 cap(s) orally once a day  Metoprolol Succinate ER 25 mg oral tablet, extended release: 0.5 tab(s) orally once a day  Nasacort AQ 55 mcg/inh nasal spray: 1   2 times a day   Protonix 40 mg oral delayed release tablet: 1 tab(s) orally once a day  Vitamin D3 25 mcg (1000 intl units) oral tablet: 1 tab(s) orally 2 times a day   cyanocobalamin 1000 mcg oral tablet: 1 tab(s) orally once a day  Durezol 0.05% ophthalmic emulsion: 1 drop(s) in the left eye 3 times a day  FLUoxetine 20 mg oral capsule: 3 cap(s) orally once a day  gabapentin 100 mg oral capsule: 1 cap(s) orally 2 times a day  Nasacort AQ 55 mcg/inh nasal spray: 1   2 times a day   pantoprazole 40 mg oral delayed release tablet: 1 tab(s) orally once a day (before a meal)  polyethylene glycol 3350 oral powder for reconstitution: 17 gram(s) orally once a day, As needed, Constipation  predniSONE 50 mg oral tablet: 1 tab(s) orally once a day  Preparation H Cooling 0.25% rectal gel: 1 application rectal 4 times a day  senna leaf extract oral tablet: 2 tab(s) orally once a day (at bedtime)  Vitamin D3 25 mcg (1000 intl units) oral tablet: 1 tab(s) orally 2 times a day

## 2022-07-08 NOTE — DISCHARGE NOTE PROVIDER - PROVIDER TOKENS
PROVIDER:[TOKEN:[[id number]],FOLLOWUP:[1 week]],PROVIDER:[TOKEN:[[id number]],FOLLOWUP:[1 week]],PROVIDER:[TOKEN:[[id number]],FOLLOWUP:[1 week]]

## 2022-07-08 NOTE — PROGRESS NOTE ADULT - SUBJECTIVE AND OBJECTIVE BOX
CARDIOLOGY FOLLOW UP - Dr. Lee  DATE OF SERVICE: 7/8/22     CC no cp or sob       REVIEW OF SYSTEMS:  CONSTITUTIONAL: No fever, weight loss, or fatigue  RESPIRATORY: No cough, wheezing, chills or hemoptysis; No Shortness of Breath  CARDIOVASCULAR: No chest pain, palpitations, passing out, dizziness, or leg swelling  GASTROINTESTINAL: No abdominal or epigastric pain. No nausea, vomiting, or hematemesis; No diarrhea or constipation. No melena or hematochezia.      PHYSICAL EXAM:  T(C): 36.6 (07-08-22 @ 08:15), Max: 36.6 (07-08-22 @ 08:15)  HR: 55 (07-08-22 @ 08:15) (55 - 56)  BP: 132/66 (07-08-22 @ 08:15) (113/65 - 132/66)  RR: 18 (07-08-22 @ 08:15) (18 - 18)  SpO2: 98% (07-08-22 @ 08:15) (97% - 98%)  Wt(kg): --  I&O's Summary    07 Jul 2022 07:01  -  08 Jul 2022 07:00  --------------------------------------------------------  IN: 360 mL / OUT: 750 mL / NET: -390 mL        Appearance: Normal	  Cardiovascular: Normal S1 S2,RRR  Respiratory: Lungs clear to auscultation	  Gastrointestinal:  Soft, Non-tender, + BS	  Extremities: Normal range of motion, No clubbing, cyanosis or edema      Home Medications:  acetaminophen 325 mg oral tablet: 2 tab(s) orally every 6 hours, As needed, Temp greater or equal to 38C (100.4F), Mild Pain (1 - 3) (03 Jul 2022 00:32)  Durezol 0.05% ophthalmic emulsion: 1 drop(s) in the left eye 3 times a day (03 Jul 2022 00:32)  FLUoxetine 20 mg oral capsule: 3 cap(s) orally once a day (03 Jul 2022 00:32)  Metoprolol Succinate ER 25 mg oral tablet, extended release: 0.5 tab(s) orally once a day (03 Jul 2022 00:32)  Nasacort AQ 55 mcg/inh nasal spray: 1   2 times a day  (03 Jul 2022 00:32)  Protonix 40 mg oral delayed release tablet: 1 tab(s) orally once a day (03 Jul 2022 00:32)  Vitamin D3 25 mcg (1000 intl units) oral tablet: 1 tab(s) orally 2 times a day (03 Jul 2022 00:32)      MEDICATIONS  (STANDING):  cyanocobalamin 1000 MICROGram(s) Oral daily  enoxaparin Injectable 40 milliGRAM(s) SubCutaneous every 24 hours  FLUoxetine 60 milliGRAM(s) Oral daily  gabapentin 100 milliGRAM(s) Oral two times a day  hemorrhoidal Ointment      pantoprazole    Tablet 40 milliGRAM(s) Oral before breakfast  predniSONE   Tablet 50 milliGRAM(s) Oral daily  senna 2 Tablet(s) Oral at bedtime      TELEMETRY: 	    ECG:  	  RADIOLOGY:   DIAGNOSTIC TESTING:  [ ] Echocardiogram:  [ ]  Catheterization:  [ ] Stress Test:    OTHER: 	    LABS:	 	    Troponin T, High Sensitivity Result: 18 ng/L [0 - 51] (07-03 @ 06:39)                          9.3    6.44  )-----------( 258      ( 07 Jul 2022 07:13 )             29.1     07-08    134<L>  |  101  |  28<H>  ----------------------------<  105<H>  4.1   |  24  |  0.96    Ca    9.5      08 Jul 2022 07:13

## 2022-07-09 ENCOUNTER — TRANSCRIPTION ENCOUNTER (OUTPATIENT)
Age: 87
End: 2022-07-09

## 2022-07-09 VITALS
HEART RATE: 60 BPM | SYSTOLIC BLOOD PRESSURE: 129 MMHG | OXYGEN SATURATION: 95 % | DIASTOLIC BLOOD PRESSURE: 64 MMHG | RESPIRATION RATE: 18 BRPM | TEMPERATURE: 98 F

## 2022-07-09 PROCEDURE — 81001 URINALYSIS AUTO W/SCOPE: CPT

## 2022-07-09 PROCEDURE — 85025 COMPLETE CBC W/AUTO DIFF WBC: CPT

## 2022-07-09 PROCEDURE — 99285 EMERGENCY DEPT VISIT HI MDM: CPT

## 2022-07-09 PROCEDURE — 87086 URINE CULTURE/COLONY COUNT: CPT

## 2022-07-09 PROCEDURE — 71045 X-RAY EXAM CHEST 1 VIEW: CPT

## 2022-07-09 PROCEDURE — U0003: CPT

## 2022-07-09 PROCEDURE — 83735 ASSAY OF MAGNESIUM: CPT

## 2022-07-09 PROCEDURE — 80053 COMPREHEN METABOLIC PANEL: CPT

## 2022-07-09 PROCEDURE — 85027 COMPLETE CBC AUTOMATED: CPT

## 2022-07-09 PROCEDURE — 85652 RBC SED RATE AUTOMATED: CPT

## 2022-07-09 PROCEDURE — 84436 ASSAY OF TOTAL THYROXINE: CPT

## 2022-07-09 PROCEDURE — 72100 X-RAY EXAM L-S SPINE 2/3 VWS: CPT

## 2022-07-09 PROCEDURE — 82533 TOTAL CORTISOL: CPT

## 2022-07-09 PROCEDURE — 73630 X-RAY EXAM OF FOOT: CPT

## 2022-07-09 PROCEDURE — 80048 BASIC METABOLIC PNL TOTAL CA: CPT

## 2022-07-09 PROCEDURE — 82595 ASSAY OF CRYOGLOBULIN: CPT

## 2022-07-09 PROCEDURE — 93306 TTE W/DOPPLER COMPLETE: CPT

## 2022-07-09 PROCEDURE — 84100 ASSAY OF PHOSPHORUS: CPT

## 2022-07-09 PROCEDURE — 84443 ASSAY THYROID STIM HORMONE: CPT

## 2022-07-09 PROCEDURE — U0005: CPT

## 2022-07-09 PROCEDURE — 84484 ASSAY OF TROPONIN QUANT: CPT

## 2022-07-09 PROCEDURE — 97530 THERAPEUTIC ACTIVITIES: CPT

## 2022-07-09 PROCEDURE — 80074 ACUTE HEPATITIS PANEL: CPT

## 2022-07-09 PROCEDURE — 82607 VITAMIN B-12: CPT

## 2022-07-09 PROCEDURE — 84480 ASSAY TRIIODOTHYRONINE (T3): CPT

## 2022-07-09 PROCEDURE — 93880 EXTRACRANIAL BILAT STUDY: CPT

## 2022-07-09 PROCEDURE — 97162 PT EVAL MOD COMPLEX 30 MIN: CPT

## 2022-07-09 PROCEDURE — 36415 COLL VENOUS BLD VENIPUNCTURE: CPT

## 2022-07-09 PROCEDURE — 82746 ASSAY OF FOLIC ACID SERUM: CPT

## 2022-07-09 PROCEDURE — 70450 CT HEAD/BRAIN W/O DYE: CPT | Mod: MA

## 2022-07-09 RX ORDER — SENNA PLUS 8.6 MG/1
2 TABLET ORAL
Qty: 0 | Refills: 0 | DISCHARGE
Start: 2022-07-09

## 2022-07-09 RX ORDER — PANTOPRAZOLE SODIUM 20 MG/1
1 TABLET, DELAYED RELEASE ORAL
Qty: 0 | Refills: 0 | DISCHARGE

## 2022-07-09 RX ORDER — METOPROLOL TARTRATE 50 MG
0.5 TABLET ORAL
Qty: 0 | Refills: 0 | DISCHARGE

## 2022-07-09 RX ORDER — PANTOPRAZOLE SODIUM 20 MG/1
1 TABLET, DELAYED RELEASE ORAL
Qty: 0 | Refills: 0 | DISCHARGE
Start: 2022-07-09

## 2022-07-09 RX ORDER — PREGABALIN 225 MG/1
1 CAPSULE ORAL
Qty: 0 | Refills: 0 | DISCHARGE
Start: 2022-07-09

## 2022-07-09 RX ORDER — PHENYLEPHRINE-SHARK LIVER OIL-MINERAL OIL-PETROLATUM RECTAL OINTMENT
1 OINTMENT (GRAM) RECTAL
Qty: 1 | Refills: 0
Start: 2022-07-09 | End: 2022-07-15

## 2022-07-09 RX ORDER — GABAPENTIN 400 MG/1
1 CAPSULE ORAL
Qty: 0 | Refills: 0 | DISCHARGE
Start: 2022-07-09

## 2022-07-09 RX ORDER — POLYETHYLENE GLYCOL 3350 17 G/17G
17 POWDER, FOR SOLUTION ORAL
Qty: 0 | Refills: 0 | DISCHARGE
Start: 2022-07-09

## 2022-07-09 RX ADMIN — GABAPENTIN 100 MILLIGRAM(S): 400 CAPSULE ORAL at 06:11

## 2022-07-09 RX ADMIN — PANTOPRAZOLE SODIUM 40 MILLIGRAM(S): 20 TABLET, DELAYED RELEASE ORAL at 06:11

## 2022-07-09 RX ADMIN — Medication 50 MILLIGRAM(S): at 06:11

## 2022-07-09 NOTE — PROGRESS NOTE ADULT - PROBLEM SELECTOR PLAN 1
- resovled  - ESR is elev, cannot R/O PMR,  ptn feels much better after a single dose of prednisone, denies pain, more alert/awake, able to move her extremities without pain. MS back at baseline, very chatty  -euvolemic, eating well  - urinating well on her own  - vit b12 is on the low side, will start supplementation. TFTs in range.   - will d/c Norvasc 2/2 BP on low side. may need to resume at [facility] - resovled  - ESR is elev, cannot R/O PMR,  ptn feels much better after a single dose of prednisone, denies pain, more alert/awake, able to move her extremities without pain. MS back at baseline, very chatty  - will start tapering by 5 mg every 7 days and watch for clinical response, if symptoms start to resume may need to taper slower. may need to be on chronic steroids in the end  -euvolemic, eating well  - urinating well on her own  - vit b12 is on the low side, will start supplementation. TFTs in range.   - will d/c Norvasc 2/2 BP on low side. may need to resume at OTF

## 2022-07-09 NOTE — DISCHARGE NOTE NURSING/CASE MANAGEMENT/SOCIAL WORK - PATIENT PORTAL LINK FT
You can access the FollowMyHealth Patient Portal offered by [facility] by registering at the following website: [email or web address]. By joining [employer]’s FollowMyHealth portal, you will also be able to view your health information using other applications (apps) compatible with our system.

## 2022-07-09 NOTE — PROGRESS NOTE ADULT - PROBLEM SELECTOR PLAN 5
- cont Prozac 60 mg daily  - pt also recently accidental valium toxicity.   - once metabolic encephalopathy has resolved will call psych consult
- chronic, cont Prozac 60 mg daily  - pt also recently accidental valium toxicity. ptn' s MS is at baseline, she is doing well after treatment initiated for PMR with Prednisone. no need for psych consult
- cont Prozac 60 mg daily  - pt also recently accidental valium toxicity.   - once metabolic encephalopathy has resolved will call psych consult
- chronic, cont Prozac 60 mg daily  - pt also recently accidental valium toxicity. ptn' s MS is at baseline, she is doing well after treatment initiated for PMR with Prednisone. no need for psych consult
- chronic, cont Prozac 60 mg daily  - pt also recently accidental valium toxicity. ptn' s MS is at baseline, she is doing well after treatment initiated for PMR with Prednisone. no need for psych consult
- cont Prozac 60 mg daily  - pt also recently accidental valium toxicity.   - once metabolic encephalopathy has resolved will call psych consult
- cont Prozac 60 mg daily  - pt also recently accidental valium toxicity.   - once metabolic encephalopathy has resolved will call psych consult

## 2022-07-09 NOTE — PROGRESS NOTE ADULT - PROBLEM SELECTOR PROBLEM 1
Acute metabolic encephalopathy

## 2022-07-09 NOTE — PROGRESS NOTE ADULT - PROBLEM SELECTOR PLAN 3
- 2/2 volume depletion
- unclear etiology  - check tsh, cortisol  - cont IVF
- unclear etiology  - check tsh, cortisol  - cont IVF
- 2/2 volume depletion
- 2/2 volume depletion
- unclear etiology  - check tsh, cortisol  - cont IVF
- unclear etiology  - check tsh, cortisol  - s/p IVF

## 2022-07-09 NOTE — PROGRESS NOTE ADULT - PROVIDER SPECIALTY LIST ADULT
Cardiology
Internal Medicine
Internal Medicine
Neurology
Cardiology
Cardiology
Infectious Disease
Cardiology
Cardiology
Internal Medicine
Neurology
Internal Medicine

## 2022-07-09 NOTE — PROGRESS NOTE ADULT - ASSESSMENT
ECHO 2/19/22: EF 67%; mild mr, mod as, nl LV sys fx, mod concentric lVH,   ECHO 7/5/22 ef 68%; mild as, nl LV sys fx  Mild diastolic dysfunction (Stage I).    a/p     87F c hx remote breast ca, MAC untreated, MVP, asthma, mod AS, paroxysmal tachycardia of unknown rhythm, orthostatic hypotension (Feb '22) c/b syncope, anxiety, depression, recent covid (May '22), recent accidental valium toxicity (d/c'd from [facility] 2 days ago), pw confusion, syncope, hypotension.    #Syncope-Orthostatic Hypotension  -echo mild as, nl LV sys fx ,  Basal septal hypertrophy.  -orthostatics neg  -carotid doppler No significant hemodynamic stenosis of either internal  carotid artery. Atherosclerosis of the carotid arteries. Right ECA stenosis.  -neuro f/u    # Moderate AS  -echo with mild as    #A tach  -cv stable no events     cont current tx    med f/u  dvt ppx    35 minutes spent on total encounter; more than 50% of the visit was spent counseling and/or coordinating care by the attending physician.  
ECHO 2/19/22: EF 67%; mild mr, mod as, nl LV sys fx, mod concentric lVH,   a/p     87F c hx remote breast ca, MAC untreated, MVP, asthma, mod AS, paroxysmal tachycardia of unknown rhythm, orthostatic hypotension (Feb '22) c/b syncope, anxiety, depression, recent covid (May '22), recent accidental valium toxicity (d/c'd from [facility] 2 days ago), pw confusion, syncope, hypotension.    #Syncope-Orthostatic Hypotension  -check echo  -tele  -check orthostatics  -carotid doppler No significant hemodynamic stenosis ofeither internal  carotid artery. Atherosclerosis of the carotid arteries. Right ECA stenosis.  -neuro work up     # Moderate AS  -check echo    #Atach  -tele    #depression?  -psych eval- work up per med     d/w daughter at bedside
Patient is a 87 year old female with PMH of remote breast ca, MAC untreated, MVP, asthma, mod AS, paroxysmal tachycardia of unknown rhythm, orthostatic hypotension (Feb '22) c/b syncope, anxiety, depression, recent COVID (May '22), recent accidental valium toxicity (d/c'd from [facility] 2 days ago) who presented with confusion, syncope, hypotension.  ID consulted to evaluate b/l LE for possible infection.     Foot pain and erythema, possible PMR, no sign of infection   - 7/4 noted with erythema on bilateral feet with swelling and TTP   - swelling and pain have since resolved, slight erythema on dorsal feet with no sign of acute infection   - imaging/xrays reviewed, no acute infectious pathology    - ESR elevated but stable since 2/2022   - started on prednisone for possible PMR   - continue off antibiotics     Syncope, orthostatic hypotension  Moderate AS   - management per Cardiology and primary teams     D/w daughter at bedside.   ID will sign off at this time but remains available for any further questions/concerns.    Kaushal Palomo M.D.  [facility], Division of Infectious Diseases  [phone]  After 5pm on weekdays and all day on weekends - please call [phone]
ECHO 2/19/22: EF 67%; mild mr, mod as, nl LV sys fx, mod concentric lVH,   ECHO 7/5/22 ef 68%; mild as, nl LV sys fx  Mild diastolic dysfunction (Stage I).    a/p     87F c hx remote breast ca, MAC untreated, MVP, asthma, mod AS, paroxysmal tachycardia of unknown rhythm, orthostatic hypotension (Feb '22) c/b syncope, anxiety, depression, recent covid (May '22), recent accidental valium toxicity (d/c'd from [facility] 2 days ago), pw confusion, syncope, hypotension.    #Syncope-Orthostatic Hypotension  -echo mild as, nl LV sys fx ,  Basal septal hypertrophy.  -tele  -orthostatics still PENDING   -carotid doppler No significant hemodynamic stenosis of either internal  carotid artery. Atherosclerosis of the carotid arteries. Right ECA stenosis.  -neuro work up   -labs noted today-- repeat BMP PENDING     # Moderate AS  -echo with mild as    #Atach  -tele    #depression?  -psych eval- work up per med     d/w daughter at bedside
ECHO 2/19/22: EF 67%; mild mr, mod as, nl LV sys fx, mod concentric lVH,   ECHO 7/5/22 ef 68%; mild as, nl LV sys fx  Mild diastolic dysfunction (Stage I).    a/p     87F c hx remote breast ca, MAC untreated, MVP, asthma, mod AS, paroxysmal tachycardia of unknown rhythm, orthostatic hypotension (Feb '22) c/b syncope, anxiety, depression, recent covid (May '22), recent accidental valium toxicity (d/c'd from [facility] 2 days ago), pw confusion, syncope, hypotension.    #Syncope-Orthostatic Hypotension  -echo mild as, nl LV sys fx ,  Basal septal hypertrophy.  -tele  -orthostatics neg  -carotid doppler No significant hemodynamic stenosis of either internal  carotid artery. Atherosclerosis of the carotid arteries. Right ECA stenosis.  -neuro work up     # Moderate AS  -echo with mild as    #Atach  -cv stable no events     #depression?  -psych eval- work up per med     d/w daughter at bedside
ECHO 2/19/22: EF 67%; mild mr, mod as, nl LV sys fx, mod concentric lVH,   ECHO 7/5/22 ef 68%; mild as, nl LV sys fx  Mild diastolic dysfunction (Stage I).    a/p     87F c hx remote breast ca, MAC untreated, MVP, asthma, mod AS, paroxysmal tachycardia of unknown rhythm, orthostatic hypotension (Feb '22) c/b syncope, anxiety, depression, recent covid (May '22), recent accidental valium toxicity (d/c'd from [facility] 2 days ago), pw confusion, syncope, hypotension.    #Syncope-Orthostatic Hypotension  -echo mild as, nl LV sys fx ,  Basal septal hypertrophy.  -tele  -orthostatics neg  -carotid doppler No significant hemodynamic stenosis of either internal  carotid artery. Atherosclerosis of the carotid arteries. Right ECA stenosis.  -neuro work up     # Moderate AS  -echo with mild as    #Atach  -cv stable no events     #depression?  -psych eval- work up per med     d/w daughter at bedside
87F c hx remote breast ca, MAC untreated, MVP, asthma, mod AS, paroxysmal tachycardia of unknown rhythm, orthostatic hypotension (Feb '22) c/b syncope, anxiety, depression, recent covid (May '22), recent accidental valium toxicity (d/c'd from [facility] 2 days ago), pw confusion, syncope, hypotension.
87F c hx remote breast ca, MAC untreated, MVP, asthma, mod AS, paroxysmal tachycardia of unknown rhythm, orthostatic hypotension (Feb '22) c/b syncope, anxiety, depression, recent covid (May '22), recent accidental valium toxicity (d/c'd from [facility] 2 days ago), pw confusion, syncope, hypotension.
87F c hx remote breast ca, MAC untreated, MVP, asthma, mod AS, paroxysmal tachycardia of unknown rhythm, orthostatic hypotension (Feb '22) c/b syncope, anxiety, depression, recent covid (May '22), recent accidental valium toxicity (d/c'd from [facility] 2 days ago), pw confusion, syncope, hypotension.
87F c hx remote breast ca, MAC untreated, MVP, asthma, mod AS, paroxysmal tachycardia of unknown rhythm, orthostatic hypotension (Feb '22) c/b syncope, anxiety, depression, recent covid (May '22), recent accidental valium toxicity (d/c'd from [facility] 2 days ago), pw confusion, syncope, hypotension.
87F c hx remote breast ca, MAC untreated, MVP, asthma, mod AS, paroxysmal tachycardia of unknown rhythm, orthostatic hypotension (Feb '22) c/b syncope, anxiety, depression, recent covid (May '22), recent accidental valium toxicity (d/c'd from [facility] 2 days ago), pw confusion, syncope, hypotension.
87F c hx remote breast ca, MAC untreated, MVP, asthma, mod AS, paroxysmal tachycardia of unknown rhythm, orthostatic hypotension (Feb '22) c/b syncope, anxiety, depression, recent covid (May '22), recent accidental valium toxicity (d/c'd from [facility] 2 days ago), pw confusion, syncope, hypotension.
87F c hx remote breast ca, MAC untreated, MVP, asthma, mod AS, paroxysmal tachycardia of unknown rhythm, orthostatic hypotension (Feb '22) c/b syncope, anxiety, depression, recent covid (May '22), recent accidental valium toxicity (d/c'd from [facility] 2 days ago), pw confusion, syncope, hypotension.

## 2022-07-09 NOTE — PROGRESS NOTE ADULT - PROBLEM SELECTOR PROBLEM 5
Major depression

## 2022-07-09 NOTE — PROGRESS NOTE ADULT - PROBLEM SELECTOR PLAN 2
- check orthostatics  - check TTE  - holding metoprolol  - BP improved after IVF  - card on board  - fall precaution  - start IVF, strict Is and Os
-2/2 volume depeltion
- check orthostatics  - check TTE  - holding metoprolol  - BP improved after IVF  - card called  - fall precaution
-2/2 volume depeltion
-2/2 volume depeltion

## 2022-07-09 NOTE — PROGRESS NOTE ADULT - REASON FOR ADMISSION
weakness, syncope

## 2022-07-09 NOTE — PROGRESS NOTE ADULT - SUBJECTIVE AND OBJECTIVE BOX
Patient is a 87y old  Female who presents with a chief complaint of weakness, syncope (08 Jul 2022 14:18)      SUBJECTIVE / OVERNIGHT EVENTS: no new events, awaiting OTF placement    MEDICATIONS  (STANDING):  cyanocobalamin 1000 MICROGram(s) Oral daily  enoxaparin Injectable 40 milliGRAM(s) SubCutaneous every 24 hours  FLUoxetine 60 milliGRAM(s) Oral daily  gabapentin 100 milliGRAM(s) Oral two times a day  hemorrhoidal Ointment      pantoprazole    Tablet 40 milliGRAM(s) Oral before breakfast  predniSONE   Tablet 50 milliGRAM(s) Oral daily  senna 2 Tablet(s) Oral at bedtime    MEDICATIONS  (PRN):  polyethylene glycol 3350 17 Gram(s) Oral daily PRN Constipation  QUEtiapine 25 milliGRAM(s) Oral every 6 hours PRN agitation/happucinations      Vital Signs Last 24 Hrs  T(F): 98 (07-09-22 @ 04:48), Max: 98.1 (07-08-22 @ 20:42)  HR: 60 (07-09-22 @ 04:48) (53 - 60)  BP: 129/64 (07-09-22 @ 04:48) (122/66 - 129/64)  RR: 18 (07-09-22 @ 04:48) (18 - 18)  SpO2: 95% (07-09-22 @ 04:48) (95% - 98%)  Telemetry:   CAPILLARY BLOOD GLUCOSE        I&O's Summary    08 Jul 2022 07:01  -  09 Jul 2022 07:00  --------------------------------------------------------  IN: 440 mL / OUT: 200 mL / NET: 240 mL        PHYSICAL EXAM:  GENERAL: NAD, well-developed  HEAD:  Atraumatic, Normocephalic  EYES: EOMI, PERRLA, conjunctiva and sclera clear  NECK: Supple, No JVD  CHEST/LUNG: Clear to auscultation bilaterally; No wheeze  HEART: Regular rate and rhythm; No murmurs, rubs, or gallops  ABDOMEN: Soft, Nontender, Nondistended; Bowel sounds present  EXTREMITIES:  2+ Peripheral Pulses, No clubbing, cyanosis, or edema  PSYCH: AAOx3  NEUROLOGY: non-focal  SKIN: No rashes or lesions    LABS:    07-08    134<L>  |  101  |  28<H>  ----------------------------<  105<H>  4.1   |  24  |  0.96    Ca    9.5      08 Jul 2022 07:13                RADIOLOGY & ADDITIONAL TESTS:    Imaging Personally Reviewed:    Consultant(s) Notes Reviewed:      Care Discussed with Consultants/Other Providers:

## 2022-07-09 NOTE — PROGRESS NOTE ADULT - SUBJECTIVE AND OBJECTIVE BOX
CARDIOLOGY FOLLOW UP NOTE - DR. BRANTLEY    Patient Name: ADA ROLLE  Date of Service: 07-09-22     Patient seen and examined    Subjective:    cv: denies chest pain, dyspnea, palpitations, dizziness  pulmonary: denies cough  GI: denies abdominal pain, nausea, vomiting  vascular/legs: no edema   skin: no rash  ROS: otherwise negative   overnight events:      PHYSICAL EXAM:  T(C): 36.7 (07-09-22 @ 04:48), Max: 36.7 (07-08-22 @ 20:42)  HR: 60 (07-09-22 @ 04:48) (53 - 60)  BP: 129/64 (07-09-22 @ 04:48) (122/66 - 129/64)  RR: 18 (07-09-22 @ 04:48) (18 - 18)  SpO2: 95% (07-09-22 @ 04:48) (95% - 98%)  Wt(kg): --  I&O's Summary    08 Jul 2022 07:01  -  09 Jul 2022 07:00  --------------------------------------------------------  IN: 440 mL / OUT: 200 mL / NET: 240 mL      Daily     Daily     Appearance: Normal	  Cardiovascular: Normal S1 S2,RRR, sm  Respiratory: Lungs clear to auscultation	  Gastrointestinal:  Soft, Non-tender, + BS	  Extremities: Normal range of motion, No clubbing, cyanosis or edema      Home Medications:  cyanocobalamin 1000 mcg oral tablet: 1 tab(s) orally once a day (09 Jul 2022 09:02)  Durezol 0.05% ophthalmic emulsion: 1 drop(s) in the left eye 3 times a day (03 Jul 2022 00:32)  FLUoxetine 20 mg oral capsule: 3 cap(s) orally once a day (09 Jul 2022 09:02)  gabapentin 100 mg oral capsule: 1 cap(s) orally 2 times a day (09 Jul 2022 09:02)  Nasacort AQ 55 mcg/inh nasal spray: 1   2 times a day  (03 Jul 2022 00:32)  pantoprazole 40 mg oral delayed release tablet: 1 tab(s) orally once a day (before a meal) (09 Jul 2022 09:02)  polyethylene glycol 3350 oral powder for reconstitution: 17 gram(s) orally once a day, As needed, Constipation (09 Jul 2022 09:02)  predniSONE 50 mg oral tablet: 1 tab(s) orally once a day (09 Jul 2022 09:02)  senna leaf extract oral tablet: 2 tab(s) orally once a day (at bedtime) (09 Jul 2022 09:02)  Vitamin D3 25 mcg (1000 intl units) oral tablet: 1 tab(s) orally 2 times a day (03 Jul 2022 00:32)      MEDICATIONS  (STANDING):  cyanocobalamin 1000 MICROGram(s) Oral daily  enoxaparin Injectable 40 milliGRAM(s) SubCutaneous every 24 hours  FLUoxetine 60 milliGRAM(s) Oral daily  gabapentin 100 milliGRAM(s) Oral two times a day  hemorrhoidal Ointment      pantoprazole    Tablet 40 milliGRAM(s) Oral before breakfast  senna 2 Tablet(s) Oral at bedtime      TELEMETRY: 	    ECG:  	  RADIOLOGY:   DIAGNOSTIC TESTING:  [ ] Echocardiogram:  [ ] Catheterization:  [ ] Stress Test:    OTHER: 	    LABS:	 	    CARDIAC MARKERS:                  07-08    134<L>  |  101  |  28<H>  ----------------------------<  105<H>  4.1   |  24  |  0.96    Ca    9.5      08 Jul 2022 07:13      proBNP:     Lipid Profile:   HgA1c:     Creatinine, Serum: 0.96 mg/dL (07-08-22 @ 07:13)  Creatinine, Serum: 0.65 mg/dL (07-07-22 @ 07:01)

## 2022-07-09 NOTE — PROGRESS NOTE ADULT - PROBLEM SELECTOR PLAN 4
- resolved. doing well on prednisone for PMR  - worse after covid in May 2022  - ensure supplementation  - PT/OT  - daughters agree on DNR/DNI, cannot decide on artificial nutrition for now, want acute treatment as necessary.
- worse after covid in May 2022  - ensure supplementation  - PT/OT  - daughters agree on DNR/DNI, cannot decide on artificial nutrition for now, want acute treatment as necessary.
- resolved. doing well on prednisone for PMR  - worse after covid in May 2022  - ensure supplementation  - PT/OT  - daughters agree on DNR/DNI, cannot decide on artificial nutrition for now, want acute treatment as necessary.
- worse after covid in May 2022  - ensure supplementation  - PT/OT  - daughters agree on DNR/DNI, cannot decide on artificial nutrition for now, want acute treatment as necessary.
- worse after covid in May 2022  - ensure supplementation  - PT/OT
- resolved. doing well on prednisone for PMR  - worse after covid in May 2022  - ensure supplementation  - PT/OT  - daughters agree on DNR/DNI, cannot decide on artificial nutrition for now, want acute treatment as necessary.
- worse after covid in May 2022  - ensure supplementation  - PT/OT  - daughters agree on DNR/DNI, cannot decide on artificial nutrition for now, want acute treatment as necessary.

## 2022-07-09 NOTE — PROGRESS NOTE ADULT - NUTRITIONAL ASSESSMENT
This patient has been assessed with a concern for Malnutrition and has been determined to have a diagnosis/diagnoses of Severe protein-calorie malnutrition.    This patient is being managed with:   Diet Regular-  Supplement Feeding Modality:  Oral  Ensure Enlive Cans or Servings Per Day:  1       Frequency:  Two Times a day  Entered: Jul  3 2022  1:36AM    

## 2022-07-11 LAB
CORTICOSTEROID BINDING GLOBULIN RESULT: 2.1 MG/DL — SIGNIFICANT CHANGE UP
CORTIS F/TOTAL MFR SERPL: 11 % — SIGNIFICANT CHANGE UP
CORTIS SERPL-MCNC: 13 UG/DL — SIGNIFICANT CHANGE UP
CORTISOL, FREE RESULT: 1.4 UG/DL — SIGNIFICANT CHANGE UP

## 2022-07-12 LAB — CRYOGLOB SERPL-MCNC: NEGATIVE — SIGNIFICANT CHANGE UP

## 2022-07-13 ENCOUNTER — NON-APPOINTMENT (OUTPATIENT)
Age: 87
End: 2022-07-13

## 2022-09-04 ENCOUNTER — INPATIENT (INPATIENT)
Facility: HOSPITAL | Age: 87
LOS: 3 days | Discharge: HOME HEALTH | DRG: 543 | End: 2022-09-08
Attending: INTERNAL MEDICINE | Admitting: INTERNAL MEDICINE
Payer: MEDICARE

## 2022-09-04 VITALS
WEIGHT: 143.96 LBS | DIASTOLIC BLOOD PRESSURE: 92 MMHG | SYSTOLIC BLOOD PRESSURE: 137 MMHG | HEIGHT: 68 IN | HEART RATE: 72 BPM | TEMPERATURE: 98 F | OXYGEN SATURATION: 98 % | RESPIRATION RATE: 16 BRPM

## 2022-09-04 DIAGNOSIS — R10.9 UNSPECIFIED ABDOMINAL PAIN: ICD-10-CM

## 2022-09-04 LAB
ALBUMIN SERPL ELPH-MCNC: 3.2 G/DL — LOW (ref 3.3–5)
ALP SERPL-CCNC: 107 U/L — SIGNIFICANT CHANGE UP (ref 40–120)
ALT FLD-CCNC: 14 U/L — SIGNIFICANT CHANGE UP (ref 10–45)
ANION GAP SERPL CALC-SCNC: 13 MMOL/L — SIGNIFICANT CHANGE UP (ref 5–17)
APPEARANCE UR: CLEAR — SIGNIFICANT CHANGE UP
APTT BLD: 23.6 SEC — LOW (ref 27.5–35.5)
AST SERPL-CCNC: 13 U/L — SIGNIFICANT CHANGE UP (ref 10–40)
BACTERIA # UR AUTO: NEGATIVE — SIGNIFICANT CHANGE UP
BASOPHILS # BLD AUTO: 0.03 K/UL — SIGNIFICANT CHANGE UP (ref 0–0.2)
BASOPHILS NFR BLD AUTO: 0.3 % — SIGNIFICANT CHANGE UP (ref 0–2)
BILIRUB SERPL-MCNC: 0.5 MG/DL — SIGNIFICANT CHANGE UP (ref 0.2–1.2)
BILIRUB UR-MCNC: NEGATIVE — SIGNIFICANT CHANGE UP
BUN SERPL-MCNC: 17 MG/DL — SIGNIFICANT CHANGE UP (ref 7–23)
CALCIUM SERPL-MCNC: 9.3 MG/DL — SIGNIFICANT CHANGE UP (ref 8.4–10.5)
CHLORIDE SERPL-SCNC: 100 MMOL/L — SIGNIFICANT CHANGE UP (ref 96–108)
CO2 SERPL-SCNC: 27 MMOL/L — SIGNIFICANT CHANGE UP (ref 22–31)
COLOR SPEC: COLORLESS — SIGNIFICANT CHANGE UP
CREAT SERPL-MCNC: 0.73 MG/DL — SIGNIFICANT CHANGE UP (ref 0.5–1.3)
DIFF PNL FLD: NEGATIVE — SIGNIFICANT CHANGE UP
EGFR: 80 ML/MIN/1.73M2 — SIGNIFICANT CHANGE UP
EOSINOPHIL # BLD AUTO: 0 K/UL — SIGNIFICANT CHANGE UP (ref 0–0.5)
EOSINOPHIL NFR BLD AUTO: 0 % — SIGNIFICANT CHANGE UP (ref 0–6)
EPI CELLS # UR: 0 /HPF — SIGNIFICANT CHANGE UP
FLUAV AG NPH QL: SIGNIFICANT CHANGE UP
FLUBV AG NPH QL: SIGNIFICANT CHANGE UP
GAS PNL BLDV: SIGNIFICANT CHANGE UP
GLUCOSE SERPL-MCNC: 89 MG/DL — SIGNIFICANT CHANGE UP (ref 70–99)
GLUCOSE UR QL: NEGATIVE — SIGNIFICANT CHANGE UP
HCT VFR BLD CALC: 32.9 % — LOW (ref 34.5–45)
HGB BLD-MCNC: 10.6 G/DL — LOW (ref 11.5–15.5)
HYALINE CASTS # UR AUTO: 0 /LPF — SIGNIFICANT CHANGE UP (ref 0–2)
IMM GRANULOCYTES NFR BLD AUTO: 0.6 % — SIGNIFICANT CHANGE UP (ref 0–1.5)
INR BLD: 1.17 RATIO — HIGH (ref 0.88–1.16)
KETONES UR-MCNC: NEGATIVE — SIGNIFICANT CHANGE UP
LEUKOCYTE ESTERASE UR-ACNC: NEGATIVE — SIGNIFICANT CHANGE UP
LYMPHOCYTES # BLD AUTO: 3.45 K/UL — HIGH (ref 1–3.3)
LYMPHOCYTES # BLD AUTO: 38.9 % — SIGNIFICANT CHANGE UP (ref 13–44)
MAGNESIUM SERPL-MCNC: 1.9 MG/DL — SIGNIFICANT CHANGE UP (ref 1.6–2.6)
MCHC RBC-ENTMCNC: 28.4 PG — SIGNIFICANT CHANGE UP (ref 27–34)
MCHC RBC-ENTMCNC: 32.2 GM/DL — SIGNIFICANT CHANGE UP (ref 32–36)
MCV RBC AUTO: 88.2 FL — SIGNIFICANT CHANGE UP (ref 80–100)
MONOCYTES # BLD AUTO: 1.04 K/UL — HIGH (ref 0–0.9)
MONOCYTES NFR BLD AUTO: 11.7 % — SIGNIFICANT CHANGE UP (ref 2–14)
NEUTROPHILS # BLD AUTO: 4.31 K/UL — SIGNIFICANT CHANGE UP (ref 1.8–7.4)
NEUTROPHILS NFR BLD AUTO: 48.5 % — SIGNIFICANT CHANGE UP (ref 43–77)
NITRITE UR-MCNC: NEGATIVE — SIGNIFICANT CHANGE UP
NRBC # BLD: 0 /100 WBCS — SIGNIFICANT CHANGE UP (ref 0–0)
PH UR: 8 — SIGNIFICANT CHANGE UP (ref 5–8)
PLATELET # BLD AUTO: 284 K/UL — SIGNIFICANT CHANGE UP (ref 150–400)
POTASSIUM SERPL-MCNC: 3.7 MMOL/L — SIGNIFICANT CHANGE UP (ref 3.5–5.3)
POTASSIUM SERPL-SCNC: 3.7 MMOL/L — SIGNIFICANT CHANGE UP (ref 3.5–5.3)
PROT SERPL-MCNC: 6.5 G/DL — SIGNIFICANT CHANGE UP (ref 6–8.3)
PROT UR-MCNC: NEGATIVE — SIGNIFICANT CHANGE UP
PROTHROM AB SERPL-ACNC: 13.5 SEC — HIGH (ref 10.5–13.4)
RBC # BLD: 3.73 M/UL — LOW (ref 3.8–5.2)
RBC # FLD: 16 % — HIGH (ref 10.3–14.5)
RBC CASTS # UR COMP ASSIST: 1 /HPF — SIGNIFICANT CHANGE UP (ref 0–4)
RSV RNA NPH QL NAA+NON-PROBE: SIGNIFICANT CHANGE UP
SARS-COV-2 RNA SPEC QL NAA+PROBE: SIGNIFICANT CHANGE UP
SODIUM SERPL-SCNC: 140 MMOL/L — SIGNIFICANT CHANGE UP (ref 135–145)
SP GR SPEC: 1.01 — LOW (ref 1.01–1.02)
TROPONIN T, HIGH SENSITIVITY RESULT: 40 NG/L — SIGNIFICANT CHANGE UP (ref 0–51)
TROPONIN T, HIGH SENSITIVITY RESULT: 43 NG/L — SIGNIFICANT CHANGE UP (ref 0–51)
UROBILINOGEN FLD QL: NEGATIVE — SIGNIFICANT CHANGE UP
WBC # BLD: 8.88 K/UL — SIGNIFICANT CHANGE UP (ref 3.8–10.5)
WBC # FLD AUTO: 8.88 K/UL — SIGNIFICANT CHANGE UP (ref 3.8–10.5)
WBC UR QL: 0 /HPF — SIGNIFICANT CHANGE UP (ref 0–5)

## 2022-09-04 PROCEDURE — 71045 X-RAY EXAM CHEST 1 VIEW: CPT | Mod: 26

## 2022-09-04 PROCEDURE — 74176 CT ABD & PELVIS W/O CONTRAST: CPT | Mod: 26,MA

## 2022-09-04 PROCEDURE — 99285 EMERGENCY DEPT VISIT HI MDM: CPT

## 2022-09-04 RX ORDER — ACETAMINOPHEN 500 MG
975 TABLET ORAL ONCE
Refills: 0 | Status: COMPLETED | OUTPATIENT
Start: 2022-09-04 | End: 2022-09-04

## 2022-09-04 RX ORDER — POLYETHYLENE GLYCOL 3350 17 G/17G
17 POWDER, FOR SOLUTION ORAL DAILY
Refills: 0 | Status: DISCONTINUED | OUTPATIENT
Start: 2022-09-04 | End: 2022-09-05

## 2022-09-04 RX ORDER — RIVAROXABAN 15 MG-20MG
10 KIT ORAL DAILY
Refills: 0 | Status: DISCONTINUED | OUTPATIENT
Start: 2022-09-04 | End: 2022-09-05

## 2022-09-04 RX ORDER — DUREZOL 0.5 MG/ML
1 EMULSION OPHTHALMIC
Refills: 0 | Status: DISCONTINUED | OUTPATIENT
Start: 2022-09-04 | End: 2022-09-08

## 2022-09-04 RX ORDER — MORPHINE SULFATE 50 MG/1
2 CAPSULE, EXTENDED RELEASE ORAL EVERY 4 HOURS
Refills: 0 | Status: DISCONTINUED | OUTPATIENT
Start: 2022-09-04 | End: 2022-09-08

## 2022-09-04 RX ORDER — DUREZOL 0.5 MG/ML
1 EMULSION OPHTHALMIC
Qty: 0 | Refills: 0 | DISCHARGE

## 2022-09-04 RX ORDER — SENNA PLUS 8.6 MG/1
2 TABLET ORAL AT BEDTIME
Refills: 0 | Status: DISCONTINUED | OUTPATIENT
Start: 2022-09-04 | End: 2022-09-08

## 2022-09-04 RX ORDER — FLUOXETINE HCL 10 MG
60 CAPSULE ORAL DAILY
Refills: 0 | Status: DISCONTINUED | OUTPATIENT
Start: 2022-09-04 | End: 2022-09-08

## 2022-09-04 RX ORDER — CALCIUM CARBONATE 500(1250)
1 TABLET ORAL DAILY
Refills: 0 | Status: DISCONTINUED | OUTPATIENT
Start: 2022-09-04 | End: 2022-09-08

## 2022-09-04 RX ORDER — GABAPENTIN 400 MG/1
100 CAPSULE ORAL
Refills: 0 | Status: DISCONTINUED | OUTPATIENT
Start: 2022-09-04 | End: 2022-09-08

## 2022-09-04 RX ORDER — CHOLECALCIFEROL (VITAMIN D3) 125 MCG
1000 CAPSULE ORAL DAILY
Refills: 0 | Status: DISCONTINUED | OUTPATIENT
Start: 2022-09-04 | End: 2022-09-08

## 2022-09-04 RX ORDER — PANTOPRAZOLE SODIUM 20 MG/1
40 TABLET, DELAYED RELEASE ORAL
Refills: 0 | Status: DISCONTINUED | OUTPATIENT
Start: 2022-09-04 | End: 2022-09-08

## 2022-09-04 RX ORDER — CHOLECALCIFEROL (VITAMIN D3) 125 MCG
1 CAPSULE ORAL
Qty: 0 | Refills: 0 | DISCHARGE

## 2022-09-04 RX ORDER — AMLODIPINE BESYLATE 2.5 MG/1
5 TABLET ORAL DAILY
Refills: 0 | Status: DISCONTINUED | OUTPATIENT
Start: 2022-09-04 | End: 2022-09-08

## 2022-09-04 RX ORDER — GLYCERIN 1 %
1 DROPS OPHTHALMIC (EYE) THREE TIMES A DAY
Refills: 0 | Status: DISCONTINUED | OUTPATIENT
Start: 2022-09-04 | End: 2022-09-04

## 2022-09-04 RX ORDER — LANOLIN ALCOHOL/MO/W.PET/CERES
5 CREAM (GRAM) TOPICAL AT BEDTIME
Refills: 0 | Status: DISCONTINUED | OUTPATIENT
Start: 2022-09-04 | End: 2022-09-08

## 2022-09-04 RX ORDER — METOPROLOL TARTRATE 50 MG
25 TABLET ORAL DAILY
Refills: 0 | Status: DISCONTINUED | OUTPATIENT
Start: 2022-09-04 | End: 2022-09-08

## 2022-09-04 RX ORDER — ACETAMINOPHEN 500 MG
650 TABLET ORAL EVERY 6 HOURS
Refills: 0 | Status: DISCONTINUED | OUTPATIENT
Start: 2022-09-04 | End: 2022-09-08

## 2022-09-04 RX ORDER — FLUTICASONE PROPIONATE 50 MCG
1 SPRAY, SUSPENSION NASAL
Qty: 0 | Refills: 0 | DISCHARGE

## 2022-09-04 RX ORDER — NAPHAZOLINE HCL 0.1 %
1 DROPS OPHTHALMIC (EYE)
Qty: 0 | Refills: 0 | DISCHARGE

## 2022-09-04 RX ORDER — FLUTICASONE PROPIONATE 50 MCG
1 SPRAY, SUSPENSION NASAL
Refills: 0 | Status: DISCONTINUED | OUTPATIENT
Start: 2022-09-04 | End: 2022-09-08

## 2022-09-04 RX ORDER — NAPHAZOLINE HCL 0.1 %
1 DROPS OPHTHALMIC (EYE) THREE TIMES A DAY
Refills: 0 | Status: DISCONTINUED | OUTPATIENT
Start: 2022-09-04 | End: 2022-09-04

## 2022-09-04 RX ORDER — INFLUENZA VIRUS VACCINE 15; 15; 15; 15 UG/.5ML; UG/.5ML; UG/.5ML; UG/.5ML
0.7 SUSPENSION INTRAMUSCULAR ONCE
Refills: 0 | Status: DISCONTINUED | OUTPATIENT
Start: 2022-09-04 | End: 2022-09-08

## 2022-09-04 RX ORDER — SODIUM CHLORIDE 9 MG/ML
1000 INJECTION, SOLUTION INTRAVENOUS
Refills: 0 | Status: DISCONTINUED | OUTPATIENT
Start: 2022-09-04 | End: 2022-09-08

## 2022-09-04 RX ADMIN — GABAPENTIN 100 MILLIGRAM(S): 400 CAPSULE ORAL at 22:52

## 2022-09-04 RX ADMIN — MORPHINE SULFATE 2 MILLIGRAM(S): 50 CAPSULE, EXTENDED RELEASE ORAL at 21:44

## 2022-09-04 RX ADMIN — Medication 975 MILLIGRAM(S): at 23:23

## 2022-09-04 RX ADMIN — Medication 975 MILLIGRAM(S): at 22:53

## 2022-09-04 RX ADMIN — DUREZOL 1 DROP(S): 0.5 EMULSION OPHTHALMIC at 23:50

## 2022-09-04 RX ADMIN — SODIUM CHLORIDE 70 MILLILITER(S): 9 INJECTION, SOLUTION INTRAVENOUS at 23:00

## 2022-09-04 RX ADMIN — SENNA PLUS 2 TABLET(S): 8.6 TABLET ORAL at 22:53

## 2022-09-04 NOTE — ED PROVIDER NOTE - NSICDXPASTSURGICALHX_GEN_ALL_CORE_FT
PAST SURGICAL HISTORY:  History of Breast Biopsy [place] lymph node biopsy    History of Cataract Surgery Left eye    S/P Breast Lumpectomy     S/P Lumpectomy of Breast Left Breast    S/P Nasal Polypectomy     Status Post Tonsillectomy

## 2022-09-04 NOTE — ED PROVIDER NOTE - PROGRESS NOTE DETAILS
Floyd Hein MD: Floyd Hein MD:  Diffuse abdominal pain, admitting for constipation vs. urinary retention with >200 CC urine noted on US, inability to urinate fully

## 2022-09-04 NOTE — H&P ADULT - HISTORY OF PRESENT ILLNESS
87 Y F H/O Breast CA, MAC untreated, MVP, asthma, mod  AS, on prednisone for PMR since July w resolution of diffuse symptoms. Ptenrique was discharged to  Rehab and 3 days ago came home.   About 10 days ago she developed severe back pain, denies trauma,   at present the pain is 10/10, pain radiates from the back  to the abdomen and right hip.  In Addition ptn had been constipated as well. At baseline abc in 5-6K, today >8K  Ptn had PVR of 250 cc here, she was straight cathed. she also had a abd/pelvic CT which revealed severe stool burden and compression Fx in Ls. ptn also points to thoracic area as another point of pain. Ptn had been taper down to prednisone 30 mg from 50 mg 2 mo ago.

## 2022-09-04 NOTE — ED PROVIDER NOTE - NS ED ROS FT
GENERAL: No fever or chills  EYES: No change in vision  HEENT: No trouble swallowing or speaking  CARDIAC: No chest pain  PULMONARY: No cough or SOB  GI: +  abdominal pain, + abdominal distention no nausea or no vomiting, no diarrhea or constipation  : Decreased urinary control,   SKIN: No rashes  NEURO: No headache, no numbness  MSK: No joint pain  Otherwise as HPI or negative.

## 2022-09-04 NOTE — H&P ADULT - NSICDXPASTMEDICALHX_GEN_ALL_CORE_FT
PAST MEDICAL HISTORY:  2019 novel coronavirus disease (COVID-19) 5/27/2022    Anxiety     Asthma     Breast Cancer 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    Clinical Depression     COVID-19 vaccine series completed Moderna    GERD (Gastroesophageal Reflux Disease)     Hiatal Hernia     Hypertension     Irritable Bowel Syndrome     Irritable Bowel Syndrome     Mitral Valve Prolapse     Nasal Polyp     PMR (polymyalgia rheumatica)     Uveitis

## 2022-09-04 NOTE — H&P ADULT - ASSESSMENT
87 Y F H/O Breast CA, MAC untreated, MVP, asthma, mod  AS, on prednisone for PMR since July w resolution of diffuse symptoms. Ptn was discharged to  Rehab and 3 days ago came home.   About 10 days ago she developed severe back pain, denies trauma,   at present the pain is 10/10, pain radiates from the back  to the abdomen and right hip.  In Addition ptn had been constipated as well. At baseline abc in 5-6K, today >8K  Ptn had PVR of 250 cc here, she was straight cathed. she also had a abd/pelvic CT which revealed severe stool burden and compression Fx in Ls. ptn also points to thoracic area as another point of pain. Ptn had been taper down to prednisone 30 mg from 50 mg 2 mo ago.     PLAN:  ABd/Pelvic ct:  L3 compression fx: get dedicated LS and T spine ct,   ptn also c/o pain over Right hip, will get pelvic ct as well.   ptn denies trauma. she has been on steroids however for 2 months. she will need outptn Forteo and alike treatment for OP  pain control w Morphine/Gabapentin/Tylenol  will order TLSO brace  Neuro consutl  GI consult for Bowel regulation

## 2022-09-04 NOTE — ED PROVIDER NOTE - CLINICAL SUMMARY MEDICAL DECISION MAKING FREE TEXT BOX
87 Y F presenting with abdominal distention , 200 cc + urine, primary suspicion for stercolitis, vs. diverticulitis, vs. additioanl acute intraabdominal pathology, common labs, CTAP, urinalysis, re-assess, likely admit pending results.

## 2022-09-04 NOTE — ED ADULT NURSE NOTE - NSICDXPASTSURGICALHX_GEN_ALL_CORE_FT
PAST SURGICAL HISTORY:  History of Breast Biopsy [place] lymph node biopsy    History of Cataract Surgery Left eye    S/P Breast Lumpectomy     S/P Lumpectomy of Breast Left Breast    S/P Nasal Polypectomy     Status Post Tonsillectomy

## 2022-09-04 NOTE — ED ADULT NURSE NOTE - OBJECTIVE STATEMENT
86 y/o AxOx4 F, BIBA complaining of diffuse abd pain. PMH SBO. Pt reports flank and diffuse abdominal pain, generalized but 10/10 severe. Pt has distension and tenderness in abdomen all 4 quadrants. Pt stated pain started Thursday, and has gotten worse since. Pt reports 2 BM today that were samira-colored and solid, however provided no relief. Respirations spontaneous and unlabored. Denies SOB, dyspnea, cough, chest pain, palpitations. No n/v/d. Denies urinary symptoms. Denies fever/chills. No sick contacts. Skin is warm/dry and normal for race. Ambulates at baseline. Daughter at bedside. 20 G RAC placed in ER.

## 2022-09-04 NOTE — ED ADULT NURSE NOTE - NSIMPLEMENTINTERV_GEN_ALL_ED
Implemented All Fall with Harm Risk Interventions:  [place] to call system. Call bell, personal items and telephone within reach. Instruct patient to call for assistance. Room bathroom lighting operational. Non-slip footwear when patient is off stretcher. Physically safe environment: no spills, clutter or unnecessary equipment. Stretcher in lowest position, wheels locked, appropriate side rails in place. Provide visual cue, wrist band, yellow gown, etc. Monitor gait and stability. Monitor for mental status changes and reorient to person, place, and time. Review medications for side effects contributing to fall risk. Reinforce activity limits and safety measures with patient and family. Provide visual clues: red socks.

## 2022-09-04 NOTE — ED PROVIDER NOTE - OBJECTIVE STATEMENT
87 Y F H/O CA, MAC, MVP, asthma, AS, presenting with diffuse abdominal pain, states since discharge experiencing persistent abdominal pain, decreased ability to urinate at home, no focal neurologic findings, denies any loss of sensation, nausea, vomiting, back pain.

## 2022-09-04 NOTE — ED ADULT NURSE REASSESSMENT NOTE - NS ED NURSE REASSESS COMMENT FT1
Attempted to place indwelling catheter x2, RN unsuccessful. MD aware, will attempt to obtain urine 1x more.

## 2022-09-04 NOTE — H&P ADULT - NSICDXPASTSURGICALHX_GEN_ALL_CORE_FT
PAST SURGICAL HISTORY:  History of Breast Biopsy [place] lymph node biopsy    History of Cataract Surgery Left eye    S/P Breast Lumpectomy     S/P Lumpectomy of Breast Left Breast    S/P Nasal Polypectomy     Status Post Tonsillectomy

## 2022-09-04 NOTE — H&P ADULT - NSHPPHYSICALEXAM_GEN_ALL_CORE
T(C): 36.9 (09-04-22 @ 20:45), Max: 36.9 (09-04-22 @ 20:45)  HR: 77 (09-04-22 @ 20:45) (72 - 77)  BP: 133/74 (09-04-22 @ 20:45) (133/74 - 160/80)  RR: 18 (09-04-22 @ 20:45) (16 - 18)  SpO2: 99% (09-04-22 @ 20:45) (98% - 99%)    PHYSICAL EXAM:  GENERAL: NAD, well-developed  HEAD:  Atraumatic, Normocephalic  EYES: EOMI, PERRLA, conjunctiva and sclera clear  NECK: Supple, No JVD  CHEST/LUNG: Clear to auscultation bilaterally; No wheeze  HEART: Regular rate and rhythm; No murmurs, rubs, or gallops  ABDOMEN: Soft, Nontender, Nondistended; Bowel sounds present  EXTREMITIES:  2+ Peripheral Pulses, No clubbing, cyanosis, or edema  PSYCH: AAOx3  NEUROLOGY: non-focal  SKIN: No rashes or lesions

## 2022-09-04 NOTE — ED PROVIDER NOTE - PHYSICAL EXAMINATION
Physical Exam:  General: NAD, Conversive  Eyes: EOMI, Conjunctiva and sclera clear  Neck: No JVD  Lungs: Clear to auscultation bilaterally, no wheeze, no rhonchi  Heart: Normal S1, S2, no murmurs  Abdomen: Mildly distended abdomen, tender to palpation over suprapubic  Extremities: 2+ peripheral pulses, no edema  Psych: AAO X3  Neurologic: Non-focal

## 2022-09-05 LAB
A1C WITH ESTIMATED AVERAGE GLUCOSE RESULT: 5.8 % — HIGH (ref 4–5.6)
CULTURE RESULTS: NO GROWTH — SIGNIFICANT CHANGE UP
ESTIMATED AVERAGE GLUCOSE: 120 MG/DL — HIGH (ref 68–114)
SPECIMEN SOURCE: SIGNIFICANT CHANGE UP
TSH SERPL-MCNC: 0.85 UIU/ML — SIGNIFICANT CHANGE UP (ref 0.27–4.2)

## 2022-09-05 PROCEDURE — 72192 CT PELVIS W/O DYE: CPT | Mod: 26

## 2022-09-05 PROCEDURE — 72131 CT LUMBAR SPINE W/O DYE: CPT | Mod: 26

## 2022-09-05 PROCEDURE — 72128 CT CHEST SPINE W/O DYE: CPT | Mod: 26

## 2022-09-05 PROCEDURE — 76377 3D RENDER W/INTRP POSTPROCES: CPT | Mod: 26

## 2022-09-05 RX ORDER — SOD SULF/SODIUM/NAHCO3/KCL/PEG
1 SOLUTION, RECONSTITUTED, ORAL ORAL ONCE
Refills: 0 | Status: COMPLETED | OUTPATIENT
Start: 2022-09-05 | End: 2022-09-05

## 2022-09-05 RX ORDER — POLYETHYLENE GLYCOL 3350 17 G/17G
17 POWDER, FOR SOLUTION ORAL
Refills: 0 | Status: DISCONTINUED | OUTPATIENT
Start: 2022-09-05 | End: 2022-09-08

## 2022-09-05 RX ORDER — ENOXAPARIN SODIUM 100 MG/ML
40 INJECTION SUBCUTANEOUS EVERY 24 HOURS
Refills: 0 | Status: DISCONTINUED | OUTPATIENT
Start: 2022-09-05 | End: 2022-09-08

## 2022-09-05 RX ADMIN — GABAPENTIN 100 MILLIGRAM(S): 400 CAPSULE ORAL at 18:01

## 2022-09-05 RX ADMIN — MORPHINE SULFATE 2 MILLIGRAM(S): 50 CAPSULE, EXTENDED RELEASE ORAL at 05:31

## 2022-09-05 RX ADMIN — Medication 25 MILLIGRAM(S): at 05:32

## 2022-09-05 RX ADMIN — DUREZOL 1 DROP(S): 0.5 EMULSION OPHTHALMIC at 21:09

## 2022-09-05 RX ADMIN — PANTOPRAZOLE SODIUM 40 MILLIGRAM(S): 20 TABLET, DELAYED RELEASE ORAL at 05:33

## 2022-09-05 RX ADMIN — GABAPENTIN 100 MILLIGRAM(S): 400 CAPSULE ORAL at 05:32

## 2022-09-05 RX ADMIN — DUREZOL 1 DROP(S): 0.5 EMULSION OPHTHALMIC at 11:39

## 2022-09-05 RX ADMIN — Medication 60 MILLIGRAM(S): at 13:53

## 2022-09-05 RX ADMIN — Medication 1000 UNIT(S): at 13:56

## 2022-09-05 RX ADMIN — Medication 1 LITER(S): at 18:23

## 2022-09-05 RX ADMIN — Medication 1 SPRAY(S): at 21:09

## 2022-09-05 RX ADMIN — AMLODIPINE BESYLATE 5 MILLIGRAM(S): 2.5 TABLET ORAL at 18:15

## 2022-09-05 RX ADMIN — Medication 1 SPRAY(S): at 13:39

## 2022-09-05 RX ADMIN — ENOXAPARIN SODIUM 40 MILLIGRAM(S): 100 INJECTION SUBCUTANEOUS at 21:10

## 2022-09-05 RX ADMIN — POLYETHYLENE GLYCOL 3350 17 GRAM(S): 17 POWDER, FOR SOLUTION ORAL at 18:04

## 2022-09-05 RX ADMIN — Medication 1 TABLET(S): at 13:59

## 2022-09-05 NOTE — PROGRESS NOTE ADULT - ASSESSMENT
87 Y F H/O Breast CA, MAC untreated, MVP, asthma, mod  AS, on prednisone for PMR since July w resolution of diffuse symptoms. Ptn was discharged to  Rehab and 3 days ago came home.   About 10 days ago she developed severe back pain, denies trauma,   at present the pain is 10/10, pain radiates from the back  to the abdomen and right hip.  In Addition ptn had been constipated as well. At baseline abc in 5-6K, today >8K  Ptn had PVR of 250 cc here, she was straight cathed. she also had a abd/pelvic CT which revealed severe stool burden and compression Fx in Ls. ptn also points to thoracic area as another point of pain. Ptn had been taper down to prednisone 30 mg from 50 mg 2 mo ago.     PLAN:  9/4: ABd/Pelvic ct:  L3 compression fx: get dedicated LS and T spine ct,   ptn also c/o pain over Right hip, will get pelvic ct as well.   ptn denies trauma. she has been on steroids however for 2 months. she will need outptn Forteo and alike treatment for OP  pain control w Morphine/Gabapentin/Tylenol  will order TLSO brace  Neuro consult  GI consult for Bowel regulation  9/5: seen by GI, NEURO, had Ct T/L/S spine, seen by orthotics, ptn refusing po Xarelto for DVT ppx, wants " the injection"  Ct revealed OLD T9 and T12 compression Fxs and Acute L3 compression Fx  back pain 2/2 acute L3 compression Fx is controlled w Neurontin 100 mg bid,   ptn is awaiting drinking Movieprep to evacuate large stool burden from which she has abd pain and urinary retention.   ptn hasn't needed Morphine since 5 am.   TLSO brace delivered.   PT eval pending.   ptn will need OP treatment on outptn basis.   DVT ppx w sc Lovenox     Implemented All Universal Safety Interventions:  [place] to call system. Call bell, personal items and telephone within reach. Instruct patient to call for assistance. Room bathroom lighting operational. Non-slip footwear when patient is off stretcher. Physically safe environment: no spills, clutter or unnecessary equipment. Stretcher in lowest position, wheels locked, appropriate side rails in place.

## 2022-09-05 NOTE — CONSULT NOTE ADULT - ASSESSMENT
87 year old female with history of breast CA, MAC untreated, MVP, asthma, mod AS, PMR on prednisone presenting from rehab with severe back pain that radiated to abdomen and right hip.    1. Constipation  - large stool burden on CT  - 1 L moviprep and 1 enema ordered  - cw Miralax 17g BID and senna 2 tabs qhs     2. Back pain  - L3 compression fx  - osteopenia, 2/2 steroids for PMR and long term Prilosec use   - prn pain control          Advanced care planning forms were discussed. Code status including forceful chest compressions, defibrillation and intubation were discussed. The risks benefits and alternatives to pertinent gastrointestinal procedures and interventions were discussed in detail and all questions were answered. Duration: 15 Minutes.    Attending supervision statement: I have personally seen and examined the patient. I fully participated in the care of this patient. I have made amendments to the documentation where necessary, and agree with the history, physical exam, and plan as outlined by the ACP.    [address]  Office: [phone]
ECHO 2/19/22: EF 67%; mild mr, mod as, nl LV sys fx, mod concentric lVH,   ECHO 7/5/22 ef 68%; mild as, nl LV sys fx  Mild diastolic dysfunction (Stage I).    a/p     87F c hx remote breast ca, MAC untreated, MVP, asthma, mod AS, paroxysmal tachycardia of unknown rhythm, orthostatic hypotension (Feb '22) c/b syncope, anxiety, depression, recent covid (May '22), recent accidental valium toxicity , pw back pain and constipationonfusion, syncope, hypotension.      #Back Pain  -CT noted  -pain control    #Constipation  -mgmt per med      # Moderate AS  -prior echo with mild as    #A tach  -cv stable no events     cont current tx    med f/u  dvt ppx    70 minutes spent on total encounter; more than 50% of the visit was spent counseling and/or coordinating care by the attending physician.

## 2022-09-05 NOTE — CONSULT NOTE ADULT - REASON FOR ADMISSION
acute compression fractures, multiple, abdominal pain

## 2022-09-05 NOTE — CONSULT NOTE ADULT - SUBJECTIVE AND OBJECTIVE BOX
CARDIOLOGY CONSULT - Dr. Lee  Date of Service: 9/5/22      HPI:  87 Y F H/O Breast CA, MAC untreated, MVP, asthma, mod  AS, on prednisone for PMR since July w resolution of diffuse symptoms. Ptn was discharged to  Rehab and 3 days ago came home.   About 10 days ago she developed severe back pain, denies trauma,   at present the pain is 10/10, pain radiates from the back  to the abdomen and right hip.  In Addition ptn had been constipated as well. At baseline abc in 5-6K, today >8K  Ptn had PVR of 250 cc here, she was straight cathed. she also had a abd/pelvic CT which revealed severe stool burden and compression Fx in Ls. ptn also points to thoracic area as another point of pain. Ptn had been taper down to prednisone 30 mg from 50 mg 2 mo ago.  (04 Sep 2022 21:30)      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      GERD (Gastroesophageal Reflux Disease)      Irritable Bowel Syndrome      Mitral Valve Prolapse      Anxiety      Clinical Depression      Asthma      Uveitis      Irritable Bowel Syndrome      Hiatal Hernia      Nasal Polyp      Hypertension      COVID-19 vaccine series completed  Moderna      2019 novel coronavirus disease (COVID-19)  5/27/2022      PMR (polymyalgia rheumatica)      S/P Breast Lumpectomy      S/P Lumpectomy of Breast  Left Breast      Status Post Tonsillectomy      S/P Nasal Polypectomy      History of Cataract Surgery  Left eye      History of Breast Biopsy  [place] lymph node biopsy              PREVIOUS DIAGNOSTIC TESTING:    [ ] Echocardiogram:  [ ]  Catheterization:  [ ] Stress Test:  	    MEDICATIONS:  MEDICATIONS  (STANDING):  amLODIPine   Tablet 5 milliGRAM(s) Oral daily  calcium carbonate   1250 mG (OsCal) 1 Tablet(s) Oral daily  cholecalciferol 1000 Unit(s) Oral daily  difluprednate 0.05% Ophthalmic Emulsion 1 Drop(s) Left EYE two times a day  FLUoxetine 60 milliGRAM(s) Oral daily  fluticasone propionate 50 MICROgram(s)/spray Nasal Spray 1 Spray(s) Both Nostrils two times a day  gabapentin 100 milliGRAM(s) Oral two times a day  influenza  Vaccine (HIGH DOSE) 0.7 milliLiter(s) IntraMuscular once  metoprolol succinate ER 25 milliGRAM(s) Oral daily  pantoprazole    Tablet 40 milliGRAM(s) Oral before breakfast  polyethylene glycol 3350 17 Gram(s) Oral daily  predniSONE   Tablet 25 milliGRAM(s) Oral daily  rivaroxaban 10 milliGRAM(s) Oral daily  senna 2 Tablet(s) Oral at bedtime  sodium chloride 0.45%. 1000 milliLiter(s) (70 mL/Hr) IV Continuous <Continuous>      FAMILY HISTORY:  FH: CAD (coronary artery disease) (Father, Mother, Sibling, Aunt)    FH: lung cancer (Mother)        SOCIAL HISTORY:    [ ] Non-smoker  [ ] Smoker  [ ] Alcohol    Allergies    cefdinir (Unknown)  ciprofloxacin (Other)  codeine (Nausea; Other)  contrast media (iodine-based) (Angioedema)  fluorescein ophthalmic (Hives; Rash; Flushing)  lactose (Unknown)  latex (Anaphylaxis)  Levaquin (Nausea; Other)  lidocaine (Unknown)  SULFA (Anaphylaxis)  tetracycline (Anaphylaxis)    Intolerances    Augmentin (Stomach Upset)  	    REVIEW OF SYSTEMS:  CONSTITUTIONAL: No fever, weight loss, or fatigue  EYES: No eye pain, visual disturbances, or discharge  ENMT:  No difficulty hearing, tinnitus, vertigo; No sinus or throat pain  NECK: No pain or stiffness  RESPIRATORY: No cough, wheezing, chills or hemoptysis; No Shortness of Breath  CARDIOVASCULAR: No chest pain, palpitations, passing out, dizziness, or leg swelling  GASTROINTESTINAL: No abdominal or epigastric pain. No nausea, vomiting, or hematemesis; No diarrhea or constipation. No melena or hematochezia.  GENITOURINARY: No dysuria, frequency, hematuria, or incontinence  NEUROLOGICAL: No headaches, memory loss, loss of strength, numbness, or tremors  SKIN: No itching, burning, rashes, or lesions   	    [ ] All others negative	  [ ] Unable to obtain    PHYSICAL EXAM:  T(C): 36.6 (09-05-22 @ 09:20), Max: 36.9 (09-04-22 @ 20:45)  HR: 67 (09-05-22 @ 09:20) (67 - 77)  BP: 145/79 (09-05-22 @ 09:20) (131/65 - 160/80)  RR: 18 (09-05-22 @ 09:20) (16 - 18)  SpO2: 97% (09-05-22 @ 09:20) (94% - 100%)  Wt(kg): --  I&O's Summary    04 Sep 2022 07:01  -  05 Sep 2022 07:00  --------------------------------------------------------  IN: 210 mL / OUT: 0 mL / NET: 210 mL    05 Sep 2022 07:01  -  05 Sep 2022 09:34  --------------------------------------------------------  IN: 280 mL / OUT: 0 mL / NET: 280 mL        Appearance: Normal	  Psychiatry: A & O x 3, Mood & affect appropriate  HEENT:   Normal oral mucosa, PERRL, EOMI	  Lymphatic: No lymphadenopathy  Cardiovascular: Normal S1 S2,RRR, No JVD, No murmurs  Respiratory: Lungs clear to auscultation	  Gastrointestinal:  Soft, Non-tender, + BS	  Skin: No rashes, No ecchymoses, No cyanosis	  Neurologic: Non-focal  Extremities: Normal range of motion, No clubbing, cyanosis or edema  Vascular: Peripheral pulses palpable 2+ bilaterally    TELEMETRY: 	    ECG:  	  RADIOLOGY:  OTHER: 	  	  LABS:	 	    CARDIAC MARKERS:                                  10.6   8.88  )-----------( 284      ( 04 Sep 2022 15:34 )             32.9     09-04    140  |  100  |  17  ----------------------------<  89  3.7   |  27  |  0.73    Ca    9.3      04 Sep 2022 15:34  Mg     1.9     09-04    TPro  6.5  /  Alb  3.2<L>  /  TBili  0.5  /  DBili  x   /  AST  13  /  ALT  14  /  AlkPhos  107  09-04    PT/INR - ( 04 Sep 2022 15:34 )   PT: 13.5 sec;   INR: 1.17 ratio         PTT - ( 04 Sep 2022 15:34 )  PTT:23.6 sec  proBNP:   Lipid Profile:   HgA1c:   TSH:     ASSESSMENT/PLAN: 	              70 minutes spent on total encounter; more than 50% of the visit was spent counseling and/or coordinating care by the attending physician.  
[facility]), [facility]        Patient is a 87y old  Female who presents with a chief complaint of acute compression fractures, multiple, abdominal pain (05 Sep 2022 09:34)    Excerpt from H&P,"     87 Y F H/O Breast CA, MAC untreated, MVP, asthma, mod  AS, on prednisone for PMR since  resolution of diffuse symptoms. Ptn was discharged to  Rehab and 3 days ago came home.   About 10 days ago she developed severe back pain, denies trauma,   at present the pain is 10/10, pain radiates from the back  to the abdomen and right hip.  In Addition ptn had been constipated as well. At baseline abc in 5-6K, today >8K  Ptn had PVR of 250 cc here, she was straight cathed. she also had a abd/pelvic CT which revealed severe stool burden and compression Fx in Ls. ptn also points to thoracic area as another point of pain. Ptn had been taper down to prednisone 30 mg from 50 mg 2 mo ago.  (04 Sep           *****PAST MEDICAL / Surgical  HISTORY:  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      GERD (Gastroesophageal Reflux Disease)      Irritable Bowel Syndrome      Mitral Valve Prolapse      Anxiety      Clinical Depression      Asthma      Uveitis      Irritable Bowel Syndrome      Hiatal Hernia      Nasal Polyp      Hypertension      COVID-19 vaccine series completed  Moderna      2019 novel coronavirus disease (COVID-19)  2022      PMR (polymyalgia rheumatica)      S/P Breast Lumpectomy      S/P Lumpectomy of Breast  Left Breast      Status Post Tonsillectomy      S/P Nasal Polypectomy      History of Cataract Surgery  Left eye      History of Breast Biopsy  [place] lymph node biopsy               *****FAMILY HISTORY:  FAMILY HISTORY:  FH: CAD (coronary artery disease) (Father, Mother, Sibling, Aunt)    FH: lung cancer (Mother)             *****SOCIAL HISTORY:  Alcohol: None  Smoking: None         *****ALLERGIES:   Allergies    cefdinir (Unknown)  ciprofloxacin (Other)  codeine (Nausea; Other)  contrast media (iodine-based) (Angioedema)  fluorescein ophthalmic (Hives; Rash; Flushing)  lactose (Unknown)  latex (Anaphylaxis)  Levaquin (Nausea; Other)  lidocaine (Unknown)  SULFA (Anaphylaxis)  tetracycline (Anaphylaxis)    Intolerances    Augmentin (Stomach Upset)           *****MEDICATIONS: current medication reviewed and documented.   MEDICATIONS  (STANDING):  amLODIPine   Tablet 5 milliGRAM(s) Oral daily  calcium carbonate   1250 mG (OsCal) 1 Tablet(s) Oral daily  cholecalciferol 1000 Unit(s) Oral daily  difluprednate 0.05% Ophthalmic Emulsion 1 Drop(s) Left EYE two times a day  FLUoxetine 60 milliGRAM(s) Oral daily  fluticasone propionate 50 MICROgram(s)/spray Nasal Spray 1 Spray(s) Both Nostrils two times a day  gabapentin 100 milliGRAM(s) Oral two times a day  influenza  Vaccine (HIGH DOSE) 0.7 milliLiter(s) IntraMuscular once  metoprolol succinate ER 25 milliGRAM(s) Oral daily  pantoprazole    Tablet 40 milliGRAM(s) Oral before breakfast  polyethylene glycol 3350 17 Gram(s) Oral daily  predniSONE   Tablet 25 milliGRAM(s) Oral daily  rivaroxaban 10 milliGRAM(s) Oral daily  senna 2 Tablet(s) Oral at bedtime  sodium chloride 0.45%. 1000 milliLiter(s) (70 mL/Hr) IV Continuous <Continuous>    MEDICATIONS  (PRN):  acetaminophen     Tablet .. 650 milliGRAM(s) Oral every 6 hours PRN Temp greater or equal to 38C (100.4F), Mild Pain (1 - 3)  melatonin 5 milliGRAM(s) Oral at bedtime PRN Insomnia  morphine  - Injectable 2 milliGRAM(s) IV Push every 4 hours PRN Severe Pain (7 - 10)           *****REVIEW OF SYSTEM:  GEN: no fever, no chills, no pain  RESP: no SOB, no cough, no sputum  CVS: no chest pain, no palpitations, no edema  GI: no abdominal pain, no nausea, no vomiting, no constipation, no diarrhea  : no dysurea, no frequency, no hematurea  Neuro: no headache, no dizziness  PSYCH: no anxiety, no depression  Derm : no itching, no rash         *****VITAL SIGNS:  T(C): 36.6 (22 @ 09:20), Max: 36.9 (22 @ 20:45)  HR: 67 (22 @ 09:20) (67 - 77)  BP: 145/79 (22 @ 09:20) (131/65 - 160/80)  RR: 18 (22 @ 09:20) (16 - 18)  SpO2: 97% (22 @ 09:20) (94% - 100%)  Wt(kg): --     @ 07:01  -   @ 07:00  --------------------------------------------------------  IN: 210 mL / OUT: 0 mL / NET: 210 mL     @ 07:01  -   @ 10:22  --------------------------------------------------------  IN: 280 mL / OUT: 0 mL / NET: 280 mL             *****PHYSICAL EXAM:   Alert oriented x 2   Attention comprehension are fair. Able to name, repeat, read without any difficulty.   Able to follow 3 step commands.     EOMI fundi not visualized,  VFF to confrontration  No facial asymmetry   Tongue is midline    Moving all 4 ext symmetrically antigravity       Gait : not assessed.  B/L down going toes               *****LAB AND IMAGING:                          10.6   8.88  )-----------( 284      ( 04 Sep 2022 15:34 )             32.9                   140  |  100  |  17  ----------------------------<  89  3.7   |  27  |  0.73    Ca    9.3      04 Sep 2022 15:34  Mg     1.9     04    TPro  6.5  /  Alb  3.2<L>  /  TBili  0.5  /  DBili  x   /  AST  13  /  ALT  14  /  AlkPhos  107  09-04    PT/INR - ( 04 Sep 2022 15:34 )   PT: 13.5 sec;   INR: 1.17 ratio         PTT - ( 04 Sep 2022 15:34 )  PTT:23.6 sec                        Urinalysis Basic - ( 04 Sep 2022 17:55 )    Color: Colorless / Appearance: Clear / S.008 / pH: x  Gluc: x / Ketone: Negative  / Bili: Negative / Urobili: Negative   Blood: x / Protein: Negative / Nitrite: Negative   Leuk Esterase: Negative / RBC: 1 /hpf / WBC 0 /HPF   Sq Epi: x / Non Sq Epi: 0 /hpf / Bacteria: Negative        [All pertinent recent Imaging reports reviewed]         *****A S S E S S M E N T   A N D   P L A N :     Excerpt from H&P,"     87 Y F H/O Breast CA, MAC untreated, MVP, asthma, mod  AS, on prednisone for PMR since  resolution of diffuse symptoms. Ptn was discharged to  Rehab and 3 days ago came home.   About 10 days ago she developed severe back pain, denies trauma,   at present the pain is 10/10, pain radiates from the back  to the abdomen and right hip.  In Addition ptn had been constipated as well. At baseline abc in 5-6K, today >8K  Ptn had PVR of 250 cc here, she was straight cathed. she also had a abd/pelvic CT which revealed severe stool burden and compression Fx in Ls. ptn also points to thoracic area as another point of pain. Ptn had been taper down to prednisone 30 mg from 50 mg 2 mo ago.  (04 Sep           Problem/Recommendations 1: back pain   l3 compression fracture noted on ct ab/pelvis  nortryptiline 25 qhs for pain   tlso brace   given osteopenia taper off steroids          Problem/Recommendations 2:lethargy   regulate sleep wake cycle            pt at risk for developing delirium, therefore please institute the following preventative measures if possible          - initiating early mobilization          -minimizing "tethers" - IV, oxygen, catheters, etc          -avoiding   sedatives          -maintaining hydration/nutrition          -avoid anticholinergics - diphenhydramine, etc          -pain control          -sleep wake cycle regulation; avoid day time somnolence           -supportive environment    ___________________________  Will follow with you.  Thank you,  Melissa Pérez MD  Diplomate of the American Board of Neurology and Psychiatry.  Diplomate of the American Board of Vascular Neurology.   [facility] ([facility]), [facility]   Ph: [phone]    Differential diagnosis and plan of care discussed with patient after the evaluation.   Advanced care planning options discussed.   Pain assessed and judicious use of narcotics when appropriate was discussed.  Importance of Fall prevention discussed.  Counseling on Smoking and Alcohol cessation was offered when appropriate.  Counseling on Diet, exercise, and medication compliance was done.   83 minutes spent on the total encounter;  more than 50 % of the visit was spent on counseling  and or coordinating care by the attending physician.    Thank you for allowing me to participate in the care of this delbert patient. Please do not hesitate to call me if you have any questions.     This and subsequent notes  will  inherently be subject to errors including those of syntax and substitutions which may escape proofreading. In such instances original meaning may be extrapolated by contextual derivation.   
Chief Complaint:  Patient is a 87y old  Female who presents with a chief complaint of acute compression fractures, multiple, abdominal pain (05 Sep 2022 10:21)      Date of service: 22 @ 13:08    HPI:    The patient is a 87 year old female with history of breast CA, MAC untreated, MVP, asthma, mod AS, PMR on prednisone presenting from rehab with severe back pain that radiated to abdomen and right hip. Patient denies falls or trauma.   GI consulted for significant stool burden seen on CT. Patient states she has history of IBS and has episodes of constipation. States she does not take laxatives or stool softeners at home. Last BM ~4-5 days ago. Reports abdominal bloating with lower abdomen pain, worse to left side. Hx of GERD, states she has been on Prilosec for years. Last colonoscopy x 5 years ago which was normal. Last EGD, recent does no remember when, where a small hiatal hernia was found. Had an esophogram done in 2022 for painful esophageal spasms, which showed esophageal dysmotility without definite stricture or mass.       Allergies:  Augmentin (Stomach Upset)  cefdinir (Unknown)  ciprofloxacin (Other)  codeine (Nausea; Other)  contrast media (iodine-based) (Angioedema)  fluorescein ophthalmic (Hives; Rash; Flushing)  lactose (Unknown)  latex (Anaphylaxis)  Levaquin (Nausea; Other)  lidocaine (Unknown)  SULFA (Anaphylaxis)  tetracycline (Anaphylaxis)      Home Medications:    Hospital Medications:  acetaminophen     Tablet .. 650 milliGRAM(s) Oral every 6 hours PRN  amLODIPine   Tablet 5 milliGRAM(s) Oral daily  calcium carbonate   1250 mG (OsCal) 1 Tablet(s) Oral daily  cholecalciferol 1000 Unit(s) Oral daily  difluprednate 0.05% Ophthalmic Emulsion 1 Drop(s) Left EYE two times a day  FLUoxetine 60 milliGRAM(s) Oral daily  fluticasone propionate 50 MICROgram(s)/spray Nasal Spray 1 Spray(s) Both Nostrils two times a day  gabapentin 100 milliGRAM(s) Oral two times a day  influenza  Vaccine (HIGH DOSE) 0.7 milliLiter(s) IntraMuscular once  melatonin 5 milliGRAM(s) Oral at bedtime PRN  metoprolol succinate ER 25 milliGRAM(s) Oral daily  morphine  - Injectable 2 milliGRAM(s) IV Push every 4 hours PRN  pantoprazole    Tablet 40 milliGRAM(s) Oral before breakfast  polyethylene glycol 3350 17 Gram(s) Oral daily  polyethylene glycol/electrolyte Solution 1 Liter(s) Oral once  predniSONE   Tablet 25 milliGRAM(s) Oral daily  rivaroxaban 10 milliGRAM(s) Oral daily  saline laxative (FLEET) Rectal Enema 1 Enema Rectal once  senna 2 Tablet(s) Oral at bedtime  sodium chloride 0.45%. 1000 milliLiter(s) IV Continuous <Continuous>      PMHX/PSHX:  Breast Cancer    GERD (Gastroesophageal Reflux Disease)    Irritable Bowel Syndrome    Mitral Valve Prolapse    Anxiety    Clinical Depression    Asthma    Uveitis    Irritable Bowel Syndrome    Hiatal Hernia    Nasal Polyp    History of Left Breast Biopsy    History of Cataract Surgery    S/P Nasal Polypectomy    Status Post Tonsillectomy    Hypertension    COVID-19 vaccine series completed     novel coronavirus disease (COVID-19)    PMR (polymyalgia rheumatica)    S/P Breast Lumpectomy    S/P Lumpectomy of Breast    Status Post Tonsillectomy    S/P Nasal Polypectomy    History of Cataract Surgery    History of Breast Biopsy        Family history:  No pertinent family history in first degree relatives    FH: CAD (coronary artery disease) (Father, Mother, Sibling, Aunt)    FH: lung cancer (Mother)        Social History:   Denies ethanol use.  Denies illicit drug use.    ROS:     General:  No wt loss, fevers, chills, night sweats, fatigue,   Eyes:  Good vision, no reported pain  ENT:  No sore throat, pain, runny nose, dysphagia  CV:  No pain, palpitations, hypo/hypertension  Resp:  No dyspnea, cough, tachypnea, wheezing  GI:  See HPI  :  No pain, bleeding, incontinence, nocturia  Muscle:  No pain, weakness  Neuro:  No weakness, tingling, memory problems  Psych:  No fatigue, insomnia, mood problems, depression  Endocrine:  No polyuria, polydipsia, cold/heat intolerance  Heme:  No petechiae, ecchymosis, easy bruisability  Integumentary:  No rash, edema      PHYSICAL EXAM:     GENERAL:  Appears stated age, well-groomed, well-nourished, no distress  HEENT:  NC/AT,  conjunctivae anicteric, clear and pink,   NECK: supple, trachea midline  CHEST:  Full & symmetric excursion, no increased effort, breath sounds clear  HEART:  Regular rhythm, no JVD  ABDOMEN:  Soft, non-tender, non-distended, normoactive bowel sounds,  no masses , no hepatosplenomegaly  EXTREMITIES:  no cyanosis,clubbing or edema  SKIN:  No rash, erythema, or, ecchymoses, no jaundice  NEURO:  Alert, non-focal, no asterixis  PSYCH: Appropriate affect, oriented to place and time  RECTAL: Deferred      Vital Signs:  Vital Signs Last 24 Hrs  T(C): 36.6 (05 Sep 2022 09:20), Max: 36.9 (04 Sep 2022 20:45)  T(F): 97.9 (05 Sep 2022 09:20), Max: 98.5 (04 Sep 2022 20:45)  HR: 67 (05 Sep 2022 09:20) (67 - 77)  BP: 145/79 (05 Sep 2022 09:20) (131/65 - 160/80)  BP(mean): --  RR: 18 (05 Sep 2022 09:20) (16 - 18)  SpO2: 97% (05 Sep 2022 09:20) (94% - 100%)    Parameters below as of 05 Sep 2022 09:20  Patient On (Oxygen Delivery Method): room air      Daily Height in cm: 172.72 (04 Sep 2022 14:43)    Daily     LABS: Labs personally reviewed by me:                        10.6   8.88  )-----------( 284      ( 04 Sep 2022 15:34 )             32.9     09-04    140  |  100  |  17  ----------------------------<  89  3.7   |  27  |  0.73    Ca    9.3      04 Sep 2022 15:34  Mg     1.9     -04    TPro  6.5  /  Alb  3.2<L>  /  TBili  0.5  /  DBili  x   /  AST  13  /  ALT  14  /  AlkPhos  107  -04    LIVER FUNCTIONS - ( 04 Sep 2022 15:34 )  Alb: 3.2 g/dL / Pro: 6.5 g/dL / ALK PHOS: 107 U/L / ALT: 14 U/L / AST: 13 U/L / GGT: x           PT/INR - ( 04 Sep 2022 15:34 )   PT: 13.5 sec;   INR: 1.17 ratio         PTT - ( 04 Sep 2022 15:34 )  PTT:23.6 sec  Urinalysis Basic - ( 04 Sep 2022 17:55 )    Color: Colorless / Appearance: Clear / S.008 / pH: x  Gluc: x / Ketone: Negative  / Bili: Negative / Urobili: Negative   Blood: x / Protein: Negative / Nitrite: Negative   Leuk Esterase: Negative / RBC: 1 /hpf / WBC 0 /HPF   Sq Epi: x / Non Sq Epi: 0 /hpf / Bacteria: Negative          Imaging personally reviewed by me:        Chief Complaint:  Patient is a 87y old  Female who presents with a chief complaint of acute compression fractures, multiple, abdominal pain (05 Sep 2022 10:21)      Date of service: 22 @ 13:08    HPI:    The patient is a     The patient denies dysphagia, nausea and vomiting, abdominal pain, diarrhea, unintentional weight loss, change in bowel habits or NSAID use.      Allergies:  Augmentin (Stomach Upset)  cefdinir (Unknown)  ciprofloxacin (Other)  codeine (Nausea; Other)  contrast media (iodine-based) (Angioedema)  fluorescein ophthalmic (Hives; Rash; Flushing)  lactose (Unknown)  latex (Anaphylaxis)  Levaquin (Nausea; Other)  lidocaine (Unknown)  SULFA (Anaphylaxis)  tetracycline (Anaphylaxis)      Home Medications:    Hospital Medications:  acetaminophen     Tablet .. 650 milliGRAM(s) Oral every 6 hours PRN  amLODIPine   Tablet 5 milliGRAM(s) Oral daily  calcium carbonate   1250 mG (OsCal) 1 Tablet(s) Oral daily  cholecalciferol 1000 Unit(s) Oral daily  difluprednate 0.05% Ophthalmic Emulsion 1 Drop(s) Left EYE two times a day  FLUoxetine 60 milliGRAM(s) Oral daily  fluticasone propionate 50 MICROgram(s)/spray Nasal Spray 1 Spray(s) Both Nostrils two times a day  gabapentin 100 milliGRAM(s) Oral two times a day  influenza  Vaccine (HIGH DOSE) 0.7 milliLiter(s) IntraMuscular once  melatonin 5 milliGRAM(s) Oral at bedtime PRN  metoprolol succinate ER 25 milliGRAM(s) Oral daily  morphine  - Injectable 2 milliGRAM(s) IV Push every 4 hours PRN  pantoprazole    Tablet 40 milliGRAM(s) Oral before breakfast  polyethylene glycol 3350 17 Gram(s) Oral daily  polyethylene glycol/electrolyte Solution 1 Liter(s) Oral once  predniSONE   Tablet 25 milliGRAM(s) Oral daily  rivaroxaban 10 milliGRAM(s) Oral daily  saline laxative (FLEET) Rectal Enema 1 Enema Rectal once  senna 2 Tablet(s) Oral at bedtime  sodium chloride 0.45%. 1000 milliLiter(s) IV Continuous <Continuous>      PMHX/PSHX:  Breast Cancer    GERD (Gastroesophageal Reflux Disease)    Irritable Bowel Syndrome    Mitral Valve Prolapse    Anxiety    Clinical Depression    Asthma    Uveitis    Irritable Bowel Syndrome    Hiatal Hernia    Nasal Polyp    History of Left Breast Biopsy    History of Cataract Surgery    S/P Nasal Polypectomy    Status Post Tonsillectomy    Hypertension    COVID-19 vaccine series completed     novel coronavirus disease (COVID-19)    PMR (polymyalgia rheumatica)    S/P Breast Lumpectomy    S/P Lumpectomy of Breast    Status Post Tonsillectomy    S/P Nasal Polypectomy    History of Cataract Surgery    History of Breast Biopsy        Family history:  No pertinent family history in first degree relatives    FH: CAD (coronary artery disease) (Father, Mother, Sibling, Aunt)    FH: lung cancer (Mother)        Social History:   Denies ethanol use.  Denies illicit drug use.    ROS:     General:  No wt loss, fevers, chills, night sweats, fatigue,   Eyes:  Good vision, no reported pain  ENT:  No sore throat, pain, runny nose, dysphagia  CV:  No pain, palpitations, hypo/hypertension  Resp:  No dyspnea, cough, tachypnea, wheezing  GI:  See HPI  :  No pain, bleeding, incontinence, nocturia  Muscle:  No pain, weakness  Neuro:  No weakness, tingling, memory problems  Psych:  No fatigue, insomnia, mood problems, depression  Endocrine:  No polyuria, polydipsia, cold/heat intolerance  Heme:  No petechiae, ecchymosis, easy bruisability  Integumentary:  No rash, edema      PHYSICAL EXAM:     GENERAL:  Appears stated age, well-groomed, well-nourished, no distress  HEENT:  NC/AT,  conjunctivae anicteric, clear and pink,   NECK: supple, trachea midline  CHEST:  Full & symmetric excursion, no increased effort, breath sounds clear  HEART:  Regular rhythm, no JVD  ABDOMEN:  Soft, non-tender, non-distended, normoactive bowel sounds,  no masses , no hepatosplenomegaly  EXTREMITIES:  no cyanosis,clubbing or edema  SKIN:  No rash, erythema, or, ecchymoses, no jaundice  NEURO:  Alert, non-focal, no asterixis  PSYCH: Appropriate affect, oriented to place and time  RECTAL: Deferred      Vital Signs:  Vital Signs Last 24 Hrs  T(C): 36.6 (05 Sep 2022 09:20), Max: 36.9 (04 Sep 2022 20:45)  T(F): 97.9 (05 Sep 2022 09:20), Max: 98.5 (04 Sep 2022 20:45)  HR: 67 (05 Sep 2022 09:20) (67 - 77)  BP: 145/79 (05 Sep 2022 09:20) (131/65 - 160/80)  BP(mean): --  RR: 18 (05 Sep 2022 09:20) (16 - 18)  SpO2: 97% (05 Sep 2022 09:20) (94% - 100%)    Parameters below as of 05 Sep 2022 09:20  Patient On (Oxygen Delivery Method): room air      Daily Height in cm: 172.72 (04 Sep 2022 14:43)    Daily     LABS: Labs personally reviewed by me:                        10.6   8.88  )-----------( 284      ( 04 Sep 2022 15:34 )             32.9     09-04    140  |  100  |  17  ----------------------------<  89  3.7   |  27  |  0.73    Ca    9.3      04 Sep 2022 15:34  Mg     1.9     09-04    TPro  6.5  /  Alb  3.2<L>  /  TBili  0.5  /  DBili  x   /  AST  13  /  ALT  14  /  AlkPhos  107  09-04    LIVER FUNCTIONS - ( 04 Sep 2022 15:34 )  Alb: 3.2 g/dL / Pro: 6.5 g/dL / ALK PHOS: 107 U/L / ALT: 14 U/L / AST: 13 U/L / GGT: x           PT/INR - ( 04 Sep 2022 15:34 )   PT: 13.5 sec;   INR: 1.17 ratio         PTT - ( 04 Sep 2022 15:34 )  PTT:23.6 sec  Urinalysis Basic - ( 04 Sep 2022 17:55 )    Color: Colorless / Appearance: Clear / S.008 / pH: x  Gluc: x / Ketone: Negative  / Bili: Negative / Urobili: Negative   Blood: x / Protein: Negative / Nitrite: Negative   Leuk Esterase: Negative / RBC: 1 /hpf / WBC 0 /HPF   Sq Epi: x / Non Sq Epi: 0 /hpf / Bacteria: Negative          Imaging personally reviewed by me:

## 2022-09-06 PROCEDURE — 93010 ELECTROCARDIOGRAM REPORT: CPT

## 2022-09-06 RX ADMIN — Medication 650 MILLIGRAM(S): at 05:02

## 2022-09-06 RX ADMIN — ENOXAPARIN SODIUM 40 MILLIGRAM(S): 100 INJECTION SUBCUTANEOUS at 20:28

## 2022-09-06 RX ADMIN — Medication 1 TABLET(S): at 12:29

## 2022-09-06 RX ADMIN — Medication 650 MILLIGRAM(S): at 06:00

## 2022-09-06 RX ADMIN — SODIUM CHLORIDE 70 MILLILITER(S): 9 INJECTION, SOLUTION INTRAVENOUS at 18:42

## 2022-09-06 RX ADMIN — Medication 25 MILLIGRAM(S): at 12:29

## 2022-09-06 RX ADMIN — Medication 1 SPRAY(S): at 20:28

## 2022-09-06 RX ADMIN — PANTOPRAZOLE SODIUM 40 MILLIGRAM(S): 20 TABLET, DELAYED RELEASE ORAL at 09:25

## 2022-09-06 RX ADMIN — MORPHINE SULFATE 2 MILLIGRAM(S): 50 CAPSULE, EXTENDED RELEASE ORAL at 09:25

## 2022-09-06 RX ADMIN — GABAPENTIN 100 MILLIGRAM(S): 400 CAPSULE ORAL at 20:28

## 2022-09-06 RX ADMIN — DUREZOL 1 DROP(S): 0.5 EMULSION OPHTHALMIC at 09:30

## 2022-09-06 RX ADMIN — GABAPENTIN 100 MILLIGRAM(S): 400 CAPSULE ORAL at 09:26

## 2022-09-06 RX ADMIN — Medication 60 MILLIGRAM(S): at 12:30

## 2022-09-06 RX ADMIN — SODIUM CHLORIDE 70 MILLILITER(S): 9 INJECTION, SOLUTION INTRAVENOUS at 22:05

## 2022-09-06 RX ADMIN — Medication 1000 UNIT(S): at 12:30

## 2022-09-06 RX ADMIN — DUREZOL 1 DROP(S): 0.5 EMULSION OPHTHALMIC at 20:28

## 2022-09-06 RX ADMIN — Medication 1 SPRAY(S): at 09:28

## 2022-09-06 RX ADMIN — AMLODIPINE BESYLATE 5 MILLIGRAM(S): 2.5 TABLET ORAL at 18:42

## 2022-09-06 NOTE — PHYSICAL THERAPY INITIAL EVALUATION ADULT - ORIENTATION, REHAB EVAL
oriented to person, place, time and situation H Plasty Text: Given the location of the defect, shape of the defect and the proximity to free margins a H-plasty was deemed most appropriate for repair.  Using a sterile surgical marker, the appropriate advancement arms of the H-plasty were drawn incorporating the defect and placing the expected incisions within the relaxed skin tension lines where possible. The area thus outlined was incised deep to adipose tissue with a #15 scalpel blade. The skin margins were undermined to an appropriate distance in all directions utilizing iris scissors.  The opposing advancement arms were then advanced into place in opposite direction and anchored with interrupted buried subcutaneous sutures.

## 2022-09-06 NOTE — PHYSICAL THERAPY INITIAL EVALUATION ADULT - PERTINENT HX OF CURRENT PROBLEM, REHAB EVAL
87 Y F H/O Breast CA, MAC untreated, MVP, asthma, mod  AS, on prednisone for PMR since July w resolution of diffuse symptoms. Ptn was discharged to  Rehab and 3 days ago came home.   About 10 days ago she developed severe back pain, denies trauma,   at present the pain is 10/10, pain radiates from the back  to the abdomen and right hip.  In Addition ptn had been constipated as well. At baseline abc in 5-6K, today >8K  Ptn had PVR of 250 cc here, she was straight cathed. she also had a abd/pelvic CT which revealed severe stool burden and compression Fx in Ls. ptn also points to thoracic area as another point of pain. Ct revealed OLD T9 and T12 compression Fxs and Acute L3 compression Fx  back pain 2/2 acute L3 compression Fx is controlled w Neurontin 100 mg bid. TLSO Brace. CT T/L spine- Acute superior endplate compression deformity L3.

## 2022-09-06 NOTE — PROGRESS NOTE ADULT - ASSESSMENT
ECHO 2/19/22: EF 67%; mild mr, mod as, nl LV sys fx, mod concentric lVH,   ECHO 7/5/22 ef 68%; mild as, nl LV sys fx  Mild diastolic dysfunction (Stage I).    a/p     87F c hx remote breast ca, MAC untreated, MVP, asthma, mod AS, paroxysmal tachycardia of unknown rhythm, orthostatic hypotension (Feb '22) c/b syncope, anxiety, depression, recent covid (May '22), recent accidental valium toxicity , pw back pain and constipationonfusion, syncope, hypotension.    #atypical chest pain   -likely GERD related- reports chronic issues with GERD  -CHECK ECG   -recent echo in feb  EF 67%; mild mr, mod as, nl LV sys fx, mod concentric lVH,     #Back Pain  -CT noted  -pain control    #Constipation  -mgmt per med/ GI     # Moderate AS,    -prior echo with mild as    #Atach  -cv stable no events     cont current tx    med f/u  dvt ppx

## 2022-09-06 NOTE — PHYSICAL THERAPY INITIAL EVALUATION ADULT - ADDITIONAL COMMENTS
Pt. lives in apt. with daughter, elevator+.  Patient ambulated with rolling walker independent. pt owns rw, bed side commode at home. no h/o falls.

## 2022-09-06 NOTE — PROGRESS NOTE ADULT - ASSESSMENT
87 year old female with history of breast CA, MAC untreated, MVP, asthma, mod AS, PMR on prednisone presenting from rehab with severe back pain that radiated to abdomen and right hip.    1. Constipation, resolved   - large stool burden on CT  - having BMs s/p 1 L moviprep   - cw Miralax 17g BID and senna 2 tabs qhs     2. Back pain  - L3 compression fx  - osteopenia, 2/2 steroids for PMR and long term Prilosec use   consider dc Prilosec given hx of fractures   - prn pain control    3. Epigastric pain/ left chest pain  -pain reproducible by palpation, likely musculoskeletal  -prn pain meds             Attending supervision statement: I have personally seen and examined the patient. I fully participated in the care of this patient. I have made amendments to the documentation where necessary, and agree with the history, physical exam, and plan as outlined by the ACP.    [address]  Office: [phone]

## 2022-09-06 NOTE — PHYSICAL THERAPY INITIAL EVALUATION ADULT - PATIENT/FAMILY AGREES WITH PLAN
Home with home PT for safety assessment, gait,stair negotiation, balance, & strength training and to return pt to baseline functional mobility status. (call placed to pt's daughter in both nos. no answer). will f/u/yes

## 2022-09-06 NOTE — PHYSICAL THERAPY INITIAL EVALUATION ADULT - PRECAUTIONS/LIMITATIONS, REHAB EVAL
TLSO brace./spinal precautions TLSO brace. Consluted Nadeige NP. per NP, pt cleared for PT/amb wearing TLSO Brace./spinal precautions

## 2022-09-07 ENCOUNTER — TRANSCRIPTION ENCOUNTER (OUTPATIENT)
Age: 87
End: 2022-09-07

## 2022-09-07 LAB — SARS-COV-2 RNA SPEC QL NAA+PROBE: SIGNIFICANT CHANGE UP

## 2022-09-07 RX ADMIN — Medication 1000 UNIT(S): at 12:59

## 2022-09-07 RX ADMIN — Medication 60 MILLIGRAM(S): at 12:58

## 2022-09-07 RX ADMIN — DUREZOL 1 DROP(S): 0.5 EMULSION OPHTHALMIC at 10:16

## 2022-09-07 RX ADMIN — Medication 25 MILLIGRAM(S): at 12:59

## 2022-09-07 RX ADMIN — DUREZOL 1 DROP(S): 0.5 EMULSION OPHTHALMIC at 21:10

## 2022-09-07 RX ADMIN — Medication 1 SPRAY(S): at 10:16

## 2022-09-07 RX ADMIN — AMLODIPINE BESYLATE 5 MILLIGRAM(S): 2.5 TABLET ORAL at 17:31

## 2022-09-07 RX ADMIN — Medication 1 SPRAY(S): at 21:09

## 2022-09-07 RX ADMIN — Medication 1 TABLET(S): at 12:59

## 2022-09-07 RX ADMIN — ENOXAPARIN SODIUM 40 MILLIGRAM(S): 100 INJECTION SUBCUTANEOUS at 21:09

## 2022-09-07 RX ADMIN — GABAPENTIN 100 MILLIGRAM(S): 400 CAPSULE ORAL at 21:09

## 2022-09-07 RX ADMIN — POLYETHYLENE GLYCOL 3350 17 GRAM(S): 17 POWDER, FOR SOLUTION ORAL at 06:31

## 2022-09-07 RX ADMIN — PANTOPRAZOLE SODIUM 40 MILLIGRAM(S): 20 TABLET, DELAYED RELEASE ORAL at 06:30

## 2022-09-07 RX ADMIN — GABAPENTIN 100 MILLIGRAM(S): 400 CAPSULE ORAL at 10:18

## 2022-09-07 NOTE — PROGRESS NOTE ADULT - ASSESSMENT
87 year old female with history of breast CA, MAC untreated, MVP, asthma, mod AS, PMR on prednisone presenting from rehab with severe back pain that radiated to abdomen and right hip.    1. Constipation, resolved   - large stool burden on CT  - having BMs s/p 1 L moviprep   - cw Miralax 17g BID and senna 2 tabs qhs     2. Back pain  - L3 compression fx  - osteopenia, 2/2 steroids for PMR and long term Prilosec use   consider dc Prilosec given hx of fractures   - prn pain control    3. Epigastric pain/ left chest pain  -pain reproducible by palpation, likely musculoskeletal  -prn pain meds   -PPI daily, elective outpt EGD             Attending supervision statement: I have personally seen and examined the patient. I fully participated in the care of this patient. I have made amendments to the documentation where necessary, and agree with the history, physical exam, and plan as outlined by the ACP.    [address]  Office: [phone] 87 year old female with history of breast CA, MAC untreated, MVP, asthma, mod AS, PMR on prednisone presenting from rehab with severe back pain that radiated to abdomen and right hip.    1. Constipation, resolved   - large stool burden on CT  - having BMs s/p 1 L moviprep   - cw Miralax 17g BID and senna 2 tabs qhs     2. Back pain  - L3 compression fx  - osteopenia, 2/2 steroids for PMR and long term Prilosec use   consider dc Prilosec given hx of fractures   - prn pain control    3. Epigastric pain/ left chest pain  -pain reproducible by palpation, likely musculoskeletal  -prn pain meds   -elective outpt EGD             Attending supervision statement: I have personally seen and examined the patient. I fully participated in the care of this patient. I have made amendments to the documentation where necessary, and agree with the history, physical exam, and plan as outlined by the ACP.    [address]  Office: [phone]

## 2022-09-07 NOTE — DISCHARGE NOTE PROVIDER - NSDCMRMEDTOKEN_GEN_ALL_CORE_FT
amLODIPine 5 mg oral tablet: 1 tab(s) orally once a day (in the evening)  Clear Eyes 0.012% ophthalmic solution: 1 drop(s) to each affected eye 3 times a day, As Needed  Durezol 0.05% ophthalmic emulsion: 1 drop(s) in the left eye 2 times a day  Flonase 50 mcg/inh nasal spray: 1 spray(s) in each nostril 2 times a day  FLUoxetine 20 mg oral capsule: 3 cap(s) orally once a day  metoprolol succinate 25 mg oral tablet, extended release: 0.5 tab(s) orally once a day  omeprazole 40 mg oral delayed release capsule: 1 cap(s) orally once a day  predniSONE: 30 milligram(s) orally once a day  NOTE: patient [id number] + 20mg tabs at home  Tylenol 500 mg oral tablet: 2 tab(s) orally 3 times a day  Vitamin D3 25 mcg (1000 intl units) oral tablet: 1 tab(s) orally once a day   amLODIPine 5 mg oral tablet: 1 tab(s) orally once a day (in the evening)  calcium carbonate 1250 mg (500 mg elemental calcium) oral tablet: 1 tab(s) orally once a day  Clear Eyes 0.012% ophthalmic solution: 1 drop(s) to each affected eye 3 times a day, As Needed  Durezol 0.05% ophthalmic emulsion: 1 drop(s) in the left eye 2 times a day  Flonase 50 mcg/inh nasal spray: 1 spray(s) in each nostril 2 times a day  FLUoxetine 20 mg oral capsule: 3 cap(s) orally once a day  gabapentin 100 mg oral capsule: 1 cap(s) orally 2 times a day  melatonin 5 mg oral tablet: 1 tab(s) orally once a day (at bedtime), As needed, Insomnia  metoprolol succinate 25 mg oral tablet, extended release: 1 tab(s) orally once a day  omeprazole 40 mg oral delayed release capsule: 1 cap(s) orally once a day  polyethylene glycol 3350 oral powder for reconstitution: 17 gram(s) orally 2 times a day  predniSONE 5 mg oral tablet: 4 tabs 20mg po x 5 days till 9/13th then 2 tabs ( 10mg po x 5 days ) then 1 tab - 5mg po   senna leaf extract oral tablet: 2 tab(s) orally once a day (at bedtime)  Tylenol 500 mg oral tablet: 2 tab(s) orally 3 times a day  Vitamin D3 25 mcg (1000 intl units) oral tablet: 1 tab(s) orally once a day   amLODIPine 5 mg oral tablet: 1 tab(s) orally once a day (in the evening)  calcium carbonate 1250 mg (500 mg elemental calcium) oral tablet: 1 tab(s) orally once a day  Clear Eyes 0.012% ophthalmic solution: 1 drop(s) to each affected eye 3 times a day, As Needed  Durezol 0.05% ophthalmic emulsion: 1 drop(s) in the left eye 2 times a day  Flonase 50 mcg/inh nasal spray: 1 spray(s) in each nostril 2 times a day  FLUoxetine 20 mg oral capsule: 3 cap(s) orally once a day  gabapentin 100 mg oral capsule: 1 cap(s) orally 2 times a day  melatonin 5 mg oral tablet: 1 tab(s) orally once a day (at bedtime), As needed, Insomnia  metoprolol succinate 25 mg oral tablet, extended release: 1 tab(s) orally once a day  omeprazole 40 mg oral delayed release capsule: 1 cap(s) orally once a day  polyethylene glycol 3350 oral powder for reconstitution: 17 gram(s) orally 2 times a day  predniSONE 10 mg oral tablet: 3 tab(s) orally once a day   Rolling Walker :   senna leaf extract oral tablet: 2 tab(s) orally once a day (at bedtime)  tiZANidine 2 mg oral tablet: 2 tab(s) orally every 8 hours, As Needed -for moderate pain neck pain   Transport Wheelchair : 1   once a day   Tylenol 500 mg oral tablet: 2 tab(s) orally 3 times a day  Vitamin D3 25 mcg (1000 intl units) oral tablet: 1 tab(s) orally once a day  Walker :    amLODIPine 5 mg oral tablet: 1 tab(s) orally once a day (in the evening)  calcium carbonate 1250 mg (500 mg elemental calcium) oral tablet: 1 tab(s) orally once a day  Clear Eyes 0.012% ophthalmic solution: 1 drop(s) to each affected eye 3 times a day, As Needed  Durezol 0.05% ophthalmic emulsion: 1 drop(s) in the left eye 2 times a day  Flonase 50 mcg/inh nasal spray: 1 spray(s) in each nostril 2 times a day  FLUoxetine 20 mg oral capsule: 3 cap(s) orally once a day  gabapentin 100 mg oral capsule: 1 cap(s) orally 2 times a day  melatonin 5 mg oral tablet: 1 tab(s) orally once a day (at bedtime), As needed, Insomnia  metoprolol succinate 25 mg oral tablet, extended release: 1 tab(s) orally once a day  pantoprazole 40 mg oral delayed release tablet: 1 tab(s) orally once a day (before a meal)  polyethylene glycol 3350 oral powder for reconstitution: 17 gram(s) orally 2 times a day  predniSONE 10 mg oral tablet: 3 tab(s) orally once a day   Rolling Walker : once a day   senna leaf extract oral tablet: 2 tab(s) orally once a day (at bedtime)  tiZANidine 2 mg oral tablet: 2 tab(s) orally every 8 hours, As Needed -for moderate pain neck pain   Transport Wheelchair : 1   once a day   Tylenol 500 mg oral tablet: 2 tab(s) orally 3 times a day  Vitamin D3 25 mcg (1000 intl units) oral tablet: 1 tab(s) orally once a day  Walker : once a day

## 2022-09-07 NOTE — DISCHARGE NOTE PROVIDER - CARE PROVIDER_API CALL
Criselda Cardona)  Internal Medicine  [address], [address]  [place] [address]  Phone: [phone]  Fax: [phone]  Established Patient  Follow Up Time: 2 weeks

## 2022-09-07 NOTE — DISCHARGE NOTE PROVIDER - NSFOLLOWUPCLINICS_GEN_ALL_ED_FT
[facility] Rhe[facility]  Rheumatology  [address] [address]  Phone: [phone]  Fax:   Follow Up Time: 1 week

## 2022-09-07 NOTE — PROGRESS NOTE ADULT - ASSESSMENT
87 Y F H/O Breast CA, MAC untreated, MVP, asthma, mod  AS, on prednisone for PMR since July w resolution of diffuse symptoms. Ptn was discharged to  Rehab and 3 days ago came home.   About 10 days ago she developed severe back pain, denies trauma,   at present the pain is 10/10, pain radiates from the back  to the abdomen and right hip.  In Addition ptn had been constipated as well. At baseline abc in 5-6K, today >8K  Ptn had PVR of 250 cc here, she was straight cathed. she also had a abd/pelvic CT which revealed severe stool burden and compression Fx in Ls. ptn also points to thoracic area as another point of pain. Ptn had been taper down to prednisone 30 mg from 50 mg 2 mo ago.     PLAN:  9/4: ABd/Pelvic ct:  L3 compression fx: get dedicated LS and T spine ct,   ptn also c/o pain over Right hip, will get pelvic ct as well.   ptn denies trauma. she has been on steroids however for 2 months. she will need outptn Forteo and alike treatment for OP  pain control w Morphine/Gabapentin/Tylenol  will order TLSO brace  Neuro consult  GI consult for Bowel regulation  9/5: seen by GI, NEURO, had Ct T/L/S spine, seen by orthotics, ptn refusing po Xarelto for DVT ppx, wants " the injection"  Ct revealed OLD T9 and T12 compression Fxs and Acute L3 compression Fx  back pain 2/2 acute L3 compression Fx is controlled w Neurontin 100 mg bid,   ptn is awaiting drinking Movieprep to evacuate large stool burden from which she has abd pain and urinary retention.   ptn hasn't needed Morphine since 5 am.   TLSO brace delivered.   PT eval pending.   ptn will need OP treatment on outptn basis.   DVT ppx w sc Lovenox  9/6: ptn walked w pt today, dc planning home w home pt. she had a large BM, abd pain resolved. seen by GI. ptn has h/o OP, she will need aggressive outptn therapy, doubt PRILOSEC/PPI will harm her at this point. she needs GI protection considering she is on prednisone for PMR. on 9/11 prednisone dose should be lowered to 20 mg. ptn needs to F/U w her PMD Dr. Criselda Cardona post DC. DC planning in am home w home PT  9/7: ptn has no pain, she is comfortable, awaiting DC home in am. RHEUM team notified to make prednisone taper recs prior to DC

## 2022-09-07 NOTE — DISCHARGE NOTE PROVIDER - NSDCFUADDAPPT_GEN_ALL_CORE_FT
Please follow up with your primary care provider within 1-2 weeks of discharge Please follow up with your primary care provider within 1-2 weeks of discharge  Follow up with rheum to taper steroids in 1 week

## 2022-09-07 NOTE — DISCHARGE NOTE PROVIDER - HOSPITAL COURSE
87 Y F H/O Breast CA, MAC untreated, MVP, asthma, mod  AS, on prednisone for PMR since July w resolution of diffuse symptoms. Ptn was discharged to  Rehab and 3 days ago came home.   About 10 days ago she developed severe back pain, denies trauma,   at present the pain is 10/10, pain radiates from the back  to the abdomen and right hip.  In Addition ptn had been constipated as well. At baseline abc in 5-6K, today >8K  Ptn had PVR of 250 cc here, she was straight cathed. she also had a abd/pelvic CT which revealed severe stool burden and compression Fx in Ls. ptn also points to thoracic area as another point of pain. Ptn had been taper down to prednisone 30 mg from 50 mg 2 mo ago.     PLAN:  9/4: ABd/Pelvic ct:  L3 compression fx: get dedicated LS and T spine ct,   ptn also c/o pain over Right hip, will get pelvic ct as well.   ptn denies trauma. she has been on steroids however for 2 months. she will need outptn Forteo and alike treatment for OP  pain control w Morphine/Gabapentin/Tylenol  will order TLSO brace  Neuro consult  GI consult for Bowel regulation  9/5: seen by GI, NEURO, had Ct T/L/S spine, seen by orthotics, ptn refusing po Xarelto for DVT ppx, wants " the injection"  Ct revealed OLD T9 and T12 compression Fxs and Acute L3 compression Fx  back pain 2/2 acute L3 compression Fx is controlled w Neurontin 100 mg bid,   ptn is awaiting drinking Movieprep to evacuate large stool burden from which she has abd pain and urinary retention.   ptn hasn't needed Morphine since 5 am.   TLSO brace delivered.   PT eval pending.   ptn will need OP treatment on outptn basis.   DVT ppx w sc Lovenox  9/6: ptn walked w pt today, dc planning home w home pt. she had a large BM, abd pain resolved. seen by GI. ptn has h/o OP, she will need aggressive outptn therapy, doubt PRILOSEC/PPI will harm her at this point. she needs GI protection considering she is on prednisone for PMR. on 9/11 prednisone dose should be lowered to 20 mg. ptn needs to F/U w her PMD Dr. Criselda Cardona post DC. DC planning in am home w home PT     ** INCOMPLETE - NEEDS MED REC AND COMPLETED CARE PLAN**    88 y/o Female w/ PMHx of Breast Cancer, MAC (untreated), MVP, Mod AS, PMR (on Prednisone since July) presented to the hospital c/o severe back a/w [id number] days prior to presentation. CTAP revealed L3 compression fracture. Dedicated CT TLS spine revealed OLD T9 and T12 compression fxs and acute L3 compression fracture. Patient was seen by neurology and orthotics and TLSO brace ordered and delivered. GI evaluated patient for severe constipation and large stool burden on CT. Placed on aggressive bowel regimen w/ improvement. Also with urinary retention during hospital course required straight cath x1.       Patient seen and examined at the bedside on ______. No significant events on telemetry overnight. AM labs wnl, VSS/HD stable. Denies any complaints at this time. Patient has been medically cleared for discharge as per  ______. Patient has been given appropriate discharge instructions including medication regimen, and follow up. Medications that patient needs refills on (+/- new medications) have been e-prescribed to preferred pharmacy. Patient will f/u with Dr. Criselda Cardona in 1-2 weeks for further management.     Temp HR BP SpO2 RR; VSS  Gen: NAD, A&O x3  Cards: RRR, clear S1 and S2 without murmur  Pulm: CTA B/L without w/r/r  Abd: soft, NT  Ext: no LE edema or ulcerations B/L    ** Per Dr. Bucio, start Prilosec/PPI on d/c and lower Prednisone dose to 20mg on 9/11       87 Y F H/O Breast CA, MAC untreated, MVP, asthma, mod  AS, on prednisone for PMR since July w resolution of diffuse symptoms. Ptn was discharged to  Rehab and 3 days ago came home.   About 10 days ago she developed severe back pain, denies trauma,   at present the pain is 10/10, pain radiates from the back  to the abdomen and right hip.  In Addition ptn had been constipated as well. At baseline abc in 5-6K, today >8K  Ptn had PVR of 250 cc here, she was straight cathed. she also had a abd/pelvic CT which revealed severe stool burden and compression Fx in Ls. ptn also points to thoracic area as another point of pain. Ptn had been taper down to prednisone 30 mg from 50 mg 2 mo ago.     PLAN:  9/4: ABd/Pelvic ct:  L3 compression fx: get dedicated LS and T spine ct,   ptn also c/o pain over Right hip, will get pelvic ct as well.   ptn denies trauma. she has been on steroids however for 2 months. she will need outptn Forteo and alike treatment for OP  pain control w Morphine/Gabapentin/Tylenol  will order TLSO brace  Neuro consult  GI consult for Bowel regulation  9/5: seen by GI, NEURO, had Ct T/L/S spine, seen by orthotics, ptn refusing po Xarelto for DVT ppx, wants " the injection"  Ct revealed OLD T9 and T12 compression Fxs and Acute L3 compression Fx  back pain 2/2 acute L3 compression Fx is controlled w Neurontin 100 mg bid,   ptn is awaiting drinking Movieprep to evacuate large stool burden from which she has abd pain and urinary retention.   ptn hasn't needed Morphine since 5 am.   TLSO brace delivered.   PT eval pending.   ptn will need OP treatment on outptn basis.   DVT ppx w sc Lovenox  9/6: ptn walked w pt today, dc planning home w home pt. she had a large BM, abd pain resolved. seen by GI. ptn has h/o OP, she will need aggressive outptn therapy, doubt PRILOSEC/PPI will harm her at this point. she needs GI protection considering she is on prednisone for PMR. on 9/11 prednisone dose should be lowered to 20 mg. ptn needs to F/U w her PMD Dr. Criselda Cardona post DC. DC planning in am home w home PT  9/7: ptn has no pain, she is comfortable, awaiting DC home in am. RHEUM team notified to make prednisone taper recs prior to DC

## 2022-09-07 NOTE — PROGRESS NOTE ADULT - ASSESSMENT
ECHO 2/19/22: EF 67%; mild mr, mod as, nl LV sys fx, mod concentric lVH,   ECHO 7/5/22 ef 68%; mild as, nl LV sys fx  Mild diastolic dysfunction (Stage I).    a/p     87F c hx remote breast ca, MAC untreated, MVP, asthma, mod AS, paroxysmal tachycardia of unknown rhythm, orthostatic hypotension (Feb '22) c/b syncope, anxiety, depression, recent covid (May '22), recent accidental valium toxicity , pw back pain and constipationonfusion, syncope, hypotension.    #atypical chest pain   -likely GERD / GI related- reports chronic issues with GERD  -ECG with ischemic changes   -recent echo in feb  EF 67%; mild mr, mod as, nl LV sys fx, mod concentric lVH,     #Back Pain  -CT noted  -pain control    #Constipation  -mgmt per med/ GI     # Moderate AS,    -prior echo with mild as    #Atach  -cv stable no events     cont current tx    med f/u  dvt ppx

## 2022-09-07 NOTE — DISCHARGE NOTE PROVIDER - NSDCCPCAREPLAN_GEN_ALL_CORE_FT
PRINCIPAL DISCHARGE DIAGNOSIS  Diagnosis: Abdominal pain  Assessment and Plan of Treatment: You were having severe back/belly pain and constipation prompting you to come to the hospital. You had a CT scan which showed a fracture in your spine as well as constipation. You were seen by our neurology, gastroenterology and orthodontic team. You were given medication to help alleviate the constipation and delivered a TLSO brace for the spine fracture. Please follow up with your primary care provider within 1-2 weeks of discharge.      SECONDARY DISCHARGE DIAGNOSES  Diagnosis: HTN (hypertension)  Assessment and Plan of Treatment: You have high blood pressure. Continue taking your blood pressure medications as prescribed. Eat a heart healthy diet with low salt; exercise regularly (if cleared by your primary care doctor or cardiologist). Maintain a heart healthy weight and include healthy ways to manage stress. If you smoke, quit. If you need assistance to help you stop smoking, please use the following resource: [facility] – ([phone]). Follow up with your primary care doctor to continue having your blood pressure checked on a continual basis.    Diagnosis: PMR (polymyalgia rheumatica)  Assessment and Plan of Treatment: You have known PMR. Please continue taking Prednisone 25mg daily. Follow up with your primary care doctor and the person who manages your PMR within 1-2 weeks of discharge. It is recommended that you decrease the dose of Prednisone to 20mg daily beginning on 9/11/22.

## 2022-09-08 ENCOUNTER — INPATIENT (INPATIENT)
Facility: HOSPITAL | Age: 87
LOS: 7 days | Discharge: HOME | DRG: 516 | End: 2022-09-16
Attending: INTERNAL MEDICINE | Admitting: INTERNAL MEDICINE
Payer: MEDICARE

## 2022-09-08 ENCOUNTER — TRANSCRIPTION ENCOUNTER (OUTPATIENT)
Age: 87
End: 2022-09-08

## 2022-09-08 VITALS
HEIGHT: 68 IN | SYSTOLIC BLOOD PRESSURE: 123 MMHG | RESPIRATION RATE: 16 BRPM | OXYGEN SATURATION: 94 % | HEART RATE: 64 BPM | DIASTOLIC BLOOD PRESSURE: 75 MMHG | WEIGHT: 145.06 LBS | TEMPERATURE: 98 F

## 2022-09-08 VITALS
DIASTOLIC BLOOD PRESSURE: 63 MMHG | RESPIRATION RATE: 18 BRPM | SYSTOLIC BLOOD PRESSURE: 121 MMHG | HEART RATE: 76 BPM | TEMPERATURE: 98 F | OXYGEN SATURATION: 94 %

## 2022-09-08 DIAGNOSIS — M54.9 DORSALGIA, UNSPECIFIED: ICD-10-CM

## 2022-09-08 LAB
ALBUMIN SERPL ELPH-MCNC: 2.9 G/DL — LOW (ref 3.3–5)
ALP SERPL-CCNC: 100 U/L — SIGNIFICANT CHANGE UP (ref 40–120)
ALT FLD-CCNC: 19 U/L — SIGNIFICANT CHANGE UP (ref 10–45)
ANION GAP SERPL CALC-SCNC: 10 MMOL/L — SIGNIFICANT CHANGE UP (ref 5–17)
ANION GAP SERPL CALC-SCNC: 12 MMOL/L — SIGNIFICANT CHANGE UP (ref 5–17)
AST SERPL-CCNC: 29 U/L — SIGNIFICANT CHANGE UP (ref 10–40)
BASOPHILS # BLD AUTO: 0.03 K/UL — SIGNIFICANT CHANGE UP (ref 0–0.2)
BASOPHILS NFR BLD AUTO: 0.4 % — SIGNIFICANT CHANGE UP (ref 0–2)
BILIRUB SERPL-MCNC: 0.2 MG/DL — SIGNIFICANT CHANGE UP (ref 0.2–1.2)
BUN SERPL-MCNC: 14 MG/DL — SIGNIFICANT CHANGE UP (ref 7–23)
BUN SERPL-MCNC: 20 MG/DL — SIGNIFICANT CHANGE UP (ref 7–23)
CALCIUM SERPL-MCNC: 8.7 MG/DL — SIGNIFICANT CHANGE UP (ref 8.4–10.5)
CALCIUM SERPL-MCNC: 9.4 MG/DL — SIGNIFICANT CHANGE UP (ref 8.4–10.5)
CHLORIDE SERPL-SCNC: 100 MMOL/L — SIGNIFICANT CHANGE UP (ref 96–108)
CHLORIDE SERPL-SCNC: 97 MMOL/L — SIGNIFICANT CHANGE UP (ref 96–108)
CO2 SERPL-SCNC: 22 MMOL/L — SIGNIFICANT CHANGE UP (ref 22–31)
CO2 SERPL-SCNC: 28 MMOL/L — SIGNIFICANT CHANGE UP (ref 22–31)
CREAT SERPL-MCNC: 0.55 MG/DL — SIGNIFICANT CHANGE UP (ref 0.5–1.3)
CREAT SERPL-MCNC: 0.94 MG/DL — SIGNIFICANT CHANGE UP (ref 0.5–1.3)
EGFR: 59 ML/MIN/1.73M2 — LOW
EGFR: 89 ML/MIN/1.73M2 — SIGNIFICANT CHANGE UP
EOSINOPHIL # BLD AUTO: 0.15 K/UL — SIGNIFICANT CHANGE UP (ref 0–0.5)
EOSINOPHIL NFR BLD AUTO: 2.1 % — SIGNIFICANT CHANGE UP (ref 0–6)
FLUAV AG NPH QL: SIGNIFICANT CHANGE UP
FLUBV AG NPH QL: SIGNIFICANT CHANGE UP
GLUCOSE SERPL-MCNC: 90 MG/DL — SIGNIFICANT CHANGE UP (ref 70–99)
GLUCOSE SERPL-MCNC: 94 MG/DL — SIGNIFICANT CHANGE UP (ref 70–99)
HCT VFR BLD CALC: 30.1 % — LOW (ref 34.5–45)
HCT VFR BLD CALC: 34.3 % — LOW (ref 34.5–45)
HGB BLD-MCNC: 10.8 G/DL — LOW (ref 11.5–15.5)
HGB BLD-MCNC: 9.5 G/DL — LOW (ref 11.5–15.5)
IMM GRANULOCYTES NFR BLD AUTO: 0.8 % — SIGNIFICANT CHANGE UP (ref 0–1.5)
LYMPHOCYTES # BLD AUTO: 2.69 K/UL — SIGNIFICANT CHANGE UP (ref 1–3.3)
LYMPHOCYTES # BLD AUTO: 38 % — SIGNIFICANT CHANGE UP (ref 13–44)
MCHC RBC-ENTMCNC: 28.3 PG — SIGNIFICANT CHANGE UP (ref 27–34)
MCHC RBC-ENTMCNC: 28.4 PG — SIGNIFICANT CHANGE UP (ref 27–34)
MCHC RBC-ENTMCNC: 31.5 GM/DL — LOW (ref 32–36)
MCHC RBC-ENTMCNC: 31.6 GM/DL — LOW (ref 32–36)
MCV RBC AUTO: 89.9 FL — SIGNIFICANT CHANGE UP (ref 80–100)
MCV RBC AUTO: 90 FL — SIGNIFICANT CHANGE UP (ref 80–100)
MONOCYTES # BLD AUTO: 0.99 K/UL — HIGH (ref 0–0.9)
MONOCYTES NFR BLD AUTO: 14 % — SIGNIFICANT CHANGE UP (ref 2–14)
NEUTROPHILS # BLD AUTO: 3.15 K/UL — SIGNIFICANT CHANGE UP (ref 1.8–7.4)
NEUTROPHILS NFR BLD AUTO: 44.7 % — SIGNIFICANT CHANGE UP (ref 43–77)
NRBC # BLD: 0 /100 WBCS — SIGNIFICANT CHANGE UP (ref 0–0)
NRBC # BLD: 0 /100 WBCS — SIGNIFICANT CHANGE UP (ref 0–0)
PLATELET # BLD AUTO: 259 K/UL — SIGNIFICANT CHANGE UP (ref 150–400)
PLATELET # BLD AUTO: 288 K/UL — SIGNIFICANT CHANGE UP (ref 150–400)
POTASSIUM SERPL-MCNC: 3.7 MMOL/L — SIGNIFICANT CHANGE UP (ref 3.5–5.3)
POTASSIUM SERPL-MCNC: 4.6 MMOL/L — SIGNIFICANT CHANGE UP (ref 3.5–5.3)
POTASSIUM SERPL-SCNC: 3.7 MMOL/L — SIGNIFICANT CHANGE UP (ref 3.5–5.3)
POTASSIUM SERPL-SCNC: 4.6 MMOL/L — SIGNIFICANT CHANGE UP (ref 3.5–5.3)
PROT SERPL-MCNC: 6 G/DL — SIGNIFICANT CHANGE UP (ref 6–8.3)
RBC # BLD: 3.35 M/UL — LOW (ref 3.8–5.2)
RBC # BLD: 3.81 M/UL — SIGNIFICANT CHANGE UP (ref 3.8–5.2)
RBC # FLD: 16.2 % — HIGH (ref 10.3–14.5)
RBC # FLD: 16.3 % — HIGH (ref 10.3–14.5)
RSV RNA NPH QL NAA+NON-PROBE: SIGNIFICANT CHANGE UP
SARS-COV-2 RNA SPEC QL NAA+PROBE: SIGNIFICANT CHANGE UP
SODIUM SERPL-SCNC: 134 MMOL/L — LOW (ref 135–145)
SODIUM SERPL-SCNC: 135 MMOL/L — SIGNIFICANT CHANGE UP (ref 135–145)
WBC # BLD: 7.07 K/UL — SIGNIFICANT CHANGE UP (ref 3.8–10.5)
WBC # BLD: 8 K/UL — SIGNIFICANT CHANGE UP (ref 3.8–10.5)
WBC # FLD AUTO: 7.07 K/UL — SIGNIFICANT CHANGE UP (ref 3.8–10.5)
WBC # FLD AUTO: 8 K/UL — SIGNIFICANT CHANGE UP (ref 3.8–10.5)

## 2022-09-08 PROCEDURE — 87086 URINE CULTURE/COLONY COUNT: CPT

## 2022-09-08 PROCEDURE — 84484 ASSAY OF TROPONIN QUANT: CPT

## 2022-09-08 PROCEDURE — 83036 HEMOGLOBIN GLYCOSYLATED A1C: CPT

## 2022-09-08 PROCEDURE — 94640 AIRWAY INHALATION TREATMENT: CPT

## 2022-09-08 PROCEDURE — 85027 COMPLETE CBC AUTOMATED: CPT

## 2022-09-08 PROCEDURE — 80053 COMPREHEN METABOLIC PANEL: CPT

## 2022-09-08 PROCEDURE — 84295 ASSAY OF SERUM SODIUM: CPT

## 2022-09-08 PROCEDURE — 72192 CT PELVIS W/O DYE: CPT

## 2022-09-08 PROCEDURE — 82803 BLOOD GASES ANY COMBINATION: CPT

## 2022-09-08 PROCEDURE — 99285 EMERGENCY DEPT VISIT HI MDM: CPT

## 2022-09-08 PROCEDURE — 71045 X-RAY EXAM CHEST 1 VIEW: CPT

## 2022-09-08 PROCEDURE — 82435 ASSAY OF BLOOD CHLORIDE: CPT

## 2022-09-08 PROCEDURE — 82330 ASSAY OF CALCIUM: CPT

## 2022-09-08 PROCEDURE — 85730 THROMBOPLASTIN TIME PARTIAL: CPT

## 2022-09-08 PROCEDURE — 81001 URINALYSIS AUTO W/SCOPE: CPT

## 2022-09-08 PROCEDURE — U0005: CPT

## 2022-09-08 PROCEDURE — 93005 ELECTROCARDIOGRAM TRACING: CPT

## 2022-09-08 PROCEDURE — 36415 COLL VENOUS BLD VENIPUNCTURE: CPT

## 2022-09-08 PROCEDURE — 85014 HEMATOCRIT: CPT

## 2022-09-08 PROCEDURE — 72131 CT LUMBAR SPINE W/O DYE: CPT

## 2022-09-08 PROCEDURE — 83605 ASSAY OF LACTIC ACID: CPT

## 2022-09-08 PROCEDURE — 85018 HEMOGLOBIN: CPT

## 2022-09-08 PROCEDURE — 84443 ASSAY THYROID STIM HORMONE: CPT

## 2022-09-08 PROCEDURE — 87637 SARSCOV2&INF A&B&RSV AMP PRB: CPT

## 2022-09-08 PROCEDURE — 76377 3D RENDER W/INTRP POSTPROCES: CPT

## 2022-09-08 PROCEDURE — 97116 GAIT TRAINING THERAPY: CPT

## 2022-09-08 PROCEDURE — 97161 PT EVAL LOW COMPLEX 20 MIN: CPT

## 2022-09-08 PROCEDURE — 96374 THER/PROPH/DIAG INJ IV PUSH: CPT

## 2022-09-08 PROCEDURE — 85025 COMPLETE CBC W/AUTO DIFF WBC: CPT

## 2022-09-08 PROCEDURE — 72128 CT CHEST SPINE W/O DYE: CPT

## 2022-09-08 PROCEDURE — 85610 PROTHROMBIN TIME: CPT

## 2022-09-08 PROCEDURE — 83735 ASSAY OF MAGNESIUM: CPT

## 2022-09-08 PROCEDURE — U0003: CPT

## 2022-09-08 PROCEDURE — 80048 BASIC METABOLIC PNL TOTAL CA: CPT

## 2022-09-08 PROCEDURE — 74176 CT ABD & PELVIS W/O CONTRAST: CPT | Mod: MA

## 2022-09-08 PROCEDURE — 82947 ASSAY GLUCOSE BLOOD QUANT: CPT

## 2022-09-08 PROCEDURE — 97110 THERAPEUTIC EXERCISES: CPT

## 2022-09-08 PROCEDURE — 84132 ASSAY OF SERUM POTASSIUM: CPT

## 2022-09-08 RX ORDER — POLYETHYLENE GLYCOL 3350 17 G/17G
17 POWDER, FOR SOLUTION ORAL
Qty: 0 | Refills: 0 | DISCHARGE
Start: 2022-09-08

## 2022-09-08 RX ORDER — CALCIUM CARBONATE 500(1250)
1 TABLET ORAL
Qty: 30 | Refills: 0
Start: 2022-09-08 | End: 2022-10-07

## 2022-09-08 RX ORDER — PANTOPRAZOLE SODIUM 20 MG/1
1 TABLET, DELAYED RELEASE ORAL
Qty: 30 | Refills: 0
Start: 2022-09-08 | End: 2022-10-07

## 2022-09-08 RX ORDER — METOPROLOL TARTRATE 50 MG
1 TABLET ORAL
Qty: 30 | Refills: 0
Start: 2022-09-08 | End: 2022-10-07

## 2022-09-08 RX ORDER — METOPROLOL TARTRATE 50 MG
0.5 TABLET ORAL
Qty: 0 | Refills: 0 | DISCHARGE

## 2022-09-08 RX ORDER — LANOLIN ALCOHOL/MO/W.PET/CERES
1 CREAM (GRAM) TOPICAL
Qty: 0 | Refills: 0 | DISCHARGE
Start: 2022-09-08

## 2022-09-08 RX ORDER — TIZANIDINE 4 MG/1
2 TABLET ORAL
Qty: 180 | Refills: 0
Start: 2022-09-08 | End: 2022-10-07

## 2022-09-08 RX ORDER — SENNA PLUS 8.6 MG/1
2 TABLET ORAL
Qty: 60 | Refills: 0
Start: 2022-09-08 | End: 2022-10-07

## 2022-09-08 RX ORDER — GABAPENTIN 400 MG/1
1 CAPSULE ORAL
Qty: 60 | Refills: 0
Start: 2022-09-08 | End: 2022-10-07

## 2022-09-08 RX ORDER — OMEPRAZOLE 10 MG/1
1 CAPSULE, DELAYED RELEASE ORAL
Qty: 0 | Refills: 0 | DISCHARGE

## 2022-09-08 RX ADMIN — GABAPENTIN 100 MILLIGRAM(S): 400 CAPSULE ORAL at 08:16

## 2022-09-08 RX ADMIN — Medication 650 MILLIGRAM(S): at 12:52

## 2022-09-08 RX ADMIN — MORPHINE SULFATE 2 MILLIGRAM(S): 50 CAPSULE, EXTENDED RELEASE ORAL at 03:01

## 2022-09-08 RX ADMIN — PANTOPRAZOLE SODIUM 40 MILLIGRAM(S): 20 TABLET, DELAYED RELEASE ORAL at 05:43

## 2022-09-08 RX ADMIN — Medication 1000 UNIT(S): at 12:51

## 2022-09-08 RX ADMIN — MORPHINE SULFATE 2 MILLIGRAM(S): 50 CAPSULE, EXTENDED RELEASE ORAL at 01:58

## 2022-09-08 RX ADMIN — Medication 1 TABLET(S): at 12:52

## 2022-09-08 RX ADMIN — Medication 650 MILLIGRAM(S): at 14:19

## 2022-09-08 RX ADMIN — Medication 25 MILLIGRAM(S): at 12:51

## 2022-09-08 RX ADMIN — DUREZOL 1 DROP(S): 0.5 EMULSION OPHTHALMIC at 08:17

## 2022-09-08 RX ADMIN — Medication 60 MILLIGRAM(S): at 12:51

## 2022-09-08 RX ADMIN — Medication 1 SPRAY(S): at 08:13

## 2022-09-08 RX ADMIN — Medication 30 MILLIGRAM(S): at 22:26

## 2022-09-08 NOTE — PROGRESS NOTE ADULT - REASON FOR ADMISSION
acute compression fractures, multiple, abdominal pain

## 2022-09-08 NOTE — DISCHARGE NOTE NURSING/CASE MANAGEMENT/SOCIAL WORK - NSSCTYPOFSERV_GEN_ALL_CORE
Home care for skilled RN visits, home physical therapy visits, and RN will evaluate for home health aide services. RN will call 1-2 days to schedule visit.

## 2022-09-08 NOTE — PROGRESS NOTE ADULT - PROVIDER SPECIALTY LIST ADULT
Neurology
Internal Medicine
Cardiology
Cardiology
Gastroenterology
Internal Medicine
Internal Medicine
Cardiology
Gastroenterology
Gastroenterology
Internal Medicine

## 2022-09-08 NOTE — PROGRESS NOTE ADULT - TIME BILLING
agree with above  cv stable  pain control  cont current mgmt  dvt ppx
agree with above  cv stable  pain control  med f/u  cont current mgmt  dvt ppx
agree with above  cv stable  pain control  cont current mgmt  dvt ppx

## 2022-09-08 NOTE — DISCHARGE NOTE NURSING/CASE MANAGEMENT/SOCIAL WORK - NSDCDMETYPESERV_GEN_ALL_CORE_FT
Transport wheelchair and rolling walker. Company to follow up with daughter Amaya once approved to schedule delivery.

## 2022-09-08 NOTE — ED PROVIDER NOTE - NSICDXPASTSURGICALHX_GEN_ALL_CORE_FT
PAST SURGICAL HISTORY:  History of Breast Biopsy [place] lymph node biopsy    History of Cataract Surgery Left eye    S/P Breast Lumpectomy     S/P Lumpectomy of Breast Left Breast    S/P Nasal Polypectomy     Status Post Tonsillectomy

## 2022-09-08 NOTE — ED ADULT NURSE NOTE - NSIMPLEMENTINTERV_GEN_ALL_ED
Implemented All Fall with Harm Risk Interventions:  [place] to call system. Call bell, personal items and telephone within reach. Instruct patient to call for assistance. Room bathroom lighting operational. Non-slip footwear when patient is off stretcher. Physically safe environment: no spills, clutter or unnecessary equipment. Stretcher in lowest position, wheels locked, appropriate side rails in place. Provide visual cue, wrist band, yellow gown, etc. Monitor gait and stability. Monitor for mental status changes and reorient to person, place, and time. Review medications for side effects contributing to fall risk. Reinforce activity limits and safety measures with patient and family. Provide visual clues: red socks.

## 2022-09-08 NOTE — CHART NOTE - NSCHARTNOTEFT_GEN_A_CORE
Ms. Santana was seen today for an LSO. She was measured and fit with the brace. The brace was set up to her anatomy. A good fit was achieved. The brace should be worn while in an upright position. This will include both sitting and standing. It is not necessary while in bed. Use and care were explained.    OMAR Mooney.  [facility]  [phone]
Primary team called for consult.   Our fellow saw her earlier today and on interview there was concern for GCA  Prior to rounds with attending patient was discharged today.   I called patients daughter and advised her to bring her back to the ER for continued care. Advised there is concern for GCA   Patients daughter will bring her back to [facility] today either by car or ambulance asap     When she arrives at ER please give patient 30mg solumedrol IV and an additional 30 mg of solumedrol in the AM   Please order temporal artery US for the AM and our team will see her tomorrow.   Please check CBC, COMP QuantiFeron hepatitis B/C acute and hepatitis B core ab total.     Homa Becker MD  [facility], Division of Rheumatology   [phone]  [email or web address]
Due to condition: Acute back pain L3 compression fracture  , patient has a severe mobility limitation and requires a transport wheelchair to assist in daily adls. Patient cannot ambulate safely with a can or a walker. Patient does not have sufficient upper body strength to self propel a standard wheelchair. Patient has a caregiver to assist with maneuvering the wheelchair. Pt has not expressed an unwillingness to use the wheelchair. Patient has ample room in the home for said wheelchair. Use of a transport wheelchair will significantly improve the patients ability to participate in daily adls and the patient will use it on a regular basis in the home .

## 2022-09-08 NOTE — PROGRESS NOTE ADULT - ASSESSMENT
87 year old female with history of breast CA, MAC untreated, MVP, asthma, mod AS, PMR on prednisone presenting from rehab with severe back pain that radiated to abdomen and right hip.    1. Constipation, resolved   - large stool burden on CT  - having BMs s/p 1 L moviprep   - cw Miralax 17g BID and senna 2 tabs qhs     2. Back pain  - L3 compression fx  - osteopenia, 2/2 steroids for PMR and long term Prilosec use   consider dc Prilosec given hx of fractures   - prn pain control    3. Epigastric pain/ left chest pain  -pain reproducible by palpation, likely musculoskeletal  -prn pain meds   -elective outpt EGD     The plan of care was discussed with the physician assistant and modifications were made to the notation where appropriate.   Differential diagnosis and plan of care discussed with patient after the evaluation  35 minutes spent on total encounter of which more than fifty percent of the encounter was spent counseling and/or coordinating care by the attending physician.    [address]  Office: [phone]

## 2022-09-08 NOTE — DISCHARGE NOTE NURSING/CASE MANAGEMENT/SOCIAL WORK - NSDCPEFALRISK_GEN_ALL_CORE
For information on Fall & Injury Prevention, visit: [email or web address] OR  [email or web address] OR  https://www.cdc.gov/steadi/patient.html

## 2022-09-08 NOTE — ED ADULT TRIAGE NOTE - CHIEF COMPLAINT QUOTE
Back pain, pt was told she has broken vertebrae  EMS reports pt was discharged before being able to speak with doctor, doctor wanted pt back

## 2022-09-08 NOTE — PROGRESS NOTE ADULT - ASSESSMENT
87 Y F H/O Breast CA, MAC untreated, MVP, asthma, mod  AS, on prednisone for PMR since July w resolution of diffuse symptoms. Ptn was discharged to  Rehab and 3 days ago came home.   About 10 days ago she developed severe back pain, denies trauma,   at present the pain is 10/10, pain radiates from the back  to the abdomen and right hip.  In Addition ptn had been constipated as well. At baseline abc in 5-6K, today >8K  Ptn had PVR of 250 cc here, she was straight cathed. she also had a abd/pelvic CT which revealed severe stool burden and compression Fx in Ls. ptn also points to thoracic area as another point of pain. Ptn had been taper down to prednisone 30 mg from 50 mg 2 mo ago.     PLAN:  9/4: ABd/Pelvic ct:  L3 compression fx: get dedicated LS and T spine ct,   ptn also c/o pain over Right hip, will get pelvic ct as well.   ptn denies trauma. she has been on steroids however for 2 months. she will need outptn Forteo and alike treatment for OP  pain control w Morphine/Gabapentin/Tylenol  will order TLSO brace  Neuro consult  GI consult for Bowel regulation  9/5: seen by GI, NEURO, had Ct T/L/S spine, seen by orthotics, ptn refusing po Xarelto for DVT ppx, wants " the injection"  Ct revealed OLD T9 and T12 compression Fxs and Acute L3 compression Fx  back pain 2/2 acute L3 compression Fx is controlled w Neurontin 100 mg bid,   ptn is awaiting drinking Movieprep to evacuate large stool burden from which she has abd pain and urinary retention.   ptn hasn't needed Morphine since 5 am.   TLSO brace delivered.   PT eval pending.   ptn will need OP treatment on outptn basis.   DVT ppx w sc Lovenox  9/6: ptn walked w pt today, dc planning home w home pt. she had a large BM, abd pain resolved. seen by GI. ptn has h/o OP, she will need aggressive outptn therapy, doubt PRILOSEC/PPI will harm her at this point. she needs GI protection considering she is on prednisone for PMR. on 9/11 prednisone dose should be lowered to 20 mg. ptn needs to F/U w her PMD Dr. Criselda Cardona post DC. DC planning in am home w home PT  9/7: ptn has no pain, she is comfortable, awaiting DC home in am. RHEUM team notified to make prednisone taper recs prior to DC  9/8: ptn c/o reoccurrence of fatigue and joints hurting and feet hurting after prednisone dose was dropped to 25 mg from 30 mg. rheum consult pending. will resume 30 mg. will need home PT. ptn unable to walk due to weakness and pain, will need a wheelchair at home

## 2022-09-08 NOTE — ED PROVIDER NOTE - NS ED ROS FT
CONSTITUTIONAL: no fevers  HEENT: no dysphagia  CV: no chest pain  RESP: no SOB  GI: no nausea/vomiting  : no dysuria  DERM: no rash  MSK: + back pain  NEURO: no LOC

## 2022-09-08 NOTE — ED ADULT NURSE NOTE - OBJECTIVE STATEMENT
Patient is a 88 y/o female with PMH of anxiety, asthma, breast cancer, depression, GERD, HTN, IBS, MVP presenting to the ED via EMS with c/o back pain. As per EMS, pt was prematurely d/c from [facility] today d/t miscommunication. Pt was contacted and told to return to hospital today. Pt was admitted for multiple acute compression fracture d/t osteoporosis. Pt reports 9/10 back pain at this time. Pt A&Ox4, breathing unlabored, denies SOB, denies chest pain, peripheral pulses intact, skin warm/normal color, pt denies n/v/d, denies urinary symptoms, denies fever, cough, or chills. IV access established, side rails raised, comfort and safety measures maintained.

## 2022-09-08 NOTE — DISCHARGE NOTE NURSING/CASE MANAGEMENT/SOCIAL WORK - PATIENT PORTAL LINK FT
You can access the FollowMyHealth Patient Portal offered by [facility] by registering at the following website: [email or web address]. By joining [facility]’s FollowMyHealth portal, you will also be able to view your health information using other applications (apps) compatible with our system.

## 2022-09-08 NOTE — PROGRESS NOTE ADULT - SUBJECTIVE AND OBJECTIVE BOX
Chief Complaint:  Patient is a 87y old  Female who presents with a chief complaint of acute compression fractures, multiple, abdominal pain (07 Sep 2022 09:52)      Date of service 09-07-22 @ 12:28      Interval Events:   Patient seen and examined.  No abdominal pain, feels slightly bloated.   Tolerating diet.  Having BMs.     Hospital Medications:  acetaminophen     Tablet .. 650 milliGRAM(s) Oral every 6 hours PRN  amLODIPine   Tablet 5 milliGRAM(s) Oral daily  calcium carbonate   1250 mG (OsCal) 1 Tablet(s) Oral daily  cholecalciferol 1000 Unit(s) Oral daily  difluprednate 0.05% Ophthalmic Emulsion 1 Drop(s) Left EYE two times a day  enoxaparin Injectable 40 milliGRAM(s) SubCutaneous every 24 hours  FLUoxetine 60 milliGRAM(s) Oral daily  fluticasone propionate 50 MICROgram(s)/spray Nasal Spray 1 Spray(s) Both Nostrils two times a day  gabapentin 100 milliGRAM(s) Oral two times a day  influenza  Vaccine (HIGH DOSE) 0.7 milliLiter(s) IntraMuscular once  melatonin 5 milliGRAM(s) Oral at bedtime PRN  metoprolol succinate ER 25 milliGRAM(s) Oral daily  morphine  - Injectable 2 milliGRAM(s) IV Push every 4 hours PRN  pantoprazole    Tablet 40 milliGRAM(s) Oral before breakfast  polyethylene glycol 3350 17 Gram(s) Oral two times a day  predniSONE   Tablet 25 milliGRAM(s) Oral daily  saline laxative (FLEET) Rectal Enema 1 Enema Rectal once  senna 2 Tablet(s) Oral at bedtime  sodium chloride 0.45%. 1000 milliLiter(s) IV Continuous <Continuous>        Review of Systems:  General:  No wt loss, fevers, chills, night sweats, fatigue,   Eyes:  Good vision, no reported pain  ENT:  No sore throat, pain, runny nose, dysphagia  CV:  No pain, palpitations, hypo/hypertension  Resp:  No dyspnea, cough, tachypnea, wheezing  GI:  See HPI  :  No pain, bleeding, incontinence, nocturia  Muscle:  No pain, weakness  Neuro:  No weakness, tingling, memory problems  Psych:  No fatigue, insomnia, mood problems, depression  Endocrine:  No polyuria, polydipsia, cold/heat intolerance  Heme:  No petechiae, ecchymosis, easy bruisability  Integumentary:  No rash, edema    PHYSICAL EXAM:   Vital Signs:  Vital Signs Last 24 Hrs  T(C): 36.6 (07 Sep 2022 10:20), Max: 36.9 (06 Sep 2022 16:27)  T(F): 97.8 (07 Sep 2022 10:20), Max: 98.4 (06 Sep 2022 16:27)  HR: 70 (07 Sep 2022 10:20) (64 - 70)  BP: 121/69 (07 Sep 2022 10:20) (102/58 - 147/69)  BP(mean): --  RR: 18 (07 Sep 2022 10:20) (17 - 18)  SpO2: 97% (07 Sep 2022 10:20) (95% - 98%)    Parameters below as of 07 Sep 2022 10:20  Patient On (Oxygen Delivery Method): room air      Daily     Daily       PHYSICAL EXAM:     GENERAL:  Appears stated age, well-groomed, well-nourished, no distress  HEENT:  NC/AT,  conjunctivae anicteric, clear and pink,   NECK: supple, trachea midline  CHEST:  Full & symmetric excursion, no increased effort, breath sounds clear  HEART:  Regular rhythm, no JVD  ABDOMEN:  Soft, non-tender, non-distended, normoactive bowel sounds,  no masses , no hepatosplenomegaly  EXTREMITIES:  no cyanosis,clubbing or edema  SKIN:  No rash, erythema, or, ecchymoses, no jaundice  NEURO:  Alert, non-focal, no asterixis  PSYCH: Appropriate affect, oriented to place and time  RECTAL: Deferred      LABS Personally reviewed by me:                                          10.6   8.88  )-----------( 284      ( 04 Sep 2022 15:34 )             32.9       Imaging personally reviewed by me:          
Patient is a 87y old  Female who presents with a chief complaint of acute compression fractures, multiple, abdominal pain (07 Sep 2022 13:42)      SUBJECTIVE / OVERNIGHT EVENTS: ptn has no pain, she is comfortable, awaiting DC home in am    MEDICATIONS  (STANDING):  amLODIPine   Tablet 5 milliGRAM(s) Oral daily  calcium carbonate   1250 mG (OsCal) 1 Tablet(s) Oral daily  cholecalciferol 1000 Unit(s) Oral daily  difluprednate 0.05% Ophthalmic Emulsion 1 Drop(s) Left EYE two times a day  enoxaparin Injectable 40 milliGRAM(s) SubCutaneous every 24 hours  FLUoxetine 60 milliGRAM(s) Oral daily  fluticasone propionate 50 MICROgram(s)/spray Nasal Spray 1 Spray(s) Both Nostrils two times a day  gabapentin 100 milliGRAM(s) Oral two times a day  influenza  Vaccine (HIGH DOSE) 0.7 milliLiter(s) IntraMuscular once  metoprolol succinate ER 25 milliGRAM(s) Oral daily  pantoprazole    Tablet 40 milliGRAM(s) Oral before breakfast  polyethylene glycol 3350 17 Gram(s) Oral two times a day  predniSONE   Tablet 25 milliGRAM(s) Oral daily  saline laxative (FLEET) Rectal Enema 1 Enema Rectal once  senna 2 Tablet(s) Oral at bedtime  sodium chloride 0.45%. 1000 milliLiter(s) (70 mL/Hr) IV Continuous <Continuous>    MEDICATIONS  (PRN):  acetaminophen     Tablet .. 650 milliGRAM(s) Oral every 6 hours PRN Temp greater or equal to 38C (100.4F), Mild Pain (1 - 3)  melatonin 5 milliGRAM(s) Oral at bedtime PRN Insomnia  morphine  - Injectable 2 milliGRAM(s) IV Push every 4 hours PRN Severe Pain (7 - 10)      Vital Signs Last 24 Hrs  T(F): 98.1 (09-07-22 @ 17:16), Max: 98.2 (09-07-22 @ 00:20)  HR: 67 (09-07-22 @ 17:16) (64 - 70)  BP: 124/71 (09-07-22 @ 17:16) (121/69 - 147/69)  RR: 18 (09-07-22 @ 17:16) (18 - 18)  SpO2: 96% (09-07-22 @ 17:16) (95% - 98%)  Telemetry:   CAPILLARY BLOOD GLUCOSE        I&O's Summary    06 Sep 2022 07:01  -  07 Sep 2022 07:00  --------------------------------------------------------  IN: 2130 mL / OUT: 0 mL / NET: 2130 mL    07 Sep 2022 07:01  -  07 Sep 2022 19:26  --------------------------------------------------------  IN: 320 mL / OUT: 0 mL / NET: 320 mL        PHYSICAL EXAM:  GENERAL: NAD, well-developed  HEAD:  Atraumatic, Normocephalic  EYES: EOMI, PERRLA, conjunctiva and sclera clear  NECK: Supple, No JVD  CHEST/LUNG: Clear to auscultation bilaterally; No wheeze  HEART: Regular rate and rhythm; No murmurs, rubs, or gallops  ABDOMEN: Soft, Nontender, Nondistended; Bowel sounds present  EXTREMITIES:  2+ Peripheral Pulses, No clubbing, cyanosis, or edema  PSYCH: AAOx3  NEUROLOGY: non-focal  SKIN: No rashes or lesions    LABS:                    RADIOLOGY & ADDITIONAL TESTS:    Imaging Personally Reviewed:    Consultant(s) Notes Reviewed:      Care Discussed with Consultants/Other Providers:  
  Chief Complaint:  Patient is a 87y old  Female who presents with a chief complaint of acute compression fractures, multiple, abdominal pain (06 Sep 2022 10:18)      Date of service 22 @ 12:12      Interval Events:   Patient seen and examined.   Endorses pain to epigastric region and left chest.   Having BMs.     Hospital Medications:  acetaminophen     Tablet .. 650 milliGRAM(s) Oral every 6 hours PRN  amLODIPine   Tablet 5 milliGRAM(s) Oral daily  calcium carbonate   1250 mG (OsCal) 1 Tablet(s) Oral daily  cholecalciferol 1000 Unit(s) Oral daily  difluprednate 0.05% Ophthalmic Emulsion 1 Drop(s) Left EYE two times a day  enoxaparin Injectable 40 milliGRAM(s) SubCutaneous every 24 hours  FLUoxetine 60 milliGRAM(s) Oral daily  fluticasone propionate 50 MICROgram(s)/spray Nasal Spray 1 Spray(s) Both Nostrils two times a day  gabapentin 100 milliGRAM(s) Oral two times a day  influenza  Vaccine (HIGH DOSE) 0.7 milliLiter(s) IntraMuscular once  melatonin 5 milliGRAM(s) Oral at bedtime PRN  metoprolol succinate ER 25 milliGRAM(s) Oral daily  morphine  - Injectable 2 milliGRAM(s) IV Push every 4 hours PRN  pantoprazole    Tablet 40 milliGRAM(s) Oral before breakfast  polyethylene glycol 3350 17 Gram(s) Oral two times a day  predniSONE   Tablet 25 milliGRAM(s) Oral daily  saline laxative (FLEET) Rectal Enema 1 Enema Rectal once  senna 2 Tablet(s) Oral at bedtime  sodium chloride 0.45%. 1000 milliLiter(s) IV Continuous <Continuous>        Review of Systems:  General:  No wt loss, fevers, chills, night sweats, fatigue,   Eyes:  Good vision, no reported pain  ENT:  No sore throat, pain, runny nose, dysphagia  CV:  No pain, palpitations, hypo/hypertension  Resp:  No dyspnea, cough, tachypnea, wheezing  GI:  See HPI  :  No pain, bleeding, incontinence, nocturia  Muscle:  No pain, weakness  Neuro:  No weakness, tingling, memory problems  Psych:  No fatigue, insomnia, mood problems, depression  Endocrine:  No polyuria, polydipsia, cold/heat intolerance  Heme:  No petechiae, ecchymosis, easy bruisability  Integumentary:  No rash, edema    PHYSICAL EXAM:   Vital Signs:  Vital Signs Last 24 Hrs  T(C): 36.6 (06 Sep 2022 10:25), Max: 36.8 (05 Sep 2022 13:34)  T(F): 97.9 (06 Sep 2022 10:25), Max: 98.3 (05 Sep 2022 22:28)  HR: 68 (06 Sep 2022 10:25) (68 - 73)  BP: 143/67 (06 Sep 2022 10:25) (132/77 - 145/71)  BP(mean): --  RR: 17 (06 Sep 2022 10:25) (17 - 18)  SpO2: 95% (06 Sep 2022 10:25) (95% - 98%)    Parameters below as of 06 Sep 2022 10:25  Patient On (Oxygen Delivery Method): room air      Daily     Daily       PHYSICAL EXAM:     GENERAL:  Appears stated age, well-groomed, well-nourished, no distress  HEENT:  NC/AT,  conjunctivae anicteric, clear and pink,   NECK: supple, trachea midline  CHEST:  Full & symmetric excursion, no increased effort, breath sounds clear  HEART:  Regular rhythm, no JVD  ABDOMEN:  Soft, non-tender, non-distended, normoactive bowel sounds,  no masses , no hepatosplenomegaly  EXTREMITIES:  no cyanosis,clubbing or edema  SKIN:  No rash, erythema, or, ecchymoses, no jaundice  NEURO:  Alert, non-focal, no asterixis  PSYCH: Appropriate affect, oriented to place and time  RECTAL: Deferred      LABS Personally reviewed by me:                        10.6   8.88  )-----------( 284      ( 04 Sep 2022 15:34 )             32.9       09-04    140  |  100  |  17  ----------------------------<  89  3.7   |  27  |  0.73    Ca    9.3      04 Sep 2022 15:34  Mg     1.9     09-04    TPro  6.5  /  Alb  3.2<L>  /  TBili  0.5  /  DBili  x   /  AST  13  /  ALT  14  /  AlkPhos  107  09-04    LIVER FUNCTIONS - ( 04 Sep 2022 15:34 )  Alb: 3.2 g/dL / Pro: 6.5 g/dL / ALK PHOS: 107 U/L / ALT: 14 U/L / AST: 13 U/L / GGT: x           PT/INR - ( 04 Sep 2022 15:34 )   PT: 13.5 sec;   INR: 1.17 ratio         PTT - ( 04 Sep 2022 15:34 )  PTT:23.6 sec  Urinalysis Basic - ( 04 Sep 2022 17:55 )    Color: Colorless / Appearance: Clear / S.008 / pH: x  Gluc: x / Ketone: Negative  / Bili: Negative / Urobili: Negative   Blood: x / Protein: Negative / Nitrite: Negative   Leuk Esterase: Negative / RBC: 1 /hpf / WBC 0 /HPF   Sq Epi: x / Non Sq Epi: 0 /hpf / Bacteria: Negative                              10.6   8.88  )-----------( 284      ( 04 Sep 2022 15:34 )             32.9       Imaging personally reviewed by me:          
CARDIOLOGY FOLLOW UP - Dr. Lee  DATE OF SERVICE: 9/6/22     CC no sob   co epigastric pain         REVIEW OF SYSTEMS:  CONSTITUTIONAL: No fever, weight loss, or fatigue  RESPIRATORY: No cough, wheezing, chills or hemoptysis; No Shortness of Breath  CARDIOVASCULAR: No chest pain, palpitations, passing out, dizziness, or leg swelling  GASTROINTESTINAL: No abdominal or epigastric pain. No nausea, vomiting, or hematemesis; No diarrhea or constipation. No melena or hematochezia.  VASCULAR: No edema     PHYSICAL EXAM:  T(C): 36.8 (09-06-22 @ 05:41), Max: 36.8 (09-05-22 @ 13:34)  HR: 68 (09-06-22 @ 05:41) (68 - 73)  BP: 137/63 (09-06-22 @ 05:41) (132/77 - 145/71)  RR: 18 (09-06-22 @ 05:41) (18 - 18)  SpO2: 97% (09-06-22 @ 05:41) (95% - 98%)  Wt(kg): --  I&O's Summary    05 Sep 2022 07:01  -  06 Sep 2022 07:00  --------------------------------------------------------  IN: 1620 mL / OUT: 0 mL / NET: 1620 mL        Appearance: Normal	  Cardiovascular: Normal S1 S2,RRR, No JVD, No murmurs  Respiratory: Lungs clear to auscultation	  Gastrointestinal:  Soft, Non-tender, + BS	  Extremities: Normal range of motion, No clubbing, cyanosis or edema      Home Medications:  amLODIPine 5 mg oral tablet: 1 tab(s) orally once a day (in the evening) (04 Sep 2022 21:42)  Clear Eyes 0.012% ophthalmic solution: 1 drop(s) to each affected eye 3 times a day, As Needed (04 Sep 2022 21:42)  Durezol 0.05% ophthalmic emulsion: 1 drop(s) in the left eye 2 times a day (04 Sep 2022 21:42)  Flonase 50 mcg/inh nasal spray: 1 spray(s) in each nostril 2 times a day (04 Sep 2022 21:42)  FLUoxetine 20 mg oral capsule: 3 cap(s) orally once a day (04 Sep 2022 21:42)  metoprolol succinate 25 mg oral tablet, extended release: 0.5 tab(s) orally once a day (04 Sep 2022 21:42)  omeprazole 40 mg oral delayed release capsule: 1 cap(s) orally once a day (04 Sep 2022 21:42)  predniSONE: 30 milligram(s) orally once a day  NOTE: patient [id number] + 20mg tabs at home (04 Sep 2022 21:30)  Tylenol 500 mg oral tablet: 2 tab(s) orally 3 times a day (04 Sep 2022 21:42)  Vitamin D3 25 mcg (1000 intl units) oral tablet: 1 tab(s) orally once a day (04 Sep 2022 21:42)      MEDICATIONS  (STANDING):  amLODIPine   Tablet 5 milliGRAM(s) Oral daily  calcium carbonate   1250 mG (OsCal) 1 Tablet(s) Oral daily  cholecalciferol 1000 Unit(s) Oral daily  difluprednate 0.05% Ophthalmic Emulsion 1 Drop(s) Left EYE two times a day  enoxaparin Injectable 40 milliGRAM(s) SubCutaneous every 24 hours  FLUoxetine 60 milliGRAM(s) Oral daily  fluticasone propionate 50 MICROgram(s)/spray Nasal Spray 1 Spray(s) Both Nostrils two times a day  gabapentin 100 milliGRAM(s) Oral two times a day  influenza  Vaccine (HIGH DOSE) 0.7 milliLiter(s) IntraMuscular once  metoprolol succinate ER 25 milliGRAM(s) Oral daily  pantoprazole    Tablet 40 milliGRAM(s) Oral before breakfast  polyethylene glycol 3350 17 Gram(s) Oral two times a day  predniSONE   Tablet 25 milliGRAM(s) Oral daily  saline laxative (FLEET) Rectal Enema 1 Enema Rectal once  senna 2 Tablet(s) Oral at bedtime  sodium chloride 0.45%. 1000 milliLiter(s) (70 mL/Hr) IV Continuous <Continuous>      TELEMETRY: 	    ECG:  	  RADIOLOGY:   DIAGNOSTIC TESTING:  [ ] Echocardiogram:  [ ]  Catheterization:  [ ] Stress Test:    OTHER: 	    LABS:	 	    Troponin T, High Sensitivity Result: 43 ng/L [0 - 51] (09-04 @ 20:54)  Troponin T, High Sensitivity Result: 40 ng/L [0 - 51] (09-04 @ 15:34)                          10.6   8.88  )-----------( 284      ( 04 Sep 2022 15:34 )             32.9     09-04    140  |  100  |  17  ----------------------------<  89  3.7   |  27  |  0.73    Ca    9.3      04 Sep 2022 15:34  Mg     1.9     09-04    TPro  6.5  /  Alb  3.2<L>  /  TBili  0.5  /  DBili  x   /  AST  13  /  ALT  14  /  AlkPhos  107  09-04    PT/INR - ( 04 Sep 2022 15:34 )   PT: 13.5 sec;   INR: 1.17 ratio         PTT - ( 04 Sep 2022 15:34 )  PTT:23.6 sec        
CARDIOLOGY FOLLOW UP - Dr. Lee  DATE OF SERVICE: 9/7/22     CC no cp or sob   cp abd fullness/discomfortg      REVIEW OF SYSTEMS:  CONSTITUTIONAL: No fever, weight loss, or fatigue  RESPIRATORY: No cough, wheezing, chills or hemoptysis; No Shortness of Breath  CARDIOVASCULAR: No chest pain, palpitations, passing out, dizziness, or leg swelling  GASTROINTESTINAL: No nausea, vomiting, or hematemesis; No diarrhea or constipation. No melena or hematochezia.  VASCULAR: No edema     PHYSICAL EXAM:  T(C): 36.6 (09-07-22 @ 04:55), Max: 36.9 (09-06-22 @ 16:27)  HR: 64 (09-07-22 @ 04:55) (64 - 68)  BP: 147/69 (09-07-22 @ 04:55) (102/58 - 147/69)  RR: 18 (09-07-22 @ 04:55) (17 - 18)  SpO2: 95% (09-07-22 @ 04:55) (95% - 98%)  Wt(kg): --  I&O's Summary    06 Sep 2022 07:01  -  07 Sep 2022 07:00  --------------------------------------------------------  IN: 2130 mL / OUT: 0 mL / NET: 2130 mL        Appearance: Normal	  Cardiovascular: Normal S1 S2,RRR, +  murmurs  Respiratory: Lungs clear to auscultation	  Gastrointestinal:  Soft, Non-tender, + BS	  Extremities: Normal range of motion, No clubbing, cyanosis or edema      Home Medications:  amLODIPine 5 mg oral tablet: 1 tab(s) orally once a day (in the evening) (04 Sep 2022 21:42)  Clear Eyes 0.012% ophthalmic solution: 1 drop(s) to each affected eye 3 times a day, As Needed (04 Sep 2022 21:42)  Durezol 0.05% ophthalmic emulsion: 1 drop(s) in the left eye 2 times a day (04 Sep 2022 21:42)  Flonase 50 mcg/inh nasal spray: 1 spray(s) in each nostril 2 times a day (04 Sep 2022 21:42)  FLUoxetine 20 mg oral capsule: 3 cap(s) orally once a day (04 Sep 2022 21:42)  metoprolol succinate 25 mg oral tablet, extended release: 0.5 tab(s) orally once a day (04 Sep 2022 21:42)  omeprazole 40 mg oral delayed release capsule: 1 cap(s) orally once a day (04 Sep 2022 21:42)  predniSONE: 30 milligram(s) orally once a day  NOTE: patient [id number] + 20mg tabs at home (04 Sep 2022 21:30)  Tylenol 500 mg oral tablet: 2 tab(s) orally 3 times a day (04 Sep 2022 21:42)  Vitamin D3 25 mcg (1000 intl units) oral tablet: 1 tab(s) orally once a day (04 Sep 2022 21:42)      MEDICATIONS  (STANDING):  amLODIPine   Tablet 5 milliGRAM(s) Oral daily  calcium carbonate   1250 mG (OsCal) 1 Tablet(s) Oral daily  cholecalciferol 1000 Unit(s) Oral daily  difluprednate 0.05% Ophthalmic Emulsion 1 Drop(s) Left EYE two times a day  enoxaparin Injectable 40 milliGRAM(s) SubCutaneous every 24 hours  FLUoxetine 60 milliGRAM(s) Oral daily  fluticasone propionate 50 MICROgram(s)/spray Nasal Spray 1 Spray(s) Both Nostrils two times a day  gabapentin 100 milliGRAM(s) Oral two times a day  influenza  Vaccine (HIGH DOSE) 0.7 milliLiter(s) IntraMuscular once  metoprolol succinate ER 25 milliGRAM(s) Oral daily  pantoprazole    Tablet 40 milliGRAM(s) Oral before breakfast  polyethylene glycol 3350 17 Gram(s) Oral two times a day  predniSONE   Tablet 25 milliGRAM(s) Oral daily  saline laxative (FLEET) Rectal Enema 1 Enema Rectal once  senna 2 Tablet(s) Oral at bedtime  sodium chloride 0.45%. 1000 milliLiter(s) (70 mL/Hr) IV Continuous <Continuous>      TELEMETRY: 	    ECG:  	nsr lvh  RADIOLOGY:   DIAGNOSTIC TESTING:  [ ] Echocardiogram:  [ ]  Catheterization:  [ ] Stress Test:    OTHER: 	    LABS:	 	    Troponin T, High Sensitivity Result: 43 ng/L [0 - 51] (09-04 @ 20:54)  Troponin T, High Sensitivity Result: 40 ng/L [0 - 51] (09-04 @ 15:34)                    
INTERVAL HPI/OVERNIGHT EVENTS:    pt seen and examined. daughter at bedside. Complains of feeling slightly boated. Denies n/v. Last BM yesterday. Patient is tolerating a diet.     MEDICATIONS  (STANDING):  amLODIPine   Tablet 5 milliGRAM(s) Oral daily  calcium carbonate   1250 mG (OsCal) 1 Tablet(s) Oral daily  cholecalciferol 1000 Unit(s) Oral daily  difluprednate 0.05% Ophthalmic Emulsion 1 Drop(s) Left EYE two times a day  enoxaparin Injectable 40 milliGRAM(s) SubCutaneous every 24 hours  FLUoxetine 60 milliGRAM(s) Oral daily  fluticasone propionate 50 MICROgram(s)/spray Nasal Spray 1 Spray(s) Both Nostrils two times a day  gabapentin 100 milliGRAM(s) Oral two times a day  influenza  Vaccine (HIGH DOSE) 0.7 milliLiter(s) IntraMuscular once  metoprolol succinate ER 25 milliGRAM(s) Oral daily  pantoprazole    Tablet 40 milliGRAM(s) Oral before breakfast  polyethylene glycol 3350 17 Gram(s) Oral two times a day  predniSONE   Tablet 25 milliGRAM(s) Oral daily  saline laxative (FLEET) Rectal Enema 1 Enema Rectal once  senna 2 Tablet(s) Oral at bedtime  sodium chloride 0.45%. 1000 milliLiter(s) (70 mL/Hr) IV Continuous <Continuous>    MEDICATIONS  (PRN):  acetaminophen     Tablet .. 650 milliGRAM(s) Oral every 6 hours PRN Temp greater or equal to 38C (100.4F), Mild Pain (1 - 3)  melatonin 5 milliGRAM(s) Oral at bedtime PRN Insomnia  morphine  - Injectable 2 milliGRAM(s) IV Push every 4 hours PRN Severe Pain (7 - 10)      Allergies    cefdinir (Unknown)  ciprofloxacin (Other)  codeine (Nausea; Other)  contrast media (iodine-based) (Angioedema)  fluorescein ophthalmic (Hives; Rash; Flushing)  lactose (Unknown)  latex (Anaphylaxis)  Levaquin (Nausea; Other)  lidocaine (Unknown)  SULFA (Anaphylaxis)  tetracycline (Anaphylaxis)    Intolerances    Augmentin (Stomach Upset)      Review of Systems:    General:  No wt loss, fevers, chills, night sweats,fatigue,   Eyes:  Good vision, no reported pain  ENT:  No sore throat, pain, runny nose, dysphagia  CV:  No pain, palpitations, hypo/hypertension  Resp:  No dyspnea, cough, tachypnea, wheezing  GI:  see HPI  :  No pain, bleeding, incontinence, nocturia  Muscle:  No pain, weakness  Neuro:  No weakness, tingling, memory problems  Psych:  No fatigue, insomnia, mood problems, depression  Endocrine:  No polyuria, polydypsia, cold/heat intolerance  Heme:  No petechiae, ecchymosis, easy bruisability  Skin:  No rash, tattoos, scars, edema      Vital Signs Last 24 Hrs  T(C): 36.8 (08 Sep 2022 08:32), Max: 36.9 (07 Sep 2022 21:47)  T(F): 98.3 (08 Sep 2022 08:32), Max: 98.4 (07 Sep 2022 21:47)  HR: 73 (08 Sep 2022 08:32) (67 - 73)  BP: 146/76 (08 Sep 2022 08:32) (124/71 - 154/95)  BP(mean): --  RR: 18 (08 Sep 2022 08:32) (18 - 18)  SpO2: 94% (08 Sep 2022 08:32) (94% - 97%)    Parameters below as of 08 Sep 2022 08:32  Patient On (Oxygen Delivery Method): room air        PHYSICAL EXAM:    GENERAL:  Appears stated age, well-groomed, well-nourished, no distress  HEENT:  NC/AT,  conjunctivae anicteric, clear and pink,   NECK: supple, trachea midline  CHEST:  Full & symmetric excursion, no increased effort, breath sounds clear  HEART:  Regular rhythm, no JVD  ABDOMEN:  Soft, non-tender, non-distended, normoactive bowel sounds,  no masses , no hepatosplenomegaly  EXTREMITIES:  no cyanosis, clubbing or edema  SKIN:  No rash, erythema, or, ecchymoses, no jaundice  NEURO:  Alert, non-focal, no asterixis  PSYCH: Appropriate affect, oriented to place and time    LABS:                        10.8   8.00  )-----------( 288      ( 08 Sep 2022 10:27 )             34.3     09-08    135  |  97  |  14  ----------------------------<  90  3.7   |  28  |  0.55    Ca    9.4      08 Sep 2022 10:27            RADIOLOGY & ADDITIONAL TESTS:  
Patient is a 87y old  Female who presents with a chief complaint of acute compression fractures, multiple, abdominal pain (08 Sep 2022 13:38)      SUBJECTIVE / OVERNIGHT EVENTS: ptn c/o reoccurence of fatigue and joints hurting and feet hurting after prednisone dose was dropped to 25 mg from 30 mg. rheum consult pending. will resume 30 mg. will need home PT    MEDICATIONS  (STANDING):  amLODIPine   Tablet 5 milliGRAM(s) Oral daily  calcium carbonate   1250 mG (OsCal) 1 Tablet(s) Oral daily  cholecalciferol 1000 Unit(s) Oral daily  difluprednate 0.05% Ophthalmic Emulsion 1 Drop(s) Left EYE two times a day  enoxaparin Injectable 40 milliGRAM(s) SubCutaneous every 24 hours  FLUoxetine 60 milliGRAM(s) Oral daily  fluticasone propionate 50 MICROgram(s)/spray Nasal Spray 1 Spray(s) Both Nostrils two times a day  gabapentin 100 milliGRAM(s) Oral two times a day  influenza  Vaccine (HIGH DOSE) 0.7 milliLiter(s) IntraMuscular once  metoprolol succinate ER 25 milliGRAM(s) Oral daily  pantoprazole    Tablet 40 milliGRAM(s) Oral before breakfast  polyethylene glycol 3350 17 Gram(s) Oral two times a day  predniSONE   Tablet 25 milliGRAM(s) Oral daily  saline laxative (FLEET) Rectal Enema 1 Enema Rectal once  senna 2 Tablet(s) Oral at bedtime  sodium chloride 0.45%. 1000 milliLiter(s) (70 mL/Hr) IV Continuous <Continuous>    MEDICATIONS  (PRN):  acetaminophen     Tablet .. 650 milliGRAM(s) Oral every 6 hours PRN Temp greater or equal to 38C (100.4F), Mild Pain (1 - 3)  melatonin 5 milliGRAM(s) Oral at bedtime PRN Insomnia  morphine  - Injectable 2 milliGRAM(s) IV Push every 4 hours PRN Severe Pain (7 - 10)      Vital Signs Last 24 Hrs  T(F): 97.5 (09-08-22 @ 14:32), Max: 98.4 (09-07-22 @ 21:47)  HR: 76 (09-08-22 @ 14:32) (67 - 76)  BP: 121/63 (09-08-22 @ 14:32) (121/63 - 154/95)  RR: 18 (09-08-22 @ 14:32) (18 - 18)  SpO2: 94% (09-08-22 @ 14:32) (94% - 97%)  Telemetry:   CAPILLARY BLOOD GLUCOSE        I&O's Summary    07 Sep 2022 07:01  -  08 Sep 2022 07:00  --------------------------------------------------------  IN: 1400 mL / OUT: 0 mL / NET: 1400 mL    08 Sep 2022 07:01  -  08 Sep 2022 16:30  --------------------------------------------------------  IN: 420 mL / OUT: 450 mL / NET: -30 mL        PHYSICAL EXAM:  GENERAL: NAD, well-developed  HEAD:  Atraumatic, Normocephalic  EYES: EOMI, PERRLA, conjunctiva and sclera clear  NECK: Supple, No JVD  CHEST/LUNG: Clear to auscultation bilaterally; No wheeze  HEART: Regular rate and rhythm; No murmurs, rubs, or gallops  ABDOMEN: Soft, Nontender, Nondistended; Bowel sounds present  EXTREMITIES:  2+ Peripheral Pulses, No clubbing, cyanosis, or edema  PSYCH: AAOx3  NEUROLOGY: non-focal  SKIN: No rashes or lesions    LABS:                        10.8   8.00  )-----------( 288      ( 08 Sep 2022 10:27 )             34.3     09-08    135  |  97  |  14  ----------------------------<  90  3.7   |  28  |  0.55    Ca    9.4      08 Sep 2022 10:27                RADIOLOGY & ADDITIONAL TESTS:    Imaging Personally Reviewed:    Consultant(s) Notes Reviewed:      Care Discussed with Consultants/Other Providers:  
CARDIOLOGY FOLLOW UP - Dr. Lee  DATE OF SERVICE: 9/8/22    CC no cp or sob       REVIEW OF SYSTEMS:  CONSTITUTIONAL: No fever, weight loss, or fatigue  RESPIRATORY: No cough, wheezing, chills or hemoptysis; No Shortness of Breath  CARDIOVASCULAR: No chest pain, palpitations, passing out, dizziness, or leg swelling  GASTROINTESTINAL: No abdominal or epigastric pain. No nausea, vomiting, or hematemesis; No diarrhea or constipation. No melena or hematochezia.  VASCULAR: No edema     PHYSICAL EXAM:  T(C): 36.8 (09-08-22 @ 08:32), Max: 36.9 (09-07-22 @ 21:47)  HR: 73 (09-08-22 @ 08:32) (67 - 73)  BP: 146/76 (09-08-22 @ 08:32) (121/69 - 154/95)  RR: 18 (09-08-22 @ 08:32) (18 - 18)  SpO2: 94% (09-08-22 @ 08:32) (94% - 98%)  Wt(kg): --  I&O's Summary    07 Sep 2022 07:01  -  08 Sep 2022 07:00  --------------------------------------------------------  IN: 1400 mL / OUT: 0 mL / NET: 1400 mL    08 Sep 2022 07:01  -  08 Sep 2022 09:54  --------------------------------------------------------  IN: 280 mL / OUT: 350 mL / NET: -70 mL        Appearance: Normal	  Cardiovascular: Normal S1 S2,RRR,  Respiratory: Lungs clear to auscultation	  Gastrointestinal:  Soft, Non-tender, + BS	  Extremities: Normal range of motion, No clubbing, cyanosis or edema      Home Medications:  amLODIPine 5 mg oral tablet: 1 tab(s) orally once a day (in the evening) (04 Sep 2022 21:42)  Clear Eyes 0.012% ophthalmic solution: 1 drop(s) to each affected eye 3 times a day, As Needed (04 Sep 2022 21:42)  Durezol 0.05% ophthalmic emulsion: 1 drop(s) in the left eye 2 times a day (04 Sep 2022 21:42)  Flonase 50 mcg/inh nasal spray: 1 spray(s) in each nostril 2 times a day (04 Sep 2022 21:42)  FLUoxetine 20 mg oral capsule: 3 cap(s) orally once a day (04 Sep 2022 21:42)  metoprolol succinate 25 mg oral tablet, extended release: 0.5 tab(s) orally once a day (04 Sep 2022 21:42)  omeprazole 40 mg oral delayed release capsule: 1 cap(s) orally once a day (04 Sep 2022 21:42)  predniSONE: 30 milligram(s) orally once a day  NOTE: patient [id number] + 20mg tabs at home (04 Sep 2022 21:30)  Tylenol 500 mg oral tablet: 2 tab(s) orally 3 times a day (04 Sep 2022 21:42)  Vitamin D3 25 mcg (1000 intl units) oral tablet: 1 tab(s) orally once a day (04 Sep 2022 21:42)      MEDICATIONS  (STANDING):  amLODIPine   Tablet 5 milliGRAM(s) Oral daily  calcium carbonate   1250 mG (OsCal) 1 Tablet(s) Oral daily  cholecalciferol 1000 Unit(s) Oral daily  difluprednate 0.05% Ophthalmic Emulsion 1 Drop(s) Left EYE two times a day  enoxaparin Injectable 40 milliGRAM(s) SubCutaneous every 24 hours  FLUoxetine 60 milliGRAM(s) Oral daily  fluticasone propionate 50 MICROgram(s)/spray Nasal Spray 1 Spray(s) Both Nostrils two times a day  gabapentin 100 milliGRAM(s) Oral two times a day  influenza  Vaccine (HIGH DOSE) 0.7 milliLiter(s) IntraMuscular once  metoprolol succinate ER 25 milliGRAM(s) Oral daily  pantoprazole    Tablet 40 milliGRAM(s) Oral before breakfast  polyethylene glycol 3350 17 Gram(s) Oral two times a day  predniSONE   Tablet 25 milliGRAM(s) Oral daily  saline laxative (FLEET) Rectal Enema 1 Enema Rectal once  senna 2 Tablet(s) Oral at bedtime  sodium chloride 0.45%. 1000 milliLiter(s) (70 mL/Hr) IV Continuous <Continuous>      TELEMETRY: 	    ECG:  	  RADIOLOGY:   DIAGNOSTIC TESTING:  [ ] Echocardiogram:  [ ]  Catheterization:  [ ] Stress Test:    OTHER: 	    LABS:	 	    Troponin T, High Sensitivity Result: 43 ng/L [0 - 51] (09-04 @ 20:54)  Troponin T, High Sensitivity Result: 40 ng/L [0 - 51] (09-04 @ 15:34)                    
Patient is a 87y old  Female who presents with a chief complaint of acute compression fractures, multiple, abdominal pain (05 Sep 2022 13:08)      SUBJECTIVE / OVERNIGHT EVENTS: back pain 2/2 acute L3 compression Fx is controlled w Neurontin 100 mg bid, ptn is awaiting drinking Movieprep to evacuate large stool burden from which she has abd pain and urinary retention. ptn hasnt needed Morphine since 5 am. TLSO brace delivered. PT eval pending. ptn will need OP treatment on outptn basis.     MEDICATIONS  (STANDING):  amLODIPine   Tablet 5 milliGRAM(s) Oral daily  calcium carbonate   1250 mG (OsCal) 1 Tablet(s) Oral daily  cholecalciferol 1000 Unit(s) Oral daily  difluprednate 0.05% Ophthalmic Emulsion 1 Drop(s) Left EYE two times a day  FLUoxetine 60 milliGRAM(s) Oral daily  fluticasone propionate 50 MICROgram(s)/spray Nasal Spray 1 Spray(s) Both Nostrils two times a day  gabapentin 100 milliGRAM(s) Oral two times a day  influenza  Vaccine (HIGH DOSE) 0.7 milliLiter(s) IntraMuscular once  metoprolol succinate ER 25 milliGRAM(s) Oral daily  pantoprazole    Tablet 40 milliGRAM(s) Oral before breakfast  polyethylene glycol 3350 17 Gram(s) Oral two times a day  polyethylene glycol/electrolyte Solution 1 Liter(s) Oral once  predniSONE   Tablet 25 milliGRAM(s) Oral daily  saline laxative (FLEET) Rectal Enema 1 Enema Rectal once  senna 2 Tablet(s) Oral at bedtime  sodium chloride 0.45%. 1000 milliLiter(s) (70 mL/Hr) IV Continuous <Continuous>    MEDICATIONS  (PRN):  acetaminophen     Tablet .. 650 milliGRAM(s) Oral every 6 hours PRN Temp greater or equal to 38C (100.4F), Mild Pain (1 - 3)  melatonin 5 milliGRAM(s) Oral at bedtime PRN Insomnia  morphine  - Injectable 2 milliGRAM(s) IV Push every 4 hours PRN Severe Pain (7 - 10)      Vital Signs Last 24 Hrs  T(F): 97.8 (22 @ 16:59), Max: 98.5 (22 @ 20:45)  HR: 73 (22 @ 16:59) (67 - 77)  BP: 132/77 (22 @ 16:59) (131/65 - 145/79)  RR: 18 (22 @ 16:59) (18 - 18)  SpO2: 98% (22 @ 16:59) (94% - 100%)  Telemetry:   CAPILLARY BLOOD GLUCOSE        I&O's Summary    04 Sep 2022 07:  -  05 Sep 2022 07:00  --------------------------------------------------------  IN: 210 mL / OUT: 0 mL / NET: 210 mL    05 Sep 2022 07:01  -  05 Sep 2022 18:06  --------------------------------------------------------  IN: 540 mL / OUT: 0 mL / NET: 540 mL        PHYSICAL EXAM:  GENERAL: NAD, well-developed  HEAD:  Atraumatic, Normocephalic  EYES: EOMI, PERRLA, conjunctiva and sclera clear  NECK: Supple, No JVD  CHEST/LUNG: Clear to auscultation bilaterally; No wheeze  HEART: Regular rate and rhythm; No murmurs, rubs, or gallops  ABDOMEN: Soft, Nontender, Nondistended; Bowel sounds present  EXTREMITIES:  2+ Peripheral Pulses, No clubbing, cyanosis, or edema  PSYCH: AAOx3  NEUROLOGY: non-focal  SKIN: No rashes or lesions    LABS:                        10.6   8.88  )-----------( 284      ( 04 Sep 2022 15:34 )             32.9     04    140  |  100  |  17  ----------------------------<  89  3.7   |  27  |  0.73    Ca    9.3      04 Sep 2022 15:34  Mg     1.9     04    TPro  6.5  /  Alb  3.2<L>  /  TBili  0.5  /  DBili  x   /  AST  13  /  ALT  14  /  AlkPhos  107  09-04    PT/INR - ( 04 Sep 2022 15:34 )   PT: 13.5 sec;   INR: 1.17 ratio         PTT - ( 04 Sep 2022 15:34 )  PTT:23.6 sec      Urinalysis Basic - ( 04 Sep 2022 17:55 )    Color: Colorless / Appearance: Clear / S.008 / pH: x  Gluc: x / Ketone: Negative  / Bili: Negative / Urobili: Negative   Blood: x / Protein: Negative / Nitrite: Negative   Leuk Esterase: Negative / RBC: 1 /hpf / WBC 0 /HPF   Sq Epi: x / Non Sq Epi: 0 /hpf / Bacteria: Negative        RADIOLOGY & ADDITIONAL TESTS:    Imaging Personally Reviewed:    Consultant(s) Notes Reviewed:      Care Discussed with Consultants/Other Providers:  
[facility]      Seen earlier today Date of service   No new neurological symptoms reported. Remains stable neurologically.   - Today, patient is without complaints.          *****MEDICATIONS: Current medication reviewed and documented.    MEDICATIONS  (STANDING):  amLODIPine   Tablet 5 milliGRAM(s) Oral daily  calcium carbonate   1250 mG (OsCal) 1 Tablet(s) Oral daily  cholecalciferol 1000 Unit(s) Oral daily  difluprednate 0.05% Ophthalmic Emulsion 1 Drop(s) Left EYE two times a day  enoxaparin Injectable 40 milliGRAM(s) SubCutaneous every 24 hours  FLUoxetine 60 milliGRAM(s) Oral daily  fluticasone propionate 50 MICROgram(s)/spray Nasal Spray 1 Spray(s) Both Nostrils two times a day  gabapentin 100 milliGRAM(s) Oral two times a day  influenza  Vaccine (HIGH DOSE) 0.7 milliLiter(s) IntraMuscular once  metoprolol succinate ER 25 milliGRAM(s) Oral daily  pantoprazole    Tablet 40 milliGRAM(s) Oral before breakfast  polyethylene glycol 3350 17 Gram(s) Oral two times a day  predniSONE   Tablet 25 milliGRAM(s) Oral daily  saline laxative (FLEET) Rectal Enema 1 Enema Rectal once  senna 2 Tablet(s) Oral at bedtime  sodium chloride 0.45%. 1000 milliLiter(s) (70 mL/Hr) IV Continuous <Continuous>    MEDICATIONS  (PRN):  acetaminophen     Tablet .. 650 milliGRAM(s) Oral every 6 hours PRN Temp greater or equal to 38C (100.4F), Mild Pain (1 - 3)  melatonin 5 milliGRAM(s) Oral at bedtime PRN Insomnia  morphine  - Injectable 2 milliGRAM(s) IV Push every 4 hours PRN Severe Pain (7 - 10)          ***** VITAL SIGNS:   Vital Signs Last 24 Hrs       T(F): 97.7 (09-08-22 @ 06:01), Max: 98.4 (09-07-22 @ 21:47)  HR: 69 (09-08-22 @ 06:01) (67 - 73)  BP: 148/75 (09-08-22 @ 06:01) (121/69 - 154/95)  RR: 18 (09-08-22 @ 06:01) (18 - 18)  SpO2: 97% (09-08-22 @ 06:01) (95% - 98%)  Wt(kg): --  I&O's Summary    07 Sep 2022 07:01  -  08 Sep 2022 07:00  --------------------------------------------------------  IN: 560 mL / OUT: 0 mL / NET: 560 mL             *****PHYSICAL EXAM:   alert oriented x 3 attention comprehension are fair.  Able to name, repeat.   EOmi fundi not visualized   no nystagmus VFF to confrontation  Tongue is midline  Palate elevates symmetrically   Moving all 4 ext spontaneously no drift appreciated    Gait not assessed.            *****LAB AND IMAGING:                                         [All pertinent recent Imaging/Reports reviewed]            *****A S S E S S M E N T   A N D   P L A N :         Excerpt from H&P,"     87 Y F H/O Breast CA, MAC untreated, MVP, asthma, mod  AS, on prednisone for PMR since July w resolution of diffuse symptoms. Ptenrique was discharged to  Rehab and 3 days ago came home.   About 10 days ago she developed severe back pain, denies trauma,   at present the pain is 10/10, pain radiates from the back  to the abdomen and right hip.  In Addition ptn had been constipated as well. At baseline abc in 5-6K, today >8K  Ptn had PVR of 250 cc here, she was straight cathed. she also had a abd/pelvic CT which revealed severe stool burden and compression Fx in Ls. ptn also points to thoracic area as another point of pain. Ptn had been taper down to prednisone 30 mg from 50 mg 2 mo ago.  (04 Sep           Problem/Recommendations 1: back pain   l3 compression fracture noted on ct ab/pelvis   conitnue gabapentin for pain control   tlso brace   given osteopenia taper off steroids          Problem/Recommendations 2:lethargy   regulate sleep wake cycle           Thank you for allowing me to participate in the care of this patient. Will continue to follow patient periodically. Please do not hesitate to call me if you have any  questions or if there has been a change in patients neurological status     ________________  Melissa Pérez MD  [facility] [place] ([facility]  [phone]      33 minutes spent on total encounter; more than 50 % of the visit was  spent counseling about plan of care, compliance to diet/exercise and medication regimen and or  coordinating care by the attending physician.      It is advised that stroke patients follow up with RIZWAN Jorgensen @ [phone] in 1- 2 weeks.   Others please follow up with Dr. Michael Nissenbaum [phone]
Patient is a 87y old  Female who presents with a chief complaint of acute compression fractures, multiple, abdominal pain (06 Sep 2022 12:12)      SUBJECTIVE / OVERNIGHT EVENTS: ptn walked w pt today, dc planning home w home pt. ptn has h/o OP, she will need aggressive outptn therapy, dount PRILOSEC/PPI will harm her at this point. she needs GI protection considering she is on prednisone for PMR. on 9/11 prednisone dose should be lowered to 20 mg. ptn needs to F/U w her PMD Dr. Criselda Cardona    MEDICATIONS  (STANDING):  amLODIPine   Tablet 5 milliGRAM(s) Oral daily  calcium carbonate   1250 mG (OsCal) 1 Tablet(s) Oral daily  cholecalciferol 1000 Unit(s) Oral daily  difluprednate 0.05% Ophthalmic Emulsion 1 Drop(s) Left EYE two times a day  enoxaparin Injectable 40 milliGRAM(s) SubCutaneous every 24 hours  FLUoxetine 60 milliGRAM(s) Oral daily  fluticasone propionate 50 MICROgram(s)/spray Nasal Spray 1 Spray(s) Both Nostrils two times a day  gabapentin 100 milliGRAM(s) Oral two times a day  influenza  Vaccine (HIGH DOSE) 0.7 milliLiter(s) IntraMuscular once  metoprolol succinate ER 25 milliGRAM(s) Oral daily  pantoprazole    Tablet 40 milliGRAM(s) Oral before breakfast  polyethylene glycol 3350 17 Gram(s) Oral two times a day  predniSONE   Tablet 25 milliGRAM(s) Oral daily  saline laxative (FLEET) Rectal Enema 1 Enema Rectal once  senna 2 Tablet(s) Oral at bedtime  sodium chloride 0.45%. 1000 milliLiter(s) (70 mL/Hr) IV Continuous <Continuous>    MEDICATIONS  (PRN):  acetaminophen     Tablet .. 650 milliGRAM(s) Oral every 6 hours PRN Temp greater or equal to 38C (100.4F), Mild Pain (1 - 3)  melatonin 5 milliGRAM(s) Oral at bedtime PRN Insomnia  morphine  - Injectable 2 milliGRAM(s) IV Push every 4 hours PRN Severe Pain (7 - 10)      Vital Signs Last 24 Hrs  T(F): 97.9 (09-06-22 @ 20:14), Max: 98.4 (09-06-22 @ 16:27)  HR: 65 (09-06-22 @ 20:14) (64 - 72)  BP: 127/62 (09-06-22 @ 20:14) (102/58 - 145/71)  RR: 18 (09-06-22 @ 20:14) (17 - 18)  SpO2: 98% (09-06-22 @ 20:14) (95% - 98%)  Telemetry:   CAPILLARY BLOOD GLUCOSE        I&O's Summary    05 Sep 2022 07:01  -  06 Sep 2022 07:00  --------------------------------------------------------  IN: 1620 mL / OUT: 0 mL / NET: 1620 mL    06 Sep 2022 07:01  -  06 Sep 2022 20:27  --------------------------------------------------------  IN: 1290 mL / OUT: 0 mL / NET: 1290 mL        PHYSICAL EXAM:  GENERAL: NAD, well-developed  HEAD:  Atraumatic, Normocephalic  EYES: EOMI, PERRLA, conjunctiva and sclera clear  NECK: Supple, No JVD  CHEST/LUNG: Clear to auscultation bilaterally; No wheeze  HEART: Regular rate and rhythm; No murmurs, rubs, or gallops  ABDOMEN: Soft, Nontender, Nondistended; Bowel sounds present  EXTREMITIES:  2+ Peripheral Pulses, No clubbing, cyanosis, or edema  PSYCH: AAOx3  NEUROLOGY: non-focal  SKIN: No rashes or lesions    LABS:                    RADIOLOGY & ADDITIONAL TESTS:    Imaging Personally Reviewed:    Consultant(s) Notes Reviewed:      Care Discussed with Consultants/Other Providers:

## 2022-09-08 NOTE — ED PROVIDER NOTE - OBJECTIVE STATEMENT
86 yo female hx of PMR, IBS, depression, just from hospital a scant few hours ago, now returns to hospital upon request from endocrine 2/2 ?miscommunication regarding their recc's.  they want temporal artery study, steroids, r/o GCA.  no fevers.

## 2022-09-09 PROBLEM — M35.3 POLYMYALGIA RHEUMATICA: Chronic | Status: ACTIVE | Noted: 2022-09-04

## 2022-09-09 PROCEDURE — 99223 1ST HOSP IP/OBS HIGH 75: CPT | Mod: GC

## 2022-09-09 PROCEDURE — 93998 UNLISTD NONINVAS VASC DX STD: CPT | Mod: 26

## 2022-09-09 PROCEDURE — 93882 EXTRACRANIAL UNI/LTD STUDY: CPT | Mod: 26

## 2022-09-09 RX ORDER — FLUOXETINE HCL 10 MG
60 CAPSULE ORAL DAILY
Refills: 0 | Status: DISCONTINUED | OUTPATIENT
Start: 2022-09-09 | End: 2022-09-16

## 2022-09-09 RX ORDER — CALCIUM CARBONATE 500(1250)
1 TABLET ORAL DAILY
Refills: 0 | Status: DISCONTINUED | OUTPATIENT
Start: 2022-09-09 | End: 2022-09-16

## 2022-09-09 RX ORDER — ENOXAPARIN SODIUM 100 MG/ML
40 INJECTION SUBCUTANEOUS EVERY 24 HOURS
Refills: 0 | Status: DISCONTINUED | OUTPATIENT
Start: 2022-09-09 | End: 2022-09-16

## 2022-09-09 RX ORDER — METOPROLOL TARTRATE 50 MG
25 TABLET ORAL DAILY
Refills: 0 | Status: DISCONTINUED | OUTPATIENT
Start: 2022-09-09 | End: 2022-09-16

## 2022-09-09 RX ORDER — GABAPENTIN 400 MG/1
100 CAPSULE ORAL
Refills: 0 | Status: DISCONTINUED | OUTPATIENT
Start: 2022-09-09 | End: 2022-09-16

## 2022-09-09 RX ORDER — ACETAMINOPHEN 500 MG
650 TABLET ORAL EVERY 8 HOURS
Refills: 0 | Status: DISCONTINUED | OUTPATIENT
Start: 2022-09-09 | End: 2022-09-16

## 2022-09-09 RX ORDER — PANTOPRAZOLE SODIUM 20 MG/1
40 TABLET, DELAYED RELEASE ORAL
Refills: 0 | Status: DISCONTINUED | OUTPATIENT
Start: 2022-09-09 | End: 2022-09-16

## 2022-09-09 RX ORDER — ATOVAQUONE 750 MG/5ML
1500 SUSPENSION ORAL EVERY 24 HOURS
Refills: 0 | Status: DISCONTINUED | OUTPATIENT
Start: 2022-09-09 | End: 2022-09-16

## 2022-09-09 RX ORDER — CHOLECALCIFEROL (VITAMIN D3) 125 MCG
1000 CAPSULE ORAL EVERY 24 HOURS
Refills: 0 | Status: DISCONTINUED | OUTPATIENT
Start: 2022-09-09 | End: 2022-09-16

## 2022-09-09 RX ORDER — ACETAMINOPHEN 500 MG
650 TABLET ORAL ONCE
Refills: 0 | Status: COMPLETED | OUTPATIENT
Start: 2022-09-09 | End: 2022-09-09

## 2022-09-09 RX ORDER — AMLODIPINE BESYLATE 2.5 MG/1
5 TABLET ORAL AT BEDTIME
Refills: 0 | Status: DISCONTINUED | OUTPATIENT
Start: 2022-09-09 | End: 2022-09-16

## 2022-09-09 RX ADMIN — Medication 25 MILLIGRAM(S): at 12:22

## 2022-09-09 RX ADMIN — PANTOPRAZOLE SODIUM 40 MILLIGRAM(S): 20 TABLET, DELAYED RELEASE ORAL at 12:22

## 2022-09-09 RX ADMIN — Medication 650 MILLIGRAM(S): at 06:26

## 2022-09-09 RX ADMIN — GABAPENTIN 100 MILLIGRAM(S): 400 CAPSULE ORAL at 17:53

## 2022-09-09 RX ADMIN — Medication 30 MILLIGRAM(S): at 17:03

## 2022-09-09 RX ADMIN — AMLODIPINE BESYLATE 5 MILLIGRAM(S): 2.5 TABLET ORAL at 21:13

## 2022-09-09 RX ADMIN — Medication 0.5 MILLIGRAM(S): at 07:07

## 2022-09-09 RX ADMIN — ENOXAPARIN SODIUM 40 MILLIGRAM(S): 100 INJECTION SUBCUTANEOUS at 17:53

## 2022-09-09 RX ADMIN — Medication 1000 UNIT(S): at 21:12

## 2022-09-09 RX ADMIN — ATOVAQUONE 1500 MILLIGRAM(S): 750 SUSPENSION ORAL at 17:49

## 2022-09-09 RX ADMIN — Medication 650 MILLIGRAM(S): at 05:22

## 2022-09-09 RX ADMIN — Medication 30 MILLIGRAM(S): at 17:50

## 2022-09-09 RX ADMIN — Medication 60 MILLIGRAM(S): at 12:15

## 2022-09-09 RX ADMIN — Medication 1 TABLET(S): at 12:15

## 2022-09-09 NOTE — CONSULT NOTE ADULT - ASSESSMENT
ECHO 2/19/22: EF 67%; mild mr, mod as, nl LV sys fx, mod concentric lVH,   ECHO 7/5/22 ef 68%; mild as, nl LV sys fx  Mild diastolic dysfunction (Stage I).    a/p     87F c hx remote breast ca, MAC untreated, MVP, asthma, mod AS, paroxysmal tachycardia of unknown rhythm, orthostatic hypotension (Feb '22) c/b syncope, anxiety, depression, recent covid (May '22), recent accidental valium toxicity , recent dx with  acute L3 compression Fx now presenting with PMR symptoms, fatigue and HA; She was seen by rheum and readmitted for IV Medrol 30 mg daily and TA biopsy bc high suspicion of GCA    # PMR symptoms, fatigue and HA  -rheum work up  -r/o GCA  -awaiting [facility] duplex face temp artery    # Moderate AS,    -prior echo with mild as    #Atach  -cv stable no events   -cont current tx    #htn   -c/w meds

## 2022-09-09 NOTE — PATIENT PROFILE ADULT - FALL HARM RISK - HARM RISK INTERVENTIONS

## 2022-09-09 NOTE — CONSULT NOTE ADULT - ASSESSMENT
86 yo F PMH Breast CA, MAC (untreated), MVP, asthma, osteopenia, AS, PMR (dx 7/2022 on steroids), fibromyalgia, spinal stenosis, uveitis presenting w/ back pain found to have non-traumatic compression fracture of L3 spine. Rheumatology consulted for evaluation of PMR, new Osteoporosis, and now concern for GCA    #GCA: Pt w/ complaints of daily headaches, intermittent b/l intermittent blurry vision, and scalp tenderness w/ palpation w/ elevated ESR while on steroids. GCA associated w/ PMR, and can commonly occur while already on steroids. Dopplers of temporal arteries w/o signs of GCA.   -c/w solumedrol 30mg q12  -appreciate vascular surgery consult for temporal artery biopsy as high suspicion for GCA  -appreciate opthalmology consult to evaluate patients blurry vision - if GCA related or hx of uveitis related    #PMR: Diagnosed 7/2022 w/ symptoms of shoulder/hip stiffness and pain that rapidly improved w/ steroids. Was started on prednisone 60mg 7/2022 which has been taper to 30mg qd.   -plan as above  -start mepron 1.5g qd while on high dose steroids (sulfa allergy)    #Osteoporosis: Hx of osteopenia and found to have non-traumatic L3 compression fracture  -would have orthopedic evaluation for any possible intervention such as kyphoplasty  -c/w calcium 500mg qd  -ordered vitamin D 1000mg qd  -obtain vitamin D level (ordered)  -will need to initiate bisphosphonate therapy outpatient     Discussed with Attending Dr. Rosita Ambrocio DO  Rheumatology Fellow  Pager: [phone]  Available on TEAMS 88 yo F PMH Breast CA, MAC (untreated), MVP, asthma, osteopenia, AS, PMR (dx 7/2022 on steroids), fibromyalgia, spinal stenosis, uveitis presenting w/ back pain found to have non-traumatic compression fracture of L3 spine. Rheumatology consulted for evaluation of PMR, new Osteoporosis, and now concern for GCA    #GCA: Pt w/ complaints of daily headaches, intermittent b/l intermittent blurry vision, and scalp tenderness w/ palpation w/ elevated ESR while on steroids. GCA associated w/ PMR, and can commonly occur while already on steroids. Dopplers of temporal arteries w/o signs of GCA.   -c/w solumedrol 30mg q12  -appreciate vascular surgery consult for temporal artery biopsy as high suspicion for GCA   -appreciate opthalmology consult to evaluate patients blurry vision - if GCA related or hx of uveitis related  - given the new compression fracture there is significant risk with use of steroids and therefore would need biopsy whcih is the gold standard diagnostic took for GCA to determine the treatment plan.      #PMR: Diagnosed 7/2022 w/ symptoms of shoulder/hip stiffness and pain that rapidly improved w/ steroids. Was started on prednisone 60mg 7/2022 which has been taper to 30mg qd.   -plan as above  -start mepron 1.5g qd while on high dose steroids (sulfa allergy)    #Osteoporosis: Hx of osteopenia and found to have non-traumatic L3 compression fracture  -would have orthopedic evaluation for any possible intervention such as kyphoplasty  -c/w calcium 500mg qd  -ordered vitamin D 1000mg qd  -obtain vitamin D level (ordered)  -will need to initiate bisphosphonate therapy outpatient     Discussed with Attending Dr. Rosita Ambrocio DO  Rheumatology Fellow  Pager: [phone]  Available on TEAMS

## 2022-09-09 NOTE — CHART NOTE - NSCHARTNOTEFT_GEN_A_CORE
Called by RN as pt crying inconsolably, hitting side rail, upset she was brought back to hospital. Unable to calm down patient. refusing po meds and vitals. Pt A & O X 3, behavior likely anxiety induced. Will give ativan 0.5 X 1 to calm pt. Will continue to monitor pt.     Megan Padilla NP  [phone]

## 2022-09-09 NOTE — H&P ADULT - HISTORY OF PRESENT ILLNESS
87 Y F H/O Breast CA, MAC untreated, MVP, asthma, mod  AS, on prednisone for PMR since July w resolution of diffuse symptoms.Initially started w Prednisone 50 mg, admitted on 9/4 on 30 mg without PMR Sx  Ptn was discharged to  Rehab on 9/1.  About 10 days prior to DC from rehab she developed severe back pain, nontraumatic.   Ptn was admitted on 9/4 w acute L3 compression Fx and severe constipation w large stool burden. Old T9 and T12 compression Fx were discovered as well.  pain was controlled w Neurontin and TLSO brace. She was seen by GI and had multiple large BMs w bowel regimen  Prednisone was dropped to 25 mg from 30 mg and she became more symptomatic w PMR symptoms, fatigue and HA  She was seen by rheum. Ptn was DCed on Prednisone of 30 mg. RHeumatology, Dr. Becker called her back to be admitted for IV Medrol 30 mg daily and TA biopsy bc high suspicion of GCA       87 Y F H/O Breast CA, MAC untreated, MVP, asthma, mod  AS, on prednisone for PMR since July w resolution of diffuse symptoms.Initially started w Prednisone 50 mg, admitted on 9/4 on 30 mg without PMR Sx  Ptn was discharged to  Rehab on 9/1.  About 10 days prior to DC from rehab she developed severe back pain, nontraumatic.   Ptn was admitted on 9/4 w acute L3 compression Fx and severe constipation w large stool burden. Old T9 and T12 compression Fx were discovered as well.  pain was controlled w Neurontin and TLSO brace. She was seen by GI and had multiple large BMs w bowel regimen  Prednisone was dropped to 25 mg from 30 mg and she became more symptomatic w PMR symptoms, fatigue and HA  She was seen by rheum. Ptn was DCed on Prednisone of 30 mg.   RHeumatology, Dr. Becker called her back to be admitted for IV Medrol 30 mg daily and TA biopsy bc high suspicion of GCA

## 2022-09-09 NOTE — PROVIDER CONTACT NOTE (OTHER) - ASSESSMENT
Pt received from 8M, ANM and  at bedside, pt inconsolable, crying, unable to obtain VS. pt w/ increased severe anxiety and paranoia. Upset at lack of previous christie-incontinence care. Stating "you aren't taking care of me or cleaning me, everyone is mad at me, you're keeping them (her family) from me, no one ever comes...". Pt hitting at side rails, herself and staff. Tylenol given for headache.

## 2022-09-09 NOTE — PROVIDER CONTACT NOTE (OTHER) - ACTION/TREATMENT ORDERED:
Med NP Randy came to bedside to assess pt, Ativan 0.5mg IVP ordered and given, Family called and are coming to pt bedside. Pt sleeping at present time. Safety maintained.

## 2022-09-09 NOTE — H&P ADULT - ASSESSMENT
87 Y F H/O Breast CA, MAC untreated, MVP, asthma, mod  AS, on prednisone for PMR since July w resolution of diffuse symptoms.   Initially started w Prednisone 50 mg, admitted on 9/4 on 30 mg without PMR Sx  Ptn was discharged to  Rehab on 9/1.  About 10 days prior to DC from rehab she developed severe back pain, nontraumatic.   Ptn was admitted on 9/4 w acute L3 compression Fx and severe constipation w large stool burden. Old T9 and T12 compression Fx were discovered as well.  pain was controlled w Neurontin and TLSO brace. She was seen by GI and had multiple large BMs w bowel regimen  Prednisone was dropped to 25 mg from 30 mg and she became more symptomatic w PMR symptoms, fatigue and HA  She was seen by rheum. Ptn was DCed on Prednisone of 30 mg.   RHeumatology, Dr. Becker called her back to be admitted for IV Medrol 30 mg q12H and TA biopsy bc high suspicion of GCA    - ptn denies having a HA, general HAs are occipital or frontal, she has no tenderness over palpation of her scalp including temples. in July ptn did not have HAs as one of her presenting symptoms  -awaiting Duplex of TA and vascular called for TA biopsy  -cont outptn meds  - start PCP ppx w Atovaquone  -pain control w Oxycodone prn moderate pain and Morphine prn severe pain: L3 compression Fx pain  -DVT ppx w sc Lovenox

## 2022-09-09 NOTE — CONSULT NOTE ADULT - SUBJECTIVE AND OBJECTIVE BOX
CARDIOLOGY CONSULT - Dr. Lee         HPI:  87 Y F H/O Breast CA, MAC untreated, MVP, asthma, mod  AS, on prednisone for PMR since July w resolution of diffuse symptoms.Initially started w Prednisone 50 mg, admitted on 9/4 on 30 mg without PMR Sx  Ptn was discharged to  Rehab on 9/1.  About 10 days prior to DC from rehab she developed severe back pain, nontraumatic.   Ptn was admitted on 9/4 w acute L3 compression Fx and severe constipation w large stool burden. Old T9 and T12 compression Fx were discovered as well.  pain was controlled w Neurontin and TLSO brace. She was seen by GI and had multiple large BMs w bowel regimen  Prednisone was dropped to 25 mg from 30 mg and she became more symptomatic w PMR symptoms, fatigue and HA  She was seen by rheum. Ptn was DCed on Prednisone of 30 mg. RHeumatology, Dr. Becker called her back to be admitted for IV Medrol 30 mg daily and TA biopsy bc high suspicion of GCA        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      GERD (Gastroesophageal Reflux Disease)      Irritable Bowel Syndrome      Mitral Valve Prolapse      Anxiety      Clinical Depression      Asthma      Uveitis      Irritable Bowel Syndrome      Hiatal Hernia      Nasal Polyp      Hypertension      COVID-19 vaccine series completed  Moderna      2019 novel coronavirus disease (COVID-19)  5/27/2022      PMR (polymyalgia rheumatica)      S/P Breast Lumpectomy      S/P Lumpectomy of Breast  Left Breast      Status Post Tonsillectomy      S/P Nasal Polypectomy      History of Cataract Surgery  Left eye      History of Breast Biopsy  [place] lymph node biopsy              PREVIOUS DIAGNOSTIC TESTING:    [ ] Echocardiogram:  [ ]  Catheterization:  [ ] Stress Test:  	    MEDICATIONS:  Home Medications:  amLODIPine 5 mg oral tablet: 1 tab(s) orally once a day (in the evening) (04 Sep 2022 21:42)  Clear Eyes 0.012% ophthalmic solution: 1 drop(s) to each affected eye 3 times a day, As Needed (04 Sep 2022 21:42)  Durezol 0.05% ophthalmic emulsion: 1 drop(s) in the left eye 2 times a day (04 Sep 2022 21:42)  Flonase 50 mcg/inh nasal spray: 1 spray(s) in each nostril 2 times a day (04 Sep 2022 21:42)  FLUoxetine 20 mg oral capsule: 3 cap(s) orally once a day (04 Sep 2022 21:42)  melatonin 5 mg oral tablet: 1 tab(s) orally once a day (at bedtime), As needed, Insomnia (08 Sep 2022 13:24)  polyethylene glycol 3350 oral powder for reconstitution: 17 gram(s) orally 2 times a day (08 Sep 2022 13:24)  Tylenol 500 mg oral tablet: 2 tab(s) orally 3 times a day (04 Sep 2022 21:42)  Vitamin D3 25 mcg (1000 intl units) oral tablet: 1 tab(s) orally once a day (04 Sep 2022 21:42)      MEDICATIONS  (STANDING):  amLODIPine   Tablet 5 milliGRAM(s) Oral at bedtime  calcium carbonate   1250 mG (OsCal) 1 Tablet(s) Oral daily  FLUoxetine 60 milliGRAM(s) Oral daily  gabapentin 100 milliGRAM(s) Oral two times a day  metoprolol succinate ER 25 milliGRAM(s) Oral daily  pantoprazole    Tablet 40 milliGRAM(s) Oral before breakfast      FAMILY HISTORY:  FH: CAD (coronary artery disease) (Father, Mother, Sibling, Aunt)    FH: lung cancer (Mother)        SOCIAL HISTORY:    [ ] Non-smoker  [ ] Smoker  [ ] Alcohol    Allergies    cefdinir (Unknown)  ciprofloxacin (Other)  codeine (Nausea; Other)  contrast media (iodine-based) (Angioedema)  fluorescein ophthalmic (Hives; Rash; Flushing)  lactose (Unknown)  latex (Anaphylaxis)  Levaquin (Nausea; Other)  lidocaine (Unknown)  SULFA (Anaphylaxis)  tetracycline (Anaphylaxis)    Intolerances    Augmentin (Stomach Upset)  	    REVIEW OF SYSTEMS:  CONSTITUTIONAL: No fever, weight loss, or fatigue  EYES: No eye pain, visual disturbances, or discharge  ENMT:  No difficulty hearing, tinnitus, vertigo; No sinus or throat pain  NECK: No pain or stiffness  RESPIRATORY: No cough, wheezing, chills or hemoptysis; No Shortness of Breath  CARDIOVASCULAR: No chest pain, palpitations, passing out, dizziness, or leg swelling  GASTROINTESTINAL: No abdominal or epigastric pain. No nausea, vomiting, or hematemesis; No diarrhea or constipation. No melena or hematochezia.  GENITOURINARY: No dysuria, frequency, hematuria, or incontinence  NEUROLOGICAL: No headaches, memory loss, loss of strength, numbness, or tremors  SKIN: No itching, burning, rashes, or lesions   	    [ ] All others negative	  [ ] Unable to obtain    PHYSICAL EXAM:  T(C): 36.6 (09-09-22 @ 12:14), Max: 36.9 (09-09-22 @ 02:00)  HR: 76 (09-09-22 @ 12:14) (64 - 76)  BP: 138/80 (09-09-22 @ 12:14) (120/78 - 138/80)  RR: 16 (09-09-22 @ 12:14) (16 - 18)  SpO2: 96% (09-09-22 @ 12:14) (94% - 96%)  Wt(kg): --  I&O's Summary    09 Sep 2022 07:01  -  09 Sep 2022 12:51  --------------------------------------------------------  IN: 400 mL / OUT: 0 mL / NET: 400 mL        Appearance: Normal	  Psychiatry: A & O x 3, Mood & affect appropriate  HEENT:   Normal oral mucosa, PERRL, EOMI	  Lymphatic: No lymphadenopathy  Cardiovascular: Normal S1 S2,RRR, No JVD, No murmurs  Respiratory: Lungs clear to auscultation	  Gastrointestinal:  Soft, Non-tender, + BS	  Skin: No rashes, No ecchymoses, No cyanosis	  Neurologic: Non-focal  Extremities: Normal range of motion, No clubbing, cyanosis or edema  Vascular: Peripheral pulses palpable 2+ bilaterally    TELEMETRY: 	    ECG:  	  RADIOLOGY:  OTHER: 	  	  LABS:	 	    CARDIAC MARKERS:  Troponin T, High Sensitivity Result: 43 ng/L (09-04 @ 20:54)  Troponin T, High Sensitivity Result: 40 ng/L (09-04 @ 15:34)                                  9.5    7.07  )-----------( 259      ( 08 Sep 2022 22:41 )             30.1     09-08    134<L>  |  100  |  20  ----------------------------<  94  4.6   |  22  |  0.94    Ca    8.7      08 Sep 2022 22:41    TPro  6.0  /  Alb  2.9<L>  /  TBili  0.2  /  DBili  x   /  AST  29  /  ALT  19  /  AlkPhos  100  09-08      proBNP:   Lipid Profile:   HgA1c:   TSH:        CARDIOLOGY CONSULT - Dr. Lee         HPI:  87 Y F H/O Breast CA, MAC untreated, MVP, asthma, mod  AS, on prednisone for PMR since July w resolution of diffuse symptoms.Initially started w Prednisone 50 mg, admitted on 9/4 on 30 mg without PMR Sx  Ptn was discharged to  Rehab on 9/1.  About 10 days prior to DC from rehab she developed severe back pain, nontraumatic.   Ptn was admitted on 9/4 w acute L3 compression Fx and severe constipation w large stool burden. Old T9 and T12 compression Fx were discovered as well.  pain was controlled w Neurontin and TLSO brace. She was seen by GI and had multiple large BMs w bowel regimen  Prednisone was dropped to 25 mg from 30 mg and she became more symptomatic w PMR symptoms, fatigue and HA  She was seen by rheum. Ptn was DCed on Prednisone of 30 mg. RHeumatology, Dr. Becker called her back to be admitted for IV Medrol 30 mg daily and TA biopsy bc high suspicion of GCA  pt is known from prior admissions; ECHO 7/5/22 ef 68%; mild as, nl LV sys fx  Mild diastolic dysfunction (Stage I).  ROS otherwise negative        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      GERD (Gastroesophageal Reflux Disease)      Irritable Bowel Syndrome      Mitral Valve Prolapse      Anxiety      Clinical Depression      Asthma      Uveitis      Irritable Bowel Syndrome      Hiatal Hernia      Nasal Polyp      Hypertension      COVID-19 vaccine series completed  Moderna      2019 novel coronavirus disease (COVID-19)  5/27/2022      PMR (polymyalgia rheumatica)      S/P Breast Lumpectomy      S/P Lumpectomy of Breast  Left Breast      Status Post Tonsillectomy      S/P Nasal Polypectomy      History of Cataract Surgery  Left eye      History of Breast Biopsy  [place] lymph node biopsy              PREVIOUS DIAGNOSTIC TESTING:    ECHO 2/19/22: EF 67%; mild mr, mod as, nl LV sys fx, mod concentric lVH,   ECHO 7/5/22 ef 68%; mild as, nl LV sys fx  Mild diastolic dysfunction (Stage I).      MEDICATIONS:  Home Medications:  amLODIPine 5 mg oral tablet: 1 tab(s) orally once a day (in the evening) (04 Sep 2022 21:42)  Clear Eyes 0.012% ophthalmic solution: 1 drop(s) to each affected eye 3 times a day, As Needed (04 Sep 2022 21:42)  Durezol 0.05% ophthalmic emulsion: 1 drop(s) in the left eye 2 times a day (04 Sep 2022 21:42)  Flonase 50 mcg/inh nasal spray: 1 spray(s) in each nostril 2 times a day (04 Sep 2022 21:42)  FLUoxetine 20 mg oral capsule: 3 cap(s) orally once a day (04 Sep 2022 21:42)  melatonin 5 mg oral tablet: 1 tab(s) orally once a day (at bedtime), As needed, Insomnia (08 Sep 2022 13:24)  polyethylene glycol 3350 oral powder for reconstitution: 17 gram(s) orally 2 times a day (08 Sep 2022 13:24)  Tylenol 500 mg oral tablet: 2 tab(s) orally 3 times a day (04 Sep 2022 21:42)  Vitamin D3 25 mcg (1000 intl units) oral tablet: 1 tab(s) orally once a day (04 Sep 2022 21:42)      MEDICATIONS  (STANDING):  amLODIPine   Tablet 5 milliGRAM(s) Oral at bedtime  calcium carbonate   1250 mG (OsCal) 1 Tablet(s) Oral daily  FLUoxetine 60 milliGRAM(s) Oral daily  gabapentin 100 milliGRAM(s) Oral two times a day  metoprolol succinate ER 25 milliGRAM(s) Oral daily  pantoprazole    Tablet 40 milliGRAM(s) Oral before breakfast      FAMILY HISTORY:  FH: CAD (coronary artery disease) (Father, Mother, Sibling, Aunt)    FH: lung cancer (Mother)        SOCIAL HISTORY:    [ ] Non-smoker  [ ] Smoker  [ ] Alcohol    Allergies    cefdinir (Unknown)  ciprofloxacin (Other)  codeine (Nausea; Other)  contrast media (iodine-based) (Angioedema)  fluorescein ophthalmic (Hives; Rash; Flushing)  lactose (Unknown)  latex (Anaphylaxis)  Levaquin (Nausea; Other)  lidocaine (Unknown)  SULFA (Anaphylaxis)  tetracycline (Anaphylaxis)    Intolerances    Augmentin (Stomach Upset)  	    REVIEW OF SYSTEMS:  CONSTITUTIONAL: No fever, weight loss, or fatigue  EYES: No eye pain, visual disturbances, or discharge  ENMT:  No difficulty hearing, tinnitus, vertigo; No sinus or throat pain  NECK: No pain or stiffness  RESPIRATORY: No cough, wheezing, chills or hemoptysis; No Shortness of Breath  CARDIOVASCULAR: No chest pain, palpitations, passing out, dizziness, or leg swelling  GASTROINTESTINAL: No abdominal or epigastric pain. No nausea, vomiting, or hematemesis; No diarrhea or constipation. No melena or hematochezia.  GENITOURINARY: No dysuria, frequency, hematuria, or incontinence  NEUROLOGICAL: No headaches, memory loss, loss of strength, numbness, or tremors  SKIN: No itching, burning, rashes, or lesions   	    [ ] All others negative	  [ ] Unable to obtain    PHYSICAL EXAM:  T(C): 36.6 (09-09-22 @ 12:14), Max: 36.9 (09-09-22 @ 02:00)  HR: 76 (09-09-22 @ 12:14) (64 - 76)  BP: 138/80 (09-09-22 @ 12:14) (120/78 - 138/80)  RR: 16 (09-09-22 @ 12:14) (16 - 18)  SpO2: 96% (09-09-22 @ 12:14) (94% - 96%)  Wt(kg): --  I&O's Summary    09 Sep 2022 07:01  -  09 Sep 2022 12:51  --------------------------------------------------------  IN: 400 mL / OUT: 0 mL / NET: 400 mL        Appearance: Normal	  Psychiatry: A & O x 3, Mood & affect appropriate  HEENT:   Normal oral mucosa, PERRL, EOMI	  Lymphatic: No lymphadenopathy  Cardiovascular: Normal S1 S2,RRR, No JVD, No murmurs  Respiratory: Lungs clear to auscultation	  Gastrointestinal:  Soft, Non-tender, + BS	  Skin: No rashes, No ecchymoses, No cyanosis	  Neurologic: Non-focal  Extremities: Normal range of motion, No clubbing, cyanosis or edema  Vascular: Peripheral pulses palpable 2+ bilaterally    TELEMETRY: 	    ECG:  	  RADIOLOGY:  OTHER: 	  	  LABS:	 	    CARDIAC MARKERS:  Troponin T, High Sensitivity Result: 43 ng/L (09-04 @ 20:54)  Troponin T, High Sensitivity Result: 40 ng/L (09-04 @ 15:34)                                  9.5    7.07  )-----------( 259      ( 08 Sep 2022 22:41 )             30.1     09-08    134<L>  |  100  |  20  ----------------------------<  94  4.6   |  22  |  0.94    Ca    8.7      08 Sep 2022 22:41    TPro  6.0  /  Alb  2.9<L>  /  TBili  0.2  /  DBili  x   /  AST  29  /  ALT  19  /  AlkPhos  100  09-08      proBNP:   Lipid Profile:   HgA1c:   TSH:

## 2022-09-09 NOTE — CONSULT NOTE ADULT - SUBJECTIVE AND OBJECTIVE BOX
ADA ROLLE  [id number]    HPI/Hospital Course: 86 yo F PMH Breast CA, MAC (untreated), MVP, asthma, osteopenia, AS, PMR (dx 7/2022 on steroids), fibromyalgia, spinal stenosis, uveitis presenting w/ back pain found to have compression fracture of L3 spine. Rheumatology consulted for evaluation of PMR and new suspected osteoporosis. Pt also w/ complaints of chronic headache, intermittent b/l intermittent blurry vision, and scalp tenderness w/ concern for GCA. Pt was however discharged home 9/8/22 by primary team, and called to return for evaluation for GCA r/o by rheumatology team 9/8/22. Pt returned overnight, and was given solumedrol 30mg. Dopplers of temporal arteries w/o signs of GCA. Pt started on solumedrol 30mg q12. Vascular consulted for temporal artery biopsy.     #PMR Hx:  -7/2022 presented to [facility] w/ syncope and AMS attributed to orthostatic hypotension. Pt experienced significant shoulder stiffness and hip pain w/ elevated ESR, and was started on prednisone 60mg by IM and w/ rapid improvement in symptoms. Pt diagnosed w/ PMR by IM.   -pt was discharged to rehab where she had her steroids tapered down to 30mg w/ IM    Subjective: Pt says she feels well. Has had daily headaches, however has not had one today. Says her left eye has become red, which she thinks is from her hx of uveitis in past that was causes by an infection.    ROS:  -Admits to daily headaches, intermittent b/l intermittent blurry vision, and scalp tenderness w/ palpation, occasional shoulder and hip stiffness and pain  -Denies jaw claudication, fever, chills, chest pain, sob, neuropathy such as numbness/tingling in extremities, rash, dry eye/mouth, nasal/oral ulcer    MEDICATIONS  (STANDING):  amLODIPine   Tablet 5 milliGRAM(s) Oral at bedtime  atovaquone  Suspension 1500 milliGRAM(s) Oral every 24 hours  calcium carbonate   1250 mG (OsCal) 1 Tablet(s) Oral daily  enoxaparin Injectable 40 milliGRAM(s) SubCutaneous every 24 hours  FLUoxetine 60 milliGRAM(s) Oral daily  gabapentin 100 milliGRAM(s) Oral two times a day  methylPREDNISolone sodium succinate Injectable 30 milliGRAM(s) IV Push every 12 hours  metoprolol succinate ER 25 milliGRAM(s) Oral daily  pantoprazole    Tablet 40 milliGRAM(s) Oral before breakfast    MEDICATIONS  (PRN):  acetaminophen     Tablet .. 650 milliGRAM(s) Oral every 8 hours PRN Mild Pain (1 - 3)      Allergies    cefdinir (Unknown)  ciprofloxacin (Other)  codeine (Nausea; Other)  contrast media (iodine-based) (Angioedema)  fluorescein ophthalmic (Hives; Rash; Flushing)  lactose (Unknown)  latex (Anaphylaxis)  Levaquin (Nausea; Other)  lidocaine (Unknown)  SULFA (Anaphylaxis)  tetracycline (Anaphylaxis)    Intolerances    Augmentin (Stomach Upset)      PERTINENT MEDICATION HISTORY:    MEDICATIONS: toprol, prednisone 30mg qd, vitamin D, fluoxetine    SOCIAL HISTORY: Denies hx of tobacco or alcohol use    FAMILY HISTORY:  FH: CAD (coronary artery disease) (Father, Mother, Sibling, Aunt)    FH: lung cancer (Mother)        Vital Signs Last 24 Hrs  T(C): 36.7 (09 Sep 2022 16:42), Max: 36.9 (09 Sep 2022 02:00)  T(F): 98 (09 Sep 2022 16:42), Max: 98.4 (09 Sep 2022 02:00)  HR: 70 (09 Sep 2022 16:42) (64 - 76)  BP: 127/69 (09 Sep 2022 16:42) (120/78 - 138/80)  BP(mean): --  RR: 18 (09 Sep 2022 16:42) (16 - 18)  SpO2: 96% (09 Sep 2022 16:42) (94% - 96%)    Parameters below as of 09 Sep 2022 16:42  Patient On (Oxygen Delivery Method): room air        Physical Exam:  General: Breathing comfortably on room air, no distress  HEENT: MMM, no oral ulcers  CVS: +S1/S2, RRR  Resp: CTA b/l. No crackles/wheezing  GI: Soft, NT/ND +BS  MSK: 5/5 muscle strength in arms and legs. Adequate ROM in shoulders and hips. No synovitis. No foot or hand drop  Vascular: palpable temporal artery pulses, mild tenderness b/l to palpation  Skin: no visible rashes    LABS:                        9.5    7.07  )-----------( 259      ( 08 Sep 2022 22:41 )             30.1     09-08    134<L>  |  100  |  20  ----------------------------<  94  4.6   |  22  |  0.94    Ca    8.7      08 Sep 2022 22:41    TPro  6.0  /  Alb  2.9<L>  /  TBili  0.2  /  DBili  x   /  AST  29  /  ALT  19  /  AlkPhos  100  09-08          RADIOLOGY & ADDITIONAL STUDIES: Reviewed     ADA ROLLE  [id number]    HPI/Hospital Course: 86 yo F PMH Breast CA, MAC (untreated), MVP, asthma, osteopenia, AS, PMR (dx 7/2022 on steroids), fibromyalgia, spinal stenosis, uveitis presenting w/ back pain found to have compression fracture of L3 spine. Rheumatology consulted for evaluation of PMR and new suspected osteoporosis. Pt also w/ complaints of chronic headache, intermittent b/l intermittent blurry vision, and scalp tenderness w/ concern for GCA. Pt was however discharged home 9/8/22 by primary team, and called to return for evaluation for GCA r/o by rheumatology team 9/8/22. Pt returned overnight, and was given solumedrol 30mg. Dopplers of temporal arteries w/o signs of GCA. Pt started on solumedrol 30mg q12. Vascular consulted for temporal artery biopsy.     #PMR Hx:  -7/2022 presented to [facility] w/ syncope and AMS attributed to orthostatic hypotension. Pt experienced significant shoulder stiffness and hip pain w/ elevated ESR, and was started on prednisone 60mg by IM and w/ rapid improvement in symptoms. Pt diagnosed w/ PMR by IM.   -pt was discharged to rehab where she had her steroids tapered down to 30mg w/ IM    Subjective: Pt says she feels well. Has had daily headaches, however has not had one today. Says her left eye has become red, which she thinks is from her hx of uveitis in past that was causes by an infection.    ROS:  -Admits to daily headaches, intermittent b/l intermittent blurry vision, and scalp tenderness w/ palpation, occasional shoulder and hip stiffness and pain  -Denies jaw claudication, fever, chills, chest pain, sob, neuropathy such as numbness/tingling in extremities, rash, dry eye/mouth, nasal/oral ulcer    MEDICATIONS  (STANDING):  amLODIPine   Tablet 5 milliGRAM(s) Oral at bedtime  atovaquone  Suspension 1500 milliGRAM(s) Oral every 24 hours  calcium carbonate   1250 mG (OsCal) 1 Tablet(s) Oral daily  enoxaparin Injectable 40 milliGRAM(s) SubCutaneous every 24 hours  FLUoxetine 60 milliGRAM(s) Oral daily  gabapentin 100 milliGRAM(s) Oral two times a day  methylPREDNISolone sodium succinate Injectable 30 milliGRAM(s) IV Push every 12 hours  metoprolol succinate ER 25 milliGRAM(s) Oral daily  pantoprazole    Tablet 40 milliGRAM(s) Oral before breakfast    MEDICATIONS  (PRN):  acetaminophen     Tablet .. 650 milliGRAM(s) Oral every 8 hours PRN Mild Pain (1 - 3)      Allergies    cefdinir (Unknown)  ciprofloxacin (Other)  codeine (Nausea; Other)  contrast media (iodine-based) (Angioedema)  fluorescein ophthalmic (Hives; Rash; Flushing)  lactose (Unknown)  latex (Anaphylaxis)  Levaquin (Nausea; Other)  lidocaine (Unknown)  SULFA (Anaphylaxis)  tetracycline (Anaphylaxis)    Intolerances    Augmentin (Stomach Upset)      PERTINENT MEDICATION HISTORY:    MEDICATIONS: toprol, prednisone 30mg qd, vitamin D, fluoxetine    SOCIAL HISTORY: Denies hx of tobacco or alcohol use    FAMILY HISTORY:  FH: CAD (coronary artery disease) (Father, Mother, Sibling, Aunt)    FH: lung cancer (Mother)        Vital Signs Last 24 Hrs  T(C): 36.7 (09 Sep 2022 16:42), Max: 36.9 (09 Sep 2022 02:00)  T(F): 98 (09 Sep 2022 16:42), Max: 98.4 (09 Sep 2022 02:00)  HR: 70 (09 Sep 2022 16:42) (64 - 76)  BP: 127/69 (09 Sep 2022 16:42) (120/78 - 138/80)  RR: 18 (09 Sep 2022 16:42) (16 - 18)  SpO2: 96% (09 Sep 2022 16:42) (94% - 96%)    Physical Exam:  General: Breathing comfortably on room air, no distress  HEENT: MMM, no oral ulcers  CVS: +S1/S2, RRR  Resp: CTA b/l. No crackles/wheezing  GI: Soft, NT/ND +BS  MSK: 5/5 muscle strength in arms and legs. Adequate ROM in shoulders and hips. No synovitis. No foot or hand drop  Vascular: palpable temporal artery pulses, mild tenderness b/l to palpation  Skin: no visible rashes    LABS:                        9.5    7.07  )-----------( 259      ( 08 Sep 2022 22:41 )             30.1     09-08    134<L>  |  100  |  20  ----------------------------<  94  4.6   |  22  |  0.94    Ca    8.7      08 Sep 2022 22:41    TPro  6.0  /  Alb  2.9<L>  /  TBili  0.2  /  DBili  x   /  AST  29  /  ALT  19  /  AlkPhos  100  09-08          RADIOLOGY & ADDITIONAL STUDIES: Reviewed

## 2022-09-09 NOTE — CONSULT NOTE ADULT - TIME BILLING
Agree with above NP note.  a/p   87F c hx remote breast ca, MAC untreated, MVP, asthma, mod AS, paroxysmal tachycardia of unknown rhythm, orthostatic hypotension (Feb '22) c/b syncope, anxiety, depression, recent covid (May '22), recent accidental valium toxicity , recent dx with  acute L3 compression Fx now presenting with PMR symptoms, fatigue and HA; She was seen by rheum and readmitted for IV Medrol 30 mg daily and TA biopsy bc high suspicion of GCA    # PMR symptoms, fatigue and HA  -rheum work up  -r/o GCA  -awaiting pos bx    # Moderate AS,    -prior echo with mild as    #Atach  -cv stable no events   -cont current tx    #htn   -c/w meds

## 2022-09-09 NOTE — CONSULT NOTE ADULT - ATTENDING COMMENTS
PMR  Compression fracture   Symptoms c/w GCA    Steroids as above   While US is negative that is inadequate to r/o GCA.  Patient with significant risk with using steroids given compression fracture.  GCA requires higher doses of steroids and possibly anti-IL6 therapy while PMR requires only low dose steroids   needs temporal artery biopsy   Osteoporosis therapy to be started as outpatient but  discussion of medications started today with the patient.   Orthopedics to see to consider interventions for compression fracture such as kyphoplasty.     Homa Becker MD  [phone]

## 2022-09-09 NOTE — CONSULT NOTE ADULT - SUBJECTIVE AND OBJECTIVE BOX
VASCULAR SURGERY CONSULT NOTE  ------------------------------------------------------------------------------------      HPI: 87 Y F H/O Breast CA, MAC untreated, MVP, asthma, mod  AS, on prednisone for PMR since July w resolution of diffuse symptoms. Initially started w Prednisone 50 mg, admitted on 9/4 on 30 mg without PMR Sx  Ptn was discharged to  Rehab on 9/1. About 10 days prior to DC from rehab she developed severe back pain, nontraumatic.  Pt was admitted on 9/4 w acute L3 compression Fx and severe constipation w large stool burden. Old T9 and T12 compression Fx were discovered as well.  pain was controlled w Neurontin and TLSO brace. She was seen by GI and had multiple large BMs w bowel regimen Prednisone was dropped to 25 mg from 30 mg and she became more symptomatic w PMR symptoms, fatigue and HA. She was seen by rheum. Ptn was DCed on Prednisone of 30 mg.   Rheumatology Dr. Becker called her back to be admitted for IV Medrol 30 mg daily and TA biopsy bc high suspicion of GCA    Vascular surgery is consulted for temporal A biopsy to r/o GCA    ROS: 10-system review is otherwise negative except HPI above.      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      GERD (Gastroesophageal Reflux Disease)      Irritable Bowel Syndrome      Mitral Valve Prolapse      Anxiety      Clinical Depression      Asthma      Uveitis      Irritable Bowel Syndrome      Hiatal Hernia      Nasal Polyp      Hypertension      COVID-19 vaccine series completed  Moderna      2019 novel coronavirus disease (COVID-19)  5/27/2022      PMR (polymyalgia rheumatica)      S/P Breast Lumpectomy      S/P Lumpectomy of Breast  Left Breast      Status Post Tonsillectomy      S/P Nasal Polypectomy      History of Cataract Surgery  Left eye      History of Breast Biopsy  [place] lymph node biopsy        FAMILY HISTORY:  FH: CAD (coronary artery disease) (Father, Mother, Sibling, Aunt)    FH: lung cancer (Mother)      [] Family history not pertinent as reviewed with the patient and family    SOCIAL HISTORY:  ***    ALLERGIES: Augmentin (Stomach Upset)  cefdinir (Unknown)  ciprofloxacin (Other)  codeine (Nausea; Other)  contrast media (iodine-based) (Angioedema)  fluorescein ophthalmic (Hives; Rash; Flushing)  lactose (Unknown)  latex (Anaphylaxis)  Levaquin (Nausea; Other)  lidocaine (Unknown)  SULFA (Anaphylaxis)  tetracycline (Anaphylaxis)      HOME MEDICATIONS: ***    CURRENT MEDICATIONS  MEDICATIONS (STANDING): amLODIPine   Tablet 5 milliGRAM(s) Oral at bedtime  atovaquone  Suspension 1500 milliGRAM(s) Oral every 24 hours  calcium carbonate   1250 mG (OsCal) 1 Tablet(s) Oral daily  enoxaparin Injectable 40 milliGRAM(s) SubCutaneous every 24 hours  FLUoxetine 60 milliGRAM(s) Oral daily  gabapentin 100 milliGRAM(s) Oral two times a day  methylPREDNISolone sodium succinate Injectable 30 milliGRAM(s) IV Push every 12 hours  metoprolol succinate ER 25 milliGRAM(s) Oral daily  pantoprazole    Tablet 40 milliGRAM(s) Oral before breakfast    MEDICATIONS (PRN):acetaminophen     Tablet .. 650 milliGRAM(s) Oral every 8 hours PRN Mild Pain (1 - 3)    --------------------------------------------------------------------------------------------    Vitals:   T(C): 36.7 (09-09-22 @ 16:42), Max: 36.9 (09-09-22 @ 02:00)  HR: 70 (09-09-22 @ 16:42) (64 - 76)  BP: 127/69 (09-09-22 @ 16:42) (120/78 - 138/80)  RR: 18 (09-09-22 @ 16:42) (16 - 18)  SpO2: 96% (09-09-22 @ 16:42) (94% - 96%)  CAPILLARY BLOOD GLUCOSE        CAPILLARY BLOOD GLUCOSE          09-09 @ 07:01  -  09-09 @ 18:42  --------------------------------------------------------  IN:    Oral Fluid: 400 mL  Total IN: 400 mL    OUT:  Total OUT: 0 mL    Total NET: 400 mL        Height (cm): 172.7 (09-08 @ 21:34)  Weight (kg): 65.8 (09-08 @ 21:34)  BMI (kg/m2): 22.1 (09-08 @ 21:34)  BSA (m2): 1.78 (09-08 @ 21:34)    PHYSICAL EXAM: ***  General: NAD, Lying in bed comfortably  Neuro: A+Ox3  HEENT: NC/AT, EOMI  Neck: Soft, supple  Cardio: RRR, nml S1/S2  Resp: Good effort, CTA b/l  Thorax: No chest wall tenderness  Breast: No lesions/masses, no drainage  GI/Abd: Soft, NT/ND, no rebound/guarding, no masses palpated  Vascular: All 4 extremities warm.  Skin: Intact, no breakdown  Lymphatic/Nodes: No palpable lymphadenopathy  Musculoskeletal: All 4 extremities moving spontaneously, no limitations  --------------------------------------------------------------------------------------------    LABS  CBC (09-08 @ 22:41)                              9.5<L>                         7.07    )----------------(  259        44.7  % Neutrophils, 38.0  % Lymphocytes, ANC: 3.15                                30.1<L>  CBC (09-08 @ 10:27)                              10.8<L>                         8.00    )----------------(  288        --    % Neutrophils, --    % Lymphocytes, ANC: --                                  34.3<L>    BMP (09-08 @ 22:41)             134<L>  |  100     |  20    		Ca++ --      Ca 8.7                ---------------------------------( 94    		Mg --                 4.6     |  22      |  0.94  			Ph --      BMP (09-08 @ 10:27)             135     |  97      |  14    		Ca++ --      Ca 9.4                ---------------------------------( 90    		Mg --                 3.7     |  28      |  0.55  			Ph --        LFTs (09-08 @ 22:41)      TPro 6.0 / Alb 2.9<L> / TBili 0.2 / DBili -- / AST 29 / ALT 19 / AlkPhos 100            --------------------------------------------------------------------------------------------    MICROBIOLOGY    -> Catheterized Catheterized Culture (09-04 @ 17:55)     NG    NG    No growth      --------------------------------------------------------------------------------------------    IMAGING  ***    ASSESSMENT: Patient is a 87y old f with ****    PLAN:  ***  -   -   -   -   - Patient seen/examined with attending.  - Plan to be discussed with Attending,   VASCULAR SURGERY CONSULT NOTE  ------------------------------------------------------------------------------------      HPI: 87 Y F H/O Breast CA, MAC untreated, MVP, asthma, mod  AS, on prednisone for PMR since July w resolution of diffuse symptoms. Initially started w Prednisone 50 mg, admitted on 9/4 on 30 mg without PMR Sx  Ptn was discharged to  Rehab on 9/1. About 10 days prior to DC from rehab she developed severe back pain, nontraumatic.  Pt was admitted on 9/4 w acute L3 compression Fx and severe constipation w large stool burden. Old T9 and T12 compression Fx were discovered as well.  pain was controlled w Neurontin and TLSO brace. She was seen by GI and had multiple large BMs w bowel regimen Prednisone was dropped to 25 mg from 30 mg and she became more symptomatic w PMR symptoms, fatigue and HA. She was seen by rheum. Ptn was DCed on Prednisone of 30 mg.   Rheumatology Dr. Becker called her back to be admitted for IV Medrol 30 mg daily and TA biopsy bc high suspicion of GCA    Vascular surgery is consulted for temporal A biopsy to r/o GCA    ROS: 10-system review is otherwise negative except HPI above.      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      GERD (Gastroesophageal Reflux Disease)      Irritable Bowel Syndrome      Mitral Valve Prolapse      Anxiety      Clinical Depression      Asthma      Uveitis      Irritable Bowel Syndrome      Hiatal Hernia      Nasal Polyp      Hypertension      COVID-19 vaccine series completed  Moderna      2019 novel coronavirus disease (COVID-19)  5/27/2022      PMR (polymyalgia rheumatica)      S/P Breast Lumpectomy      S/P Lumpectomy of Breast  Left Breast      Status Post Tonsillectomy      S/P Nasal Polypectomy      History of Cataract Surgery  Left eye      History of Breast Biopsy  [place] lymph node biopsy        FAMILY HISTORY:  FH: CAD (coronary artery disease) (Father, Mother, Sibling, Aunt)    FH: lung cancer (Mother)      [] Family history not pertinent as reviewed with the patient and family    SOCIAL HISTORY:  nonsmoker, denies alcohol use    ALLERGIES: Augmentin (Stomach Upset)  cefdinir (Unknown)  ciprofloxacin (Other)  codeine (Nausea; Other)  contrast media (iodine-based) (Angioedema)  fluorescein ophthalmic (Hives; Rash; Flushing)  lactose (Unknown)  latex (Anaphylaxis)  Levaquin (Nausea; Other)  lidocaine (Unknown)  SULFA (Anaphylaxis)  tetracycline (Anaphylaxis)      HOME MEDICATIONS:   · 	FLUoxetine 20 mg oral capsule: Last Dose Taken:  , 3 cap(s) orally once a day  · 	Durezol 0.05% ophthalmic emulsion: Last Dose Taken:  , 1 drop(s) in the left eye 2 times a day  · 	Vitamin D3 25 mcg (1000 intl units) oral tablet: Last Dose Taken:  , 1 tab(s) orally once a day  · 	predniSONE: Last Dose Taken: 03-Sep-2022 AM, 30 milligram(s) orally once a day  	NOTE: patient [id number] + 20mg tabs at home  · 	amLODIPine 5 mg oral tablet: Last Dose Taken:  , 1 tab(s) orally once a day (in the evening)  · 	omeprazole 40 mg oral delayed release capsule: Last Dose Taken:  , 1 cap(s) orally once a day  · 	metoprolol succinate 25 mg oral tablet, extended release: Last Dose Taken:  , 0.5 tab(s) orally once a day  · 	Flonase 50 mcg/inh nasal spray: Last Dose Taken:  , 1 spray(s) in each nostril 2 times a day  · 	Clear Eyes 0.012% ophthalmic solution: Last Dose Taken:  , 1 drop(s) to each affected eye 3 times a day, As Needed  · 	Tylenol 500 mg oral tablet: Last Dose Taken:  , 2 tab(s) orally 3 times a day    CURRENT MEDICATIONS  MEDICATIONS (STANDING): amLODIPine   Tablet 5 milliGRAM(s) Oral at bedtime  atovaquone  Suspension 1500 milliGRAM(s) Oral every 24 hours  calcium carbonate   1250 mG (OsCal) 1 Tablet(s) Oral daily  enoxaparin Injectable 40 milliGRAM(s) SubCutaneous every 24 hours  FLUoxetine 60 milliGRAM(s) Oral daily  gabapentin 100 milliGRAM(s) Oral two times a day  methylPREDNISolone sodium succinate Injectable 30 milliGRAM(s) IV Push every 12 hours  metoprolol succinate ER 25 milliGRAM(s) Oral daily  pantoprazole    Tablet 40 milliGRAM(s) Oral before breakfast    MEDICATIONS (PRN):acetaminophen     Tablet .. 650 milliGRAM(s) Oral every 8 hours PRN Mild Pain (1 - 3)    --------------------------------------------------------------------------------------------    Vitals:   T(C): 36.7 (09-09-22 @ 16:42), Max: 36.9 (09-09-22 @ 02:00)  HR: 70 (09-09-22 @ 16:42) (64 - 76)  BP: 127/69 (09-09-22 @ 16:42) (120/78 - 138/80)  RR: 18 (09-09-22 @ 16:42) (16 - 18)  SpO2: 96% (09-09-22 @ 16:42) (94% - 96%)  CAPILLARY BLOOD GLUCOSE        CAPILLARY BLOOD GLUCOSE          09-09 @ 07:01  -  09-09 @ 18:42  --------------------------------------------------------  IN:    Oral Fluid: 400 mL  Total IN: 400 mL    OUT:  Total OUT: 0 mL    Total NET: 400 mL        Height (cm): 172.7 (09-08 @ 21:34)  Weight (kg): 65.8 (09-08 @ 21:34)  BMI (kg/m2): 22.1 (09-08 @ 21:34)  BSA (m2): 1.78 (09-08 @ 21:34)    PHYSICAL EXAM:   General: NAD, Lying in bed comfortably  Neuro: A+Ox3  HEENT: NC/AT, EOMI  Neck: Soft, supple  Cardio: RRR  Resp: Good effort, CTA b/l  GI/Abd: Soft, NT/ND, no rebound/guarding, no masses palpated  Vascular: All 4 extremities warm. Palpable b/l DP/PT.   Musculoskeletal: All 4 extremities moving spontaneously, no limitations  --------------------------------------------------------------------------------------------    LABS  CBC (09-08 @ 22:41)                              9.5<L>                         7.07    )----------------(  259        44.7  % Neutrophils, 38.0  % Lymphocytes, ANC: 3.15                                30.1<L>  CBC (09-08 @ 10:27)                              10.8<L>                         8.00    )----------------(  288        --    % Neutrophils, --    % Lymphocytes, ANC: --                                  34.3<L>    BMP (09-08 @ 22:41)             134<L>  |  100     |  20    		Ca++ --      Ca 8.7                ---------------------------------( 94    		Mg --                 4.6     |  22      |  0.94  			Ph --      BMP (09-08 @ 10:27)             135     |  97      |  14    		Ca++ --      Ca 9.4                ---------------------------------( 90    		Mg --                 3.7     |  28      |  0.55  			Ph --        LFTs (09-08 @ 22:41)      TPro 6.0 / Alb 2.9<L> / TBili 0.2 / DBili -- / AST 29 / ALT 19 / AlkPhos 100            --------------------------------------------------------------------------------------------    MICROBIOLOGY    -> Catheterized Catheterized Culture (09-04 @ 17:55)     NG    NG    No growth      --------------------------------------------------------------------------------------------

## 2022-09-09 NOTE — H&P ADULT - NSHPPHYSICALEXAM_GEN_ALL_CORE
T(C): 36.9 (09-09-22 @ 02:00), Max: 36.9 (09-09-22 @ 02:00)  HR: 65 (09-09-22 @ 02:00) (64 - 76)  BP: 130/79 (09-09-22 @ 02:00) (120/78 - 130/79)  RR: 16 (09-09-22 @ 02:00) (16 - 18)  SpO2: 96% (09-09-22 @ 02:00) (94% - 96%)    PHYSICAL EXAM:  GENERAL: NAD, well-developed  HEAD:  Atraumatic, Normocephalic  EYES: EOMI, PERRLA, conjunctiva and sclera clear  NECK: Supple, No JVD  CHEST/LUNG: Clear to auscultation bilaterally; No wheeze  HEART: Regular rate and rhythm; No murmurs, rubs, or gallops  ABDOMEN: Soft, Nontender, Nondistended; Bowel sounds present  EXTREMITIES:  2+ Peripheral Pulses, No clubbing, cyanosis, or edema  PSYCH: AAOx3  NEUROLOGY: non-focal  SKIN: No rashes or lesions

## 2022-09-09 NOTE — H&P ADULT - NSICDXPASTSURGICALHX_GEN_ALL_CORE_FT
PAST SURGICAL HISTORY:  History of Breast Biopsy [place] lymph node biopsy    History of Cataract Surgery Left eye    S/P Breast Lumpectomy     S/P Lumpectomy of Breast Left Breast    S/P Nasal Polypectomy     Status Post Tonsillectomy

## 2022-09-10 LAB
24R-OH-CALCIDIOL SERPL-MCNC: 43.5 NG/ML — SIGNIFICANT CHANGE UP (ref 30–80)
ANION GAP SERPL CALC-SCNC: 12 MMOL/L — SIGNIFICANT CHANGE UP (ref 5–17)
BUN SERPL-MCNC: 21 MG/DL — SIGNIFICANT CHANGE UP (ref 7–23)
CALCIUM SERPL-MCNC: 9.2 MG/DL — SIGNIFICANT CHANGE UP (ref 8.4–10.5)
CHLORIDE SERPL-SCNC: 99 MMOL/L — SIGNIFICANT CHANGE UP (ref 96–108)
CO2 SERPL-SCNC: 25 MMOL/L — SIGNIFICANT CHANGE UP (ref 22–31)
CREAT SERPL-MCNC: 0.79 MG/DL — SIGNIFICANT CHANGE UP (ref 0.5–1.3)
EGFR: 73 ML/MIN/1.73M2 — SIGNIFICANT CHANGE UP
GLUCOSE SERPL-MCNC: 121 MG/DL — HIGH (ref 70–99)
HCT VFR BLD CALC: 32.4 % — LOW (ref 34.5–45)
HGB BLD-MCNC: 10.4 G/DL — LOW (ref 11.5–15.5)
MCHC RBC-ENTMCNC: 28.6 PG — SIGNIFICANT CHANGE UP (ref 27–34)
MCHC RBC-ENTMCNC: 32.1 GM/DL — SIGNIFICANT CHANGE UP (ref 32–36)
MCV RBC AUTO: 89 FL — SIGNIFICANT CHANGE UP (ref 80–100)
NRBC # BLD: 0 /100 WBCS — SIGNIFICANT CHANGE UP (ref 0–0)
PLATELET # BLD AUTO: 267 K/UL — SIGNIFICANT CHANGE UP (ref 150–400)
POTASSIUM SERPL-MCNC: 4.2 MMOL/L — SIGNIFICANT CHANGE UP (ref 3.5–5.3)
POTASSIUM SERPL-SCNC: 4.2 MMOL/L — SIGNIFICANT CHANGE UP (ref 3.5–5.3)
RBC # BLD: 3.64 M/UL — LOW (ref 3.8–5.2)
RBC # FLD: 15.9 % — HIGH (ref 10.3–14.5)
SODIUM SERPL-SCNC: 136 MMOL/L — SIGNIFICANT CHANGE UP (ref 135–145)
WBC # BLD: 7.26 K/UL — SIGNIFICANT CHANGE UP (ref 3.8–10.5)
WBC # FLD AUTO: 7.26 K/UL — SIGNIFICANT CHANGE UP (ref 3.8–10.5)

## 2022-09-10 RX ORDER — DIPHENHYDRAMINE HCL 50 MG
25 CAPSULE ORAL ONCE
Refills: 0 | Status: COMPLETED | OUTPATIENT
Start: 2022-09-10 | End: 2022-09-10

## 2022-09-10 RX ORDER — IPRATROPIUM/ALBUTEROL SULFATE 18-103MCG
3 AEROSOL WITH ADAPTER (GRAM) INHALATION EVERY 6 HOURS
Refills: 0 | Status: DISCONTINUED | OUTPATIENT
Start: 2022-09-10 | End: 2022-09-16

## 2022-09-10 RX ORDER — CHLORHEXIDINE GLUCONATE 213 G/1000ML
15 SOLUTION TOPICAL
Refills: 0 | Status: DISCONTINUED | OUTPATIENT
Start: 2022-09-10 | End: 2022-09-16

## 2022-09-10 RX ADMIN — Medication 30 MILLIGRAM(S): at 05:51

## 2022-09-10 RX ADMIN — Medication 25 MILLIGRAM(S): at 14:21

## 2022-09-10 RX ADMIN — Medication 1 TABLET(S): at 11:52

## 2022-09-10 RX ADMIN — GABAPENTIN 100 MILLIGRAM(S): 400 CAPSULE ORAL at 17:05

## 2022-09-10 RX ADMIN — ATOVAQUONE 1500 MILLIGRAM(S): 750 SUSPENSION ORAL at 17:06

## 2022-09-10 RX ADMIN — Medication 60 MILLIGRAM(S): at 11:52

## 2022-09-10 RX ADMIN — Medication 25 MILLIGRAM(S): at 05:51

## 2022-09-10 RX ADMIN — ENOXAPARIN SODIUM 40 MILLIGRAM(S): 100 INJECTION SUBCUTANEOUS at 17:06

## 2022-09-10 RX ADMIN — Medication 1000 UNIT(S): at 22:13

## 2022-09-10 RX ADMIN — GABAPENTIN 100 MILLIGRAM(S): 400 CAPSULE ORAL at 05:51

## 2022-09-10 RX ADMIN — AMLODIPINE BESYLATE 5 MILLIGRAM(S): 2.5 TABLET ORAL at 22:13

## 2022-09-10 RX ADMIN — PANTOPRAZOLE SODIUM 40 MILLIGRAM(S): 20 TABLET, DELAYED RELEASE ORAL at 05:52

## 2022-09-10 NOTE — PROGRESS NOTE ADULT - SUBJECTIVE AND OBJECTIVE BOX
Patient is a 87y old  Female who presents with a chief complaint of r/o GCA (09 Sep 2022 14:16)      SUBJECTIVE / OVERNIGHT EVENTS: ptn denies having pain. she is calm this am. no agitation overnight    MEDICATIONS  (STANDING):  amLODIPine   Tablet 5 milliGRAM(s) Oral at bedtime  atovaquone  Suspension 1500 milliGRAM(s) Oral every 24 hours  calcium carbonate   1250 mG (OsCal) 1 Tablet(s) Oral daily  cholecalciferol 1000 Unit(s) Oral every 24 hours  enoxaparin Injectable 40 milliGRAM(s) SubCutaneous every 24 hours  FLUoxetine 60 milliGRAM(s) Oral daily  gabapentin 100 milliGRAM(s) Oral two times a day  methylPREDNISolone sodium succinate Injectable 30 milliGRAM(s) IV Push every 12 hours  metoprolol succinate ER 25 milliGRAM(s) Oral daily  pantoprazole    Tablet 40 milliGRAM(s) Oral before breakfast    MEDICATIONS  (PRN):  acetaminophen     Tablet .. 650 milliGRAM(s) Oral every 8 hours PRN Mild Pain (1 - 3)      Vital Signs Last 24 Hrs  T(F): 97.9 (09-10-22 @ 04:55), Max: 98 (09-09-22 @ 16:42)  HR: 70 (09-10-22 @ 04:55) (70 - 76)  BP: 168/75 (09-10-22 @ 04:55) (127/69 - 168/75)  RR: 18 (09-10-22 @ 04:55) (16 - 18)  SpO2: 96% (09-10-22 @ 04:55) (95% - 96%)  Telemetry:   CAPILLARY BLOOD GLUCOSE        I&O's Summary    09 Sep 2022 07:01  -  10 Sep 2022 06:52  --------------------------------------------------------  IN: 700 mL / OUT: 0 mL / NET: 700 mL        PHYSICAL EXAM:  GENERAL: NAD, well-developed  HEAD:  Atraumatic, Normocephalic  EYES: EOMI, PERRLA, conjunctiva and sclera clear  NECK: Supple, No JVD  CHEST/LUNG: Clear to auscultation bilaterally; No wheeze  HEART: Regular rate and rhythm; No murmurs, rubs, or gallops  ABDOMEN: Soft, Nontender, Nondistended; Bowel sounds present  EXTREMITIES:  2+ Peripheral Pulses, No clubbing, cyanosis, or edema  PSYCH: AAOx3  NEUROLOGY: non-focal  SKIN: No rashes or lesions    LABS:                        10.4   7.26  )-----------( 267      ( 10 Sep 2022 06:14 )             32.4     09-08    134<L>  |  100  |  20  ----------------------------<  94  4.6   |  22  |  0.94    Ca    8.7      08 Sep 2022 22:41    TPro  6.0  /  Alb  2.9<L>  /  TBili  0.2  /  DBili  x   /  AST  29  /  ALT  19  /  AlkPhos  100  09-08              RADIOLOGY & ADDITIONAL TESTS:    Imaging Personally Reviewed:    Consultant(s) Notes Reviewed:      Care Discussed with Consultants/Other Providers:

## 2022-09-10 NOTE — PROGRESS NOTE ADULT - ASSESSMENT
87 Y F H/O Breast CA, MAC untreated, MVP, asthma, mod  AS, on prednisone for PMR since July w resolution of diffuse symptoms.   Initially started w Prednisone 50 mg, admitted on 9/4 on 30 mg without PMR Sx  Ptn was discharged to  Rehab on 9/1.  About 10 days prior to DC from rehab she developed severe back pain, nontraumatic.   Ptn was admitted on 9/4 w acute L3 compression Fx and severe constipation w large stool burden. Old T9 and T12 compression Fx were discovered as well.  pain was controlled w Neurontin and TLSO brace. She was seen by GI and had multiple large BMs w bowel regimen  Prednisone was dropped to 25 mg from 30 mg and she became more symptomatic w PMR symptoms, fatigue and HA  She was seen by rheum. Ptn was DCed on Prednisone of 30 mg.   RHeumatology, Dr. Becker called her back to be admitted for IV Medrol 30 mg q12H and TA biopsy bc high suspicion of GCA    - ptn denies having a HA, general HAs are occipital or frontal, she has no tenderness over palpation of her scalp including temples. in July ptn did not have HAs as one of her presenting symptoms  -awaiting Duplex of TA and vascular called for TA biopsy  - seen by vascular and rheum  - on Medrol 30 mg q12H  - back pain is controlled, kyphoplasty is recommended for ptns with ongoing severe back pain 2/2 compression Fx  - she will need aggressive therapy to treat OP on outptn basis  -cont outptn meds  - on PCP ppx w Atovaquone 2/2 sulfa allergy  -pain control w Oxycodone prn moderate pain and Morphine prn severe pain: L3 compression Fx pain. Hasn't needed any  -DVT ppx w sc Lovenox  - ill be away 9/11-9/13, dr ma will cover me.

## 2022-09-10 NOTE — CHART NOTE - NSCHARTNOTEFT_GEN_A_CORE
Rheumatology recommending temporal artery biopsy. Will discuss timing of operation with Dr. Calvillo. Please call with any questions.    [phone]

## 2022-09-10 NOTE — PROGRESS NOTE ADULT - SUBJECTIVE AND OBJECTIVE BOX
CARDIOLOGY FOLLOW UP NOTE - DR. BRANTLEY    Patient Name: ADA ROLLE  Date of Service: 09-10-22 @ 09:09    Patient seen and examined    Subjective:    cv: denies chest pain, dyspnea, palpitations, dizziness  pulmonary: denies cough  GI: denies abdominal pain, nausea, vomiting  vascular/legs: no edema   skin: no rash  ROS: otherwise negative   overnight events:      PHYSICAL EXAM:  T(C): 36.6 (09-10-22 @ 04:55), Max: 36.7 (09-09-22 @ 16:42)  HR: 70 (09-10-22 @ 04:55) (70 - 76)  BP: 168/75 (09-10-22 @ 04:55) (127/69 - 168/75)  RR: 18 (09-10-22 @ 04:55) (16 - 18)  SpO2: 96% (09-10-22 @ 04:55) (95% - 96%)  Wt(kg): --  I&O's Summary    09 Sep 2022 07:01  -  10 Sep 2022 07:00  --------------------------------------------------------  IN: 700 mL / OUT: 0 mL / NET: 700 mL      Daily     Daily     Appearance: Normal	  Cardiovascular: Normal S1 S2,RRR, No JVD, No murmurs  Respiratory: Lungs clear to auscultation	  Gastrointestinal:  Soft, Non-tender, + BS	  Extremities: Normal range of motion, No clubbing, cyanosis or edema      Home Medications:  amLODIPine 5 mg oral tablet: 1 tab(s) orally once a day (in the evening) (04 Sep 2022 21:42)  Clear Eyes 0.012% ophthalmic solution: 1 drop(s) to each affected eye 3 times a day, As Needed (04 Sep 2022 21:42)  Durezol 0.05% ophthalmic emulsion: 1 drop(s) in the left eye 2 times a day (04 Sep 2022 21:42)  Flonase 50 mcg/inh nasal spray: 1 spray(s) in each nostril 2 times a day (04 Sep 2022 21:42)  FLUoxetine 20 mg oral capsule: 3 cap(s) orally once a day (04 Sep 2022 21:42)  melatonin 5 mg oral tablet: 1 tab(s) orally once a day (at bedtime), As needed, Insomnia (08 Sep 2022 13:24)  polyethylene glycol 3350 oral powder for reconstitution: 17 gram(s) orally 2 times a day (08 Sep 2022 13:24)  Tylenol 500 mg oral tablet: 2 tab(s) orally 3 times a day (04 Sep 2022 21:42)  Vitamin D3 25 mcg (1000 intl units) oral tablet: 1 tab(s) orally once a day (04 Sep 2022 21:42)      MEDICATIONS  (STANDING):  amLODIPine   Tablet 5 milliGRAM(s) Oral at bedtime  atovaquone  Suspension 1500 milliGRAM(s) Oral every 24 hours  calcium carbonate   1250 mG (OsCal) 1 Tablet(s) Oral daily  cholecalciferol 1000 Unit(s) Oral every 24 hours  enoxaparin Injectable 40 milliGRAM(s) SubCutaneous every 24 hours  FLUoxetine 60 milliGRAM(s) Oral daily  gabapentin 100 milliGRAM(s) Oral two times a day  methylPREDNISolone sodium succinate Injectable 30 milliGRAM(s) IV Push every 12 hours  metoprolol succinate ER 25 milliGRAM(s) Oral daily  pantoprazole    Tablet 40 milliGRAM(s) Oral before breakfast      TELEMETRY: 	    ECG:  	  RADIOLOGY:   DIAGNOSTIC TESTING:  [ ] Echocardiogram:  [ ] Catheterization:  [ ] Stress Test:    OTHER: 	    LABS:	 	    CARDIAC MARKERS:                                      10.4   7.26  )-----------( 267      ( 10 Sep 2022 06:14 )             32.4     09-10    136  |  99  |  21  ----------------------------<  121<H>  4.2   |  25  |  0.79    Ca    9.2      10 Sep 2022 06:13    TPro  6.0  /  Alb  2.9<L>  /  TBili  0.2  /  DBili  x   /  AST  29  /  ALT  19  /  AlkPhos  100  09-08    proBNP:     Lipid Profile:   HgA1c:     Creatinine, Serum: 0.79 mg/dL (09-10-22 @ 06:13)  Creatinine, Serum: 0.94 mg/dL (09-08-22 @ 22:41)  Creatinine, Serum: 0.55 mg/dL (09-08-22 @ 10:27)

## 2022-09-10 NOTE — PHYSICAL THERAPY INITIAL EVALUATION ADULT - PERTINENT HX OF CURRENT PROBLEM, REHAB EVAL
86 y/o female with PMH of anxiety, asthma, breast cancer, depression, GERD, HTN, IBS, MVP presenting to the ED  with c/o back pain. As per EMS, pt was prematurely d/c from [facility] today d/t miscommunication. Pt was contacted and told to return to hospital today for GCA . Pt was admitted for multiple acute compression fracture d/t osteoporosis

## 2022-09-10 NOTE — CHART NOTE - NSCHARTNOTEFT_GEN_A_CORE
Called by RN to report patient w Chest tightness/ Flushed red cheeks/ pruritis  - Patient seen and examined admits to above   - Patient assessed with Dr. Bucio , patient admits to reaction likely 2/2 Solumedrol   - Solumedrol dc and started back on Prednisone 50mg po qd , Benadryl and nebs ordered - patient reports relief on symptoms    - Will cont to monitor

## 2022-09-10 NOTE — PHYSICAL THERAPY INITIAL EVALUATION ADULT - ADDITIONAL COMMENTS
PTA pt was independent with functional mobility with RW required some assistance with ADLs. Pt lives in an apartment 7 steps to enter, elevator to apartment with her daughter

## 2022-09-10 NOTE — PROGRESS NOTE ADULT - ASSESSMENT
ECHO 2/19/22: EF 67%; mild mr, mod as, nl LV sys fx, mod concentric lVH,   ECHO 7/5/22 ef 68%; mild as, nl LV sys fx  Mild diastolic dysfunction (Stage I).    a/p     87F c hx remote breast ca, MAC untreated, MVP, asthma, mod AS, paroxysmal tachycardia of unknown rhythm, orthostatic hypotension (Feb '22) c/b syncope, anxiety, depression, recent covid (May '22), recent accidental valium toxicity , recent dx with  acute L3 compression Fx now presenting with PMR symptoms, fatigue and HA; She was seen by rheum and readmitted for IV Medrol 30 mg daily and TA biopsy bc high suspicion of GCA    # PMR symptoms, fatigue and HA  -rheum work up  -r/o GCA  -w/u per vasc    # Moderate AS,    -prior echo with mild as    #Atach  -cv stable no events   -cont current tx    #htn   -permissive htn  -c/w meds   -will uptitrate as needed    dvt ppx    35 minutes spent on total encounter; more than 50% of the visit was spent counseling and/or coordinating care by the attending physician.

## 2022-09-11 RX ADMIN — Medication 25 MILLIGRAM(S): at 05:52

## 2022-09-11 RX ADMIN — Medication 650 MILLIGRAM(S): at 06:15

## 2022-09-11 RX ADMIN — ENOXAPARIN SODIUM 40 MILLIGRAM(S): 100 INJECTION SUBCUTANEOUS at 17:34

## 2022-09-11 RX ADMIN — GABAPENTIN 100 MILLIGRAM(S): 400 CAPSULE ORAL at 17:34

## 2022-09-11 RX ADMIN — Medication 50 MILLIGRAM(S): at 05:52

## 2022-09-11 RX ADMIN — GABAPENTIN 100 MILLIGRAM(S): 400 CAPSULE ORAL at 05:52

## 2022-09-11 RX ADMIN — Medication 1 TABLET(S): at 11:02

## 2022-09-11 RX ADMIN — CHLORHEXIDINE GLUCONATE 15 MILLILITER(S): 213 SOLUTION TOPICAL at 22:34

## 2022-09-11 RX ADMIN — PANTOPRAZOLE SODIUM 40 MILLIGRAM(S): 20 TABLET, DELAYED RELEASE ORAL at 05:52

## 2022-09-11 RX ADMIN — Medication 60 MILLIGRAM(S): at 11:02

## 2022-09-11 RX ADMIN — AMLODIPINE BESYLATE 5 MILLIGRAM(S): 2.5 TABLET ORAL at 22:33

## 2022-09-11 RX ADMIN — Medication 1000 UNIT(S): at 22:33

## 2022-09-11 NOTE — CHART NOTE - NSCHARTNOTEFT_GEN_A_CORE
Pt is currently #11 on add on for Temporal Artery Biopsy Tomorrow 9/12 with vascular surgery.     If you have any questions please page Vascular [id number] Pt is currently #11 on add on for Bilateral (Per Rheum) Temporal Artery Biopsy Tomorrow 9/12 with vascular surgery.     If you have any questions please page Vascular [id number] Pt is currently #11 on add on for Bilateral (Per Rheum) Temporal Artery Biopsy Tomorrow 9/12 with vascular surgery.     -NPO at midnight, Midnight IVF  - 4 AM labs.     If you have any questions please page Vascular [phone] Pt is currently #11 on add on for Bilateral (Per Rheum) Temporal Artery Biopsy Tomorrow 9/12 with vascular surgery.     -NPO at midnight, Midnight IVF  - Hold heparin gtt once pt is called to OR.  - 4 AM labs.     If you have any questions please page Vascular [phone] Pt is currently #11 on add on for Bilateral (Per Rheum) Temporal Artery Biopsy Tomorrow 9/12 with vascular surgery.     -NPO at midnight, Midnight IVF  - 4 AM labs.     If you have any questions please page Vascular [phone]

## 2022-09-11 NOTE — PROGRESS NOTE ADULT - SUBJECTIVE AND OBJECTIVE BOX
Patient is a 87y old  Female who presents with a chief complaint of r/o GCA (09 Sep 2022 14:16)    Coverage for Jennifer Brito   SUBJECTIVE / OVERNIGHT EVENTS: Comfortable   Review of Systems  chest pain no  palpitations no  sob no  nausea no  headache no    MEDICATIONS  (STANDING):  albuterol/ipratropium for Nebulization 3 milliLiter(s) Nebulizer every 6 hours  amLODIPine   Tablet 5 milliGRAM(s) Oral at bedtime  atovaquone  Suspension 1500 milliGRAM(s) Oral every 24 hours  calcium carbonate   1250 mG (OsCal) 1 Tablet(s) Oral daily  cholecalciferol 1000 Unit(s) Oral every 24 hours  enoxaparin Injectable 40 milliGRAM(s) SubCutaneous every 24 hours  FLUoxetine 60 milliGRAM(s) Oral daily  gabapentin 100 milliGRAM(s) Oral two times a day  metoprolol succinate ER 25 milliGRAM(s) Oral daily  pantoprazole    Tablet 40 milliGRAM(s) Oral before breakfast  predniSONE   Tablet 50 milliGRAM(s) Oral daily    MEDICATIONS  (PRN):  acetaminophen     Tablet .. 650 milliGRAM(s) Oral every 8 hours PRN Mild Pain (1 - 3)  chlorhexidine 0.12% Liquid 15 milliLiter(s) Swish and Spit two times a day PRN ppx      Vital Signs Last 24 Hrs  T(C): 36.7 (11 Sep 2022 12:01), Max: 36.8 (11 Sep 2022 05:00)  T(F): 98 (11 Sep 2022 12:01), Max: 98.2 (11 Sep 2022 05:00)  HR: 61 (11 Sep 2022 12:01) (60 - 67)  BP: 111/66 (11 Sep 2022 12:01) (111/66 - 146/77)  BP(mean): --  RR: 18 (11 Sep 2022 12:01) (18 - 18)  SpO2: 95% (11 Sep 2022 12:01) (94% - 97%)    Parameters below as of 11 Sep 2022 12:01  Patient On (Oxygen Delivery Method): room air        PHYSICAL EXAM:  GENERAL: NAD, well-developed  HEAD:  Atraumatic, Normocephalic  EYES: EOMI, PERRLA, conjunctiva and sclera clear  NECK: Supple, No JVD  CHEST/LUNG: Clear to auscultation bilaterally; No wheeze  HEART: Regular rate and rhythm; No murmurs, rubs, or gallops  ABDOMEN: Soft, Nontender, Nondistended; Bowel sounds present  EXTREMITIES:  2+ Peripheral Pulses, No clubbing, cyanosis, or edema  PSYCH: AAOx3  NEUROLOGY: non-focal  SKIN: No rashes or lesions    LABS:                        10.4   7.26  )-----------( 267      ( 10 Sep 2022 06:14 )             32.4     09-10    136  |  99  |  21  ----------------------------<  121<H>  4.2   |  25  |  0.79    Ca    9.2      10 Sep 2022 06:13                  RADIOLOGY & ADDITIONAL TESTS:    Imaging Personally Reviewed:    Consultant(s) Notes Reviewed:      Care Discussed with Consultants/Other Providers:

## 2022-09-11 NOTE — PROGRESS NOTE ADULT - SUBJECTIVE AND OBJECTIVE BOX
CARDIOLOGY FOLLOW UP NOTE - DR. BRANTLEY    Patient Name: ADA ROLLE  Date of Service: 09-11-22 @ 09:29    Patient seen and examined  events noted  poss allergic rxn yesterday  resolved with meds    Subjective:    cv: denies chest pain, dyspnea, palpitations, dizziness  pulmonary: denies cough  GI: denies abdominal pain, nausea, vomiting  vascular/legs: no edema   skin: no rash  ROS: otherwise negative   overnight events:      PHYSICAL EXAM:  T(C): 36.8 (09-11-22 @ 08:56), Max: 36.8 (09-10-22 @ 13:08)  HR: 62 (09-11-22 @ 08:56) (60 - 68)  BP: 146/77 (09-11-22 @ 08:56) (132/78 - 146/77)  RR: 18 (09-11-22 @ 08:56) (18 - 18)  SpO2: 94% (09-11-22 @ 08:56) (94% - 98%)  Wt(kg): --  I&O's Summary    10 Sep 2022 07:01  -  11 Sep 2022 07:00  --------------------------------------------------------  IN: 980 mL / OUT: 400 mL / NET: 580 mL      Daily     Daily     Appearance: Normal	  Cardiovascular: Normal S1 S2,RRR, No JVD, No murmurs  Respiratory: Lungs clear to auscultation	  Gastrointestinal:  Soft, Non-tender, + BS	  Extremities: Normal range of motion, No clubbing, cyanosis or edema      Home Medications:  amLODIPine 5 mg oral tablet: 1 tab(s) orally once a day (in the evening) (04 Sep 2022 21:42)  Clear Eyes 0.012% ophthalmic solution: 1 drop(s) to each affected eye 3 times a day, As Needed (04 Sep 2022 21:42)  Durezol 0.05% ophthalmic emulsion: 1 drop(s) in the left eye 2 times a day (04 Sep 2022 21:42)  Flonase 50 mcg/inh nasal spray: 1 spray(s) in each nostril 2 times a day (04 Sep 2022 21:42)  FLUoxetine 20 mg oral capsule: 3 cap(s) orally once a day (04 Sep 2022 21:42)  melatonin 5 mg oral tablet: 1 tab(s) orally once a day (at bedtime), As needed, Insomnia (08 Sep 2022 13:24)  polyethylene glycol 3350 oral powder for reconstitution: 17 gram(s) orally 2 times a day (08 Sep 2022 13:24)  Tylenol 500 mg oral tablet: 2 tab(s) orally 3 times a day (04 Sep 2022 21:42)  Vitamin D3 25 mcg (1000 intl units) oral tablet: 1 tab(s) orally once a day (04 Sep 2022 21:42)      MEDICATIONS  (STANDING):  albuterol/ipratropium for Nebulization 3 milliLiter(s) Nebulizer every 6 hours  amLODIPine   Tablet 5 milliGRAM(s) Oral at bedtime  atovaquone  Suspension 1500 milliGRAM(s) Oral every 24 hours  calcium carbonate   1250 mG (OsCal) 1 Tablet(s) Oral daily  cholecalciferol 1000 Unit(s) Oral every 24 hours  enoxaparin Injectable 40 milliGRAM(s) SubCutaneous every 24 hours  FLUoxetine 60 milliGRAM(s) Oral daily  gabapentin 100 milliGRAM(s) Oral two times a day  metoprolol succinate ER 25 milliGRAM(s) Oral daily  pantoprazole    Tablet 40 milliGRAM(s) Oral before breakfast  predniSONE   Tablet 50 milliGRAM(s) Oral daily      TELEMETRY: 	    ECG:  	  RADIOLOGY:   DIAGNOSTIC TESTING:  [ ] Echocardiogram:  [ ] Catheterization:  [ ] Stress Test:    OTHER: 	    LABS:	 	    CARDIAC MARKERS:                                      10.4   7.26  )-----------( 267      ( 10 Sep 2022 06:14 )             32.4     09-10    136  |  99  |  21  ----------------------------<  121<H>  4.2   |  25  |  0.79    Ca    9.2      10 Sep 2022 06:13      proBNP:     Lipid Profile:   HgA1c:     Creatinine, Serum: 0.79 mg/dL (09-10-22 @ 06:13)  Creatinine, Serum: 0.94 mg/dL (09-08-22 @ 22:41)  Creatinine, Serum: 0.55 mg/dL (09-08-22 @ 10:27)

## 2022-09-11 NOTE — PROGRESS NOTE ADULT - ASSESSMENT
ECHO 2/19/22: EF 67%; mild mr, mod as, nl LV sys fx, mod concentric lVH,   ECHO 7/5/22 ef 68%; mild as, nl LV sys fx  Mild diastolic dysfunction (Stage I).    a/p     87F c hx remote breast ca, MAC untreated, MVP, asthma, mod AS, paroxysmal tachycardia of unknown rhythm, orthostatic hypotension (Feb '22) c/b syncope, anxiety, depression, recent covid (May '22), recent accidental valium toxicity , recent dx with  acute L3 compression Fx now presenting with PMR symptoms, fatigue and HA; She was seen by rheum and readmitted for IV Medrol 30 mg daily and TA biopsy bc high suspicion of GCA    # PMR symptoms, fatigue and HA  -rheum work up  -r/o GCA  -w/u per vasc  -await temp artery bx    # Moderate AS,    -prior echo with mild as    #Atach  -cv stable no events   -cont current tx    #htn   -permissive htn  -c/w meds   -will uptitrate as needed    dvt ppx    35 minutes spent on total encounter; more than 50% of the visit was spent counseling and/or coordinating care by the attending physician.

## 2022-09-11 NOTE — PROGRESS NOTE ADULT - ASSESSMENT
87 Y F H/O Breast CA, MAC untreated, MVP, asthma, mod  AS, on prednisone for PMR since July w resolution of diffuse symptoms.   Initially started w Prednisone 50 mg, admitted on 9/4 on 30 mg without PMR Sx  Ptn was discharged to  Rehab on 9/1.  About 10 days prior to DC from rehab she developed severe back pain, nontraumatic.   Ptn was admitted on 9/4 w acute L3 compression Fx and severe constipation w large stool burden. Old T9 and T12 compression Fx were discovered as well.  pain was controlled w Neurontin and TLSO brace. She was seen by GI and had multiple large BMs w bowel regimen  Prednisone was dropped to 25 mg from 30 mg and she became more symptomatic w PMR symptoms, fatigue and HA  She was seen by rheum. Ptn was DCed on Prednisone of 30 mg.   RHeumatology, Dr. Becker called her back to be admitted for IV Medrol 30 mg q12H and TA biopsy bc high suspicion of GCA    - ptn denies having a HA, general HAs are occipital or frontal, she has no tenderness over palpation of her scalp including temples. in July ptn did not have HAs as one of her presenting symptoms  -awaiting Duplex of TA and vascular called for TA biopsy  - seen by vascular and rheum  - on Medrol 30 mg q12H  - back pain is controlled, kyphoplasty is recommended for ptns with ongoing severe back pain 2/2 compression Fx  - she will need aggressive therapy to treat OP on outptn basis  -cont outptn meds  - on PCP ppx w Atovaquone 2/2 sulfa allergy  -pain control w Oxycodone prn moderate pain and Morphine prn severe pain: L3 compression Fx pain. Hasn't needed any  -DVT ppx w sc Lovenox  -Medically stable. Continue present Rx     Dallas Horner MD phone [phone]

## 2022-09-12 LAB
ANION GAP SERPL CALC-SCNC: 11 MMOL/L — SIGNIFICANT CHANGE UP (ref 5–17)
APTT BLD: 24.6 SEC — LOW (ref 27.5–35.5)
BLD GP AB SCN SERPL QL: NEGATIVE — SIGNIFICANT CHANGE UP
BUN SERPL-MCNC: 24 MG/DL — HIGH (ref 7–23)
CALCIUM SERPL-MCNC: 9.4 MG/DL — SIGNIFICANT CHANGE UP (ref 8.4–10.5)
CHLORIDE SERPL-SCNC: 95 MMOL/L — LOW (ref 96–108)
CO2 SERPL-SCNC: 29 MMOL/L — SIGNIFICANT CHANGE UP (ref 22–31)
CREAT SERPL-MCNC: 0.72 MG/DL — SIGNIFICANT CHANGE UP (ref 0.5–1.3)
EGFR: 81 ML/MIN/1.73M2 — SIGNIFICANT CHANGE UP
GLUCOSE SERPL-MCNC: 75 MG/DL — SIGNIFICANT CHANGE UP (ref 70–99)
HCT VFR BLD CALC: 35.7 % — SIGNIFICANT CHANGE UP (ref 34.5–45)
HGB BLD-MCNC: 11.4 G/DL — LOW (ref 11.5–15.5)
INR BLD: 1 RATIO — SIGNIFICANT CHANGE UP (ref 0.88–1.16)
MCHC RBC-ENTMCNC: 28.6 PG — SIGNIFICANT CHANGE UP (ref 27–34)
MCHC RBC-ENTMCNC: 31.9 GM/DL — LOW (ref 32–36)
MCV RBC AUTO: 89.7 FL — SIGNIFICANT CHANGE UP (ref 80–100)
NRBC # BLD: 0 /100 WBCS — SIGNIFICANT CHANGE UP (ref 0–0)
PLATELET # BLD AUTO: 307 K/UL — SIGNIFICANT CHANGE UP (ref 150–400)
POTASSIUM SERPL-MCNC: 3.9 MMOL/L — SIGNIFICANT CHANGE UP (ref 3.5–5.3)
POTASSIUM SERPL-SCNC: 3.9 MMOL/L — SIGNIFICANT CHANGE UP (ref 3.5–5.3)
PROTHROM AB SERPL-ACNC: 11.5 SEC — SIGNIFICANT CHANGE UP (ref 10.5–13.4)
RBC # BLD: 3.98 M/UL — SIGNIFICANT CHANGE UP (ref 3.8–5.2)
RBC # FLD: 15.9 % — HIGH (ref 10.3–14.5)
RH IG SCN BLD-IMP: POSITIVE — SIGNIFICANT CHANGE UP
SARS-COV-2 RNA SPEC QL NAA+PROBE: SIGNIFICANT CHANGE UP
SODIUM SERPL-SCNC: 135 MMOL/L — SIGNIFICANT CHANGE UP (ref 135–145)
WBC # BLD: 10.1 K/UL — SIGNIFICANT CHANGE UP (ref 3.8–10.5)
WBC # FLD AUTO: 10.1 K/UL — SIGNIFICANT CHANGE UP (ref 3.8–10.5)

## 2022-09-12 RX ADMIN — Medication 650 MILLIGRAM(S): at 17:43

## 2022-09-12 RX ADMIN — Medication 25 MILLIGRAM(S): at 05:36

## 2022-09-12 RX ADMIN — Medication 1000 UNIT(S): at 21:34

## 2022-09-12 RX ADMIN — GABAPENTIN 100 MILLIGRAM(S): 400 CAPSULE ORAL at 17:29

## 2022-09-12 RX ADMIN — Medication 60 MILLIGRAM(S): at 11:12

## 2022-09-12 RX ADMIN — Medication 650 MILLIGRAM(S): at 18:10

## 2022-09-12 RX ADMIN — PANTOPRAZOLE SODIUM 40 MILLIGRAM(S): 20 TABLET, DELAYED RELEASE ORAL at 05:36

## 2022-09-12 RX ADMIN — AMLODIPINE BESYLATE 5 MILLIGRAM(S): 2.5 TABLET ORAL at 21:34

## 2022-09-12 RX ADMIN — Medication 1 TABLET(S): at 11:12

## 2022-09-12 RX ADMIN — Medication 50 MILLIGRAM(S): at 05:35

## 2022-09-12 RX ADMIN — GABAPENTIN 100 MILLIGRAM(S): 400 CAPSULE ORAL at 05:35

## 2022-09-12 RX ADMIN — ENOXAPARIN SODIUM 40 MILLIGRAM(S): 100 INJECTION SUBCUTANEOUS at 17:29

## 2022-09-12 NOTE — ADVANCED PRACTICE NURSE CONSULT - RECOMMEDATIONS
Will recommend the followin. B/l buttocks: continue to monitor, apply CAvilon daily  2. Encourage the use of underpads, d/c briefs.  3. continue to encourage mobility, T&P  4. Complete cair boots when in bed  5. Seat cushion when OOb to chair  Tx plan discussed with RN

## 2022-09-12 NOTE — ADVANCED PRACTICE NURSE CONSULT - ASSESSMENT
The pt was encountered on [facility]- Mrs Santana was sitting OOB in the chair and was able to stand with minimal assistance using a walker. She was wearing a brief and reports  that occasionally " I dribble and I feel comfortable in the diaper." Upon assessment, she  has blanchable erythema of  the skin in the fold of the b/l buttocks- the skin is intact but erythematous. Pt reports a feeling of "irritation" . will classify this as MASD/IAD not a pressure injury. I spoke with the pt at length about the benefits of under pads versus briefs and she continues to choose wearing the brief.  Will recommend the application of CAvilon daily to lay down a protective coating on the skin.  The pt is on a [employer] P 500 support surface and ambulates to the BR.  Will recommend a Seat cushion when sitting.

## 2022-09-12 NOTE — ADVANCED PRACTICE NURSE CONSULT - REASON FOR CONSULT
Requested by staff to assess skin status of pt a/w a pressure injury. PMH is noted:  87 Y F H/O Breast CA, MAC untreated, MVP, asthma, mod  AS, on prednisone for PMR since July w resolution of diffuse symptoms.Initially started w Prednisone 50 mg, admitted on 9/4 on 30 mg without PMR Sx  Ptn was discharged to  Rehab on 9/1.  About 10 days prior to DC from rehab she developed severe back pain, nontraumatic.   Ptn was admitted on 9/4 w acute L3 compression Fx and severe constipation w large stool burden. Old T9 and T12 compression Fx were discovered as well.  pain was controlled w Neurontin and TLSO brace. She was seen by GI and had multiple large BMs w bowel regimen  Prednisone was dropped to 25 mg from 30 mg and she became more symptomatic w PMR symptoms, fatigue and HA  She was seen by rheum. Ptn was DCed on Prednisone of 30 mg.   RHeumatology, Dr. Becker called her back to be admitted for IV Medrol 30 mg daily and TA biopsy bc high suspicion of GCA

## 2022-09-12 NOTE — PROGRESS NOTE ADULT - ASSESSMENT
87F H/O Breast CA, MAC untreated, MVP, asthma, mod  AS, on prednisone for PMR still elevated ESR. Patient complains of frequent headache accompanied with blurring left eye vision. Rheumatology is consulted w/ high concern for GCA. Vascular surgery is consulted for temporal A biopsy to r/o GCA.    Plan:  - b/l temporal artery biopsy today  - cancelled as pt did not want to rescind DNR/DNI for procedure    Vascular Surgery  [id number]

## 2022-09-12 NOTE — PROGRESS NOTE ADULT - SUBJECTIVE AND OBJECTIVE BOX
Coverage for Jennifer Brito   Patient is a 87y old  Female who presents with a chief complaint of r/o GCA (09 Sep 2022 14:16)      SUBJECTIVE / OVERNIGHT EVENTS: Comfortable without new complaints. Daughter at bedside.  Review of Systems  chest pain no  palpitations no  sob no  nausea no  headache no    MEDICATIONS  (STANDING):  albuterol/ipratropium for Nebulization 3 milliLiter(s) Nebulizer every 6 hours  amLODIPine   Tablet 5 milliGRAM(s) Oral at bedtime  atovaquone  Suspension 1500 milliGRAM(s) Oral every 24 hours  calcium carbonate   1250 mG (OsCal) 1 Tablet(s) Oral daily  cholecalciferol 1000 Unit(s) Oral every 24 hours  enoxaparin Injectable 40 milliGRAM(s) SubCutaneous every 24 hours  FLUoxetine 60 milliGRAM(s) Oral daily  gabapentin 100 milliGRAM(s) Oral two times a day  metoprolol succinate ER 25 milliGRAM(s) Oral daily  pantoprazole    Tablet 40 milliGRAM(s) Oral before breakfast  predniSONE   Tablet 50 milliGRAM(s) Oral daily    MEDICATIONS  (PRN):  acetaminophen     Tablet .. 650 milliGRAM(s) Oral every 8 hours PRN Mild Pain (1 - 3)  chlorhexidine 0.12% Liquid 15 milliLiter(s) Swish and Spit two times a day PRN ppx      Vital Signs Last 24 Hrs  T(C): 36.7 (12 Sep 2022 15:31), Max: 36.8 (12 Sep 2022 15:28)  T(F): 98.2 (12 Sep 2022 15:28), Max: 98.2 (12 Sep 2022 15:28)  HR: 71 (12 Sep 2022 15:31) (68 - 72)  BP: 122/76 (12 Sep 2022 15:31) (113/61 - 162/80)  BP(mean): --  RR: 18 (12 Sep 2022 15:31) (18 - 18)  SpO2: 96% (12 Sep 2022 15:31) (95% - 98%)    Parameters below as of 12 Sep 2022 12:03  Patient On (Oxygen Delivery Method): room air        PHYSICAL EXAM:  GENERAL: NAD, well-developed  HEAD:  Atraumatic, Normocephalic  EYES: EOMI, PERRLA, conjunctiva and sclera clear  NECK: Supple, No JVD  CHEST/LUNG: Clear to auscultation bilaterally; No wheeze  HEART: Regular rate and rhythm; No murmurs, rubs, or gallops  ABDOMEN: Soft, Nontender, Nondistended; Bowel sounds present  EXTREMITIES:  2+ Peripheral Pulses, No clubbing, cyanosis, or edema  PSYCH: AAOx3  NEUROLOGY: non-focal  SKIN: No rashes or lesions    LABS:                        11.4   10.10 )-----------( 307      ( 12 Sep 2022 07:38 )             35.7     09-12    135  |  95<L>  |  24<H>  ----------------------------<  75  3.9   |  29  |  0.72    Ca    9.4      12 Sep 2022 07:38      PT/INR - ( 12 Sep 2022 07:38 )   PT: 11.5 sec;   INR: 1.00 ratio         PTT - ( 12 Sep 2022 07:38 )  PTT:24.6 sec            RADIOLOGY & ADDITIONAL TESTS:    Imaging Personally Reviewed:    Consultant(s) Notes Reviewed:      Care Discussed with Consultants/Other Providers:

## 2022-09-12 NOTE — PROGRESS NOTE ADULT - SUBJECTIVE AND OBJECTIVE BOX
VASCULAR SURGERY DAILY PROGRESS NOTE:     INTERVAL EVENTS: Pt declined to rescind DNR/DNI for procedure. Procedure cancelled.    SUBJECTIVE/ROS: No acute events overnight. Patient seen and examined at bedside by surgical team.     OBJECTIVE:  Vital Signs Last 24 Hrs  T(C): 36.7 (12 Sep 2022 15:31), Max: 36.8 (12 Sep 2022 15:28)  T(F): 98.2 (12 Sep 2022 15:28), Max: 98.2 (12 Sep 2022 15:28)  HR: 71 (12 Sep 2022 15:31) (68 - 72)  BP: 122/76 (12 Sep 2022 15:31) (113/61 - 162/80)  BP(mean): --  RR: 18 (12 Sep 2022 15:31) (18 - 18)  SpO2: 96% (12 Sep 2022 15:31) (95% - 98%)    Parameters below as of 12 Sep 2022 12:03  Patient On (Oxygen Delivery Method): room air        PHYSICAL EXAM:  Constitutional: NAD  Respiratory: non-labored breathing, patent airway  Extremities: warm  Neurological: intact                          11.4   10.10 )-----------( 307      ( 12 Sep 2022 07:38 )             35.7     09-12    135  |  95<L>  |  24<H>  ----------------------------<  75  3.9   |  29  |  0.72    Ca    9.4      12 Sep 2022 07:38     PT/INR - ( 12 Sep 2022 07:38 )   PT: 11.5 sec;   INR: 1.00 ratio         PTT - ( 12 Sep 2022 07:38 )  PTT:24.6 sec  I&O's Detail    11 Sep 2022 07:01  -  12 Sep 2022 07:00  --------------------------------------------------------  IN:    Oral Fluid: 1070 mL  Total IN: 1070 mL    OUT:    Voided (mL): 1620 mL  Total OUT: 1620 mL    Total NET: -550 mL      12 Sep 2022 07:01  -  12 Sep 2022 17:20  --------------------------------------------------------  IN:    Oral Fluid: 400 mL  Total IN: 400 mL    OUT:  Total OUT: 0 mL    Total NET: 400 mL          IMAGING:

## 2022-09-12 NOTE — PROGRESS NOTE ADULT - ASSESSMENT
87 Y F H/O Breast CA, MAC untreated, MVP, asthma, mod  AS, on prednisone for PMR since July w resolution of diffuse symptoms.   Initially started w Prednisone 50 mg, admitted on 9/4 on 30 mg without PMR Sx  Ptn was discharged to  Rehab on 9/1.  About 10 days prior to DC from rehab she developed severe back pain, nontraumatic.   Ptn was admitted on 9/4 w acute L3 compression Fx and severe constipation w large stool burden. Old T9 and T12 compression Fx were discovered as well.  pain was controlled w Neurontin and TLSO brace. She was seen by GI and had multiple large BMs w bowel regimen  Prednisone was dropped to 25 mg from 30 mg and she became more symptomatic w PMR symptoms, fatigue and HA  She was seen by rheum. Ptn was DCed on Prednisone of 30 mg.   RHeumatology, Dr. Becker called her back to be admitted for IV Medrol 30 mg q12H and TA biopsy bc high suspicion of GCA    - ptn denies having a HA, general HAs are occipital or frontal, she has no tenderness over palpation of her scalp including temples. in July ptn did not have HAs as one of her presenting symptoms  -awaiting Duplex of TA and vascular called for TA biopsy  - seen by vascular and rheum  - on Medrol 30 mg q12H  - back pain is controlled, kyphoplasty is recommended for ptns with ongoing severe back pain 2/2 compression Fx  - she will need aggressive therapy to treat OP on outptn basis  -cont outptn meds  - on PCP ppx w Atovaquone 2/2 sulfa allergy  -pain control w Oxycodone prn moderate pain and Morphine prn severe pain: L3 compression Fx pain. Hasn't needed any  -DVT ppx w sc Lovenox  -Medically stable. Continue present Rx  - TA bx pending   d/w patient and daughter at bedside.     Dallas Horner MD phone [phone]

## 2022-09-13 ENCOUNTER — TRANSCRIPTION ENCOUNTER (OUTPATIENT)
Age: 87
End: 2022-09-13

## 2022-09-13 LAB
HCT VFR BLD CALC: 32.5 % — LOW (ref 34.5–45)
HGB BLD-MCNC: 10.6 G/DL — LOW (ref 11.5–15.5)
MCHC RBC-ENTMCNC: 28.6 PG — SIGNIFICANT CHANGE UP (ref 27–34)
MCHC RBC-ENTMCNC: 32.6 GM/DL — SIGNIFICANT CHANGE UP (ref 32–36)
MCV RBC AUTO: 87.6 FL — SIGNIFICANT CHANGE UP (ref 80–100)
NRBC # BLD: 0 /100 WBCS — SIGNIFICANT CHANGE UP (ref 0–0)
PLATELET # BLD AUTO: 270 K/UL — SIGNIFICANT CHANGE UP (ref 150–400)
RBC # BLD: 3.71 M/UL — LOW (ref 3.8–5.2)
RBC # FLD: 16 % — HIGH (ref 10.3–14.5)
WBC # BLD: 8.33 K/UL — SIGNIFICANT CHANGE UP (ref 3.8–10.5)
WBC # FLD AUTO: 8.33 K/UL — SIGNIFICANT CHANGE UP (ref 3.8–10.5)

## 2022-09-13 PROCEDURE — 99232 SBSQ HOSP IP/OBS MODERATE 35: CPT | Mod: GC

## 2022-09-13 RX ORDER — NYSTATIN 500MM UNIT
500000 POWDER (EA) MISCELLANEOUS THREE TIMES A DAY
Refills: 0 | Status: DISCONTINUED | OUTPATIENT
Start: 2022-09-13 | End: 2022-09-16

## 2022-09-13 RX ORDER — SODIUM CHLORIDE 9 MG/ML
1000 INJECTION INTRAMUSCULAR; INTRAVENOUS; SUBCUTANEOUS
Refills: 0 | Status: DISCONTINUED | OUTPATIENT
Start: 2022-09-14 | End: 2022-09-16

## 2022-09-13 RX ADMIN — Medication 50 MILLIGRAM(S): at 05:13

## 2022-09-13 RX ADMIN — Medication 500000 UNIT(S): at 21:21

## 2022-09-13 RX ADMIN — GABAPENTIN 100 MILLIGRAM(S): 400 CAPSULE ORAL at 05:13

## 2022-09-13 RX ADMIN — GABAPENTIN 100 MILLIGRAM(S): 400 CAPSULE ORAL at 17:11

## 2022-09-13 RX ADMIN — Medication 1 TABLET(S): at 12:40

## 2022-09-13 RX ADMIN — CHLORHEXIDINE GLUCONATE 15 MILLILITER(S): 213 SOLUTION TOPICAL at 12:40

## 2022-09-13 RX ADMIN — Medication 60 MILLIGRAM(S): at 12:40

## 2022-09-13 RX ADMIN — Medication 25 MILLIGRAM(S): at 05:13

## 2022-09-13 RX ADMIN — Medication 1000 UNIT(S): at 21:21

## 2022-09-13 RX ADMIN — AMLODIPINE BESYLATE 5 MILLIGRAM(S): 2.5 TABLET ORAL at 21:21

## 2022-09-13 RX ADMIN — ENOXAPARIN SODIUM 40 MILLIGRAM(S): 100 INJECTION SUBCUTANEOUS at 17:11

## 2022-09-13 RX ADMIN — PANTOPRAZOLE SODIUM 40 MILLIGRAM(S): 20 TABLET, DELAYED RELEASE ORAL at 05:13

## 2022-09-13 NOTE — PROGRESS NOTE ADULT - SUBJECTIVE AND OBJECTIVE BOX
ADA AQUILINO  [id number]    INTERVAL HPI/OVERNIGHT EVENTS: Pt feels well. Says she went down for temporal artery bx yesterday, however was not performed 2/2 confusion about her code status. Pt says she is okay with rescinding her DNR/DNI for temporal artery biopsy. On the prednisone 50mg qd, pt says that she has not had headache today. Says she had frontal headache yesterday, and she rubbed her b/l temple area's to help the pain go away. Denies blurry vision, and has not had one recently. Denies Jaw claudication, shoulder/hip/leg stiffness/weakness. Does have back pain that radiates into her inguinal area b/l.     MEDICATIONS  (STANDING):  albuterol/ipratropium for Nebulization 3 milliLiter(s) Nebulizer every 6 hours  amLODIPine   Tablet 5 milliGRAM(s) Oral at bedtime  atovaquone  Suspension 1500 milliGRAM(s) Oral every 24 hours  calcium carbonate   1250 mG (OsCal) 1 Tablet(s) Oral daily  cholecalciferol 1000 Unit(s) Oral every 24 hours  enoxaparin Injectable 40 milliGRAM(s) SubCutaneous every 24 hours  FLUoxetine 60 milliGRAM(s) Oral daily  gabapentin 100 milliGRAM(s) Oral two times a day  metoprolol succinate ER 25 milliGRAM(s) Oral daily  pantoprazole    Tablet 40 milliGRAM(s) Oral before breakfast  predniSONE   Tablet 50 milliGRAM(s) Oral daily    MEDICATIONS  (PRN):  acetaminophen     Tablet .. 650 milliGRAM(s) Oral every 8 hours PRN Mild Pain (1 - 3)  chlorhexidine 0.12% Liquid 15 milliLiter(s) Swish and Spit two times a day PRN ppx      Allergies    cefdinir (Unknown)  ciprofloxacin (Other)  codeine (Nausea; Other)  contrast media (iodine-based) (Angioedema)  fluorescein ophthalmic (Hives; Rash; Flushing)  lactose (Unknown)  latex (Anaphylaxis)  Levaquin (Nausea; Other)  lidocaine (Unknown)  Solu-Medrol (Pruritus; Flushing; Short breath)  SULFA (Anaphylaxis)  tetracycline (Anaphylaxis)  Zofran (Anaphylaxis)    Intolerances    Augmentin (Stomach Upset)      Review of Systems:  As above    Vital Signs Last 24 Hrs  T(C): 36.4 (13 Sep 2022 08:59), Max: 36.8 (12 Sep 2022 15:28)  T(F): 97.6 (13 Sep 2022 08:59), Max: 98.3 (12 Sep 2022 20:45)  HR: 96 (13 Sep 2022 08:59) (60 - 96)  BP: 113/68 (13 Sep 2022 08:59) (113/68 - 162/80)  BP(mean): --  RR: 18 (13 Sep 2022 08:59) (18 - 18)  SpO2: 96% (13 Sep 2022 08:59) (95% - 98%)    Parameters below as of 13 Sep 2022 08:59  Patient On (Oxygen Delivery Method): room air        Physical Exam:  General: Breathing comfortably on room air, no distress  HEENT: EOMI, MMM, no oral ulcers  CVS: +S1/S2, RRR, palpable temporal artery pulses b/l, non-tender to palpation  Resp: CTA b/l. No crackles/wheezing  GI: Soft, NT/ND +BS  MSK: 5/5 muscle strength in shoulders,, arms, hands, thighs, legs, feet. Sensation equal in arms and legs. No synovitis  Skin: no visible rashes    LABS:                        10.6   8.33  )-----------( 270      ( 13 Sep 2022 07:09 )             32.5     09-12    135  |  95<L>  |  24<H>  ----------------------------<  75  3.9   |  29  |  0.72    Ca    9.4      12 Sep 2022 07:38      PT/INR - ( 12 Sep 2022 07:38 )   PT: 11.5 sec;   INR: 1.00 ratio         PTT - ( 12 Sep 2022 07:38 )  PTT:24.6 sec

## 2022-09-13 NOTE — PROGRESS NOTE ADULT - ASSESSMENT
87 Y F H/O Breast CA, MAC untreated, MVP, asthma, mod  AS, on prednisone for PMR since July w resolution of diffuse symptoms.   Initially started w Prednisone 50 mg, admitted on 9/4 on 30 mg without PMR Sx  Ptn was discharged to  Rehab on 9/1.  About 10 days prior to DC from rehab she developed severe back pain, nontraumatic.   Ptn was admitted on 9/4 w acute L3 compression Fx and severe constipation w large stool burden. Old T9 and T12 compression Fx were discovered as well.  pain was controlled w Neurontin and TLSO brace. She was seen by GI and had multiple large BMs w bowel regimen  Prednisone was dropped to 25 mg from 30 mg and she became more symptomatic w PMR symptoms, fatigue and HA  She was seen by rheum. Ptn was DCed on Prednisone of 30 mg.   RHeumatology, Dr. Becker called her back to be admitted for IV Medrol 30 mg q12H and TA biopsy bc high suspicion of GCA    - ptn denies having a HA, general HAs are occipital or frontal, she has no tenderness over palpation of her scalp including temples. in July ptn did not have HAs as one of her presenting symptoms  -awaiting Duplex of TA and vascular called for TA biopsy  - seen by vascular and rheum  - on Medrol 30 mg q12H  - back pain is controlled, kyphoplasty is recommended for ptns with ongoing severe back pain 2/2 compression Fx  - she will need aggressive therapy to treat OP on outptn basis  -cont outptn meds  - on PCP ppx w Atovaquone 2/2 sulfa allergy  -pain control w Oxycodone prn moderate pain and Morphine prn severe pain: L3 compression Fx pain. Hasn't needed any  -DVT ppx w sc Lovenox  -Medically stable. Continue present Rx  - TA bx pending   - Ophtalmo evaluation    d/w patient      Dallas Horner MD phone [phone]  87 Y F H/O Breast CA, MAC untreated, MVP, asthma, mod  AS, on prednisone for PMR since July w resolution of diffuse symptoms.   Initially started w Prednisone 50 mg, admitted on 9/4 on 30 mg without PMR Sx  Ptn was discharged to  Rehab on 9/1.  About 10 days prior to DC from rehab she developed severe back pain, nontraumatic.   Ptn was admitted on 9/4 w acute L3 compression Fx and severe constipation w large stool burden. Old T9 and T12 compression Fx were discovered as well.  pain was controlled w Neurontin and TLSO brace. She was seen by GI and had multiple large BMs w bowel regimen  Prednisone was dropped to 25 mg from 30 mg and she became more symptomatic w PMR symptoms, fatigue and HA  She was seen by rheum. Ptn was DCed on Prednisone of 30 mg.   RHeumatology, Dr. Becker called her back to be admitted for IV Medrol 30 mg q12H and TA biopsy bc high suspicion of GCA    - ptn denies having a HA, general HAs are occipital or frontal, she has no tenderness over palpation of her scalp including temples. in July ptn did not have HAs as one of her presenting symptoms  -awaiting Duplex of TA and vascular called for TA biopsy  - seen by vascular and rheum  - on Medrol 30 mg q12H  - back pain is controlled, kyphoplasty is recommended for ptns with ongoing severe back pain 2/2 compression Fx  - she will need aggressive therapy to treat OP on outptn basis  -cont outptn meds  - on PCP ppx w Atovaquone 2/2 sulfa allergy  -pain control w Oxycodone prn moderate pain and Morphine prn severe pain: L3 compression Fx pain. Hasn't needed any  -DVT ppx w sc Lovenox  -Medically stable. Continue present Rx  - TA bx pending   - Ophtalmo evaluation pending     No acute medical condition identified to postpone planned surgical intervention.     d/w patient      Dallas Horner MD phone [phone]

## 2022-09-13 NOTE — PROGRESS NOTE ADULT - PROBLEM/PLAN-8
Call Center TCM Note    Flowsheet Row Responses   [facility] patient discharged from? [facility]   Does the patient have one of the following disease processes/diagnoses(primary or secondary)? Other   TCM attempt successful? Yes   Discharge diagnosis small bowel obstruction   Meds reviewed with patient/caregiver? Yes   Does the patient have all medications ordered at discharge? N/A   Has home health visited the patient within 72 hours of discharge? N/A   Psychosocial issues? No   Did the patient receive a copy of their discharge instructions? Yes   Nursing interventions Reviewed instructions with patient   What is the patient's perception of their health status since discharge? Improving   Is the patient/caregiver able to teach back signs and symptoms related to disease process for when to call PCP? Yes   Is the patient/caregiver able to teach back signs and symptoms related to disease process for when to call 911? Yes   Is the patient/caregiver able to teach back the hierarchy of who to call/visit for symptoms/problems? PCP, Specialist, Home health nurse, Urgent Care, ED, 911 Yes   If the patient is a current smoker, are they able to teach back resources for cessation? Not a smoker   TCM call completed? Yes   Wrap up additional comments D/C DX: SBO w/o surgery. Pt doing fine, no changes to  mediations at d/c. Pt declines my assist in sched TCM APPT with PCP Dr Roque, stating she will call.           Brandee Beck MA    9/13/2022, 16:22 EDT      
DISPLAY PLAN FREE TEXT
DISPLAY PLAN FREE TEXT

## 2022-09-13 NOTE — PROGRESS NOTE ADULT - SUBJECTIVE AND OBJECTIVE BOX
Coverage for Jennifer Brito     Patient is a 87y old  Female who presents with a chief complaint of r/o GCA (13 Sep 2022 14:09)      SUBJECTIVE / OVERNIGHT EVENTS:  Comfortable without new complaints. TA not done yesterday.   Review of Systems  chest pain no  palpitations no  sob no  nausea no  headache no    MEDICATIONS  (STANDING):  albuterol/ipratropium for Nebulization 3 milliLiter(s) Nebulizer every 6 hours  amLODIPine   Tablet 5 milliGRAM(s) Oral at bedtime  atovaquone  Suspension 1500 milliGRAM(s) Oral every 24 hours  calcium carbonate   1250 mG (OsCal) 1 Tablet(s) Oral daily  cholecalciferol 1000 Unit(s) Oral every 24 hours  enoxaparin Injectable 40 milliGRAM(s) SubCutaneous every 24 hours  FLUoxetine 60 milliGRAM(s) Oral daily  gabapentin 100 milliGRAM(s) Oral two times a day  metoprolol succinate ER 25 milliGRAM(s) Oral daily  pantoprazole    Tablet 40 milliGRAM(s) Oral before breakfast  predniSONE   Tablet 50 milliGRAM(s) Oral daily    MEDICATIONS  (PRN):  acetaminophen     Tablet .. 650 milliGRAM(s) Oral every 8 hours PRN Mild Pain (1 - 3)  chlorhexidine 0.12% Liquid 15 milliLiter(s) Swish and Spit two times a day PRN ppx      Vital Signs Last 24 Hrs  T(C): 36.8 (13 Sep 2022 16:21), Max: 36.8 (12 Sep 2022 20:45)  T(F): 98.2 (13 Sep 2022 16:21), Max: 98.3 (12 Sep 2022 20:45)  HR: 65 (13 Sep 2022 16:21) (60 - 96)  BP: 119/71 (13 Sep 2022 16:21) (110/75 - 148/82)  BP(mean): --  RR: 18 (13 Sep 2022 16:21) (18 - 18)  SpO2: 98% (13 Sep 2022 16:21) (95% - 98%)    Parameters below as of 13 Sep 2022 16:21  Patient On (Oxygen Delivery Method): room air        PHYSICAL EXAM:  GENERAL: NAD, well-developed  HEAD:  Atraumatic, Normocephalic  EYES: EOMI, PERRLA, conjunctiva and sclera clear  NECK: Supple, No JVD  CHEST/LUNG: Clear to auscultation bilaterally; No wheeze  HEART: Regular rate and rhythm; No murmurs, rubs, or gallops  ABDOMEN: Soft, Nontender, Nondistended; Bowel sounds present  EXTREMITIES:  2+ Peripheral Pulses, No clubbing, cyanosis, or edema  PSYCH: AAOx3  NEUROLOGY: non-focal  SKIN: No rashes or lesions    LABS:                        10.6   8.33  )-----------( 270      ( 13 Sep 2022 07:09 )             32.5     09-12    135  |  95<L>  |  24<H>  ----------------------------<  75  3.9   |  29  |  0.72    Ca    9.4      12 Sep 2022 07:38      PT/INR - ( 12 Sep 2022 07:38 )   PT: 11.5 sec;   INR: 1.00 ratio         PTT - ( 12 Sep 2022 07:38 )  PTT:24.6 sec            RADIOLOGY & ADDITIONAL TESTS:    Imaging Personally Reviewed:    Consultant(s) Notes Reviewed:      Care Discussed with Consultants/Other Providers:   Coverage for Jennifer Brito     Patient is a 87y old  Female who presents with a chief complaint of r/o GCA (13 Sep 2022 14:09)      SUBJECTIVE / OVERNIGHT EVENTS:  Comfortable without new complaints. TA not done yesterday.   Review of Systems  chest pain no  palpitations no  sob no  nausea no  headache no    MEDICATIONS  (STANDING):  albuterol/ipratropium for Nebulization 3 milliLiter(s) Nebulizer every 6 hours  amLODIPine   Tablet 5 milliGRAM(s) Oral at bedtime  atovaquone  Suspension 1500 milliGRAM(s) Oral every 24 hours  calcium carbonate   1250 mG (OsCal) 1 Tablet(s) Oral daily  cholecalciferol 1000 Unit(s) Oral every 24 hours  enoxaparin Injectable 40 milliGRAM(s) SubCutaneous every 24 hours  FLUoxetine 60 milliGRAM(s) Oral daily  gabapentin 100 milliGRAM(s) Oral two times a day  metoprolol succinate ER 25 milliGRAM(s) Oral daily  pantoprazole    Tablet 40 milliGRAM(s) Oral before breakfast  predniSONE   Tablet 50 milliGRAM(s) Oral daily    MEDICATIONS  (PRN):  acetaminophen     Tablet .. 650 milliGRAM(s) Oral every 8 hours PRN Mild Pain (1 - 3)  chlorhexidine 0.12% Liquid 15 milliLiter(s) Swish and Spit two times a day PRN ppx      Vital Signs Last 24 Hrs  T(C): 36.8 (13 Sep 2022 16:21), Max: 36.8 (12 Sep 2022 20:45)  T(F): 98.2 (13 Sep 2022 16:21), Max: 98.3 (12 Sep 2022 20:45)  HR: 65 (13 Sep 2022 16:21) (60 - 96)  BP: 119/71 (13 Sep 2022 16:21) (110/75 - 148/82)  BP(mean): --  RR: 18 (13 Sep 2022 16:21) (18 - 18)  SpO2: 98% (13 Sep 2022 16:21) (95% - 98%)    Parameters below as of 13 Sep 2022 16:21  Patient On (Oxygen Delivery Method): room air        PHYSICAL EXAM:  GENERAL: NAD, well-developed  HEAD:  Atraumatic, Normocephalic  EYES: EOMI, PERRLA, conjunctiva and sclera clear  NECK: Supple, No JVD  CHEST/LUNG: Clear to auscultation bilaterally; No wheeze  HEART: Regular rate and rhythm; No murmurs, rubs, or gallops  ABDOMEN: Soft, Nontender, Nondistended; Bowel sounds present  EXTREMITIES:  2+ Peripheral Pulses, No clubbing, cyanosis, or edema  PSYCH: AAOx3  NEUROLOGY: non-focal  SKIN: No rashes or lesions    LABS:                        10.6   8.33  )-----------( 270      ( 13 Sep 2022 07:09 )             32.5     09-12    135  |  95<L>  |  24<H>  ----------------------------<  75  3.9   |  29  |  0.72    Ca    9.4      12 Sep 2022 07:38      PT/INR - ( 12 Sep 2022 07:38 )   PT: 11.5 sec;   INR: 1.00 ratio         PTT - ( 12 Sep 2022 07:38 )  PTT:24.6 sec      EKG SR NSST/T      RADIOLOGY & ADDITIONAL TESTS:    Imaging Personally Reviewed:    Consultant(s) Notes Reviewed:      Care Discussed with Consultants/Other Providers:

## 2022-09-13 NOTE — CHART NOTE - NSCHARTNOTEFT_GEN_A_CORE
Preop Dx: c/f GCA  Surgeon: Byron  Procedure: bilateral temporal artery biopsy  Date: 9/14/2022 - 3rd add on    Checklist  [x] NPO past midnight, except medications  [] IVF while NPO  [x] Consent signed and in chart  [] Medical clearance for OR: Preop Dx: c/f GCA  Surgeon: Byron  Procedure: bilateral temporal artery biopsy  Date: 9/14/2022 - 3rd add on    Checklist  [x] NPO past midnight, except medications  [x] IVF while NPO  [x] Consent signed and in chart  [x] Medical clearance for OR:

## 2022-09-13 NOTE — PROGRESS NOTE ADULT - ASSESSMENT
INCOMPLETE - TO BE DISCUSSED W/ ATTENDING    88 yo F PMH Breast CA, MAC (untreated), MVP, asthma, osteopenia, AS, PMR (dx 7/2022 on steroids), fibromyalgia, spinal stenosis, uveitis presenting w/ back pain found to have non-traumatic compression fracture of L3 spine. Rheumatology consulted for evaluation of PMR, new Osteoporosis, and now concern for GCA    #GCA: Pt w/ complaints of headaches, intermittent b/l intermittent blurry vision, and scalp tenderness w/ palpation w/ elevated ESR while on steroids. GCA associated w/ PMR, and can commonly occur while already on steroids. Dopplers of temporal arteries w/o signs of GCA, however inadequate to rule out GCA.    -opthalmology consult to evaluate patients blurry vision - if GCA related or hx of uveitis related  -appreciate vascular surgery consult for temporal artery biopsy as high suspicion for GCA - pt says she is willing to rescind DNR/DNI for procedure  -c/w prednisone 1mg/kg   -given the new compression fracture there is significant risk with use of steroids and therefore would need biopsy which is the gold standard diagnostic took for GCA to determine the treatment plan  -w/ pt's osteoporosis, it is vital to r/o GCA in order to be able to use lower dose steroids and have the possibility of being able to ween off    #PMR: Diagnosed 7/2022 w/ symptoms of shoulder/hip stiffness and pain that rapidly improved w/ steroids. Was started on prednisone 60mg 7/2022 which had been tapered  -plan as above  -start mepron 1.5g qd while on high dose steroids (sulfa allergy)    #Osteoporosis: Hx of osteopenia and found to have non-traumatic L3 compression fracture. Vitamin D level wnl.   -would have orthopedic evaluation for any possible intervention such as kyphoplasty  -c/w calcium 500mg qd  -c/w vitamin D 1000mg qd  -will need to initiate bisphosphonate therapy outpatient  -discussed options w/ pt    INCOMPLETE - TO BE DISCUSSED W/ ATTENDING 86 yo F PMH Breast CA, MAC (untreated), MVP, asthma, osteopenia, AS, PMR (dx 7/2022 on steroids), fibromyalgia, spinal stenosis, uveitis presenting w/ back pain found to have non-traumatic compression fracture of L3 spine. Rheumatology consulted for evaluation of PMR, new Osteoporosis, and now concern for GCA    #GCA: Pt w/ complaints of headaches, intermittent b/l intermittent blurry vision, and scalp tenderness w/ palpation w/ elevated ESR while on steroids. GCA associated w/ PMR, and can commonly occur while already on steroids. Dopplers of temporal arteries w/o signs of GCA, however inadequate to rule out GCA.    -opthalmology consult to evaluate patients blurry vision - if GCA related or hx of uveitis related  -plan for b/l temporal artery biopsy tomorrow w/ vascular  -c/w prednisone 50mg qd   -given the new compression fracture there is significant risk with use of steroids and therefore would need biopsy which is the gold standard diagnostic took for GCA to determine the treatment plan  -w/ pt's osteoporosis, it is vital to r/o GCA in order to be able to use lower dose steroids and have the possibility of being able to ween off    #PMR: Diagnosed 7/2022 w/ symptoms of shoulder/hip stiffness and pain that rapidly improved w/ steroids. Was started on prednisone 60mg 7/2022 which had been tapered  -plan as above  -c/w mepron 1.5g qd while on high dose steroids (sulfa allergy)    #Osteoporosis: Hx of osteopenia and found to have non-traumatic L3 compression fracture. Vitamin D level wnl.   -would have orthopedic evaluation for any possible intervention such as kyphoplasty  -c/w calcium 500mg qd  -c/w vitamin D 1000mg qd  -will need to initiate bisphosphonate therapy outpatient  -discussed options w/ pt    Discussed with Attending Dr. Rosita Ambrocio,   Rheumatology Fellow  Pager: [phone]  Available on TEAMS

## 2022-09-14 ENCOUNTER — RESULT REVIEW (OUTPATIENT)
Age: 87
End: 2022-09-14

## 2022-09-14 LAB
ANION GAP SERPL CALC-SCNC: 8 MMOL/L — SIGNIFICANT CHANGE UP (ref 5–17)
BUN SERPL-MCNC: 20 MG/DL — SIGNIFICANT CHANGE UP (ref 7–23)
CALCIUM SERPL-MCNC: 7.9 MG/DL — LOW (ref 8.4–10.5)
CHLORIDE SERPL-SCNC: 103 MMOL/L — SIGNIFICANT CHANGE UP (ref 96–108)
CO2 SERPL-SCNC: 26 MMOL/L — SIGNIFICANT CHANGE UP (ref 22–31)
CREAT SERPL-MCNC: 0.66 MG/DL — SIGNIFICANT CHANGE UP (ref 0.5–1.3)
EGFR: 85 ML/MIN/1.73M2 — SIGNIFICANT CHANGE UP
GLUCOSE SERPL-MCNC: 83 MG/DL — SIGNIFICANT CHANGE UP (ref 70–99)
HCT VFR BLD CALC: 31.5 % — LOW (ref 34.5–45)
HGB BLD-MCNC: 9.9 G/DL — LOW (ref 11.5–15.5)
INR BLD: 1.08 RATIO — SIGNIFICANT CHANGE UP (ref 0.88–1.16)
MCHC RBC-ENTMCNC: 28.2 PG — SIGNIFICANT CHANGE UP (ref 27–34)
MCHC RBC-ENTMCNC: 31.4 GM/DL — LOW (ref 32–36)
MCV RBC AUTO: 89.7 FL — SIGNIFICANT CHANGE UP (ref 80–100)
NRBC # BLD: 0 /100 WBCS — SIGNIFICANT CHANGE UP (ref 0–0)
PLATELET # BLD AUTO: 252 K/UL — SIGNIFICANT CHANGE UP (ref 150–400)
POTASSIUM SERPL-MCNC: 3.8 MMOL/L — SIGNIFICANT CHANGE UP (ref 3.5–5.3)
POTASSIUM SERPL-SCNC: 3.8 MMOL/L — SIGNIFICANT CHANGE UP (ref 3.5–5.3)
PROTHROM AB SERPL-ACNC: 12.5 SEC — SIGNIFICANT CHANGE UP (ref 10.5–13.4)
RBC # BLD: 3.51 M/UL — LOW (ref 3.8–5.2)
RBC # FLD: 15.9 % — HIGH (ref 10.3–14.5)
SODIUM SERPL-SCNC: 137 MMOL/L — SIGNIFICANT CHANGE UP (ref 135–145)
WBC # BLD: 9.23 K/UL — SIGNIFICANT CHANGE UP (ref 3.8–10.5)
WBC # FLD AUTO: 9.23 K/UL — SIGNIFICANT CHANGE UP (ref 3.8–10.5)

## 2022-09-14 PROCEDURE — 88313 SPECIAL STAINS GROUP 2: CPT | Mod: 26

## 2022-09-14 PROCEDURE — 37609 LIGATION/BX TEMPORAL ARTERY: CPT | Mod: 50

## 2022-09-14 PROCEDURE — 88305 TISSUE EXAM BY PATHOLOGIST: CPT | Mod: 26

## 2022-09-14 DEVICE — CLIP APPLIER COVIDIEN SURGICLIP III 9" SM: Type: IMPLANTABLE DEVICE | Site: LEFT, RIGHT | Status: FUNCTIONAL

## 2022-09-14 RX ORDER — ACETAMINOPHEN 500 MG
1000 TABLET ORAL ONCE
Refills: 0 | Status: COMPLETED | OUTPATIENT
Start: 2022-09-14 | End: 2022-09-14

## 2022-09-14 RX ORDER — HYDROMORPHONE HYDROCHLORIDE 2 MG/ML
0.25 INJECTION INTRAMUSCULAR; INTRAVENOUS; SUBCUTANEOUS
Refills: 0 | Status: DISCONTINUED | OUTPATIENT
Start: 2022-09-14 | End: 2022-09-14

## 2022-09-14 RX ADMIN — SODIUM CHLORIDE 50 MILLILITER(S): 9 INJECTION INTRAMUSCULAR; INTRAVENOUS; SUBCUTANEOUS at 01:07

## 2022-09-14 RX ADMIN — AMLODIPINE BESYLATE 5 MILLIGRAM(S): 2.5 TABLET ORAL at 22:05

## 2022-09-14 RX ADMIN — Medication 400 MILLIGRAM(S): at 13:34

## 2022-09-14 RX ADMIN — Medication 500000 UNIT(S): at 22:05

## 2022-09-14 RX ADMIN — Medication 1000 UNIT(S): at 22:05

## 2022-09-14 RX ADMIN — ENOXAPARIN SODIUM 40 MILLIGRAM(S): 100 INJECTION SUBCUTANEOUS at 18:40

## 2022-09-14 RX ADMIN — Medication 650 MILLIGRAM(S): at 19:39

## 2022-09-14 RX ADMIN — Medication 1000 MILLIGRAM(S): at 13:51

## 2022-09-14 RX ADMIN — GABAPENTIN 100 MILLIGRAM(S): 400 CAPSULE ORAL at 18:40

## 2022-09-14 RX ADMIN — Medication 650 MILLIGRAM(S): at 18:39

## 2022-09-14 RX ADMIN — ATOVAQUONE 1500 MILLIGRAM(S): 750 SUSPENSION ORAL at 18:42

## 2022-09-14 NOTE — PROGRESS NOTE ADULT - SUBJECTIVE AND OBJECTIVE BOX
Patient is a 87y old  Female who presents with a chief complaint of r/o GCA (13 Sep 2022 14:09)      SUBJECTIVE / OVERNIGHT EVENTS: ptn is calm, denies pain/HAs, s/p b/l TA bx Today tolerated the procedure well    MEDICATIONS  (STANDING):  albuterol/ipratropium for Nebulization 3 milliLiter(s) Nebulizer every 6 hours  amLODIPine   Tablet 5 milliGRAM(s) Oral at bedtime  atovaquone  Suspension 1500 milliGRAM(s) Oral every 24 hours  calcium carbonate   1250 mG (OsCal) 1 Tablet(s) Oral daily  cholecalciferol 1000 Unit(s) Oral every 24 hours  enoxaparin Injectable 40 milliGRAM(s) SubCutaneous every 24 hours  FLUoxetine 60 milliGRAM(s) Oral daily  gabapentin 100 milliGRAM(s) Oral two times a day  metoprolol succinate ER 25 milliGRAM(s) Oral daily  nystatin    Suspension 090580 Unit(s) Oral three times a day  pantoprazole    Tablet 40 milliGRAM(s) Oral before breakfast  predniSONE   Tablet 50 milliGRAM(s) Oral daily  sodium chloride 0.9%. 1000 milliLiter(s) (50 mL/Hr) IV Continuous <Continuous>    MEDICATIONS  (PRN):  acetaminophen     Tablet .. 650 milliGRAM(s) Oral every 8 hours PRN Mild Pain (1 - 3)  chlorhexidine 0.12% Liquid 15 milliLiter(s) Swish and Spit two times a day PRN ppx      Vital Signs Last 24 Hrs  T(F): 97.7 (09-14-22 @ 19:40), Max: 98.3 (09-14-22 @ 16:40)  HR: 73 (09-14-22 @ 19:40) (62 - 73)  BP: 124/69 (09-14-22 @ 19:40) (114/58 - 163/74)  RR: 16 (09-14-22 @ 19:40) (12 - 18)  SpO2: 95% (09-14-22 @ 19:40) (91% - 100%)  Telemetry:   CAPILLARY BLOOD GLUCOSE        I&O's Summary    13 Sep 2022 07:01  -  14 Sep 2022 07:00  --------------------------------------------------------  IN: 770 mL / OUT: 600 mL / NET: 170 mL    14 Sep 2022 07:01  -  14 Sep 2022 20:59  --------------------------------------------------------  IN: 0 mL / OUT: 750 mL / NET: -750 mL        PHYSICAL EXAM:  GENERAL: NAD, well-developed  HEAD:  Atraumatic, Normocephalic  EYES: EOMI, PERRLA, conjunctiva and sclera clear  NECK: Supple, No JVD  CHEST/LUNG: Clear to auscultation bilaterally; No wheeze  HEART: Regular rate and rhythm; No murmurs, rubs, or gallops  ABDOMEN: Soft, Nontender, Nondistended; Bowel sounds present  EXTREMITIES:  2+ Peripheral Pulses, No clubbing, cyanosis, or edema  PSYCH: AAOx3  NEUROLOGY: non-focal  SKIN: No rashes or lesions    LABS:                        9.9    9.23  )-----------( 252      ( 14 Sep 2022 04:46 )             31.5     09-14    137  |  103  |  20  ----------------------------<  83  3.8   |  26  |  0.66    Ca    7.9<L>      14 Sep 2022 04:46      PT/INR - ( 14 Sep 2022 04:46 )   PT: 12.5 sec;   INR: 1.08 ratio                   RADIOLOGY & ADDITIONAL TESTS:    Imaging Personally Reviewed:    Consultant(s) Notes Reviewed:      Care Discussed with Consultants/Other Providers:

## 2022-09-14 NOTE — PRE-OP CHECKLIST - 1.
Preop teaching and emotional support provided to patient and daughter
emotional support provided to patient and family, pre op instruction and orientation to procedure provided to patient and family

## 2022-09-14 NOTE — PRE-ANESTHESIA EVALUATION ADULT - NSANTHPMHFT_GEN_ALL_CORE
87F hx of HTN, breast ca s/P L lumpectomy, MAC untreated (unofficially diagnosed many years ago, per pt), asthma (as a child, does not require albuterol), on prednisone for PMR. Denies CP, SOB, TIDWELL. Endorses GERD (intermittent symptoms, worse after eating), nausea (also intermittently).
ECHO 2/19/22: EF 67%; mild mr, mod as, nl LV sys fx, mod concentric lVH,   ECHO 7/5/22 ef 68%; mild as, nl LV sys fx  Mild diastolic dysfunction (Stage I).

## 2022-09-14 NOTE — PRE-ANESTHESIA EVALUATION ADULT - NSANTHAIRWAYFT_ENT_ALL_CORE
6cm thyromental distance  Adequate interincisor distance  FROM of neck
Mouth opening: >2cm  Thyromental distance: >3 FBs  Upper lip bite: adequate  Cervical ROM: grossly intact; though pt states extension causes mild pain d/t OA, denies upper ext tingling, numbness, or weakness

## 2022-09-14 NOTE — PROGRESS NOTE ADULT - ASSESSMENT
87 Y F H/O Breast CA, MAC untreated, MVP, asthma, mod  AS, on prednisone for PMR since July w resolution of diffuse symptoms.   Initially started w Prednisone 50 mg, admitted on 9/4 on 30 mg without PMR Sx  Ptn was discharged to  Rehab on 9/1.  About 10 days prior to DC from rehab she developed severe back pain, nontraumatic.   Ptn was admitted on 9/4 w acute L3 compression Fx and severe constipation w large stool burden. Old T9 and T12 compression Fx were discovered as well.  pain was controlled w Neurontin and TLSO brace. She was seen by GI and had multiple large BMs w bowel regimen  Prednisone was dropped to 25 mg from 30 mg and she became more symptomatic w PMR symptoms, fatigue and HA  She was seen by rheum. Ptn was DCed on Prednisone of 30 mg.   RHeumatology, Dr. Becker called her back to be admitted for IV Medrol 30 mg q12H and TA biopsy bc high suspicion of GCA    - ptn denies having a HA, general HAs are occipital or frontal, she has no tenderness over palpation of her scalp including temples. in July ptn did not have HAs as one of her presenting symptoms  -awaiting Duplex of TA and vascular called for TA biopsy  - seen by vascular and rheum  - Medrol IV stopped 2/2 " flushing and wheezing" doing well on po Prednisone. no HA, denies pain  - back pain is controlled, kyphoplasty is recommended for ptns with ongoing severe back pain 2/2 compression Fx  - she will need aggressive therapy to treat OP on outptn basis  -cont outptn meds  - on PCP ppx w Atovaquone 2/2 sulfa allergy  -pain control w Oxycodone prn moderate pain and Morphine prn severe pain: L3 compression Fx pain. Hasn't needed any  -DVT ppx w sc Lovenox  -Medically stable. Continue present Rx  - TA, b/l, done today  - Ophtalmo evaluation done pre-op

## 2022-09-14 NOTE — PROGRESS NOTE ADULT - ASSESSMENT
ECHO 2/19/22: EF 67%; mild mr, mod as, nl LV sys fx, mod concentric lVH,   ECHO 7/5/22 ef 68%; mild as, nl LV sys fx  Mild diastolic dysfunction (Stage I).    a/p     87F c hx remote breast ca, MAC untreated, MVP, asthma, mod AS, paroxysmal tachycardia of unknown rhythm, orthostatic hypotension (Feb '22) c/b syncope, anxiety, depression, recent covid (May '22), recent accidental valium toxicity , recent dx with  acute L3 compression Fx now presenting with PMR symptoms, fatigue and HA; She was seen by rheum and readmitted for IV Medrol 30 mg daily and TA biopsy bc high suspicion of GCA    # PMR symptoms, fatigue and HA  -rheum work up  -r/o GCA  -w/u per vasc  -await temp artery bx    # Moderate AS,    -prior echo with mild as    #Atach  -cv stable no events   -cont current tx    #htn   -c/w meds   -will uptitrate as needed    dvt ppx     ECHO 2/19/22: EF 67%; mild mr, mod as, nl LV sys fx, mod concentric lVH,   ECHO 7/5/22 ef 68%; mild as, nl LV sys fx  Mild diastolic dysfunction (Stage I).    a/p     87F c hx remote breast ca, MAC untreated, MVP, asthma, mod AS, paroxysmal tachycardia of unknown rhythm, orthostatic hypotension (Feb '22) c/b syncope, anxiety, depression, recent covid (May '22), recent accidental valium toxicity , recent dx with  acute L3 compression Fx now presenting with PMR symptoms, fatigue and HA; She was seen by rheum and readmitted for IV Medrol 30 mg daily and TA biopsy bc high suspicion of GCA    # PMR symptoms, fatigue and HA  -rheum work up  -r/o GCA  -w/u per vasc  -await temp artery bx    # Moderate AS,    -prior echo with mild as    #Atach  -cv stable no events   -cont current tx    #htn   -c/w meds   -will uptitrate as needed    dvt ppx   *** seen and examined on  9/13/22

## 2022-09-14 NOTE — PRE-ANESTHESIA EVALUATION ADULT - NSANTHOSAYNRD_GEN_A_CORE
No. JENN screening performed.  STOP BANG Legend: 0-2 = LOW Risk; 3-4 = INTERMEDIATE Risk; 5-8 = HIGH Risk

## 2022-09-14 NOTE — PROGRESS NOTE ADULT - SUBJECTIVE AND OBJECTIVE BOX
CARDIOLOGY FOLLOW UP - Dr. Lee  DATE OF SERVICE: 9/13/22    CC no cp or sob       REVIEW OF SYSTEMS:  CONSTITUTIONAL: No fever, weight loss, or fatigue  RESPIRATORY: No cough, wheezing, chills or hemoptysis; No Shortness of Breath  CARDIOVASCULAR: No chest pain, palpitations, passing out, dizziness, or leg swelling  GASTROINTESTINAL: No abdominal or epigastric pain. No nausea, vomiting, or hematemesis; No diarrhea or constipation. No melena or hematochezia.      PHYSICAL EXAM:  T(C): 36.8 (09-14-22 @ 12:50), Max: 36.8 (09-13-22 @ 16:21)  HR: 65 (09-14-22 @ 15:00) (62 - 72)  BP: 119/62 (09-14-22 @ 15:00) (119/62 - 163/74)  RR: 18 (09-14-22 @ 15:00) (12 - 18)  SpO2: 95% (09-14-22 @ 15:00) (91% - 100%)  Wt(kg): --  I&O's Summary    13 Sep 2022 07:01  -  14 Sep 2022 07:00  --------------------------------------------------------  IN: 770 mL / OUT: 600 mL / NET: 170 mL    14 Sep 2022 07:01  -  14 Sep 2022 15:13  --------------------------------------------------------  IN: 0 mL / OUT: 550 mL / NET: -550 mL        Appearance: Normal	  Cardiovascular: Normal S1 S2,RRR, No JVD, No murmurs  Respiratory: Lungs clear to auscultation	  Gastrointestinal:  Soft, Non-tender, + BS	  Extremities: Normal range of motion, No clubbing, cyanosis or edema      Home Medications:  amLODIPine 5 mg oral tablet: 1 tab(s) orally once a day (in the evening) (04 Sep 2022 21:42)  Clear Eyes 0.012% ophthalmic solution: 1 drop(s) to each affected eye 3 times a day, As Needed (04 Sep 2022 21:42)  Durezol 0.05% ophthalmic emulsion: 1 drop(s) in the left eye 2 times a day (04 Sep 2022 21:42)  Flonase 50 mcg/inh nasal spray: 1 spray(s) in each nostril 2 times a day (04 Sep 2022 21:42)  FLUoxetine 20 mg oral capsule: 3 cap(s) orally once a day (04 Sep 2022 21:42)  melatonin 5 mg oral tablet: 1 tab(s) orally once a day (at bedtime), As needed, Insomnia (08 Sep 2022 13:24)  polyethylene glycol 3350 oral powder for reconstitution: 17 gram(s) orally 2 times a day (08 Sep 2022 13:24)  Tylenol 500 mg oral tablet: 2 tab(s) orally 3 times a day (04 Sep 2022 21:42)  Vitamin D3 25 mcg (1000 intl units) oral tablet: 1 tab(s) orally once a day (04 Sep 2022 21:42)      MEDICATIONS  (STANDING):  albuterol/ipratropium for Nebulization 3 milliLiter(s) Nebulizer every 6 hours  amLODIPine   Tablet 5 milliGRAM(s) Oral at bedtime  atovaquone  Suspension 1500 milliGRAM(s) Oral every 24 hours  calcium carbonate   1250 mG (OsCal) 1 Tablet(s) Oral daily  cholecalciferol 1000 Unit(s) Oral every 24 hours  enoxaparin Injectable 40 milliGRAM(s) SubCutaneous every 24 hours  FLUoxetine 60 milliGRAM(s) Oral daily  gabapentin 100 milliGRAM(s) Oral two times a day  metoprolol succinate ER 25 milliGRAM(s) Oral daily  nystatin    Suspension 480396 Unit(s) Oral three times a day  pantoprazole    Tablet 40 milliGRAM(s) Oral before breakfast  predniSONE   Tablet 50 milliGRAM(s) Oral daily  sodium chloride 0.9%. 1000 milliLiter(s) (50 mL/Hr) IV Continuous <Continuous>      TELEMETRY: 	    ECG:  	  RADIOLOGY:   DIAGNOSTIC TESTING:  [ ] Echocardiogram:  [ ]  Catheterization:  [ ] Stress Test:    OTHER: 	    LABS:	 	                            9.9    9.23  )-----------( 252      ( 14 Sep 2022 04:46 )             31.5     09-14    137  |  103  |  20  ----------------------------<  83  3.8   |  26  |  0.66    Ca    7.9<L>      14 Sep 2022 04:46      PT/INR - ( 14 Sep 2022 04:46 )   PT: 12.5 sec;   INR: 1.08 ratio                  CARDIOLOGY FOLLOW UP - Dr. Lee  DATE OF SERVICE: seen and examined on  9/13/22-     no cp or sob       REVIEW OF SYSTEMS:  CONSTITUTIONAL: No fever, weight loss, or fatigue  RESPIRATORY: No cough, wheezing, chills or hemoptysis; No Shortness of Breath  CARDIOVASCULAR: No chest pain, palpitations, passing out, dizziness, or leg swelling  GASTROINTESTINAL: No abdominal or epigastric pain. No nausea, vomiting, or hematemesis; No diarrhea or constipation. No melena or hematochezia.      PHYSICAL EXAM:  T(C): 36.8 (09-14-22 @ 12:50), Max: 36.8 (09-13-22 @ 16:21)  HR: 65 (09-14-22 @ 15:00) (62 - 72)  BP: 119/62 (09-14-22 @ 15:00) (119/62 - 163/74)  RR: 18 (09-14-22 @ 15:00) (12 - 18)  SpO2: 95% (09-14-22 @ 15:00) (91% - 100%)  Wt(kg): --  I&O's Summary    13 Sep 2022 07:01  -  14 Sep 2022 07:00  --------------------------------------------------------  IN: 770 mL / OUT: 600 mL / NET: 170 mL    14 Sep 2022 07:01  -  14 Sep 2022 15:13  --------------------------------------------------------  IN: 0 mL / OUT: 550 mL / NET: -550 mL        Appearance: Normal	  Cardiovascular: Normal S1 S2,RRR, No JVD, No murmurs  Respiratory: Lungs clear to auscultation	  Gastrointestinal:  Soft, Non-tender, + BS	  Extremities: Normal range of motion, No clubbing, cyanosis or edema      Home Medications:  amLODIPine 5 mg oral tablet: 1 tab(s) orally once a day (in the evening) (04 Sep 2022 21:42)  Clear Eyes 0.012% ophthalmic solution: 1 drop(s) to each affected eye 3 times a day, As Needed (04 Sep 2022 21:42)  Durezol 0.05% ophthalmic emulsion: 1 drop(s) in the left eye 2 times a day (04 Sep 2022 21:42)  Flonase 50 mcg/inh nasal spray: 1 spray(s) in each nostril 2 times a day (04 Sep 2022 21:42)  FLUoxetine 20 mg oral capsule: 3 cap(s) orally once a day (04 Sep 2022 21:42)  melatonin 5 mg oral tablet: 1 tab(s) orally once a day (at bedtime), As needed, Insomnia (08 Sep 2022 13:24)  polyethylene glycol 3350 oral powder for reconstitution: 17 gram(s) orally 2 times a day (08 Sep 2022 13:24)  Tylenol 500 mg oral tablet: 2 tab(s) orally 3 times a day (04 Sep 2022 21:42)  Vitamin D3 25 mcg (1000 intl units) oral tablet: 1 tab(s) orally once a day (04 Sep 2022 21:42)      MEDICATIONS  (STANDING):  albuterol/ipratropium for Nebulization 3 milliLiter(s) Nebulizer every 6 hours  amLODIPine   Tablet 5 milliGRAM(s) Oral at bedtime  atovaquone  Suspension 1500 milliGRAM(s) Oral every 24 hours  calcium carbonate   1250 mG (OsCal) 1 Tablet(s) Oral daily  cholecalciferol 1000 Unit(s) Oral every 24 hours  enoxaparin Injectable 40 milliGRAM(s) SubCutaneous every 24 hours  FLUoxetine 60 milliGRAM(s) Oral daily  gabapentin 100 milliGRAM(s) Oral two times a day  metoprolol succinate ER 25 milliGRAM(s) Oral daily  nystatin    Suspension 541807 Unit(s) Oral three times a day  pantoprazole    Tablet 40 milliGRAM(s) Oral before breakfast  predniSONE   Tablet 50 milliGRAM(s) Oral daily  sodium chloride 0.9%. 1000 milliLiter(s) (50 mL/Hr) IV Continuous <Continuous>      TELEMETRY: 	    ECG:  	  RADIOLOGY:   DIAGNOSTIC TESTING:  [ ] Echocardiogram:  [ ]  Catheterization:  [ ] Stress Test:    OTHER: 	    LABS:	 	                            9.9    9.23  )-----------( 252      ( 14 Sep 2022 04:46 )             31.5     09-14    137  |  103  |  20  ----------------------------<  83  3.8   |  26  |  0.66    Ca    7.9<L>      14 Sep 2022 04:46      PT/INR - ( 14 Sep 2022 04:46 )   PT: 12.5 sec;   INR: 1.08 ratio

## 2022-09-14 NOTE — PROGRESS NOTE ADULT - SUBJECTIVE AND OBJECTIVE BOX
CARDIOLOGY FOLLOW UP - Dr. Lee  DATE OF SERVICE: 9/14/22     CC no acute events   sp bl temporal artery  Biopsy      REVIEW OF SYSTEMS:  CONSTITUTIONAL: No fever, weight loss, or fatigue  RESPIRATORY: No cough, wheezing, chills or hemoptysis; No Shortness of Breath  CARDIOVASCULAR: No chest pain, palpitations, passing out, dizziness, or leg swelling  GASTROINTESTINAL: No abdominal or epigastric pain. No nausea, vomiting, or hematemesis; No diarrhea or constipation. No melena or hematochezia.  VASCULAR: No edema     PHYSICAL EXAM:  T(C): 36.8 (09-14-22 @ 12:50), Max: 36.8 (09-13-22 @ 16:21)  HR: 66 (09-14-22 @ 14:30) (62 - 72)  BP: 122/59 (09-14-22 @ 14:30) (119/71 - 163/74)  RR: 16 (09-14-22 @ 14:30) (12 - 18)  SpO2: 96% (09-14-22 @ 14:30) (92% - 100%)  Wt(kg): --  I&O's Summary    13 Sep 2022 07:01  -  14 Sep 2022 07:00  --------------------------------------------------------  IN: 770 mL / OUT: 600 mL / NET: 170 mL    14 Sep 2022 07:01  -  14 Sep 2022 14:54  --------------------------------------------------------  IN: 0 mL / OUT: 550 mL / NET: -550 mL        Appearance: Normal	  Cardiovascular: Normal S1 S2,RRR, No JVD, No murmurs  Respiratory: Lungs clear to auscultation	  Gastrointestinal:  Soft, Non-tender, + BS	  Extremities: Normal range of motion, No clubbing, cyanosis or edema      Home Medications:  amLODIPine 5 mg oral tablet: 1 tab(s) orally once a day (in the evening) (04 Sep 2022 21:42)  Clear Eyes 0.012% ophthalmic solution: 1 drop(s) to each affected eye 3 times a day, As Needed (04 Sep 2022 21:42)  Durezol 0.05% ophthalmic emulsion: 1 drop(s) in the left eye 2 times a day (04 Sep 2022 21:42)  Flonase 50 mcg/inh nasal spray: 1 spray(s) in each nostril 2 times a day (04 Sep 2022 21:42)  FLUoxetine 20 mg oral capsule: 3 cap(s) orally once a day (04 Sep 2022 21:42)  melatonin 5 mg oral tablet: 1 tab(s) orally once a day (at bedtime), As needed, Insomnia (08 Sep 2022 13:24)  polyethylene glycol 3350 oral powder for reconstitution: 17 gram(s) orally 2 times a day (08 Sep 2022 13:24)  Tylenol 500 mg oral tablet: 2 tab(s) orally 3 times a day (04 Sep 2022 21:42)  Vitamin D3 25 mcg (1000 intl units) oral tablet: 1 tab(s) orally once a day (04 Sep 2022 21:42)      MEDICATIONS  (STANDING):  albuterol/ipratropium for Nebulization 3 milliLiter(s) Nebulizer every 6 hours  amLODIPine   Tablet 5 milliGRAM(s) Oral at bedtime  atovaquone  Suspension 1500 milliGRAM(s) Oral every 24 hours  calcium carbonate   1250 mG (OsCal) 1 Tablet(s) Oral daily  cholecalciferol 1000 Unit(s) Oral every 24 hours  enoxaparin Injectable 40 milliGRAM(s) SubCutaneous every 24 hours  FLUoxetine 60 milliGRAM(s) Oral daily  gabapentin 100 milliGRAM(s) Oral two times a day  metoprolol succinate ER 25 milliGRAM(s) Oral daily  nystatin    Suspension 224354 Unit(s) Oral three times a day  pantoprazole    Tablet 40 milliGRAM(s) Oral before breakfast  predniSONE   Tablet 50 milliGRAM(s) Oral daily  sodium chloride 0.9%. 1000 milliLiter(s) (50 mL/Hr) IV Continuous <Continuous>      TELEMETRY: 	    ECG:  	  RADIOLOGY:   DIAGNOSTIC TESTING:  [ ] Echocardiogram:  [ ]  Catheterization:  [ ] Stress Test:    OTHER: 	    LABS:	 	                            9.9    9.23  )-----------( 252      ( 14 Sep 2022 04:46 )             31.5     09-14    137  |  103  |  20  ----------------------------<  83  3.8   |  26  |  0.66    Ca    7.9<L>      14 Sep 2022 04:46      PT/INR - ( 14 Sep 2022 04:46 )   PT: 12.5 sec;   INR: 1.08 ratio

## 2022-09-14 NOTE — CHART NOTE - NSCHARTNOTEFT_GEN_A_CORE
Post op check for Vascular Sx    S: Pt felt some bilateral temporal pain, also reported feeling the R side of her head as "tight" but has improved over the last hour    T(C): 36.8 (09-14-22 @ 12:50), Max: 36.8 (09-14-22 @ 12:50)  HR: 69 (09-14-22 @ 16:00) (62 - 72)  BP: 114/58 (09-14-22 @ 15:45) (114/58 - 163/74)  RR: 18 (09-14-22 @ 16:00) (12 - 18)  SpO2: 94% (09-14-22 @ 16:00) (91% - 100%)  Wt(kg): --    09-13 @ 07:01  -  09-14 @ 07:00  --------------------------------------------------------  IN: 770 mL / OUT: 600 mL / NET: 170 mL    09-14 @ 07:01  -  09-14 @ 16:29  --------------------------------------------------------  IN: 0 mL / OUT: 550 mL / NET: -550 mL        Gen: Lying in bed, NAD  MSK/VASC: Moving all extremities spontaneously, bilateral temporal incisions c/d/i. R side is mildly swollen and tender to touch, no skin changes  Neuro: no focal neuro deficits    A/P:  Ms. Santana is an 88 yo female 4 hrs s/p bilateral temporal artery biopsy. Had some R sided temporal swelling that has since improved.    -pain control  -OOB  -regular diet  -rest of care per primary team    Vascular sx  [id number]

## 2022-09-14 NOTE — PROGRESS NOTE ADULT - ASSESSMENT
87F H/O Breast CA, MAC untreated, MVP, asthma, mod  AS, on prednisone for PMR still elevated ESR. Patient complains of frequent headache accompanied with blurring left eye vision. Rheumatology is consulted w/ high concern for GCA. Vascular surgery is consulted for temporal A biopsy to r/o GCA.    Plan:  - b/l temporal artery biopsy today 9/14 - 3rd add on  - pt consented to rescind DNR/DNI for the procedure    Vascular Surgery  [id number]

## 2022-09-14 NOTE — PROGRESS NOTE ADULT - SUBJECTIVE AND OBJECTIVE BOX
VASCULAR SURGERY DAILY PROGRESS NOTE:     SUBJECTIVE/ROS: No acute events overnight. Patient seen and examined at bedside by surgical team. Pt c/o headache, dizziness overnight.     OBJECTIVE:  Vital Signs Last 24 Hrs  T(C): 36.7 (14 Sep 2022 04:54), Max: 36.8 (13 Sep 2022 16:21)  T(F): 98.1 (14 Sep 2022 04:54), Max: 98.2 (13 Sep 2022 16:21)  HR: 72 (14 Sep 2022 04:54) (62 - 90)  BP: 150/70 (14 Sep 2022 04:54) (110/75 - 150/70)  BP(mean): --  RR: 18 (14 Sep 2022 04:54) (18 - 18)  SpO2: 95% (14 Sep 2022 04:54) (95% - 98%)    Parameters below as of 14 Sep 2022 04:54  Patient On (Oxygen Delivery Method): room air        PHYSICAL EXAM:  Constitutional: NAD  Respiratory: non-labored breathing, patent airway  Extremities: warm  Neurological: intact                          9.9    9.23  )-----------( 252      ( 14 Sep 2022 04:46 )             31.5     09-14    137  |  103  |  20  ----------------------------<  83  3.8   |  26  |  0.66    Ca    7.9<L>      14 Sep 2022 04:46     PT/INR - ( 14 Sep 2022 04:46 )   PT: 12.5 sec;   INR: 1.08 ratio           I&O's Detail    13 Sep 2022 07:01  -  14 Sep 2022 07:00  --------------------------------------------------------  IN:    Oral Fluid: 620 mL    sodium chloride 0.9%: 150 mL  Total IN: 770 mL    OUT:    Voided (mL): 600 mL  Total OUT: 600 mL    Total NET: 170 mL      14 Sep 2022 07:01  -  14 Sep 2022 09:30  --------------------------------------------------------  IN:  Total IN: 0 mL    OUT:    Voided (mL): 200 mL  Total OUT: 200 mL    Total NET: -200 mL          IMAGING:

## 2022-09-14 NOTE — PROGRESS NOTE ADULT - ASSESSMENT
ECHO 2/19/22: EF 67%; mild mr, mod as, nl LV sys fx, mod concentric lVH,   ECHO 7/5/22 ef 68%; mild as, nl LV sys fx  Mild diastolic dysfunction (Stage I).    a/p     87F c hx remote breast ca, MAC untreated, MVP, asthma, mod AS, paroxysmal tachycardia of unknown rhythm, orthostatic hypotension (Feb '22) c/b syncope, anxiety, depression, recent covid (May '22), recent accidental valium toxicity , recent dx with  acute L3 compression Fx now presenting with PMR symptoms, fatigue and HA; She was seen by rheum and readmitted for IV Medrol 30 mg daily and TA biopsy bc high suspicion of GCA    # PMR symptoms, fatigue and HA  -rheum work up  -r/o GCA  -w/u per vasc  -sp bl temporal artery  Biopsy    # Moderate AS,    -prior echo with mild as    #Atach  -cv stable no events   -cont current tx    #htn   -c/w meds - stable, increase norvasc as needed

## 2022-09-15 ENCOUNTER — TRANSCRIPTION ENCOUNTER (OUTPATIENT)
Age: 87
End: 2022-09-15

## 2022-09-15 LAB
ANION GAP SERPL CALC-SCNC: 11 MMOL/L — SIGNIFICANT CHANGE UP (ref 5–17)
BUN SERPL-MCNC: 18 MG/DL — SIGNIFICANT CHANGE UP (ref 7–23)
CALCIUM SERPL-MCNC: 9.5 MG/DL — SIGNIFICANT CHANGE UP (ref 8.4–10.5)
CHLORIDE SERPL-SCNC: 94 MMOL/L — LOW (ref 96–108)
CO2 SERPL-SCNC: 27 MMOL/L — SIGNIFICANT CHANGE UP (ref 22–31)
CREAT SERPL-MCNC: 0.59 MG/DL — SIGNIFICANT CHANGE UP (ref 0.5–1.3)
EGFR: 88 ML/MIN/1.73M2 — SIGNIFICANT CHANGE UP
GLUCOSE SERPL-MCNC: 96 MG/DL — SIGNIFICANT CHANGE UP (ref 70–99)
HCT VFR BLD CALC: 35 % — SIGNIFICANT CHANGE UP (ref 34.5–45)
HGB BLD-MCNC: 10.9 G/DL — LOW (ref 11.5–15.5)
MCHC RBC-ENTMCNC: 28.2 PG — SIGNIFICANT CHANGE UP (ref 27–34)
MCHC RBC-ENTMCNC: 31.1 GM/DL — LOW (ref 32–36)
MCV RBC AUTO: 90.4 FL — SIGNIFICANT CHANGE UP (ref 80–100)
NRBC # BLD: 0 /100 WBCS — SIGNIFICANT CHANGE UP (ref 0–0)
PLATELET # BLD AUTO: 296 K/UL — SIGNIFICANT CHANGE UP (ref 150–400)
POTASSIUM SERPL-MCNC: 4.1 MMOL/L — SIGNIFICANT CHANGE UP (ref 3.5–5.3)
POTASSIUM SERPL-SCNC: 4.1 MMOL/L — SIGNIFICANT CHANGE UP (ref 3.5–5.3)
RBC # BLD: 3.87 M/UL — SIGNIFICANT CHANGE UP (ref 3.8–5.2)
RBC # FLD: 15.9 % — HIGH (ref 10.3–14.5)
SODIUM SERPL-SCNC: 132 MMOL/L — LOW (ref 135–145)
SURGICAL PATHOLOGY STUDY: SIGNIFICANT CHANGE UP
WBC # BLD: 8.17 K/UL — SIGNIFICANT CHANGE UP (ref 3.8–10.5)
WBC # FLD AUTO: 8.17 K/UL — SIGNIFICANT CHANGE UP (ref 3.8–10.5)

## 2022-09-15 PROCEDURE — 99232 SBSQ HOSP IP/OBS MODERATE 35: CPT | Mod: GC

## 2022-09-15 RX ADMIN — Medication 60 MILLIGRAM(S): at 12:33

## 2022-09-15 RX ADMIN — Medication 650 MILLIGRAM(S): at 05:50

## 2022-09-15 RX ADMIN — AMLODIPINE BESYLATE 5 MILLIGRAM(S): 2.5 TABLET ORAL at 21:39

## 2022-09-15 RX ADMIN — Medication 1 TABLET(S): at 12:32

## 2022-09-15 RX ADMIN — Medication 500000 UNIT(S): at 21:39

## 2022-09-15 RX ADMIN — PANTOPRAZOLE SODIUM 40 MILLIGRAM(S): 20 TABLET, DELAYED RELEASE ORAL at 05:18

## 2022-09-15 RX ADMIN — GABAPENTIN 100 MILLIGRAM(S): 400 CAPSULE ORAL at 05:18

## 2022-09-15 RX ADMIN — GABAPENTIN 100 MILLIGRAM(S): 400 CAPSULE ORAL at 17:38

## 2022-09-15 RX ADMIN — CHLORHEXIDINE GLUCONATE 15 MILLILITER(S): 213 SOLUTION TOPICAL at 21:42

## 2022-09-15 RX ADMIN — Medication 650 MILLIGRAM(S): at 23:46

## 2022-09-15 RX ADMIN — Medication 1000 UNIT(S): at 21:39

## 2022-09-15 RX ADMIN — Medication 50 MILLIGRAM(S): at 05:18

## 2022-09-15 RX ADMIN — Medication 650 MILLIGRAM(S): at 06:20

## 2022-09-15 RX ADMIN — ENOXAPARIN SODIUM 40 MILLIGRAM(S): 100 INJECTION SUBCUTANEOUS at 17:38

## 2022-09-15 RX ADMIN — Medication 25 MILLIGRAM(S): at 05:18

## 2022-09-15 NOTE — DISCHARGE NOTE PROVIDER - NSFOLLOWUPCLINICS_GEN_ALL_ED_FT
[facility]  Ophthalmology  [address] [place] [address] [address]  [place] [address]  Phone: [phone]  Fax:   Follow Up Time: 1 week

## 2022-09-15 NOTE — DISCHARGE NOTE PROVIDER - NSDCMRMEDTOKEN_GEN_ALL_CORE_FT
amLODIPine 5 mg oral tablet: 1 tab(s) orally once a day (in the evening)  calcium carbonate 1250 mg (500 mg elemental calcium) oral tablet: 1 tab(s) orally once a day  Clear Eyes 0.012% ophthalmic solution: 1 drop(s) to each affected eye 3 times a day, As Needed  Durezol 0.05% ophthalmic emulsion: 1 drop(s) in the left eye 2 times a day  Flonase 50 mcg/inh nasal spray: 1 spray(s) in each nostril 2 times a day  FLUoxetine 20 mg oral capsule: 3 cap(s) orally once a day  gabapentin 100 mg oral capsule: 1 cap(s) orally 2 times a day  melatonin 5 mg oral tablet: 1 tab(s) orally once a day (at bedtime), As needed, Insomnia  metoprolol succinate 25 mg oral tablet, extended release: 1 tab(s) orally once a day  pantoprazole 40 mg oral delayed release tablet: 1 tab(s) orally once a day (before a meal)  polyethylene glycol 3350 oral powder for reconstitution: 17 gram(s) orally 2 times a day  predniSONE 10 mg oral tablet: 3 tab(s) orally once a day   Rolling Walker : once a day   senna leaf extract oral tablet: 2 tab(s) orally once a day (at bedtime)  tiZANidine 2 mg oral tablet: 2 tab(s) orally every 8 hours, As Needed -for moderate pain neck pain   Transport Wheelchair : 1   once a day   Tylenol 500 mg oral tablet: 2 tab(s) orally 3 times a day  Vitamin D3 25 mcg (1000 intl units) oral tablet: 1 tab(s) orally once a day  Walker : once a day    amLODIPine 5 mg oral tablet: 1 tab(s) orally once a day (in the evening)  atovaquone 750 mg/5 mL oral suspension: 10 milliliter(s) orally every 24 hours  calcium carbonate 1250 mg (500 mg elemental calcium) oral tablet: 1 tab(s) orally once a day  Clear Eyes 0.012% ophthalmic solution: 1 drop(s) to each affected eye 3 times a day, As Needed  Durezol 0.05% ophthalmic emulsion: 1 drop(s) in the left eye 2 times a day  Flonase 50 mcg/inh nasal spray: 1 spray(s) in each nostril 2 times a day  FLUoxetine 20 mg oral capsule: 3 cap(s) orally once a day  gabapentin 100 mg oral capsule: 1 cap(s) orally 2 times a day  melatonin 5 mg oral tablet: 1 tab(s) orally once a day (at bedtime), As needed, Insomnia  metoprolol succinate 25 mg oral tablet, extended release: 1 tab(s) orally once a day  pantoprazole 40 mg oral delayed release tablet: 1 tab(s) orally once a day (before a meal)  polyethylene glycol 3350 oral powder for reconstitution: 17 gram(s) orally 2 times a day  predniSONE 10 mg oral tablet: 4 tab(s) once a day (9/16-9/22)  3 tab(s) once a day( 9/23-9/29).  2 tab daily from 9/30.   Rolling Walker : once a day   senna leaf extract oral tablet: 2 tab(s) orally once a day (at bedtime)  tiZANidine 2 mg oral tablet: 2 tab(s) orally every 8 hours, As Needed -for moderate pain neck pain   Transport Wheelchair : 1   once a day   Tylenol 500 mg oral tablet: 2 tab(s) orally 3 times a day  Vitamin D3 25 mcg (1000 intl units) oral tablet: 1 tab(s) orally once a day  Walker : once a day    amLODIPine 5 mg oral tablet: 1 tab(s) orally once a day (in the evening)  atovaquone 750 mg/5 mL oral suspension: 10 milliliter(s) orally every 24 hours  calcium carbonate 1250 mg (500 mg elemental calcium) oral tablet: 1 tab(s) orally once a day  Clear Eyes 0.012% ophthalmic solution: 1 drop(s) to each affected eye 3 times a day, As Needed  Durezol 0.05% ophthalmic emulsion: 1 drop(s) in the left eye 2 times a day  Flonase 50 mcg/inh nasal spray: 1 spray(s) in each nostril 2 times a day  FLUoxetine 20 mg oral capsule: 3 cap(s) orally once a day  gabapentin 100 mg oral capsule: 1 cap(s) orally 2 times a day  melatonin 5 mg oral tablet: 1 tab(s) orally once a day (at bedtime), As needed, Insomnia  metoprolol succinate 25 mg oral tablet, extended release: 1 tab(s) orally once a day  pantoprazole 40 mg oral delayed release tablet: 1 tab(s) orally once a day (before a meal)  polyethylene glycol 3350 oral powder for reconstitution: 17 gram(s) orally 2 times a day  predniSONE 10 mg oral tablet: 4 tab(s) once a day (9/17-9/22)  3 tab(s) once a day( 9/23-9/29).  2 tab daily from 9/30.   Rolling Walker : once a day   senna leaf extract oral tablet: 2 tab(s) orally once a day (at bedtime)  tiZANidine 2 mg oral tablet: 2 tab(s) orally every 8 hours, As Needed -for moderate pain neck pain   Transport Wheelchair : 1   once a day   Tylenol 500 mg oral tablet: 2 tab(s) orally 3 times a day  Vitamin D3 25 mcg (1000 intl units) oral tablet: 1 tab(s) orally once a day  Walker : once a day

## 2022-09-15 NOTE — DISCHARGE NOTE PROVIDER - NSDCCPCAREPLAN_GEN_ALL_CORE_FT
PRINCIPAL DISCHARGE DIAGNOSIS  Diagnosis: History of headache  Assessment and Plan of Treatment:   - seen by vascular and rheum  - Was given Medrol IV but was stopped 2/2 " flushing and wheezing" doing well on po Prednisone. no HA, denies pain, no blurry vision   - back pain is controlled, kyphoplasty is recommended for ptns with ongoing severe back pain 2/2 compression Fx  -cont outptn meds  - on PCP ppx w Atovaquone 2/2 sulfa allergy  -pain control w Oxycodone prn moderate pain and Morphine prn  - TA biopsy b/l, done on 9/14 >>negative for temporal arteritis.  Please follow up with your primary care doctor and rheumatologist.      SECONDARY DISCHARGE DIAGNOSES  Diagnosis: Osteoporosis  Assessment and Plan of Treatment: #Osteoporosis: Hx of osteopenia and found to have non-traumatic L3 compression fracture. Vitamin D level wnl.   -would have orthopedic evaluation for any possible intervention such as kyphoplasty  -continue with calcium 500mg qd  -continue with vitamin D 1000mg qd  -will need to initiate bisphosphonate therapy outpatient     PRINCIPAL DISCHARGE DIAGNOSIS  Diagnosis: History of headache  Assessment and Plan of Treatment: - seen by vascular and rheum  - Was given Medrol IV but was stopped 2/2 " flushing and wheezing" doing well on po Prednisone. no HA, denies pain, no blurry vision   - back pain is controlled, kyphoplasty is recommended for patents with ongoing severe back pain 2/2 compression Fx  -cont outptn meds  - on PCP ppx w Atovaquone 2/2 sulfa allergy  -pain control w Oxycodone prn moderate pain and Morphine prn  - TA biopsy b/l, done on 9/14 >>negative for temporal arteritis.  Please follow up with your primary care doctor and rheumatologist.      SECONDARY DISCHARGE DIAGNOSES  Diagnosis: Osteoporosis  Assessment and Plan of Treatment: #Osteoporosis: Hx of osteopenia and found to have non-traumatic L3 compression fracture. Vitamin D level wnl.   -would have orthopedic evaluation for any possible intervention such as kyphoplasty  -continue with calcium 500mg qd  -continue with vitamin D 1000mg qd  -will need to initiate bisphosphonate therapy outpatient    Diagnosis: Blurry vision  Assessment and Plan of Treatment: Follow up with Opthalmologist as an outpatient.

## 2022-09-15 NOTE — DISCHARGE NOTE PROVIDER - NSDCFUADDAPPT_GEN_ALL_CORE_FT
Please follow up with your Ophthalmologist or at the Clinic (information provided above) for blurry vision.     Please take Prednisone dose as ordered and continue with Mepron while you are on Prednisone.  -Prednisone 40mg qd (9/16-9/22)  -Prednisone 30mg qd (9/23-9/29)  -Prednisone 20mg qd (9/30-TBD) until rheumatology follow up  -Continue mepron 1.5g qd for PCP ppx while on steroids

## 2022-09-15 NOTE — DISCHARGE NOTE PROVIDER - PROVIDER TOKENS
PROVIDER:[TOKEN:[[id number]],FOLLOWUP:[1 week]],PROVIDER:[TOKEN:[[id number]],FOLLOWUP:[1 week]],PROVIDER:[TOKEN:[[id number]],FOLLOWUP:[1 week]],PROVIDER:[TOKEN:[[id number]],FOLLOWUP:[1 week]] PROVIDER:[TOKEN:[[id number]],FOLLOWUP:[1 week]],PROVIDER:[TOKEN:[[id number]],FOLLOWUP:[1 month]],PROVIDER:[TOKEN:[[id number]],FOLLOWUP:[1 week]],PROVIDER:[[id number]],FOLLOWUP:[1 week]]

## 2022-09-15 NOTE — PROGRESS NOTE ADULT - ATTENDING COMMENTS
agree with above   awaiting Ophth consult   awaiting result of biopsy     Homa Becker MD  [phone]
PMR   Compression Fracture  Biopsy negative for GCA   rapid steroid taper to doses for PMR as above  Follow up in the office in 4 weeks     Homa Becker MD

## 2022-09-15 NOTE — DISCHARGE NOTE PROVIDER - HOSPITAL COURSE
87F c hx remote breast ca, MAC untreated, MVP, asthma, mod AS, paroxysmal tachycardia of unknown rhythm, orthostatic hypotension (Feb '22) c/b syncope, anxiety, depression, recent covid (May '22), recent accidental valium toxicity ,PMR on po prednisone at home, recent dx with  acute L3 compression Fx now presenting with PMR (polymyalgia rheumatica) symptoms,  headache accompanied with blurring left eye vision and fatigue; She was seen by rheum and readmitted for IV Medrol 30 mg daily and TA biopsy bc high suspicion of GCA.     - ptn denies having a HA, general HAs are occipital or frontal, she has no tenderness over palpation of her scalp including temples. in July ptn did not have HAs as one of her presenting symptoms  - seen by vascular and rheum  - Medrol IV stopped 2/2 " flushing and wheezing" doing well on po Prednisone. no HA, denies pain, no blurry vision   - back pain is controlled, kyphoplasty is recommended for ptns with ongoing severe back pain 2/2 compression Fx  - she will need aggressive therapy to treat OP on outptn basis  -cont outptn meds  - on PCP ppx w Atovaquone 2/2 sulfa allergy  -pain control w Oxycodone prn moderate pain and Morphine prn severe pain: L3 compression Fx pain. Hasn't needed any  -Medically stable. Continue present Rx  - TA biopsy b/l, done on 9/14 >> Trichrome and elastic stains are performed on blocks 1A and 2A  and show  negative for temporal arteritis.      #Osteoporosis: Hx of osteopenia and found to have non-traumatic L3 compression fracture. Vitamin D level wnl.   -would have orthopedic evaluation for any possible intervention such as kyphoplasty  -c/w calcium 500mg qd  -c/w vitamin D 1000mg qd  -will need to initiate bisphosphonate therapy outpatient  -discussed options w/ pt      Patient is medically cleared and stable for discharge. Discussed with

## 2022-09-15 NOTE — PROGRESS NOTE ADULT - SUBJECTIVE AND OBJECTIVE BOX
incomplete ADA AQUILINO  [id number]    INTERVAL HPI/OVERNIGHT EVENTS: Pt had b/l temporal artery biopsy. Says she feels well. Has slight swelling from right biopsy side. Denies fever, headache, blurry vision, jaw pain, neck/shoulder/hip stiffness/pain.    MEDICATIONS  (STANDING):  albuterol/ipratropium for Nebulization 3 milliLiter(s) Nebulizer every 6 hours  amLODIPine   Tablet 5 milliGRAM(s) Oral at bedtime  atovaquone  Suspension 1500 milliGRAM(s) Oral every 24 hours  calcium carbonate   1250 mG (OsCal) 1 Tablet(s) Oral daily  cholecalciferol 1000 Unit(s) Oral every 24 hours  enoxaparin Injectable 40 milliGRAM(s) SubCutaneous every 24 hours  FLUoxetine 60 milliGRAM(s) Oral daily  gabapentin 100 milliGRAM(s) Oral two times a day  metoprolol succinate ER 25 milliGRAM(s) Oral daily  nystatin    Suspension 253819 Unit(s) Oral three times a day  pantoprazole    Tablet 40 milliGRAM(s) Oral before breakfast  predniSONE   Tablet 50 milliGRAM(s) Oral daily  sodium chloride 0.9%. 1000 milliLiter(s) (50 mL/Hr) IV Continuous <Continuous>    MEDICATIONS  (PRN):  acetaminophen     Tablet .. 650 milliGRAM(s) Oral every 8 hours PRN Mild Pain (1 - 3)  chlorhexidine 0.12% Liquid 15 milliLiter(s) Swish and Spit two times a day PRN ppx      Allergies    cefdinir (Unknown)  ciprofloxacin (Other)  codeine (Nausea; Other)  contrast media (iodine-based) (Angioedema)  fluorescein ophthalmic (Hives; Rash; Flushing)  lactose (Unknown)  latex (Anaphylaxis)  Levaquin (Nausea; Other)  lidocaine (Unknown)  Solu-Medrol (Pruritus; Flushing; Short breath)  SULFA (Anaphylaxis)  tetracycline (Anaphylaxis)  Zofran (Anaphylaxis)    Intolerances    Augmentin (Stomach Upset)      Review of Systems: As above    Vital Signs Last 24 Hrs  T(C): 36.9 (15 Sep 2022 16:22), Max: 36.9 (15 Sep 2022 16:22)  T(F): 98.4 (15 Sep 2022 16:22), Max: 98.4 (15 Sep 2022 16:22)  HR: 67 (15 Sep 2022 16:22) (61 - 73)  BP: 132/67 (15 Sep 2022 16:22) (124/69 - 152/70)  BP(mean): --  RR: 18 (15 Sep 2022 16:22) (16 - 18)  SpO2: 98% (15 Sep 2022 16:22) (95% - 98%)    Parameters below as of 15 Sep 2022 16:22  Patient On (Oxygen Delivery Method): room air      Physical Exam:  General: Breathing comfortably on room air, no distress. B/l temporal area's w/ gauze covering biopsy sites. Small swelling by R temporal region.  HEENT: EOMI, MMM, no oral ulcers  CVS: +S1/S2, RRR,   Resp: CTA b/l. No crackles/wheezing  GI: Soft, NT/ND +BS  MSK: 5/5 muscle strength in shoulders,, arms, hands, thighs, legs, feet. Sensation equal in arms and legs. No synovitis  Skin: no visible rashes      LABS:                        10.9   8.17  )-----------( 296      ( 15 Sep 2022 07:15 )             35.0     09-15    132<L>  |  94<L>  |  18  ----------------------------<  96  4.1   |  27  |  0.59    Ca    9.5      15 Sep 2022 07:22      PT/INR - ( 14 Sep 2022 04:46 )   PT: 12.5 sec;   INR: 1.08 ratio      Surgical Pathology Report:   ACCESSION No: [id number]   Patient: ADA ROLLE   Accession: [id number]   Collected Date/Time: 9/14/2022 12:00 EDT   Received Date/Time: 9/14/2022 18:38 EDT   SurgicalPathology Report - Auth (Verified)   Specimen(s) Submitted   1 Right temporal artery   2 Left temporal artery   Final Diagnosis   1. Artery, right temporal, biopsy   - Artery with mild to moderate intimal hyperplasia and medial   calcification, see comment   2. Artery, left temporal, biopsy   - Artery with mild to moderate intimal hyperplasia and focal medial   calcification, see comment   Comment:   Trichrome and elastic stains are performed on blocks 1A and 2A and show   negative for temporal arteritis.   Verified by: MONAE NGUYEN MD   (Electronic Signature)

## 2022-09-15 NOTE — PROGRESS NOTE ADULT - ASSESSMENT
ECHO 2/19/22: EF 67%; mild mr, mod as, nl LV sys fx, mod concentric lVH,   ECHO 7/5/22 ef 68%; mild as, nl LV sys fx  Mild diastolic dysfunction (Stage I).    a/p     87F c hx remote breast ca, MAC untreated, MVP, asthma, mod AS, paroxysmal tachycardia of unknown rhythm, orthostatic hypotension (Feb '22) c/b syncope, anxiety, depression, recent covid (May '22), recent accidental valium toxicity , recent dx with  acute L3 compression Fx now presenting with PMR symptoms, fatigue and HA; She was seen by rheum and readmitted for IV Medrol 30 mg daily and TA biopsy bc high suspicion of GCA    # PMR symptoms, fatigue and HA  -rheum work up  -r/o GCA  -sp bl temporal artery  Biopsy- vasc fu     # Moderate AS,    -prior echo with mild as    #Atach  -cv stable no events   -cont current tx    #htn   -c/w meds - stable c/w meds

## 2022-09-15 NOTE — PROGRESS NOTE ADULT - ASSESSMENT
87 Y F H/O Breast CA, MAC untreated, MVP, asthma, mod  AS, on prednisone for PMR since July w resolution of diffuse symptoms.   Initially started w Prednisone 50 mg, admitted on 9/4 on 30 mg without PMR Sx  Ptn was discharged to  Rehab on 9/1.  About 10 days prior to DC from rehab she developed severe back pain, nontraumatic.   Ptn was admitted on 9/4 w acute L3 compression Fx and severe constipation w large stool burden. Old T9 and T12 compression Fx were discovered as well.  pain was controlled w Neurontin and TLSO brace. She was seen by GI and had multiple large BMs w bowel regimen  Prednisone was dropped to 25 mg from 30 mg and she became more symptomatic w PMR symptoms, fatigue and HA  She was seen by rheum. Ptn was DCed on Prednisone of 30 mg.   RHeumatology, Dr. Becker called her back to be admitted for IV Medrol 30 mg q12H and TA biopsy bc high suspicion of GCA    - ptn denies having a HA, general HAs are occipital or frontal, she has no tenderness over palpation of her scalp including temples. in July ptn did not have HAs as one of her presenting symptoms  -awaiting Duplex of TA and vascular called for TA biopsy  - seen by vascular and rheum  - Medrol IV stopped 2/2 " flushing and wheezing" doing well on po Prednisone. no HA, denies pain  - back pain is controlled, kyphoplasty is recommended for ptns with ongoing severe back pain 2/2 compression Fx  - she will need aggressive therapy to treat OP on outptn basis  -cont outptn meds  - on PCP ppx w Atovaquone 2/2 sulfa allergy  -pain control w Oxycodone prn moderate pain and Morphine prn severe pain: L3 compression Fx pain. Hasn't needed any  -DVT ppx w sc Lovenox  -Medically stable. Continue present Rx  - TA, b/l, done 9/14. ptn feels well, denies changes in vision, denies HAs, TA biopsy w no inflammatory changes c/w GCA. rheum to F/U with recommendations. ptn refused an optho exam x 2. daughters aware. dc planning home tomorrow

## 2022-09-15 NOTE — PROGRESS NOTE ADULT - SUBJECTIVE AND OBJECTIVE BOX
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 rheum to F/U with recommendations. ptn refused an optho exam x 2. daughters aware. dc planning home tomorrow    MEDICATIONS  (STANDING):  albuterol/ipratropium for Nebulization 3 milliLiter(s) Nebulizer every 6 hours  amLODIPine   Tablet 5 milliGRAM(s) Oral at bedtime  atovaquone  Suspension 1500 milliGRAM(s) Oral every 24 hours  calcium carbonate   1250 mG (OsCal) 1 Tablet(s) Oral daily  cholecalciferol 1000 Unit(s) Oral every 24 hours  enoxaparin Injectable 40 milliGRAM(s) SubCutaneous every 24 hours  FLUoxetine 60 milliGRAM(s) Oral daily  gabapentin 100 milliGRAM(s) Oral two times a day  metoprolol succinate ER 25 milliGRAM(s) Oral daily  nystatin    Suspension 602409 Unit(s) Oral three times a day  pantoprazole    Tablet 40 milliGRAM(s) Oral before breakfast  predniSONE   Tablet 50 milliGRAM(s) Oral daily  sodium chloride 0.9%. 1000 milliLiter(s) (50 mL/Hr) IV Continuous <Continuous>    MEDICATIONS  (PRN):  acetaminophen     Tablet .. 650 milliGRAM(s) Oral every 8 hours PRN Mild Pain (1 - 3)  chlorhexidine 0.12% Liquid 15 milliLiter(s) Swish and Spit two times a day PRN ppx      Vital Signs Last 24 Hrs  T(F): 98.4 (09-15-22 @ 16:22), Max: 98.4 (09-15-22 @ 16:22)  HR: 67 (09-15-22 @ 16:22) (61 - 73)  BP: 132/67 (09-15-22 @ 16:22) (124/69 - 152/70)  RR: 18 (09-15-22 @ 16:22) (16 - 18)  SpO2: 98% (09-15-22 @ 16:22) (95% - 98%)  Telemetry:   CAPILLARY BLOOD GLUCOSE        I&O's Summary    14 Sep 2022 07:01  -  15 Sep 2022 07:00  --------------------------------------------------------  IN: 700 mL / OUT: 1250 mL / NET: -550 mL        PHYSICAL EXAM:  GENERAL: NAD, well-developed  HEAD:  Atraumatic, Normocephalic  EYES: EOMI, PERRLA, conjunctiva and sclera clear  NECK: Supple, No JVD  CHEST/LUNG: Clear to auscultation bilaterally; No wheeze  HEART: Regular rate and rhythm; No murmurs, rubs, or gallops  ABDOMEN: Soft, Nontender, Nondistended; Bowel sounds present  EXTREMITIES:  2+ Peripheral Pulses, No clubbing, cyanosis, or edema  PSYCH: AAOx3  NEUROLOGY: non-focal  SKIN: No rashes or lesions    LABS:                        10.9   8.17  )-----------( 296      ( 15 Sep 2022 07:15 )             35.0     09-15    132<L>  |  94<L>  |  18  ----------------------------<  96  4.1   |  27  |  0.59    Ca    9.5      15 Sep 2022 07:22      PT/INR - ( 14 Sep 2022 04:46 )   PT: 12.5 sec;   INR: 1.08 ratio                   RADIOLOGY & ADDITIONAL TESTS:    Imaging Personally Reviewed:    Consultant(s) Notes Reviewed:      Care Discussed with Consultants/Other Providers:

## 2022-09-15 NOTE — PROGRESS NOTE ADULT - ASSESSMENT
88 yo F PMH Breast CA, MAC (untreated), MVP, asthma, osteopenia, AS, PMR (dx 7/2022 on steroids), fibromyalgia, spinal stenosis, uveitis presenting w/ back pain found to have non-traumatic compression fracture of L3 spine. Rheumatology consulted for evaluation of PMR, new Osteoporosis, and concern for GCA    #R/o GCA: Pt w/ complaints of headaches, intermittent b/l intermittent blurry vision, and scalp tenderness w/ palpation w/ elevated ESR while on steroids. GCA associated w/ PMR, and can commonly occur while already on steroids. Dopplers of temporal arteries w/o signs of GCA, however inadequate to rule out GCA. B/l temporal artery biopsy 9/14/22 w/o GCA.    -can f/u w/ opthalmology outpatient for continued monitoring   -can reduce prednisone from 50mg to 40mg qd - tomorrow 9/16/22  -Prednisone 40mg qd (9/16-9/22)  -Prednisone 30mg qd (9/23-9/29)  -Prednisone 20mg qd (9/30-TBD) until rheumatology follow up  -c/w mepron 1.5g qd for PCP ppx while on steroids  -can follow up with Rheumatologist Dr. Becker in 4 weeks (we will set up appointment)    Homa Becker MD  [address], [place] [address]  [phone]     #PMR: Diagnosed 7/2022 w/ symptoms of shoulder/hip stiffness and pain that rapidly improved w/ steroids. Was started on prednisone 60mg 7/2022 which had been tapered  -plan as above  -c/w mepron 1.5g qd while on high dose steroids (sulfa allergy)    #Osteoporosis: Hx of osteopenia and found to have non-traumatic L3 compression fracture. Vitamin D level wnl.   -would have orthopedic evaluation for any possible intervention such as kyphoplasty  -c/w calcium 500mg qd  -c/w vitamin D 1000mg qd  -will need to initiate bisphosphonate therapy outpatient  -discussed options w/ pt    Discussed with Attending Dr. Rosita Ambrocio DO  Rheumatology Fellow  Pager: [phone]  Available on TEAMS 88 yo F PMH Breast CA, MAC (untreated), MVP, asthma, osteopenia, AS, PMR (dx 7/2022 on steroids), fibromyalgia, spinal stenosis, uveitis presenting w/ back pain found to have non-traumatic compression fracture of L3 spine. Rheumatology consulted for evaluation of PMR, new Osteoporosis, and concern for GCA    #R/o GCA: Pt w/ complaints of headaches, intermittent b/l intermittent blurry vision, and scalp tenderness w/ palpation w/ elevated ESR while on steroids. GCA associated w/ PMR, and can commonly occur while already on steroids. Dopplers of temporal arteries w/o signs of GCA, however inadequate to rule out GCA. B/l temporal artery biopsy 9/14/22 w/o GCA.    -can f/u w/ opthalmology outpatient for continued monitoring   -can reduce prednisone from 50mg to 40mg qd - tomorrow 9/16/22  -Prednisone 40mg qd (9/16-9/22)  -Prednisone 30mg qd (9/23-9/29)  -Prednisone 20mg qd (9/30-TBD) until rheumatology follow up  -c/w mepron 1.5g qd for PCP ppx while on steroids  -can follow up with Rheumatologist Dr. Becker in 4 weeks   Homa Becker MD  [address], [place] [address]  [phone]     #PMR: Diagnosed 7/2022 w/ symptoms of shoulder/hip stiffness and pain that rapidly improved w/ steroids. Was started on prednisone 60mg 7/2022 which had been tapered  -plan as above  -c/w mepron 1.5g qd while on high dose steroids (sulfa allergy)    #Osteoporosis: Hx of osteopenia and found to have non-traumatic L3 compression fracture. Vitamin D level wnl.   -would have orthopedic evaluation for any possible intervention such as kyphoplasty  -c/w calcium 500mg qd  -c/w vitamin D 1000mg qd  -will need to initiate bisphosphonate therapy outpatient  -discussed options w/ pt    Discussed with Attending Dr. Rosita Ambrocio DO  Rheumatology Fellow  Pager: [phone]  Available on TEAMS

## 2022-09-15 NOTE — CHART NOTE - NSCHARTNOTEFT_GEN_A_CORE
Ophthalmology called for consult on 9/15/22 at 2:30 PM for blurry vision. Pt denying any blurry vision since her hospitalization. Refused any drops to be placed in her eyes or IOP check. Explained that we cannot examine her retina or optic nerve without dilation and she stated she understood but did not want an examination. Partial exam below:    VA 20/20 OU  PERRL, no APD  EOMs full  CVF full.    Unable to perform remainder of exam due to pt refusal.     Pt should follow up with her outpatient ophthalmologist.     Seen and discussed with Dr. Gonzalez, attending. Ophthalmology called for consult on 9/15/22 at 2:30 PM for blurry vision. Pt denying any blurry vision since her hospitalization. Refused any drops to be placed in her eyes or IOP check. Explained that we cannot examine her retina or optic nerve without dilation and she stated she understood but did not want an examination. Partial exam below:    VA 20/20 OU  PERRL, no APD  EOMs full  CVF full.    Unable to perform remainder of exam due to pt refusal. Pt understands the risks of not having a full ophthalmic exam including but not limited to loss of vision or loss of the eye.    Pt should follow up with her outpatient ophthalmologist.     Seen and discussed with Dr. Gonzalez, attending.    Agree with above  Pt refused ophthalmologic exam to myself and the hospitalist attending in the room  Pt advised to let team know if she has any new or worsening eye complaints    Rebeca Gonzalez MD

## 2022-09-15 NOTE — DISCHARGE NOTE PROVIDER - CARE PROVIDER_API CALL
Kristian Lee)  Cardiology  [address], [address]  [place] [address]  Phone: [phone]  Fax: [phone]  Follow Up Time: 1 week    Homa Becker)  Internal Medicine; Rheumatology  [address], [address]  [place] [address]  Phone: [phone]  Fax: [phone]  Follow Up Time: 1 week    Bannazadeh, Mohsen (MD)  Surgery; Vascular Surgery  [address] [place] [address]  Phone: [phone]  Fax: [phone]  Follow Up Time: 1 week    Criselda Cardona)  Internal Medicine  [address], [address]  [place] [address]  Phone: [phone]  Fax: [phone]  Follow Up Time: 1 week   Kristian Lee)  Cardiology  [address], [address]  [place] [address]  Phone: [phone]  Fax: [phone]  Follow Up Time: 1 week    Homa Becker)  Internal Medicine; Rheumatology  [address], [address]  [place] [address]  Phone: [phone]  Fax: [phone]  Follow Up Time: 1 month    Bannazadeh, Mohsen (MD)  Surgery; Vascular Surgery  [address] [place] [address]  Phone: [phone]  Fax: [phone]  Follow Up Time: 1 week    Criselda Cardona)  Internal Medicine  [address], [address] [facility] [address] [facility] [address]  [place] [address]  Phone: [phone]  Fax: [phone]  Follow Up Time: 1 week

## 2022-09-15 NOTE — CHART NOTE - NSCHARTNOTESELECT_GEN_ALL_CORE
VASCULAR
Event Note
Event Note
Ophthalmology/Event Note
Post Op Check
VASCULAR/Event Note
Vascular Surgery - preop clearance

## 2022-09-15 NOTE — PROGRESS NOTE ADULT - SUBJECTIVE AND OBJECTIVE BOX
CARDIOLOGY FOLLOW UP - Dr. Lee  DATE OF SERVICE: 9/15/22    CC no cp or sob      REVIEW OF SYSTEMS:  CONSTITUTIONAL: No fever, weight loss, or fatigue  RESPIRATORY: No cough, wheezing, chills or hemoptysis; No Shortness of Breath  CARDIOVASCULAR: No chest pain, palpitations, passing out, dizziness, or leg swelling  GASTROINTESTINAL: No abdominal or epigastric pain. No nausea, vomiting, or hematemesis; No diarrhea or constipation. No melena or hematochezia.  VASCULAR: No edema     PHYSICAL EXAM:  T(C): 36.3 (09-15-22 @ 08:48), Max: 36.8 (09-14-22 @ 16:40)  HR: 69 (09-15-22 @ 08:48) (61 - 73)  BP: 152/70 (09-15-22 @ 08:48) (124/69 - 152/70)  RR: 18 (09-15-22 @ 08:48) (15 - 18)  SpO2: 95% (09-15-22 @ 08:48) (94% - 97%)  Wt(kg): --  I&O's Summary    14 Sep 2022 07:01  -  15 Sep 2022 07:00  --------------------------------------------------------  IN: 700 mL / OUT: 1250 mL / NET: -550 mL        Appearance: Normal	  Cardiovascular: Normal S1 S2,RRR, No JVD, No murmurs  Respiratory: Lungs clear to auscultation	  Gastrointestinal:  Soft, Non-tender, + BS	  Extremities: Normal range of motion, No clubbing, cyanosis or edema      Home Medications:  amLODIPine 5 mg oral tablet: 1 tab(s) orally once a day (in the evening) (04 Sep 2022 21:42)  Clear Eyes 0.012% ophthalmic solution: 1 drop(s) to each affected eye 3 times a day, As Needed (04 Sep 2022 21:42)  Durezol 0.05% ophthalmic emulsion: 1 drop(s) in the left eye 2 times a day (04 Sep 2022 21:42)  Flonase 50 mcg/inh nasal spray: 1 spray(s) in each nostril 2 times a day (04 Sep 2022 21:42)  FLUoxetine 20 mg oral capsule: 3 cap(s) orally once a day (04 Sep 2022 21:42)  melatonin 5 mg oral tablet: 1 tab(s) orally once a day (at bedtime), As needed, Insomnia (08 Sep 2022 13:24)  polyethylene glycol 3350 oral powder for reconstitution: 17 gram(s) orally 2 times a day (08 Sep 2022 13:24)  Tylenol 500 mg oral tablet: 2 tab(s) orally 3 times a day (04 Sep 2022 21:42)  Vitamin D3 25 mcg (1000 intl units) oral tablet: 1 tab(s) orally once a day (04 Sep 2022 21:42)      MEDICATIONS  (STANDING):  albuterol/ipratropium for Nebulization 3 milliLiter(s) Nebulizer every 6 hours  amLODIPine   Tablet 5 milliGRAM(s) Oral at bedtime  atovaquone  Suspension 1500 milliGRAM(s) Oral every 24 hours  calcium carbonate   1250 mG (OsCal) 1 Tablet(s) Oral daily  cholecalciferol 1000 Unit(s) Oral every 24 hours  enoxaparin Injectable 40 milliGRAM(s) SubCutaneous every 24 hours  FLUoxetine 60 milliGRAM(s) Oral daily  gabapentin 100 milliGRAM(s) Oral two times a day  metoprolol succinate ER 25 milliGRAM(s) Oral daily  nystatin    Suspension 477482 Unit(s) Oral three times a day  pantoprazole    Tablet 40 milliGRAM(s) Oral before breakfast  predniSONE   Tablet 50 milliGRAM(s) Oral daily  sodium chloride 0.9%. 1000 milliLiter(s) (50 mL/Hr) IV Continuous <Continuous>      TELEMETRY: 	    ECG:  	  RADIOLOGY:   DIAGNOSTIC TESTING:  [ ] Echocardiogram:  [ ]  Catheterization:  [ ] Stress Test:    OTHER: 	    LABS:	 	                            10.9   8.17  )-----------( 296      ( 15 Sep 2022 07:15 )             35.0     09-15    132<L>  |  94<L>  |  18  ----------------------------<  96  4.1   |  27  |  0.59    Ca    9.5      15 Sep 2022 07:22      PT/INR - ( 14 Sep 2022 04:46 )   PT: 12.5 sec;   INR: 1.08 ratio

## 2022-09-15 NOTE — DISCHARGE NOTE PROVIDER - CARE PROVIDERS DIRECT ADDRESSES
,DirectAddress_Unknown,[email or web address],DirectAddress_Unknown,[email or web address]

## 2022-09-16 ENCOUNTER — NON-APPOINTMENT (OUTPATIENT)
Age: 87
End: 2022-09-16

## 2022-09-16 ENCOUNTER — TRANSCRIPTION ENCOUNTER (OUTPATIENT)
Age: 87
End: 2022-09-16

## 2022-09-16 VITALS
RESPIRATION RATE: 18 BRPM | HEART RATE: 59 BPM | OXYGEN SATURATION: 98 % | SYSTOLIC BLOOD PRESSURE: 113 MMHG | DIASTOLIC BLOOD PRESSURE: 59 MMHG | TEMPERATURE: 98 F

## 2022-09-16 LAB
ANION GAP SERPL CALC-SCNC: 9 MMOL/L — SIGNIFICANT CHANGE UP (ref 5–17)
BUN SERPL-MCNC: 22 MG/DL — SIGNIFICANT CHANGE UP (ref 7–23)
CALCIUM SERPL-MCNC: 9.1 MG/DL — SIGNIFICANT CHANGE UP (ref 8.4–10.5)
CHLORIDE SERPL-SCNC: 95 MMOL/L — LOW (ref 96–108)
CO2 SERPL-SCNC: 29 MMOL/L — SIGNIFICANT CHANGE UP (ref 22–31)
CREAT SERPL-MCNC: 0.83 MG/DL — SIGNIFICANT CHANGE UP (ref 0.5–1.3)
EGFR: 69 ML/MIN/1.73M2 — SIGNIFICANT CHANGE UP
GLUCOSE SERPL-MCNC: 89 MG/DL — SIGNIFICANT CHANGE UP (ref 70–99)
HCT VFR BLD CALC: 31.5 % — LOW (ref 34.5–45)
HGB BLD-MCNC: 9.9 G/DL — LOW (ref 11.5–15.5)
MCHC RBC-ENTMCNC: 28.4 PG — SIGNIFICANT CHANGE UP (ref 27–34)
MCHC RBC-ENTMCNC: 31.4 GM/DL — LOW (ref 32–36)
MCV RBC AUTO: 90.3 FL — SIGNIFICANT CHANGE UP (ref 80–100)
NRBC # BLD: 0 /100 WBCS — SIGNIFICANT CHANGE UP (ref 0–0)
PLATELET # BLD AUTO: 270 K/UL — SIGNIFICANT CHANGE UP (ref 150–400)
POTASSIUM SERPL-MCNC: 4 MMOL/L — SIGNIFICANT CHANGE UP (ref 3.5–5.3)
POTASSIUM SERPL-SCNC: 4 MMOL/L — SIGNIFICANT CHANGE UP (ref 3.5–5.3)
RBC # BLD: 3.49 M/UL — LOW (ref 3.8–5.2)
RBC # FLD: 15.9 % — HIGH (ref 10.3–14.5)
SODIUM SERPL-SCNC: 133 MMOL/L — LOW (ref 135–145)
WBC # BLD: 9.21 K/UL — SIGNIFICANT CHANGE UP (ref 3.8–10.5)
WBC # FLD AUTO: 9.21 K/UL — SIGNIFICANT CHANGE UP (ref 3.8–10.5)

## 2022-09-16 PROCEDURE — 85027 COMPLETE CBC AUTOMATED: CPT

## 2022-09-16 PROCEDURE — 86850 RBC ANTIBODY SCREEN: CPT

## 2022-09-16 PROCEDURE — 85730 THROMBOPLASTIN TIME PARTIAL: CPT

## 2022-09-16 PROCEDURE — 94640 AIRWAY INHALATION TREATMENT: CPT

## 2022-09-16 PROCEDURE — U0005: CPT

## 2022-09-16 PROCEDURE — 80048 BASIC METABOLIC PNL TOTAL CA: CPT

## 2022-09-16 PROCEDURE — 97161 PT EVAL LOW COMPLEX 20 MIN: CPT

## 2022-09-16 PROCEDURE — 86900 BLOOD TYPING SEROLOGIC ABO: CPT

## 2022-09-16 PROCEDURE — 88305 TISSUE EXAM BY PATHOLOGIST: CPT

## 2022-09-16 PROCEDURE — 93998 UNLISTD NONINVAS VASC DX STD: CPT

## 2022-09-16 PROCEDURE — 97116 GAIT TRAINING THERAPY: CPT

## 2022-09-16 PROCEDURE — 88313 SPECIAL STAINS GROUP 2: CPT

## 2022-09-16 PROCEDURE — 82306 VITAMIN D 25 HYDROXY: CPT

## 2022-09-16 PROCEDURE — 36415 COLL VENOUS BLD VENIPUNCTURE: CPT

## 2022-09-16 PROCEDURE — 85025 COMPLETE CBC W/AUTO DIFF WBC: CPT

## 2022-09-16 PROCEDURE — 97530 THERAPEUTIC ACTIVITIES: CPT

## 2022-09-16 PROCEDURE — 99285 EMERGENCY DEPT VISIT HI MDM: CPT

## 2022-09-16 PROCEDURE — 87637 SARSCOV2&INF A&B&RSV AMP PRB: CPT

## 2022-09-16 PROCEDURE — C9399: CPT

## 2022-09-16 PROCEDURE — 80053 COMPREHEN METABOLIC PANEL: CPT

## 2022-09-16 PROCEDURE — 85610 PROTHROMBIN TIME: CPT

## 2022-09-16 PROCEDURE — 86901 BLOOD TYPING SEROLOGIC RH(D): CPT

## 2022-09-16 PROCEDURE — U0003: CPT

## 2022-09-16 PROCEDURE — 86480 TB TEST CELL IMMUN MEASURE: CPT

## 2022-09-16 PROCEDURE — C1889: CPT

## 2022-09-16 RX ORDER — ATOVAQUONE 750 MG/5ML
10 SUSPENSION ORAL
Qty: 300 | Refills: 0
Start: 2022-09-16 | End: 2022-10-15

## 2022-09-16 RX ORDER — SIMETHICONE 80 MG/1
80 TABLET, CHEWABLE ORAL ONCE
Refills: 0 | Status: COMPLETED | OUTPATIENT
Start: 2022-09-16 | End: 2022-09-16

## 2022-09-16 RX ADMIN — Medication 1 TABLET(S): at 11:39

## 2022-09-16 RX ADMIN — Medication 650 MILLIGRAM(S): at 00:46

## 2022-09-16 RX ADMIN — Medication 25 MILLIGRAM(S): at 05:04

## 2022-09-16 RX ADMIN — GABAPENTIN 100 MILLIGRAM(S): 400 CAPSULE ORAL at 05:03

## 2022-09-16 RX ADMIN — Medication 60 MILLIGRAM(S): at 11:39

## 2022-09-16 RX ADMIN — Medication 650 MILLIGRAM(S): at 09:34

## 2022-09-16 RX ADMIN — Medication 40 MILLIGRAM(S): at 10:09

## 2022-09-16 RX ADMIN — Medication 650 MILLIGRAM(S): at 08:34

## 2022-09-16 RX ADMIN — PANTOPRAZOLE SODIUM 40 MILLIGRAM(S): 20 TABLET, DELAYED RELEASE ORAL at 05:04

## 2022-09-16 RX ADMIN — Medication 30 MILLILITER(S): at 08:33

## 2022-09-16 RX ADMIN — SIMETHICONE 80 MILLIGRAM(S): 80 TABLET, CHEWABLE ORAL at 06:57

## 2022-09-16 NOTE — PROGRESS NOTE ADULT - PROVIDER SPECIALTY LIST ADULT
Cardiology
Internal Medicine
Internal Medicine
Rheumatology
Vascular Surgery
Cardiology
Internal Medicine
Cardiology
Internal Medicine
Vascular Surgery
Rheumatology

## 2022-09-16 NOTE — PROGRESS NOTE ADULT - ASSESSMENT
ECHO 2/19/22: EF 67%; mild mr, mod as, nl LV sys fx, mod concentric lVH,   ECHO 7/5/22 ef 68%; mild as, nl LV sys fx  Mild diastolic dysfunction (Stage I).    a/p     87F c hx remote breast ca, MAC untreated, MVP, asthma, mod AS, paroxysmal tachycardia of unknown rhythm, orthostatic hypotension (Feb '22) c/b syncope, anxiety, depression, recent covid (May '22), recent accidental valium toxicity , recent dx with  acute L3 compression Fx now presenting with PMR symptoms, fatigue and HA; She was seen by rheum and readmitted for IV Medrol 30 mg daily and TA biopsy bc high suspicion of GCA    # PMR symptoms, fatigue and HA  -rheum work up  -r/o GCA  -sp bl temporal artery  Biopsy-  w/o GCA. per rheum   -manage per rheum/ med     # Moderate AS,    -prior echo with mild as    #Atach  -cv stable no events   -cont current tx    #htn   -c/w meds - stable c/w meds

## 2022-09-16 NOTE — DISCHARGE NOTE NURSING/CASE MANAGEMENT/SOCIAL WORK - NSFLUVACAGEDISCH_IMM_ALL_CORE
Depression Response    Patient completed the PHQ-9 assessment for depression and scored >9? Yes  Question 9 on the PHQ-9 was positive for suicidality? No  Does patient have current mental health provider? Yes    Is this a virtual visit? Yes   Does patient have suicidal ideation (positive question 9)? No - offer to place Mental Health Referral.  Patient declined referral/not needed    I personally notified the following: visit provider              Adult

## 2022-09-16 NOTE — DISCHARGE NOTE NURSING/CASE MANAGEMENT/SOCIAL WORK - NSDCPEFALRISK_GEN_ALL_CORE
For information on Fall & Injury Prevention, visit: [email or web address] OR  [email or web address] OR  https://www.cdc.gov/steadi/patient.html

## 2022-09-16 NOTE — PROGRESS NOTE ADULT - TIME BILLING
Agree with above NP note.  cv stable   s/p ta bx  med/vasc f/u
Agree with above NP note.  cv stable   s/p ta bx  med/vasc f/u
agree with above  cv stable  pain control  med f/u  cont current mgmt  dvt ppx

## 2022-09-16 NOTE — PROGRESS NOTE ADULT - SUBJECTIVE AND OBJECTIVE BOX
CARDIOLOGY FOLLOW UP - Dr. Lee  DATE OF SERVICE: 9/16/22     CC no cp or sob        REVIEW OF SYSTEMS:  CONSTITUTIONAL: No fever, weight loss, or fatigue  RESPIRATORY: No cough, wheezing, chills or hemoptysis; No Shortness of Breath  CARDIOVASCULAR: No chest pain, palpitations, passing out, dizziness, or leg swelling  GASTROINTESTINAL: No abdominal or epigastric pain. No nausea, vomiting, or hematemesis; No diarrhea or constipation. No melena or hematochezia.  VASCULAR: No edema     PHYSICAL EXAM:  T(C): 36.6 (09-16-22 @ 12:12), Max: 36.9 (09-15-22 @ 16:22)  HR: 59 (09-16-22 @ 12:12) (58 - 67)  BP: 113/59 (09-16-22 @ 12:12) (113/59 - 155/74)  RR: 18 (09-16-22 @ 12:12) (18 - 18)  SpO2: 98% (09-16-22 @ 12:12) (95% - 98%)  Wt(kg): --  I&O's Summary    15 Sep 2022 07:01  -  16 Sep 2022 07:00  --------------------------------------------------------  IN: 520 mL / OUT: 500 mL / NET: 20 mL    16 Sep 2022 07:01  -  16 Sep 2022 13:21  --------------------------------------------------------  IN: 240 mL / OUT: 0 mL / NET: 240 mL        Appearance: Normal	  Cardiovascular: Normal S1 S2,RRR+ murmurs  Respiratory: Lungs clear to auscultation	  Gastrointestinal:  Soft, Non-tender, + BS	  Extremities: Normal range of motion, No clubbing, cyanosis or edema      Home Medications:  amLODIPine 5 mg oral tablet: 1 tab(s) orally once a day (in the evening) (04 Sep 2022 21:42)  Clear Eyes 0.012% ophthalmic solution: 1 drop(s) to each affected eye 3 times a day, As Needed (04 Sep 2022 21:42)  Durezol 0.05% ophthalmic emulsion: 1 drop(s) in the left eye 2 times a day (04 Sep 2022 21:42)  Flonase 50 mcg/inh nasal spray: 1 spray(s) in each nostril 2 times a day (04 Sep 2022 21:42)  FLUoxetine 20 mg oral capsule: 3 cap(s) orally once a day (04 Sep 2022 21:42)  melatonin 5 mg oral tablet: 1 tab(s) orally once a day (at bedtime), As needed, Insomnia (08 Sep 2022 13:24)  polyethylene glycol 3350 oral powder for reconstitution: 17 gram(s) orally 2 times a day (08 Sep 2022 13:24)  Tylenol 500 mg oral tablet: 2 tab(s) orally 3 times a day (04 Sep 2022 21:42)  Vitamin D3 25 mcg (1000 intl units) oral tablet: 1 tab(s) orally once a day (04 Sep 2022 21:42)      MEDICATIONS  (STANDING):  albuterol/ipratropium for Nebulization 3 milliLiter(s) Nebulizer every 6 hours  amLODIPine   Tablet 5 milliGRAM(s) Oral at bedtime  atovaquone  Suspension 1500 milliGRAM(s) Oral every 24 hours  calcium carbonate   1250 mG (OsCal) 1 Tablet(s) Oral daily  cholecalciferol 1000 Unit(s) Oral every 24 hours  enoxaparin Injectable 40 milliGRAM(s) SubCutaneous every 24 hours  FLUoxetine 60 milliGRAM(s) Oral daily  gabapentin 100 milliGRAM(s) Oral two times a day  metoprolol succinate ER 25 milliGRAM(s) Oral daily  nystatin    Suspension 161240 Unit(s) Oral three times a day  pantoprazole    Tablet 40 milliGRAM(s) Oral before breakfast  predniSONE   Tablet 40 milliGRAM(s) Oral every 24 hours  sodium chloride 0.9%. 1000 milliLiter(s) (50 mL/Hr) IV Continuous <Continuous>      TELEMETRY: 	    ECG:  	  RADIOLOGY:   DIAGNOSTIC TESTING:  [ ] Echocardiogram:  [ ]  Catheterization:  [ ] Stress Test:    OTHER: 	    LABS:	 	                            9.9    9.21  )-----------( 270      ( 16 Sep 2022 06:12 )             31.5     09-16    133<L>  |  95<L>  |  22  ----------------------------<  89  4.0   |  29  |  0.83    Ca    9.1      16 Sep 2022 06:13

## 2022-09-16 NOTE — DISCHARGE NOTE NURSING/CASE MANAGEMENT/SOCIAL WORK - PATIENT PORTAL LINK FT
You can access the FollowMyHealth Patient Portal offered by [facility] by registering at the following website: [email or web address]. By joining [facility]’s FollowMyHealth portal, you will also be able to view your health information using other applications (apps) compatible with our system.

## 2022-09-16 NOTE — PROGRESS NOTE ADULT - ASSESSMENT
87 Y F H/O Breast CA, MAC untreated, MVP, asthma, mod  AS, on prednisone for PMR since July w resolution of diffuse symptoms.   Initially started w Prednisone 50 mg, admitted on 9/4 on 30 mg without PMR Sx  Ptn was discharged to  Rehab on 9/1.  About 10 days prior to DC from rehab she developed severe back pain, nontraumatic.   Ptn was admitted on 9/4 w acute L3 compression Fx and severe constipation w large stool burden. Old T9 and T12 compression Fx were discovered as well.  pain was controlled w Neurontin and TLSO brace. She was seen by GI and had multiple large BMs w bowel regimen  Prednisone was dropped to 25 mg from 30 mg and she became more symptomatic w PMR symptoms, fatigue and HA  She was seen by rheum. Ptn was DCed on Prednisone of 30 mg.   RHeumatology, Dr. Becker called her back to be admitted for IV Medrol 30 mg q12H and TA biopsy bc high suspicion of GCA    - ptn denies having a HA, general HAs are occipital or frontal, she has no tenderness over palpation of her scalp including temples. in July ptn did not have HAs as one of her presenting symptoms  - seen by vascular and rheum  - Medrol IV stopped 2/2 " flushing and wheezing" doing well on po Prednisone. no HA, denies pain  - back pain is controlled, kyphoplasty is recommended for ptns with ongoing severe back pain 2/2 compression Fx  - she will need aggressive therapy to treat OP on outptn basis  -cont outptn meds  - on PCP ppx w Atovaquone 2/2 sulfa allergy  -pain control w Oxycodone prn moderate pain and Morphine prn severe pain: L3 compression Fx pain. Hasn't needed any  -DVT ppx w sc Lovenox  -Medically stable. Continue present Rx  - TA, b/l, done 9/14. ptn feels well, denies changes in vision, denies HAs, TA biopsy w no inflammatory changes c/w GCA. awaiting DC home today. rheum F/U reviewed, steroid treatment to cont for treatment of PMR, GCA was ruled out by bilateral TA biopsies.

## 2022-09-16 NOTE — PROGRESS NOTE ADULT - SUBJECTIVE AND OBJECTIVE BOX
Patient is a 87y old  Female who presents with a chief complaint of r/o GCA (15 Sep 2022 17:15)      SUBJECTIVE / OVERNIGHT EVENTS: no new c/o, awaiting DC home. rheum F/U reviewed, steroid treatment to cont for treatment of PMR, GCA was ruled out by bilateral TA biopsies.     MEDICATIONS  (STANDING):  albuterol/ipratropium for Nebulization 3 milliLiter(s) Nebulizer every 6 hours  amLODIPine   Tablet 5 milliGRAM(s) Oral at bedtime  atovaquone  Suspension 1500 milliGRAM(s) Oral every 24 hours  calcium carbonate   1250 mG (OsCal) 1 Tablet(s) Oral daily  cholecalciferol 1000 Unit(s) Oral every 24 hours  enoxaparin Injectable 40 milliGRAM(s) SubCutaneous every 24 hours  FLUoxetine 60 milliGRAM(s) Oral daily  gabapentin 100 milliGRAM(s) Oral two times a day  metoprolol succinate ER 25 milliGRAM(s) Oral daily  nystatin    Suspension 170738 Unit(s) Oral three times a day  pantoprazole    Tablet 40 milliGRAM(s) Oral before breakfast  predniSONE   Tablet 40 milliGRAM(s) Oral every 24 hours  sodium chloride 0.9%. 1000 milliLiter(s) (50 mL/Hr) IV Continuous <Continuous>    MEDICATIONS  (PRN):  acetaminophen     Tablet .. 650 milliGRAM(s) Oral every 8 hours PRN Mild Pain (1 - 3)  chlorhexidine 0.12% Liquid 15 milliLiter(s) Swish and Spit two times a day PRN ppx      Vital Signs Last 24 Hrs  T(F): 97.8 (09-16-22 @ 12:12), Max: 98.4 (09-15-22 @ 16:22)  HR: 59 (09-16-22 @ 12:12) (58 - 67)  BP: 113/59 (09-16-22 @ 12:12) (113/59 - 155/74)  RR: 18 (09-16-22 @ 12:12) (18 - 18)  SpO2: 98% (09-16-22 @ 12:12) (95% - 98%)  Telemetry:   CAPILLARY BLOOD GLUCOSE        I&O's Summary    15 Sep 2022 07:01  -  16 Sep 2022 07:00  --------------------------------------------------------  IN: 520 mL / OUT: 500 mL / NET: 20 mL    16 Sep 2022 07:01  -  16 Sep 2022 15:19  --------------------------------------------------------  IN: 240 mL / OUT: 0 mL / NET: 240 mL        PHYSICAL EXAM:  GENERAL: NAD, well-developed  HEAD:  Atraumatic, Normocephalic  EYES: EOMI, PERRLA, conjunctiva and sclera clear  NECK: Supple, No JVD  CHEST/LUNG: Clear to auscultation bilaterally; No wheeze  HEART: Regular rate and rhythm; No murmurs, rubs, or gallops  ABDOMEN: Soft, Nontender, Nondistended; Bowel sounds present  EXTREMITIES:  2+ Peripheral Pulses, No clubbing, cyanosis, or edema  PSYCH: AAOx3  NEUROLOGY: non-focal  SKIN: No rashes or lesions    LABS:                        9.9    9.21  )-----------( 270      ( 16 Sep 2022 06:12 )             31.5     09-16    133<L>  |  95<L>  |  22  ----------------------------<  89  4.0   |  29  |  0.83    Ca    9.1      16 Sep 2022 06:13                RADIOLOGY & ADDITIONAL TESTS:    Imaging Personally Reviewed:    Consultant(s) Notes Reviewed:      Care Discussed with Consultants/Other Providers:

## 2022-09-17 LAB
GAMMA INTERFERON BACKGROUND BLD IA-ACNC: 0.02 IU/ML — SIGNIFICANT CHANGE UP
M TB IFN-G BLD-IMP: ABNORMAL
M TB IFN-G CD4+ BCKGRND COR BLD-ACNC: 0 IU/ML — SIGNIFICANT CHANGE UP
M TB IFN-G CD4+CD8+ BCKGRND COR BLD-ACNC: 0 IU/ML — SIGNIFICANT CHANGE UP
QUANT TB PLUS MITOGEN MINUS NIL: 0.17 IU/ML — SIGNIFICANT CHANGE UP

## 2022-09-25 NOTE — H&P ADULT - NSICDXFAMILYHX_GEN_ALL_CORE_FT
FAMILY HISTORY:  Father  Still living? No  FH: CAD (coronary artery disease), Age at diagnosis: Age Unknown    Mother  Still living? No  FH: CAD (coronary artery disease), Age at diagnosis: Age Unknown  FH: lung cancer, Age at diagnosis: Age Unknown    Sibling  Still living? Unknown  FH: CAD (coronary artery disease), Age at diagnosis: Age Unknown    Aunt  Still living? Unknown  FH: CAD (coronary artery disease), Age at diagnosis: Age Unknown     yes

## 2022-10-06 ENCOUNTER — APPOINTMENT (OUTPATIENT)
Dept: VASCULAR SURGERY | Facility: CLINIC | Age: 87
End: 2022-10-06

## 2022-10-06 VITALS
SYSTOLIC BLOOD PRESSURE: 119 MMHG | TEMPERATURE: 96.8 F | WEIGHT: 144 LBS | DIASTOLIC BLOOD PRESSURE: 66 MMHG | HEART RATE: 68 BPM | BODY MASS INDEX: 23.14 KG/M2 | HEIGHT: 66 IN

## 2022-10-06 DIAGNOSIS — R51.9 HEADACHE, UNSPECIFIED: ICD-10-CM

## 2022-10-06 PROCEDURE — 99212 OFFICE O/P EST SF 10 MIN: CPT

## 2022-10-06 NOTE — DISCUSSION/SUMMARY
[FreeTextEntry1] : Status post bilateral temporal artery biopsy\par Biopsy result negative\par Follow-up with primary care

## 2022-10-06 NOTE — PHYSICAL EXAM
[de-identified] : Alert oriented [de-identified] : Bilateral temporal artery biopsy incision healed completely

## 2022-10-06 NOTE — REASON FOR VISIT
[de-identified] : Bilateral temporal artery biopsies [de-identified] : Patient presented a status post bilateral temporal artery biopsies\par Incision completely healed\par No fever or chills

## 2022-10-12 ENCOUNTER — APPOINTMENT (OUTPATIENT)
Dept: RHEUMATOLOGY | Facility: CLINIC | Age: 87
End: 2022-10-12

## 2022-10-12 ENCOUNTER — NON-APPOINTMENT (OUTPATIENT)
Age: 87
End: 2022-10-12

## 2022-10-12 VITALS
HEART RATE: 69 BPM | SYSTOLIC BLOOD PRESSURE: 125 MMHG | WEIGHT: 145 LBS | DIASTOLIC BLOOD PRESSURE: 76 MMHG | HEIGHT: 66 IN | BODY MASS INDEX: 23.3 KG/M2 | OXYGEN SATURATION: 96 % | TEMPERATURE: 97.2 F

## 2022-10-12 DIAGNOSIS — I10 ESSENTIAL (PRIMARY) HYPERTENSION: ICD-10-CM

## 2022-10-12 DIAGNOSIS — K21.9 GASTRO-ESOPHAGEAL REFLUX DISEASE W/OUT ESOPHAGITIS: ICD-10-CM

## 2022-10-12 LAB
25(OH)D3 SERPL-MCNC: 43 NG/ML
ALBUMIN SERPL ELPH-MCNC: 3.7 G/DL
ALP BLD-CCNC: 101 U/L
ALT SERPL-CCNC: 20 U/L
ANION GAP SERPL CALC-SCNC: 14 MMOL/L
AST SERPL-CCNC: 16 U/L
BASOPHILS # BLD AUTO: 0.01 K/UL
BASOPHILS NFR BLD AUTO: 0.1 %
BILIRUB SERPL-MCNC: 0.4 MG/DL
BUN SERPL-MCNC: 24 MG/DL
CALCIUM SERPL-MCNC: 9.2 MG/DL
CHLORIDE SERPL-SCNC: 96 MMOL/L
CO2 SERPL-SCNC: 23 MMOL/L
CREAT SERPL-MCNC: 0.79 MG/DL
CRP SERPL-MCNC: 14 MG/L
EGFR: 73 ML/MIN/1.73M2
EOSINOPHIL # BLD AUTO: 0 K/UL
EOSINOPHIL NFR BLD AUTO: 0 %
GLUCOSE SERPL-MCNC: 149 MG/DL
HCT VFR BLD CALC: 31.6 %
HGB BLD-MCNC: 10.3 G/DL
IMM GRANULOCYTES NFR BLD AUTO: 0.8 %
LYMPHOCYTES # BLD AUTO: 0.87 K/UL
LYMPHOCYTES NFR BLD AUTO: 11.7 %
MAN DIFF?: NORMAL
MCHC RBC-ENTMCNC: 29.6 PG
MCHC RBC-ENTMCNC: 32.6 GM/DL
MCV RBC AUTO: 90.8 FL
MONOCYTES # BLD AUTO: 0.21 K/UL
MONOCYTES NFR BLD AUTO: 2.8 %
NEUTROPHILS # BLD AUTO: 6.29 K/UL
NEUTROPHILS NFR BLD AUTO: 84.6 %
PLATELET # BLD AUTO: 330 K/UL
POTASSIUM SERPL-SCNC: 4.9 MMOL/L
PROT SERPL-MCNC: 6.3 G/DL
RBC # BLD: 3.48 M/UL
RBC # FLD: 15.8 %
SODIUM SERPL-SCNC: 133 MMOL/L
WBC # FLD AUTO: 7.44 K/UL

## 2022-10-12 PROCEDURE — 99215 OFFICE O/P EST HI 40 MIN: CPT

## 2022-10-12 RX ORDER — CALCIUM CARBONATE 600 MG
600 TABLET ORAL
Refills: 0 | Status: ACTIVE | COMMUNITY

## 2022-10-12 RX ORDER — TRIAMCINOLONE ACETONIDE 55 MCG
55 AEROSOL WITH ADAPTER (GRAM) NASAL
Refills: 0 | Status: DISCONTINUED | COMMUNITY
End: 2022-10-12

## 2022-10-12 RX ORDER — METOPROLOL SUCCINATE AND HYDROCHLOROTHIAZIDE 12.5; 1 MG/1; MG/1
TABLET ORAL
Refills: 0 | Status: COMPLETED | COMMUNITY

## 2022-10-12 RX ORDER — TRAMADOL HYDROCHLORIDE 25 MG/1
TABLET, COATED ORAL
Refills: 0 | Status: COMPLETED | COMMUNITY

## 2022-10-12 RX ORDER — NADOLOL 40 MG/1
TABLET ORAL
Refills: 0 | Status: DISCONTINUED | COMMUNITY
End: 2022-10-12

## 2022-10-12 RX ORDER — NIRMATRELVIR AND RITONAVIR 300-100 MG
20 X 150 MG & KIT ORAL
Qty: 30 | Refills: 0 | Status: DISCONTINUED | COMMUNITY
Start: 2022-05-27 | End: 2022-10-12

## 2022-10-13 LAB — ERYTHROCYTE [SEDIMENTATION RATE] IN BLOOD BY WESTERGREN METHOD: 33 MM/HR

## 2022-10-19 NOTE — PROCEDURE NOTE - NSPROCNAME_GEN_A_CORE
Chief Complaint   Patient presents with    Arm Pain     Left arm pain     Chest pain history:  Marlena Walls reports 2 days onset of chest discomfort described as sharp. in the left  chest.  The discomfort is intermittent (1-10 minutes). she's had  1 episodes daily. she is not currently having chest discomfort. she denies radiation of discomfort to the left arm. Associated symptoms include: anxiety. Cardiac risk factors include:  Age > 30 and HTN. she has not had a past history of cardiovascular disease and has had recent work ups of chest pain. In 2020    Anxiety over son    Walks 3 miles per day no issues walked today      Current Outpatient Medications   Medication Sig    amLODIPine (NORVASC) 5 mg tablet TAKE ONE TABLET BY MOUTH DAILY    atorvastatin (LIPITOR) 20 mg tablet Take 1 Tablet by mouth daily. cholecalciferol (Vitamin D3) (1000 Units /25 mcg) tablet Take 1,000 Units by mouth daily. calcium-cholecalciferol, D3, (CALTRATE 600+D) tablet Take 1 Tablet by mouth daily. Indications: post-menopausal osteoporosis prevention    latanoprost (XALATAN) 0.005 % ophthalmic solution     carboxymethylcellulose sodium (THERATEARS OP) Apply  to eye. TURMERIC  mg. Lactobacillus acidophilus (PROBIOTIC PO) Take  by mouth daily. Indications: 14 strains    RESTASIS 0.05 % ophthalmic emulsion 1 Drop daily. No current facility-administered medications for this visit. Anxious    Vitals:    10/19/22 1138   BP: (!) 142/68   Pulse: 75   Resp: 18   Temp: 98.6 °F (37 °C)   TempSrc: Temporal   SpO2: 99%   Weight: 108 lb 6.4 oz (49.2 kg)   Height: 4' 10\" (1.473 m)     S1 and S2 normal, no murmurs, clicks, gallops or rubs. Regular rate and rhythm. Chest is clear; no wheezes or rales. No edema or JVD. Non tender  The abdomen is soft without tenderness, guarding, mass, rebound or organomegaly. Bowel sounds are normal. No CVA tenderness or inguinal adenopathy noted.       EKG: normal EKG, normal sinus rhythm, nonspecific ST and T waves changes, poor r wave progression. Diagnoses and all orders for this visit:    1.  Atypical chest pain  -     AMB POC EKG ROUTINE W/ 12 LEADS, INTER & REP    See 3 weeks  Reassure her Urinary Device Placement

## 2022-10-27 NOTE — ED PROVIDER NOTE - ATTENDING CONTRIBUTION TO CARE
Yes Patient presenting with daughter, having progressively worsening abdominal pain at home worse over last day prompting visit.  Has been having issues with constipation as well.  No vomiting, associated distention of abdomen per daughter, on exam well appearing, diffusely mildly tender but soft abdomen.  Plan for symptom management, labs, CTAP, re-evaluate.

## 2022-11-02 ENCOUNTER — OUTPATIENT (OUTPATIENT)
Dept: OUTPATIENT SERVICES | Facility: HOSPITAL | Age: 87
LOS: 1 days | Discharge: HOME | End: 2022-11-02

## 2022-11-02 DIAGNOSIS — C50.912 MALIGNANT NEOPLASM OF UNSPECIFIED SITE OF LEFT FEMALE BREAST: ICD-10-CM

## 2022-11-04 ENCOUNTER — RESULT REVIEW (OUTPATIENT)
Age: 87
End: 2022-11-04

## 2022-11-04 ENCOUNTER — APPOINTMENT (OUTPATIENT)
Dept: ULTRASOUND IMAGING | Facility: IMAGING CENTER | Age: 87
End: 2022-11-04

## 2022-11-04 ENCOUNTER — OUTPATIENT (OUTPATIENT)
Dept: OUTPATIENT SERVICES | Facility: HOSPITAL | Age: 87
LOS: 1 days | End: 2022-11-04
Payer: MEDICARE

## 2022-11-04 ENCOUNTER — APPOINTMENT (OUTPATIENT)
Dept: HEMATOLOGY ONCOLOGY | Facility: CLINIC | Age: 87
End: 2022-11-04

## 2022-11-04 VITALS
WEIGHT: 152.56 LBS | HEART RATE: 75 BPM | TEMPERATURE: 97.7 F | OXYGEN SATURATION: 96 % | RESPIRATION RATE: 16 BRPM | BODY MASS INDEX: 24.52 KG/M2 | SYSTOLIC BLOOD PRESSURE: 128 MMHG | HEIGHT: 65.98 IN | DIASTOLIC BLOOD PRESSURE: 72 MMHG

## 2022-11-04 DIAGNOSIS — C50.912 MALIGNANT NEOPLASM OF UNSPECIFIED SITE OF LEFT FEMALE BREAST: ICD-10-CM

## 2022-11-04 DIAGNOSIS — N63.20 UNSPECIFIED LUMP IN THE LEFT BREAST, UNSPECIFIED QUADRANT: ICD-10-CM

## 2022-11-04 PROCEDURE — 77065 DX MAMMO INCL CAD UNI: CPT

## 2022-11-04 PROCEDURE — 76642 ULTRASOUND BREAST LIMITED: CPT

## 2022-11-04 PROCEDURE — 76642 ULTRASOUND BREAST LIMITED: CPT | Mod: 26,LT

## 2022-11-04 PROCEDURE — 99214 OFFICE O/P EST MOD 30 MIN: CPT

## 2022-11-04 PROCEDURE — 77065 DX MAMMO INCL CAD UNI: CPT | Mod: 26,LT

## 2022-11-04 PROCEDURE — G0279: CPT | Mod: 26

## 2022-11-04 PROCEDURE — G0279: CPT

## 2022-11-04 RX ORDER — METOPROLOL SUCCINATE 25 MG/1
25 TABLET, EXTENDED RELEASE ORAL
Qty: 90 | Refills: 0 | Status: ACTIVE | COMMUNITY
Start: 2022-04-14

## 2022-11-04 RX ORDER — DIFLUPREDNATE 0.5 MG/ML
0.05 EMULSION OPHTHALMIC
Refills: 0 | Status: ACTIVE | COMMUNITY

## 2022-11-04 RX ORDER — AMLODIPINE BESYLATE 2.5 MG/1
2.5 TABLET ORAL
Qty: 90 | Refills: 0 | Status: ACTIVE | COMMUNITY
Start: 2022-10-20

## 2022-11-04 RX ORDER — SODIUM CHLORIDE FOR INHALATION 3.5 %
3.5 VIAL, NEBULIZER (ML) INHALATION
Qty: 1 | Refills: 3 | Status: DISCONTINUED | COMMUNITY
Start: 2021-02-04 | End: 2022-11-04

## 2022-11-04 RX ORDER — ALBUTEROL SULFATE 90 UG/1
108 (90 BASE) INHALANT RESPIRATORY (INHALATION) TWICE DAILY
Qty: 1 | Refills: 2 | Status: DISCONTINUED | COMMUNITY
Start: 2022-01-25 | End: 2022-11-04

## 2022-11-04 RX ORDER — ALBUTEROL SULFATE 2.5 MG/3ML
(2.5 MG/3ML) SOLUTION RESPIRATORY (INHALATION)
Qty: 1 | Refills: 5 | Status: DISCONTINUED | COMMUNITY
Start: 2021-02-04 | End: 2022-11-04

## 2022-11-04 NOTE — ASSESSMENT
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 She underwent lumpectomy with SLNB.  She s/p Taxotere/Cytoxan/Herceptin x 1 dose, stopped because of AEs, followed by Taxol/Herceptin x 2 doses also unable to tolerate.  She completed one year of Herceptin.  She has been OXANA since that time.  She presents for follow up/survivorship visit.  				\par 											\par -The patient is advised to have a targeted sonogram of left breast at this time.  Apt was for patient to have done today.  Will follow up with patients once results are in.\par -recent mammo/sono was reviewed- BIRADS 2\par -All questions and concerns were addressed and answered. \par

## 2022-11-04 NOTE — HISTORY OF PRESENT ILLNESS
[Disease: _____________________] : Disease: [unfilled] [T: ___] : T[unfilled] [N: ___] : N[unfilled] [M: ___] : M[unfilled] [AJCC Stage: ____] : AJCC Stage: [unfilled] [de-identified] : The patient presents for breast medical oncology follow up/survivorship.\par \par The patient presented in 2011, age 75, any mammogram with an abnormal screening mammogram.\par \par In April 21, 2011 patient had an ultrasound-guided biopsy of the left breast which was positive for poorly differentiated infiltrating ductal carcinoma with a Bre score of 9/9 which was healing negative, UT negative, HER-2/radha positive by fish (3.4).\par \par On May 11, 2011 Dr. Hilton Koroma performed a lumpectomy and sentinel lymph node biopsy at [facility]. The patient had a single focus of infiltrating ductal carcinoma measuring 0.4 cm with a Bre score of 9/9. There were 2 negative sentinel lymph nodes. ER negative UT negative HER-2/radha positive.\par \par As part of the patient's staging she underwent a PET CT scan performed by Dr. Cooper Nagy, which demonstrated metabolically active round densities in left para-aortic region felt to be suspicious for possible lymphoma. There was also a tiny right axillary lymph node adjacent to the rib cage and activity in the subpectoral region of the left.\par \par The patient received one cycle of Taxotere Cytoxan and Herceptin on June 22, 2011. This led to admission to [facility] with neutropenia and fever. The patient one additional dose of single agent Herceptin.\par \par The patient transferred her care to this office.  On September 20, 2011 FNA of the right axillary lymph node demonstrated a pleomorphic population of lymphocytes and was negative for carcinoma. On October 19, 2011 she started weekly Taxol and Herceptin, which she was unable to tolerate after the second dose. She continued Herceptin every 3 weeks through June 25, 2012. \par \par The patient received radiation therapy under the supervision of Dr. Margaret Durbin at [facility], which was completed on January 31, 2012. \par \par The PET/CT findings have remained unchanged.\par \par Pertinent History:\par PMD: JEAN Cardona\par Cardio: Dr. Nice\par HTN, Anxiety/depression, left uveitis, right cataracts, GERD\par FH: mother had lung cancer\par The patient lives with Nichole, one of her three daughters\par   [de-identified] : Poorly  differentiated infiltrating ductal carcinoma  [de-identified] : ER negative VT negative HER-2/radha positive [de-identified] : The patient presents with her daughter for follow up.  Last seen 3/2021.\par \par The patient states last week she felt a mass in her left breast while in the shower.   She denies any pain, nipple discharge, or skin changes.  \par \par She states she has had a number of medical issues this past year and has been in and out of the hospital and rehab facilities.  She had bilateral temporal artery biopsy that was negative.  She was discovered to have a vertebrae fracture and is currently wearing a brace.   She was also diagnosed with Polymyalgia rheumatica.\par \par She complains of ongoing weakness.  Uses wheelchair when out.  She notes an okay appetite, stable weight and limited performance status.  She lives with her daughter. \par \par Mammo/sono, 3/21/22- BIRADS 2

## 2022-11-04 NOTE — REASON FOR VISIT
[Follow-Up Visit] : a follow-up [Other: _____] : [unfilled] [FreeTextEntry2] : left breast cancer; palpable mass left breast

## 2022-11-04 NOTE — PHYSICAL EXAM
[Ambulatory and capable of all self care but unable to carry out any work activities] : Status 2- Ambulatory and capable of all self care but unable to carry out any work activities. Up and about more than 50% of waking hours [Normal] : PERRL, EOMI, no conjunctival infection, anicteric [de-identified] : IV/VI murmur [de-identified] : No LE edema  [de-identified] : The patient was examined in both sitting and supine position.  Right breast with no discrete palpable masses, no palpable axillary nodes.  Left breast with changes from previous surgery and radiation, a palpable mass was  noted at 11:00, measuring about 1 cm, firm, mobile, nontender, skin dimple over mass, seems consistent with fat necrosis, no palpable axillary nodes.  [de-identified] : uses wheelchair to get around office

## 2022-11-13 ENCOUNTER — OUTPATIENT (OUTPATIENT)
Dept: OUTPATIENT SERVICES | Facility: HOSPITAL | Age: 87
LOS: 1 days | End: 2022-11-13
Payer: MEDICARE

## 2022-11-13 ENCOUNTER — APPOINTMENT (OUTPATIENT)
Dept: MRI IMAGING | Facility: IMAGING CENTER | Age: 87
End: 2022-11-13

## 2022-11-13 DIAGNOSIS — M54.50 LOW BACK PAIN, UNSPECIFIED: ICD-10-CM

## 2022-11-13 DIAGNOSIS — Z00.8 ENCOUNTER FOR OTHER GENERAL EXAMINATION: ICD-10-CM

## 2022-11-13 PROCEDURE — 72148 MRI LUMBAR SPINE W/O DYE: CPT

## 2022-11-13 PROCEDURE — 72148 MRI LUMBAR SPINE W/O DYE: CPT | Mod: 26

## 2022-11-15 ENCOUNTER — APPOINTMENT (OUTPATIENT)
Dept: RHEUMATOLOGY | Facility: CLINIC | Age: 87
End: 2022-11-15

## 2022-11-15 VITALS
TEMPERATURE: 97.6 F | DIASTOLIC BLOOD PRESSURE: 72 MMHG | RESPIRATION RATE: 16 BRPM | OXYGEN SATURATION: 94 % | HEART RATE: 69 BPM | HEIGHT: 65.98 IN | BODY MASS INDEX: 24.11 KG/M2 | SYSTOLIC BLOOD PRESSURE: 146 MMHG | WEIGHT: 150 LBS

## 2022-11-15 DIAGNOSIS — Z23 ENCOUNTER FOR IMMUNIZATION: ICD-10-CM

## 2022-11-15 LAB
25(OH)D3 SERPL-MCNC: 41.4 NG/ML
ALBUMIN SERPL ELPH-MCNC: 3.8 G/DL
ALP BLD-CCNC: 105 U/L
ALT SERPL-CCNC: 13 U/L
ANION GAP SERPL CALC-SCNC: 13 MMOL/L
AST SERPL-CCNC: 20 U/L
BASOPHILS # BLD AUTO: 0.01 K/UL
BASOPHILS NFR BLD AUTO: 0.1 %
BILIRUB SERPL-MCNC: 0.4 MG/DL
BUN SERPL-MCNC: 20 MG/DL
CALCIUM SERPL-MCNC: 9.2 MG/DL
CHLORIDE SERPL-SCNC: 99 MMOL/L
CO2 SERPL-SCNC: 23 MMOL/L
CREAT SERPL-MCNC: 0.74 MG/DL
CRP SERPL-MCNC: 9 MG/L
EGFR: 79 ML/MIN/1.73M2
EOSINOPHIL # BLD AUTO: 0 K/UL
EOSINOPHIL NFR BLD AUTO: 0 %
GLUCOSE SERPL-MCNC: 129 MG/DL
HCT VFR BLD CALC: 32.9 %
HGB BLD-MCNC: 10.3 G/DL
IMM GRANULOCYTES NFR BLD AUTO: 0.7 %
LYMPHOCYTES # BLD AUTO: 0.89 K/UL
LYMPHOCYTES NFR BLD AUTO: 11.6 %
MAN DIFF?: NORMAL
MCHC RBC-ENTMCNC: 29.1 PG
MCHC RBC-ENTMCNC: 31.3 GM/DL
MCV RBC AUTO: 92.9 FL
MONOCYTES # BLD AUTO: 0.36 K/UL
MONOCYTES NFR BLD AUTO: 4.7 %
NEUTROPHILS # BLD AUTO: 6.36 K/UL
NEUTROPHILS NFR BLD AUTO: 82.9 %
PLATELET # BLD AUTO: 278 K/UL
POTASSIUM SERPL-SCNC: 4.4 MMOL/L
PROT SERPL-MCNC: 6.6 G/DL
RBC # BLD: 3.54 M/UL
RBC # FLD: 14.9 %
SODIUM SERPL-SCNC: 135 MMOL/L
WBC # FLD AUTO: 7.67 K/UL

## 2022-11-15 PROCEDURE — 99215 OFFICE O/P EST HI 40 MIN: CPT

## 2022-11-15 NOTE — ASSESSMENT
[FreeTextEntry1] : ADA ROLLE is a 87 year  old female, seen on today  for PMR/GCA, Osteoporosis, Fibromyalgia, neuropathy,   OA\par \par #R/o GCA: Pt w/ complaints of headaches, intermittent b/l intermittent blurry vision, and scalp tenderness w/ palpation w/ elevated ESR while on steroids. GCA associated w/ PMR, and can commonly occur while already on steroids. Dopplers of temporal arteries w/o signs of GCA, however inadequate to rule out GCA. B/l temporal artery biopsy 9/14/22 w/o GCA.  Current steroid dose is 10 mg daily and will taper down per pmr dosing and no longer suspected for GCA\par \par  #PMR: Diagnosed 7/2022 w/ symptoms of shoulder/hip stiffness and pain that rapidly improved w/ steroids. Was started on prednisone 60mg 7/2022 which had been tapered to 10mg daily by her daughter/ \par ->  taper steroid from 10 mg daily to 9mg daily in 3 weeks.  (just started 10mg last week) \par \par \par #Osteoporosis: Hx of osteopenia and found to have non-traumatic L3 compression fracture. Vitamin D level wnl. \par -would have orthopedic evaluation for any possible intervention such as kyphoplasty\par -c/w calcium 500 mg qd\par -c/w vitamin D 1000 mg qd\par -will need to initiate bisphosphonate therapy outpatient\par -discussed options w/ pt and her daughter again \par - patient refuses bisphosphonate but will take ca and d \par \par Neuropathy in feet\par ->  associated with prior chemo therapy \par ->  lower gabapentin from 100mg bid to 50mg bid for given fatigue. \par \par Opth:  \par Needs follow up and this has been d/w patient/daughter \par \par HCM:  initially said she wanted flu shot but then refused high dose flu shot.  Unable to give reason for refusing high dose flu shot but thinks she is too sensitive to medications.  I have advised her that the guidelines are to use high dose flu shot. \par \par More than 50% of the encounter was spent counselling  ADA ROLLE on differential, workup, disease course, and treatment/management.   Education was provided to ADA ROLLE during this encounter. All questions and concerns were answered and addressed in detail.  ADA ROLLE verbalized understanding and agreed to plan\par \par ADA ROLLE has been instructed to call for an earlier appointment if new symptoms develop \par ADA ROLLE has been instructed to make a follow up appointment 1 month \par \par \par \par

## 2022-11-15 NOTE — PHYSICAL EXAM
[General Appearance - Alert] : alert [General Appearance - In No Acute Distress] : in no acute distress [FreeTextEntry1] : UE/LE 5/5 d/p,  no tenderness except for over thoracic spine

## 2022-11-15 NOTE — HISTORY OF PRESENT ILLNESS
[FreeTextEntry1] : 86 yo F PMH Breast CA (2011) , MAC (untreated), MVP, asthma, osteopenia, AS, PMR (dx 7/2022 on steroids), fibromyalgia, spinal stenosis, uveitis(~ 30 yo and again in 2011) presenting w/ back pain found to have non-traumatic compression fracture of L3 spine and older compression fractures\par \par \par #R/o GCA at [facility] and negative biospy and negative TAUS  headaches, intermittent b/l intermittent blurry vision, and scalp tenderness w/ palpation w/ elevated ESR while on steroids. Currently on prednisone 20mg daily. \par ->  occasional frontal headaches that resolve with tylenol and not worse with tapering steroids from 60mg to current dose of 10 mg daily \par ->  off and on headahce (tylenol responsive)  , no visual changes, doesn't hurt when she toledo her hair or touches her face. \par \par  #PMR: Diagnosed 7/2022 w/ symptoms of shoulder/hip stiffness and pain that rapidly improved w/ steroids but Dr. Bucio (hospitalist) . Was started on prednisone 60mg 7/2022 which had been tapered to current dose of 15mg at her last visit and her daughter lowered it to 10mg last week.   denies hip or shoulder tenderness but feels like her muscle are weak.  She would like to do more physical therapy and exercises at home. \par \par \par #Osteoporosis: Hx of osteopenia and found to have non-traumatic L3 compression fracture. Vitamin D level wnl and on calcium 500mg daily + dietary sources\par reviewed information from hospital and from her last visit on bisphosphonate and is adamant that she doesn't want to take this medication because she is afraid of the side effects. \par was never treated for osteoporosis/osteopenia \par \par #  OA / fibromyalgia\par ->  chronic pain in the hips at time - intermittent and worst with activity \par \par Neuropathy in feet\par ->  associated with prior chemo therapy \par ->  on gabapentin 100mg twice a day\par \par # anxiety \par she is not currently on medications for anxiety.  last saw psych > 20 years ago.  Has spoken to pmd and is thinking of starting a medication

## 2022-11-16 LAB — ERYTHROCYTE [SEDIMENTATION RATE] IN BLOOD BY WESTERGREN METHOD: 55 MM/HR

## 2022-11-22 ENCOUNTER — APPOINTMENT (OUTPATIENT)
Dept: MRI IMAGING | Facility: IMAGING CENTER | Age: 87
End: 2022-11-22

## 2022-11-23 ENCOUNTER — APPOINTMENT (OUTPATIENT)
Dept: ORTHOPEDIC SURGERY | Facility: CLINIC | Age: 87
End: 2022-11-23

## 2022-11-23 DIAGNOSIS — S22.060A WEDGE COMPRESSION FRACTURE OF T7-T8 VERTEBRA, INITIAL ENCOUNTER FOR CLOSED FRACTURE: ICD-10-CM

## 2022-11-23 DIAGNOSIS — S22.070A WEDGE COMPRESSION FRACTURE OF T9-T10 VERTEBRA, INITIAL ENCOUNTER FOR CLOSED FRACTURE: ICD-10-CM

## 2022-11-23 PROCEDURE — 99203 OFFICE O/P NEW LOW 30 MIN: CPT

## 2022-11-23 PROCEDURE — 72070 X-RAY EXAM THORAC SPINE 2VWS: CPT

## 2022-11-23 NOTE — HISTORY OF PRESENT ILLNESS
[de-identified] : This 87-year-old woman is seen in the office today along with her daughter.  She has had symptoms of increasing back pain since September.  Initially she had physical therapy which seem to make the symptoms worse.  She was actually in rehab for a while.  She had lower back pain in the past now the pain is more in the mid back and radiates anteriorly bilaterally in the mid to lower thoracic distribution.  She comes in today in a wheelchair and uses a walker at home.  She wears a back support at times.  She also has polymyalgia rheumatica and has been on steroids but they are currently decreasing the dose of the prednisone.  She has been told in the past her osteoporosis needs treatment but she has not done it as she is fearful of the side effects of the treatment.  She comes in telling me that she has a compression fracture.  She had a recent MRI and it shows an old superior endplate deformity of L4 and an old compression fracture of T12.  There is minimal canal compromise.  The daughter relates that while in the hospital and at rehab the only thing that helped was morphine and asks about her taking that in a pill form.

## 2022-11-23 NOTE — DISCUSSION/SUMMARY
[Medication Risks Reviewed] : Medication risks reviewed [de-identified] : The symptoms became worse a few days ago after moving around after having a shower.  I suspect that 1 of these compression fractures is likely new.  I discussed with the patient and with her daughter that her osteoporosis definitely needs to be treated.  The risks of not treating it are much greater than any risks of the medication.  She could end up with a hip fracture which has measurable mortality at her age.  Using opiates at her age which could affect mental status and lead to a fall is clearly contraindicated.  She will push Tylenol to the full dose of either 8 extra strength a day or 6 arthritis strength a day.  She will use her back support if she feels it helps.  I will see her for follow-up in 2 months.  I discussed the normal course of recovery with a compression fracture at length.  The average patient is probably only 10 or 15% better at a month, 30% better 2 months, 50% better at 3 months and 70% better at 4 months.

## 2022-11-23 NOTE — PHYSICAL EXAM
[de-identified] : On examination she has a mildly increased kyphosis. [de-identified] : AP and lateral x-rays of the thoracic spine are obtained.  The old compression fracture of T12 can be seen.  There are mild compression deformities of T7, T8 and T9.  AP x-ray shows no destructive changes.

## 2022-11-23 NOTE — REASON FOR VISIT
[Initial Visit] : an initial visit for [Back Pain] : back pain [Family Member] : family member [FreeTextEntry2] : Osteoporosis with multiple compression fractures

## 2022-12-12 NOTE — ED ADULT NURSE NOTE - NSICDXPASTSURGICALHX_GEN_ALL_CORE_FT
PAST SURGICAL HISTORY:  History of Breast Biopsy [place] lymph node biopsy    History of Cataract Surgery Left eye    S/P Breast Lumpectomy     S/P Lumpectomy of Breast Left Breast    S/P Nasal Polypectomy     Status Post Tonsillectomy     
Elena

## 2022-12-21 ENCOUNTER — APPOINTMENT (OUTPATIENT)
Dept: GASTROENTEROLOGY | Facility: CLINIC | Age: 87
End: 2022-12-21

## 2022-12-21 VITALS
DIASTOLIC BLOOD PRESSURE: 64 MMHG | HEART RATE: 70 BPM | BODY MASS INDEX: 25.49 KG/M2 | RESPIRATION RATE: 16 BRPM | SYSTOLIC BLOOD PRESSURE: 148 MMHG | TEMPERATURE: 97.8 F | WEIGHT: 153 LBS | HEIGHT: 65 IN | OXYGEN SATURATION: 98 %

## 2022-12-21 DIAGNOSIS — R12 HEARTBURN: ICD-10-CM

## 2022-12-21 DIAGNOSIS — R10.12 LEFT UPPER QUADRANT PAIN: ICD-10-CM

## 2022-12-21 DIAGNOSIS — Z87.898 PERSONAL HISTORY OF OTHER SPECIFIED CONDITIONS: ICD-10-CM

## 2022-12-21 PROCEDURE — 99204 OFFICE O/P NEW MOD 45 MIN: CPT

## 2022-12-21 NOTE — CONSULT LETTER
[Dear  ___] : Dear  [unfilled], [Consult Letter:] : I had the pleasure of evaluating your patient, [unfilled]. [( Thank you for referring [unfilled] for consultation for _____ )] : Thank you for referring [unfilled] for consultation for [unfilled] [Please see my note below.] : Please see my note below. [Consult Closing:] : Thank you very much for allowing me to participate in the care of this patient.  If you have any questions, please do not hesitate to contact me. [Sincerely,] : Sincerely, [FreeTextEntry3] : Tej Austin MD\par \par Gastroenterology\par [facility]\par [facility]\par \par

## 2022-12-21 NOTE — HISTORY OF PRESENT ILLNESS
[FreeTextEntry1] : She is in n 87 year old  female who  complains of a 3-month history of chronic constipation associated with abdominal bloating  distention and heartburn.  She feels very uncomfortable.  She is presently taking MiraLAX which is not effective. Her last colonoscopy was 7 years ago which was negative. She did not have an endoscopy when she was in the hospital due to her pulmonary status.

## 2022-12-21 NOTE — ASSESSMENT
[FreeTextEntry1] : CT of abd & pelvis ordered\par Start Linzess 145 mcg daily in the AM\par Start pantoprazole 40 mg daily in the AM\par \par Office f/u in 1 month

## 2022-12-21 NOTE — PHYSICAL EXAM
[Normal] : heart rate was normal and rhythm regular, normal S1 and S2, no murmurs [de-identified] : distended  mild diffuse tenderness

## 2022-12-29 ENCOUNTER — APPOINTMENT (OUTPATIENT)
Dept: RHEUMATOLOGY | Facility: CLINIC | Age: 87
End: 2022-12-29
Payer: MEDICARE

## 2022-12-29 VITALS
OXYGEN SATURATION: 98 % | DIASTOLIC BLOOD PRESSURE: 72 MMHG | HEIGHT: 65 IN | SYSTOLIC BLOOD PRESSURE: 163 MMHG | HEART RATE: 72 BPM | BODY MASS INDEX: 24.99 KG/M2 | WEIGHT: 150 LBS | TEMPERATURE: 97.6 F | RESPIRATION RATE: 16 BRPM

## 2022-12-29 DIAGNOSIS — M25.50 PAIN IN UNSPECIFIED JOINT: ICD-10-CM

## 2022-12-29 LAB
ALBUMIN SERPL ELPH-MCNC: 4 G/DL
ALP BLD-CCNC: 79 U/L
ALT SERPL-CCNC: 12 U/L
ANION GAP SERPL CALC-SCNC: 13 MMOL/L
AST SERPL-CCNC: 15 U/L
BASOPHILS # BLD AUTO: 0.02 K/UL
BASOPHILS NFR BLD AUTO: 0.2 %
BILIRUB SERPL-MCNC: 0.4 MG/DL
BUN SERPL-MCNC: 23 MG/DL
CALCIUM SERPL-MCNC: 9.6 MG/DL
CHLORIDE SERPL-SCNC: 101 MMOL/L
CO2 SERPL-SCNC: 26 MMOL/L
CREAT SERPL-MCNC: 0.84 MG/DL
EGFR: 68 ML/MIN/1.73M2
EOSINOPHIL # BLD AUTO: 0 K/UL
EOSINOPHIL NFR BLD AUTO: 0 %
ERYTHROCYTE [SEDIMENTATION RATE] IN BLOOD BY WESTERGREN METHOD: 33 MM/HR
GLUCOSE SERPL-MCNC: 102 MG/DL
HCT VFR BLD CALC: 33.2 %
HGB BLD-MCNC: 10.4 G/DL
IMM GRANULOCYTES NFR BLD AUTO: 0.5 %
LYMPHOCYTES # BLD AUTO: 1.2 K/UL
LYMPHOCYTES NFR BLD AUTO: 13.8 %
MAN DIFF?: NORMAL
MCHC RBC-ENTMCNC: 28.3 PG
MCHC RBC-ENTMCNC: 31.3 GM/DL
MCV RBC AUTO: 90.5 FL
MONOCYTES # BLD AUTO: 0.45 K/UL
MONOCYTES NFR BLD AUTO: 5.2 %
NEUTROPHILS # BLD AUTO: 6.98 K/UL
NEUTROPHILS NFR BLD AUTO: 80.3 %
PLATELET # BLD AUTO: 261 K/UL
POTASSIUM SERPL-SCNC: 4.4 MMOL/L
PROT SERPL-MCNC: 7 G/DL
RBC # BLD: 3.67 M/UL
RBC # FLD: 15.5 %
SODIUM SERPL-SCNC: 139 MMOL/L
WBC # FLD AUTO: 8.69 K/UL

## 2022-12-29 PROCEDURE — 99214 OFFICE O/P EST MOD 30 MIN: CPT

## 2022-12-29 RX ORDER — GABAPENTIN 250 MG/5ML
250 SOLUTION ORAL
Qty: 60 | Refills: 0 | Status: DISCONTINUED | COMMUNITY
Start: 2022-11-17 | End: 2022-12-29

## 2022-12-29 NOTE — DATA REVIEWED
[FreeTextEntry1] : Laboratory and radiology studies that were personally reviewed at today's visit are summarized in above and below:\par Accession:                             [id number]\par Collected Date/Time:                   9/14/2022 12:00 EDT\par Received Date/Time:                    9/14/2022 18:38 EDT\par Surgical Pathology Report - Auth (Verified)\par Specimen(s) Submitted\par 1  Right temporal artery\par 2  Left temporal artery\par \par Final Diagnosis\par \par 1. Artery, right temporal, biopsy\par - Artery with mild to moderate intimal hyperplasia and medial\par \par calcification, see comment\par \par 2.  Artery, left temporal, biopsy\par - Artery with mild to moderate intimal hyperplasia and focal medial\par calcification, see comment\par \par Comment:\par \par Trichrome and elastic stains are performed on blocks 1A and 2A  and show\par negative for temporal arteritis.\par \par CT L spine (9-5-22):  old compression fracture at T9 and T12 and acute compression fx at L3

## 2022-12-29 NOTE — ASSESSMENT
[FreeTextEntry1] : ADA ROLLE is a 87 year  old female, seen on today  for PMR/GCA, Osteoporosis, Fibromyalgia, neuropathy,   OA\par \par #R/o GCA: Pt w/ complaints of headaches, intermittent b/l intermittent blurry vision, and scalp tenderness w/ palpation w/ elevated ESR while on steroids. GCA associated w/ PMR, and can commonly occur while already on steroids. Dopplers of temporal arteries w/o signs of GCA, however inadequate to rule out GCA. B/l temporal artery biopsy 9/14/22 w/o GCA.  Current steroid dose is 9 mg daily and will taper down per pmr dosing and no longer suspected for GCA\par \par  #PMR: Diagnosed 7/2022 w/ symptoms of shoulder/hip stiffness and pain that rapidly improved w/ steroids. Was started on prednisone 60mg 7/2022 which had been tapered to 10mg daily by her daughter/ \par -> currently on prednisone 9mg daily and will taper to 8mg daily for 1 month then 7mg a day for 1 month \par \par #Osteoporosis: Hx of osteopenia and found to have non-traumatic L3 compression fracture. Vitamin D level wnl. \par -would have orthopedic evaluation for any possible intervention such as kyphoplasty\par -c/w calcium 500 mg qd\par -c/w vitamin D 1000 mg qd\par -will need to initiate bisphosphonate therapy outpatient\par -discussed options w/ pt and her daughter again today\par - patient refuses bisphosphonate but will take ca and d \par \par Neuropathy in feet\par ->  associated with prior chemo therapy \par -> can't tolerate oral liquid and requests 100mg bid and will refill \par ->  lower gabapentin from 100mg bid to 50mg bid for given fatigue. \par \par Opth:  \par Needs follow up and this has been d/w patient/daughter \par \par Anxiety \par referral for geriatric psychiatry \par More than 50% of the encounter was spent counselling  ADA ROLLE on differential, workup, disease course, and treatment/management.   Education was provided to ADA ROLLE during this encounter. All questions and concerns were answered and addressed in detail.  ADA ROLLE verbalized understanding and agreed to plan\par \par ADAKIM ROLLE has been instructed to call for an earlier appointment if new symptoms develop \par ADA ROLLE has been instructed to make a follow up appointment 2 months\par \par \par \par

## 2022-12-29 NOTE — PHYSICAL EXAM
[General Appearance - Alert] : alert [General Appearance - In No Acute Distress] : in no acute distress [FreeTextEntry1] : no tenderness , + OA changes

## 2022-12-29 NOTE — HISTORY OF PRESENT ILLNESS
[FreeTextEntry1] : 86 yo F PMH Breast CA (2011) , MAC (untreated), MVP, asthma, osteopenia, AS, PMR (dx 7/2022 on steroids), fibromyalgia, spinal stenosis, uveitis(~ 32 yo and again in 2011) presenting w/ back pain found to have non-traumatic compression fracture of L3 spine and older compression fractures\par \par today she reports she feels foggy and shaky in her hands and has dropped things \par  # PMR - on prednisone 9mg daily \par sometimes with shoudler pain in the left shoulder and history of frozen shoulder\par she would like to do physical therapy \par \par #Osteoporosis: Hx of osteopenia and found to have non-traumatic L3 compression fracture. Vitamin D level wnl and on calcium 500mg daily + dietary sources\par reviewed information from hospital and from her last visit on bisphosphonate and is adamant that she doesn't want to take this medication because she is afraid of the side effects. \par was never treated for osteoporosis/osteopenia \par still hasn't decided to take bisphosphonate\par \par #  OA / fibromyalgia\par \par Neuropathy in feet\par ->  associated with prior chemo therapy \par ->  on gabapentin 100mg twice a day - couldn't tolerate liquid \par \par # anxiety \par she is not currently on medications for anxiety.  last saw psych > 20 years ago.  Has spoken to pmd and is thinking of starting a medication \par  her pmd stopped her medications because she took too much - is willing to see nila psych

## 2023-01-04 DIAGNOSIS — R10.32 LEFT LOWER QUADRANT PAIN: ICD-10-CM

## 2023-01-10 ENCOUNTER — RESULT REVIEW (OUTPATIENT)
Age: 88
End: 2023-01-10

## 2023-01-13 ENCOUNTER — NON-APPOINTMENT (OUTPATIENT)
Age: 88
End: 2023-01-13

## 2023-01-16 ENCOUNTER — OUTPATIENT (OUTPATIENT)
Dept: OUTPATIENT SERVICES | Facility: HOSPITAL | Age: 88
LOS: 1 days | End: 2023-01-16
Payer: MEDICARE

## 2023-01-16 ENCOUNTER — APPOINTMENT (OUTPATIENT)
Dept: CT IMAGING | Facility: IMAGING CENTER | Age: 88
End: 2023-01-16
Payer: MEDICARE

## 2023-01-16 DIAGNOSIS — K59.09 OTHER CONSTIPATION: ICD-10-CM

## 2023-01-16 DIAGNOSIS — R10.32 LEFT LOWER QUADRANT PAIN: ICD-10-CM

## 2023-01-16 PROCEDURE — 74176 CT ABD & PELVIS W/O CONTRAST: CPT | Mod: 26

## 2023-01-16 PROCEDURE — 74176 CT ABD & PELVIS W/O CONTRAST: CPT

## 2023-01-24 ENCOUNTER — APPOINTMENT (OUTPATIENT)
Dept: ORTHOPEDIC SURGERY | Facility: CLINIC | Age: 88
End: 2023-01-24

## 2023-01-25 ENCOUNTER — APPOINTMENT (OUTPATIENT)
Dept: RHEUMATOLOGY | Facility: CLINIC | Age: 88
End: 2023-01-25

## 2023-01-30 ENCOUNTER — APPOINTMENT (OUTPATIENT)
Dept: GASTROENTEROLOGY | Facility: CLINIC | Age: 88
End: 2023-01-30
Payer: MEDICARE

## 2023-01-30 VITALS
OXYGEN SATURATION: 99 % | TEMPERATURE: 98.2 F | RESPIRATION RATE: 14 BRPM | HEART RATE: 74 BPM | DIASTOLIC BLOOD PRESSURE: 68 MMHG | SYSTOLIC BLOOD PRESSURE: 142 MMHG

## 2023-01-30 VITALS — BODY MASS INDEX: 24.99 KG/M2 | HEIGHT: 65 IN | WEIGHT: 150 LBS

## 2023-01-30 DIAGNOSIS — R14.0 ABDOMINAL DISTENSION (GASEOUS): ICD-10-CM

## 2023-01-30 DIAGNOSIS — K64.8 OTHER HEMORRHOIDS: ICD-10-CM

## 2023-01-30 PROCEDURE — 99214 OFFICE O/P EST MOD 30 MIN: CPT

## 2023-02-02 PROBLEM — K64.8 INTERNAL HEMORRHOIDS: Status: ACTIVE | Noted: 2023-01-30

## 2023-02-02 PROBLEM — R14.0 ABDOMINAL BLOATING: Status: ACTIVE | Noted: 2023-02-02

## 2023-02-02 PROBLEM — R14.0 ABDOMINAL BLOATING: Status: RESOLVED | Noted: 2022-12-21 | Resolved: 2023-02-02

## 2023-02-02 NOTE — ASSESSMENT
[FreeTextEntry1] : High Fiber Diet\par Continue Linzess 290 mcg daily in the AM\par Add Miralax 2 tablespoons in 8 oz of water in the morning. Can increase the dose by 1 tablespoon every week until the desired result is achieved\par Hemorrhoidal suppositories\par \par

## 2023-02-02 NOTE — CONSULT LETTER
[Dear  ___] : Dear  [unfilled], [Consult Letter:] : I had the pleasure of evaluating your patient, [unfilled]. [( Thank you for referring [unfilled] for consultation for _____ )] : Thank you for referring [unfilled] for consultation for [unfilled] [Please see my note below.] : Please see my note below. [Consult Closing:] : Thank you very much for allowing me to participate in the care of this patient.  If you have any questions, please do not hesitate to contact me. [Sincerely,] : Sincerely, [FreeTextEntry3] : Tej Austin MD\par \par Gastroenterology\par [facility]\par [facility]\par \par

## 2023-02-02 NOTE — REVIEW OF SYSTEMS
[Constipation] : constipation [Bloating (gassiness)] : bloating [Negative] : Heme/Lymph [FreeTextEntry7] : hemorrhoidal discomfort

## 2023-02-02 NOTE — HISTORY OF PRESENT ILLNESS
[FreeTextEntry1] : She was taking Linzess 290 mc290 mcg which has helped her but she was still constipated so she switched to Miralax. CT of her abdomen did not reveal any obstruction. Her colon was full of stool and diverticular disease was seen. She also complains of hemorrhoidal discomfort. She gets sone relief with hemorrhoidal suppositories.\par \par Daughter was in the room

## 2023-03-03 ENCOUNTER — APPOINTMENT (OUTPATIENT)
Dept: RHEUMATOLOGY | Facility: CLINIC | Age: 88
End: 2023-03-03
Payer: MEDICARE

## 2023-03-03 VITALS
TEMPERATURE: 97.3 F | HEART RATE: 62 BPM | RESPIRATION RATE: 16 BRPM | OXYGEN SATURATION: 94 % | SYSTOLIC BLOOD PRESSURE: 147 MMHG | DIASTOLIC BLOOD PRESSURE: 66 MMHG | HEIGHT: 65 IN | BODY MASS INDEX: 24.49 KG/M2 | WEIGHT: 147 LBS

## 2023-03-03 DIAGNOSIS — M19.90 UNSPECIFIED OSTEOARTHRITIS, UNSPECIFIED SITE: ICD-10-CM

## 2023-03-03 DIAGNOSIS — R91.8 OTHER NONSPECIFIC ABNORMAL FINDING OF LUNG FIELD: ICD-10-CM

## 2023-03-03 DIAGNOSIS — R93.89 ABNORMAL FINDINGS ON DIAGNOSTIC IMAGING OF OTHER SPECIFIED BODY STRUCTURES: ICD-10-CM

## 2023-03-03 DIAGNOSIS — J98.4 OTHER DISORDERS OF LUNG: ICD-10-CM

## 2023-03-03 DIAGNOSIS — R06.02 SHORTNESS OF BREATH: ICD-10-CM

## 2023-03-03 PROCEDURE — 99215 OFFICE O/P EST HI 40 MIN: CPT

## 2023-03-03 NOTE — DATA REVIEWED
[FreeTextEntry1] : Laboratory and radiology studies that were personally reviewed at today's visit are summarized in above and below:\par \par \par CT abdomen (1-):  ILD at lung bases. osteopenia with T12 superior endplate compression deformity. \par \par Received Date/Time:                    9/14/2022 18:38 EDT\par Surgical Pathology Report - Auth (Verified)\par Specimen(s) Submitted\par 1  Right temporal artery\par 2  Left temporal artery\par \par Final Diagnosis\par 1. Artery, right temporal, biopsy\par - Artery with mild to moderate intimal hyperplasia and medial\par calcification, see comment\par 2.  Artery, left temporal, biopsy\par - Artery with mild to moderate intimal hyperplasia and focal medial\par calcification, see comment\par Comment:\par Trichrome and elastic stains are performed on blocks 1A and 2A  and show\par negative for temporal arteritis.\par \par CT L spine (9-5-22):  old compression fracture at T9 and T12 and acute compression fx at L3

## 2023-03-03 NOTE — PHYSICAL EXAM
[General Appearance - Alert] : alert [General Appearance - In No Acute Distress] : in no acute distress [Sclera] : the sclera and conjunctiva were normal [FreeTextEntry1] : no tenderness , + OA changes

## 2023-03-03 NOTE — ASSESSMENT
[FreeTextEntry1] : ADA ROLLE is a 87 year  old female, seen on today  for PMR/GCA, Osteoporosis, Fibromyalgia, neuropathy,   OA\par \par # acute Shortness of breath worsening over the past 3 weeks \par concern for PE (dizziness and exertional SOB and sedentary lifestyle), worsening infection (chronic MAC or a new PNA) \par d/w patient and daughter - needs to go to ER for CT (with PE protocol). \par she wants to go to [place] and copy of note given to patient.  \par \par  #PMR: Diagnosed 7/2022 w/ symptoms of shoulder/hip stiffness and pain that rapidly improved w/ steroids. Was started on prednisone 60mg 7/2022 which had been tapered to 10mg daily by her daughter/ \par -> currently on prednisone 6mg daily and will taper to 5 g daily for 1 month then 4mg a day for 1 month the 3mg daily for 1 month \par check CBC/COMP/ESR/CRP\par \par #Osteoporosis: Hx of osteopenia and found to have non-traumatic L3 compression fracture. now with T12 endplate deformity seen on ct abdomen in 1-2023.  Vitamin D level wnl. \par -would have orthopedic evaluation for any possible intervention such as kyphoplasty\par -c/w calcium 500 mg qd\par -c/w vitamin D 1000 mg qd\par -will need to initiate bisphosphonate therapy but she defers \par -discussed options w/ pt and her daughter again today and she want to wait until she feels better to decide \par - patient refuses bisphosphonate but will take ca and d \par \par Neuropathy in feet\par -> improved with gabapentin 100 mg bid and will lower to 100 mg QDay given the weakness \par \par Opth:  \par Needs follow up and this has been d/w patient/daughter \par \par \par More than 50% of the encounter was spent counselling  ADA ROLLE on differential, workup, disease course, and treatment/management.   Education was provided to ADA ROLLE during this encounter. All questions and concerns were answered and addressed in detail.  ADA ROLLE verbalized understanding and agreed to plan\par \par ADA ROLLE has been instructed to call for an earlier appointment if new symptoms develop \par ADA ROLLE has been instructed to make a follow up appointment 2 months\par \par

## 2023-03-03 NOTE — HISTORY OF PRESENT ILLNESS
[FreeTextEntry1] : 86 yo F PMH Breast CA (2011) , MAC (untreated), MVP, asthma, osteopenia, AS, PMR (dx 7/2022 on steroid taper ), fibromyalgia, spinal stenosis, uveitis(~ 30 yo and again in 2011) presenting w/ back pain found to have non-traumatic compression fracture of L3 spine and older compression fractures.  \par \par 3-3-2023:   she reports 3 weeks of fatigue and exhaustion and decreased stamina.\par when she tries to exert herself she feels winded.  she feels short of breath when walking  in the house.  \par she feels her legs are a little swollen. \par she gets short of breath with talking. \par this shortness of breath has been worsening over the past 3 weeks \par she has a little cough. \par no fevers but does break out in sweats at times. \par reports dizziness \par \par saw GI for stomach swelling/bloating \par (reviewed old pulm notes (5-2022) and she had no shortness of breath) \par \par She feels the gabapentin helps the foot pain but it makes her feel drugged .\par \par \par  # PMR - on prednisone 6mg daily \par improved on steroids and hasn't noticed a decrease with lowering steroids \par \par #Osteoporosis: Hx of osteopenia and found to have non-traumatic L3 compression fracture and now with T12 endplate deformity seen on ct abdomen \par  Vitamin D level wnl and on calcium 500mg daily + dietary sources\par was never treated for osteoporosis/osteopenia \par still hasn't decided to take bisphosphonate (asked again today - she defers until she feels better.  \par \par #  OA / fibromyalgia\par \par # Neuropathy in feet\par ->  associated with prior chemo therapy \par ->  on gabapentin 100 mg twice a day - couldn't tolerate liquid \par

## 2023-03-07 ENCOUNTER — APPOINTMENT (OUTPATIENT)
Dept: PULMONOLOGY | Facility: CLINIC | Age: 88
End: 2023-03-07
Payer: MEDICARE

## 2023-03-07 VITALS
HEART RATE: 78 BPM | RESPIRATION RATE: 15 BRPM | WEIGHT: 147 LBS | SYSTOLIC BLOOD PRESSURE: 165 MMHG | BODY MASS INDEX: 24.49 KG/M2 | DIASTOLIC BLOOD PRESSURE: 81 MMHG | TEMPERATURE: 97 F | HEIGHT: 65 IN | OXYGEN SATURATION: 92 %

## 2023-03-07 PROCEDURE — 99214 OFFICE O/P EST MOD 30 MIN: CPT

## 2023-03-07 NOTE — PHYSICAL EXAM
[No Acute Distress] : no acute distress [Normal Oropharynx] : normal oropharynx [Normal Appearance] : normal appearance [No Neck Mass] : no neck mass [Normal Rate/Rhythm] : normal rate/rhythm [Normal S1, S2] : normal s1, s2 [No Murmurs] : no murmurs [No Resp Distress] : no resp distress [No Abnormalities] : no abnormalities [Benign] : benign [Normal Gait] : normal gait [No Clubbing] : no clubbing [No Cyanosis] : no cyanosis [No Edema] : no edema [FROM] : FROM [Normal Color/ Pigmentation] : normal color/ pigmentation [No Focal Deficits] : no focal deficits [Oriented x3] : oriented x3 [Normal Affect] : normal affect [TextBox_68] : faint crackles

## 2023-03-07 NOTE — ASSESSMENT
[FreeTextEntry1] : Patient is an 85 yo F w/ IBS, Breast CA s/p lumpectomy and chemo/RT (2011), anxiety, GERD, abnormal chest CT here for follow up after ED visit at [place]. The patient overall has minimal respiratory symptoms but has fatigue. CT chest from [facility] shows resolution of the opacity of the right side, but continued presence of a cavity vs cyst. Will need to review images with radiology to see if the previous scan just showed impacted mucus. \par \par Plan \par - Will review CT chest with radiology \par - Continue to monitor prednisone dose per Rheum \par - Patient can use albuterol HFA as needed \par - continue to stay active and ambulate \par \par \par d/w Dr. Mcgowan \par \par

## 2023-03-07 NOTE — HISTORY OF PRESENT ILLNESS
[TextBox_4] : 88 yo F w/ IBS, Breast CA s/p lumpectomy and chemoRT (2011), anxiety, GERD, abnormal chest CT who presents to IP clinic for further evaluation of abnormal Chest CT demonstrate growing RUL cavitary lesion. \par \par Recently seen in the [place] ED on 3/3/2023 for SOB and cough. The patient had CT chest which should a possible PNA. Patient was discharged from the ED with abx. The patient continues to complain of general fatigue and SOB on activity. Still continues to be sedentary. Patient has been on prednisone for PMR, was at a high of 60mg but now down to 6mg once a day. \par \par Walked in office today with lowest O2 sat of 95% on RA \par \par Sputum cultures 12/21/20 with numerous staph aureus and MAC by DNA probe. Quant Gold Indeterminate x 2\par \par Denies any fevers, chill. Endorses stable respiratory status and sputum with compliance of airway clearance and nebulizer treatments.

## 2023-04-07 ENCOUNTER — APPOINTMENT (OUTPATIENT)
Dept: RHEUMATOLOGY | Facility: CLINIC | Age: 88
End: 2023-04-07

## 2023-04-13 ENCOUNTER — INPATIENT (INPATIENT)
Facility: HOSPITAL | Age: 88
LOS: 3 days | Discharge: HOME HEALTH | DRG: 153 | End: 2023-04-17
Attending: INTERNAL MEDICINE | Admitting: INTERNAL MEDICINE
Payer: MEDICARE

## 2023-04-13 VITALS
RESPIRATION RATE: 17 BRPM | OXYGEN SATURATION: 99 % | WEIGHT: 145.06 LBS | SYSTOLIC BLOOD PRESSURE: 144 MMHG | HEIGHT: 68 IN | DIASTOLIC BLOOD PRESSURE: 74 MMHG | TEMPERATURE: 98 F | HEART RATE: 80 BPM

## 2023-04-13 DIAGNOSIS — R53.1 WEAKNESS: ICD-10-CM

## 2023-04-13 LAB
ALBUMIN SERPL ELPH-MCNC: 3.1 G/DL — LOW (ref 3.3–5)
ALP SERPL-CCNC: 97 U/L — SIGNIFICANT CHANGE UP (ref 40–120)
ALT FLD-CCNC: 21 U/L — SIGNIFICANT CHANGE UP (ref 10–45)
ANION GAP SERPL CALC-SCNC: 11 MMOL/L — SIGNIFICANT CHANGE UP (ref 5–17)
AST SERPL-CCNC: 19 U/L — SIGNIFICANT CHANGE UP (ref 10–40)
BASOPHILS # BLD AUTO: 0.02 K/UL — SIGNIFICANT CHANGE UP (ref 0–0.2)
BASOPHILS NFR BLD AUTO: 0.3 % — SIGNIFICANT CHANGE UP (ref 0–2)
BILIRUB SERPL-MCNC: 0.2 MG/DL — SIGNIFICANT CHANGE UP (ref 0.2–1.2)
BUN SERPL-MCNC: 15 MG/DL — SIGNIFICANT CHANGE UP (ref 7–23)
CALCIUM SERPL-MCNC: 9.4 MG/DL — SIGNIFICANT CHANGE UP (ref 8.4–10.5)
CHLORIDE SERPL-SCNC: 102 MMOL/L — SIGNIFICANT CHANGE UP (ref 96–108)
CO2 SERPL-SCNC: 24 MMOL/L — SIGNIFICANT CHANGE UP (ref 22–31)
CREAT SERPL-MCNC: 0.69 MG/DL — SIGNIFICANT CHANGE UP (ref 0.5–1.3)
CRP SERPL-MCNC: 42 MG/L — HIGH (ref 0–4)
EGFR: 84 ML/MIN/1.73M2 — SIGNIFICANT CHANGE UP
EOSINOPHIL # BLD AUTO: 0 K/UL — SIGNIFICANT CHANGE UP (ref 0–0.5)
EOSINOPHIL NFR BLD AUTO: 0 % — SIGNIFICANT CHANGE UP (ref 0–6)
FLUAV AG NPH QL: SIGNIFICANT CHANGE UP
FLUBV AG NPH QL: SIGNIFICANT CHANGE UP
GLUCOSE SERPL-MCNC: 152 MG/DL — HIGH (ref 70–99)
HCT VFR BLD CALC: 30.6 % — LOW (ref 34.5–45)
HGB BLD-MCNC: 9.6 G/DL — LOW (ref 11.5–15.5)
IMM GRANULOCYTES NFR BLD AUTO: 0.8 % — SIGNIFICANT CHANGE UP (ref 0–0.9)
LYMPHOCYTES # BLD AUTO: 1.26 K/UL — SIGNIFICANT CHANGE UP (ref 1–3.3)
LYMPHOCYTES # BLD AUTO: 20.1 % — SIGNIFICANT CHANGE UP (ref 13–44)
MAGNESIUM SERPL-MCNC: 2 MG/DL — SIGNIFICANT CHANGE UP (ref 1.6–2.6)
MCHC RBC-ENTMCNC: 28.7 PG — SIGNIFICANT CHANGE UP (ref 27–34)
MCHC RBC-ENTMCNC: 31.4 GM/DL — LOW (ref 32–36)
MCV RBC AUTO: 91.6 FL — SIGNIFICANT CHANGE UP (ref 80–100)
MONOCYTES # BLD AUTO: 0.42 K/UL — SIGNIFICANT CHANGE UP (ref 0–0.9)
MONOCYTES NFR BLD AUTO: 6.7 % — SIGNIFICANT CHANGE UP (ref 2–14)
NEUTROPHILS # BLD AUTO: 4.53 K/UL — SIGNIFICANT CHANGE UP (ref 1.8–7.4)
NEUTROPHILS NFR BLD AUTO: 72.1 % — SIGNIFICANT CHANGE UP (ref 43–77)
NRBC # BLD: 0 /100 WBCS — SIGNIFICANT CHANGE UP (ref 0–0)
NT-PROBNP SERPL-SCNC: 1597 PG/ML — HIGH (ref 0–300)
PHOSPHATE SERPL-MCNC: 3.6 MG/DL — SIGNIFICANT CHANGE UP (ref 2.5–4.5)
PLATELET # BLD AUTO: 290 K/UL — SIGNIFICANT CHANGE UP (ref 150–400)
POTASSIUM SERPL-MCNC: 4.3 MMOL/L — SIGNIFICANT CHANGE UP (ref 3.5–5.3)
POTASSIUM SERPL-SCNC: 4.3 MMOL/L — SIGNIFICANT CHANGE UP (ref 3.5–5.3)
PROT SERPL-MCNC: 6.8 G/DL — SIGNIFICANT CHANGE UP (ref 6–8.3)
RBC # BLD: 3.34 M/UL — LOW (ref 3.8–5.2)
RBC # FLD: 14.8 % — HIGH (ref 10.3–14.5)
RSV RNA NPH QL NAA+NON-PROBE: SIGNIFICANT CHANGE UP
SARS-COV-2 RNA SPEC QL NAA+PROBE: SIGNIFICANT CHANGE UP
SODIUM SERPL-SCNC: 137 MMOL/L — SIGNIFICANT CHANGE UP (ref 135–145)
TROPONIN T, HIGH SENSITIVITY RESULT: 30 NG/L — SIGNIFICANT CHANGE UP (ref 0–51)
TROPONIN T, HIGH SENSITIVITY RESULT: 30 NG/L — SIGNIFICANT CHANGE UP (ref 0–51)
WBC # BLD: 6.28 K/UL — SIGNIFICANT CHANGE UP (ref 3.8–10.5)
WBC # FLD AUTO: 6.28 K/UL — SIGNIFICANT CHANGE UP (ref 3.8–10.5)

## 2023-04-13 PROCEDURE — 71045 X-RAY EXAM CHEST 1 VIEW: CPT | Mod: 26

## 2023-04-13 PROCEDURE — 99285 EMERGENCY DEPT VISIT HI MDM: CPT

## 2023-04-13 PROCEDURE — 70450 CT HEAD/BRAIN W/O DYE: CPT | Mod: 26,MA

## 2023-04-13 PROCEDURE — 76376 3D RENDER W/INTRP POSTPROCES: CPT | Mod: 26

## 2023-04-13 PROCEDURE — 70486 CT MAXILLOFACIAL W/O DYE: CPT | Mod: 26,MA

## 2023-04-13 RX ORDER — AZTREONAM 2 G
1000 VIAL (EA) INJECTION ONCE
Refills: 0 | Status: COMPLETED | OUTPATIENT
Start: 2023-04-13 | End: 2023-04-13

## 2023-04-13 RX ORDER — ACETAMINOPHEN 500 MG
975 TABLET ORAL ONCE
Refills: 0 | Status: COMPLETED | OUTPATIENT
Start: 2023-04-13 | End: 2023-04-13

## 2023-04-13 RX ORDER — ACETAMINOPHEN 500 MG
650 TABLET ORAL EVERY 6 HOURS
Refills: 0 | Status: ACTIVE | OUTPATIENT
Start: 2023-04-13 | End: 2024-03-11

## 2023-04-13 RX ORDER — ENOXAPARIN SODIUM 100 MG/ML
30 INJECTION SUBCUTANEOUS EVERY 24 HOURS
Refills: 0 | Status: ACTIVE | OUTPATIENT
Start: 2023-04-13 | End: 2024-03-11

## 2023-04-13 RX ORDER — SODIUM CHLORIDE 9 MG/ML
250 INJECTION INTRAMUSCULAR; INTRAVENOUS; SUBCUTANEOUS ONCE
Refills: 0 | Status: COMPLETED | OUTPATIENT
Start: 2023-04-13 | End: 2023-04-13

## 2023-04-13 RX ORDER — METOCLOPRAMIDE HCL 10 MG
10 TABLET ORAL ONCE
Refills: 0 | Status: COMPLETED | OUTPATIENT
Start: 2023-04-13 | End: 2023-04-13

## 2023-04-13 RX ADMIN — Medication 50 MILLIGRAM(S): at 22:27

## 2023-04-13 RX ADMIN — Medication 975 MILLIGRAM(S): at 17:52

## 2023-04-13 RX ADMIN — Medication 975 MILLIGRAM(S): at 17:22

## 2023-04-13 RX ADMIN — SODIUM CHLORIDE 250 MILLILITER(S): 9 INJECTION INTRAMUSCULAR; INTRAVENOUS; SUBCUTANEOUS at 17:22

## 2023-04-13 NOTE — ED PROVIDER NOTE - PROGRESS NOTE DETAILS
O'Leobardo DO PGY-3: received sign out on this patient. Guillermo Simpson MD: Labs and imaging reviewed, troponin indeterminate range, but repeat unchanged.  Patient with elevated proBNP.  Imaging findings concerning for possible sinusitis and pneumonia.  Will treat with empiric antibiotics.  Elevated CRP, may be secondary to inflammation versus infectious etiology.  Given concern for acute infection, will hold off on high-dose steroids, but patient would benefit from rheumatology consultation as an inpatient for further evaluation for TCA, but no visual changes, and headache improved.  The patient will need to be admitted to the hospital for continued evaluation and management.  Discussed with the accepting physician regarding the initial presentation, diagnostic studies, treatments given in the ED, and current plan of care.   The patient was accepted by and endorsed to the medicine team.

## 2023-04-13 NOTE — ED PROVIDER NOTE - NS ED ROS FT
Constitutional:  (-) fever, (-) chills, (+) lethargy  Eyes:  (-) eye pain (-) visual changes  ENMT: (-) nasal discharge, (-) sore throat. (-) neck pain or stiffness  Cardiac: (-) chest pain (-) palpitations  Respiratory:  (-) cough (-) respiratory distress.   GI:  (+) nausea (-) vomiting (-) diarrhea (-) abdominal pain.  :  (-) dysuria (-) frequency (-) burning.  MS:  (-) back pain (-) joint pain.  Neuro:  (+) headache (-) numbness (-) tingling (-) focal weakness  Skin:  (-) rash  Except as documented in the HPI,  all other systems are negative

## 2023-04-13 NOTE — H&P ADULT - NSHPPHYSICALEXAM_GEN_ALL_CORE
T(C): 36.6 (04-13-23 @ 20:13), Max: 36.6 (04-13-23 @ 16:36)  HR: 77 (04-13-23 @ 20:13) (77 - 80)  BP: 137/89 (04-13-23 @ 20:13) (137/89 - 144/74)  RR: 20 (04-13-23 @ 20:13) (17 - 20)  SpO2: 97% (04-13-23 @ 20:13) (97% - 99%)    PHYSICAL EXAM:  GENERAL: NAD, well-developed  HEAD:  Atraumatic, Normocephalic  EYES: EOMI, PERRLA, conjunctiva and sclera clear  NECK: Supple, No JVD  CHEST/LUNG: Clear to auscultation bilaterally; No wheeze  HEART: Regular rate and rhythm; No murmurs, rubs, or gallops  ABDOMEN: Soft, Nontender, Nondistended; Bowel sounds present  EXTREMITIES:  2+ Peripheral Pulses, No clubbing, cyanosis, or edema  PSYCH: AAOx3  NEUROLOGY: non-focal  SKIN: No rashes or lesions

## 2023-04-13 NOTE — ED PROVIDER NOTE - NSICDXPASTSURGICALHX_GEN_ALL_CORE_FT
PAST SURGICAL HISTORY:  History of Breast Biopsy [place] lymph node biopsy    History of Cataract Surgery Left eye    S/P Breast Lumpectomy     S/P Lumpectomy of Breast Left Breast    S/P Nasal Polypectomy     Status Post Tonsillectomy

## 2023-04-13 NOTE — ED PROVIDER NOTE - OBJECTIVE STATEMENT
87 y F, hx of mod AS, MVP, breast CA in remission, asthma, PMR on prednisone taper, p/w 1-day onset of headache, sinus pressure, generalized weakness, sore throat, decreased PO intake. Patient had URI symptoms a week ago, was treated with amoxicillin for 4 days. Patient was feeling better for a while and, however, woke up with above mentioned symptoms today. Reports headache now is 5/10. No new vision changes. No vomiting, chest pain, shortness of breath, abdominal pain.

## 2023-04-13 NOTE — ED PROVIDER NOTE - ATTENDING CONTRIBUTION TO CARE
Guillermo Simpson MD:  I personally saw the patient and performed a substantive portion of the visit including all aspects of the medical decision making.    MDM: 87-year-old female with history as seen in HPI above, who presents with generalized headache, sinus pressure, congestion, generalized weakness, sore throat and decreased p.o. intake.  Patient has been on amoxicillin for the last 4 days, but symptoms are getting worse.  Headache is moderate in severity, and bitemporal and bifrontal.  Patient with fever Tmax of 102 Fahrenheit, but has been afebrile for the last 4 days.  Of note patient has had steroid taper for polymyalgia rheumatica, and believes this has been making her symptoms worse.    Denies any chest pain, shortness of breath, new weakness, abdominal pain, vomiting, diarrhea, constipation.    On examination patient has stable vitals, (+) frontal and maxillary sinus tenderness, no temporal tenderness.  Cardiac RRR, lungs CTAB, abdomen soft nontender.  No focal deficits with sensory or motor examination.    Differential is broad and includes but is not limited to: Electrolyte abnormality, infectious etiology, UTI, pneumonia, intracranial pathology, sinus infection.    Will obtain CT Head to evaluate for acute intracranial pathology.  Will obtain CT of max face to evaluate for sinusitis.  Noncontrast scans given patient allergic to IV contrast with angioedema.  Will obtain labs to evaluate for hematologic disorder, metabolic derangements, hepatic and renal function, and screen for infection.    Differential includes but is not limited to: See above    Patient with new problems requiring additional work-up and treatment, following orders: see above  Discussed case with: N/A  Obtained and reviewed external records: Discharge note from 9/15/2022  Additional history obtained from: Daughter at bedside  Chronic conditions and social determinants of health affecting care: See above

## 2023-04-13 NOTE — H&P ADULT - NSICDXPASTSURGICALHX_GEN_ALL_CORE_FT
PAST SURGICAL HISTORY:  History of Breast Biopsy [place] lymph node biopsy    History of Cataract Surgery Left eye    S/P Breast Lumpectomy     S/P Lumpectomy of Breast Left Breast    S/P Nasal Polypectomy     Status Post Tonsillectomy

## 2023-04-13 NOTE — ED ADULT NURSE NOTE - OBJECTIVE STATEMENT
87 y f came to the ed c/o fatigue and headache for 8 days. saw her PMD who started her on abx for a sinus infection after being negative for covid and flu. says the fatigue and headache are progressively getting worse. patient is a/ox3. denies chest pain, sob. skin is warm and dry.

## 2023-04-13 NOTE — ED ADULT NURSE REASSESSMENT NOTE - NS ED NURSE REASSESS COMMENT FT1
Received patient from Prakash RN, patient at [facility] mental status, able to make needs known, NAD, VSS, patient agreeable to plan of care, pending rpt troponin, comfort and safety provided.

## 2023-04-13 NOTE — H&P ADULT - HISTORY OF PRESENT ILLNESS
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 Mcgowan , MVP, breast CA in remission, asthma, PMR on prednisone taper, was diagnosed in July 2022, had bilateral TA biopsies done which were neg for GCA  Ptn presents  w 1-day onset of headache, sinus pressure, generalized weakness, sore throat, decreased PO intake. Patient had URI symptoms a week ago, was treated with amoxicillin for 4 days. Patient was feeling better for a while and, however, woke up with above mentioned symptoms today.   Reports headache now is 5/10. No new vision changes. No vomiting, no fever, no chills, chest pain, shortness of breath, abdominal pain

## 2023-04-13 NOTE — ED PROVIDER NOTE - PHYSICAL EXAMINATION
Gen: Patient is in mild distress due to headache, AAOx3, able to answer questions appropriately, able to follow commands   HEENT: NCAT, EOMI, PERRLA, sinus pressure noted, normal conjunctiva, tongue midline, oral mucosa moist  Lung: CTAB, no respiratory distress, no wheezes/rhonchi/rales B/L, speaking in full sentences  CV: RRR, no murmurs, rubs or gallops, distal pulses 2+ b/l  Abd: soft, NT, ND, no guarding, no rigidity, no rebound tenderness, no CVA tenderness   MSK: no visible deformities, ROM normal in UE/LE, normal strength 5/5 of both UEs and LEs, no back TTP  Neuro: No focal sensory or motor deficits, normal CN exam, normal coordination  Skin: Warm, well perfused, no rash, no leg swelling  Psych: normal affect, calm

## 2023-04-13 NOTE — H&P ADULT - ASSESSMENT
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 Mcgowan , MVP, breast CA in remission, asthma, PMR on prednisone taper, was diagnosed in July 2022, had bilateral TA biopsies done which were neg for GCA  Ptn presents  w 1-day onset of headache, sinus pressure, generalized weakness, sore throat, decreased PO intake. Patient had URI symptoms a week ago, was treated with amoxicillin for 4 days. Patient was feeling better for a while and, however, woke up with above mentioned symptoms today.   Reports headache now is 5/10. No new vision changes. No vomiting, no fever, no chills, chest pain, shortness of breath, abdominal pain    PLAN: ENT consult for r/o acute on chronic sinusitis. will treat w augmentin for now  neuro consult for R/O myopathy from using prednisone for the past 10 months on and off  Rheum consult re PMR treatment and if ptn needs to remain on steroids  cont outptn meds  dvt ppx w sc lovenox

## 2023-04-13 NOTE — ED PROVIDER NOTE - EKG ADDITIONAL INFORMATION FREE TEXT
My independent interpretation of the EKG shows:  Normal sinus rhythm with rate of 70 bpm, normal axis, (+) T wave inversions noted in aVL and V2, no ST elevations or depressions.

## 2023-04-13 NOTE — ED PROVIDER NOTE - CLINICAL SUMMARY MEDICAL DECISION MAKING FREE TEXT BOX
87 y F, hx of breast CA in remission, mod AS, MVR, PMR on prednisone, p/w HA, fatigue, sore throat, sinus pressure. VSWNL on arrival. PE as noted above. Patient is AAOx3, normal neurologic exam, normal coordination, normal strength 5/5 of UEs and LEs bilaterally, clear lung exam, no abdominal tenderness. suspect symptoms likely due to sinus congestion/infection vs adrenal insufficiency (as patient on prednisone taper). Will get inflammatory markers to evaluate for GCA, CT head/maxfacial noncontrast to evaluate for ICH, sinus infection (patient is allergic to IV contrast - angioedema). Will get basic labs, cardiac markers, reassess.

## 2023-04-14 ENCOUNTER — NON-APPOINTMENT (OUTPATIENT)
Age: 88
End: 2023-04-14

## 2023-04-14 DIAGNOSIS — R68.2 DRY MOUTH, UNSPECIFIED: ICD-10-CM

## 2023-04-14 DIAGNOSIS — J32.9 CHRONIC SINUSITIS, UNSPECIFIED: ICD-10-CM

## 2023-04-14 LAB
CORTIS AM PEAK SERPL-MCNC: 14.8 UG/DL — SIGNIFICANT CHANGE UP (ref 6–18.4)
ERYTHROCYTE [SEDIMENTATION RATE] IN BLOOD: 72 MM/HR — HIGH (ref 0–20)
TSH SERPL-MCNC: 0.48 UIU/ML — SIGNIFICANT CHANGE UP (ref 0.27–4.2)

## 2023-04-14 PROCEDURE — 99223 1ST HOSP IP/OBS HIGH 75: CPT

## 2023-04-14 PROCEDURE — 99221 1ST HOSP IP/OBS SF/LOW 40: CPT | Mod: 25

## 2023-04-14 PROCEDURE — 31231 NASAL ENDOSCOPY DX: CPT

## 2023-04-14 PROCEDURE — 99222 1ST HOSP IP/OBS MODERATE 55: CPT

## 2023-04-14 RX ORDER — CHLORHEXIDINE GLUCONATE 213 G/1000ML
1 SOLUTION TOPICAL DAILY
Refills: 0 | Status: ACTIVE | OUTPATIENT
Start: 2023-04-14 | End: 2024-03-12

## 2023-04-14 RX ORDER — METOCLOPRAMIDE HCL 10 MG
10 TABLET ORAL ONCE
Refills: 0 | Status: COMPLETED | OUTPATIENT
Start: 2023-04-14 | End: 2023-04-14

## 2023-04-14 RX ORDER — SODIUM CHLORIDE 0.65 %
1 AEROSOL, SPRAY (ML) NASAL THREE TIMES A DAY
Refills: 0 | Status: ACTIVE | OUTPATIENT
Start: 2023-04-14 | End: 2024-03-12

## 2023-04-14 RX ORDER — AMLODIPINE BESYLATE 2.5 MG/1
2.5 TABLET ORAL DAILY
Refills: 0 | Status: DISCONTINUED | OUTPATIENT
Start: 2023-04-14 | End: 2023-04-15

## 2023-04-14 RX ORDER — METOPROLOL TARTRATE 50 MG
12.5 TABLET ORAL DAILY
Refills: 0 | Status: ACTIVE | OUTPATIENT
Start: 2023-04-14 | End: 2024-03-12

## 2023-04-14 RX ORDER — ACETAMINOPHEN 500 MG
1000 TABLET ORAL ONCE
Refills: 0 | Status: COMPLETED | OUTPATIENT
Start: 2023-04-14 | End: 2023-04-14

## 2023-04-14 RX ADMIN — Medication 1 SPRAY(S): at 23:00

## 2023-04-14 RX ADMIN — ENOXAPARIN SODIUM 30 MILLIGRAM(S): 100 INJECTION SUBCUTANEOUS at 00:45

## 2023-04-14 RX ADMIN — Medication 10 MILLIGRAM(S): at 09:06

## 2023-04-14 RX ADMIN — Medication 400 MILLIGRAM(S): at 22:49

## 2023-04-14 RX ADMIN — Medication 1 SPRAY(S): at 13:46

## 2023-04-14 RX ADMIN — Medication 1 TABLET(S): at 06:05

## 2023-04-14 RX ADMIN — CHLORHEXIDINE GLUCONATE 1 APPLICATION(S): 213 SOLUTION TOPICAL at 12:02

## 2023-04-14 RX ADMIN — AMLODIPINE BESYLATE 2.5 MILLIGRAM(S): 2.5 TABLET ORAL at 00:44

## 2023-04-14 RX ADMIN — Medication 5 MILLIGRAM(S): at 06:06

## 2023-04-14 RX ADMIN — Medication 12.5 MILLIGRAM(S): at 06:06

## 2023-04-14 NOTE — CONSULT NOTE ADULT - SUBJECTIVE AND OBJECTIVE BOX
Patient is a 87y old  Female who presents with a chief complaint of sinusitis, PMR (14 Apr 2023 14:19)    HPI:   87 y F, hx of mod AS but most recent outptn TTE w Dr Lee on outptn basis in jan 2023 showed a nl MV function, MAC, 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 Mcgowan , MVP, breast CA in remission, asthma, PMR on prednisone taper, was diagnosed in July 2022, had bilateral TA biopsies done which were neg for GCA  Ptn admitted 4/13/23  w 1-day onset of headache, sinus pressure, generalized weakness, sore throat, decreased PO intake. Patient had URI symptoms a week ago, was treated with amoxicillin for 4 days. Patient was feeling better for a while and, however, woke up with above mentioned symptoms today.   Reports headache now is 5/10. No new vision changes. No vomiting, no fever, no chills, chest pain, shortness of breath, abdominal pain (13 Apr 2023 21:55)    Patient notes recent cough, fever prior to admission  Rx: Azreonam 4/13     Augmeting 4/13 -->  Prednisone 4/13 50 mg; 4/14: 5m on daily    This am she is uncomfortable, marked abd pain, sweaty, anxious. Notes headache largely resolved. She has nausea.    She notes that she has been on long gradual steroid taper from Prednisone 60 mg daily to current 5 mg daily for PMR    Hospitalized 9/8 --> 9/16 [id number] c hx remote breast ca, MAC untreated, MVP, asthma, mod AS, paroxysmal tachycardia of unknown rhythm, orthostatic hypotension (Feb '22) c/b syncope, anxiety, depression, recent covid (May '22), recent accidental valium toxicity ,PMR on po prednisone at home, recent dx with  acute L3 compression Fx now presenting with PMR (polymyalgia rheumatica) symptoms,  headache accompanied with blurring left eye vision and fatigue;  Underwent bilateral Temporal artery bx, Neg for GCA     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  9/22: Temporal Artery Bx: bilateral temporal artery showed intimal hyperplasia, medial calcification and negative for dysruption of elastin or multinucleated cells. Negative for temporal arteritis   Interstitial lung disease  Uveitis  OS    GERD (Gastroesophageal Reflux Disease)  Irritable Bowel Syndrome    Mitral Valve Prolapse  mild Aortic Stenosis by echo 7/5/22  Anxiety  Clinical Depression  Asthma  Irritable Bowel Syndrome  long standing  Hiatal Hernia  Nasal Polyp  Hypertension      COVID-19 vaccine series completed  Moderna      2019 novel coronavirus disease (COVID-19)  5/27/2022    PMR (polymyalgia rheumatica)  S/P Breast Lumpectomy  S/P Lumpectomy of Breast  Left Breast      Status Post Tonsillectomy  S/P Nasal Polypectomy    History of Cataract Surgery  Left eye    Social history: , lives with one of her daughters  has aide, no tob  previously computer book keeper    FAMILY HISTORY:  FH: CAD (coronary artery disease) (Father, Mother, Sibling, Aunt)    FH: lung cancer (Mother)    REVIEW OF SYSTEMS:  CONSTITUTIONAL: No weakness, fevers or chills  EYES/ENT: No visual changes;  No vertigo or throat pain   NECK: No pain or stiffness  RESPIRATORY: No cough, wheezing, hemoptysis; No shortness of breath  CARDIOVASCULAR: No chest pain or palpitations  GASTROINTESTINAL: No abdominal or epigastric pain. No nausea, vomiting, or hematemesis; No diarrhea or constipation. No melena or hematochezia.  GENITOURINARY: No dysuria, frequency or hematuria  NEUROLOGICAL: No numbness or weakness  SKIN: No itching, burning, rashes, or lesions   All other review of systems is negative unless indicated above    Allergies    cefdinir (Unknown)  ciprofloxacin (Other)  codeine (Nausea; Other)  contrast media (iodine-based) (Angioedema)  fluorescein ophthalmic (Hives; Rash; Flushing)  lactose (Unknown)  latex (Anaphylaxis)  Levaquin (Nausea; Other)  lidocaine (Unknown)  Solu-Medrol (Pruritus; Flushing; Short breath)  SULFA (Anaphylaxis)  tetracycline (Anaphylaxis)  Zofran (Anaphylaxis)    Intolerances    Augmentin (Stomach Upset)      Antimicrobials:  amoxicillin  875 milliGRAM(s)/clavulanate 1 Tablet(s) Oral two times a day      Vital Signs Last 24 Hrs  T(C): 36.4 (14 Apr 2023 08:32), Max: 37 (14 Apr 2023 02:20)  T(F): 97.5 (14 Apr 2023 08:32), Max: 98.6 (14 Apr 2023 02:20)  HR: 77 (14 Apr 2023 08:32) (72 - 81)  BP: 169/88 (14 Apr 2023 08:32) (137/89 - 175/93)  BP(mean): --  RR: 18 (14 Apr 2023 08:32) (17 - 20)  SpO2: 97% (14 Apr 2023 08:32) (95% - 99%)    Parameters below as of 14 Apr 2023 08:32  Patient On (Oxygen Delivery Method): room air        PHYSICAL EXAM:  General: NAD, Non-toxic  Neurology: A&Ox3, nonfocal  HENT  no palpable temporal aa. OS with sl conunctival injection  Respiratory: Clear to auscultation bilaterally  CV: RRR, S1S2, no murmurs, rubs or gallops  Abdominal: Soft, Non-tender, non-distended, normal bowel sounds  Extremities: No edema, + peripheral pulses  Line Sites: Clear  Skin: No rash                        9.6    6.28  )-----------( 290      ( 13 Apr 2023 17:36 )             30.6       04-13    137  |  102  |  15  ----------------------------<  152<H>  4.3   |  24  |  0.69    Ca    9.4      13 Apr 2023 17:36  Phos  3.6     04-13  Mg     2.0     04-13    TPro  6.8  /  Alb  3.1<L>  /  TBili  0.2  /  DBili  x   /  AST  19  /  ALT  21  /  AlkPhos  97  04-13      MICROBIOLOGY:   (04.13.23 @ 17:35)   SARS-CoV-2 Result: NotDete:   Influenza A Result: NotDete  Influenza B Result: NotDetec  Resp Syn Virus Result: NotDetec    Radiology:  images independently viewed  < from: CT 3D Reconstruct w/o Workstation (04.13.23 @ 19:08) >  IMPRESSION:  Head CT: No evidence for intracranial hemorrhage, mass effect, or   displaced calvarial fracture.    Maxillofacial CT: Findings compatible with extensive sinusitis, with   sequela of chronic sinusitis, however superimposed acute sinusitis as   cause of patient's headache cannot be excluded.    < end of copied text >  < from: CT Maxillofacial No Cont (04.13.23 @ 19:08) >  Maxillofacial CT: Findings compatible with extensive sinusitis, with   sequela of chronic sinusitis, however superimposed acute sinusitis as   cause of patient's headache cannot be excluded.    < end of copied text >      Maximilian Dumas MD; Division of Infectious Disease; Pager: [phone]; nights and weekends: [phone]

## 2023-04-14 NOTE — CONSULT NOTE ADULT - PROBLEM SELECTOR RECOMMENDATION 9
- Abx (Augmentin vs Unasyn)  - Flonase 1 spray to b/l nares bid  - Afrin 1-2 sprays to b/l nare bid x 3 days  - nasal saline 2 sprays to b/l nares tid  - will obtain culture  - ENT will continue to follow, call with questions x [phone] - Recommend Unasyn  - Flonase 1 spray to b/l nares bid  - Afrin 1-2 sprays to b/l nare bid x 3 days  - nasal saline 2 sprays to b/l nares tid  - will obtain culture  - ENT will continue to follow, call with questions x [phone] - Recommend Unasyn  - Flonase 1 spray to b/l nares bid  - Afrin 1-2 sprays to b/l nare bid x 3 days  - nasal saline 2 sprays to b/l nares tid  - f/u culture  - Follow up outpatient with Dr. Hsu/Jeana [phone] - Antibiotics as per ID  - Flonase 1 spray to b/l nares bid  - Afrin 1-2 sprays to b/l nare bid x 3 days  - nasal saline 2 sprays to b/l nares tid  - f/u culture    - Follow up outpatient with Dr. Hsu/Jeana [phone]

## 2023-04-14 NOTE — PATIENT PROFILE ADULT - NSPROPOAPRESSUREINJURY_GEN_A_NUR
no Hpi Title: Evaluation of Skin Lesions How Severe Are Your Spot(S)?: mild Have Your Spot(S) Been Treated In The Past?: has not been treated

## 2023-04-14 NOTE — CONSULT NOTE ADULT - SUBJECTIVE AND OBJECTIVE BOX
CC: sinusitis, HA    HPI: 88yo female with PMHx AS, MAC, MVP, breast CA in remission, asthma, PMR on prednisone taper, admitted for sinusitis. Pt c/o headache, sinus pressure, generalized weakness, sore throat, decreased PO intake x 3 days. Patient had URI symptoms a week ago, was treated with amoxicillin for 4 days. CT shows extensive sinusitis, with sequela of chronic sinusitis (detailed report below). Pt states she has a known history of nasal polyps that cause chronic congestion. She denies worsening of nasal congestion. Pt also c/o sore throat and dry mouth. Pt denies fever, chills, n/v, nasal discharge or congestion, SOB, dysphagia, hemoptysis, hoarseness, unintentional weight loss.         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      GERD (Gastroesophageal Reflux Disease)      Irritable Bowel Syndrome      Mitral Valve Prolapse      Anxiety      Clinical Depression      Asthma      Uveitis      Irritable Bowel Syndrome      Hiatal Hernia      Nasal Polyp      Hypertension      COVID-19 vaccine series completed  Moderna      2019 novel coronavirus disease (COVID-19)  5/27/2022      PMR (polymyalgia rheumatica)      S/P Breast Lumpectomy      S/P Lumpectomy of Breast  Left Breast      Status Post Tonsillectomy      S/P Nasal Polypectomy      History of Cataract Surgery  Left eye      History of Breast Biopsy  [place] lymph node biopsy        Allergies    cefdinir (Unknown)  ciprofloxacin (Other)  codeine (Nausea; Other)  contrast media (iodine-based) (Angioedema)  fluorescein ophthalmic (Hives; Rash; Flushing)  lactose (Unknown)  latex (Anaphylaxis)  Levaquin (Nausea; Other)  lidocaine (Unknown)  Solu-Medrol (Pruritus; Flushing; Short breath)  SULFA (Anaphylaxis)  tetracycline (Anaphylaxis)  Zofran (Anaphylaxis)    Intolerances    Augmentin (Stomach Upset)    MEDICATIONS  (STANDING):  amLODIPine   Tablet 2.5 milliGRAM(s) Oral daily  amoxicillin  875 milliGRAM(s)/clavulanate 1 Tablet(s) Oral two times a day  chlorhexidine 2% Cloths 1 Application(s) Topical daily  enoxaparin Injectable 30 milliGRAM(s) SubCutaneous every 24 hours  metoprolol succinate ER 12.5 milliGRAM(s) Oral daily  predniSONE   Tablet 5 milliGRAM(s) Oral daily  sodium chloride 0.65% Nasal 1 Spray(s) Both Nostrils three times a day    MEDICATIONS  (PRN):  acetaminophen     Tablet .. 650 milliGRAM(s) Oral every 6 hours PRN Temp greater or equal to 38C (100.4F), Mild Pain (1 - 3)      Social History: Pt denies tobacco use     Family history: Pt denies any significant family history     ROS:   ENT: all negative except as noted in HPI   CV: denies palpitations  Pulm: denies SOB, cough, hemoptysis  GI: denies change in apetite, indigestion, n/v  : denies pertinent urinary symptoms, urgency  Neuro: denies numbness/tingling, loss of sensation  Psych: denies anxiety  MS: denies muscle weakness, instability  Heme: denies easy bruising or bleeding  Endo: denies heat/cold intolerance, excessive sweating  Vascular: denies LE edema    Vital Signs Last 24 Hrs  T(C): 36.4 (14 Apr 2023 08:32), Max: 37 (14 Apr 2023 02:20)  T(F): 97.5 (14 Apr 2023 08:32), Max: 98.6 (14 Apr 2023 02:20)  HR: 77 (14 Apr 2023 08:32) (72 - 81)  BP: 169/88 (14 Apr 2023 08:32) (137/89 - 175/93)  BP(mean): --  RR: 18 (14 Apr 2023 08:32) (17 - 20)  SpO2: 97% (14 Apr 2023 08:32) (95% - 99%)    Parameters below as of 14 Apr 2023 08:32  Patient On (Oxygen Delivery Method): room air                              9.6    6.28  )-----------( 290      ( 13 Apr 2023 17:36 )             30.6    04-13    137  |  102  |  15  ----------------------------<  152<H>  4.3   |  24  |  0.69    Ca    9.4      13 Apr 2023 17:36  Phos  3.6     04-13  Mg     2.0     04-13    TPro  6.8  /  Alb  3.1<L>  /  TBili  0.2  /  DBili  x   /  AST  19  /  ALT  21  /  AlkPhos  97  04-13       PHYSICAL EXAM:  Gen: NAD  Skin: No rashes, bruises, or lesions  Head: Normocephalic, Atraumatic  Face: no edema, erythema, or fluctuance. Parotid glands soft without mass  Eyes: no scleral injection  Nose: No discharge or purulence noted  Mouth: very dry oral mucosa, + purulence noted along posterior pharynx. No Stridor / Drooling / Trismus. Tongue/uvula midline  Neck: Flat, supple, no lymphadenopathy, trachea midline, no masses  Lymphatic: No lymphadenopathy  Resp: breathing easily, no stridor  CV: no peripheral edema/cyanosis  GI: nondistended   Peripheral vascular: no JVD or edema  Neuro: facial nerve intact, no facial droop      Diagnostic Nasal Endoscopy: (Scope #2 used)  pending    IMAGING/ADDITIONAL STUDIES:   < from: CT Maxillofacial No Cont (04.13.23 @ 19:08) >    MAXILLOFACIAL:    No acute facial bone fractures are visualized.    The temporomandibular joints are intact. No nasal septal defect.    The globes are symmetric in size and contour. Extraocular muscles are not   enlarged or deviated. No radiopaque orbital foreign bodies are   visualized. The retrobulbar fat is preserved.    The right frontal sinus is unpneumatized. The left frontal sinus is   clear. There is extensive mucosal thickening of the bilateral ethmoid air   cells, maxillary sinuses, and right sphenoid sinus. There is prominent   polypoid soft tissue in the bilateral nasal cavities, likely obstructing   the maxillary sinus ostia. The left maxillary sinus walls are thickened   and the bilateral maxillary sinuses are atelectatic, likely reflecting   chronic sinusitis.    No air-fluid levels or aerosolized secretions are seen. The premaxillary   fat is preserved.    The mastoid air cells and middle ear cavities are clear.    IMPRESSION:  Head CT: No evidence for intracranial hemorrhage, mass effect, or   displaced calvarial fracture.    Maxillofacial CT: Findings compatible with extensive sinusitis, with   sequela of chronic sinusitis, however superimposed acute sinusitis as   cause of patient's headache cannot be excluded.    --- End of Report ---          JAVAD PARIS MD; Resident Radiologist  This document has been electronically signed.  ELIA HADLEY MD; Attending Radiologist  This document has been electronically joseline    < end of copied text >   CC: sinusitis, HA    HPI: 88yo female with PMHx AS, MAC, MVP, breast CA in remission, asthma, PMR on prednisone taper, admitted for sinusitis. Pt c/o headache, sinus pressure, generalized weakness, sore throat, decreased PO intake x 3 days. Patient had URI symptoms a week ago, was treated with amoxicillin for 4 days. CT shows extensive sinusitis, with sequela of chronic sinusitis (detailed report below). Pt states she has a known history of nasal polyps that cause chronic congestion. She denies worsening of nasal congestion. Pt also c/o sore throat and dry mouth. Pt denies fever, chills, n/v, nasal discharge or congestion, SOB, dysphagia, hemoptysis, hoarseness, unintentional weight loss.         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      GERD (Gastroesophageal Reflux Disease)      Irritable Bowel Syndrome      Mitral Valve Prolapse      Anxiety      Clinical Depression      Asthma      Uveitis      Irritable Bowel Syndrome      Hiatal Hernia      Nasal Polyp      Hypertension      COVID-19 vaccine series completed  Moderna      2019 novel coronavirus disease (COVID-19)  5/27/2022      PMR (polymyalgia rheumatica)      S/P Breast Lumpectomy      S/P Lumpectomy of Breast  Left Breast      Status Post Tonsillectomy      S/P Nasal Polypectomy      History of Cataract Surgery  Left eye      History of Breast Biopsy  [place] lymph node biopsy        Allergies    cefdinir (Unknown)  ciprofloxacin (Other)  codeine (Nausea; Other)  contrast media (iodine-based) (Angioedema)  fluorescein ophthalmic (Hives; Rash; Flushing)  lactose (Unknown)  latex (Anaphylaxis)  Levaquin (Nausea; Other)  lidocaine (Unknown)  Solu-Medrol (Pruritus; Flushing; Short breath)  SULFA (Anaphylaxis)  tetracycline (Anaphylaxis)  Zofran (Anaphylaxis)    Intolerances    Augmentin (Stomach Upset)    MEDICATIONS  (STANDING):  amLODIPine   Tablet 2.5 milliGRAM(s) Oral daily  amoxicillin  875 milliGRAM(s)/clavulanate 1 Tablet(s) Oral two times a day  chlorhexidine 2% Cloths 1 Application(s) Topical daily  enoxaparin Injectable 30 milliGRAM(s) SubCutaneous every 24 hours  metoprolol succinate ER 12.5 milliGRAM(s) Oral daily  predniSONE   Tablet 5 milliGRAM(s) Oral daily  sodium chloride 0.65% Nasal 1 Spray(s) Both Nostrils three times a day    MEDICATIONS  (PRN):  acetaminophen     Tablet .. 650 milliGRAM(s) Oral every 6 hours PRN Temp greater or equal to 38C (100.4F), Mild Pain (1 - 3)      Social History: Pt denies tobacco use     Family history: Pt denies any significant family history     ROS:   ENT: all negative except as noted in HPI   CV: denies palpitations  Pulm: denies SOB, cough, hemoptysis  GI: denies change in apetite, indigestion, n/v  : denies pertinent urinary symptoms, urgency  Neuro: denies numbness/tingling, loss of sensation  Psych: denies anxiety  MS: denies muscle weakness, instability  Heme: denies easy bruising or bleeding  Endo: denies heat/cold intolerance, excessive sweating  Vascular: denies LE edema    Vital Signs Last 24 Hrs  T(C): 36.4 (14 Apr 2023 08:32), Max: 37 (14 Apr 2023 02:20)  T(F): 97.5 (14 Apr 2023 08:32), Max: 98.6 (14 Apr 2023 02:20)  HR: 77 (14 Apr 2023 08:32) (72 - 81)  BP: 169/88 (14 Apr 2023 08:32) (137/89 - 175/93)  BP(mean): --  RR: 18 (14 Apr 2023 08:32) (17 - 20)  SpO2: 97% (14 Apr 2023 08:32) (95% - 99%)    Parameters below as of 14 Apr 2023 08:32  Patient On (Oxygen Delivery Method): room air                              9.6    6.28  )-----------( 290      ( 13 Apr 2023 17:36 )             30.6    04-13    137  |  102  |  15  ----------------------------<  152<H>  4.3   |  24  |  0.69    Ca    9.4      13 Apr 2023 17:36  Phos  3.6     04-13  Mg     2.0     04-13    TPro  6.8  /  Alb  3.1<L>  /  TBili  0.2  /  DBili  x   /  AST  19  /  ALT  21  /  AlkPhos  97  04-13       PHYSICAL EXAM:  Gen: NAD  Skin: No rashes, bruises, or lesions  Head: Normocephalic, Atraumatic  Face: no edema, erythema, or fluctuance. Parotid glands soft without mass  Eyes: no scleral injection  Nose: No discharge or purulence noted  Mouth: very dry oral mucosa, + purulence noted along posterior pharynx. No Stridor / Drooling / Trismus. Tongue/uvula midline  Neck: Flat, supple, no lymphadenopathy, trachea midline, no masses  Lymphatic: No lymphadenopathy  Resp: breathing easily, no stridor  CV: no peripheral edema/cyanosis  GI: nondistended   Peripheral vascular: no JVD or edema  Neuro: facial nerve intact, no facial droop      Diagnostic Nasal Endoscopy: (Scope #2 used)  Indication for procedure: sinusitis   "Anterior rhinoscopy insufficient to account for symptoms"    Verbal consent obtained from patient    Ambu Scope used: flexible fiber optic telescope used with surgilube     + large diffuse polyps in right nare, nare with small passage along floor of nose. Left nare with polyps, slightly more patent than right. Unable to evaluate sinus ostia 2/2 polyps. Purulence noted along floor of left nare, culture obtained.        IMAGING/ADDITIONAL STUDIES:   < from: CT Maxillofacial No Cont (04.13.23 @ 19:08) >    MAXILLOFACIAL:    No acute facial bone fractures are visualized.    The temporomandibular joints are intact. No nasal septal defect.    The globes are symmetric in size and contour. Extraocular muscles are not   enlarged or deviated. No radiopaque orbital foreign bodies are   visualized. The retrobulbar fat is preserved.    The right frontal sinus is unpneumatized. The left frontal sinus is   clear. There is extensive mucosal thickening of the bilateral ethmoid air   cells, maxillary sinuses, and right sphenoid sinus. There is prominent   polypoid soft tissue in the bilateral nasal cavities, likely obstructing   the maxillary sinus ostia. The left maxillary sinus walls are thickened   and the bilateral maxillary sinuses are atelectatic, likely reflecting   chronic sinusitis.    No air-fluid levels or aerosolized secretions are seen. The premaxillary   fat is preserved.    The mastoid air cells and middle ear cavities are clear.    IMPRESSION:  Head CT: No evidence for intracranial hemorrhage, mass effect, or   displaced calvarial fracture.    Maxillofacial CT: Findings compatible with extensive sinusitis, with   sequela of chronic sinusitis, however superimposed acute sinusitis as   cause of patient's headache cannot be excluded.    --- End of Report ---          JAVAD PARIS MD; Resident Radiologist  This document has been electronically signed.  ELIA HADLEY MD; Attending Radiologist  This document has been electronically joseline    < end of copied text >

## 2023-04-14 NOTE — CONSULT NOTE ADULT - ASSESSMENT
88yo female with PMHx AS, MAC, MVP, breast CA in remission, asthma, PMR, c/o headache, sinus pressure, generalized weakness, sore throat, decreased PO intake x 3 days. CT shows extensive sinusitis, with sequela of chronic sinusitis (detailed report below). Pt states she has a known history of nasal polyps that cause chronic congestion. Pt also c/o sore throat and dry mouth. On exam, no nasal discharge noted, however, + purulence noted along posterior pharynx. Pending nasal endoscopy    88yo female with PMHx AS, MAC, MVP, breast CA in remission, asthma, PMR, c/o headache, sinus pressure, generalized weakness, sore throat, decreased PO intake x 3 days. CT shows extensive sinusitis, with sequela of chronic sinusitis (detailed report below). Pt states she has a known history of nasal polyps that cause chronic congestion. Pt also c/o sore throat and dry mouth. On exam, no nasal discharge noted, however, + purulence noted along posterior pharynx and left nare on nasal endoscopy, culture obtained. Nasal endoscopy remarkable for diffuse polys in b/l nares, unable to evaluate sinuses.    86yo female with PMHx AS, MAC, MVP, breast CA in remission, asthma, PMR, c/o headache, sinus pressure, generalized weakness, sore throat, decreased PO intake x 3 days. CT shows extensive sinusitis, with sequela of chronic sinusitis (detailed report below). Pt states she has a known history of nasal polyps and sinus surgery that cause chronic congestion. Pt also c/o sore throat and dry mouth. On exam, no nasal discharge noted, however, + purulence noted along posterior pharynx and left nare on nasal endoscopy, culture obtained. Nasal endoscopy remarkable for diffuse polys in b/l nares, unable to evaluate sinuses.

## 2023-04-14 NOTE — CONSULT NOTE ADULT - TIME BILLING
Patient seen and examined.  Agree with above PA note.  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 Mcgowan , MVP, breast CA in remission, asthma, PMR on prednisone taper, was diagnosed in July 2022, had bilateral TA biopsies done which were neg for GCA, p/w 1-day onset of headache, sinus pressure, generalized weakness, sore throat, decreased PO intake.    #Sinusitis  -ENT following  -Abx per ENT    #Aortic Stenosis  -moderate on recent echo, cont to monitor     #Orthostatic hypotension  -Stable  -Recent echo with normal LV function, mod AS  -continue norvasc 2.5mg in PM  -continue Toprol 12.5mg for hypertension and palpitations in the am daily  -encourage PO hydration  -compression stockings, leg elevation    #PMR  -F/u rheum eval

## 2023-04-14 NOTE — CONSULT NOTE ADULT - SUBJECTIVE AND OBJECTIVE BOX
ADA ROLLE  [id number]    HISTORY OF PRESENT ILLNESS:  87-year-old PMH/brease cancer (2011), asthma, osteopenia, MAC (untreated), PMR, fibromyalgia, spinal stenosis, uveitis, osteoporosis, ILD GGO, GCA presneted with worsening headache after 1 week of sinusitis.     On admission, VS was remarkable w//98 mmHg, . Labs H/H 9.6/30.6, ESR 72, CRP 42. BNP 1597. CT head The left frontal sinus is clear. There is extensive mucosal thickening of the bilateral ethmoid air cells, maxillary sinuses, and right sphenoid sinus. There is prominent polypoid soft tissue in the bilateral nasal cavities, likely obstructing the maxillary sinus ostia. The left maxillary sinus walls are thickened and the bilateral maxillary sinuses are atelectatic, likely reflecting chronic sinusitis.    Consult day: 4/14/2023  Patient was seen at the bedside. She endorsed having hx frequent sinus infection secondary of polyps in the sinus which was previously removed and now its re-growth. Patient stated that she started having running nose, headache and general body ache 2 weeks prior admission. She called her PCP who provide amoxicillin and took for 4 days with minimal symptom improvement. Patient stated that the headache was located in the frontal and paranasal sinus area. She feels pressure like pain in the face and frontal area. She also has sore throat and productive cough. Patient stated that she saw Dr. Becker in 3/2023 and due to recent episode of non-traumatic compression fracture in the back, she started tapering her prednisone which she is currently taking 5 mg daily. She endorsed PMR symptoms after decreasing the steroid.     ROS:   Positive: sinus like pain, PMR symptoms, running nose, sore throat, productive cough, constipation    Negative: fevers, chills, night sweats, eye pain, eye redness, vision changes, photosensitivity, abdominal pain, n/v/d/c, changes in urinary freq, dysuria, hematuria, foamy urine      #PMR   dx 7/2022  Previous treatment: prednisone 60 mg tapering dose  Current treatment: prednisone 5 mg and tapering dose     #GCA  9/2022  Initial symptoms: headaches, intermittent b/l intermittent blurry vision, and scalp tenderness w/ palpation   Dopplers of temporal arteries w/o signs of GCA,  Rheumatologist: Dr. Becker   Bx: bilateral temporal artery showed intimal hyperplasia, medial calcification and negative for dysruption of elastin or multinucleated cells. Negative for temporal arteritis   Last visit: 3/23/23  Current: prednisone 5 mg daily     #Osteoporosis:  non-traumatic L3 compression fracture  low vitamin D levels     #Fibromyalgia   Current treatment: gabapentin     #ILD  CT chest 1/2017: GGO opacity solid component with subpleural reticular opacities.   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      GERD (Gastroesophageal Reflux Disease)      Irritable Bowel Syndrome      Mitral Valve Prolapse      Anxiety      Clinical Depression      Asthma      Uveitis      Irritable Bowel Syndrome      Hiatal Hernia      Nasal Polyp      Hypertension      COVID-19 vaccine series completed  Moderna      2019 novel coronavirus disease (COVID-19)  5/27/2022      PMR (polymyalgia rheumatica)      S/P Breast Lumpectomy      S/P Lumpectomy of Breast  Left Breast      Status Post Tonsillectomy      S/P Nasal Polypectomy      History of Cataract Surgery  Left eye      History of Breast Biopsy  [place] lymph node biopsy          Review of Systems:  See H&P    MEDICATIONS  (STANDING):  amLODIPine   Tablet 2.5 milliGRAM(s) Oral daily  amoxicillin  875 milliGRAM(s)/clavulanate 1 Tablet(s) Oral two times a day  chlorhexidine 2% Cloths 1 Application(s) Topical daily  enoxaparin Injectable 30 milliGRAM(s) SubCutaneous every 24 hours  metoprolol succinate ER 12.5 milliGRAM(s) Oral daily  predniSONE   Tablet 5 milliGRAM(s) Oral daily  sodium chloride 0.65% Nasal 1 Spray(s) Both Nostrils three times a day    MEDICATIONS  (PRN):  acetaminophen     Tablet .. 650 milliGRAM(s) Oral every 6 hours PRN Temp greater or equal to 38C (100.4F), Mild Pain (1 - 3)      Allergies    cefdinir (Unknown)  ciprofloxacin (Other)  codeine (Nausea; Other)  contrast media (iodine-based) (Angioedema)  fluorescein ophthalmic (Hives; Rash; Flushing)  lactose (Unknown)  latex (Anaphylaxis)  Levaquin (Nausea; Other)  lidocaine (Unknown)  Solu-Medrol (Pruritus; Flushing; Short breath)  SULFA (Anaphylaxis)  tetracycline (Anaphylaxis)  Zofran (Anaphylaxis)    Intolerances    Augmentin (Stomach Upset)      PERTINENT MEDICATION HISTORY:    SOCIAL HISTORY:  OCCUPATION:  TRAVEL HISTORY:    FAMILY HISTORY:  FH: CAD (coronary artery disease) (Father, Mother, Sibling, Aunt)    FH: lung cancer (Mother)        Vital Signs Last 24 Hrs  T(C): 36.4 (14 Apr 2023 08:32), Max: 37 (14 Apr 2023 02:20)  T(F): 97.5 (14 Apr 2023 08:32), Max: 98.6 (14 Apr 2023 02:20)  HR: 77 (14 Apr 2023 08:32) (72 - 81)  BP: 169/88 (14 Apr 2023 08:32) (137/89 - 175/93)  BP(mean): --  RR: 18 (14 Apr 2023 08:32) (17 - 20)  SpO2: 97% (14 Apr 2023 08:32) (95% - 99%)    Parameters below as of 14 Apr 2023 08:32  Patient On (Oxygen Delivery Method): room air        Physical Exam:  General: No apparent distress  HEENT: postnasal drip, red throat, sinus pressure   CVS: +S1/S2, RRR, no murmurs  Resp: CTA b/l. Velcro sounds   GI: Soft, NT/ND +BS  MSK:  Neuro: AAOx3  Skin: no visible rashes    LABS:                        9.6    6.28  )-----------( 290      ( 13 Apr 2023 17:36 )             30.6     04-13    137  |  102  |  15  ----------------------------<  152<H>  4.3   |  24  |  0.69    Ca    9.4      13 Apr 2023 17:36  Phos  3.6     04-13  Mg     2.0     04-13    TPro  6.8  /  Alb  3.1<L>  /  TBili  0.2  /  DBili  x   /  AST  19  /  ALT  21  /  AlkPhos  97  04-13        Sedimentation Rate, Erythrocyte: 72 mm/hr (04.13.23 @ 17:36) Pro-Brain Natriuretic Peptide: 1597 pg/mL (04.13.23 @ 17:36)   RADIOLOGY & ADDITIONAL STUDIES:  < from: CT 3D Reconstruct w/o Workstation (04.13.23 @ 19:08) >  Head CT: No evidence for intracranial hemorrhage, mass effect, or   displaced calvarial fracture.    Maxillofacial CT: Findings compatible with extensive sinusitis, with   sequela of chronic sinusitis, however superimposed acute sinusitis as   cause of patient's headache cannot be excluded.    < end of copied text >  < from: CT Maxillofacial No Cont (04.13.23 @ 19:08) >  Head CT: No evidence for intracranial hemorrhage, mass effect, or   displaced calvarial fracture.    Maxillofacial CT: Findings compatible with extensive sinusitis, with   sequela of chronic sinusitis, however superimposed acute sinusitis as   cause of patient's headache cannot be excluded.      < end of copied text >  < from: CT Head No Cont (04.13.23 @ 19:08) >  The right frontal sinus is unpneumatized. The left frontal sinus is   clear. There is extensive mucosal thickening of the bilateral ethmoid air   cells, maxillary sinuses, and right sphenoid sinus. There is prominent   polypoid soft tissue in the bilateral nasal cavities, likely obstructing   the maxillary sinus ostia. The left maxillary sinus walls are thickened   and the bilateral maxillary sinuses are atelectatic, likely reflecting   chronic sinusitis.    < end of copied text >     ADA ROLLE  [id number]    HISTORY OF PRESENT ILLNESS:  87-year-old PMH/brease cancer (2011), asthma, osteopenia, MAC (untreated), PMR, fibromyalgia, spinal stenosis, uveitis, osteoporosis, uveitis, chronic sinus polyps, ILD GGO, GCA presented with worsening headache after 1 week of sinusitis.     On admission, VS was remarkable w//98 mmHg, . Labs H/H 9.6/30.6, ESR 72, CRP 42. BNP 1597. CT head The left frontal sinus is clear. There is extensive mucosal thickening of the bilateral ethmoid air cells, maxillary sinuses, and right sphenoid sinus. There is prominent polypoid soft tissue in the bilateral nasal cavities, likely obstructing the maxillary sinus ostia. The left maxillary sinus walls are thickened and the bilateral maxillary sinuses are atelectatic, likely reflecting chronic sinusitis.    Consult day: 4/14/2023  Patient was seen at the bedside. She endorsed having hx frequent sinus infection secondary of polyps in the sinus which was previously removed and now its re-growth. Patient stated that she started having running nose, headache and general body ache 2 weeks prior admission. She called her PCP who provide amoxicillin and took for 4 days with minimal symptom improvement. Patient stated that the headache was located in the frontal and paranasal sinus area. She feels pressure like pain in the face and frontal area. She also has sore throat and productive cough. Patient stated that she saw Dr. Becker in 3/2023 and due to recent episode of non-traumatic compression fracture in the back, she started tapering her prednisone which she is currently taking 5 mg daily. She endorsed PMR symptoms after decreasing the steroid.     ROS:   Positive: sinus like pain, PMR symptoms, running nose, sore throat, productive cough, constipation    Negative: fevers, chills, night sweats, eye pain, eye redness, vision changes, photosensitivity, abdominal pain, n/v/d/c, changes in urinary freq, dysuria, hematuria, foamy urine      #PMR   dx 7/2022  Previous treatment: prednisone 60 mg tapering dose  Current treatment: prednisone 5 mg and tapering dose     #GCA  9/2022  Initial symptoms: headaches, intermittent b/l intermittent blurry vision, and scalp tenderness w/ palpation   Dopplers of temporal arteries w/o signs of GCA,  Rheumatologist: Dr. Becker   Bx: bilateral temporal artery showed intimal hyperplasia, medial calcification and negative for dysruption of elastin or multinucleated cells. Negative for temporal arteritis   Last visit: 3/23/23  Current: prednisone 5 mg daily     #Osteoporosis:  non-traumatic L3 compression fracture  low vitamin D levels     #Fibromyalgia   Current treatment: gabapentin     #ILD  CT chest 1/2017: GGO opacity solid component with subpleural reticular opacities.   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      GERD (Gastroesophageal Reflux Disease)      Irritable Bowel Syndrome      Mitral Valve Prolapse      Anxiety      Clinical Depression      Asthma      Uveitis      Irritable Bowel Syndrome      Hiatal Hernia      Nasal Polyp      Hypertension      COVID-19 vaccine series completed  Moderna      2019 novel coronavirus disease (COVID-19)  5/27/2022      PMR (polymyalgia rheumatica)      S/P Breast Lumpectomy      S/P Lumpectomy of Breast  Left Breast      Status Post Tonsillectomy      S/P Nasal Polypectomy      History of Cataract Surgery  Left eye      History of Breast Biopsy  [place] lymph node biopsy          Review of Systems:  See H&P    MEDICATIONS  (STANDING):  amLODIPine   Tablet 2.5 milliGRAM(s) Oral daily  amoxicillin  875 milliGRAM(s)/clavulanate 1 Tablet(s) Oral two times a day  chlorhexidine 2% Cloths 1 Application(s) Topical daily  enoxaparin Injectable 30 milliGRAM(s) SubCutaneous every 24 hours  metoprolol succinate ER 12.5 milliGRAM(s) Oral daily  predniSONE   Tablet 5 milliGRAM(s) Oral daily  sodium chloride 0.65% Nasal 1 Spray(s) Both Nostrils three times a day    MEDICATIONS  (PRN):  acetaminophen     Tablet .. 650 milliGRAM(s) Oral every 6 hours PRN Temp greater or equal to 38C (100.4F), Mild Pain (1 - 3)      Allergies    cefdinir (Unknown)  ciprofloxacin (Other)  codeine (Nausea; Other)  contrast media (iodine-based) (Angioedema)  fluorescein ophthalmic (Hives; Rash; Flushing)  lactose (Unknown)  latex (Anaphylaxis)  Levaquin (Nausea; Other)  lidocaine (Unknown)  Solu-Medrol (Pruritus; Flushing; Short breath)  SULFA (Anaphylaxis)  tetracycline (Anaphylaxis)  Zofran (Anaphylaxis)    Intolerances    Augmentin (Stomach Upset)      PERTINENT MEDICATION HISTORY:    SOCIAL HISTORY:  OCCUPATION:  TRAVEL HISTORY:    FAMILY HISTORY:  FH: CAD (coronary artery disease) (Father, Mother, Sibling, Aunt)    FH: lung cancer (Mother)        Vital Signs Last 24 Hrs  T(C): 36.4 (14 Apr 2023 08:32), Max: 37 (14 Apr 2023 02:20)  T(F): 97.5 (14 Apr 2023 08:32), Max: 98.6 (14 Apr 2023 02:20)  HR: 77 (14 Apr 2023 08:32) (72 - 81)  BP: 169/88 (14 Apr 2023 08:32) (137/89 - 175/93)  BP(mean): --  RR: 18 (14 Apr 2023 08:32) (17 - 20)  SpO2: 97% (14 Apr 2023 08:32) (95% - 99%)    Parameters below as of 14 Apr 2023 08:32  Patient On (Oxygen Delivery Method): room air        Physical Exam:  General: No apparent distress  HEENT: postnasal drip, red throat, sinus pressure   CVS: +S1/S2, RRR, no murmurs  Resp: CTA b/l. Velcro sounds   GI: Soft, NT/ND +BS  MSK:  Neuro: AAOx3  Skin: no visible rashes    LABS:                        9.6    6.28  )-----------( 290      ( 13 Apr 2023 17:36 )             30.6     04-13    137  |  102  |  15  ----------------------------<  152<H>  4.3   |  24  |  0.69    Ca    9.4      13 Apr 2023 17:36  Phos  3.6     04-13  Mg     2.0     04-13    TPro  6.8  /  Alb  3.1<L>  /  TBili  0.2  /  DBili  x   /  AST  19  /  ALT  21  /  AlkPhos  97  04-13        Sedimentation Rate, Erythrocyte: 72 mm/hr (04.13.23 @ 17:36) Pro-Brain Natriuretic Peptide: 1597 pg/mL (04.13.23 @ 17:36)   RADIOLOGY & ADDITIONAL STUDIES:  < from: CT 3D Reconstruct w/o Workstation (04.13.23 @ 19:08) >  Head CT: No evidence for intracranial hemorrhage, mass effect, or   displaced calvarial fracture.    Maxillofacial CT: Findings compatible with extensive sinusitis, with   sequela of chronic sinusitis, however superimposed acute sinusitis as   cause of patient's headache cannot be excluded.    < end of copied text >  < from: CT Maxillofacial No Cont (04.13.23 @ 19:08) >  Head CT: No evidence for intracranial hemorrhage, mass effect, or   displaced calvarial fracture.    Maxillofacial CT: Findings compatible with extensive sinusitis, with   sequela of chronic sinusitis, however superimposed acute sinusitis as   cause of patient's headache cannot be excluded.      < end of copied text >  < from: CT Head No Cont (04.13.23 @ 19:08) >  The right frontal sinus is unpneumatized. The left frontal sinus is   clear. There is extensive mucosal thickening of the bilateral ethmoid air   cells, maxillary sinuses, and right sphenoid sinus. There is prominent   polypoid soft tissue in the bilateral nasal cavities, likely obstructing   the maxillary sinus ostia. The left maxillary sinus walls are thickened   and the bilateral maxillary sinuses are atelectatic, likely reflecting   chronic sinusitis.    < end of copied text >     ADA ROLLE  [id number]    HISTORY OF PRESENT ILLNESS:  87-year-old PMH/brease cancer (2011), asthma, osteopenia, MAC (untreated), PMR, fibromyalgia, spinal stenosis, uveitis, osteoporosis, uveitis, chronic sinus polyps, ILD GGO, presented with worsening headache after 1 week of sinusitis.     On admission, VS was remarkable w//98 mmHg, . Labs H/H 9.6/30.6, ESR 72, CRP 42. BNP 1597. CT head The left frontal sinus is clear. There is extensive mucosal thickening of the bilateral ethmoid air cells, maxillary sinuses, and right sphenoid sinus. There is prominent polypoid soft tissue in the bilateral nasal cavities, likely obstructing the maxillary sinus ostia. The left maxillary sinus walls are thickened and the bilateral maxillary sinuses are atelectatic, likely reflecting chronic sinusitis.    Consult day: 4/14/2023  Patient was seen at the bedside. She endorsed having hx frequent sinus infection secondary of polyps in the sinus which was previously removed and now its re-growth. Patient stated that she started having running nose, headache and general body ache 2 weeks prior admission. She called her PCP who provide amoxicillin and took for 4 days with minimal symptom improvement. Patient stated that the headache was located in the frontal and paranasal sinus area. She feels pressure like pain in the face and frontal area. She also has sore throat and productive cough. Patient stated that she saw Dr. Becker in 3/2023 and due to recent episode of non-traumatic compression fracture in the back, she started tapering her prednisone which she is currently taking 5 mg daily. She endorsed PMR symptoms after decreasing the steroid.     ROS:   Positive: sinus like pain, PMR symptoms, running nose, sore throat, productive cough, constipation    Negative: fevers, chills, night sweats, eye pain, eye redness, vision changes, photosensitivity, abdominal pain, n/v/d/c, changes in urinary freq, dysuria, hematuria, foamy urine      #PMR   dx 7/2022  Previous treatment: prednisone 60 mg tapering dose  Current treatment: prednisone 5 mg and tapering dose     #r/o GCA  9/2022  Initial symptoms: headaches, intermittent b/l intermittent blurry vision, and scalp tenderness w/ palpation   Dopplers of temporal arteries w/o signs of GCA,  Rheumatologist: Dr. Becker   Bx: bilateral temporal artery showed intimal hyperplasia, medial calcification and negative for dysruption of elastin or multinucleated cells. Negative for temporal arteritis   Last visit: 3/23/23    #Osteoporosis:  non-traumatic L3 compression fracture  low vitamin D levels     #Fibromyalgia   Current treatment: gabapentin     #ILD  CT chest 1/2017: GGO opacity solid component with subpleural reticular opacities.   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      GERD (Gastroesophageal Reflux Disease)      Irritable Bowel Syndrome      Mitral Valve Prolapse      Anxiety      Clinical Depression      Asthma      Uveitis      Irritable Bowel Syndrome      Hiatal Hernia      Nasal Polyp      Hypertension      COVID-19 vaccine series completed  Moderna      2019 novel coronavirus disease (COVID-19)  5/27/2022      PMR (polymyalgia rheumatica)      S/P Breast Lumpectomy      S/P Lumpectomy of Breast  Left Breast      Status Post Tonsillectomy      S/P Nasal Polypectomy      History of Cataract Surgery  Left eye      History of Breast Biopsy  [place] lymph node biopsy          Review of Systems:  See H&P    MEDICATIONS  (STANDING):  amLODIPine   Tablet 2.5 milliGRAM(s) Oral daily  amoxicillin  875 milliGRAM(s)/clavulanate 1 Tablet(s) Oral two times a day  chlorhexidine 2% Cloths 1 Application(s) Topical daily  enoxaparin Injectable 30 milliGRAM(s) SubCutaneous every 24 hours  metoprolol succinate ER 12.5 milliGRAM(s) Oral daily  predniSONE   Tablet 5 milliGRAM(s) Oral daily  sodium chloride 0.65% Nasal 1 Spray(s) Both Nostrils three times a day    MEDICATIONS  (PRN):  acetaminophen     Tablet .. 650 milliGRAM(s) Oral every 6 hours PRN Temp greater or equal to 38C (100.4F), Mild Pain (1 - 3)      Allergies    cefdinir (Unknown)  ciprofloxacin (Other)  codeine (Nausea; Other)  contrast media (iodine-based) (Angioedema)  fluorescein ophthalmic (Hives; Rash; Flushing)  lactose (Unknown)  latex (Anaphylaxis)  Levaquin (Nausea; Other)  lidocaine (Unknown)  Solu-Medrol (Pruritus; Flushing; Short breath)  SULFA (Anaphylaxis)  tetracycline (Anaphylaxis)  Zofran (Anaphylaxis)    Intolerances    Augmentin (Stomach Upset)        FAMILY HISTORY:  FH: CAD (coronary artery disease) (Father, Mother, Sibling, Aunt)    FH: lung cancer (Mother)        Vital Signs Last 24 Hrs  T(C): 36.4 (14 Apr 2023 08:32), Max: 37 (14 Apr 2023 02:20)  T(F): 97.5 (14 Apr 2023 08:32), Max: 98.6 (14 Apr 2023 02:20)  HR: 77 (14 Apr 2023 08:32) (72 - 81)  BP: 169/88 (14 Apr 2023 08:32) (137/89 - 175/93)  BP(mean): --  RR: 18 (14 Apr 2023 08:32) (17 - 20)  SpO2: 97% (14 Apr 2023 08:32) (95% - 99%)    Parameters below as of 14 Apr 2023 08:32  Patient On (Oxygen Delivery Method): room air        Physical Exam:  General: No apparent distress  HEENT: postnasal drip, red throat, sinus pressure   CVS: +S1/S2, RRR, no murmurs  Resp: CTA b/l. Velcro sounds   GI: Soft, NT/ND +BS  MSK: no synovitis   Neuro: AAOx3  Skin: no visible rashes    LABS:                        9.6    6.28  )-----------( 290      ( 13 Apr 2023 17:36 )             30.6     04-13    137  |  102  |  15  ----------------------------<  152<H>  4.3   |  24  |  0.69    Ca    9.4      13 Apr 2023 17:36  Phos  3.6     04-13  Mg     2.0     04-13    TPro  6.8  /  Alb  3.1<L>  /  TBili  0.2  /  DBili  x   /  AST  19  /  ALT  21  /  AlkPhos  97  04-13        Sedimentation Rate, Erythrocyte: 72 mm/hr (04.13.23 @ 17:36) Pro-Brain Natriuretic Peptide: 1597 pg/mL (04.13.23 @ 17:36)   RADIOLOGY & ADDITIONAL STUDIES:  < from: CT 3D Reconstruct w/o Workstation (04.13.23 @ 19:08) >  Head CT: No evidence for intracranial hemorrhage, mass effect, or   displaced calvarial fracture.    Maxillofacial CT: Findings compatible with extensive sinusitis, with   sequela of chronic sinusitis, however superimposed acute sinusitis as   cause of patient's headache cannot be excluded.    < end of copied text >  < from: CT Maxillofacial No Cont (04.13.23 @ 19:08) >  Head CT: No evidence for intracranial hemorrhage, mass effect, or   displaced calvarial fracture.    Maxillofacial CT: Findings compatible with extensive sinusitis, with   sequela of chronic sinusitis, however superimposed acute sinusitis as   cause of patient's headache cannot be excluded.      < end of copied text >  < from: CT Head No Cont (04.13.23 @ 19:08) >  The right frontal sinus is unpneumatized. The left frontal sinus is   clear. There is extensive mucosal thickening of the bilateral ethmoid air   cells, maxillary sinuses, and right sphenoid sinus. There is prominent   polypoid soft tissue in the bilateral nasal cavities, likely obstructing   the maxillary sinus ostia. The left maxillary sinus walls are thickened   and the bilateral maxillary sinuses are atelectatic, likely reflecting   chronic sinusitis.    < end of copied text >

## 2023-04-14 NOTE — CONSULT NOTE ADULT - SUBJECTIVE AND OBJECTIVE BOX
CARDIOLOGY CONSULT - Dr. Lee       HPI:  87 y F, hx of mod AS but most recent outptn TTE w Dr Lee on outptn basis in jan 2023 showed a nl MV function, MAC, 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 Mcgowan , MVP, breast CA in remission, asthma, PMR on prednisone taper, was diagnosed in July 2022, had bilateral TA biopsies done which were neg for GCA  Ptn presents  w 1-day onset of headache, sinus pressure, generalized weakness, sore throat, decreased PO intake. Patient had URI symptoms a week ago, was treated with amoxicillin for 4 days. Patient was feeling better for a while and, however, woke up with above mentioned symptoms today.   Reports headache now is 5/10. No new vision changes. No vomiting, no fever, no chills, chest pain, shortness of breath, abdominal pain (13 Apr 2023 21:55)      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      GERD (Gastroesophageal Reflux Disease)      Irritable Bowel Syndrome      Mitral Valve Prolapse      Anxiety      Clinical Depression      Asthma      Uveitis      Irritable Bowel Syndrome      Hiatal Hernia      Nasal Polyp      Hypertension      COVID-19 vaccine series completed  Moderna      2019 novel coronavirus disease (COVID-19)  5/27/2022      PMR (polymyalgia rheumatica)      S/P Breast Lumpectomy      S/P Lumpectomy of Breast  Left Breast      Status Post Tonsillectomy      S/P Nasal Polypectomy      History of Cataract Surgery  Left eye      History of Breast Biopsy  [place] lymph node biopsy              PREVIOUS DIAGNOSTIC TESTING:    [x] Echocardiogram:  < from: Transthoracic Echocardiogram (07.05.22 @ 14:22) >  Conclusions:  1. Calcified aortic valve with decreased opening. Peak  transaortic valve gradient equals 21 mm Hg, mean  transaortic valve gradient equals 10 mm Hg, estimated  aortic valve area equals 1.8 sqcm (by continuity equation),  aortic valve velocity time integral equals 50 cm,  consistent with mild aortic stenosis.  2. Basal septal hypertrophy.  3. Normal left ventricular systolic function. No segmental  wall motion abnormalities.  4. Moderate right atrial enlargement.  5. Normal right ventricular size and function.    < end of copied text >    [ ]  Catheterization:  [ ] Stress Test:  	    MEDICATIONS:  Home Medications:  amLODIPine 5 mg oral tablet: 1 tab(s) orally once a day (in the evening) (04 Sep 2022 21:42)  Clear Eyes 0.012% ophthalmic solution: 1 drop(s) to each affected eye 3 times a day, As Needed (04 Sep 2022 21:42)  Durezol 0.05% ophthalmic emulsion: 1 drop(s) in the left eye 2 times a day (04 Sep 2022 21:42)  Flonase 50 mcg/inh nasal spray: 1 spray(s) in each nostril 2 times a day (04 Sep 2022 21:42)  FLUoxetine 20 mg oral capsule: 3 cap(s) orally once a day (04 Sep 2022 21:42)  melatonin 5 mg oral tablet: 1 tab(s) orally once a day (at bedtime), As needed, Insomnia (08 Sep 2022 13:24)  polyethylene glycol 3350 oral powder for reconstitution: 17 gram(s) orally 2 times a day (08 Sep 2022 13:24)  Tylenol 500 mg oral tablet: 2 tab(s) orally 3 times a day (04 Sep 2022 21:42)  Vitamin D3 25 mcg (1000 intl units) oral tablet: 1 tab(s) orally once a day (04 Sep 2022 21:42)      MEDICATIONS  (STANDING):  amLODIPine   Tablet 2.5 milliGRAM(s) Oral daily  amoxicillin  875 milliGRAM(s)/clavulanate 1 Tablet(s) Oral two times a day  chlorhexidine 2% Cloths 1 Application(s) Topical daily  enoxaparin Injectable 30 milliGRAM(s) SubCutaneous every 24 hours  metoprolol succinate ER 12.5 milliGRAM(s) Oral daily  predniSONE   Tablet 5 milliGRAM(s) Oral daily  sodium chloride 0.65% Nasal 1 Spray(s) Both Nostrils three times a day      FAMILY HISTORY:  FH: CAD (coronary artery disease) (Father, Mother, Sibling, Aunt)    FH: lung cancer (Mother)        SOCIAL HISTORY:    [x] Non-smoker  [ ] Smoker  [ ] Alcohol    Allergies    cefdinir (Unknown)  ciprofloxacin (Other)  codeine (Nausea; Other)  contrast media (iodine-based) (Angioedema)  fluorescein ophthalmic (Hives; Rash; Flushing)  lactose (Unknown)  latex (Anaphylaxis)  Levaquin (Nausea; Other)  lidocaine (Unknown)  Solu-Medrol (Pruritus; Flushing; Short breath)  SULFA (Anaphylaxis)  tetracycline (Anaphylaxis)  Zofran (Anaphylaxis)    Intolerances    Augmentin (Stomach Upset)  	    REVIEW OF SYSTEMS:  CONSTITUTIONAL: No fever, weight loss, or fatigue  EYES: No eye pain, visual disturbances, or discharge  ENMT:  +Congestion, +sore throat  NECK: No pain or stiffness  RESPIRATORY: No cough, wheezing, chills or hemoptysis; No Shortness of Breath  CARDIOVASCULAR: No chest pain, palpitations, passing out, dizziness, or leg swelling  GASTROINTESTINAL: No abdominal or epigastric pain. No nausea, vomiting, or hematemesis; No diarrhea or constipation. No melena or hematochezia.  GENITOURINARY: No dysuria, frequency, hematuria, or incontinence  NEUROLOGICAL: No headaches, memory loss, loss of strength, numbness, or tremors  SKIN: No itching, burning, rashes, or lesions   	    [x] All others negative	  [ ] Unable to obtain    PHYSICAL EXAM:  T(C): 36.4 (04-14-23 @ 08:32), Max: 37 (04-14-23 @ 02:20)  HR: 77 (04-14-23 @ 08:32) (72 - 81)  BP: 169/88 (04-14-23 @ 08:32) (137/89 - 175/93)  RR: 18 (04-14-23 @ 08:32) (17 - 20)  SpO2: 97% (04-14-23 @ 08:32) (95% - 99%)  Wt(kg): --  I&O's Summary      Appearance: Normal	  Psychiatry: A & O x 3, Mood & affect appropriate  HEENT:   Normal oral mucosa, PERRL, EOMI, +congestion	  Lymphatic: No lymphadenopathy  Cardiovascular: Normal S1 S2,RRR, No JVD, No murmurs  Respiratory: Lungs clear to auscultation b/l  Gastrointestinal:  Soft, Non-tender, + BS	  Skin: No rashes, No ecchymoses, No cyanosis	  Neurologic: Non-focal  Extremities: Normal range of motion, No clubbing, cyanosis or edema  Vascular: Peripheral pulses palpable 2+ bilaterally    TELEMETRY: 	    ECG:  NSR 70 bpm	  RADIOLOGY:  OTHER: 	  	  LABS:	 	    CARDIAC MARKERS:  Troponin T, High Sensitivity Result: 30 ng/L (04-13 @ 20:36)  Troponin T, High Sensitivity Result: 30 ng/L (04-13 @ 17:36)                                  9.6    6.28  )-----------( 290      ( 13 Apr 2023 17:36 )             30.6     04-13    137  |  102  |  15  ----------------------------<  152<H>  4.3   |  24  |  0.69    Ca    9.4      13 Apr 2023 17:36  Phos  3.6     04-13  Mg     2.0     04-13    TPro  6.8  /  Alb  3.1<L>  /  TBili  0.2  /  DBili  x   /  AST  19  /  ALT  21  /  AlkPhos  97  04-13      proBNP:   Lipid Profile:   HgA1c:   TSH:

## 2023-04-14 NOTE — CONSULT NOTE ADULT - ATTENDING COMMENTS
Amendments to fellow's assessment and plan as above.  Patient aware of our recommendations and in agreement.  Discussed with Dr Bucio

## 2023-04-14 NOTE — PATIENT PROFILE ADULT - FALL HARM RISK - RISK INTERVENTIONS

## 2023-04-14 NOTE — CONSULT NOTE ADULT - ASSESSMENT
87-year-old PMH/brease cancer (2011), asthma, osteopenia, MAC (untreated), PMR, fibromyalgia, spinal stenosis, uveitis, osteoporosis, ILD GGO, GCA presneted with worsening headache after 1 week of sinusitis.     #Headache   -Patient had frequent sinusitis in a past due to hx of sinus polyps which was previously removed and improved. Patient endorsed that her last visit w/ENT mentioned that she has polyps again   -Patient w/running nose, pressure like pain in the face headache for 2 weeks like sinusitis  -She took 4 days of amoxicillin previous coming to the hospital which was helping with symptoms  -Inflammatory markers elevated but on a setting of infection   -CT showed extensive sinus opacification       #PMR  Patient started tapering her prednisone dose and currently on 5 mg  She was advised to fast tapering due to her osteoporosis       #GCA     Plan:  -likely sinusitis w/current sinus polyps   -No actual GCA symptoms but endorse PMR symptoms, with ongoing infection and osteoporosis we will hold on increasing steroid. Possible role in sparing medication such as IL6 inhibitor Sarilumab. This can be discussed as outpatient    Note is incomplete, please wait for attending attestation  Amari Marie MD  PGY-4 Rheumatology Fellow  Pager: [phone]   Available in teams        87-year-old PMH/brease cancer (2011), asthma, osteopenia, MAC (untreated), PMR, fibromyalgia, spinal stenosis, uveitis, osteoporosis, uveitis, ILD GGO, GCA presented with worsening headache after 1 week of sinusitis.     #Headache   -Patient had frequent sinusitis in a past due to hx of sinus polyps which was previously removed and improved. Patient endorsed that her last visit w/ENT mentioned that she has polyps again   -Patient w/running nose, pressure like pain in the face headache for 2 weeks like sinusitis  -She took 4 days of amoxicillin previous coming to the hospital which was helping with symptoms  - On ROS denied any vision change or jaw claudication  -Inflammatory markers elevated but on a setting of infection   -CT showed extensive sinus opacification   -Likely sinusitis      #PMR  Patient started tapering her prednisone dose and currently on 5 mg  She was advised to fast tapering due to her osteoporosis     #Hx of Uveitis       #Hx GCA     Plan:  -likely sinusitis w/current sinus polyps, continue on 5 mg of prednisone   -No actual GCA symptoms but endorse PMR symptoms but improving. Possible role in sparing medication such as IL6 inhibitor, Sarilumab. This can be discussed as outpatient    DW Dr. Sidney Marie MD  PGY-4 Rheumatology Fellow  Pager: [phone]   Available in teams        87-year-old PMH of breast cancer (2011), asthma, osteopenia, MAC (untreated), PMR, fibromyalgia, spinal stenosis, uveitis, osteoporosis, presented with worsening headache after 1 week of sinusitis.     #Headache , sinusitis   -Patient had frequent sinusitis in a past due to hx of sinus polyps which was previously removed and improved. Patient endorsed that her last visit w/ENT mentioned that she has polyps again   -Patient w/runny nose, pressure like pain in the face headache for 2 weeks like sinusitis  -She took 4 days of amoxicillin previous coming to the hospital which was helping with symptoms  - On ROS denied any vision change or jaw claudication  -Inflammatory markers elevated but on but likely in the setting of infection   -CT showed extensive sinus opacification         #PMR  Patient started tapering her prednisone dose and currently on 5 mg  She was advised to do a fast tapering due to her osteoporosis     #Hx of Uveitis         Plan:  -likely sinusitis w/current sinus polyps, continue on 5 mg of prednisone   -No actual GCA symptoms but endorse PMR symptoms but improving. Possible role of steroid sparing medication such as IL6 inhibitor, Sarilumab. This can be discussed as outpatient    DW Dr. Sidney Marie MD  PGY-4 Rheumatology Fellow  Pager: [phone]   Available in teams

## 2023-04-14 NOTE — CONSULT NOTE ADULT - ASSESSMENT
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 Mcgowan , MVP, breast CA in remission, asthma, PMR on prednisone taper, was diagnosed in July 2022, had bilateral TA biopsies done which were neg for GCA, p/w 1-day onset of headache, sinus pressure, generalized weakness, sore throat, decreased PO intake.    #Sinusitis  -ENT following  -Abx per ENT    #Aortic Stenosis  -Recent echo in office with Mod AS, mild MR, nml lv fxn  -No need to repeat echo    #Orthostatic hypotension  -Stable  -Recent echo with normal LV function, mod AS  -Carotid dopplers done in office negative for significant stenosis  -continue norvasc 2.5mg in PM  -continue Toprol 12.5mg for hypertension and palpitations in the am daily  -encourage PO hydration  -compression stockings, leg elevation    #PMR  -F/u rheum eval      Please call/text me with questions/concerns between 8am-4pm  [phone]

## 2023-04-14 NOTE — CONSULT NOTE ADULT - ASSESSMENT
87F, with PMR on prednisone tapered from 60 to currently 5 mg daily, mild AS  MVP, breast CA in remission, asthma, MAC not treated. She wad admitted 4/13/23  w 1-day onset of headache, sinus pressure, generalized weakness, sore throat, decreased PO intake, fever despite out-pt Amox  She currently has marked distress  possible reaction to Augmentin  possible acute on chronic sinusitis/recent viral uri  ?PMR exacerabation on steroids tapered down    Suggest  Stop Augmentin  Doxycycline 100 mg IV every 12 hours  -->switch to po when GI distress abates    continue through 4/10  87F, with PMR on prednisone tapered from 60 to currently 5 mg daily, mild AS  MVP, breast CA in remission, asthma, MAC not treated. She wad admitted 4/13/23  w 1-day onset of headache, sinus pressure, generalized weakness, sore throat, decreased PO intake, fever despite out-pt Amox  She currently has marked distress  possible reaction to Augmentin  possible acute on chronic sinusitis/recent viral uri  ?PMR exacerabation on steroids tapered down    Multiple antibiotic allergies/intoleratance    Suggest  Stop Augmentin  Bactrim Ds 1 tab twice daily     (I ordered)  5 -7 day course anticipated

## 2023-04-14 NOTE — CONSULT NOTE ADULT - NS ATTEND AMEND GEN_ALL_CORE FT
ENT Consulted for sinusitis. Pt states she has a known history of nasal polyps and sinus surgery that cause chronic congestion. Uses Sinus Rinse at home. Pt also c/o sore throat and dry mouth.     CT shows extensive mucosal thickening of the bilateral ethmoid air cells, maxillary sinuses, and right sphenoid sinus. There is prominent polypoid soft tissue in the bilateral nasal cavities, likely obstructing the maxillary sinus ostia.    Nasal endoscopy shows + purulence noted along posterior pharynx and left nare on nasal endoscopy, culture obtained. diffuse polyps in b/l nares obstructing middle meatus, unable to evaluate fully sinuses.    Recommend:  - Antibiotics as per ID  - Flonase 1 spray to b/l nares bid  - Afrin 1-2 sprays to b/l nare bid x 3 days  - nasal saline 2 sprays to b/l nares tid  - f/u culture    - Follow up outpatient with Dr. Hsu/Jeana [phone]

## 2023-04-14 NOTE — CONSULT NOTE ADULT - SUBJECTIVE AND OBJECTIVE BOX
[facility] [place] [place]        Patient is a 87y old  Female who presents with a chief complaint of sinusitis, PMR (2023 19:44)    Excerpt from H&P,"      87 y F, hx of mod AS but most recent outptn TTE w Dr Lee on outptn basis in 2023 showed a nl MV function, MAC, 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 Mcgowan , MVP, breast CA in remission, asthma, PMR on prednisone taper, was diagnosed in 2022, had bilateral TA biopsies done which were neg for GCA  Ptn presents  w 1-day onset of headache, sinus pressure, generalized weakness, sore throat, decreased PO intake. Patient had URI symptoms a week ago, was treated with amoxicillin for 4 days. Patient was feeling better for a while   however, woke up with above mentioned symptoms today.   Reports headache now is 5/10. No new vision changes. No vomiting, no fever, no chills, chest pain, shortness of breath, abdominal pain (2023 21:55)           *****PAST MEDICAL / Surgical  HISTORY:  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      GERD (Gastroesophageal Reflux Disease)      Irritable Bowel Syndrome      Mitral Valve Prolapse      Anxiety      Clinical Depression      Asthma      Uveitis      Irritable Bowel Syndrome      Hiatal Hernia      Nasal Polyp      Hypertension      COVID-19 vaccine series completed  Moderna      2019 novel coronavirus disease (COVID-19)  2022      PMR (polymyalgia rheumatica)      S/P Breast Lumpectomy      S/P Lumpectomy of Breast  Left Breast      Status Post Tonsillectomy      S/P Nasal Polypectomy      History of Cataract Surgery  Left eye      History of Breast Biopsy  [place] lymph node biopsy               *****FAMILY HISTORY:  FAMILY HISTORY:  FH: CAD (coronary artery disease) (Father, Mother, Sibling, Aunt)    FH: lung cancer (Mother)             *****SOCIAL HISTORY:  Alcohol: None  Smoking: None         *****ALLERGIES:   Allergies    cefdinir (Unknown)  ciprofloxacin (Other)  codeine (Nausea; Other)  contrast media (iodine-based) (Angioedema)  fluorescein ophthalmic (Hives; Rash; Flushing)  lactose (Unknown)  latex (Anaphylaxis)  Levaquin (Nausea; Other)  lidocaine (Unknown)  Solu-Medrol (Pruritus; Flushing; Short breath)  SULFA (Anaphylaxis)  tetracycline (Anaphylaxis)  Zofran (Anaphylaxis)    Intolerances    Augmentin (Stomach Upset)           *****MEDICATIONS: current medication reviewed and documented.   MEDICATIONS  (STANDING):  amLODIPine   Tablet 2.5 milliGRAM(s) Oral daily  chlorhexidine 2% Cloths 1 Application(s) Topical daily  enoxaparin Injectable 30 milliGRAM(s) SubCutaneous every 24 hours  metoprolol succinate ER 12.5 milliGRAM(s) Oral daily  predniSONE   Tablet 5 milliGRAM(s) Oral daily  sodium chloride 0.65% Nasal 1 Spray(s) Both Nostrils three times a day  sodium chloride 0.9%. 1000 milliLiter(s) (50 mL/Hr) IV Continuous <Continuous>  trimethoprim  160 mG/sulfamethoxazole 800 mG 1 Tablet(s) Oral two times a day    MEDICATIONS  (PRN):  acetaminophen     Tablet .. 650 milliGRAM(s) Oral every 6 hours PRN Temp greater or equal to 38C (100.4F), Mild Pain (1 - 3)           *****REVIEW OF SYSTEM:  GEN: no fever, no chills, no pain  RESP: no SOB, no cough, no sputum  CVS: no chest pain, no palpitations, no edema  GI: no abdominal pain, no nausea, no vomiting, no constipation, no diarrhea  : no dysurea, no frequency, no hematurea  Neuro: no headache, no dizziness  PSYCH: no anxiety, no depression  Derm : no itching, no rash         *****VITAL SIGNS:  T(C): 36.4 (04-15-23 @ 01:15), Max: 37 (23 @ 02:20)  HR: 91 (04-15-23 @ 01:15) (73 - 91)  BP: 156/98 (04-15-23 @ 01:15) (149/90 - 183/97)  RR: 16 (04-15-23 @ 01:15) (16 - 18)  SpO2: 97% (04-15-23 @ 01:15) (95% - 98%)  Wt(kg): --           *****PHYSICAL EXAM:   Alert oriented x2   Attention comprehension are limited . Able to name, repeat, without any difficulty.   Able to follow 1 step commands.     EOMI fundi not visualized, blinks to threat   No facial asymmetry   Tongue is midline    Moving all 4 ext symmetrically not cooperative to resistance testing   Reflexes are symmetric throughout   sensation is grossly symmetric  Gait : not assessed.  B/L down going toes               *****LAB AND IMAGIN.6    6.28  )-----------( 290      ( 2023 17:36 )             30.6               04-13    137  |  102  |  15  ----------------------------<  152<H>  4.3   |  24  |  0.69    Ca    9.4      2023 17:36  Phos  3.6     04-  Mg     2.0     -13    TPro  6.8  /  Alb  3.1<L>  /  TBili  0.2  /  DBili  x   /  AST  19  /  ALT  21  /  AlkPhos  97  04-                                [All pertinent recent Imaging reports reviewed]         *****A S S E S S M E N T   A N D   P L A N :     Excerpt from H&P,"      87 y F, hx of mod AS but most recent outptn TTE w Dr Lee on outptn basis in 2023 showed a nl MV function, MAC, 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 Mcgowan , MVP, breast CA in remission, asthma, PMR on prednisone taper, was diagnosed in 2022, had bilateral TA biopsies done which were neg for GCA  Ptn presents  w 1-day onset of headache, sinus pressure, generalized weakness, sore throat, decreased PO intake. Patient had URI symptoms a week ago, was treated with amoxicillin for 4 days. Patient was feeling better for a while   however, woke up with above mentioned symptoms today.   Reports headache now is 5/10. No new vision changes. No vomiting, no fever, no chills, chest pain, shortness of breath, abdominal pain (2023 21:55)         Problem/Recommendations 1:  generalized weakness and pain  likely secondary to multiple comorbidities   ?resurgence of pmr   defer to  rheum     pt oob to chair         Problem/Recommendations 2: headache sinus pressure   defer to ENT   pt on chronic steroids  sulfa allergy bactrim stopped will alert team          pt at risk for developing delirium, therefore please institute the following preventative measures if possible          - initiating early mobilization          -minimizing "tethers" - IV, oxygen, catheters, etc          -avoiding   sedatives          -maintaining hydration/nutrition          -avoid anticholinergics - diphenhydramine, etc          -pain control          -sleep wake cycle regulation; avoid day time somnolence           -supportive environment    ___________________________  Will follow with you.  Thank you,  Melissa Pérez MD  Diplomate of the American Board of Neurology and Psychiatry.  Diplomate of the American Board of Vascular Neurology.   [facility] [place] ([facility] [place]   Ph: [phone]    Differential diagnosis and plan of care discussed with patient after the evaluation.   Advanced care planning options discussed.   Pain assessed and judicious use of narcotics when appropriate was discussed.  Importance of Fall prevention discussed.  Counseling on Smoking and Alcohol cessation was offered when appropriate.  Counseling on Diet, exercise, and medication compliance was done.   83 minutes spent on the total encounter;  more than 50 % of the visit was spent on counseling  and or coordinating care by the attending physician.    Thank you for allowing me to participate in the care of this delbert patient. Please do not hesitate to call me if you have any questions.     This and subsequent notes  will  inherently be subject to errors including those of syntax and substitutions which may escape proofreading. In such instances original meaning may be extrapolated by contextual derivation.

## 2023-04-15 LAB
ANION GAP SERPL CALC-SCNC: 15 MMOL/L — SIGNIFICANT CHANGE UP (ref 5–17)
BUN SERPL-MCNC: 9 MG/DL — SIGNIFICANT CHANGE UP (ref 7–23)
CALCIUM SERPL-MCNC: 9.3 MG/DL — SIGNIFICANT CHANGE UP (ref 8.4–10.5)
CHLORIDE SERPL-SCNC: 99 MMOL/L — SIGNIFICANT CHANGE UP (ref 96–108)
CO2 SERPL-SCNC: 24 MMOL/L — SIGNIFICANT CHANGE UP (ref 22–31)
CREAT SERPL-MCNC: 0.61 MG/DL — SIGNIFICANT CHANGE UP (ref 0.5–1.3)
EGFR: 87 ML/MIN/1.73M2 — SIGNIFICANT CHANGE UP
GLUCOSE SERPL-MCNC: 93 MG/DL — SIGNIFICANT CHANGE UP (ref 70–99)
HCT VFR BLD CALC: 34.7 % — SIGNIFICANT CHANGE UP (ref 34.5–45)
HGB BLD-MCNC: 10.9 G/DL — LOW (ref 11.5–15.5)
MCHC RBC-ENTMCNC: 28.6 PG — SIGNIFICANT CHANGE UP (ref 27–34)
MCHC RBC-ENTMCNC: 31.4 GM/DL — LOW (ref 32–36)
MCV RBC AUTO: 91.1 FL — SIGNIFICANT CHANGE UP (ref 80–100)
MRSA PCR RESULT.: SIGNIFICANT CHANGE UP
NRBC # BLD: 0 /100 WBCS — SIGNIFICANT CHANGE UP (ref 0–0)
PLATELET # BLD AUTO: 345 K/UL — SIGNIFICANT CHANGE UP (ref 150–400)
POTASSIUM SERPL-MCNC: 3.9 MMOL/L — SIGNIFICANT CHANGE UP (ref 3.5–5.3)
POTASSIUM SERPL-SCNC: 3.9 MMOL/L — SIGNIFICANT CHANGE UP (ref 3.5–5.3)
RBC # BLD: 3.81 M/UL — SIGNIFICANT CHANGE UP (ref 3.8–5.2)
RBC # FLD: 14.9 % — HIGH (ref 10.3–14.5)
S AUREUS DNA NOSE QL NAA+PROBE: SIGNIFICANT CHANGE UP
SODIUM SERPL-SCNC: 138 MMOL/L — SIGNIFICANT CHANGE UP (ref 135–145)
WBC # BLD: 7.13 K/UL — SIGNIFICANT CHANGE UP (ref 3.8–10.5)
WBC # FLD AUTO: 7.13 K/UL — SIGNIFICANT CHANGE UP (ref 3.8–10.5)

## 2023-04-15 RX ORDER — HYOSCYAMINE SULFATE 0.13 MG
0.12 TABLET ORAL
Refills: 0 | Status: ACTIVE | OUTPATIENT
Start: 2023-04-15 | End: 2024-03-13

## 2023-04-15 RX ORDER — LANOLIN ALCOHOL/MO/W.PET/CERES
3 CREAM (GRAM) TOPICAL ONCE
Refills: 0 | Status: ACTIVE | OUTPATIENT
Start: 2023-04-15

## 2023-04-15 RX ORDER — HYDRALAZINE HCL 50 MG
10 TABLET ORAL ONCE
Refills: 0 | Status: COMPLETED | OUTPATIENT
Start: 2023-04-15 | End: 2023-04-15

## 2023-04-15 RX ORDER — IPRATROPIUM/ALBUTEROL SULFATE 18-103MCG
3 AEROSOL WITH ADAPTER (GRAM) INHALATION ONCE
Refills: 0 | Status: COMPLETED | OUTPATIENT
Start: 2023-04-15 | End: 2023-04-15

## 2023-04-15 RX ORDER — ALPRAZOLAM 0.25 MG
0.25 TABLET ORAL ONCE
Refills: 0 | Status: DISCONTINUED | OUTPATIENT
Start: 2023-04-15 | End: 2023-04-15

## 2023-04-15 RX ORDER — SODIUM CHLORIDE 9 MG/ML
1000 INJECTION INTRAMUSCULAR; INTRAVENOUS; SUBCUTANEOUS
Refills: 0 | Status: ACTIVE | OUTPATIENT
Start: 2023-04-15 | End: 2023-04-15

## 2023-04-15 RX ORDER — METOCLOPRAMIDE HCL 10 MG
10 TABLET ORAL ONCE
Refills: 0 | Status: COMPLETED | OUTPATIENT
Start: 2023-04-15 | End: 2023-04-15

## 2023-04-15 RX ORDER — AMLODIPINE BESYLATE 2.5 MG/1
10 TABLET ORAL DAILY
Refills: 0 | Status: ACTIVE | OUTPATIENT
Start: 2023-04-15 | End: 2024-03-13

## 2023-04-15 RX ORDER — GABAPENTIN 400 MG/1
100 CAPSULE ORAL
Refills: 0 | Status: ACTIVE | OUTPATIENT
Start: 2023-04-15 | End: 2024-03-13

## 2023-04-15 RX ADMIN — SODIUM CHLORIDE 50 MILLILITER(S): 9 INJECTION INTRAMUSCULAR; INTRAVENOUS; SUBCUTANEOUS at 01:33

## 2023-04-15 RX ADMIN — Medication 0.12 MILLIGRAM(S): at 18:13

## 2023-04-15 RX ADMIN — AMLODIPINE BESYLATE 2.5 MILLIGRAM(S): 2.5 TABLET ORAL at 05:57

## 2023-04-15 RX ADMIN — Medication 1 SPRAY(S): at 13:09

## 2023-04-15 RX ADMIN — Medication 0.5 MILLIGRAM(S): at 16:27

## 2023-04-15 RX ADMIN — Medication 1 SPRAY(S): at 05:59

## 2023-04-15 RX ADMIN — Medication 10 MILLIGRAM(S): at 10:17

## 2023-04-15 RX ADMIN — Medication 3 MILLILITER(S): at 02:11

## 2023-04-15 RX ADMIN — Medication 5 MILLIGRAM(S): at 05:57

## 2023-04-15 RX ADMIN — Medication 12.5 MILLIGRAM(S): at 05:59

## 2023-04-15 RX ADMIN — Medication 10 MILLIGRAM(S): at 01:09

## 2023-04-15 RX ADMIN — ENOXAPARIN SODIUM 30 MILLIGRAM(S): 100 INJECTION SUBCUTANEOUS at 00:09

## 2023-04-15 RX ADMIN — Medication 0.25 MILLIGRAM(S): at 01:34

## 2023-04-15 RX ADMIN — AMLODIPINE BESYLATE 10 MILLIGRAM(S): 2.5 TABLET ORAL at 18:05

## 2023-04-15 RX ADMIN — CHLORHEXIDINE GLUCONATE 1 APPLICATION(S): 213 SOLUTION TOPICAL at 13:12

## 2023-04-15 RX ADMIN — GABAPENTIN 100 MILLIGRAM(S): 400 CAPSULE ORAL at 18:05

## 2023-04-15 RX ADMIN — Medication 1 TABLET(S): at 16:27

## 2023-04-16 PROCEDURE — 99232 SBSQ HOSP IP/OBS MODERATE 35: CPT

## 2023-04-16 RX ADMIN — Medication 1 TABLET(S): at 12:18

## 2023-04-16 RX ADMIN — GABAPENTIN 100 MILLIGRAM(S): 400 CAPSULE ORAL at 06:17

## 2023-04-16 RX ADMIN — Medication 0.5 MILLIGRAM(S): at 06:17

## 2023-04-16 RX ADMIN — Medication 12.5 MILLIGRAM(S): at 06:18

## 2023-04-16 RX ADMIN — Medication 1 SPRAY(S): at 22:37

## 2023-04-16 RX ADMIN — Medication 1 SPRAY(S): at 06:19

## 2023-04-16 RX ADMIN — Medication 0.12 MILLIGRAM(S): at 00:36

## 2023-04-16 RX ADMIN — Medication 0.5 MILLIGRAM(S): at 17:47

## 2023-04-16 RX ADMIN — Medication 0.12 MILLIGRAM(S): at 17:47

## 2023-04-16 RX ADMIN — ENOXAPARIN SODIUM 30 MILLIGRAM(S): 100 INJECTION SUBCUTANEOUS at 00:35

## 2023-04-16 RX ADMIN — Medication 0.12 MILLIGRAM(S): at 06:17

## 2023-04-16 RX ADMIN — Medication 1 TABLET(S): at 06:18

## 2023-04-16 RX ADMIN — GABAPENTIN 100 MILLIGRAM(S): 400 CAPSULE ORAL at 17:47

## 2023-04-16 RX ADMIN — Medication 1 TABLET(S): at 17:47

## 2023-04-16 RX ADMIN — CHLORHEXIDINE GLUCONATE 1 APPLICATION(S): 213 SOLUTION TOPICAL at 12:20

## 2023-04-16 RX ADMIN — Medication 5 MILLIGRAM(S): at 06:18

## 2023-04-16 RX ADMIN — Medication 0.12 MILLIGRAM(S): at 12:18

## 2023-04-16 RX ADMIN — Medication 1 SPRAY(S): at 13:40

## 2023-04-16 NOTE — PHYSICAL THERAPY INITIAL EVALUATION ADULT - ADDITIONAL COMMENTS
Pt lives with daughter in an apartment with ramp entrance to first floor, building is elevator accessible. Patient was independent with all ADLs and IADLs prior to admission. Ambulates with rollator. Has a home health aide 3 days/week 6 hours a day. Daughter is home to assist the rest of the time. No falls in the past 6 months.

## 2023-04-16 NOTE — PHYSICAL THERAPY INITIAL EVALUATION ADULT - PERTINENT HX OF CURRENT PROBLEM, REHAB EVAL
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 Mcgowan , MVP, breast CA in remission, asthma, PMR on prednisone taper, was diagnosed in July 2022, had bilateral TA biopsies done which were neg for GCA  Ptn presents  w 1-day onset of headache, sinus pressure, generalized weakness, sore throat, decreased PO intake. Patient had URI symptoms a week ago, was treated with amoxicillin for 4 days. Patient was feeling better for a while. Reports headache now is 5/10. No new vision changes. No vomiting, no fever, no chills, chest pain, shortness of breath, abdominal pain Head CT: No evidence for intracranial hemorrhage, mass effect, or displaced calvarial fracture. Maxillofacial CT: Findings compatible with extensive sinusitis, with sequela of chronic sinusitis, however superimposed acute sinusitis as cause of patient's headache cannot be excluded.

## 2023-04-16 NOTE — PHYSICAL THERAPY INITIAL EVALUATION ADULT - STANDING BALANCE: DYNAMIC, REHAB EVAL
Reason for Disposition  • [1] MILD dizziness (e.g., walking normally) AND [2] has NOT been evaluated by physician for this  (Exception: dizziness caused by heat exposure, sudden standing, or poor fluid intake)    Protocols used: DIZZINESS - [id number]       Onset: 3 days  Location / description: dizzy twice  Precipitating Factors: unknown  Pain Scale (1 - 10), 10 highest: none  Associated Symptoms: unknown    LMP : No LMP recorded. Patient is postmenopausal.  Recent Care: 5/28/20     Patient states twice in the past 3 days she has gotten dizzy. Once when she was in bed when she rolled over on her side the room was spining. The other time she had been lying down when she tried to get up she was so dizzy her  had to hold her up.  She feels fine now. She does have a runny nose. Denies fever, no shortness of breath, no unusual  Breathing problems, no chest tightness. Patient drinks one cup of coffee, a tea, glass of milk and 17 oz of flavored water. Informed she should probably be drinking more. She will try to drink more water. She denied any symptoms of dehydration.  Appointment made for tomorrow. Informed if dizziness returns  Gets worse, she should be go to urgent care/ER. Patient states understanding and agreed.   
Noted.    Agree with recommendations.    Thanks  MB  
Patient requesting to speak with nurse regarding having some dizziness that causes nausea and head congestion.  Please advise.  
fair balance

## 2023-04-17 ENCOUNTER — TRANSCRIPTION ENCOUNTER (OUTPATIENT)
Age: 88
End: 2023-04-17

## 2023-04-17 VITALS
TEMPERATURE: 98 F | DIASTOLIC BLOOD PRESSURE: 61 MMHG | RESPIRATION RATE: 18 BRPM | OXYGEN SATURATION: 94 % | SYSTOLIC BLOOD PRESSURE: 107 MMHG | HEART RATE: 69 BPM

## 2023-04-17 LAB
CULTURE RESULTS: SIGNIFICANT CHANGE UP
SPECIMEN SOURCE: SIGNIFICANT CHANGE UP

## 2023-04-17 PROCEDURE — 80048 BASIC METABOLIC PNL TOTAL CA: CPT

## 2023-04-17 PROCEDURE — 85027 COMPLETE CBC AUTOMATED: CPT

## 2023-04-17 PROCEDURE — 82533 TOTAL CORTISOL: CPT

## 2023-04-17 PROCEDURE — 99285 EMERGENCY DEPT VISIT HI MDM: CPT

## 2023-04-17 PROCEDURE — 85025 COMPLETE CBC W/AUTO DIFF WBC: CPT

## 2023-04-17 PROCEDURE — 71045 X-RAY EXAM CHEST 1 VIEW: CPT

## 2023-04-17 PROCEDURE — 87641 MR-STAPH DNA AMP PROBE: CPT

## 2023-04-17 PROCEDURE — 94640 AIRWAY INHALATION TREATMENT: CPT

## 2023-04-17 PROCEDURE — 84100 ASSAY OF PHOSPHORUS: CPT

## 2023-04-17 PROCEDURE — 80053 COMPREHEN METABOLIC PANEL: CPT

## 2023-04-17 PROCEDURE — 85652 RBC SED RATE AUTOMATED: CPT

## 2023-04-17 PROCEDURE — 83880 ASSAY OF NATRIURETIC PEPTIDE: CPT

## 2023-04-17 PROCEDURE — 87637 SARSCOV2&INF A&B&RSV AMP PRB: CPT

## 2023-04-17 PROCEDURE — 96374 THER/PROPH/DIAG INJ IV PUSH: CPT

## 2023-04-17 PROCEDURE — 86140 C-REACTIVE PROTEIN: CPT

## 2023-04-17 PROCEDURE — 76376 3D RENDER W/INTRP POSTPROCES: CPT

## 2023-04-17 PROCEDURE — 84484 ASSAY OF TROPONIN QUANT: CPT

## 2023-04-17 PROCEDURE — 84443 ASSAY THYROID STIM HORMONE: CPT

## 2023-04-17 PROCEDURE — G0378: CPT

## 2023-04-17 PROCEDURE — 83735 ASSAY OF MAGNESIUM: CPT

## 2023-04-17 PROCEDURE — 70486 CT MAXILLOFACIAL W/O DYE: CPT | Mod: MA

## 2023-04-17 PROCEDURE — 99231 SBSQ HOSP IP/OBS SF/LOW 25: CPT

## 2023-04-17 PROCEDURE — 70450 CT HEAD/BRAIN W/O DYE: CPT | Mod: MA

## 2023-04-17 PROCEDURE — 97161 PT EVAL LOW COMPLEX 20 MIN: CPT

## 2023-04-17 PROCEDURE — 36415 COLL VENOUS BLD VENIPUNCTURE: CPT

## 2023-04-17 PROCEDURE — 87070 CULTURE OTHR SPECIMN AEROBIC: CPT

## 2023-04-17 PROCEDURE — 87640 STAPH A DNA AMP PROBE: CPT

## 2023-04-17 RX ORDER — AMLODIPINE BESYLATE 2.5 MG/1
1 TABLET ORAL
Qty: 30 | Refills: 0
Start: 2023-04-17 | End: 2023-05-16

## 2023-04-17 RX ORDER — ACETAMINOPHEN 500 MG
2 TABLET ORAL
Qty: 0 | Refills: 0 | DISCHARGE

## 2023-04-17 RX ORDER — METOPROLOL TARTRATE 50 MG
0.5 TABLET ORAL
Qty: 15 | Refills: 0
Start: 2023-04-17 | End: 2023-05-16

## 2023-04-17 RX ORDER — ACETAMINOPHEN 500 MG
2 TABLET ORAL
Qty: 0 | Refills: 0 | DISCHARGE
Start: 2023-04-17

## 2023-04-17 RX ORDER — HYOSCYAMINE SULFATE 0.13 MG
1 TABLET ORAL
Qty: 56 | Refills: 0
Start: 2023-04-17 | End: 2023-04-30

## 2023-04-17 RX ORDER — AMLODIPINE BESYLATE 2.5 MG/1
1 TABLET ORAL
Qty: 0 | Refills: 0 | DISCHARGE

## 2023-04-17 RX ORDER — SODIUM CHLORIDE 0.65 %
1 AEROSOL, SPRAY (ML) NASAL
Qty: 0 | Refills: 0 | DISCHARGE
Start: 2023-04-17

## 2023-04-17 RX ORDER — FLUOXETINE HCL 10 MG
3 CAPSULE ORAL
Qty: 0 | Refills: 0 | DISCHARGE

## 2023-04-17 RX ADMIN — Medication 0.12 MILLIGRAM(S): at 00:29

## 2023-04-17 RX ADMIN — Medication 1 TABLET(S): at 17:31

## 2023-04-17 RX ADMIN — Medication 0.5 MILLIGRAM(S): at 06:27

## 2023-04-17 RX ADMIN — AMLODIPINE BESYLATE 10 MILLIGRAM(S): 2.5 TABLET ORAL at 11:59

## 2023-04-17 RX ADMIN — ENOXAPARIN SODIUM 30 MILLIGRAM(S): 100 INJECTION SUBCUTANEOUS at 00:29

## 2023-04-17 RX ADMIN — Medication 0.5 MILLIGRAM(S): at 17:31

## 2023-04-17 RX ADMIN — CHLORHEXIDINE GLUCONATE 1 APPLICATION(S): 213 SOLUTION TOPICAL at 12:02

## 2023-04-17 RX ADMIN — Medication 1 TABLET(S): at 06:21

## 2023-04-17 RX ADMIN — Medication 0.12 MILLIGRAM(S): at 06:21

## 2023-04-17 RX ADMIN — Medication 5 MILLIGRAM(S): at 06:22

## 2023-04-17 RX ADMIN — GABAPENTIN 100 MILLIGRAM(S): 400 CAPSULE ORAL at 17:31

## 2023-04-17 RX ADMIN — Medication 1 SPRAY(S): at 13:33

## 2023-04-17 RX ADMIN — Medication 0.12 MILLIGRAM(S): at 17:32

## 2023-04-17 RX ADMIN — Medication 12.5 MILLIGRAM(S): at 06:27

## 2023-04-17 RX ADMIN — Medication 1 SPRAY(S): at 06:22

## 2023-04-17 RX ADMIN — GABAPENTIN 100 MILLIGRAM(S): 400 CAPSULE ORAL at 06:22

## 2023-04-17 RX ADMIN — Medication 0.12 MILLIGRAM(S): at 12:01

## 2023-04-17 RX ADMIN — Medication 1 TABLET(S): at 11:58

## 2023-04-17 NOTE — DISCHARGE NOTE PROVIDER - PROVIDER TOKENS
PROVIDER:[TOKEN:[[id number]],FOLLOWUP:[1 week]],PROVIDER:[TOKEN:[[id number]],FOLLOWUP:[1 week]] PROVIDER:[TOKEN:[[id number]],FOLLOWUP:[1 week]],PROVIDER:[TOKEN:[[id number]]],PROVIDER:[TOKEN:[[id number]]]

## 2023-04-17 NOTE — DISCHARGE NOTE PROVIDER - CARE PROVIDER_API CALL
Maci Hills)  Otolaryngology  [address], [address]  [place] [address]  Phone: [phone]  Fax: [phone]  Follow Up Time: 1 week    Tanisha Gilbert)  Internal Medicine; Rheumatology  [address], [address]  [place] [address]  Phone: [phone]  Fax: [phone]  Follow Up Time: 1 week   Maci Hills)  Otolaryngology  [address], [address]  [place] [address]  Phone: [phone]  Fax: [phone]  Follow Up Time: 1 week    Homa Becker (MD)  Internal Medicine; Rheumatology  [address], [address]  [place] [address]  Phone: [phone]  Fax: [phone]  Follow Up Time:     Criselda Cardona)  Internal Medicine  [address], [address] [facility] [address]  [place] [address]  Phone: [phone]  Fax: [phone]  Follow Up Time:

## 2023-04-17 NOTE — PROGRESS NOTE ADULT - PROVIDER SPECIALTY LIST ADULT
Cardiology
Infectious Disease
Cardiology
Cardiology
Internal Medicine
Neurology
Internal Medicine
Infectious Disease
Internal Medicine
Internal Medicine

## 2023-04-17 NOTE — DISCHARGE NOTE PROVIDER - NSDCMRMEDTOKEN_GEN_ALL_CORE_FT
amLODIPine 5 mg oral tablet: 1 tab(s) orally once a day (in the evening)  atovaquone 750 mg/5 mL oral suspension: 10 milliliter(s) orally every 24 hours  calcium carbonate 1250 mg (500 mg elemental calcium) oral tablet: 1 tab(s) orally once a day  Clear Eyes 0.012% ophthalmic solution: 1 drop(s) to each affected eye 3 times a day, As Needed  Durezol 0.05% ophthalmic emulsion: 1 drop(s) in the left eye 2 times a day  Flonase 50 mcg/inh nasal spray: 1 spray(s) in each nostril 2 times a day  FLUoxetine 20 mg oral capsule: 3 cap(s) orally once a day  gabapentin 100 mg oral capsule: 1 cap(s) orally 2 times a day  melatonin 5 mg oral tablet: 1 tab(s) orally once a day (at bedtime), As needed, Insomnia  metoprolol succinate 25 mg oral tablet, extended release: 1 tab(s) orally once a day  pantoprazole 40 mg oral delayed release tablet: 1 tab(s) orally once a day (before a meal)  polyethylene glycol 3350 oral powder for reconstitution: 17 gram(s) orally 2 times a day  predniSONE 10 mg oral tablet: 4 tab(s) once a day (9/17-9/22)  3 tab(s) once a day( 9/23-9/29).  2 tab daily from 9/30.   Rolling Walker : once a day   senna leaf extract oral tablet: 2 tab(s) orally once a day (at bedtime)  tiZANidine 2 mg oral tablet: 2 tab(s) orally every 8 hours, As Needed -for moderate pain neck pain   Transport Wheelchair : 1   once a day   Tylenol 500 mg oral tablet: 2 tab(s) orally 3 times a day  Vitamin D3 25 mcg (1000 intl units) oral tablet: 1 tab(s) orally once a day  Walker : once a day    acetaminophen 325 mg oral tablet: 2 tab(s) orally every 6 hours As needed Temp greater or equal to 38C (100.4F), Mild Pain (1 - 3)  amLODIPine 10 mg oral tablet: 1 tab(s) orally once a day  calcium carbonate 1250 mg (500 mg elemental calcium) oral tablet: 1 tab(s) orally once a day  Clear Eyes 0.012% ophthalmic solution: 1 drop(s) to each affected eye 3 times a day, As Needed  Durezol 0.05% ophthalmic emulsion: 1 drop(s) in the left eye 2 times a day  Flonase 50 mcg/inh nasal spray: 1 spray(s) in each nostril 2 times a day  gabapentin 100 mg oral capsule: 1 cap(s) orally 2 times a day  hyoscyamine 0.125 mg sublingual tablet: 1 tab(s) sublingual 4 times a day  LORazepam 0.5 mg oral tablet: 1 tab(s) orally 2 times a day MDD: 2 tabs  melatonin 5 mg oral tablet: 1 tab(s) orally once a day (at bedtime), As needed, Insomnia  metoprolol succinate 25 mg oral tablet, extended release: 0.5 tab(s) orally once a day  Multiple Vitamins oral tablet: 1 tab(s) orally once a day  pantoprazole 40 mg oral delayed release tablet: 1 tab(s) orally once a day (before a meal)  polyethylene glycol 3350 oral powder for reconstitution: 17 gram(s) orally 2 times a day  predniSONE 5 mg oral tablet: 1 tab(s) orally once a day  senna leaf extract oral tablet: 2 tab(s) orally once a day (at bedtime)  sodium chloride 0.65% nasal spray: 1 spray(s) in each nostril 3 times a day  sulfamethoxazole-trimethoprim 800 mg-160 mg oral tablet: 1 tab(s) orally 2 times a day  tiZANidine 2 mg oral tablet: 2 tab(s) orally every 8 hours, As Needed -for moderate pain neck pain   Transport Wheelchair : 1   once a day   Vitamin D3 25 mcg (1000 intl units) oral tablet: 1 tab(s) orally once a day  Walker : once a day

## 2023-04-17 NOTE — PROGRESS NOTE ADULT - ASSESSMENT
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 Mcgowan , MVP, breast CA in remission, asthma, PMR on prednisone taper, was diagnosed in July 2022, had bilateral TA biopsies done which were neg for GCA, p/w 1-day onset of headache, sinus pressure, generalized weakness, sore throat, decreased PO intake.    #Sinusitis  -ENT following  -Abx per ENT    #Aortic Stenosis  -Recent echo in office with Mod AS, mild MR, nml lv fxn  -No need to repeat echo    #Orthostatic hypotension  -Stable  -Recent echo with normal LV function, mod AS  -Carotid dopplers done in office negative for significant stenosis  -continue norvasc 2.5mg in PM  -continue Toprol 12.5mg for hypertension and palpitations in the am daily  -encourage PO hydration  -compression stockings, leg elevation    #PMR  -F/u rheum eval    35 minutes spent on total encounter; more than 50% of the visit was spent counseling and/or coordinating care by the attending physician.  
 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 Mcgowan , MVP, breast CA in remission, asthma, PMR on prednisone taper, was diagnosed in July 2022, had bilateral TA biopsies done which were neg for GCA  Ptn presents  w 1-day onset of headache, sinus pressure, generalized weakness, sore throat, decreased PO intake. Patient had URI symptoms a week ago, was treated with amoxicillin for 4 days. Patient was feeling better for a while and, however, woke up with above mentioned symptoms today.   Reports headache now is 5/10. No new vision changes. No vomiting, no fever, no chills, chest pain, shortness of breath, abdominal pain    PLAN:   ptn was seen by ENT, ID, Card and RHeum.   ptn has been ambulating,   agree w ID re changing to po Bactrim for a 7 days course, ptn does not have an allergy to BACTRIM. this was d/w ptn and daughters  ENT eval noted. will place on Flonase and Saline nasal drops. will need ENT and rheum F/U. on outptn basis  ptn did not have GCA, both TA biopsies were negative in 9/2022.   ptn is anxious, hyperventilating, c/o nausea, HA, abd cramps. daughter and granddaughter at bedside. ptn agrees to take ativan for anxiety, daughter and grand daughter in agreement majority of symptoms are from untreated anxiety. once feeling better will dc home    dvt ppx w sc lovenox
 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 Mcgowan , MVP, breast CA in remission, asthma, PMR on prednisone taper, was diagnosed in July 2022, had bilateral TA biopsies done which were neg for GCA  Ptn presents  w 1-day onset of headache, sinus pressure, generalized weakness, sore throat, decreased PO intake. Patient had URI symptoms a week ago, was treated with amoxicillin for 4 days. Patient was feeling better for a while and, however, woke up with above mentioned symptoms today.   Reports headache now is 5/10. No new vision changes. No vomiting, no fever, no chills, chest pain, shortness of breath, abdominal pain    PLAN:   ptn was seen by ENT, ID, Card and RHeum.   ptn has been ambulating,   tolerating po Bactrim for a 7 days course, ptn does not have an allergy to BACTRIM. this was d/w ptn and daughters  ENT eval noted.on Flonase and Saline nasal drops. will need ENT and rheum F/U. on outptn basis  ptn did not have GCA, both TA biopsies were negative in 9/2022.   since admission and in the past ptn is anxious, hyperventilating, c/o nausea, HA, abd cramps. daughter and granddaughter at bedside. ptn agrees to take ativan for anxiety, daughter and grand daughter in agreement majority of symptoms are from untreated anxiety. on 4/16 restlessness and irritability have resolved, ptn is smiling, feeling well. will cont ativan, this was D/W her PCP Dr. Cardona  dc home in am    dvt ppx w sc lovenox
 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 Mcgowan , MVP, breast CA in remission, asthma, PMR on prednisone taper, was diagnosed in July 2022, had bilateral TA biopsies done which were neg for GCA  Ptn presents  w 1-day onset of headache, sinus pressure, generalized weakness, sore throat, decreased PO intake. Patient had URI symptoms a week ago, was treated with amoxicillin for 4 days. Patient was feeling better for a while and, however, woke up with above mentioned symptoms today.   Reports headache now is 5/10. No new vision changes. No vomiting, no fever, no chills, chest pain, shortness of breath, abdominal pain    PLAN:   ptn was seen by ENT, ID, Card and RHeum.   ptn has been ambulating,   doesnt have worsening HAs,   agree w ID re changing to po Bactrim for a 7 days course,   ENT eval noted. will place on Flonase and Saline nasal drops. will need ENT and rheum F/U.   ptn did not have GCA, both TA biopsies were negative in 9/2022.   DC planning home in am  dvt ppx w sc lovenox
 87F, with PMR on prednisone tapered from 60 to currently 5 mg daily, mild AS  MVP, breast CA in remission, asthma, MAC not treated. She wad admitted 4/13/23  w 1-day onset of headache, sinus pressure, generalized weakness, sore throat, decreased PO intake, fever despite out-pt Amox  marked distress at admission  possible reaction to Augmentin  possible acute on chronic sinusitis/recent viral uri  On Prednisone 5 mg daily    Multiple antibiotic allergies/intoleratance  - tolerating Bactrim  4/16  clinically improved    Suggest  Continue Bactrim DS 1 tab twice daily    4/14-->    continue through 4/20  
  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 Mcgowan , MVP, breast CA in remission, asthma, PMR on prednisone taper, was diagnosed in July 2022, had bilateral TA biopsies done which were neg for GCA, p/w 1-day onset of headache, sinus pressure, generalized weakness, sore throat, decreased PO intake.    #Sinusitis  -ENT following  -Abx per ENT    #Aortic Stenosis  -Recent echo in office with Mod AS, mild MR, nml lv fxn  -No need to repeat echo    #Orthostatic hypotension  -Stable  -Recent echo with normal LV function, mod AS  -Carotid dopplers done in office negative for significant stenosis  -Norvasc increased to 10mg, recommend pm dosing  -continue Toprol 12.5mg for hypertension and palpitations in the am daily  -encourage PO hydration  -compression stockings, leg elevation    #PMR  -F/u rheum       Please call/text me with questions/concerns between 8am-4pm  [phone]
  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 Mcgowan , MVP, breast CA in remission, asthma, PMR on prednisone taper, was diagnosed in July 2022, had bilateral TA biopsies done which were neg for GCA, p/w 1-day onset of headache, sinus pressure, generalized weakness, sore throat, decreased PO intake.    #Sinusitis  -ENT following  -Abx per ENT    #Aortic Stenosis  -Recent echo in office with Mod AS, mild MR, nml lv fxn  -No need to repeat echo    #Orthostatic hypotension  -Stable  -Recent echo with normal LV function, mod AS  -Carotid dopplers done in office negative for significant stenosis  -continue norvasc 2.5mg in PM  -continue Toprol 12.5mg for hypertension and palpitations in the am daily  -encourage PO hydration  -compression stockings, leg elevation    #PMR  -F/u rheum eval    35 minutes spent on total encounter; more than 50% of the visit was spent counseling and/or coordinating care by the attending physician.  
 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 Mcgowan , MVP, breast CA in remission, asthma, PMR on prednisone taper, was diagnosed in July 2022, had bilateral TA biopsies done which were neg for GCA  Ptn presents  w 1-day onset of headache, sinus pressure, generalized weakness, sore throat, decreased PO intake. Patient had URI symptoms a week ago, was treated with amoxicillin for 4 days. Patient was feeling better for a while and, however, woke up with above mentioned symptoms today.   Reports headache now is 5/10. No new vision changes. No vomiting, no fever, no chills, chest pain, shortness of breath, abdominal pain    PLAN:   ptn was seen by ENT, ID, Card and RHeum.   ptn has been ambulating,   tolerating po Bactrim for a 7 days course, ptn does not have an allergy to BACTRIM. this was d/w ptn and daughters  ENT eval noted.on Flonase and Saline nasal drops. will need ENT and rheum F/U. on outptn basis  ptn did not have GCA, both TA biopsies were negative in 9/2022.   since admission and in the past ptn is anxious, hyperventilating, c/o nausea, HA, abd cramps. daughter and granddaughter at bedside. ptn agrees to take ativan for anxiety, daughter and grand daughter in agreement majority of symptoms are from untreated anxiety. on 4/16 restlessness and irritability have resolved, ptn is smiling, feeling well. will cont ativan, this was D/W her PCP Dr. Cardona  dc home today    dvt ppx w sc lovenox
 87F, with PMR on prednisone tapered from 60 to currently 5 mg daily, mild AS  MVP, breast CA in remission, asthma, MAC not treated. She wad admitted 4/13/23  w 1-day onset of headache, sinus pressure, generalized weakness, sore throat, decreased PO intake, fever despite out-pt Amox  marked distress at admission  chronic anxiety  possible reaction to Augmentin  possible acute on chronic sinusitis/recent viral uri  On Prednisone 5 mg daily    Multiple antibiotic allergies/intoleratance  - tolerating Bactrim  4/16  clinically improved  Lorazepam 0.5 mg twice daily initiated 4/15    Suggest  Continue Bactrim DS 1 tab twice daily    4/14-->    continue through 4/20

## 2023-04-17 NOTE — DISCHARGE NOTE NURSING/CASE MANAGEMENT/SOCIAL WORK - NSDCPEFALRISK_GEN_ALL_CORE
For information on Fall & Injury Prevention, visit: [email or web address] OR  [email or web address] OR  https://www.cdc.gov/steadi/patient.html

## 2023-04-17 NOTE — DISCHARGE NOTE PROVIDER - CARE PROVIDERS DIRECT ADDRESSES
,[email or web address]

## 2023-04-17 NOTE — PROGRESS NOTE ADULT - SUBJECTIVE AND OBJECTIVE BOX
Follow Up:  acute on chronic sinusitis    Interval History/ROS:  considerably better,  decreased sinus area pain    Allergies  contrast media (iodine-based) (Angioedema)  Levaquin (Nausea; Other)  cefdinir (Unknown)  ciprofloxacin (Other)  Zofran (Anaphylaxis)  tetracycline (Anaphylaxis)  latex (Anaphylaxis)  lidocaine (Unknown)  lactose (Unknown)  Solu-Medrol (Pruritus; Flushing; Short breath)  fluorescein ophthalmic (Hives; Rash; Flushing)  codeine (Nausea; Other)  SULFA, but tolerates BACTRIM (Anaphylaxis)    ANTIMICROBIALS:  trimethoprim  160 mG/sulfamethoxazole 800 mG 1 two times a day    OTHER MEDS:  MEDICATIONS  (STANDING):  acetaminophen     Tablet .. 650 every 6 hours PRN  amLODIPine   Tablet 10 daily  enoxaparin Injectable 30 every 24 hours  gabapentin 100 two times a day  hyoscyamine SL 0.125 four times a day  LORazepam     Tablet 0.5 two times a day  melatonin 3 once  metoprolol succinate ER 12.5 daily  predniSONE   Tablet 5 daily      Vital Signs Last 24 Hrs  T(C): 36.7 (16 Apr 2023 07:36), Max: 36.7 (15 Apr 2023 23:44)  T(F): 98 (16 Apr 2023 07:36), Max: 98 (15 Apr 2023 23:44)  HR: 73 (16 Apr 2023 07:36) (73 - 95)  BP: 110/67 (16 Apr 2023 07:36) (110/67 - 177/75)  BP(mean): --  RR: 18 (16 Apr 2023 07:36) (18 - 18)  SpO2: 95% (16 Apr 2023 07:36) (94% - 95%)    Parameters below as of 16 Apr 2023 07:36  Patient On (Oxygen Delivery Method): room air        PHYSICAL EXAM:  General: WN/WD NAD, Non-toxic  Neurology: A&Ox3, nonfocal  HENT  ecchymoses above left eye  Respiratory: Clear to auscultation bilaterally  CV: RRR, S1S2, no murmurs, rubs or gallops  Abdominal: Soft, Non-tender, non-distended, normal bowel sounds  Extremities: No edema  Line Sites: Clear  Skin: No rash                        10.9   7.13  )-----------( 345      ( 15 Apr 2023 07:07 )             34.7   WBC Count: 7.13 (04-15 @ 07:07)  WBC Count: 6.28 (04-13 @ 17:36)      04-15    138  |  99  |  9   ----------------------------<  93  3.9   |  24  |  0.61    Ca    9.3      15 Apr 2023 07:07        MICROBIOLOGY:  Nares left nare  04-14-23   Staphylococcus aureus isolated  --  --    (04.14.23 @ 16:05)   MRSA PCR Result.: NotEnrique:   Staph aureus PCR Result: NotDetec  RADIOLOGY:  < from: CT Maxillofacial No Cont (04.13.23 @ 19:08) >  IMPRESSION:  Head CT: No evidence for intracranial hemorrhage, mass effect, or   displaced calvarial fracture.    Maxillofacial CT: Findings compatible with extensive sinusitis, with   sequela of chronic sinusitis, however superimposed acute sinusitis as   cause of patient's headache cannot be excluded.    < end of copied text >      Maximilian Dumas MD; Division of Infectious Disease; Pager: [phone]; nights and weekends: [phone]
Patient is a 87y old  Female who presents with a chief complaint of sinusitis, PMR (14 Apr 2023 14:37)      SUBJECTIVE / OVERNIGHT EVENTS: ptn was seen by ENT, ID, Card and RHeum. ptn has been ambulating, doesnt have worsening HAs, agree w ID re changing to po Bactrim for a 7 days course, ENT eval noted. will place on Flonase and Saline nasal drops. will need ENT and rheum F/U. ptn did not have GCA, both TA biopsies were negative in 9/2022. DC planning home in am    MEDICATIONS  (STANDING):  amLODIPine   Tablet 2.5 milliGRAM(s) Oral daily  chlorhexidine 2% Cloths 1 Application(s) Topical daily  enoxaparin Injectable 30 milliGRAM(s) SubCutaneous every 24 hours  metoprolol succinate ER 12.5 milliGRAM(s) Oral daily  predniSONE   Tablet 5 milliGRAM(s) Oral daily  sodium chloride 0.65% Nasal 1 Spray(s) Both Nostrils three times a day  trimethoprim  160 mG/sulfamethoxazole 800 mG 1 Tablet(s) Oral two times a day    MEDICATIONS  (PRN):  acetaminophen     Tablet .. 650 milliGRAM(s) Oral every 6 hours PRN Temp greater or equal to 38C (100.4F), Mild Pain (1 - 3)      Vital Signs Last 24 Hrs  T(F): 98.2 (04-14-23 @ 16:53), Max: 98.6 (04-14-23 @ 02:20)  HR: 87 (04-14-23 @ 16:53) (72 - 87)  BP: 177/82 (04-14-23 @ 16:53) (137/89 - 177/82)  RR: 18 (04-14-23 @ 16:53) (18 - 20)  SpO2: 95% (04-14-23 @ 16:53) (95% - 98%)  Telemetry:   CAPILLARY BLOOD GLUCOSE        I&O's Summary      PHYSICAL EXAM:  GENERAL: NAD, well-developed  HEAD:  Atraumatic, Normocephalic  EYES: EOMI, PERRLA, conjunctiva and sclera clear  NECK: Supple, No JVD  CHEST/LUNG: Clear to auscultation bilaterally; No wheeze  HEART: Regular rate and rhythm; No murmurs, rubs, or gallops  ABDOMEN: Soft, Nontender, Nondistended; Bowel sounds present  EXTREMITIES:  2+ Peripheral Pulses, No clubbing, cyanosis, or edema  PSYCH: AAOx3  NEUROLOGY: non-focal  SKIN: No rashes or lesions    LABS:                        9.6    6.28  )-----------( 290      ( 13 Apr 2023 17:36 )             30.6     04-13    137  |  102  |  15  ----------------------------<  152<H>  4.3   |  24  |  0.69    Ca    9.4      13 Apr 2023 17:36  Phos  3.6     04-13  Mg     2.0     04-13    TPro  6.8  /  Alb  3.1<L>  /  TBili  0.2  /  DBili  x   /  AST  19  /  ALT  21  /  AlkPhos  97  04-13              RADIOLOGY & ADDITIONAL TESTS:    Imaging Personally Reviewed:    Consultant(s) Notes Reviewed:      Care Discussed with Consultants/Other Providers:  
Patient is a 87y old  Female who presents with a chief complaint of sinusitis, PMR (17 Apr 2023 13:49)      SUBJECTIVE / OVERNIGHT EVENTS: no new events    MEDICATIONS  (STANDING):  amLODIPine   Tablet 10 milliGRAM(s) Oral daily  chlorhexidine 2% Cloths 1 Application(s) Topical daily  enoxaparin Injectable 30 milliGRAM(s) SubCutaneous every 24 hours  gabapentin 100 milliGRAM(s) Oral two times a day  hyoscyamine SL 0.125 milliGRAM(s) SubLingual four times a day  LORazepam     Tablet 0.5 milliGRAM(s) Oral two times a day  melatonin 3 milliGRAM(s) Oral once  metoprolol succinate ER 12.5 milliGRAM(s) Oral daily  multivitamin 1 Tablet(s) Oral daily  predniSONE   Tablet 5 milliGRAM(s) Oral daily  sodium chloride 0.65% Nasal 1 Spray(s) Both Nostrils three times a day  sodium chloride 0.9%. 1000 milliLiter(s) (50 mL/Hr) IV Continuous <Continuous>  trimethoprim  160 mG/sulfamethoxazole 800 mG 1 Tablet(s) Oral two times a day    MEDICATIONS  (PRN):  acetaminophen     Tablet .. 650 milliGRAM(s) Oral every 6 hours PRN Temp greater or equal to 38C (100.4F), Mild Pain (1 - 3)      Vital Signs Last 24 Hrs  T(F): 98.3 (04-17-23 @ 09:25), Max: 98.3 (04-16-23 @ 23:56)  HR: 69 (04-17-23 @ 09:25) (69 - 79)  BP: 107/61 (04-17-23 @ 09:25) (107/61 - 133/71)  RR: 18 (04-17-23 @ 09:25) (18 - 18)  SpO2: 94% (04-17-23 @ 09:25) (93% - 94%)  Telemetry:   CAPILLARY BLOOD GLUCOSE        I&O's Summary    16 Apr 2023 07:01  -  17 Apr 2023 07:00  --------------------------------------------------------  IN: 890 mL / OUT: 0 mL / NET: 890 mL        PHYSICAL EXAM:  GENERAL: NAD, well-developed  HEAD:  Atraumatic, Normocephalic  EYES: EOMI, PERRLA, conjunctiva and sclera clear  NECK: Supple, No JVD  CHEST/LUNG: Clear to auscultation bilaterally; No wheeze  HEART: Regular rate and rhythm; No murmurs, rubs, or gallops  ABDOMEN: Soft, Nontender, Nondistended; Bowel sounds present  EXTREMITIES:  2+ Peripheral Pulses, No clubbing, cyanosis, or edema  PSYCH: AAOx3  NEUROLOGY: non-focal  SKIN: No rashes or lesions    LABS:                    RADIOLOGY & ADDITIONAL TESTS:    Imaging Personally Reviewed:    Consultant(s) Notes Reviewed:      Care Discussed with Consultants/Other Providers:  
  Follow Up:  chronic sinusitis    Interval History/ROS:  feels better  --notes anxiety about discharge    Allergies  contrast media (iodine-based) (Angioedema)  Levaquin (Nausea; Other)  cefdinir (Unknown)  ciprofloxacin (Other)  Zofran (Anaphylaxis)  tetracycline (Anaphylaxis)  latex (Anaphylaxis)  lidocaine (Unknown)  lactose (Unknown)  Solu-Medrol (Pruritus; Flushing; Short breath)  fluorescein ophthalmic (Hives; Rash; Flushing)  codeine (Nausea; Other)  SULFA, but tolerates BACTRIM (Anaphylaxis)    ANTIMICROBIALS:  trimethoprim  160 mG/sulfamethoxazole 800 mG 1 two times a day      OTHER MEDS:  MEDICATIONS  (STANDING):  acetaminophen     Tablet .. 650 every 6 hours PRN  amLODIPine   Tablet 10 daily  enoxaparin Injectable 30 every 24 hours  gabapentin 100 two times a day  hyoscyamine SL 0.125 four times a day  LORazepam     Tablet 0.5 two times a day  melatonin 3 once  metoprolol succinate ER 12.5 daily  predniSONE   Tablet 5 daily      Vital Signs Last 24 Hrs  T(C): 36.8 (17 Apr 2023 09:25), Max: 36.8 (16 Apr 2023 16:16)  T(F): 98.3 (17 Apr 2023 09:25), Max: 98.3 (16 Apr 2023 16:16)  HR: 69 (17 Apr 2023 09:25) (69 - 79)  BP: 107/61 (17 Apr 2023 09:25) (107/61 - 135/87)  BP(mean): --  RR: 18 (17 Apr 2023 09:25) (18 - 18)  SpO2: 94% (17 Apr 2023 09:25) (93% - 95%)    Parameters below as of 17 Apr 2023 09:25  Patient On (Oxygen Delivery Method): room air        PHYSICAL EXAM:  General: WN/WD NAD, Non-toxic  Neurology: A&Ox3, nonfocal  HENT  - small ecchymotec area left eyebrow  Respiratory: Clear to auscultation bilaterally  CV: RRR, S1S2, no murmurs, rubs or gallops  Abdominal: Soft, Non-tender, non-distended, normal bowel sounds  Extremities: No edema  Line Sites: Clear  Skin: No rash    WBC Count: 7.13 (04-15 @ 07:07)  WBC Count: 6.28 (04-13 @ 17:36)    Creatinine: 0.61 (04-15 @ 07:07)    Creatinine: 0.69 (04-13 @ 17:36)        MICROBIOLOGY:  Nares left nare  04-14-23   Staphylococcus aureus isolated  --  --    (04.14.23 @ 16:05)   MRSA PCR Result.: Luis  Staph aureus PCR Result: [place]      RADIOLOGY:    Maximilian Dumas MD; Division of Infectious Disease; Pager: [phone]; nights and weekends: [phone]
Patient is a 87y old  Female who presents with a chief complaint of sinusitis, PMR (15 Apr 2023 07:18)      SUBJECTIVE / OVERNIGHT EVENTS: ptn is anxious, hyperventilating, c/o nausea, HA, abd cramps. states she doesnt have allergy to BACTRIM, daughter and granddaughter at bedside. agrees to take ativan for anxiety, daughter and grand daughter in agreement majority of symptoms are from untreated anxiety. once feeling better will dc home    MEDICATIONS  (STANDING):  amLODIPine   Tablet 10 milliGRAM(s) Oral daily  chlorhexidine 2% Cloths 1 Application(s) Topical daily  enoxaparin Injectable 30 milliGRAM(s) SubCutaneous every 24 hours  gabapentin 100 milliGRAM(s) Oral two times a day  hyoscyamine SL 0.125 milliGRAM(s) SubLingual four times a day  LORazepam     Tablet 0.5 milliGRAM(s) Oral two times a day  melatonin 3 milliGRAM(s) Oral once  metoprolol succinate ER 12.5 milliGRAM(s) Oral daily  multivitamin 1 Tablet(s) Oral daily  predniSONE   Tablet 5 milliGRAM(s) Oral daily  sodium chloride 0.65% Nasal 1 Spray(s) Both Nostrils three times a day  sodium chloride 0.9%. 1000 milliLiter(s) (50 mL/Hr) IV Continuous <Continuous>  trimethoprim  160 mG/sulfamethoxazole 800 mG 1 Tablet(s) Oral two times a day    MEDICATIONS  (PRN):  acetaminophen     Tablet .. 650 milliGRAM(s) Oral every 6 hours PRN Temp greater or equal to 38C (100.4F), Mild Pain (1 - 3)      Vital Signs Last 24 Hrs  T(F): 97.6 (04-15-23 @ 05:00), Max: 98.2 (04-14-23 @ 16:53)  HR: 92 (04-15-23 @ 05:00) (78 - 92)  BP: 127/79 (04-15-23 @ 05:00) (127/79 - 183/97)  RR: 18 (04-15-23 @ 05:00) (16 - 18)  SpO2: 94% (04-15-23 @ 05:00) (94% - 97%)  Telemetry:   CAPILLARY BLOOD GLUCOSE        I&O's Summary      PHYSICAL EXAM:  GENERAL: NAD, well-developed  HEAD:  Atraumatic, Normocephalic  EYES: EOMI, PERRLA, conjunctiva and sclera clear  NECK: Supple, No JVD  CHEST/LUNG: Clear to auscultation bilaterally; No wheeze  HEART: Regular rate and rhythm; No murmurs, rubs, or gallops  ABDOMEN: Soft, Nontender, Nondistended; Bowel sounds present  EXTREMITIES:  2+ Peripheral Pulses, No clubbing, cyanosis, or edema  PSYCH: AAOx3  NEUROLOGY: non-focal  SKIN: No rashes or lesions    LABS:                        10.9   7.13  )-----------( 345      ( 15 Apr 2023 07:07 )             34.7     04-15    138  |  99  |  9   ----------------------------<  93  3.9   |  24  |  0.61    Ca    9.3      15 Apr 2023 07:07  Phos  3.6     04-13  Mg     2.0     04-13    TPro  6.8  /  Alb  3.1<L>  /  TBili  0.2  /  DBili  x   /  AST  19  /  ALT  21  /  AlkPhos  97  04-13              RADIOLOGY & ADDITIONAL TESTS:    Imaging Personally Reviewed:    Consultant(s) Notes Reviewed:      Care Discussed with Consultants/Other Providers:  
[facility]      Seen earlier today Date of service  4-15-23 @ 05:00 No new neurological symptoms reported. Remains stable neurologically.   - Today, patient is without complaints.        *****MEDICATIONS: Current medication reviewed and documented.    MEDICATIONS  (STANDING):  amLODIPine   Tablet 10 milliGRAM(s) Oral daily  chlorhexidine 2% Cloths 1 Application(s) Topical daily  enoxaparin Injectable 30 milliGRAM(s) SubCutaneous every 24 hours  gabapentin 100 milliGRAM(s) Oral two times a day  hyoscyamine SL 0.125 milliGRAM(s) SubLingual four times a day  LORazepam     Tablet 0.5 milliGRAM(s) Oral two times a day  melatonin 3 milliGRAM(s) Oral once  metoprolol succinate ER 12.5 milliGRAM(s) Oral daily  multivitamin 1 Tablet(s) Oral daily  predniSONE   Tablet 5 milliGRAM(s) Oral daily  sodium chloride 0.65% Nasal 1 Spray(s) Both Nostrils three times a day  sodium chloride 0.9%. 1000 milliLiter(s) (50 mL/Hr) IV Continuous <Continuous>  trimethoprim  160 mG/sulfamethoxazole 800 mG 1 Tablet(s) Oral two times a day    MEDICATIONS  (PRN):  acetaminophen     Tablet .. 650 milliGRAM(s) Oral every 6 hours PRN Temp greater or equal to 38C (100.4F), Mild Pain (1 - 3)          ***** VITAL SIGNS:   Vital Signs Last 24 Hrs  T(F): 97.6 (04-15-23 @ 05:00), Max: 97.7 (04-15-23 @ 00:29)  HR: 92 (04-15-23 @ 05:00) (78 - 92)  BP: 127/79 (04-15-23 @ 05:00) (127/79 - 183/97)  RR: 18 (04-15-23 @ 05:00) (16 - 18)  SpO2: 94% (04-15-23 @ 05:00) (94% - 97%)  Wt(kg): --    T(F): 97.6 (04-15-23 @ 05:00), Max: 97.7 (04-15-23 @ 00:29)  HR: 92 (04-15-23 @ 05:00) (78 - 92)  BP: 127/79 (04-15-23 @ 05:00) (127/79 - 183/97)  RR: 18 (04-15-23 @ 05:00) (16 - 18)  SpO2: 94% (04-15-23 @ 05:00) (94% - 97%)  Wt(kg): --  I&O's Summary           *****PHYSICAL EXAM:   alert oriented x 2 attention comprehension are limited   Able to name, repeat.   EOmi fundi not visualized   no nystagmus VFF to confrontation  Tongue is midline   Moving all 4 ext spontaneously no drift appreciated    Gait not assessed.            *****LAB AND IMAGING:                        10.9   7.13  )-----------( 345      ( 15 Apr 2023 07:07 )             34.7               04-15    138  |  99  |  9   ----------------------------<  93  3.9   |  24  |  0.61    Ca    9.3      15 Apr 2023 07:07  Phos  3.6     04-13  Mg     2.0     04-13    TPro  6.8  /  Alb  3.1<L>  /  TBili  0.2  /  DBili  x   /  AST  19  /  ALT  21  /  AlkPhos  97  04-13                         [All pertinent recent Imaging/Reports reviewed]  T(F): 97.6 (04-15-23 @ 05:00), Max: 97.7 (04-15-23 @ 00:29)  HR: 92 (04-15-23 @ 05:00) (78 - 92)  BP: 127/79 (04-15-23 @ 05:00) (127/79 - 183/97)  RR: 18 (04-15-23 @ 05:00) (16 - 18)  SpO2: 94% (04-15-23 @ 05:00) (94% - 97%)  Wt(kg): --         *****A S S E S S M E N T   A N D   P L A N :       Excerpt from H&P,"      87 y F, hx of mod AS but most recent outptn TTE w Dr Lee on outptn basis in jan 2023 showed a nl MV function, MAC, 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 Mcgowan , MVP, breast CA in remission, asthma, PMR on prednisone taper, was diagnosed in July 2022, had bilateral TA biopsies done which were neg for GCA  Ptn presents  w 1-day onset of headache, sinus pressure, generalized weakness, sore throat, decreased PO intake. Patient had URI symptoms a week ago, was treated with amoxicillin for 4 days. Patient was feeling better for a while   however, woke up with above mentioned symptoms today.   Reports headache now is 5/10. No new vision changes. No vomiting, no fever, no chills, chest pain, shortness of breath, abdominal pain (13 Apr 2023 21:55)         Problem/Recommendations 1:  generalized weakness and pain  likely secondary to multiple comorbidities   ?resurgence of pmr   defer to  rheum     pt oob to chair         Problem/Recommendations 2: headache sinus pressure   defer to ENT   pt on chronic steroids   defer to ID   improved headache   will sign off           Thank you for allowing me to participate in the care of this patient. Will continue to follow patient periodically. Please do not hesitate to call me if you have any  questions or if there has been a change in patients neurological status     ________________  Melissa Pérez MD  [facility] ([facility]  [phone]      33 minutes spent on total encounter; more than 50 % of the visit was  spent counseling about plan of care, compliance to diet/exercise and medication regimen and or  coordinating care by the attending physician.      It is advised that stroke patients follow up with RIZWAN Jorgensen @ [phone] in 1- 2 weeks.   Others please follow up with Dr. Michael Nissenbaum [phone]
CARDIOLOGY FOLLOW UP - Dr. Lee  DATE OF SERVICE: 4/17/23    CC  No CV complaints    REVIEW OF SYSTEMS:  CONSTITUTIONAL: No fever, weight loss, or fatigue  RESPIRATORY: No cough, wheezing, chills or hemoptysis; No Shortness of Breath  CARDIOVASCULAR: No chest pain, palpitations, passing out, dizziness, or leg swelling  GASTROINTESTINAL: No abdominal or epigastric pain. No nausea, vomiting, or hematemesis; No diarrhea or constipation. No melena or hematochezia.  VASCULAR: No edema     PHYSICAL EXAM:  T(C): 36.8 (04-17-23 @ 09:25), Max: 36.8 (04-16-23 @ 16:16)  HR: 69 (04-17-23 @ 09:25) (69 - 79)  BP: 107/61 (04-17-23 @ 09:25) (107/61 - 135/87)  RR: 18 (04-17-23 @ 09:25) (18 - 18)  SpO2: 94% (04-17-23 @ 09:25) (93% - 95%)  Wt(kg): --  I&O's Summary    16 Apr 2023 07:01  -  17 Apr 2023 07:00  --------------------------------------------------------  IN: 890 mL / OUT: 0 mL / NET: 890 mL        Appearance: Elderly female	  Cardiovascular: Normal S1 S2,RRR, No JVD, No murmurs  Respiratory: Lungs clear to auscultation b/l	  Gastrointestinal:  Soft, Non-tender, + BS	  Extremities: Normal range of motion, No clubbing, cyanosis or edema      Home Medications:  amLODIPine 5 mg oral tablet: 1 tab(s) orally once a day (in the evening) (04 Sep 2022 21:42)  Clear Eyes 0.012% ophthalmic solution: 1 drop(s) to each affected eye 3 times a day, As Needed (04 Sep 2022 21:42)  Durezol 0.05% ophthalmic emulsion: 1 drop(s) in the left eye 2 times a day (04 Sep 2022 21:42)  Flonase 50 mcg/inh nasal spray: 1 spray(s) in each nostril 2 times a day (04 Sep 2022 21:42)  FLUoxetine 20 mg oral capsule: 3 cap(s) orally once a day (04 Sep 2022 21:42)  melatonin 5 mg oral tablet: 1 tab(s) orally once a day (at bedtime), As needed, Insomnia (08 Sep 2022 13:24)  polyethylene glycol 3350 oral powder for reconstitution: 17 gram(s) orally 2 times a day (08 Sep 2022 13:24)  Tylenol 500 mg oral tablet: 2 tab(s) orally 3 times a day (04 Sep 2022 21:42)  Vitamin D3 25 mcg (1000 intl units) oral tablet: 1 tab(s) orally once a day (04 Sep 2022 21:42)      MEDICATIONS  (STANDING):  amLODIPine   Tablet 10 milliGRAM(s) Oral daily  chlorhexidine 2% Cloths 1 Application(s) Topical daily  enoxaparin Injectable 30 milliGRAM(s) SubCutaneous every 24 hours  gabapentin 100 milliGRAM(s) Oral two times a day  hyoscyamine SL 0.125 milliGRAM(s) SubLingual four times a day  LORazepam     Tablet 0.5 milliGRAM(s) Oral two times a day  melatonin 3 milliGRAM(s) Oral once  metoprolol succinate ER 12.5 milliGRAM(s) Oral daily  multivitamin 1 Tablet(s) Oral daily  predniSONE   Tablet 5 milliGRAM(s) Oral daily  sodium chloride 0.65% Nasal 1 Spray(s) Both Nostrils three times a day  sodium chloride 0.9%. 1000 milliLiter(s) (50 mL/Hr) IV Continuous <Continuous>  trimethoprim  160 mG/sulfamethoxazole 800 mG 1 Tablet(s) Oral two times a day      TELEMETRY: 	    ECG:  	  RADIOLOGY:   DIAGNOSTIC TESTING:  [ ] Echocardiogram:  [ ]  Catheterization:  [ ] Stress Test:    OTHER: 	    LABS:	 	    Troponin T, High Sensitivity Result: 30 ng/L [0 - 51] (04-13 @ 20:36)  Troponin T, High Sensitivity Result: 30 ng/L [0 - 51] (04-13 @ 17:36)                    
CARDIOLOGY FOLLOW UP - Dr. Lee  Date of Service: 4/15/2023  CC: c/o sinus congestion, no cp/sob    Review of Systems:  Constitutional: No fever, weight loss, or fatigue  Respiratory: No cough, wheezing, or hemoptysis, no shortness of breath  Cardiovascular: No chest pain, palpitations, passing out, dizziness, or leg swelling  Gastrointestinal: No abd or epigastric pain. No nausea, vomiting, or hematemesis; no diarrhea or consiptaiton, no melena or hematochezia  Vascular: No edema     TELEMETRY:    PHYSICAL EXAM:  T(C): 36.4 (04-15-23 @ 05:00), Max: 36.8 (04-14-23 @ 16:53)  HR: 92 (04-15-23 @ 05:00) (77 - 92)  BP: 127/79 (04-15-23 @ 05:00) (127/79 - 183/97)  RR: 18 (04-15-23 @ 05:00) (16 - 18)  SpO2: 94% (04-15-23 @ 05:00) (94% - 97%)  Wt(kg): --  I&O's Summary      Appearance: Normal	  Cardiovascular: Normal S1 S2,RRR, No JVD, No murmurs  Respiratory: Lungs clear to auscultation	  Gastrointestinal:  Soft, Non-tender, + BS	  Extremities: Normal range of motion, No clubbing, cyanosis or edema  Vascular: Peripheral pulses palpable 2+ bilaterally       Home Medications:  amLODIPine 5 mg oral tablet: 1 tab(s) orally once a day (in the evening) (04 Sep 2022 21:42)  Clear Eyes 0.012% ophthalmic solution: 1 drop(s) to each affected eye 3 times a day, As Needed (04 Sep 2022 21:42)  Durezol 0.05% ophthalmic emulsion: 1 drop(s) in the left eye 2 times a day (04 Sep 2022 21:42)  Flonase 50 mcg/inh nasal spray: 1 spray(s) in each nostril 2 times a day (04 Sep 2022 21:42)  FLUoxetine 20 mg oral capsule: 3 cap(s) orally once a day (04 Sep 2022 21:42)  melatonin 5 mg oral tablet: 1 tab(s) orally once a day (at bedtime), As needed, Insomnia (08 Sep 2022 13:24)  polyethylene glycol 3350 oral powder for reconstitution: 17 gram(s) orally 2 times a day (08 Sep 2022 13:24)  Tylenol 500 mg oral tablet: 2 tab(s) orally 3 times a day (04 Sep 2022 21:42)  Vitamin D3 25 mcg (1000 intl units) oral tablet: 1 tab(s) orally once a day (04 Sep 2022 21:42)        MEDICATIONS  (STANDING):  amLODIPine   Tablet 2.5 milliGRAM(s) Oral daily  chlorhexidine 2% Cloths 1 Application(s) Topical daily  enoxaparin Injectable 30 milliGRAM(s) SubCutaneous every 24 hours  melatonin 3 milliGRAM(s) Oral once  metoprolol succinate ER 12.5 milliGRAM(s) Oral daily  predniSONE   Tablet 5 milliGRAM(s) Oral daily  sodium chloride 0.65% Nasal 1 Spray(s) Both Nostrils three times a day  sodium chloride 0.9%. 1000 milliLiter(s) (50 mL/Hr) IV Continuous <Continuous>        EKG:  RADIOLOGY:  DIAGNOSTIC TESTING:  [ ] Echocardiogram:  [ ] Catherterization:  [ ] Stress Test:  OTHER:     LABS:	 	                          9.6    6.28  )-----------( 290      ( 13 Apr 2023 17:36 )             30.6     04-13    137  |  102  |  15  ----------------------------<  152<H>  4.3   |  24  |  0.69    Ca    9.4      13 Apr 2023 17:36  Phos  3.6     04-13  Mg     2.0     04-13    TPro  6.8  /  Alb  3.1<L>  /  TBili  0.2  /  DBili  x   /  AST  19  /  ALT  21  /  AlkPhos  97  04-13          CARDIAC MARKERS:                  
CARDIOLOGY FOLLOW UP - Dr. Lee  Date of Service: 4/16/2023  CC: no new complaints    Review of Systems:  Constitutional: No fever, weight loss, or fatigue  Respiratory: No cough, wheezing, or hemoptysis, no shortness of breath  Cardiovascular: No chest pain, palpitations, passing out, dizziness, or leg swelling  Gastrointestinal: No abd or epigastric pain. No nausea, vomiting, or hematemesis; no diarrhea or consiptaiton, no melena or hematochezia  Vascular: No edema     TELEMETRY:    PHYSICAL EXAM:  T(C): 36.7 (04-15-23 @ 23:44), Max: 36.7 (04-15-23 @ 23:44)  HR: 80 (04-15-23 @ 23:44) (80 - 95)  BP: 133/78 (04-15-23 @ 23:44) (133/78 - 177/75)  RR: 18 (04-15-23 @ 23:44) (18 - 18)  SpO2: 94% (04-15-23 @ 23:44) (94% - 95%)  Wt(kg): --  I&O's Summary      Appearance: Normal	  Cardiovascular: Normal S1 S2,RRR, No JVD, No murmurs  Respiratory: Lungs clear to auscultation	  Gastrointestinal:  Soft, Non-tender, + BS	  Extremities: Normal range of motion, No clubbing, cyanosis or edema  Vascular: Peripheral pulses palpable 2+ bilaterally       Home Medications:  amLODIPine 5 mg oral tablet: 1 tab(s) orally once a day (in the evening) (04 Sep 2022 21:42)  Clear Eyes 0.012% ophthalmic solution: 1 drop(s) to each affected eye 3 times a day, As Needed (04 Sep 2022 21:42)  Durezol 0.05% ophthalmic emulsion: 1 drop(s) in the left eye 2 times a day (04 Sep 2022 21:42)  Flonase 50 mcg/inh nasal spray: 1 spray(s) in each nostril 2 times a day (04 Sep 2022 21:42)  FLUoxetine 20 mg oral capsule: 3 cap(s) orally once a day (04 Sep 2022 21:42)  melatonin 5 mg oral tablet: 1 tab(s) orally once a day (at bedtime), As needed, Insomnia (08 Sep 2022 13:24)  polyethylene glycol 3350 oral powder for reconstitution: 17 gram(s) orally 2 times a day (08 Sep 2022 13:24)  Tylenol 500 mg oral tablet: 2 tab(s) orally 3 times a day (04 Sep 2022 21:42)  Vitamin D3 25 mcg (1000 intl units) oral tablet: 1 tab(s) orally once a day (04 Sep 2022 21:42)        MEDICATIONS  (STANDING):  amLODIPine   Tablet 10 milliGRAM(s) Oral daily  chlorhexidine 2% Cloths 1 Application(s) Topical daily  enoxaparin Injectable 30 milliGRAM(s) SubCutaneous every 24 hours  gabapentin 100 milliGRAM(s) Oral two times a day  hyoscyamine SL 0.125 milliGRAM(s) SubLingual four times a day  LORazepam     Tablet 0.5 milliGRAM(s) Oral two times a day  melatonin 3 milliGRAM(s) Oral once  metoprolol succinate ER 12.5 milliGRAM(s) Oral daily  multivitamin 1 Tablet(s) Oral daily  predniSONE   Tablet 5 milliGRAM(s) Oral daily  sodium chloride 0.65% Nasal 1 Spray(s) Both Nostrils three times a day  sodium chloride 0.9%. 1000 milliLiter(s) (50 mL/Hr) IV Continuous <Continuous>  trimethoprim  160 mG/sulfamethoxazole 800 mG 1 Tablet(s) Oral two times a day        EKG:  RADIOLOGY:  DIAGNOSTIC TESTING:  [ ] Echocardiogram:  [ ] Catherterization:  [ ] Stress Test:  OTHER:     LABS:	 	                          10.9   7.13  )-----------( 345      ( 15 Apr 2023 07:07 )             34.7     04-15    138  |  99  |  9   ----------------------------<  93  3.9   |  24  |  0.61    Ca    9.3      15 Apr 2023 07:07            CARDIAC MARKERS:                  
Patient is a 87y old  Female who presents with a chief complaint of sinusitis, PMR (16 Apr 2023 11:17)      SUBJECTIVE / OVERNIGHT EVENTS: ptn feels a lot better. nausea and restlessness has resolved. still has on and off HA, which are chronic    MEDICATIONS  (STANDING):  amLODIPine   Tablet 10 milliGRAM(s) Oral daily  chlorhexidine 2% Cloths 1 Application(s) Topical daily  enoxaparin Injectable 30 milliGRAM(s) SubCutaneous every 24 hours  gabapentin 100 milliGRAM(s) Oral two times a day  hyoscyamine SL 0.125 milliGRAM(s) SubLingual four times a day  LORazepam     Tablet 0.5 milliGRAM(s) Oral two times a day  melatonin 3 milliGRAM(s) Oral once  metoprolol succinate ER 12.5 milliGRAM(s) Oral daily  multivitamin 1 Tablet(s) Oral daily  predniSONE   Tablet 5 milliGRAM(s) Oral daily  sodium chloride 0.65% Nasal 1 Spray(s) Both Nostrils three times a day  sodium chloride 0.9%. 1000 milliLiter(s) (50 mL/Hr) IV Continuous <Continuous>  trimethoprim  160 mG/sulfamethoxazole 800 mG 1 Tablet(s) Oral two times a day    MEDICATIONS  (PRN):  acetaminophen     Tablet .. 650 milliGRAM(s) Oral every 6 hours PRN Temp greater or equal to 38C (100.4F), Mild Pain (1 - 3)      Vital Signs Last 24 Hrs  T(F): 98.3 (04-16-23 @ 16:16), Max: 98.3 (04-16-23 @ 16:16)  HR: 70 (04-16-23 @ 16:16) (70 - 80)  BP: 135/87 (04-16-23 @ 16:16) (101/61 - 135/87)  RR: 18 (04-16-23 @ 16:16) (18 - 18)  SpO2: 95% (04-16-23 @ 16:16) (94% - 95%)  Telemetry:   CAPILLARY BLOOD GLUCOSE        I&O's Summary    15 Apr 2023 07:01  -  16 Apr 2023 07:00  --------------------------------------------------------  IN: 350 mL / OUT: 0 mL / NET: 350 mL    16 Apr 2023 07:01  -  16 Apr 2023 18:31  --------------------------------------------------------  IN: 540 mL / OUT: 0 mL / NET: 540 mL        PHYSICAL EXAM:  GENERAL: NAD, well-developed  HEAD:  Atraumatic, Normocephalic  EYES: EOMI, PERRLA, conjunctiva and sclera clear  NECK: Supple, No JVD  CHEST/LUNG: Clear to auscultation bilaterally; No wheeze  HEART: Regular rate and rhythm; No murmurs, rubs, or gallops  ABDOMEN: Soft, Nontender, Nondistended; Bowel sounds present  EXTREMITIES:  2+ Peripheral Pulses, No clubbing, cyanosis, or edema  PSYCH: AAOx3  NEUROLOGY: non-focal  SKIN: No rashes or lesions    LABS:                        10.9   7.13  )-----------( 345      ( 15 Apr 2023 07:07 )             34.7     04-15    138  |  99  |  9   ----------------------------<  93  3.9   |  24  |  0.61    Ca    9.3      15 Apr 2023 07:07                RADIOLOGY & ADDITIONAL TESTS:    Imaging Personally Reviewed:    Consultant(s) Notes Reviewed:      Care Discussed with Consultants/Other Providers:

## 2023-04-17 NOTE — DISCHARGE NOTE PROVIDER - HOSPITAL COURSE
87yoF, hx of mod AS, MVP, breast CA in remission, asthma, PMR on prednisone taper, p/w 1-day onset of headache, sinus pressure, generalized weakness, sore throat, decreased PO intake. Patient had URI symptoms a week ago, was treated with amoxicillin for 4 days. Patient was feeling better for a while and, however, woke up with above mentioned symptoms today. Reports headache is 5/10. Admitted with headache/weakness ?2/2 AoC sinusitis, followed by ID.  Received a dose of IV aztreonam, then PO Augmentin, marked distress at admission w/h/o chronic anxiety, stress thought to be possible reaction to Augmentin, On Prednisone 5 mg daily.  Multiple antibiotic allergies/intoleratance  - tolerating Bactrim. Lorazepam 0.5 mg twice daily initiated 4/15, continue Bactrim through 4/20/23.  Pt was seen by ENT-Flonase 1 spray to b/l nares bid, Afrin 1-2 sprays to b/l nare bid x 3 days, nasal saline 2 sprays to b/l nares tid, Follow up outpatient with Dr. Hsu/Jeana [phone].  Also followed by rheum, -likely sinusitis w/current sinus polyps, continue on 5 mg of prednisone   No actual GCA symptoms but endorse PMR symptoms but improving. Follow up with rheum as outpatient.  PT recommended home PT.  Acute issues resolved.  DC to home with PT. 87yoF, hx of mod AS, MVP, breast CA in remission, asthma, PMR on prednisone taper, p/w 1-day onset of headache, sinus pressure, generalized weakness, sore throat, decreased PO intake. Patient had URI symptoms a week ago, was treated with amoxicillin for 4 days. Patient was feeling better for a while and, however, woke up with above mentioned symptoms today. Reports headache is 5/10. Admitted with headache/weakness ?2/2 AoC sinusitis, followed by ID.  Received a dose of IV aztreonam, then PO Augmentin, marked distress at admission w/h/o chronic anxiety, stress thought to be possible reaction to Augmentin, On Prednisone 5 mg daily.  Multiple antibiotic allergies/ intolerance -> tolerating Bactrim. Lorazepam 0.5 mg twice daily initiated 4/15, continue Bactrim through 4/20/23.  Pt was seen by ENT-Flonase 1 spray to b/l nares bid, Afrin 1-2 sprays to b/l nare bid x 3 days, nasal saline 2 sprays to b/l nares tid.  Follow up outpatient with Dr. Hsu/Jeana [phone].  Also followed by rheum, -likely sinusitis w/current sinus polyps, continue on 5 mg of prednisone   No actual GCA symptoms but endorse PMR symptoms but improving. Follow up with rheum as outpatient.  PT recommended home PT.  Acute issues resolved.  DC to home with PT.

## 2023-04-17 NOTE — DISCHARGE NOTE NURSING/CASE MANAGEMENT/SOCIAL WORK - PATIENT PORTAL LINK FT
You can access the FollowMyHealth Patient Portal offered by [facility] by registering at the following website: [email or web address]. By joining [employer]’s FollowMyHealth portal, you will also be able to view your health information using other applications (apps) compatible with our system.

## 2023-04-17 NOTE — DISCHARGE NOTE PROVIDER - NSDCCPCAREPLAN_GEN_ALL_CORE_FT
PRINCIPAL DISCHARGE DIAGNOSIS  Diagnosis: Weakness  Assessment and Plan of Treatment: You were evaluated by Neurology,  likely secondary to multiple comorbidities   You were also evaluated by Rheumatology-> likely sinusitis  with current sinus polyps, continue on 5 mg of prednisone   -No actual GCA symptoms but endorse PMR symptoms but improving. Possible role of steroid sparing medication such as IL6 inhibitor, Sarilumab. This can be discussed as outpatient-> Please follow up with   Rheumatology      SECONDARY DISCHARGE DIAGNOSES  Diagnosis: Sinusitis  Assessment and Plan of Treatment: You were treated with antbiotics. Please continue with antibiotics as prescribe and follow up with ENT  Recommendations:   Flonase 1 spray to both nares 2x/day   - Afrin 1-2 sprays to both  nares  2x/day  x 3 days  - nasal saline 2 sprays to both  nares 3x/day       Diagnosis: PMR (polymyalgia rheumatica)  Assessment and Plan of Treatment: No actual GCA symptoms but endorse PMR symptoms but improving.   Please follow up with Rheumatology    Diagnosis: Aortic stenosis  Assessment and Plan of Treatment: Recent echo in office with Mod AS, mild MR, nml lv fxn  -No need to repeat per Cardiology , Please follow up with Dr. OVIEDO     PRINCIPAL DISCHARGE DIAGNOSIS  Diagnosis: Weakness  Assessment and Plan of Treatment: You were evaluated by Neurology,  likely secondary to multiple comorbidities   You were also evaluated by Rheumatology-> likely sinusitis  with current sinus polyps, continue on 5 mg of prednisone   -No actual GCA symptoms but endorse PMR symptoms but improving. Possible role of steroid sparing medication such as IL6 inhibitor, Sarilumab. This can be discussed as outpatient-> Please follow up with   Rheumatology      SECONDARY DISCHARGE DIAGNOSES  Diagnosis: Sinusitis  Assessment and Plan of Treatment: You were treated with antbiotics. Please continue with antibiotics as prescribe and follow up with ENT  Recommendations:   Flonase 1 spray to both nares 2x/day   - Afrin 1-2 sprays to both  nares  2x/day  x 3 days  - nasal saline 2 sprays to both  nares 3x/day       Diagnosis: PMR (polymyalgia rheumatica)  Assessment and Plan of Treatment: No actual GCA symptoms but endorse PMR symptoms but improving.   Please follow up with Rheumatology    Diagnosis: Aortic stenosis  Assessment and Plan of Treatment: Recent echo in office with Mod AS, mild MR, nml lv fxn  -No need to repeat per Cardiology , Please follow up with Dr. OVIEDO    Diagnosis: Benign essential HTN  Assessment and Plan of Treatment: Low salt diet  Activity as tolerated.  Take all medication as prescribed.  Follow up with your medical doctor for routine blood pressure monitoring at your next visit.  Notify your doctor if you have any of the following symptoms:   Dizziness, Lightheadedness, Blurry vision, Headache, Chest pain, Shortness of breath      Diagnosis: Anxiety  Assessment and Plan of Treatment: You will be sent with a prescription for 5 days  . Please follow up with your PCP

## 2023-04-17 NOTE — DISCHARGE NOTE PROVIDER - NSDCFUSCHEDAPPT_GEN_ALL_CORE_FT
Homa Becker  [facility] Physician [facility]  R[address]  Scheduled Appointment: 04/27/2023    [facility]  BRSTIMAG [address]  Scheduled Appointment: 05/05/2023    [facility]  ULTRASND  [place]  Scheduled Appointment: 05/05/2023    Homa Becker  [facility]  [address]  Scheduled Appointment: 06/08/2023

## 2023-04-17 NOTE — PROGRESS NOTE ADULT - REASON FOR ADMISSION
sinusitis, PMR

## 2023-04-18 ENCOUNTER — NON-APPOINTMENT (OUTPATIENT)
Age: 88
End: 2023-04-18

## 2023-04-25 LAB
CORTICOSTEROID BINDING GLOBULIN RESULT: 2.3 MG/DL — SIGNIFICANT CHANGE UP
CORTIS F/TOTAL MFR SERPL: 2.7 % — SIGNIFICANT CHANGE UP
CORTIS SERPL-MCNC: 3 UG/DL — LOW
CORTISOL, FREE RESULT: 0.08 UG/DL — LOW

## 2023-04-27 ENCOUNTER — APPOINTMENT (OUTPATIENT)
Dept: RHEUMATOLOGY | Facility: CLINIC | Age: 88
End: 2023-04-27

## 2023-05-05 ENCOUNTER — APPOINTMENT (OUTPATIENT)
Dept: MAMMOGRAPHY | Facility: IMAGING CENTER | Age: 88
End: 2023-05-05

## 2023-05-05 ENCOUNTER — APPOINTMENT (OUTPATIENT)
Dept: ULTRASOUND IMAGING | Facility: IMAGING CENTER | Age: 88
End: 2023-05-05

## 2023-05-08 ENCOUNTER — APPOINTMENT (OUTPATIENT)
Dept: RHEUMATOLOGY | Facility: CLINIC | Age: 88
End: 2023-05-08

## 2023-05-31 NOTE — ED ADULT NURSE NOTE - CAS DISCH BELONGINGS RETURNED
I would recommend you restart your torsemide and spironolactone. You can continue to hold your metoprolol for now. Call your cardiologist tomorrow for an appointment this week to discuss all of your symptoms and medications.
Not applicable

## 2023-06-08 ENCOUNTER — APPOINTMENT (OUTPATIENT)
Dept: RHEUMATOLOGY | Facility: CLINIC | Age: 88
End: 2023-06-08
Payer: MEDICARE

## 2023-06-08 VITALS
HEART RATE: 72 BPM | SYSTOLIC BLOOD PRESSURE: 144 MMHG | TEMPERATURE: 97.1 F | BODY MASS INDEX: 23.82 KG/M2 | HEIGHT: 65 IN | DIASTOLIC BLOOD PRESSURE: 73 MMHG | OXYGEN SATURATION: 98 % | RESPIRATION RATE: 16 BRPM | WEIGHT: 143 LBS

## 2023-06-08 DIAGNOSIS — E55.9 VITAMIN D DEFICIENCY, UNSPECIFIED: ICD-10-CM

## 2023-06-08 DIAGNOSIS — F41.8 OTHER SPECIFIED ANXIETY DISORDERS: ICD-10-CM

## 2023-06-08 PROCEDURE — 99215 OFFICE O/P EST HI 40 MIN: CPT

## 2023-06-08 RX ORDER — PREDNISONE 1 MG/1
1 TABLET ORAL
Qty: 360 | Refills: 1 | Status: DISCONTINUED | COMMUNITY
End: 2023-06-08

## 2023-06-08 RX ORDER — PANTOPRAZOLE 40 MG/1
40 TABLET, DELAYED RELEASE ORAL DAILY
Refills: 0 | Status: DISCONTINUED | COMMUNITY
End: 2023-06-08

## 2023-06-08 RX ORDER — ESCITALOPRAM OXALATE 5 MG/1
5 TABLET, FILM COATED ORAL
Refills: 0 | Status: ACTIVE | COMMUNITY
Start: 2023-06-08

## 2023-06-08 RX ORDER — GABAPENTIN 100 MG/1
100 CAPSULE ORAL
Qty: 180 | Refills: 0 | Status: DISCONTINUED | COMMUNITY
Start: 2022-10-12 | End: 2023-06-08

## 2023-06-08 RX ORDER — GABAPENTIN 100 MG/1
100 CAPSULE ORAL DAILY
Qty: 90 | Refills: 2 | Status: DISCONTINUED | COMMUNITY
Start: 2022-12-29 | End: 2023-06-08

## 2023-06-08 RX ORDER — LINACLOTIDE 145 UG/1
145 CAPSULE, GELATIN COATED ORAL
Qty: 30 | Refills: 4 | Status: DISCONTINUED | COMMUNITY
Start: 2022-12-21 | End: 2023-06-08

## 2023-06-08 NOTE — PHYSICAL EXAM
[General Appearance - Alert] : alert [General Appearance - In No Acute Distress] : in no acute distress [Sclera] : the sclera and conjunctiva were normal [FreeTextEntry1] : + OA changes, ttp in shoulders, neck. hips

## 2023-06-08 NOTE — DATA REVIEWED
[FreeTextEntry1] : Laboratory and radiology studies that were personally reviewed at today's visit are summarized in above and below:\par \par 4-  Inpatinet rheum consult note reviewed - sinus infection and improving on antibitoict \par \par CT abdomen (1-):  ILD at lung bases. osteopenia with T12 superior endplate compression deformity. \par \par Received Date/Time:                    9/14/2022 18:38 EDT\par Surgical Pathology Report - Auth (Verified)\par Specimen(s) Submitted\par 1  Right temporal artery\par 2  Left temporal artery\par \par Final Diagnosis\par 1. Artery, right temporal, biopsy\par - Artery with mild to moderate intimal hyperplasia and medial\par calcification, see comment\par 2.  Artery, left temporal, biopsy\par - Artery with mild to moderate intimal hyperplasia and focal medial\par calcification, see comment\par Comment:\par Trichrome and elastic stains are performed on blocks 1A and 2A  and show\par negative for temporal arteritis.\par \par CT L spine (9-5-22):  old compression fracture at T9 and T12 and acute compression fx at L3

## 2023-06-08 NOTE — ASSESSMENT
[FreeTextEntry1] : ADA ROLLE sola 88 year  old female, seen on today  for PMR/GCA, Osteoporosis, Fibromyalgia, neuropathy,   OA\par \par  #PMR: Diagnosed 7/2022 w/ symptoms of shoulder/hip stiffness and pain that rapidly improved w/ steroids. Was started on prednisone 60mg 7/2022 which has been tapered down\par -> currently on prednisone 3 mg daily\par ->  now with increase in hip/shoulder/neck pain - will increase prednisone to 4 mg daily and re-evaluate in 6 weeks. \par ->  Will check laboratory tests to look for markers of disease activity and also to assess for medication toxicity.\par \par #Osteoporosis: Hx of osteopenia and found to have non-traumatic L3 compression fracture. now with T12 endplate deformity seen on ct abdomen in 1-2023.  Vitamin D level wnl. \par -would have orthopedic evaluation for any possible intervention such as kyphoplasty\par -> she started prolia with Dr. Bucio\par -> c/w calcium 500 mg qd in addition to her dietary intake for goal of 1200mg daily \par -> c/w vitamin D 1000 mg qd\par \par # Neuropathy in feet\par -> off gabapentin and will monitor \par \par Opth:  \par Needs follow up and this has been d/w patient/daughter \par \par \par More than 50% of the encounter was spent counselling  ADA ROLLE on differential, workup, disease course, and treatment/management.   Education was provided to ADA AQUILINO during this encounter. All questions and concerns were answered and addressed in detail.  ADA ROLLE verbalized understanding and agreed to plan\par \par ADA ROLLE has been instructed to call for an earlier appointment if new symptoms develop \par ADA ROLLE has been instructed to make a follow up appointment 1.5 months\par \par

## 2023-06-09 LAB
25(OH)D3 SERPL-MCNC: 49.2 NG/ML
ALBUMIN SERPL ELPH-MCNC: 4 G/DL
ALP BLD-CCNC: 75 U/L
ALT SERPL-CCNC: 15 U/L
ANION GAP SERPL CALC-SCNC: 15 MMOL/L
AST SERPL-CCNC: 22 U/L
BILIRUB SERPL-MCNC: 0.3 MG/DL
BUN SERPL-MCNC: 26 MG/DL
CALCIUM SERPL-MCNC: 9.9 MG/DL
CHLORIDE SERPL-SCNC: 98 MMOL/L
CO2 SERPL-SCNC: 26 MMOL/L
CREAT SERPL-MCNC: 0.8 MG/DL
CRP SERPL-MCNC: <3 MG/L
EGFR: 71 ML/MIN/1.73M2
ERYTHROCYTE [SEDIMENTATION RATE] IN BLOOD BY WESTERGREN METHOD: 52 MM/HR
GLUCOSE SERPL-MCNC: 89 MG/DL
HCT VFR BLD CALC: 34.4 %
HEPB DNA PCR INT: NOT DETECTED
HEPB DNA PCR LOG: NOT DETECTED LOGIU/ML
HGB BLD-MCNC: 10.6 G/DL
MCHC RBC-ENTMCNC: 28.7 PG
MCHC RBC-ENTMCNC: 30.8 GM/DL
MCV RBC AUTO: 93.2 FL
PLATELET # BLD AUTO: 245 K/UL
POTASSIUM SERPL-SCNC: 4.7 MMOL/L
PROT SERPL-MCNC: 7.2 G/DL
RBC # BLD: 3.69 M/UL
RBC # FLD: 15.4 %
SODIUM SERPL-SCNC: 139 MMOL/L
WBC # FLD AUTO: 7.58 K/UL

## 2023-06-30 NOTE — ED ADULT TRIAGE NOTE - BEFAST SPEECH SLURRED

## 2023-07-07 ENCOUNTER — APPOINTMENT (OUTPATIENT)
Dept: RHEUMATOLOGY | Facility: CLINIC | Age: 88
End: 2023-07-07

## 2023-07-19 ENCOUNTER — APPOINTMENT (OUTPATIENT)
Dept: RHEUMATOLOGY | Facility: CLINIC | Age: 88
End: 2023-07-19
Payer: MEDICARE

## 2023-07-19 VITALS
HEART RATE: 72 BPM | SYSTOLIC BLOOD PRESSURE: 130 MMHG | DIASTOLIC BLOOD PRESSURE: 67 MMHG | WEIGHT: 140 LBS | BODY MASS INDEX: 23.32 KG/M2 | OXYGEN SATURATION: 95 % | HEIGHT: 65 IN

## 2023-07-19 PROCEDURE — 99214 OFFICE O/P EST MOD 30 MIN: CPT

## 2023-07-19 NOTE — PHYSICAL EXAM
[General Appearance - Alert] : alert [General Appearance - In No Acute Distress] : in no acute distress [Sclera] : the sclera and conjunctiva were normal [FreeTextEntry1] : + OA changes, ttp in shoulders, neck. hips  (diffuse TTP)

## 2023-07-19 NOTE — ASSESSMENT
[FreeTextEntry1] : ADA ROLLE sola 88 year  old female, seen on today  for PMR/GCA, Osteoporosis, Fibromyalgia, neuropathy,   OA\par \par  #PMR: Diagnosed 7/2022 w/ symptoms of shoulder/hip stiffness and pain that rapidly improved w/ steroids. Was started on prednisone 60mg 7/2022 which has been tapered down\par -> currently on prednisone 4 mg daily\par ->  lower prednsione to 3mg daily x 1 month then 2mg daily until follow up \par ->  Will check laboratory tests to look for markers of disease activity and also to assess for medication toxicity.\par \par #Osteoporosis: Hx of osteopenia and found to have non-traumatic L3 compression fracture. now with T12 endplate deformity seen on ct abdomen in 1-2023.  Vitamin D level wnl. \par -would have orthopedic evaluation for any possible intervention such as kyphoplasty\par -> she started prolia with Dr. Bucio\par -> c/w calcium 500 mg qd in addition to her dietary intake for goal of 1200mg daily \par -> c/w vitamin D 1000 mg qd\par \par # Neuropathy in feet\par -> off gabapentin and will monitor \par \par Opth:  \par compliant with follow up per daughter \par \par \par More than 50% of the encounter was spent counselling  ADA ROLLE on differential, workup, disease course, and treatment/management.   Education was provided to ADA AQUILINO during this encounter. All questions and concerns were answered and addressed in detail.  ADA ROLLE verbalized understanding and agreed to plan\par \par ADA ROLLE has been instructed to call for an earlier appointment if new symptoms develop \par ADA ROLLE has been instructed to make a follow up appointment 2 months\par \par

## 2023-07-19 NOTE — DATA REVIEWED
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 osteopenia with T12 superior endplate compression deformity. \par \par Received Date/Time:                    9/14/2022 18:38 EDT\par Surgical Pathology Report - Auth (Verified)\par Specimen(s) Submitted\par 1  Right temporal artery\par 2  Left temporal artery\par \par Final Diagnosis\par 1. Artery, right temporal, biopsy\par - Artery with mild to moderate intimal hyperplasia and medial\par calcification, see comment\par 2.  Artery, left temporal, biopsy\par - Artery with mild to moderate intimal hyperplasia and focal medial\par calcification, see comment\par Comment:\par Trichrome and elastic stains are performed on blocks 1A and 2A  and show\par negative for temporal arteritis.\par \par CT L spine (9-5-22):  old compression fracture at T9 and T12 and acute compression fx at L3

## 2023-07-19 NOTE — HISTORY OF PRESENT ILLNESS
[FreeTextEntry1] : 89 yo F PMH Breast CA (2011) , MAC (untreated), MVP, asthma, osteopenia, AS, PMR (dx 7/2022 on steroid taper ), fibromyalgia, spinal stenosis, uveitis(~ 30 yo and again in 2011) presenting w/ back pain found to have non-traumatic compression fracture of L3 spine and older compression fractures.  \par \par  # PMR - on prednisone  4mg daily \par pain is better with current regimen and tylenol and heating pad \par chronic compliants of weakness - unchanged \par tried PT for weakeness but it causes her more pain. \par Sees Dr. Bucio for lightheadedness and was told it is anxiety \par Dr. Bucio has started her lexapro and because of sweating she has decided to take 1/2 a pill. \par she has stopped taking the gabapentin. \par she has some pain in the neck/shoudlers and hips that has recently started.  \par \par \par #Osteoporosis: Hx of osteopenia and found to have non-traumatic L3 compression fracture and now with T12 endplate deformity seen on ct abdomen \par  Vitamin D level wnl and on calcium 500mg daily + dietary sources\par was never treated for osteoporosis/osteopenia \par She has started prolia with Dr. Bucio for bone health \par \par #  OA / fibromyalgia and Neuropathy in feet\par ->  Neuropathy is associated with prior chemo therapy \par ->  tylelnol helps with pain\par \par

## 2023-07-21 LAB
ALBUMIN SERPL ELPH-MCNC: 4.2 G/DL
ALP BLD-CCNC: 70 U/L
ALT SERPL-CCNC: 13 U/L
ANION GAP SERPL CALC-SCNC: 11 MMOL/L
AST SERPL-CCNC: 20 U/L
BILIRUB SERPL-MCNC: 0.3 MG/DL
BUN SERPL-MCNC: 24 MG/DL
CALCIUM SERPL-MCNC: 9.2 MG/DL
CHLORIDE SERPL-SCNC: 101 MMOL/L
CO2 SERPL-SCNC: 24 MMOL/L
CREAT SERPL-MCNC: 0.81 MG/DL
CRP SERPL-MCNC: 6 MG/L
EGFR: 70 ML/MIN/1.73M2
ERYTHROCYTE [SEDIMENTATION RATE] IN BLOOD BY WESTERGREN METHOD: 17 MM/HR
GLUCOSE SERPL-MCNC: 91 MG/DL
IRON SATN MFR SERPL: 22 %
IRON SERPL-MCNC: 78 UG/DL
POTASSIUM SERPL-SCNC: 4.7 MMOL/L
PROT SERPL-MCNC: 7 G/DL
SODIUM SERPL-SCNC: 136 MMOL/L
TIBC SERPL-MCNC: 359 UG/DL
UIBC SERPL-MCNC: 281 UG/DL

## 2023-07-22 NOTE — PHYSICAL THERAPY INITIAL EVALUATION ADULT - LEVEL OF INDEPENDENCE: SIT/STAND, REHAB EVAL
"Routing refill request to provider for review/approval because:  Labs not current:    SA review    Last Written Prescription Date:  5/31/23  Last Fill Quantity: 30,  # refills: 1   Last office visit provider:  5/26/23     Requested Prescriptions   Pending Prescriptions Disp Refills     topiramate (TOPAMAX) 25 MG tablet 30 tablet 1     Sig: Take 1 tablet (25 mg) by mouth daily       Anti-Seizure Meds Protocol  Failed - 7/21/2023  2:21 PM        Failed - Review Authorizing provider's last note.      Refer to last progress notes: confirm request is for original authorizing provider (cannot be through other providers).          Failed - Normal CBC on file in past 26 months     No lab results found.              Failed - Normal platelet count on file in past 26 months     No lab results found.            Passed - Recent (12 mo) or future (30 days) visit within the authorizing provider's specialty     Patient has had an office visit with the authorizing provider or a provider within the authorizing providers department within the previous 12 mos or has a future within next 30 days. See \"Patient Info\" tab in inbasket, or \"Choose Columns\" in Meds & Orders section of the refill encounter.              Passed - Normal ALT or AST on file in past 26 months     Recent Labs   Lab Test 02/18/22  1103   ALT 22     No lab results found.          Passed - Medication is active on med list             Romelia Evans RN 07/22/23 5:49 PM  "
contact guard

## 2023-09-20 ENCOUNTER — APPOINTMENT (OUTPATIENT)
Dept: RHEUMATOLOGY | Facility: CLINIC | Age: 88
End: 2023-09-20
Payer: MEDICARE

## 2023-09-20 ENCOUNTER — LABORATORY RESULT (OUTPATIENT)
Age: 88
End: 2023-09-20

## 2023-09-20 VITALS
WEIGHT: 140 LBS | OXYGEN SATURATION: 95 % | SYSTOLIC BLOOD PRESSURE: 146 MMHG | DIASTOLIC BLOOD PRESSURE: 75 MMHG | HEIGHT: 65 IN | TEMPERATURE: 97.1 F | RESPIRATION RATE: 16 BRPM | HEART RATE: 77 BPM | BODY MASS INDEX: 23.32 KG/M2

## 2023-09-20 PROCEDURE — 99214 OFFICE O/P EST MOD 30 MIN: CPT

## 2023-09-21 LAB
ALBUMIN SERPL ELPH-MCNC: 4.4 G/DL
ALP BLD-CCNC: 68 U/L
ALT SERPL-CCNC: 11 U/L
ANION GAP SERPL CALC-SCNC: 12 MMOL/L
AST SERPL-CCNC: 19 U/L
BASOPHILS # BLD AUTO: 0.05 K/UL
BASOPHILS NFR BLD AUTO: 0.6 %
BILIRUB SERPL-MCNC: 0.3 MG/DL
BUN SERPL-MCNC: 25 MG/DL
CALCIUM SERPL-MCNC: 9.8 MG/DL
CHLORIDE SERPL-SCNC: 100 MMOL/L
CO2 SERPL-SCNC: 27 MMOL/L
CREAT SERPL-MCNC: 0.75 MG/DL
CRP SERPL-MCNC: <3 MG/L
DIRECT COOMBS: NORMAL
EGFR: 77 ML/MIN/1.73M2
EOSINOPHIL # BLD AUTO: 0.74 K/UL
EOSINOPHIL NFR BLD AUTO: 9.3 %
ERYTHROCYTE [SEDIMENTATION RATE] IN BLOOD BY WESTERGREN METHOD: 46 MM/HR
FERRITIN SERPL-MCNC: 49 NG/ML
FOLATE SERPL-MCNC: >20 NG/ML
GLUCOSE SERPL-MCNC: 94 MG/DL
HAPTOGLOB SERPL-MCNC: 195 MG/DL
HCT VFR BLD CALC: 35.9 %
HGB BLD-MCNC: 11 G/DL
IMM GRANULOCYTES NFR BLD AUTO: 0.3 %
LDH SERPL-CCNC: 208 U/L
LYMPHOCYTES # BLD AUTO: 2.24 K/UL
LYMPHOCYTES NFR BLD AUTO: 28.1 %
MAN DIFF?: NORMAL
MCHC RBC-ENTMCNC: 29.6 PG
MCHC RBC-ENTMCNC: 30.6 GM/DL
MCV RBC AUTO: 96.8 FL
MONOCYTES # BLD AUTO: 0.68 K/UL
MONOCYTES NFR BLD AUTO: 8.5 %
NEUTROPHILS # BLD AUTO: 4.24 K/UL
NEUTROPHILS NFR BLD AUTO: 53.2 %
PLATELET # BLD AUTO: 240 K/UL
POTASSIUM SERPL-SCNC: 5 MMOL/L
PROT SERPL-MCNC: 7.5 G/DL
RBC # BLD: 3.71 M/UL
RBC # FLD: 14.7 %
SODIUM SERPL-SCNC: 140 MMOL/L
VIT B12 SERPL-MCNC: 561 PG/ML
WBC # FLD AUTO: 7.97 K/UL

## 2023-10-18 ENCOUNTER — APPOINTMENT (OUTPATIENT)
Dept: RHEUMATOLOGY | Facility: CLINIC | Age: 88
End: 2023-10-18

## 2023-10-19 ENCOUNTER — APPOINTMENT (OUTPATIENT)
Dept: MAMMOGRAPHY | Facility: IMAGING CENTER | Age: 88
End: 2023-10-19

## 2023-10-19 ENCOUNTER — APPOINTMENT (OUTPATIENT)
Dept: ULTRASOUND IMAGING | Facility: IMAGING CENTER | Age: 88
End: 2023-10-19

## 2023-10-26 ENCOUNTER — APPOINTMENT (OUTPATIENT)
Dept: PULMONOLOGY | Facility: CLINIC | Age: 88
End: 2023-10-26
Payer: MEDICARE

## 2023-10-26 VITALS
WEIGHT: 140 LBS | TEMPERATURE: 97 F | RESPIRATION RATE: 15 BRPM | BODY MASS INDEX: 23.32 KG/M2 | HEART RATE: 76 BPM | DIASTOLIC BLOOD PRESSURE: 76 MMHG | SYSTOLIC BLOOD PRESSURE: 143 MMHG | OXYGEN SATURATION: 94 % | HEIGHT: 65 IN

## 2023-10-26 DIAGNOSIS — J47.9 BRONCHIECTASIS, UNCOMPLICATED: ICD-10-CM

## 2023-10-26 DIAGNOSIS — R05.3 CHRONIC COUGH: ICD-10-CM

## 2023-10-26 DIAGNOSIS — R09.82 POSTNASAL DRIP: ICD-10-CM

## 2023-10-26 DIAGNOSIS — J34.9 UNSPECIFIED DISORDER OF NOSE AND NASAL SINUSES: ICD-10-CM

## 2023-10-26 DIAGNOSIS — K59.09 OTHER CONSTIPATION: ICD-10-CM

## 2023-10-26 PROCEDURE — 99214 OFFICE O/P EST MOD 30 MIN: CPT

## 2023-10-26 RX ORDER — AZELASTINE HYDROCHLORIDE 137 UG/1
137 SPRAY, METERED NASAL TWICE DAILY
Qty: 1 | Refills: 5 | Status: ACTIVE | COMMUNITY
Start: 2023-10-26 | End: 1900-01-01

## 2023-10-27 ENCOUNTER — APPOINTMENT (OUTPATIENT)
Dept: MAMMOGRAPHY | Facility: IMAGING CENTER | Age: 88
End: 2023-10-27

## 2023-10-27 ENCOUNTER — APPOINTMENT (OUTPATIENT)
Dept: ULTRASOUND IMAGING | Facility: IMAGING CENTER | Age: 88
End: 2023-10-27

## 2023-10-27 LAB
RAPID RVP RESULT: NOT DETECTED
SARS-COV-2 RNA PNL RESP NAA+PROBE: NOT DETECTED

## 2023-11-20 ENCOUNTER — EMERGENCY (EMERGENCY)
Facility: HOSPITAL | Age: 88
LOS: 1 days | Discharge: HOME | End: 2023-11-20
Attending: EMERGENCY MEDICINE
Payer: MEDICARE

## 2023-11-20 VITALS
RESPIRATION RATE: 18 BRPM | DIASTOLIC BLOOD PRESSURE: 91 MMHG | TEMPERATURE: 98 F | OXYGEN SATURATION: 96 % | SYSTOLIC BLOOD PRESSURE: 196 MMHG | HEART RATE: 72 BPM

## 2023-11-20 VITALS
DIASTOLIC BLOOD PRESSURE: 77 MMHG | OXYGEN SATURATION: 96 % | HEART RATE: 66 BPM | RESPIRATION RATE: 16 BRPM | SYSTOLIC BLOOD PRESSURE: 162 MMHG | TEMPERATURE: 98 F

## 2023-11-20 LAB
ALBUMIN SERPL ELPH-MCNC: 4.2 G/DL — SIGNIFICANT CHANGE UP (ref 3.3–5)
ALBUMIN SERPL ELPH-MCNC: 4.2 G/DL — SIGNIFICANT CHANGE UP (ref 3.3–5)
ALP SERPL-CCNC: 67 U/L — SIGNIFICANT CHANGE UP (ref 40–120)
ALP SERPL-CCNC: 67 U/L — SIGNIFICANT CHANGE UP (ref 40–120)
ALT FLD-CCNC: 15 U/L — SIGNIFICANT CHANGE UP (ref 10–45)
ALT FLD-CCNC: 15 U/L — SIGNIFICANT CHANGE UP (ref 10–45)
APPEARANCE UR: CLEAR — SIGNIFICANT CHANGE UP
APPEARANCE UR: CLEAR — SIGNIFICANT CHANGE UP
APTT BLD: 28.2 SEC — SIGNIFICANT CHANGE UP (ref 24.5–35.6)
APTT BLD: 28.2 SEC — SIGNIFICANT CHANGE UP (ref 24.5–35.6)
AST SERPL-CCNC: 21 U/L — SIGNIFICANT CHANGE UP (ref 10–40)
AST SERPL-CCNC: 21 U/L — SIGNIFICANT CHANGE UP (ref 10–40)
BACTERIA # UR AUTO: NEGATIVE /HPF — SIGNIFICANT CHANGE UP
BACTERIA # UR AUTO: NEGATIVE /HPF — SIGNIFICANT CHANGE UP
BASE EXCESS BLDV CALC-SCNC: 3.4 MMOL/L — HIGH (ref -2–3)
BASE EXCESS BLDV CALC-SCNC: 3.4 MMOL/L — HIGH (ref -2–3)
BASOPHILS # BLD AUTO: 0.04 K/UL — SIGNIFICANT CHANGE UP (ref 0–0.2)
BASOPHILS # BLD AUTO: 0.04 K/UL — SIGNIFICANT CHANGE UP (ref 0–0.2)
BASOPHILS NFR BLD AUTO: 0.5 % — SIGNIFICANT CHANGE UP (ref 0–2)
BASOPHILS NFR BLD AUTO: 0.5 % — SIGNIFICANT CHANGE UP (ref 0–2)
BILIRUB SERPL-MCNC: 0.2 MG/DL — SIGNIFICANT CHANGE UP (ref 0.2–1.2)
BILIRUB SERPL-MCNC: 0.2 MG/DL — SIGNIFICANT CHANGE UP (ref 0.2–1.2)
BILIRUB UR-MCNC: NEGATIVE — SIGNIFICANT CHANGE UP
BILIRUB UR-MCNC: NEGATIVE — SIGNIFICANT CHANGE UP
BUN SERPL-MCNC: 31 MG/DL — HIGH (ref 7–23)
BUN SERPL-MCNC: 31 MG/DL — HIGH (ref 7–23)
CA-I SERPL-SCNC: 1.21 MMOL/L — SIGNIFICANT CHANGE UP (ref 1.15–1.33)
CA-I SERPL-SCNC: 1.21 MMOL/L — SIGNIFICANT CHANGE UP (ref 1.15–1.33)
CALCIUM SERPL-MCNC: 9.3 MG/DL — SIGNIFICANT CHANGE UP (ref 8.4–10.5)
CALCIUM SERPL-MCNC: 9.3 MG/DL — SIGNIFICANT CHANGE UP (ref 8.4–10.5)
CAST: 0 /LPF — SIGNIFICANT CHANGE UP (ref 0–4)
CAST: 0 /LPF — SIGNIFICANT CHANGE UP (ref 0–4)
CHLORIDE BLDV-SCNC: 105 MMOL/L — SIGNIFICANT CHANGE UP (ref 96–108)
CHLORIDE BLDV-SCNC: 105 MMOL/L — SIGNIFICANT CHANGE UP (ref 96–108)
CHLORIDE SERPL-SCNC: 105 MMOL/L — SIGNIFICANT CHANGE UP (ref 96–108)
CHLORIDE SERPL-SCNC: 105 MMOL/L — SIGNIFICANT CHANGE UP (ref 96–108)
CO2 BLDV-SCNC: 31 MMOL/L — HIGH (ref 22–26)
CO2 BLDV-SCNC: 31 MMOL/L — HIGH (ref 22–26)
CO2 SERPL-SCNC: 25 MMOL/L — SIGNIFICANT CHANGE UP (ref 22–31)
CO2 SERPL-SCNC: 25 MMOL/L — SIGNIFICANT CHANGE UP (ref 22–31)
COLOR SPEC: YELLOW — SIGNIFICANT CHANGE UP
COLOR SPEC: YELLOW — SIGNIFICANT CHANGE UP
CREAT SERPL-MCNC: 0.79 MG/DL — SIGNIFICANT CHANGE UP (ref 0.5–1.3)
CREAT SERPL-MCNC: 0.79 MG/DL — SIGNIFICANT CHANGE UP (ref 0.5–1.3)
DIFF PNL FLD: ABNORMAL
DIFF PNL FLD: ABNORMAL
EGFR: 72 ML/MIN/1.73M2 — SIGNIFICANT CHANGE UP
EGFR: 72 ML/MIN/1.73M2 — SIGNIFICANT CHANGE UP
EOSINOPHIL # BLD AUTO: 0.06 K/UL — SIGNIFICANT CHANGE UP (ref 0–0.5)
EOSINOPHIL # BLD AUTO: 0.06 K/UL — SIGNIFICANT CHANGE UP (ref 0–0.5)
EOSINOPHIL NFR BLD AUTO: 0.8 % — SIGNIFICANT CHANGE UP (ref 0–6)
EOSINOPHIL NFR BLD AUTO: 0.8 % — SIGNIFICANT CHANGE UP (ref 0–6)
GAS PNL BLDV: 138 MMOL/L — SIGNIFICANT CHANGE UP (ref 136–145)
GAS PNL BLDV: 138 MMOL/L — SIGNIFICANT CHANGE UP (ref 136–145)
GAS PNL BLDV: SIGNIFICANT CHANGE UP
GAS PNL BLDV: SIGNIFICANT CHANGE UP
GLUCOSE BLDV-MCNC: 95 MG/DL — SIGNIFICANT CHANGE UP (ref 70–99)
GLUCOSE BLDV-MCNC: 95 MG/DL — SIGNIFICANT CHANGE UP (ref 70–99)
GLUCOSE SERPL-MCNC: 93 MG/DL — SIGNIFICANT CHANGE UP (ref 70–99)
GLUCOSE SERPL-MCNC: 93 MG/DL — SIGNIFICANT CHANGE UP (ref 70–99)
GLUCOSE UR QL: NEGATIVE MG/DL — SIGNIFICANT CHANGE UP
GLUCOSE UR QL: NEGATIVE MG/DL — SIGNIFICANT CHANGE UP
HCO3 BLDV-SCNC: 30 MMOL/L — HIGH (ref 22–29)
HCO3 BLDV-SCNC: 30 MMOL/L — HIGH (ref 22–29)
HCT VFR BLD CALC: 33.2 % — LOW (ref 34.5–45)
HCT VFR BLD CALC: 33.2 % — LOW (ref 34.5–45)
HCT VFR BLDA CALC: 33 % — LOW (ref 34.5–46.5)
HCT VFR BLDA CALC: 33 % — LOW (ref 34.5–46.5)
HGB BLD CALC-MCNC: 11.1 G/DL — LOW (ref 11.7–16.1)
HGB BLD CALC-MCNC: 11.1 G/DL — LOW (ref 11.7–16.1)
HGB BLD-MCNC: 10.6 G/DL — LOW (ref 11.5–15.5)
HGB BLD-MCNC: 10.6 G/DL — LOW (ref 11.5–15.5)
IMM GRANULOCYTES NFR BLD AUTO: 0.1 % — SIGNIFICANT CHANGE UP (ref 0–0.9)
IMM GRANULOCYTES NFR BLD AUTO: 0.1 % — SIGNIFICANT CHANGE UP (ref 0–0.9)
INR BLD: 1.04 RATIO — SIGNIFICANT CHANGE UP (ref 0.85–1.18)
INR BLD: 1.04 RATIO — SIGNIFICANT CHANGE UP (ref 0.85–1.18)
KETONES UR-MCNC: NEGATIVE MG/DL — SIGNIFICANT CHANGE UP
KETONES UR-MCNC: NEGATIVE MG/DL — SIGNIFICANT CHANGE UP
LACTATE BLDV-MCNC: 0.9 MMOL/L — SIGNIFICANT CHANGE UP (ref 0.5–2)
LACTATE BLDV-MCNC: 0.9 MMOL/L — SIGNIFICANT CHANGE UP (ref 0.5–2)
LEUKOCYTE ESTERASE UR-ACNC: NEGATIVE — SIGNIFICANT CHANGE UP
LEUKOCYTE ESTERASE UR-ACNC: NEGATIVE — SIGNIFICANT CHANGE UP
LIDOCAIN IGE QN: 38 U/L — SIGNIFICANT CHANGE UP (ref 7–60)
LIDOCAIN IGE QN: 38 U/L — SIGNIFICANT CHANGE UP (ref 7–60)
LYMPHOCYTES # BLD AUTO: 3.45 K/UL — HIGH (ref 1–3.3)
LYMPHOCYTES # BLD AUTO: 3.45 K/UL — HIGH (ref 1–3.3)
LYMPHOCYTES # BLD AUTO: 46.7 % — HIGH (ref 13–44)
LYMPHOCYTES # BLD AUTO: 46.7 % — HIGH (ref 13–44)
MCHC RBC-ENTMCNC: 29.8 PG — SIGNIFICANT CHANGE UP (ref 27–34)
MCHC RBC-ENTMCNC: 29.8 PG — SIGNIFICANT CHANGE UP (ref 27–34)
MCHC RBC-ENTMCNC: 31.9 GM/DL — LOW (ref 32–36)
MCHC RBC-ENTMCNC: 31.9 GM/DL — LOW (ref 32–36)
MCV RBC AUTO: 93.3 FL — SIGNIFICANT CHANGE UP (ref 80–100)
MCV RBC AUTO: 93.3 FL — SIGNIFICANT CHANGE UP (ref 80–100)
MONOCYTES # BLD AUTO: 0.76 K/UL — SIGNIFICANT CHANGE UP (ref 0–0.9)
MONOCYTES # BLD AUTO: 0.76 K/UL — SIGNIFICANT CHANGE UP (ref 0–0.9)
MONOCYTES NFR BLD AUTO: 10.3 % — SIGNIFICANT CHANGE UP (ref 2–14)
MONOCYTES NFR BLD AUTO: 10.3 % — SIGNIFICANT CHANGE UP (ref 2–14)
NEUTROPHILS # BLD AUTO: 3.06 K/UL — SIGNIFICANT CHANGE UP (ref 1.8–7.4)
NEUTROPHILS # BLD AUTO: 3.06 K/UL — SIGNIFICANT CHANGE UP (ref 1.8–7.4)
NEUTROPHILS NFR BLD AUTO: 41.6 % — LOW (ref 43–77)
NEUTROPHILS NFR BLD AUTO: 41.6 % — LOW (ref 43–77)
NITRITE UR-MCNC: NEGATIVE — SIGNIFICANT CHANGE UP
NITRITE UR-MCNC: NEGATIVE — SIGNIFICANT CHANGE UP
NRBC # BLD: 0 /100 WBCS — SIGNIFICANT CHANGE UP (ref 0–0)
NRBC # BLD: 0 /100 WBCS — SIGNIFICANT CHANGE UP (ref 0–0)
PCO2 BLDV: 51 MMHG — HIGH (ref 39–42)
PCO2 BLDV: 51 MMHG — HIGH (ref 39–42)
PH BLDV: 7.37 — SIGNIFICANT CHANGE UP (ref 7.32–7.43)
PH BLDV: 7.37 — SIGNIFICANT CHANGE UP (ref 7.32–7.43)
PH UR: 7 — SIGNIFICANT CHANGE UP (ref 5–8)
PH UR: 7 — SIGNIFICANT CHANGE UP (ref 5–8)
PLATELET # BLD AUTO: 202 K/UL — SIGNIFICANT CHANGE UP (ref 150–400)
PLATELET # BLD AUTO: 202 K/UL — SIGNIFICANT CHANGE UP (ref 150–400)
PO2 BLDV: 52 MMHG — HIGH (ref 25–45)
PO2 BLDV: 52 MMHG — HIGH (ref 25–45)
POTASSIUM BLDV-SCNC: 4.6 MMOL/L — SIGNIFICANT CHANGE UP (ref 3.5–5.1)
POTASSIUM BLDV-SCNC: 4.6 MMOL/L — SIGNIFICANT CHANGE UP (ref 3.5–5.1)
POTASSIUM SERPL-MCNC: 4.4 MMOL/L — SIGNIFICANT CHANGE UP (ref 3.5–5.3)
POTASSIUM SERPL-MCNC: 4.4 MMOL/L — SIGNIFICANT CHANGE UP (ref 3.5–5.3)
POTASSIUM SERPL-SCNC: 4.4 MMOL/L — SIGNIFICANT CHANGE UP (ref 3.5–5.3)
POTASSIUM SERPL-SCNC: 4.4 MMOL/L — SIGNIFICANT CHANGE UP (ref 3.5–5.3)
PROT SERPL-MCNC: 7.7 G/DL — SIGNIFICANT CHANGE UP (ref 6–8.3)
PROT SERPL-MCNC: 7.7 G/DL — SIGNIFICANT CHANGE UP (ref 6–8.3)
PROT UR-MCNC: NEGATIVE MG/DL — SIGNIFICANT CHANGE UP
PROT UR-MCNC: NEGATIVE MG/DL — SIGNIFICANT CHANGE UP
PROTHROM AB SERPL-ACNC: 11.4 SEC — SIGNIFICANT CHANGE UP (ref 9.5–13)
PROTHROM AB SERPL-ACNC: 11.4 SEC — SIGNIFICANT CHANGE UP (ref 9.5–13)
RBC # BLD: 3.56 M/UL — LOW (ref 3.8–5.2)
RBC # BLD: 3.56 M/UL — LOW (ref 3.8–5.2)
RBC # FLD: 14.9 % — HIGH (ref 10.3–14.5)
RBC # FLD: 14.9 % — HIGH (ref 10.3–14.5)
RBC CASTS # UR COMP ASSIST: 2 /HPF — SIGNIFICANT CHANGE UP (ref 0–4)
RBC CASTS # UR COMP ASSIST: 2 /HPF — SIGNIFICANT CHANGE UP (ref 0–4)
SAO2 % BLDV: 77.6 % — SIGNIFICANT CHANGE UP (ref 67–88)
SAO2 % BLDV: 77.6 % — SIGNIFICANT CHANGE UP (ref 67–88)
SODIUM SERPL-SCNC: 141 MMOL/L — SIGNIFICANT CHANGE UP (ref 135–145)
SODIUM SERPL-SCNC: 141 MMOL/L — SIGNIFICANT CHANGE UP (ref 135–145)
SP GR SPEC: 1.01 — SIGNIFICANT CHANGE UP (ref 1–1.03)
SP GR SPEC: 1.01 — SIGNIFICANT CHANGE UP (ref 1–1.03)
SQUAMOUS # UR AUTO: 0 /HPF — SIGNIFICANT CHANGE UP (ref 0–5)
SQUAMOUS # UR AUTO: 0 /HPF — SIGNIFICANT CHANGE UP (ref 0–5)
TROPONIN T, HIGH SENSITIVITY RESULT: 37 NG/L — SIGNIFICANT CHANGE UP (ref 0–51)
TROPONIN T, HIGH SENSITIVITY RESULT: 37 NG/L — SIGNIFICANT CHANGE UP (ref 0–51)
TROPONIN T, HIGH SENSITIVITY RESULT: 38 NG/L — SIGNIFICANT CHANGE UP (ref 0–51)
TROPONIN T, HIGH SENSITIVITY RESULT: 38 NG/L — SIGNIFICANT CHANGE UP (ref 0–51)
UROBILINOGEN FLD QL: 0.2 MG/DL — SIGNIFICANT CHANGE UP (ref 0.2–1)
UROBILINOGEN FLD QL: 0.2 MG/DL — SIGNIFICANT CHANGE UP (ref 0.2–1)
WBC # BLD: 7.38 K/UL — SIGNIFICANT CHANGE UP (ref 3.8–10.5)
WBC # BLD: 7.38 K/UL — SIGNIFICANT CHANGE UP (ref 3.8–10.5)
WBC # FLD AUTO: 7.38 K/UL — SIGNIFICANT CHANGE UP (ref 3.8–10.5)
WBC # FLD AUTO: 7.38 K/UL — SIGNIFICANT CHANGE UP (ref 3.8–10.5)
WBC UR QL: 0 /HPF — SIGNIFICANT CHANGE UP (ref 0–5)
WBC UR QL: 0 /HPF — SIGNIFICANT CHANGE UP (ref 0–5)

## 2023-11-20 PROCEDURE — 82435 ASSAY OF BLOOD CHLORIDE: CPT

## 2023-11-20 PROCEDURE — 84295 ASSAY OF SERUM SODIUM: CPT

## 2023-11-20 PROCEDURE — 71045 X-RAY EXAM CHEST 1 VIEW: CPT | Mod: 26

## 2023-11-20 PROCEDURE — 82330 ASSAY OF CALCIUM: CPT

## 2023-11-20 PROCEDURE — 71045 X-RAY EXAM CHEST 1 VIEW: CPT

## 2023-11-20 PROCEDURE — 99284 EMERGENCY DEPT VISIT MOD MDM: CPT

## 2023-11-20 PROCEDURE — 99285 EMERGENCY DEPT VISIT HI MDM: CPT | Mod: 25

## 2023-11-20 PROCEDURE — 83690 ASSAY OF LIPASE: CPT

## 2023-11-20 PROCEDURE — 87086 URINE CULTURE/COLONY COUNT: CPT

## 2023-11-20 PROCEDURE — 81001 URINALYSIS AUTO W/SCOPE: CPT

## 2023-11-20 PROCEDURE — 85610 PROTHROMBIN TIME: CPT

## 2023-11-20 PROCEDURE — 85025 COMPLETE CBC W/AUTO DIFF WBC: CPT

## 2023-11-20 PROCEDURE — 82803 BLOOD GASES ANY COMBINATION: CPT

## 2023-11-20 PROCEDURE — 96374 THER/PROPH/DIAG INJ IV PUSH: CPT

## 2023-11-20 PROCEDURE — 83605 ASSAY OF LACTIC ACID: CPT

## 2023-11-20 PROCEDURE — 80053 COMPREHEN METABOLIC PANEL: CPT

## 2023-11-20 PROCEDURE — 82947 ASSAY GLUCOSE BLOOD QUANT: CPT

## 2023-11-20 PROCEDURE — 85730 THROMBOPLASTIN TIME PARTIAL: CPT

## 2023-11-20 PROCEDURE — 85014 HEMATOCRIT: CPT

## 2023-11-20 PROCEDURE — 84484 ASSAY OF TROPONIN QUANT: CPT

## 2023-11-20 PROCEDURE — 74176 CT ABD & PELVIS W/O CONTRAST: CPT | Mod: 26,MA

## 2023-11-20 PROCEDURE — 85018 HEMOGLOBIN: CPT

## 2023-11-20 PROCEDURE — 74176 CT ABD & PELVIS W/O CONTRAST: CPT | Mod: MA

## 2023-11-20 PROCEDURE — 84132 ASSAY OF SERUM POTASSIUM: CPT

## 2023-11-20 PROCEDURE — 36415 COLL VENOUS BLD VENIPUNCTURE: CPT

## 2023-11-20 RX ORDER — SODIUM CHLORIDE 9 MG/ML
1000 INJECTION INTRAMUSCULAR; INTRAVENOUS; SUBCUTANEOUS ONCE
Refills: 0 | Status: COMPLETED | OUTPATIENT
Start: 2023-11-20 | End: 2023-11-20

## 2023-11-20 RX ORDER — ACETAMINOPHEN 500 MG
1000 TABLET ORAL ONCE
Refills: 0 | Status: COMPLETED | OUTPATIENT
Start: 2023-11-20 | End: 2023-11-20

## 2023-11-20 RX ADMIN — Medication 400 MILLIGRAM(S): at 04:13

## 2023-11-20 RX ADMIN — SODIUM CHLORIDE 2000 MILLILITER(S): 9 INJECTION INTRAMUSCULAR; INTRAVENOUS; SUBCUTANEOUS at 04:13

## 2023-11-20 NOTE — ED PROVIDER NOTE - CLINICAL SUMMARY MEDICAL DECISION MAKING FREE TEXT BOX
89 y/o female pt pmhx RA, MAC, HTN, chronic fatigue, peripheral neuropathy, asthma, breast CA C/o fatigue, weakness for the past couple of days. Denies fevers, urinary sxs, recent weight loss, night sweats, abd pain, n/v/d, chest pain, SOB, bloody stool, bloody urine, dizziness, lightheadedness.

## 2023-11-20 NOTE — ED PROVIDER NOTE - NSFOLLOWUPINSTRUCTIONS_ED_ALL_ED_FT
The results of any blood tests and imaging studies completed during your visit today were discussed and explained to you and a copy provided with your discharge instructions. Please follow up with your primary care doctor within 48 hours.    Fatigue  If you have fatigue, you feel tired all the time and have a lack of energy or a lack of motivation. Fatigue may make it difficult to start or complete tasks because of exhaustion.    Occasional or mild fatigue is often a normal response to activity or life. However, long-term (chronic) or extreme fatigue may be a symptom of a medical condition such as:  Depression.  Not having enough red blood cells or hemoglobin in the blood (anemia).  A problem with a small gland located in the lower front part of the neck (thyroid disorder).  Rheumatologic conditions. These are problems related to the body's defense system (immune system).  Infections, especially certain viral infections.  Fatigue can also lead to negative health outcomes over time.    Follow these instructions at home:  Medicines    Take over-the-counter and prescription medicines only as told by your health care provider.  Take a multivitamin if told by your health care provider.  Do not use herbal or dietary supplements unless they are approved by your health care provider.  Eating and drinking    A comparison of three sample cups showing dark yellow, yellow, and pale yellow urine.  Avoid heavy meals in the evening.  Eat a well-balanced diet, which includes lean proteins, whole grains, plenty of fruits and vegetables, and low-fat dairy products.  Avoid eating or drinking too many products with caffeine in them.  Avoid alcohol.  Drink enough fluid to keep your urine pale yellow.  Activity    A person sitting on the floor doing yoga.  Exercise regularly, as told by your health care provider.  Use or practice techniques to help you relax, such as yoga, sandra chi, meditation, or massage therapy.  Lifestyle    Change situations that cause you stress. Try to keep your work and personal schedules in balance.  Do not use recreational or illegal drugs.  General instructions    Monitor your fatigue for any changes.  Go to bed and get up at the same time every day.  Avoid fatigue by pacing yourself during the day and getting enough sleep at night.  Maintain a healthy weight.  Contact a health care provider if:  Your fatigue does not get better.  You have a fever.  You suddenly lose or gain weight.  You have headaches.  You have trouble falling asleep or sleeping through the night.  You feel angry, guilty, anxious, or sad.  You have swelling in your legs or another part of your body.  Get help right away if:  You feel confused, feel like you might faint, or faint.  Your vision is blurry or you have a severe headache.  You have severe pain in your abdomen, your back, or the area between your waist and hips (pelvis).  You have chest pain, shortness of breath, or an irregular or fast heartbeat.  You are unable to urinate, or you urinate less than normal.  You have abnormal bleeding from the rectum, nose, lungs, nipples, or, if you are female, the vagina.  You vomit blood.  You have thoughts about hurting yourself or others.  These symptoms may be an emergency. Get help right away. Call 911.  Do not wait to see if the symptoms will go away.  Do not drive yourself to the hospital.  Get help right away if you feel like you may hurt yourself or others, or have thoughts about taking your own life. Go to your nearest emergency room or:  Call 911.  Call the National Suicide Prevention Lifeline at 1-294.435.9350 or 677. This is open 24 hours a day.  Text the Crisis Text Line at 539398.  Summary  If you have fatigue, you feel tired all the time and have a lack of energy or a lack of motivation.  Fatigue may make it difficult to start or complete tasks because of exhaustion.  Long-term (chronic) or extreme fatigue may be a symptom of a medical condition.  Exercise regularly, as told by your health care provider.  Change situations that cause you stress. Try to keep your work and personal schedules in balance.  This information is not intended to replace advice given to you by your health care provider. Make sure you discuss any questions you have with your health care provider.

## 2023-11-20 NOTE — ED PROVIDER NOTE - PROGRESS NOTE DETAILS
Attending Jose Alfredo:  evaluated pt, appears kinga anxious, no distress, no s/s consistent w/ hypertensive emergency Pt is stable, explained to patient repeat troponin wnl. Vitals stable. Not experiencing any sob, cp, vision changes, n/v, headache, dizziness, lightheadedness. Family and pt is comfortable with d/c. Attending MD Garcia: Patient re-evaluated and eager for discharge.  No acute issues at  this time.  Lab and radiology tests reviewed with patient and family at bedside.  Patient stable for discharge. Follow up instructions given, importance of follow up emphasized, return to ED parameters reviewed and patient verbalized understanding.  All questions answered, all concerns addressed.

## 2023-11-20 NOTE — ED PROVIDER NOTE - OBJECTIVE STATEMENT
87 y/o female pt pmhx RA, MAC, HTN, chronic fatigue, peripheral neuropathy, asthma, breast CA C/o fatigue, weakness for the past couple of days. Denies fevers, urinary sxs, recent weight loss, night sweats, abd pain, n/v/d, chest pain, SOB, bloody stool, bloody urine, dizziness, lightheadedness. 87 y/o female pt pmhx RA, MAC, HTN, chronic fatigue, peripheral neuropathy, asthma, breast CA C/o fatigue, weakness for the past couple of days. Denies fevers, urinary sxs, recent weight loss, night sweats, abd pain, n/v/d, chest pain, SOB, bloody stool, bloody urine, .

## 2023-11-20 NOTE — ED PROVIDER NOTE - SHIFT CHANGE DETAILS
Attending MD Garcia: 88F w HTN, HLD, polymyalgia rheumatica, here for fatigue, malaise, CT nonactionable (stool suggestive of constipation, mild cardiomegaly and chronic interstitial lung disease partially visible similar to prior), pending repeat trop 37 (in previous range), if repeat trop consistent, can follow up with outpatient

## 2023-11-20 NOTE — ED ADULT NURSE NOTE - OBJECTIVE STATEMENT
88 y.o female, A&Ox4, PMH RA, HTN, uveitis, asthma, GERD, pt presents to ED via EMS c/o feeling generally unwell. pt states on Friday she started not feeling well, states she felt weaker and more pain in legs and back than usual. pt states tonight she took her BP at home and found it to be elevated which prompted ED visit. pt states she noticed to have a visual disturbance of a curtain going over her field of view. pt states symptoms she is feeling is not new but rather more severe than her usual baseline. pt endorsing a headache at this time. pt denies weakness to one side of body, N/V/D, fevers, chills, sick contacts. IV access established, cardiac monitor placed, pt safety and comfort provided.

## 2023-11-20 NOTE — ED ADULT NURSE NOTE - NSFALLHARMRISKINTERV_ED_ALL_ED

## 2023-11-20 NOTE — ED ADULT TRIAGE NOTE - CHIEF COMPLAINT QUOTE
BIBEMS for elevated BP at home. Pt feeling "generally unwell" checked BP found it to be elevated. Denies neuro symptoms

## 2023-11-20 NOTE — ED ADULT NURSE NOTE - CAS TRG GENERAL AIRWAY, MLM
SUBJECTIVE:   CC: America Woo is an 57 year old woman who presents for preventive health visit.     Healthy Habits:     Getting at least 3 servings of Calcium per day:  Yes    Bi-annual eye exam:  Yes    Dental care twice a year:  Yes    Sleep apnea or symptoms of sleep apnea:  None    Diet:  Regular (no restrictions)    Frequency of exercise:  4-5 days/week    Duration of exercise:  15-30 minutes    Taking medications regularly:  Yes    Medication side effects:  None    PHQ-2 Total Score: 0    Additional concerns today:  No          {additional problems to add (Optional):288934}    Today's PHQ-2 Score:   PHQ-2 ( 1999 [employer]) 10/21/2019   Q1: Little interest or pleasure in doing things 0   Q2: Feeling down, depressed or hopeless 0   PHQ-2 Score 0   Q1: Little interest or pleasure in doing things Not at all   Q2: Feeling down, depressed or hopeless Not at all   PHQ-2 Score 0       Abuse: Current or Past(Physical, Sexual or Emotional)- { :655522}  Do you feel safe in your environment? { :826457}    Social History     Tobacco Use     Smoking status: Never Smoker     Smokeless tobacco: Never Used   Substance Use Topics     Alcohol use: Yes     Alcohol/week: 2.0 standard drinks     Types: 2 Standard drinks or equivalent per week     Comment: MONTHLY     {Rooming Staff- Complete this question if Prescreen response is not shown below for today's visit. If you drink alcohol do you typically have >3 drinks per day or >7 drinks per week? (Optional):389888}    Alcohol Use 10/21/2019   Prescreen: >3 drinks/day or >7 drinks/week? No   {add AUDIT responses (Optional) (A score of 7 for adult men is an indication of hazardous drinking; a score of 8 or more is an indication of an alcohol use disorder.  A score of 7 or more for adult women is an indication of hazardous drinking or an alchohol use disorder):752193}    Reviewed orders with patient.  Reviewed health maintenance and updated orders accordingly - {  ":413400::\"Yes\"}  {Chronicprobdata (optional):087992}    {Mammo Decision Support (Optional):634944}    Pertinent mammograms are reviewed under the imaging tab.  History of abnormal Pap smear: { :753166}     Reviewed and updated as needed this visit by clinical staff  Tobacco  Allergies  Meds  Med Hx  Surg Hx  Fam Hx  Soc Hx        Reviewed and updated as needed this visit by Provider        {HISTORY OPTIONS (Optional):197904}    Review of Systems   Constitutional: Negative for chills and fever.   HENT: Negative for congestion and ear pain.    Eyes: Negative for pain.   Respiratory: Negative for cough.    Cardiovascular: Negative for chest pain.   Gastrointestinal: Negative for abdominal pain, constipation, diarrhea and hematochezia.   Genitourinary: Negative for frequency and hematuria.   Neurological: Negative for dizziness.   Psychiatric/Behavioral: The patient is not nervous/anxious.           OBJECTIVE:   There were no vitals taken for this visit.  Physical Exam  {Exam Choices (Optional):281350}    {Diagnostic Test Results (Optional):758726::\"Diagnostic Test Results:\",\"Labs reviewed in Epic\"}    ASSESSMENT/PLAN:   {Diag Picklist:296291}    COUNSELING:  {FEMALE COUNSELING MESSAGES:679899::\"Reviewed preventive health counseling, as reflected in patient instructions\"}    Estimated body mass index is 23.81 kg/m  as calculated from the following:    Height as of 9/19/19: 1.702 m (5' 7\").    Weight as of 9/19/19: 68.9 kg (152 lb).    {Weight Management Plan (ACO) Complete if BMI is abnormal-  Ages 18-64  BMI >24.9.  Age 65+ with BMI <23 or >30 (Optional):805884}     reports that she has never smoked. She has never used smokeless tobacco.  {Tobacco Cessation -- Complete if patient is a smoker (Optional):510633}    Counseling Resources:  ATP IV Guidelines  Pooled Cohorts Equation Calculator  Breast Cancer Risk Calculator  FRAX Risk Assessment  ICSI Preventive Guidelines  Dietary Guidelines for Americans, " 2010  USDA's MyPlate  ASA Prophylaxis  Lung CA Screening    Dian Garcia MD  [facility]   [place]

## 2023-11-20 NOTE — ED PROVIDER NOTE - PATIENT PORTAL LINK FT
You can access the FollowMyHealth Patient Portal offered by [facility] by registering at the following website: [email or web address]. By joining [facility]’s FollowMyHealth portal, you will also be able to view your health information using other applications (apps) compatible with our system.

## 2023-11-20 NOTE — ED ADULT NURSE NOTE - NSICDXPASTSURGICALHX_GEN_ALL_CORE_FT
PAST SURGICAL HISTORY:  History of Breast Biopsy [place] lymph node biopsy    History of Cataract Surgery Left eye    S/P Breast Lumpectomy     S/P Lumpectomy of Breast Left Breast    S/P Nasal Polypectomy     Status Post Tonsillectomy

## 2023-11-20 NOTE — ED PROVIDER NOTE - PHYSICAL EXAMINATION
GENERAL: NAD  HEENT:  Atraumatic  CHEST/LUNG: Chest rise equal bilaterally, clear breath sounds b/l  HEART: Regular rate and rhythm  ABDOMEN: Soft, Nontender, Nondistended  EXTREMITIES:  Extremities warm  PSYCH: A&Ox3  SKIN: No obvious rashes or lesions  MSK: No cervical spine TTP, able to range neck to the left and right/ No midline spinal TTP  NEUROLOGY: strength and sensation intact in all extremities. CN 2 - 12 intact. No pronator drift. Ambulatory without difficulty.

## 2023-11-20 NOTE — ED PROVIDER NOTE - ATTENDING CONTRIBUTION TO CARE
MD Veliz:  patient seen and evaluated personally.   I agree with the History & Physical,  Impression & Plan other than what was detailed in my note.  MD Veliz  89 y/o female pt pmhx RA, MAC, HTN, chronic fatigue, peripheral neuropathy, asthma, breast CA C/o HTN. Noted her blood pressures in the 160/180's, she has not had any symptoms other than feeling fatigued more so than usual. States she has chronic joint pain that is not much different then usual. maybe some nausea, no abdominal pain no vomiting, no cp, sob, no syncope, near syncope, no unilat weakness, no f/c diarrhea, no ha, no bv, afebrile vitals stable, repeat bs bp is stable  non toxic well appearing, NC/AT,  conjunctiva non conjected, sclera anicteric, moist mucous membranes, neck supple, heart sounds, normal, no mrg, lungs cta b/l no wrr, abd soft non distended w/ no tenderness, no visual deformities of extremities, axox3, , normal mood and affect. resdient exam had some llq abd ttp, plan for infectious metabolic workup, cbc, cmp, trop, cxr, ua, ct scan r/o diverticulitis, if workup neg, likely dc home.

## 2023-11-20 NOTE — ED PROVIDER NOTE - NSICDXPASTSURGICALHX_GEN_ALL_CORE_FT
PAST SURGICAL HISTORY:  History of Breast Biopsy [place] lymph node biopsy    History of Cataract Surgery Left eye    S/P Breast Lumpectomy     S/P Lumpectomy of Breast Left Breast    S/P Nasal Polypectomy     Status Post Tonsillectomy

## 2023-12-08 ENCOUNTER — APPOINTMENT (OUTPATIENT)
Dept: RHEUMATOLOGY | Facility: CLINIC | Age: 88
End: 2023-12-08
Payer: MEDICARE

## 2023-12-08 VITALS
WEIGHT: 140 LBS | HEART RATE: 78 BPM | HEIGHT: 65 IN | SYSTOLIC BLOOD PRESSURE: 147 MMHG | RESPIRATION RATE: 16 BRPM | BODY MASS INDEX: 23.32 KG/M2 | OXYGEN SATURATION: 98 % | DIASTOLIC BLOOD PRESSURE: 70 MMHG | TEMPERATURE: 97.1 F

## 2023-12-08 DIAGNOSIS — G62.9 POLYNEUROPATHY, UNSPECIFIED: ICD-10-CM

## 2023-12-08 DIAGNOSIS — A04.8 OTHER SPECIFIED BACTERIAL INTESTINAL INFECTIONS: ICD-10-CM

## 2023-12-08 PROCEDURE — 99215 OFFICE O/P EST HI 40 MIN: CPT

## 2023-12-08 RX ORDER — OMEPRAZOLE MAGNESIUM 20 MG/1
20 TABLET, DELAYED RELEASE ORAL
Refills: 0 | Status: DISCONTINUED | COMMUNITY
Start: 2023-12-08 | End: 2023-12-08

## 2023-12-08 RX ORDER — LINACLOTIDE 290 UG/1
290 CAPSULE, GELATIN COATED ORAL
Qty: 90 | Refills: 2 | Status: DISCONTINUED | COMMUNITY
Start: 2023-01-30 | End: 2023-12-08

## 2023-12-08 RX ORDER — PANTOPRAZOLE 40 MG/1
40 TABLET, DELAYED RELEASE ORAL
Qty: 30 | Refills: 3 | Status: DISCONTINUED | COMMUNITY
Start: 2022-12-21 | End: 2023-12-08

## 2023-12-08 RX ORDER — PREGABALIN 25 MG/1
25 CAPSULE ORAL AT BEDTIME
Qty: 30 | Refills: 3 | Status: DISCONTINUED | COMMUNITY
Start: 2023-09-20 | End: 2023-12-08

## 2023-12-08 RX ORDER — AZITHROMYCIN 250 MG/1
250 TABLET, FILM COATED ORAL
Qty: 1 | Refills: 0 | Status: DISCONTINUED | COMMUNITY
Start: 2023-10-26 | End: 2023-12-08

## 2023-12-08 RX ORDER — AMLODIPINE BESYLATE 5 MG/1
5 TABLET ORAL
Refills: 0 | Status: DISCONTINUED | COMMUNITY
End: 2023-12-08

## 2023-12-09 DIAGNOSIS — D64.9 ANEMIA, UNSPECIFIED: ICD-10-CM

## 2023-12-14 LAB
25(OH)D3 SERPL-MCNC: 43.1 NG/ML
ALBUMIN SERPL ELPH-MCNC: 4.3 G/DL
ALP BLD-CCNC: 57 U/L
ALT SERPL-CCNC: 10 U/L
ANION GAP SERPL CALC-SCNC: 10 MMOL/L
AST SERPL-CCNC: 18 U/L
BASOPHILS # BLD AUTO: 0.02 K/UL
BASOPHILS NFR BLD AUTO: 0.3 %
BILIRUB SERPL-MCNC: 0.4 MG/DL
BUN SERPL-MCNC: 25 MG/DL
CALCIUM SERPL-MCNC: 10 MG/DL
CHLORIDE SERPL-SCNC: 102 MMOL/L
CO2 SERPL-SCNC: 28 MMOL/L
CREAT SERPL-MCNC: 0.74 MG/DL
CRP SERPL-MCNC: 4 MG/L
EGFR: 78 ML/MIN/1.73M2
EOSINOPHIL # BLD AUTO: 0 K/UL
EOSINOPHIL NFR BLD AUTO: 0 %
ERYTHROCYTE [SEDIMENTATION RATE] IN BLOOD BY WESTERGREN METHOD: 28 MM/HR
ESTIMATED AVERAGE GLUCOSE: 114 MG/DL
GLUCOSE SERPL-MCNC: 99 MG/DL
HBA1C MFR BLD HPLC: 5.6 %
HCT VFR BLD CALC: 34.7 %
HEPB DNA PCR INT: NOT DETECTED
HEPB DNA PCR LOG: NOT DETECTED LOGIU/ML
HGB BLD-MCNC: 10.8 G/DL
IMM GRANULOCYTES NFR BLD AUTO: 0.3 %
IRON SATN MFR SERPL: 25 %
IRON SERPL-MCNC: 89 UG/DL
LYMPHOCYTES # BLD AUTO: 1.68 K/UL
LYMPHOCYTES NFR BLD AUTO: 25.2 %
MAN DIFF?: NORMAL
MCHC RBC-ENTMCNC: 29.6 PG
MCHC RBC-ENTMCNC: 31.1 GM/DL
MCV RBC AUTO: 95.1 FL
MONOCYTES # BLD AUTO: 0.56 K/UL
MONOCYTES NFR BLD AUTO: 8.4 %
NEUTROPHILS # BLD AUTO: 4.39 K/UL
NEUTROPHILS NFR BLD AUTO: 65.8 %
PLATELET # BLD AUTO: 227 K/UL
POTASSIUM SERPL-SCNC: 4.6 MMOL/L
PROT SERPL-MCNC: 7.1 G/DL
RBC # BLD: 3.65 M/UL
RBC # FLD: 14.8 %
SODIUM SERPL-SCNC: 140 MMOL/L
T3 SERPL-MCNC: 86 NG/DL
T4 FREE SERPL-MCNC: 1.3 NG/DL
TIBC SERPL-MCNC: 358 UG/DL
TSH SERPL-ACNC: 0.93 UIU/ML
UIBC SERPL-MCNC: 269 UG/DL
WBC # FLD AUTO: 6.67 K/UL

## 2024-01-01 ENCOUNTER — OUTPATIENT (OUTPATIENT)
Dept: OUTPATIENT SERVICES | Facility: HOSPITAL | Age: 89
LOS: 1 days | End: 2024-01-01
Payer: MEDICARE

## 2024-01-01 ENCOUNTER — INPATIENT (INPATIENT)
Facility: HOSPITAL | Age: 89
LOS: 0 days | DRG: 267 | End: 2024-10-11
Attending: THORACIC SURGERY (CARDIOTHORACIC VASCULAR SURGERY) | Admitting: THORACIC SURGERY (CARDIOTHORACIC VASCULAR SURGERY)
Payer: MEDICARE

## 2024-01-01 ENCOUNTER — TRANSCRIPTION ENCOUNTER (OUTPATIENT)
Age: 89
End: 2024-01-01

## 2024-01-01 ENCOUNTER — APPOINTMENT (OUTPATIENT)
Dept: CARDIOTHORACIC SURGERY | Facility: HOSPITAL | Age: 89
End: 2024-01-01

## 2024-01-01 ENCOUNTER — RESULT REVIEW (OUTPATIENT)
Age: 89
End: 2024-01-01

## 2024-01-01 VITALS
DIASTOLIC BLOOD PRESSURE: 89 MMHG | HEIGHT: 68 IN | WEIGHT: 147.05 LBS | SYSTOLIC BLOOD PRESSURE: 183 MMHG | OXYGEN SATURATION: 94 % | HEART RATE: 75 BPM | TEMPERATURE: 98 F | RESPIRATION RATE: 16 BRPM

## 2024-01-01 VITALS
HEART RATE: 75 BPM | SYSTOLIC BLOOD PRESSURE: 183 MMHG | RESPIRATION RATE: 16 BRPM | HEIGHT: 68 IN | DIASTOLIC BLOOD PRESSURE: 89 MMHG | OXYGEN SATURATION: 94 % | WEIGHT: 147.05 LBS | TEMPERATURE: 98 F

## 2024-01-01 DIAGNOSIS — I35.0 NONRHEUMATIC AORTIC (VALVE) STENOSIS: ICD-10-CM

## 2024-01-01 DIAGNOSIS — Z00.00 ENCOUNTER FOR GENERAL ADULT MEDICAL EXAMINATION WITHOUT ABNORMAL FINDINGS: ICD-10-CM

## 2024-01-01 DIAGNOSIS — Z98.890 OTHER SPECIFIED POSTPROCEDURAL STATES: Chronic | ICD-10-CM

## 2024-01-01 LAB
GAS PNL BLDA: SIGNIFICANT CHANGE UP
GAS PNL BLDV: SIGNIFICANT CHANGE UP
GLUCOSE BLDC GLUCOMTR-MCNC: 127 MG/DL — HIGH (ref 70–99)

## 2024-01-01 PROCEDURE — 71275 CT ANGIOGRAPHY CHEST: CPT | Mod: 26

## 2024-01-01 PROCEDURE — 74174 CTA ABD&PLVS W/CONTRAST: CPT | Mod: 26

## 2024-01-01 PROCEDURE — 75574 CT ANGIO HRT W/3D IMAGE: CPT

## 2024-01-01 PROCEDURE — 33305 REPAIR OF HEART WOUND: CPT

## 2024-01-01 PROCEDURE — 88300 SURGICAL PATH GROSS: CPT | Mod: 26,59

## 2024-01-01 PROCEDURE — 88305 TISSUE EXAM BY PATHOLOGIST: CPT | Mod: 26

## 2024-01-01 PROCEDURE — 33016 PERICARDIOCENTESIS W/IMAGING: CPT

## 2024-01-01 PROCEDURE — 75574 CT ANGIO HRT W/3D IMAGE: CPT | Mod: 26

## 2024-01-01 PROCEDURE — 74174 CTA ABD&PLVS W/CONTRAST: CPT

## 2024-01-01 PROCEDURE — 93312 ECHO TRANSESOPHAGEAL: CPT | Mod: 26

## 2024-01-01 PROCEDURE — 71275 CT ANGIOGRAPHY CHEST: CPT

## 2024-01-01 PROCEDURE — 33405 REPLACEMENT AORTIC VALVE OPN: CPT

## 2024-01-01 PROCEDURE — 71045 X-RAY EXAM CHEST 1 VIEW: CPT | Mod: 26

## 2024-01-01 DEVICE — CATH VASCULAR EXPO VENTRICULAR PIGTAIL CURVE (PIG) 0.045" X 5FR X 100CM: Type: IMPLANTABLE DEVICE | Status: FUNCTIONAL

## 2024-01-01 DEVICE — CATH IMPULSE FR4 5F X 100CM: Type: IMPLANTABLE DEVICE | Status: FUNCTIONAL

## 2024-01-01 DEVICE — VLV TRANS CATH W/COMM SYS SAPIEN 3 ULTRA 29MM: Type: IMPLANTABLE DEVICE | Status: FUNCTIONAL

## 2024-01-01 DEVICE — CANNULA STR SELF INFLATING 3.5MM: Type: IMPLANTABLE DEVICE | Status: FUNCTIONAL

## 2024-01-01 DEVICE — CANNULA RETROGRADE CARDIOPLEGIA SELF-INFLATING 14FR PRE-SHAPED STYLET/HANDLE: Type: IMPLANTABLE DEVICE | Status: FUNCTIONAL

## 2024-01-01 DEVICE — CANNULA RCC MANUALLY INFLATING W/GWIRE 15FR: Type: IMPLANTABLE DEVICE | Status: FUNCTIONAL

## 2024-01-01 DEVICE — SHEATH INTRODUCER TERUMO PINNACLE CORONARY 6FR X 10CM X 0.038" MINI WIRE: Type: IMPLANTABLE DEVICE | Status: FUNCTIONAL

## 2024-01-01 DEVICE — GWIRE STR .038X180 STIFF LONG TAPR: Type: IMPLANTABLE DEVICE | Status: FUNCTIONAL

## 2024-01-01 DEVICE — SHEATH INTRODUCER TERUMO PINNACLE CORONARY 5FR X 10CM X 0.038" MINI WIRE: Type: IMPLANTABLE DEVICE | Status: FUNCTIONAL

## 2024-01-01 DEVICE — WIRE GUIDE EXCHANGE J TIP 3MM X 260CM: Type: IMPLANTABLE DEVICE | Status: FUNCTIONAL

## 2024-01-01 DEVICE — COR-KNOT QUICK LOAD SINGLES: Type: IMPLANTABLE DEVICE | Status: FUNCTIONAL

## 2024-01-01 DEVICE — CANNULA AORTIC PERFUSION EZ GLIDE STRAIGHT 21FR X 35CM NON-VENTED: Type: IMPLANTABLE DEVICE | Status: FUNCTIONAL

## 2024-01-01 DEVICE — CANNULA VENOUS RETURN DUAL WITH OBTURATOR 34 TO 46FR X 37.5C: Type: IMPLANTABLE DEVICE | Status: FUNCTIONAL

## 2024-01-01 DEVICE — CATH VENT VENTRICULAR PVC 18FR X 4.25" TIP PERFORATION: Type: IMPLANTABLE DEVICE | Status: FUNCTIONAL

## 2024-01-01 DEVICE — HEMASORB ABS BONE PUTTY PLUS: Type: IMPLANTABLE DEVICE | Status: FUNCTIONAL

## 2024-01-01 DEVICE — COR-KNOT MINI DEVICE COMBO KIT: Type: IMPLANTABLE DEVICE | Status: FUNCTIONAL

## 2024-01-01 DEVICE — INTRO MICROPUNC 4FRX40CM PED: Type: IMPLANTABLE DEVICE | Status: FUNCTIONAL

## 2024-01-01 DEVICE — CATH DIAG DXTERITY AMPLATZ LEFT 5F 100CM AL10: Type: IMPLANTABLE DEVICE | Status: FUNCTIONAL

## 2024-01-01 DEVICE — VALVE AORTIC INSPIRIS RESILIA 23MM: Type: IMPLANTABLE DEVICE | Status: FUNCTIONAL

## 2024-01-01 DEVICE — SHEATH INTRODUCER TERUMO PINNACLE CORONARY 8FR X 10CM X 0.038" MINI WIRE: Type: IMPLANTABLE DEVICE | Status: FUNCTIONAL

## 2024-01-01 DEVICE — SUT PERCLOSE PROGLIDE 6FR: Type: IMPLANTABLE DEVICE | Status: FUNCTIONAL

## 2024-01-01 DEVICE — GWIRE GUID  0.035INX150CM: Type: IMPLANTABLE DEVICE | Status: FUNCTIONAL

## 2024-01-01 DEVICE — PATCH EVARREST FIBRIN SEALANT 2X4CM: Type: IMPLANTABLE DEVICE | Status: FUNCTIONAL

## 2024-01-01 DEVICE — CATH VASCULAR EXPO VENTRICULAR PIGTAIL CURVE (PIG 145) 0.045" X 5FR X 10CM: Type: IMPLANTABLE DEVICE | Status: FUNCTIONAL

## 2024-01-01 DEVICE — GUIDEWIRE AMPLATZ SUPER-STIFF STRAIGHT .035" X 180CM: Type: IMPLANTABLE DEVICE | Status: FUNCTIONAL

## 2024-01-01 DEVICE — WIRE GD CONFIDA BRECKER CRV .035X260: Type: IMPLANTABLE DEVICE | Status: FUNCTIONAL

## 2024-01-01 DEVICE — PACING CATH PACEL RIGHT HEART CURVE 5FR: Type: IMPLANTABLE DEVICE | Status: FUNCTIONAL

## 2024-01-01 RX ORDER — POLYVINYL ALCOHOL 1 %
1 DROPS OPHTHALMIC (EYE)
Refills: 0 | DISCHARGE

## 2024-01-01 RX ORDER — SODIUM CHLORIDE 0.9 % (FLUSH) 0.9 %
3 SYRINGE (ML) INJECTION EVERY 8 HOURS
Refills: 0 | Status: DISCONTINUED | OUTPATIENT
Start: 2024-01-01 | End: 2024-01-01

## 2024-01-01 RX ORDER — CHLORHEXIDINE GLUCONATE ORAL RINSE 1.2 MG/ML
1 SOLUTION DENTAL ONCE
Refills: 0 | Status: DISCONTINUED | OUTPATIENT
Start: 2024-01-01 | End: 2024-01-01

## 2024-01-10 ENCOUNTER — OUTPATIENT (OUTPATIENT)
Dept: OUTPATIENT SERVICES | Facility: HOSPITAL | Age: 89
LOS: 1 days | Discharge: HOME | End: 2024-01-10

## 2024-01-10 DIAGNOSIS — C50.912 MALIGNANT NEOPLASM OF UNSPECIFIED SITE OF LEFT FEMALE BREAST: ICD-10-CM

## 2024-01-18 ENCOUNTER — APPOINTMENT (OUTPATIENT)
Dept: HEMATOLOGY ONCOLOGY | Facility: CLINIC | Age: 89
End: 2024-01-18
Payer: MEDICARE

## 2024-01-18 VITALS
DIASTOLIC BLOOD PRESSURE: 73 MMHG | HEIGHT: 65 IN | SYSTOLIC BLOOD PRESSURE: 160 MMHG | OXYGEN SATURATION: 95 % | BODY MASS INDEX: 24.51 KG/M2 | TEMPERATURE: 97.1 F | WEIGHT: 147.11 LBS | HEART RATE: 70 BPM | RESPIRATION RATE: 16 BRPM

## 2024-01-18 DIAGNOSIS — Z17.1 MALIGNANT NEOPLASM OF UNSPECIFIED SITE OF LEFT FEMALE BREAST: ICD-10-CM

## 2024-01-18 DIAGNOSIS — Z92.21 PERSONAL HISTORY OF ANTINEOPLASTIC CHEMOTHERAPY: ICD-10-CM

## 2024-01-18 DIAGNOSIS — C50.912 MALIGNANT NEOPLASM OF UNSPECIFIED SITE OF LEFT FEMALE BREAST: ICD-10-CM

## 2024-01-18 PROCEDURE — 99214 OFFICE O/P EST MOD 30 MIN: CPT

## 2024-01-18 RX ORDER — HYDROCORTISONE ACETATE 25 MG/1
25 SUPPOSITORY RECTAL TWICE DAILY
Qty: 28 | Refills: 3 | Status: DISCONTINUED | COMMUNITY
Start: 2023-01-30 | End: 2024-01-18

## 2024-01-18 RX ORDER — PANTOPRAZOLE SODIUM 20 MG/1
20 TABLET, DELAYED RELEASE ORAL
Refills: 0 | Status: ACTIVE | COMMUNITY

## 2024-01-18 RX ORDER — OMEPRAZOLE 20 MG/1
20 CAPSULE, DELAYED RELEASE ORAL DAILY
Qty: 1 | Refills: 0 | Status: DISCONTINUED | COMMUNITY
Start: 2023-12-08 | End: 2024-01-18

## 2024-01-19 NOTE — REASON FOR VISIT
[Follow-Up Visit] : a follow-up [Other: _____] : [unfilled] [FreeTextEntry2] : left breast cancer; survivorship

## 2024-01-19 NOTE — HISTORY OF PRESENT ILLNESS
[Disease: _____________________] : Disease: [unfilled] [T: ___] : T[unfilled] [N: ___] : N[unfilled] [M: ___] : M[unfilled] [AJCC Stage: ____] : AJCC Stage: [unfilled] [de-identified] : The patient presents for breast medical oncology follow up/survivorship.\par  \par  The patient presented in 2011, age 75, any mammogram with an abnormal screening mammogram.\par  \par  In April 21, 2011 patient had an ultrasound-guided biopsy of the left breast which was positive for poorly differentiated infiltrating ductal carcinoma with a Montclair score of 9/9 which was healing negative, AL negative, HER-2/radha positive by fish (3.4).\par  \par  On May 11, 2011 Dr. Hilton Koroma performed a lumpectomy and sentinel lymph node biopsy at [facility]. The patient had a single focus of infiltrating ductal carcinoma measuring 0.4 cm with a Montclair score of 9/9. There were 2 negative sentinel lymph nodes. ER negative AL negative HER-2/radha positive.\par  \par  As part of the patient's staging she underwent a PET CT scan performed by Dr. Cooper Nagy, which demonstrated metabolically active round densities in left para-aortic region felt to be suspicious for possible lymphoma. There was also a tiny right axillary lymph node adjacent to the rib cage and activity in the subpectoral region of the left.\par  \par  The patient received one cycle of Taxotere Cytoxan and Herceptin on June 22, 2011. This led to admission to [facility] with neutropenia and fever. The patient one additional dose of single agent Herceptin.\par  \par  The patient transferred her care to this office.  On September 20, 2011 FNA of the right axillary lymph node demonstrated a pleomorphic population of lymphocytes and was negative for carcinoma. On October 19, 2011 she started weekly Taxol and Herceptin, which she was unable to tolerate after the second dose. She continued Herceptin every 3 weeks through June 25, 2012. \par  \par  The patient received radiation therapy under the supervision of Dr. Margaret Durbin at [facility], [place], which was completed on January 31, 2012. \par  \par  The PET/CT findings have remained unchanged.\par  \par  Pertinent History:\par  PMD: JEAN Cardona\par  Cardio: Dr. Nice\par  HTN, Anxiety/depression, left uveitis, right cataracts, GERD\par  FH: mother had lung cancer\par  The patient lives with Nichole, one of her three daughters\par    [de-identified] : Poorly  differentiated infiltrating ductal carcinoma  [de-identified] : ER negative CO negative HER-2/radha positive [de-identified] : The patient presents with her daughter for follow up.  Last seen 11/2022.  The patient denies any breast complaints.  She is overdue for mammo/sono.   She states she has had a number of medical issues this past 2 year and has been in and out of the hospital.    She has multiple medical issues, including PMR, uveitis, neuropathy, MAC, HTN, arthritis, and osteoporosis.    She c/o ongoing fatigue.  She with chronic mild anemia, she was recommended by her rheumatologist to see a hematologist at this time, per patient to rule out anemia as possible cause for her fatigue.  She denies any other complaints.   Uses wheelchair when out.  She notes an okay appetite, stable weight and limited performance status.  She lives with her daughter.   Left mammo/sono, 11/4/22- IMPRESSION: No mammographic or sonographic evidence of malignancy. There is postsurgical change and fat necrosis at area of palpable concern.  BIRADS 2  Mammo/sono, 3/21/22- BIRADS 2

## 2024-01-19 NOTE — ASSESSMENT
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 She underwent lumpectomy with SLNB. She s/p Taxotere/Cytoxan/Herceptin x 1 dose, stopped because of AEs, followed by Taxol/Herceptin x 2 doses also unable to tolerate. She completed one year of Herceptin. She has been OXANA since that time. She presents for follow up/survivorship visit.          -Clinically stable with OXANA. -The patient is overdue for mammo/sono, which patient states she is scheduled for next week.  -The patient is advised to follow up in one year in survivorship clinic or sooner if necessary.  -All questions and concerns were addressed and answered.

## 2024-01-19 NOTE — PHYSICAL EXAM
[Ambulatory and capable of all self care but unable to carry out any work activities] : Status 2- Ambulatory and capable of all self care but unable to carry out any work activities. Up and about more than 50% of waking hours [Normal] : affect appropriate [de-identified] : IV/VI murmur [de-identified] : Exam limited performed in wheelchair.  Right breast with no discrete palpable masses, no palpable axillary nodes.  Left breast with changes from previous surgery and radiation, no discrete palpable masses, no palpable axillary nodes.  [de-identified] : uses wheelchair to get around office

## 2024-01-24 ENCOUNTER — NON-APPOINTMENT (OUTPATIENT)
Age: 89
End: 2024-01-24

## 2024-01-26 ENCOUNTER — APPOINTMENT (OUTPATIENT)
Dept: ULTRASOUND IMAGING | Facility: IMAGING CENTER | Age: 89
End: 2024-01-26
Payer: MEDICARE

## 2024-01-26 ENCOUNTER — APPOINTMENT (OUTPATIENT)
Dept: MAMMOGRAPHY | Facility: IMAGING CENTER | Age: 89
End: 2024-01-26
Payer: MEDICARE

## 2024-01-26 ENCOUNTER — RESULT REVIEW (OUTPATIENT)
Age: 89
End: 2024-01-26

## 2024-01-26 ENCOUNTER — OUTPATIENT (OUTPATIENT)
Dept: OUTPATIENT SERVICES | Facility: HOSPITAL | Age: 89
LOS: 1 days | End: 2024-01-26
Payer: MEDICARE

## 2024-01-26 DIAGNOSIS — C50.912 MALIGNANT NEOPLASM OF UNSPECIFIED SITE OF LEFT FEMALE BREAST: ICD-10-CM

## 2024-01-26 PROCEDURE — 76641 ULTRASOUND BREAST COMPLETE: CPT

## 2024-01-26 PROCEDURE — 77063 BREAST TOMOSYNTHESIS BI: CPT

## 2024-01-26 PROCEDURE — 77067 SCR MAMMO BI INCL CAD: CPT | Mod: 26

## 2024-01-26 PROCEDURE — 77063 BREAST TOMOSYNTHESIS BI: CPT | Mod: 26

## 2024-01-26 PROCEDURE — 77067 SCR MAMMO BI INCL CAD: CPT

## 2024-01-26 PROCEDURE — 76641 ULTRASOUND BREAST COMPLETE: CPT | Mod: 26,50

## 2024-01-30 ENCOUNTER — NON-APPOINTMENT (OUTPATIENT)
Age: 89
End: 2024-01-30

## 2024-02-06 ENCOUNTER — APPOINTMENT (OUTPATIENT)
Dept: RHEUMATOLOGY | Facility: CLINIC | Age: 89
End: 2024-02-06
Payer: MEDICARE

## 2024-02-06 VITALS
DIASTOLIC BLOOD PRESSURE: 70 MMHG | HEIGHT: 65 IN | HEART RATE: 73 BPM | SYSTOLIC BLOOD PRESSURE: 152 MMHG | WEIGHT: 147 LBS | BODY MASS INDEX: 24.49 KG/M2 | RESPIRATION RATE: 16 BRPM | OXYGEN SATURATION: 98 % | TEMPERATURE: 98.1 F

## 2024-02-06 PROCEDURE — 99215 OFFICE O/P EST HI 40 MIN: CPT

## 2024-02-06 NOTE — ASSESSMENT
[FreeTextEntry1] : ADA ROLLE sola 88 year old female, seen on today for PMR/GCA, Osteoporosis, Fibromyalgia, neuropathy, OA   #PMR: Diagnosed 7/2022 w/ symptoms of shoulder/hip stiffness and pain that rapidly improved w/ steroids. Was started on prednisone 60mg 7/2022 which has been tapered down -> lower prednisone from 7mg Q am to 3mg po bid  -> Will check laboratory tests to look for markers of disease activity and also to assess for medication toxicity. ->  continue Prilosec   #Osteoporosis: Hx of osteopenia and found to have non-traumatic L3 compression fracture. now with T12 endplate deformity seen on ct abdomen in 1-2023. Vitamin D level wnl. -would have orthopedic evaluation for any possible intervention such as kyphoplasty -> she started prolia with Dr. Bucio (May 2023 # 1 , Nov 2023 # 2  -> c/w calcium 500 mg qd in addition to her dietary intake for goal of 1200mg daily -> c/w vitamin D 1000 mg qd -> she plans to continue her prolia with Dr. Bucio.   Opth: compliant with follow up per daughter  Chronic anemia - normocytic previously had B12 shots recommend heme eval and has that scheduled   lightheadedness - PMD thinks it is anxiety.  recommend that she sees cardiology for evaluation and re-evaluate the lightheadedness with PMD as it persist depsite addng lexapro. to see neurology also for eval for lightheadedness.  Neck pain  severe OA recommend seeing orthopedics for injections if willing to try injections    More than 50% of the encounter was spent counseling ADA JUANKAREN on the differential, workup, disease course, and treatment/management.   Education was provided to ADA AQUILINO during this encounter. All questions and concerns were answered and addressed in detail.  ADA AQUILINO verbalized understanding and agreed to the plan.   ADA ROLLE has been instructed to call for an earlier appointment if new symptoms develop in the interim. ADA ROLLE has been instructed to make a follow-up appointment in 2 months

## 2024-02-06 NOTE — HISTORY OF PRESENT ILLNESS
[FreeTextEntry1] : 89 yo F PMH Breast CA (2011) , MAC (untreated), MVP, asthma, osteopenia, AS, PMR (dx 7/2022 on steroid taper ), fibromyalgia, spinal stenosis, uveitis(~ 32 yo and again in 2011) presenting w/ back pain found to have non-traumatic compression fracture of L3 spine and older compression fractures.     # PMR - on prednisone 7mg daily.  decrease shoulder pain and hip pain and fatigue.  the last few days she has been taking Tylenol arthritis for shoulder pain    HTN -seeing cardiologist for high blood pressure   Sees Dr. Bucio for lightheadedness and was told it is anxiety  Dr. Bucio has started her lexapro and she thinks it is helpful for anxiety and depression.    per patients daughter the patient alters her medication doses on her own   self d/c Lyrica - didn't agree with her.   #Osteoporosis: Hx of osteopenia and found to have non-traumatic L3 compression fracture and now with T12 endplate deformity seen on ct abdomen   Vitamin D level wnl and on calcium 500mg daily + dietary sources was never treated for osteoporosis/osteopenia.  She has started prolia with Dr. Bucio for bone health. (May/November) - she has recently gotten prolia in .   #  OA / fibromyalgia and Neuropathy in feet ->  Neuropathy is associated with prior chemo therapy  ->  Tylenol helps with pain ->  Dr. Bucio prescribed tramadol and she is planning to start it  -> she doesn't take gabapentin because it makes her feel drugged.  ->  tried lyrica and didn't agree  with her  ->   she couldn't tolerate tramadol that was tried by PMD

## 2024-02-06 NOTE — DATA REVIEWED
[FreeTextEntry1] : Laboratory and radiology studies that were personally reviewed at today's visit are summarized in above and below: 4-  Inpatient rheum consult note reviewed - sinus infection and improving on antibiotic   CT abdomen (1-):  ILD at lung bases. osteopenia with T12 superior endplate compression deformity.   Received Date/Time:                    9/14/2022 18:38 EDT Surgical Pathology Report - Auth (Verified) Specimen(s) Submitted 1  Right temporal artery 2  Left temporal artery  Final Diagnosis 1. Artery, right temporal, biopsy - Artery with mild to moderate intimal hyperplasia and medial calcification, see comment 2.  Artery, left temporal, biopsy - Artery with mild to moderate intimal hyperplasia and focal medial calcification, see comment Comment: Trichrome and elastic stains are performed on blocks 1A and 2A  and show negative for temporal arteritis.  CT L spine (9-5-22):  old compression fracture at T9 and T12 and acute compression fx at L3

## 2024-02-06 NOTE — PHYSICAL EXAM
[General Appearance - Alert] : alert [General Appearance - In No Acute Distress] : in no acute distress [Sclera] : the sclera and conjunctiva were normal [FreeTextEntry1] : rash from mid shin to ankle:   punctate lesions, non raised non blancing

## 2024-02-07 LAB
25(OH)D3 SERPL-MCNC: 41.9 NG/ML
ALBUMIN SERPL ELPH-MCNC: 4.1 G/DL
ALP BLD-CCNC: 49 U/L
ALT SERPL-CCNC: 12 U/L
ANION GAP SERPL CALC-SCNC: 12 MMOL/L
AST SERPL-CCNC: 16 U/L
BASOPHILS # BLD AUTO: 0.03 K/UL
BASOPHILS NFR BLD AUTO: 0.4 %
BILIRUB SERPL-MCNC: 0.4 MG/DL
BUN SERPL-MCNC: 23 MG/DL
CALCIUM SERPL-MCNC: 9.4 MG/DL
CHLORIDE SERPL-SCNC: 104 MMOL/L
CO2 SERPL-SCNC: 26 MMOL/L
CREAT SERPL-MCNC: 0.82 MG/DL
CRP SERPL-MCNC: <3 MG/L
EGFR: 69 ML/MIN/1.73M2
EOSINOPHIL # BLD AUTO: 0.18 K/UL
EOSINOPHIL NFR BLD AUTO: 2.3 %
ERYTHROCYTE [SEDIMENTATION RATE] IN BLOOD BY WESTERGREN METHOD: 21 MM/HR
GLUCOSE SERPL-MCNC: 81 MG/DL
HCT VFR BLD CALC: 35.7 %
HGB BLD-MCNC: 11 G/DL
IMM GRANULOCYTES NFR BLD AUTO: 0.4 %
LYMPHOCYTES # BLD AUTO: 2.27 K/UL
LYMPHOCYTES NFR BLD AUTO: 28.6 %
MAN DIFF?: NORMAL
MCHC RBC-ENTMCNC: 29.1 PG
MCHC RBC-ENTMCNC: 30.8 GM/DL
MCV RBC AUTO: 94.4 FL
MONOCYTES # BLD AUTO: 0.79 K/UL
MONOCYTES NFR BLD AUTO: 9.9 %
NEUTROPHILS # BLD AUTO: 4.65 K/UL
NEUTROPHILS NFR BLD AUTO: 58.4 %
PLATELET # BLD AUTO: 214 K/UL
POTASSIUM SERPL-SCNC: 4.4 MMOL/L
PROT SERPL-MCNC: 7.3 G/DL
RBC # BLD: 3.78 M/UL
RBC # FLD: 15 %
SODIUM SERPL-SCNC: 143 MMOL/L
WBC # FLD AUTO: 7.95 K/UL

## 2024-02-15 ENCOUNTER — APPOINTMENT (OUTPATIENT)
Dept: INTERNAL MEDICINE | Facility: CLINIC | Age: 89
End: 2024-02-15

## 2024-03-05 ENCOUNTER — RESULT REVIEW (OUTPATIENT)
Age: 89
End: 2024-03-05

## 2024-03-05 ENCOUNTER — APPOINTMENT (OUTPATIENT)
Dept: HEMATOLOGY ONCOLOGY | Facility: CLINIC | Age: 89
End: 2024-03-05
Payer: MEDICARE

## 2024-03-05 VITALS
RESPIRATION RATE: 16 BRPM | OXYGEN SATURATION: 95 % | HEIGHT: 63.78 IN | DIASTOLIC BLOOD PRESSURE: 76 MMHG | BODY MASS INDEX: 25.91 KG/M2 | TEMPERATURE: 98.4 F | WEIGHT: 149.91 LBS | HEART RATE: 66 BPM | SYSTOLIC BLOOD PRESSURE: 153 MMHG

## 2024-03-05 DIAGNOSIS — D64.9 ANEMIA, UNSPECIFIED: ICD-10-CM

## 2024-03-05 DIAGNOSIS — R53.83 OTHER FATIGUE: ICD-10-CM

## 2024-03-05 DIAGNOSIS — Z80.1 FAMILY HISTORY OF MALIGNANT NEOPLASM OF TRACHEA, BRONCHUS AND LUNG: ICD-10-CM

## 2024-03-05 DIAGNOSIS — Z87.19 PERSONAL HISTORY OF OTHER DISEASES OF THE DIGESTIVE SYSTEM: ICD-10-CM

## 2024-03-05 DIAGNOSIS — Z86.79 PERSONAL HISTORY OF OTHER DISEASES OF THE CIRCULATORY SYSTEM: ICD-10-CM

## 2024-03-05 LAB
BASOPHILS # BLD AUTO: 0.02 K/UL — SIGNIFICANT CHANGE UP (ref 0–0.2)
BASOPHILS NFR BLD AUTO: 0.3 % — SIGNIFICANT CHANGE UP (ref 0–2)
EOSINOPHIL # BLD AUTO: 0 K/UL — SIGNIFICANT CHANGE UP (ref 0–0.5)
EOSINOPHIL NFR BLD AUTO: 0 % — SIGNIFICANT CHANGE UP (ref 0–6)
ERYTHROCYTE [SEDIMENTATION RATE] IN BLOOD: 20 MM/HR — SIGNIFICANT CHANGE UP (ref 0–20)
HCT VFR BLD CALC: 34.2 % — LOW (ref 34.5–45)
HGB BLD-MCNC: 10.9 G/DL — LOW (ref 11.5–15.5)
IMM GRANULOCYTES NFR BLD AUTO: 0.3 % — SIGNIFICANT CHANGE UP (ref 0–0.9)
LYMPHOCYTES # BLD AUTO: 1.84 K/UL — SIGNIFICANT CHANGE UP (ref 1–3.3)
LYMPHOCYTES # BLD AUTO: 25.3 % — SIGNIFICANT CHANGE UP (ref 13–44)
MCHC RBC-ENTMCNC: 29.9 PG — SIGNIFICANT CHANGE UP (ref 27–34)
MCHC RBC-ENTMCNC: 31.9 G/DL — LOW (ref 32–36)
MCV RBC AUTO: 93.7 FL — SIGNIFICANT CHANGE UP (ref 80–100)
MONOCYTES # BLD AUTO: 0.65 K/UL — SIGNIFICANT CHANGE UP (ref 0–0.9)
MONOCYTES NFR BLD AUTO: 9 % — SIGNIFICANT CHANGE UP (ref 2–14)
NEUTROPHILS # BLD AUTO: 4.73 K/UL — SIGNIFICANT CHANGE UP (ref 1.8–7.4)
NEUTROPHILS NFR BLD AUTO: 65.1 % — SIGNIFICANT CHANGE UP (ref 43–77)
NRBC # BLD: 0 /100 WBCS — SIGNIFICANT CHANGE UP (ref 0–0)
PLATELET # BLD AUTO: 186 K/UL — SIGNIFICANT CHANGE UP (ref 150–400)
RBC # BLD: 3.65 M/UL — LOW (ref 3.8–5.2)
RBC # FLD: 14.7 % — HIGH (ref 10.3–14.5)
WBC # BLD: 7.26 K/UL — SIGNIFICANT CHANGE UP (ref 3.8–10.5)
WBC # FLD AUTO: 7.26 K/UL — SIGNIFICANT CHANGE UP (ref 3.8–10.5)

## 2024-03-05 PROCEDURE — 99215 OFFICE O/P EST HI 40 MIN: CPT

## 2024-03-05 PROCEDURE — 99205 OFFICE O/P NEW HI 60 MIN: CPT

## 2024-03-05 NOTE — CONSULT LETTER
[Dear  ___] : Dear  [unfilled], [Consult Letter:] : I had the pleasure of evaluating your patient, [unfilled]. [Please see my note below.] : Please see my note below. [Consult Closing:] : Thank you very much for allowing me to participate in the care of this patient.  If you have any questions, please do not hesitate to contact me. [FreeTextEntry2] : Dr Homa Becker [Sincerely,] : Sincerely,

## 2024-03-05 NOTE — ASSESSMENT
[FreeTextEntry1] : 87 yo woman with PMHx of 4/21/11 -left breast cancer stage 1A S/P lumpectomy, Taxotere/Cytoxan/Herceptin and RT. Referred for evaluation of anemia.  2/7/24- WBC 7.95, RBC 3.78, Hb 11g/dl, Hct 35.7%, MCV 94.4, RDW 15%, . Neutrophil 4.65, lymphocytes 2.27, monocytes 0.79, eos .18. Basos 0.03.  Patient also has history of polymyalgia, osteoarthritis. Reports feeling very tired, denies fevers, night sweats (positive), weight loss ( 8 pounds/ 1 year.   I had a detailed discussion today with the patient regarding the natural history, epidemiology, diagnosis, and treatment of anemia. I reviewed her laboratory studies in detail today. I then discussed the need to do a complete anemia work up if negative will recommend a bone marrow biopsy to r/o MDS.  I answered all their questions to satisfaction.  Greater than 50% of the encounter time was spent on counseling and coordination of care for  anemia    and I have spent  60    minutes of face to face time with the patient.  RTC 4 months.

## 2024-03-05 NOTE — HISTORY OF PRESENT ILLNESS
[0 - No Distress] : Distress Level: 0 [80: Normal activity with effort; some signs or symptoms of disease.] : 80: Normal activity with effort; some signs or symptoms of disease.  [de-identified] : 89 yo woman with PMHx of 4/21/11 -left breast cancer stage 1A S/P lumpectomy, Taxotere/Cytoxan/Herceptin and RT. Referred for evaluation of anemia.  2/7/24- WBC 7.95, RBC 3.78, Hb 11g/dl, Hct 35.7%, MCV 94.4, RDW 15%, . Neutrophil 4.65, lymphocytes 2.27, monocytes 0.79, eos .18. Basos 0.03.  Patient also has history of polymyalgia, osteoarthritis. Reports feeling very tired, denies fevers, night sweats (positive), weight loss ( 8 pounds/ 1 year. Reviewed of her laboratory studies c/w Hb 10.4 g/dl on 7/26/21, 10.2g/dl on 7/19/2023.

## 2024-03-07 LAB
ALBUMIN SERPL ELPH-MCNC: 4.2 G/DL
ALP BLD-CCNC: 55 U/L
ALT SERPL-CCNC: 13 U/L
ANION GAP SERPL CALC-SCNC: 15 MMOL/L
AST SERPL-CCNC: 19 U/L
BILIRUB SERPL-MCNC: 0.2 MG/DL
BUN SERPL-MCNC: 27 MG/DL
CALCIUM SERPL-MCNC: 9.4 MG/DL
CHLORIDE SERPL-SCNC: 104 MMOL/L
CO2 SERPL-SCNC: 24 MMOL/L
CREAT SERPL-MCNC: 0.86 MG/DL
CRP SERPL-MCNC: <3 MG/L
DEPRECATED KAPPA LC FREE/LAMBDA SER: 1.85 RATIO
EGFR: 65 ML/MIN/1.73M2
FERRITIN SERPL-MCNC: 29 NG/ML
FOLATE SERPL-MCNC: 19.9 NG/ML
GLUCOSE SERPL-MCNC: 109 MG/DL
IRON SATN MFR SERPL: 21 %
IRON SERPL-MCNC: 74 UG/DL
KAPPA LC CSF-MCNC: 2.94 MG/DL
KAPPA LC SERPL-MCNC: 5.43 MG/DL
LDH SERPL-CCNC: 224 U/L
M PROTEIN SPEC IFE-MCNC: NORMAL
POTASSIUM SERPL-SCNC: 5 MMOL/L
PROT SERPL-MCNC: 7.3 G/DL
SODIUM SERPL-SCNC: 143 MMOL/L
TIBC SERPL-MCNC: 358 UG/DL
UIBC SERPL-MCNC: 284 UG/DL
VIT B12 SERPL-MCNC: 441 PG/ML

## 2024-03-07 RX ORDER — FOLIC ACID 1 MG/1
1 TABLET ORAL DAILY
Qty: 30 | Refills: 6 | Status: ACTIVE | COMMUNITY
Start: 2024-03-07 | End: 1900-01-01

## 2024-03-07 RX ORDER — CHLORHEXIDINE GLUCONATE 4 %
325 (65 FE) LIQUID (ML) TOPICAL DAILY
Qty: 30 | Refills: 6 | Status: ACTIVE | COMMUNITY
Start: 2024-03-07 | End: 1900-01-01

## 2024-03-13 ENCOUNTER — APPOINTMENT (OUTPATIENT)
Dept: RHEUMATOLOGY | Facility: CLINIC | Age: 89
End: 2024-03-13
Payer: MEDICARE

## 2024-03-13 VITALS
DIASTOLIC BLOOD PRESSURE: 74 MMHG | BODY MASS INDEX: 25.93 KG/M2 | OXYGEN SATURATION: 98 % | WEIGHT: 150 LBS | SYSTOLIC BLOOD PRESSURE: 147 MMHG | RESPIRATION RATE: 16 BRPM | HEART RATE: 76 BPM | TEMPERATURE: 98.1 F | HEIGHT: 63.78 IN

## 2024-03-13 PROCEDURE — 99214 OFFICE O/P EST MOD 30 MIN: CPT

## 2024-03-13 NOTE — ASSESSMENT
[FreeTextEntry1] : ADA ROLLE sola 88-year-old female, seen on today for PMR/GCA, Osteoporosis, Fibromyalgia, neuropathy, OA   #PMR: Diagnosed 7/2022 w/ symptoms of shoulder/hip stiffness and pain that rapidly improved w/ steroids. Was started on prednisone 60mg 7/2022 which has been tapered down. -> change prednisone to medrol 8mg daily -> Blood work from 3-6-2024 reviewed and do not need to re-check today  -> continue Prilosec   #Osteoporosis: Hx of osteopenia and found to have non-traumatic L3 compression fracture. now with T12 endplate deformity seen on ct abdomen in 1-2023. Vitamin D level wnl. -would have orthopedic evaluation for any possible intervention such as kyphoplasty -> she started prolia with Dr. Bucio (May 2023 # 1 , Nov 2023 # 2  -> c/w calcium 500 mg qd in addition to her dietary intake for goal of 1200mg daily -> c/w vitamin D 1000 mg qd -> she plans to continue her prolia with Dr. Bucio.   Opth: compliant with follow up per daughter.  Chronic anemia - follows with heme.    Neck pain  severe OA recommend seeing orthopedics for injections if willing to try injections.    More than 50% of the encounter was spent counseling ADA AQUILINO on the differential, workup, disease course, and treatment/management.   Education was provided to ADA ROLLE during this encounter. All questions and concerns were answered and addressed in detail.  ADA ROLLE verbalized understanding and agreed to the plan.   ADA ROLLE has been instructed to call for an earlier appointment if new symptoms develop in the interim. ADA ROLLE has been instructed to make a follow-up appointment in 1 months.

## 2024-03-13 NOTE — PHYSICAL EXAM
[General Appearance - Alert] : alert [General Appearance - In No Acute Distress] : in no acute distress [Sclera] : the sclera and conjunctiva were normal [FreeTextEntry1] : no rashes

## 2024-03-13 NOTE — HISTORY OF PRESENT ILLNESS
[FreeTextEntry1] : 89 yo F PMH Breast CA (2011) , MAC (untreated), MVP, asthma, osteopenia, AS, PMR (dx 7/2022 on steroid taper ), fibromyalgia, spinal stenosis, uveitis(~ 30 yo and again in 2011) presenting w/ back pain found to have non-traumatic compression fracture of L3 spine and older compression fractures.     # PMR - on prednisone 3mg PO BID  notes that for 3 weeks she has had increased fatigue, non-restorative sleep, shoulder pain, hip pain, foot swelling and tender feet.  .    BP was low at home and cardiologist told her to eat salt and it is better.   Sees Dr. Bucio for lightheadedness and was told it is anxiety  Dr. Bucio has started her lexapro and she thinks it is helpful for anxiety and depression.    per patients daughter the patient alters her medication doses on her own   self d/c Lyrica - didn't agree with her.   #Osteoporosis: Hx of osteopenia and found to have non-traumatic L3 compression fracture and now with T12 endplate deformity seen on ct abdomen   Vitamin D level wnl and on calcium 500mg daily + dietary sources was never treated for osteoporosis/osteopenia.  She has started prolia with Dr. Bucio for bone health. (May/November) - she has recently gotten prolia in .   #  OA / fibromyalgia and Neuropathy in feet ->  Neuropathy is associated with prior chemo therapy  ->  Tylenol helps with pain ->  Dr. Bucio prescribed tramadol and she is planning to start it  -> she doesn't take gabapentin because it makes her feel drugged.  ->  tried lyrica and didn't agree  with her  ->   she couldn't tolerate tramadol that was tried by PMD

## 2024-04-09 ENCOUNTER — APPOINTMENT (OUTPATIENT)
Dept: RHEUMATOLOGY | Facility: CLINIC | Age: 89
End: 2024-04-09
Payer: MEDICARE

## 2024-04-09 VITALS
OXYGEN SATURATION: 98 % | HEIGHT: 63 IN | HEART RATE: 73 BPM | SYSTOLIC BLOOD PRESSURE: 131 MMHG | BODY MASS INDEX: 26.22 KG/M2 | WEIGHT: 148 LBS | DIASTOLIC BLOOD PRESSURE: 71 MMHG | RESPIRATION RATE: 16 BRPM

## 2024-04-09 DIAGNOSIS — M81.0 AGE-RELATED OSTEOPOROSIS W/OUT CURRENT PATHOLOGICAL FRACTURE: ICD-10-CM

## 2024-04-09 DIAGNOSIS — B18.1 CHRONIC VIRAL HEPATITIS B W/OUT DELTA-AGENT: ICD-10-CM

## 2024-04-09 LAB
25(OH)D3 SERPL-MCNC: 47.6 NG/ML
ALBUMIN SERPL ELPH-MCNC: 4 G/DL
ALP BLD-CCNC: 58 U/L
ALT SERPL-CCNC: 9 U/L
ANION GAP SERPL CALC-SCNC: 13 MMOL/L
AST SERPL-CCNC: 14 U/L
BASOPHILS # BLD AUTO: 0.04 K/UL
BASOPHILS NFR BLD AUTO: 0.5 %
BILIRUB SERPL-MCNC: 0.4 MG/DL
BUN SERPL-MCNC: 20 MG/DL
CALCIUM SERPL-MCNC: 9.4 MG/DL
CHLORIDE SERPL-SCNC: 100 MMOL/L
CO2 SERPL-SCNC: 25 MMOL/L
CREAT SERPL-MCNC: 0.85 MG/DL
CRP SERPL-MCNC: 12 MG/L
EGFR: 66 ML/MIN/1.73M2
EOSINOPHIL # BLD AUTO: 0.23 K/UL
EOSINOPHIL NFR BLD AUTO: 2.9 %
ERYTHROCYTE [SEDIMENTATION RATE] IN BLOOD BY WESTERGREN METHOD: 39 MM/HR
GLUCOSE SERPL-MCNC: 94 MG/DL
HCT VFR BLD CALC: 35.6 %
HGB BLD-MCNC: 11.2 G/DL
IMM GRANULOCYTES NFR BLD AUTO: 0.4 %
LYMPHOCYTES # BLD AUTO: 1.7 K/UL
LYMPHOCYTES NFR BLD AUTO: 21.7 %
MAN DIFF?: NORMAL
MCHC RBC-ENTMCNC: 29.7 PG
MCHC RBC-ENTMCNC: 31.5 GM/DL
MCV RBC AUTO: 94.4 FL
MONOCYTES # BLD AUTO: 0.81 K/UL
MONOCYTES NFR BLD AUTO: 10.3 %
NEUTROPHILS # BLD AUTO: 5.03 K/UL
NEUTROPHILS NFR BLD AUTO: 64.2 %
PLATELET # BLD AUTO: 234 K/UL
POTASSIUM SERPL-SCNC: 4.7 MMOL/L
PROT SERPL-MCNC: 7.1 G/DL
RBC # BLD: 3.77 M/UL
RBC # FLD: 15.3 %
SODIUM SERPL-SCNC: 138 MMOL/L
WBC # FLD AUTO: 7.84 K/UL

## 2024-04-09 PROCEDURE — G2211 COMPLEX E/M VISIT ADD ON: CPT

## 2024-04-09 PROCEDURE — 99215 OFFICE O/P EST HI 40 MIN: CPT

## 2024-04-09 NOTE — ASSESSMENT
[FreeTextEntry1] : ADA ROLLE sola 88-year-old female, seen on today for PMR/GCA, Osteoporosis, Fibromyalgia, neuropathy, OA  Fatigue/weakness/dizziness  not likely due to PMR and may be multifactorial  planning to see Dr Cardona next week but needs a full evaluation and may need to see cardiology  will try an increase in prednisone 5mg BID to see if PMR is contributing to these symtoms but overall impression is that it is  not likey due to PMR    #PMR: Diagnosed 7/2022 w/ symptoms of shoulder/hip stiffness and pain that rapidly improved w/ steroids. Was started on prednisone 60mg 7/2022 which has been tapered down. -> increase prednisone to 5 mg BID  given arm pain and fatigue  ->  check blood work.  -> continue Prilosec   #Osteoporosis: Hx of osteopenia and found to have non-traumatic L3 compression fracture. now with T12 endplate deformity seen on ct abdomen in 1-2023. Vitamin D level wnl. -would have orthopedic evaluation for any possible intervention such as kyphoplasty -> she started prolia with Dr. Bucio (May 2023 # 1 , Nov 2023 # 2  -> c/w calcium 500 mg qd in addition to her dietary intake for goal of 1200mg daily -> c/w vitamin D 1000 mg qd ->left Dr. Bucio and has been getting prolia from Dr. Bucio.  Next one is due in May 2024 - she has it at home - will make appt to have it here in May   Opth: compliant with follow up per daughter.  Chronic anemia - follows with heme.    Neck pain  severe OA recommend seeing orthopedics for injections if willing to try injections.    More than 50% of the encounter was spent counseling ADA JUANKAREN on the differential, workup, disease course, and treatment/management.   Education was provided to ADA AQUILINO during this encounter. All questions and concerns were answered and addressed in detail.  ADA ROLLE verbalized understanding and agreed to the plan.   ADA ROLLE has been instructed to call for an earlier appointment if new symptoms develop in the interim. ADA ROLLE has been instructed to make a follow-up appointment in 1 months.

## 2024-04-09 NOTE — HISTORY OF PRESENT ILLNESS
[FreeTextEntry1] : 89 yo F PMH Breast CA (2011) , MAC (untreated), MVP, asthma, osteopenia, AS, PMR (dx 7/2022 on steroid taper ), fibromyalgia, spinal stenosis, uveitis(~ 32 yo and again in 2011) presenting w/ back pain found to have non-traumatic compression fracture of L3 spine and older compression fractures.    today - weakness, tiredness, difficulty doing events in the house, has dizziness and has pain over the sinuses.  when she has to do something like a holiday she gets ready (dressed etc) and then she feels like something comes over her and she gets dizzy and doesn't want to go anywhere and doesn't go to the event.  she stopped seeing dr. Bucio and she is going to Dr. Cardona and has an appointment with dr. Cardona on 4-16.  dr. bucio told her it was psychological and told her the fatigue and weakness was caused by depression.  yesterday she cancelled her optho appointment due to stomach pain.   She hasn't been doing well at home and has extreme weakness and lightheadedness.  changed from Medrol to prednisone because the Medrol made her feel ill  she is on prednisone 6mg daily  she has pain in the neck and hips and spine.  hasn't passed out and blood pressure has been okay no palpitations but sometimes feels her heart racing  no shortness of breath or chest pain     # PMR - on prednisone 3mg PO BID  notes that she has increased fatigue and some shoudler pain   BP was low at home and cardiologist told her to eat salt and it is better.  (fuschetto)   Sees Dr. Bucio for lightheadedness and was told it is anxiety  Dr. Bucio has started her lexapro and she thinks it is helpful for anxiety and depression.    per patients daughter the patient alters her medication doses on her own   self d/c Lyrica - didn't agree with her.   #Osteoporosis: Hx of osteopenia and found to have non-traumatic L3 compression fracture and now with T12 endplate deformity seen on ct abdomen   Vitamin D level wnl and on calcium 500mg daily + dietary sources was never treated for osteoporosis/osteopenia.  She has started prolia with Dr. Bucio for bone health. (May/November) - she has recently gotten prolia in .   #  OA / fibromyalgia and Neuropathy in feet ->  Neuropathy is associated with prior chemo therapy  ->  Tylenol helps with pain ->  Dr. Bucio prescribed tramadol and she is planning to start it  -> she doesn't take gabapentin because it makes her feel drugged.  ->  tried Lyrica and didn't agree  with her  ->   she couldn't tolerate tramadol that was tried by PMD

## 2024-04-10 LAB
HEPB DNA PCR INT: NOT DETECTED
HEPB DNA PCR LOG: NOT DETECTED LOGIU/ML

## 2024-04-11 DIAGNOSIS — M81.0 AGE-RELATED OSTEOPOROSIS W/OUT CURRENT PATHOLOGICAL FRACTURE: ICD-10-CM

## 2024-05-15 ENCOUNTER — APPOINTMENT (OUTPATIENT)
Dept: RHEUMATOLOGY | Facility: CLINIC | Age: 89
End: 2024-05-15
Payer: MEDICARE

## 2024-05-15 VITALS
SYSTOLIC BLOOD PRESSURE: 132 MMHG | HEART RATE: 64 BPM | RESPIRATION RATE: 16 BRPM | TEMPERATURE: 97.2 F | DIASTOLIC BLOOD PRESSURE: 64 MMHG | OXYGEN SATURATION: 95 %

## 2024-05-15 PROCEDURE — 96372 THER/PROPH/DIAG INJ SC/IM: CPT

## 2024-05-15 RX ORDER — DENOSUMAB 60 MG/ML
60 INJECTION SUBCUTANEOUS
Qty: 1 | Refills: 0 | Status: COMPLETED | OUTPATIENT
Start: 2024-04-11

## 2024-05-21 ENCOUNTER — APPOINTMENT (OUTPATIENT)
Dept: RHEUMATOLOGY | Facility: CLINIC | Age: 89
End: 2024-05-21
Payer: MEDICARE

## 2024-05-21 VITALS
HEART RATE: 62 BPM | HEIGHT: 63 IN | RESPIRATION RATE: 16 BRPM | SYSTOLIC BLOOD PRESSURE: 168 MMHG | BODY MASS INDEX: 26.05 KG/M2 | DIASTOLIC BLOOD PRESSURE: 72 MMHG | TEMPERATURE: 98.1 F | WEIGHT: 147 LBS | OXYGEN SATURATION: 98 %

## 2024-05-21 DIAGNOSIS — B37.0 CANDIDAL STOMATITIS: ICD-10-CM

## 2024-05-21 LAB
ALBUMIN SERPL ELPH-MCNC: 4.2 G/DL
ALP BLD-CCNC: 69 U/L
ALT SERPL-CCNC: 17 U/L
ANION GAP SERPL CALC-SCNC: 11 MMOL/L
AST SERPL-CCNC: 19 U/L
BASOPHILS # BLD AUTO: 0.03 K/UL
BASOPHILS NFR BLD AUTO: 0.3 %
BILIRUB SERPL-MCNC: 0.3 MG/DL
BUN SERPL-MCNC: 24 MG/DL
CALCIUM SERPL-MCNC: 9.1 MG/DL
CHLORIDE SERPL-SCNC: 99 MMOL/L
CO2 SERPL-SCNC: 24 MMOL/L
CREAT SERPL-MCNC: 0.69 MG/DL
CRP SERPL-MCNC: <3 MG/L
EGFR: 83 ML/MIN/1.73M2
EOSINOPHIL # BLD AUTO: 0 K/UL
EOSINOPHIL NFR BLD AUTO: 0 %
ERYTHROCYTE [SEDIMENTATION RATE] IN BLOOD BY WESTERGREN METHOD: 23 MM/HR
GLUCOSE SERPL-MCNC: 100 MG/DL
HCT VFR BLD CALC: 35.8 %
HGB BLD-MCNC: 11.4 G/DL
IMM GRANULOCYTES NFR BLD AUTO: 0.3 %
LYMPHOCYTES # BLD AUTO: 2.03 K/UL
LYMPHOCYTES NFR BLD AUTO: 23.5 %
MAN DIFF?: NORMAL
MCHC RBC-ENTMCNC: 29.6 PG
MCHC RBC-ENTMCNC: 31.8 GM/DL
MCV RBC AUTO: 93 FL
MONOCYTES # BLD AUTO: 0.68 K/UL
MONOCYTES NFR BLD AUTO: 7.9 %
NEUTROPHILS # BLD AUTO: 5.88 K/UL
NEUTROPHILS NFR BLD AUTO: 68 %
PLATELET # BLD AUTO: 255 K/UL
POTASSIUM SERPL-SCNC: 5.2 MMOL/L
PROT SERPL-MCNC: 7.2 G/DL
RBC # BLD: 3.85 M/UL
RBC # FLD: 15.1 %
SODIUM SERPL-SCNC: 134 MMOL/L
WBC # FLD AUTO: 8.65 K/UL

## 2024-05-21 PROCEDURE — G2211 COMPLEX E/M VISIT ADD ON: CPT

## 2024-05-21 PROCEDURE — 99214 OFFICE O/P EST MOD 30 MIN: CPT

## 2024-05-21 RX ORDER — NYSTATIN 100000 [USP'U]/ML
100000 SUSPENSION ORAL 3 TIMES DAILY
Qty: 105 | Refills: 0 | Status: ACTIVE | COMMUNITY
Start: 2024-05-21 | End: 1900-01-01

## 2024-05-21 NOTE — HISTORY OF PRESENT ILLNESS
[FreeTextEntry1] : 89 yo F PMH Breast CA (2011) , MAC (untreated), MVP, asthma, osteopenia, AS, PMR (dx 7/2022 on steroid taper ), fibromyalgia, spinal stenosis, uveitis(~ 30 yo and again in 2011) presenting w/ back pain found to have non-traumatic compression fracture of L3 spine and older compression fractures.    today  she was feeling amazing on prednisone 5mg PO BID until Friday when she woke up feeling like her face was puffy, her mouth and tongue were sore and she feels like she has too much saliva she has broken blood vessels on her knee and on her hands.   on Saturday (5-18) bp was 171/96 in the morning and 130/75 after taking amlodipine.  Sunday (5-19) 158/80 and Monday (5-20) in the am 130/66. and today is 168/ May 15 - 132/64 cardiologist started 2.5mg amlodipine     # PMR - on prednisone 5mg PO BID notes that she has increased fatigue and some shoulder pain   BP was low at home and cardiologist told her to eat salt and it is better.  (fuschetto)   Sees Dr. Bucio for lightheadedness and was told it is anxiety  Dr. Bucio has started her lexapro and she thinks it is helpful for anxiety and depression.    per patients daughter the patient alters her medication doses on her own   self d/c Lyrica - didn't agree with her.   #Osteoporosis: Hx of osteopenia and found to have non-traumatic L3 compression fracture and now with T12 endplate deformity seen on ct abdomen   Vitamin D level wnl and on calcium 500mg daily + dietary sources was never treated for osteoporosis/osteopenia.  She has started prolia with Dr. Bucio for bone health. (May/November) - she has recently gotten prolia in .   #  OA / fibromyalgia and Neuropathy in feet ->  Neuropathy is associated with prior chemo therapy  ->  Tylenol helps with pain ->  Dr. Bucio prescribed tramadol and she is planning to start it  -> she doesn't take gabapentin because it makes her feel drugged.  ->  tried Lyrica and didn't agree  with her  ->   she couldn't tolerate tramadol that was tried by PMD

## 2024-05-21 NOTE — ASSESSMENT
[FreeTextEntry1] : ADA ROLLE is a 89 year old female, seen on today for seen on today for PMR/GCA, Osteoporosis, Fibromyalgia, neuropathy, OA   #PMR: Diagnosed 7/2022 w/ symptoms of shoulder/hip stiffness and pain that rapidly improved w/ steroids. Was started on prednisone 60mg 7/2022 which has been tapered down. -> taper steroid from 10mg daily to 9mg daily (5mg in the am and 4mg in the pm)  for 1 week then 4mg po bid until follow up.  ->  check blood work.  -> continue Prilosec   #Osteoporosis: Hx of osteopenia and found to have non-traumatic L3 compression fracture. now with T12 endplate deformity seen on ct abdomen in 1-2023. Vitamin D level wnl. -would have orthopedic evaluation for any possible intervention such as kyphoplasty -> she started prolia with Dr. Bucio (May 2023 # 1 , Nov 2023 # 2  Prolia # 3 on 5-.  -> c/w calcium 500 mg qd in addition to her dietary intake for goal of 1200mg daily -> c/w vitamin D 1000 mg qd   Opth: compliant with follow up per daughter.  Chronic anemia - follows with heme.    Neck pain  severe OA recommend seeing orthopedics for injections if willing to try injections.    More than 50% of the encounter was spent counseling ADA ROLLE on the differential, workup, disease course, and treatment/management.   Education was provided to ADA ROLEL during this encounter. All questions and concerns were answered and addressed in detail.  ADA ROLLE verbalized understanding and agreed to the plan.   ADA ROLLE has been instructed to call for an earlier appointment if new symptoms develop in the interim. ADA JUANKAREN has been instructed to make a follow-up appointment in 1 month

## 2024-06-12 ENCOUNTER — RX RENEWAL (OUTPATIENT)
Age: 89
End: 2024-06-12

## 2024-06-17 ENCOUNTER — APPOINTMENT (OUTPATIENT)
Dept: HEMATOLOGY ONCOLOGY | Facility: CLINIC | Age: 89
End: 2024-06-17

## 2024-06-27 ENCOUNTER — APPOINTMENT (OUTPATIENT)
Dept: RHEUMATOLOGY | Facility: CLINIC | Age: 89
End: 2024-06-27
Payer: MEDICARE

## 2024-06-27 VITALS
WEIGHT: 147 LBS | DIASTOLIC BLOOD PRESSURE: 78 MMHG | SYSTOLIC BLOOD PRESSURE: 147 MMHG | OXYGEN SATURATION: 93 % | HEIGHT: 63 IN | BODY MASS INDEX: 26.05 KG/M2 | HEART RATE: 77 BPM

## 2024-06-27 DIAGNOSIS — M35.3 POLYMYALGIA RHEUMATICA: ICD-10-CM

## 2024-06-27 PROCEDURE — 99214 OFFICE O/P EST MOD 30 MIN: CPT

## 2024-06-27 PROCEDURE — G2211 COMPLEX E/M VISIT ADD ON: CPT

## 2024-07-17 ENCOUNTER — NON-APPOINTMENT (OUTPATIENT)
Age: 89
End: 2024-07-17

## 2024-08-05 ENCOUNTER — APPOINTMENT (OUTPATIENT)
Dept: RHEUMATOLOGY | Facility: CLINIC | Age: 89
End: 2024-08-05

## 2024-08-05 PROBLEM — F41.9 ANXIETY: Status: ACTIVE | Noted: 2024-08-05

## 2024-08-05 PROCEDURE — 99214 OFFICE O/P EST MOD 30 MIN: CPT

## 2024-08-05 PROCEDURE — G2211 COMPLEX E/M VISIT ADD ON: CPT

## 2024-08-05 NOTE — ASSESSMENT
[FreeTextEntry1] : ADA ROLLE is a 89 year old female, seen on today for seen on today for PMR/GCA, Osteoporosis, Fibromyalgia, neuropathy, OA   #PMR: Diagnosed 7/2022 w/ symptoms of shoulder/hip stiffness and pain that rapidly improved w/ steroids. Was started on prednisone 60mg 7/2022 which has been tapered down. -> current steroid 4 mg in the morning and 3 mg in the evening and will lower to 3mg PO BID ->  check blood work today  -> continue Prilosec   # facial rash  ->  pictures reviewed - not SLE   #Osteoporosis: Hx of osteopenia and found to have non-traumatic L3 compression fracture. now with T12 endplate deformity seen on ct abdomen in 1-2023. Vitamin D level wnl. -would have orthopedic evaluation for any possible intervention such as kyphoplasty -> she started Prolia with Dr. Bucio (May 2023 # 1 , Nov 2023 # 2  Prolia # 3 on 5-.  -> c/w calcium 500 mg qd in addition to her dietary intake for goal of 1200mg daily -> c/w vitamin D 1000 mg qd  Opth: obtain records from Dr. Goodman [phone]  Chronic anemia - follows with heme.    Neck pain  severe OA recommend seeing orthopedics for injections if willing to try injections.  ortho - spine eval   Psych - anxiety -  recommend geriatric psych  extensive d/w patient and daughter today about importantce of addressing anxiety   More than 50% of the encounter was spent counseling ADA ROLLE on the differential, workup, disease course, and treatment/management.   Education was provided to ADA ROLLE during this encounter. All questions and concerns were answered and addressed in detail.  ADA ROLLE verbalized understanding and agreed to the plan.   ADA ROLLE has been instructed to call for an earlier appointment if new symptoms develop in the interim. ADA ROLLE has been instructed to make a follow-up appointment in 2 months (visit and prolia)

## 2024-08-05 NOTE — HISTORY OF PRESENT ILLNESS
[FreeTextEntry1] : 88 yo F PMH Breast CA (2011) , MAC (untreated), MVP, asthma, osteopenia, AS, PMR (dx 7/2022 on steroid taper ), fibromyalgia, spinal stenosis, uveitis(~ 32 yo and again in 2011) presenting w/ back pain found to have non-traumatic compression fracture of L3 spine and older compression fractures.    today  - not a good day  on prednisone 4mg in the morning and 3 mg in the evening.  feels very off but not dizzy but feels off balance and light headed.  has some pain in the temples and eyes feel heavy and under eyes look puffy  sleeps well only when takes Xanax.  hasn't had a good enough day to go out.  working with cardiology for blood pressure and has been very variable - planning to go back to cardiology  she isn't taking her amlodipine daily - she doesn't take it unless sbp is over 130.  Cards - Rosa   # PMR - on prednisone 4mg in the morning and 3 mg in the evening  notes that she has increased fatigue and some shoulder pain  self d/c Lyrica - didn't agree with her.   #Osteoporosis: Hx of osteopenia and found to have non-traumatic L3 compression fracture and now with T12 endplate deformity seen on ct abdomen   Vitamin D level wnl and on calcium 500mg daily + dietary sources was never treated for osteoporosis/osteopenia.  She has started prolia with Dr. Bucio for bone health. (May/November) - last Prolia with Dr. 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  ->  tried Lyrica and didn't agree  with her  ->   she couldn't tolerate tramadol that was tried by PMD

## 2024-08-05 NOTE — PHYSICAL EXAM
[General Appearance - In No Acute Distress] : in no acute distress [General Appearance - Alert] : alert [Sclera] : the sclera and conjunctiva were normal [Outer Ear] : the ears and nose were normal in appearance [FreeTextEntry1] : mild redness over left cheek.

## 2024-08-08 ENCOUNTER — APPOINTMENT (OUTPATIENT)
Dept: PHYSICAL MEDICINE AND REHAB | Facility: CLINIC | Age: 89
End: 2024-08-08

## 2024-08-08 NOTE — HISTORY OF PRESENT ILLNESS
[FreeTextEntry1] : ADA ROLLE is an 89 year old F here for initial evaluation of [] Ms. ROLLE was sent for consultation by  [] for possible injection.   Pain location: [] Quality: [] Radiation: [] Severity: [] Onset: [] Associated symptoms: [] Numbness: [] Weakness: [] Exacerbated by: [] Improved by: [] Bowel or bladder involvement: []   The pain interferes with function, ADLs and quality of life: []    Denies bowel/bladder dysfunction, saddle anesthesia, fevers, chills, weight loss, night pain, or night sweats at this time.  Patient had tried Acetaminophen, NSAIDs, prescription pain medications, muscle relaxants without any lasting relief of pain.   Patient had imaging studies to evaluate the pain. Patient had not had any nerve conduction studies to evaluate the symptoms. Patient had tried physical therapy, and/or physician directed home exercises, stretching, lifestyle modification for over [] weeks without any significant relief.

## 2024-08-19 NOTE — PRE-OP CHECKLIST - HEART RATE (BEATS/MIN)
Spoke with patient he states he has not heard from [facility] ortho and spine for his fracture. I will reach out to [facility] ortho and spine for scheduling. Patient is also asking for a referral to [facility] for his fracture.   
71
70

## 2024-09-03 ENCOUNTER — INPATIENT (INPATIENT)
Facility: HOSPITAL | Age: 89
LOS: 4 days | Discharge: HOME | DRG: 690 | End: 2024-09-08
Attending: INTERNAL MEDICINE | Admitting: INTERNAL MEDICINE
Payer: MEDICARE

## 2024-09-03 VITALS
OXYGEN SATURATION: 97 % | DIASTOLIC BLOOD PRESSURE: 84 MMHG | RESPIRATION RATE: 17 BRPM | TEMPERATURE: 98 F | WEIGHT: 147.05 LBS | SYSTOLIC BLOOD PRESSURE: 172 MMHG | HEART RATE: 68 BPM | HEIGHT: 68 IN

## 2024-09-03 DIAGNOSIS — N12 TUBULO-INTERSTITIAL NEPHRITIS, NOT SPECIFIED AS ACUTE OR CHRONIC: ICD-10-CM

## 2024-09-03 LAB
ALBUMIN SERPL ELPH-MCNC: 3.7 G/DL — SIGNIFICANT CHANGE UP (ref 3.3–5)
ALP SERPL-CCNC: 61 U/L — SIGNIFICANT CHANGE UP (ref 40–120)
ALT FLD-CCNC: 7 U/L — LOW (ref 10–45)
ANION GAP SERPL CALC-SCNC: 11 MMOL/L — SIGNIFICANT CHANGE UP (ref 5–17)
APPEARANCE UR: CLEAR — SIGNIFICANT CHANGE UP
AST SERPL-CCNC: 13 U/L — SIGNIFICANT CHANGE UP (ref 10–40)
BACTERIA # UR AUTO: ABNORMAL /HPF
BASOPHILS # BLD AUTO: 0.04 K/UL — SIGNIFICANT CHANGE UP (ref 0–0.2)
BASOPHILS NFR BLD AUTO: 0.5 % — SIGNIFICANT CHANGE UP (ref 0–2)
BILIRUB SERPL-MCNC: 0.4 MG/DL — SIGNIFICANT CHANGE UP (ref 0.2–1.2)
BILIRUB UR-MCNC: NEGATIVE — SIGNIFICANT CHANGE UP
BUN SERPL-MCNC: 24 MG/DL — HIGH (ref 7–23)
CALCIUM SERPL-MCNC: 9.4 MG/DL — SIGNIFICANT CHANGE UP (ref 8.4–10.5)
CHLORIDE SERPL-SCNC: 101 MMOL/L — SIGNIFICANT CHANGE UP (ref 96–108)
CO2 SERPL-SCNC: 25 MMOL/L — SIGNIFICANT CHANGE UP (ref 22–31)
COLOR SPEC: YELLOW — SIGNIFICANT CHANGE UP
CREAT SERPL-MCNC: 0.76 MG/DL — SIGNIFICANT CHANGE UP (ref 0.5–1.3)
DIFF PNL FLD: ABNORMAL
EGFR: 75 ML/MIN/1.73M2 — SIGNIFICANT CHANGE UP
EOSINOPHIL # BLD AUTO: 0 K/UL — SIGNIFICANT CHANGE UP (ref 0–0.5)
EOSINOPHIL NFR BLD AUTO: 0 % — SIGNIFICANT CHANGE UP (ref 0–6)
EPI CELLS # UR: 4 — SIGNIFICANT CHANGE UP
GAS PNL BLDV: SIGNIFICANT CHANGE UP
GLUCOSE SERPL-MCNC: 100 MG/DL — HIGH (ref 70–99)
GLUCOSE UR QL: NEGATIVE MG/DL — SIGNIFICANT CHANGE UP
HCT VFR BLD CALC: 36.4 % — SIGNIFICANT CHANGE UP (ref 34.5–45)
HGB BLD-MCNC: 11.4 G/DL — LOW (ref 11.5–15.5)
HYALINE CASTS # UR AUTO: 2 — SIGNIFICANT CHANGE UP
IMM GRANULOCYTES NFR BLD AUTO: 0.3 % — SIGNIFICANT CHANGE UP (ref 0–0.9)
KETONES UR-MCNC: NEGATIVE MG/DL — SIGNIFICANT CHANGE UP
LEUKOCYTE ESTERASE UR-ACNC: ABNORMAL
LYMPHOCYTES # BLD AUTO: 2.03 K/UL — SIGNIFICANT CHANGE UP (ref 1–3.3)
LYMPHOCYTES # BLD AUTO: 26.3 % — SIGNIFICANT CHANGE UP (ref 13–44)
MCHC RBC-ENTMCNC: 29.1 PG — SIGNIFICANT CHANGE UP (ref 27–34)
MCHC RBC-ENTMCNC: 31.3 GM/DL — LOW (ref 32–36)
MCV RBC AUTO: 92.9 FL — SIGNIFICANT CHANGE UP (ref 80–100)
MONOCYTES # BLD AUTO: 0.6 K/UL — SIGNIFICANT CHANGE UP (ref 0–0.9)
MONOCYTES NFR BLD AUTO: 7.8 % — SIGNIFICANT CHANGE UP (ref 2–14)
NEUTROPHILS # BLD AUTO: 5.03 K/UL — SIGNIFICANT CHANGE UP (ref 1.8–7.4)
NEUTROPHILS NFR BLD AUTO: 65.1 % — SIGNIFICANT CHANGE UP (ref 43–77)
NITRITE UR-MCNC: NEGATIVE — SIGNIFICANT CHANGE UP
NRBC # BLD: 0 /100 WBCS — SIGNIFICANT CHANGE UP (ref 0–0)
PH UR: 6 — SIGNIFICANT CHANGE UP (ref 5–8)
PLATELET # BLD AUTO: 203 K/UL — SIGNIFICANT CHANGE UP (ref 150–400)
POTASSIUM SERPL-MCNC: 4.1 MMOL/L — SIGNIFICANT CHANGE UP (ref 3.5–5.3)
POTASSIUM SERPL-SCNC: 4.1 MMOL/L — SIGNIFICANT CHANGE UP (ref 3.5–5.3)
PROT SERPL-MCNC: 7.4 G/DL — SIGNIFICANT CHANGE UP (ref 6–8.3)
PROT UR-MCNC: NEGATIVE MG/DL — SIGNIFICANT CHANGE UP
RBC # BLD: 3.92 M/UL — SIGNIFICANT CHANGE UP (ref 3.8–5.2)
RBC # FLD: 14.4 % — SIGNIFICANT CHANGE UP (ref 10.3–14.5)
RBC CASTS # UR COMP ASSIST: 3 /HPF — SIGNIFICANT CHANGE UP (ref 0–4)
SODIUM SERPL-SCNC: 137 MMOL/L — SIGNIFICANT CHANGE UP (ref 135–145)
SP GR SPEC: 1.01 — SIGNIFICANT CHANGE UP (ref 1–1.03)
UROBILINOGEN FLD QL: 0.2 MG/DL — SIGNIFICANT CHANGE UP (ref 0.2–1)
WBC # BLD: 7.72 K/UL — SIGNIFICANT CHANGE UP (ref 3.8–10.5)
WBC # FLD AUTO: 7.72 K/UL — SIGNIFICANT CHANGE UP (ref 3.8–10.5)
WBC UR QL: 30 /HPF — HIGH (ref 0–5)

## 2024-09-03 PROCEDURE — 72131 CT LUMBAR SPINE W/O DYE: CPT | Mod: 26,MC

## 2024-09-03 PROCEDURE — 94010 BREATHING CAPACITY TEST: CPT | Mod: 26

## 2024-09-03 PROCEDURE — 99285 EMERGENCY DEPT VISIT HI MDM: CPT

## 2024-09-03 PROCEDURE — 74176 CT ABD & PELVIS W/O CONTRAST: CPT | Mod: 26,MC

## 2024-09-03 RX ORDER — PREDNISONE 10 MG
3 TABLET, DOSE PACK ORAL
Refills: 0 | Status: DISCONTINUED | OUTPATIENT
Start: 2024-09-03 | End: 2024-09-08

## 2024-09-03 RX ORDER — ESCITALOPRAM OXALATE 10 MG/1
10 TABLET ORAL DAILY
Refills: 0 | Status: DISCONTINUED | OUTPATIENT
Start: 2024-09-03 | End: 2024-09-08

## 2024-09-03 RX ORDER — METOPROLOL TARTRATE 100 MG/1
12.5 TABLET ORAL DAILY
Refills: 0 | Status: DISCONTINUED | OUTPATIENT
Start: 2024-09-03 | End: 2024-09-08

## 2024-09-03 RX ORDER — ENOXAPARIN SODIUM 100 MG/ML
30 INJECTION SUBCUTANEOUS EVERY 24 HOURS
Refills: 0 | Status: DISCONTINUED | OUTPATIENT
Start: 2024-09-03 | End: 2024-09-05

## 2024-09-03 RX ORDER — NAPHAZOLINE HYDROCHLORIDE AND PHENIRAMINE MALEATE .25; 3 MG/ML; MG/ML
1 SOLUTION/ DROPS OPHTHALMIC THREE TIMES A DAY
Refills: 0 | Status: DISCONTINUED | OUTPATIENT
Start: 2024-09-03 | End: 2024-09-08

## 2024-09-03 RX ORDER — PANTOPRAZOLE SODIUM 40 MG
40 TABLET, DELAYED RELEASE (ENTERIC COATED) ORAL
Refills: 0 | Status: DISCONTINUED | OUTPATIENT
Start: 2024-09-03 | End: 2024-09-08

## 2024-09-03 RX ORDER — AMLODIPINE BESYLATE 10 MG/1
2.5 TABLET ORAL DAILY
Refills: 0 | Status: DISCONTINUED | OUTPATIENT
Start: 2024-09-03 | End: 2024-09-05

## 2024-09-03 RX ORDER — MIDODRINE HYDROCHLORIDE 5 MG/1
2.5 TABLET ORAL THREE TIMES A DAY
Refills: 0 | Status: DISCONTINUED | OUTPATIENT
Start: 2024-09-03 | End: 2024-09-08

## 2024-09-03 RX ORDER — FOLIC ACID/MULTIVIT,IRON,MINER 0.4MG-18MG
1000 TABLET,CHEWABLE ORAL DAILY
Refills: 0 | Status: DISCONTINUED | OUTPATIENT
Start: 2024-09-03 | End: 2024-09-08

## 2024-09-03 RX ORDER — ACETAMINOPHEN 325 MG/1
1000 TABLET ORAL ONCE
Refills: 0 | Status: COMPLETED | OUTPATIENT
Start: 2024-09-03 | End: 2024-09-03

## 2024-09-03 RX ORDER — ALPRAZOLAM 0.25 MG
0.25 TABLET ORAL EVERY 6 HOURS
Refills: 0 | Status: DISCONTINUED | OUTPATIENT
Start: 2024-09-03 | End: 2024-09-08

## 2024-09-03 RX ORDER — NAPHAZOLINE HCL 0.1 %
1 DROPS OPHTHALMIC (EYE) THREE TIMES A DAY
Refills: 0 | Status: DISCONTINUED | OUTPATIENT
Start: 2024-09-03 | End: 2024-09-03

## 2024-09-03 RX ORDER — ACETAMINOPHEN 325 MG/1
650 TABLET ORAL ONCE
Refills: 0 | Status: DISCONTINUED | OUTPATIENT
Start: 2024-09-03 | End: 2024-09-03

## 2024-09-03 RX ORDER — CALCIUM CARBONATE 500(1250)
1 TABLET ORAL DAILY
Refills: 0 | Status: DISCONTINUED | OUTPATIENT
Start: 2024-09-03 | End: 2024-09-08

## 2024-09-03 RX ORDER — SODIUM CHLORIDE 0.65 %
1 AEROSOL, SPRAY (ML) NASAL THREE TIMES A DAY
Refills: 0 | Status: DISCONTINUED | OUTPATIENT
Start: 2024-09-03 | End: 2024-09-08

## 2024-09-03 RX ORDER — ALPRAZOLAM 0.25 MG
0.25 TABLET ORAL AT BEDTIME
Refills: 0 | Status: DISCONTINUED | OUTPATIENT
Start: 2024-09-03 | End: 2024-09-08

## 2024-09-03 RX ADMIN — Medication 100 MILLIGRAM(S): at 18:05

## 2024-09-03 RX ADMIN — Medication 75 MILLILITER(S): at 18:14

## 2024-09-03 RX ADMIN — ACETAMINOPHEN 400 MILLIGRAM(S): 325 TABLET ORAL at 23:27

## 2024-09-03 RX ADMIN — Medication 1 SPRAY(S): at 22:37

## 2024-09-03 RX ADMIN — Medication 3 MILLIGRAM(S): at 23:28

## 2024-09-03 RX ADMIN — ESCITALOPRAM OXALATE 10 MILLIGRAM(S): 10 TABLET ORAL at 22:36

## 2024-09-03 RX ADMIN — ACETAMINOPHEN 400 MILLIGRAM(S): 325 TABLET ORAL at 15:55

## 2024-09-03 RX ADMIN — AMLODIPINE BESYLATE 2.5 MILLIGRAM(S): 10 TABLET ORAL at 20:36

## 2024-09-03 RX ADMIN — Medication 1000 UNIT(S): at 22:36

## 2024-09-03 NOTE — H&P ADULT - ASSESSMENT
89 y/o F pmhx breast cx in remission, orthostatic hypotension (on midodrine prn), AS, PMR on chronic steroids, anxiety, Asthma presents with back pain for 8 days. unable to walk.  Patient states pain started while putting on a compression sock. Denies trauma. Denies numbness/tingling down posterior aspect of LE b/l, denies perianal numbness/tingling. Denies urinary incontinence, fecal incontinence. Hx of multiple compression fx in the past. At baseline ambulates with walker, now is unable to 2/2 pain. Pain radiates from R paraspinal region to R anterior abdomen wall. she is pain free when not moving and resting.     UTI, cannot R/O PN, ct A/P was non contrast. however ptn w no CVA tenderness on exam.. doubt she has a PN. she was given CTX in the ED , will cont for 2 more doses.  MSK back pain. 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 she does not tolerate muscle relaxers nor Gabapentin. will get PT eval. treat w tylenol and has tolerated Morphine in the past. PT eval ordered  Anxiety will cont prn xanax and lexapro  PMR: cont daily Prednisone no exacebration  HTN: cont Metoprolol and Norvasc.   Orthostatic hypotension: occasionally needs Midodrine  h/o Mod AS:  get TTE while here. not necessary prior to DC.  DVT ppx w sc Lovenox

## 2024-09-03 NOTE — ED PROVIDER NOTE - ATTENDING CONTRIBUTION TO CARE
Attending MD Garcia: I personally have seen and examined this patient.  Resident note reviewed and agree on plan of care and except where noted.  See below for details.     Seen in [place] [facility], accompanied by daughter  reviewed extensive allergies    TO BE COMPLETED Attending MD Garcia: I personally have seen and examined this patient.  Resident note reviewed and agree on plan of care and except where noted.  See below for details.     Seen in [place], accompanied by daughter  reviewed extensive allergies    89F with PMH/PSH including breast ca in remission, hypotension (on Midodrine PRN), PMR, asthma. AS, compression fx of back presents to the ED with back pain for about a week.  Reports last week bent to put on compression sock and developed sudden onset R sided pain.  Denies fall, trauma.  Reports has baseline occasional urinary incontinence, reports unchanged.  Denies fecal incontinence.  Denies numbness, weakness or tingling in extremities. Denies dysuria, hematuria, change in urinary habits including frequency, urgency. Denies fevers, chills.  Reports presents now because pain has not improved with Tylenol, hot compress to area as recommended by her doctor, Dr. Cardona.  Reports pain is R sided back pain but also feels it radiate across upper abdomen.  Denies vomiting, diarrhea, chest pain, shortness of breath.  Reports baseline ambulation with walker, reports unable to ambulate now.    Exam:   General: NAD  HENT: head NCAT, airway patent  Eyes: anicteric, no conjunctival injection   Lungs: lungs CTAB with good inspiratory effort, no wheezing, no rhonchi, no rales  Cardiac: +S1S2, no obvious m/r/g  GI: abdomen soft with +BS, +minimal tenderness across upper abdomen, no rebound, no guarding, ND  : no CVAT  MSK: ranging neck and extremities freely, +tenderness to R lumbar area, no midline tenderness  Neuro: moving all extremities spontaneously, nonfocal  Psych: normal mood and affect     A/P: 89F with R back pain, suspect MSK, however given abdominal pain will also consider urinary etiology, will obtain labs, UA/UrCx, CTAP/CT L spine, will give analgesia, will likely need admission as is unable to ambulate Attending MD Garcia: I personally have seen and examined this patient.  Resident note reviewed and agree on plan of care and except where noted.  See below for details.     Seen in [place], accompanied by daughter  reviewed extensive allergies    89F with PMH/PSH including breast ca in remission, hypotension (on Midodrine PRN), PMR, asthma. AS, compression fx of back presents to the ED with back pain for about a week.  Reports last week bent to put on compression sock and developed sudden onset R sided pain.  Denies fall, trauma.  Reports has baseline occasional urinary incontinence, reports unchanged.  Denies fecal incontinence.  Denies numbness, weakness or tingling in extremities. Denies dysuria, hematuria, change in urinary habits including frequency, urgency. Denies fevers, chills.  Reports presents now because pain has not improved with Tylenol, hot compress to area as recommended by her doctor, Dr. Cardona.  Reports pain is R sided back pain but also feels it radiate across upper abdomen.  Denies vomiting, diarrhea, chest pain, shortness of breath.  Reports baseline ambulation with walker, reports unable to ambulate now.    Exam:   General: NAD  HENT: head NCAT, airway patent  Eyes: anicteric, no conjunctival injection   Lungs: lungs CTAB with good inspiratory effort, no wheezing, no rhonchi, no rales  Cardiac: +S1S2, no obvious r/g, +murmur  GI: abdomen soft with +BS, +minimal tenderness across upper abdomen, no rebound, no guarding, ND  : no CVAT  MSK: ranging neck and extremities freely, +tenderness to R lumbar area, no midline tenderness  Neuro: moving all extremities spontaneously, nonfocal  Psych: normal mood and affect     A/P: 89F with R back pain, suspect MSK, however given abdominal pain will also consider urinary etiology, will obtain labs, UA/UrCx, CTAP/CT L spine, will give analgesia, will likely need admission as is unable to ambulate

## 2024-09-03 NOTE — ED ADULT NURSE NOTE - NSICDXPASTSURGICALHX_GEN_ALL_CORE_FT
PAST SURGICAL HISTORY:  History of Breast Biopsy [place] lymph node biopsy    History of Cataract Surgery Left eye    S/P Breast Lumpectomy     S/P Lumpectomy of Breast Left Breast    S/P Nasal Polypectomy     Status Post Tonsillectomy

## 2024-09-03 NOTE — ED PROVIDER NOTE - CLINICAL SUMMARY MEDICAL DECISION MAKING FREE TEXT BOX
88 y/o F pmhx breast cx in remission, hypotension (on midodrine prn), AS, PMR, Asthma presents with back pain. Pain started 8 days ago, denies numbness/tingling perineal region, no urinary/fecal incontinence. On arrival pt. is HD stable. On exam no midline c/t/l spine ttp, 5/5 motor strength b/l LE. Will order CT-lumbar spine to evaluate for compression fx, will order CTAP noncontrast to evaluate for nephrolithiasis, patient with contrast allergy, otherwise, would favor IV contrast. will order cmp to evaluate for renal function and cbc to evaluate for elevated wbc count. Will order tylenol for pain control.

## 2024-09-03 NOTE — ED ADULT NURSE NOTE - OBJECTIVE STATEMENT
89 y/o F presents to ED with c/o lower back pain. Per daughter at bedside, pt has had the pain for two weeks. Pt noted that pain started when she was bending down to put on compressions socks when she felt a sharp pain. Pt has been medicating with Tylenol which has stopped helping and is affecting her walking hence today visit. Pt a&ox3, VS see flow sheet. PMH orthostatic hypotension taking midodrine, lung cancer, breast CA, asthma, GERD, depression, IBS. Pt placed in position of comfort.  Bed in lowest position, wheels locked, appropriate side rails raised. Pt denies needs at this time.

## 2024-09-03 NOTE — ED ADULT NURSE NOTE - NSFALLFACTORS_ED_ALL_ED
Weakness Ivermectin Pregnancy And Lactation Text: This medication is Pregnancy Category C and it isn't known if it is safe during pregnancy. It is also excreted in breast milk.

## 2024-09-03 NOTE — ED PROVIDER NOTE - CHIEF COMPLAINT
Spoke with staff from home care to clarify what was needed for pt.    Sabi from home care returned call, requesting nystatin cream for pt instead of powder.      Rx for nystatin cream sent to pharmacy.      Encounter closed.         The patient is a 89y Female complaining of back pain general.

## 2024-09-03 NOTE — ED PROVIDER NOTE - OBJECTIVE STATEMENT
89 y/o F pmhx breast cx in remission, hypotension (on midodrine prn), AS, PMR, Asthma presents with back pain for 8 days. Patient states pain started while putting on sock. Denies falls. Denies numbness/tingling down posterior aspect of LE b/l, denies perianal numbness/tingling. Denies urinary incontinence, fecal incontinence. Hx of multiple compression fx in the past. Baseline ambulates with walker, now is unable to 2/2 pain. Pain radiates from R paraspinal region to R anterior abdomen wall.

## 2024-09-03 NOTE — ED PROVIDER NOTE - NSICDXPASTSURGICALHX_GEN_ALL_CORE_FT
5
PAST SURGICAL HISTORY:  History of Breast Biopsy [place] lymph node biopsy    History of Cataract Surgery Left eye    S/P Breast Lumpectomy     S/P Lumpectomy of Breast Left Breast    S/P Nasal Polypectomy     Status Post Tonsillectomy

## 2024-09-03 NOTE — ED PROVIDER NOTE - PROGRESS NOTE DETAILS
Juan WEAVER, PGY-2;  Patient has UTI, concern for pyleo. Review of previous abx indicate patient has received ceftriaxone in the past. Will order and admit to Dr. Bucio. Patient is agreeable to this plan. CT scan with evidence of old compression fx. Attending MD Garcia: UA noted, CT noted, will admit for clinical pyelo.  Admitted to Dr. Mustafa Attending MD Garcia: UA noted, CT noted, will admit for clinical pyelo vs MSK, unable to ambulate.  Admitted to Dr. Mustafa

## 2024-09-03 NOTE — H&P ADULT - NSICDXPASTSURGICALHX_GEN_ALL_CORE_FT
PAST SURGICAL HISTORY:  History of Breast Biopsy [place] lymph node biopsy    History of Cataract Surgery Left eye    S/P Breast Lumpectomy     S/P Lumpectomy of Breast Left Breast    S/P Nasal Polypectomy     Status Post Tonsillectomy

## 2024-09-03 NOTE — ED ADULT TRIAGE NOTE - CHIEF COMPLAINT QUOTE
back pain after reaching to put on compression sock, usually ambulatory without assistance now requiring assistance

## 2024-09-03 NOTE — H&P ADULT - NSHPPHYSICALEXAM_GEN_ALL_CORE
T(C): 36.6 (09-03-24 @ 18:20), Max: 36.7 (09-03-24 @ 14:50)  HR: 73 (09-03-24 @ 18:20) (66 - 73)  BP: 153/83 (09-03-24 @ 18:20) (153/83 - 180/84)  RR: 18 (09-03-24 @ 18:20) (17 - 19)  SpO2: 96% (09-03-24 @ 18:20) (96% - 99%)    PHYSICAL EXAM:  GENERAL: NAD, well-developed  HEAD:  Atraumatic, Normocephalic  EYES: EOMI, PERRLA, conjunctiva and sclera clear  NECK: Supple, No JVD  CHEST/LUNG: Clear to auscultation bilaterally; No wheeze  HEART: Regular rate and rhythm; No murmurs, rubs, or gallops  ABDOMEN: Soft, Nontender, Nondistended; Bowel sounds present  EXTREMITIES:  2+ Peripheral Pulses, No clubbing, cyanosis, or edema  PSYCH: AAOx3  NEUROLOGY: non-focal  SKIN: No rashes or lesions

## 2024-09-03 NOTE — H&P ADULT - NSICDXFAMILYHX_GEN_ALL_CORE_FT
Patient aware and states that she has an appt   In November for a sleep consult FAMILY HISTORY:  Father  Still living? No  FH: CAD (coronary artery disease), Age at diagnosis: Age Unknown    Mother  Still living? No  FH: CAD (coronary artery disease), Age at diagnosis: Age Unknown  FH: lung cancer, Age at diagnosis: Age Unknown    Sibling  Still living? Unknown  FH: CAD (coronary artery disease), Age at diagnosis: Age Unknown    Aunt  Still living? Unknown  FH: CAD (coronary artery disease), Age at diagnosis: Age Unknown

## 2024-09-03 NOTE — ED PROVIDER NOTE - PHYSICAL EXAMINATION
Physical Exam:  Gen: NAD, AOx3, non-toxic appearing  Head: NCAT  HEENT: EOMI, PEERLA, normal conjunctiva, tongue midline, oral mucosa moist  Lung: CTAB, no respiratory distress, no wheezes/rhonchi/rales B/L, speaking in full sentences  CV: RRR, no murmurs, rubs or gallops  Abd: soft, NT, ND, no guarding, no rigidity, no rebound tenderness, no CVA tenderness   MSK: no midline c/t/l spine ttp, paraspinal R-sided lumbar ttp  Neuro: No focal sensory or motor deficits  Skin: Warm, well perfused, no rash, no leg swelling  Psych: normal affect, calm

## 2024-09-03 NOTE — ED PROVIDER NOTE - NSICDXFAMILYHX_GEN_ALL_CORE_FT
FAMILY HISTORY:  Father  Still living? No  FH: CAD (coronary artery disease), Age at diagnosis: Age Unknown    Mother  Still living? No  FH: CAD (coronary artery disease), Age at diagnosis: Age Unknown  FH: lung cancer, Age at diagnosis: Age Unknown    Sibling  Still living? Unknown  FH: CAD (coronary artery disease), Age at diagnosis: Age Unknown    Aunt  Still living? Unknown  FH: CAD (coronary artery disease), Age at diagnosis: Age Unknown

## 2024-09-04 ENCOUNTER — RESULT REVIEW (OUTPATIENT)
Age: 89
End: 2024-09-04

## 2024-09-04 LAB
A1C WITH ESTIMATED AVERAGE GLUCOSE RESULT: 5.7 % — HIGH (ref 4–5.6)
ESTIMATED AVERAGE GLUCOSE: 117 MG/DL — HIGH (ref 68–114)
GLUCOSE BLDC GLUCOMTR-MCNC: 103 MG/DL — HIGH (ref 70–99)
IRON SATN MFR SERPL: 107 UG/DL — SIGNIFICANT CHANGE UP (ref 30–160)
T3 SERPL-MCNC: 93 NG/DL — SIGNIFICANT CHANGE UP (ref 80–200)
T4 AB SER-ACNC: 7.7 UG/DL — SIGNIFICANT CHANGE UP (ref 4.6–12)
TSH SERPL-MCNC: 1.47 UIU/ML — SIGNIFICANT CHANGE UP (ref 0.27–4.2)

## 2024-09-04 PROCEDURE — 93306 TTE W/DOPPLER COMPLETE: CPT | Mod: 26

## 2024-09-04 RX ORDER — ACETAMINOPHEN 325 MG/1
650 TABLET ORAL ONCE
Refills: 0 | Status: COMPLETED | OUTPATIENT
Start: 2024-09-04 | End: 2024-09-04

## 2024-09-04 RX ORDER — ACETAMINOPHEN 325 MG/1
650 TABLET ORAL ONCE
Refills: 0 | Status: DISCONTINUED | OUTPATIENT
Start: 2024-09-04 | End: 2024-09-04

## 2024-09-04 RX ORDER — ACETAMINOPHEN 325 MG/1
1000 TABLET ORAL ONCE
Refills: 0 | Status: COMPLETED | OUTPATIENT
Start: 2024-09-04 | End: 2024-09-04

## 2024-09-04 RX ADMIN — ESCITALOPRAM OXALATE 10 MILLIGRAM(S): 10 TABLET ORAL at 12:17

## 2024-09-04 RX ADMIN — Medication 100 MILLIGRAM(S): at 19:10

## 2024-09-04 RX ADMIN — Medication 75 MILLILITER(S): at 18:32

## 2024-09-04 RX ADMIN — Medication 0.25 MILLIGRAM(S): at 09:38

## 2024-09-04 RX ADMIN — ACETAMINOPHEN 400 MILLIGRAM(S): 325 TABLET ORAL at 09:12

## 2024-09-04 RX ADMIN — METOPROLOL TARTRATE 12.5 MILLIGRAM(S): 100 TABLET ORAL at 06:21

## 2024-09-04 RX ADMIN — ACETAMINOPHEN 1000 MILLIGRAM(S): 325 TABLET ORAL at 00:00

## 2024-09-04 RX ADMIN — Medication 1 SPRAY(S): at 06:33

## 2024-09-04 RX ADMIN — Medication 0.25 MILLIGRAM(S): at 21:00

## 2024-09-04 RX ADMIN — Medication 75 MILLILITER(S): at 12:18

## 2024-09-04 RX ADMIN — Medication 1 SPRAY(S): at 14:13

## 2024-09-04 RX ADMIN — Medication 1 SPRAY(S): at 21:04

## 2024-09-04 RX ADMIN — Medication 0.25 MILLIGRAM(S): at 21:02

## 2024-09-04 RX ADMIN — Medication 3 MILLIGRAM(S): at 18:32

## 2024-09-04 RX ADMIN — AMLODIPINE BESYLATE 2.5 MILLIGRAM(S): 10 TABLET ORAL at 06:21

## 2024-09-04 RX ADMIN — NAPHAZOLINE HYDROCHLORIDE AND PHENIRAMINE MALEATE 1 DROP(S): .25; 3 SOLUTION/ DROPS OPHTHALMIC at 22:38

## 2024-09-04 RX ADMIN — Medication 3 MILLIGRAM(S): at 06:21

## 2024-09-04 RX ADMIN — ACETAMINOPHEN 1000 MILLIGRAM(S): 325 TABLET ORAL at 09:42

## 2024-09-04 RX ADMIN — ACETAMINOPHEN 650 MILLIGRAM(S): 325 TABLET ORAL at 22:14

## 2024-09-04 RX ADMIN — Medication 40 MILLIGRAM(S): at 06:21

## 2024-09-04 RX ADMIN — Medication 1000 UNIT(S): at 12:17

## 2024-09-04 RX ADMIN — Medication 1 TABLET(S): at 12:17

## 2024-09-04 RX ADMIN — ACETAMINOPHEN 650 MILLIGRAM(S): 325 TABLET ORAL at 23:05

## 2024-09-04 RX ADMIN — ENOXAPARIN SODIUM 30 MILLIGRAM(S): 100 INJECTION SUBCUTANEOUS at 06:24

## 2024-09-04 NOTE — PHYSICAL THERAPY INITIAL EVALUATION ADULT - PERTINENT HX OF CURRENT PROBLEM, REHAB EVAL
89 y/o F pmhx breast cx in remission, hypotension (on midodrine prn), AS, PMR, Asthma presents with back pain for 8 days. Patient states pain started while putting on sock. Denies falls. Denies numbness/tingling down posterior aspect of LE b/l, denies perianal numbness/tingling. Denies urinary incontinence, fecal incontinence. Hx of multiple compression fx in the past. Baseline ambulates with walker, now is unable to 2/2 pain.    CT LUMBAR SPINE 9/3: Generalized osteopenia. Old superior endplate compression deformity T12. No acute fractures or dislocations. CT ABDOMEN & PELVIS 9/3: No evidence of urinary tract stone or obstruction.
88 y/o F pmhx breast cx in remission, hypotension (on midodrine prn), AS, PMR, Asthma presents with back pain for 8 days. Patient states pain started while putting on sock. Denies falls. Denies numbness/tingling down posterior aspect of LE b/l, denies perianal numbness/tingling. Denies urinary incontinence, fecal incontinence. Hx of multiple compression fx in the past. Baseline ambulates with walker, now is unable to 2/2 pain.    CT LUMBAR SPINE 9/3: Generalized osteopenia. Old superior endplate compression deformity T12. No acute fractures or dislocations. CT ABDOMEN & PELVIS 9/3: No evidence of urinary tract stone or obstruction.

## 2024-09-04 NOTE — PHYSICAL THERAPY INITIAL EVALUATION ADULT - GAIT TRAINING, PT EVAL
GOAL: Pt will ambulate with or without appropriate assistive device x 50 feet indep in 2 weeks. GOAL: Pt will ambulate with or without appropriate assistive device x 150 feet supervision in 2 weeks.

## 2024-09-04 NOTE — PROGRESS NOTE ADULT - ASSESSMENT
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 unable to walk.  Patient states pain started while putting on a compression sock. Denies trauma. Denies numbness/tingling down posterior aspect of LE b/l, denies perianal numbness/tingling. Denies urinary incontinence, fecal incontinence. Hx of multiple compression fx in the past. At baseline ambulates with walker, now is unable to 2/2 pain. Pain radiates from R paraspinal region to R anterior abdomen wall. she is pain free when not moving and resting.     UTI, cannot R/O PN, ct A/P was non contrast. however ptn w no CVA tenderness on exam.. doubt she has a PN. she was given CTX in the ED , will cont for 2 more doses. last dose 9/5  MSK back pain. 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 she does not tolerate muscle relaxers nor Gabapentin. will get PT eval. treat w tylenol and has tolerated Morphine in the past. PT eval ordered  Anxiety will cont prn xanax and lexapro  PMR: cont daily Prednisone no exacebration  HTN: cont Metoprolol and Norvasc.   Orthostatic hypotension: occasionally needs Midodrine  h/o Mod AS:  rot TTE: severe AS. will call cardiology for consult  Dizziness: c/o chronic facial sinus pressure and dizziness. was supposed to see her ENT but due to back spasm couldn't keep the appnt. daughters requesting a head ct, all the head cts in the past have been c/w chronic sinusitis.  TTE today: severe AS w increased LV filling pressures. will get card and structural consult  DVT ppx w sc Lovenox

## 2024-09-04 NOTE — PATIENT PROFILE ADULT - FUNCTIONAL ASSESSMENT - DAILY ACTIVITY ASSESSMENT TYPE
His murmur is less audible today than previous.  Transesophageal echocardiogram planned at time of cardioversion next week to confirm mitral valve anatomy and mitral regurgitation severity.   Admission

## 2024-09-04 NOTE — CONSULT NOTE ADULT - TIME BILLING
agree with above  89 y/o F pmhx breast cx in remission, orthostatic hypotension (on midodrine prn), AS, PMR on chronic steroids, anxiety, Asthma presents with back pain for 8 days.     #Back Pain   -CT L spine w/ old compression deformity T12, no acute findings  -Pain mgmt as per med  -PT for mobility    #UTI  -Abx, mgmt per med    #Hx Orthostatic Hypotension  -Dizziness worse in AM, however not new to pt   -Currently taking amlodipine 2.5mg bid - resume  -Continue toprol 12.5mg qd  -Allow some degree of permissive htn    #Aortic Stenosis  -Recent echo in office 5/2024 with mod-severe AS  -volume status stable on exam  -Cont med mgmt for now     #Hx Paroxysmal Tachycardia  -Cont metoprolol as above    #PMR  -On chronic steroids - cont as ordered       dvt ppx   d/w daughter at bedside

## 2024-09-04 NOTE — PHYSICAL THERAPY INITIAL EVALUATION ADULT - TRANSFER TRAINING, PT EVAL
GOAL: Pt will perform all transfers with or without appropriate assistive device indep in 2 weeks GOAL: Pt will perform all transfers with or without appropriate assistive device with supervision in 2 weeks

## 2024-09-04 NOTE — PHYSICAL THERAPY INITIAL EVALUATION ADULT - ADDITIONAL COMMENTS
Pt lives in an apartment, no stairs to enter, elevator accessible. Pt has an aide 6 hours, 3 days a week, while daughter (who works twice a week when the aide is there) assists on other days. Pt owns rollator, shower chair, grab bars. Pt lives in an apartment, no stairs to enter, elevator accessible. Pt has an aide 6 hours, 3 days a week, while daughter (who works twice a week when the aide is there) assists on other days. Pt ambulates using rollator with supervision and performs all ADLs with supervision & assist as needed from daughter or aide. Pt owns rollator, shower chair, grab bars.

## 2024-09-04 NOTE — PATIENT PROFILE ADULT - FALL HARM RISK - HARM RISK INTERVENTIONS
Assistance with ambulation/Assistance OOB with selected safe patient handling equipment/Communicate Risk of Fall with Harm to all staff/Discuss with provider need for PT consult/Monitor gait and stability/Provide patient with walking aids - walker, cane, crutches/Reinforce activity limits and safety measures with patient and family/Sit up slowly, dangle for a short time, stand at bedside before walking/Tailored Fall Risk Interventions/Visual Cue: Yellow wristband and red socks/Bed in lowest position, wheels locked, appropriate side rails in place/Call bell, personal items and telephone in reach/Instruct patient to call for assistance before getting out of bed or chair/Non-slip footwear when patient is out of bed/[place] to call system/Physically safe environment - no spills, clutter or unnecessary equipment/Purposeful Proactive Rounding/Room/bathroom lighting operational, light cord in reach

## 2024-09-04 NOTE — PROGRESS NOTE ADULT - SUBJECTIVE AND OBJECTIVE BOX
Patient is a 89y old  Female who presents with a chief complaint of UTI, PN, MSK back pain (04 Sep 2024 11:26)      SUBJECTIVE / OVERNIGHT EVENTS: ptn feels well, c/o chronic facial sinus pressure and dizziness. was supposed to see her ENT but due to back spasm couldn't keep the appnt. denies back ache, but has been in bed since yesterday. ate all of her lunch. daughters requesting a head ct, all the head cts in the past have been c/w chronic sinusitis. TTE today: severe AS w increased LV filling pressures. will get card and structural consult    MEDICATIONS  (STANDING):  ALPRAZolam 0.25 milliGRAM(s) Oral at bedtime  amLODIPine   Tablet 2.5 milliGRAM(s) Oral daily  calcium carbonate   1250 mG (OsCal) 1 Tablet(s) Oral daily  cefTRIAXone   IVPB 1000 milliGRAM(s) IV Intermittent every 24 hours  cholecalciferol 1000 Unit(s) Oral daily  enoxaparin Injectable 30 milliGRAM(s) SubCutaneous every 24 hours  escitalopram 10 milliGRAM(s) Oral daily  metoprolol succinate ER 12.5 milliGRAM(s) Oral daily  naphazoline/pheniramine Solution 1 Drop(s) Both EYES three times a day  pantoprazole    Tablet 40 milliGRAM(s) Oral before breakfast  predniSONE   Tablet 3 milliGRAM(s) Oral two times a day  sodium chloride 0.45%. 1000 milliLiter(s) (75 mL/Hr) IV Continuous <Continuous>  sodium chloride 0.65% Nasal 1 Spray(s) Both Nostrils three times a day    MEDICATIONS  (PRN):  ALPRAZolam 0.25 milliGRAM(s) Oral every 6 hours PRN anxiety  midodrine. 2.5 milliGRAM(s) Oral three times a day PRN SBP <100  morphine  - Injectable 2 milliGRAM(s) IV Push every 6 hours PRN Severe Pain (7 - 10)      Vital Signs Last 24 Hrs  T(F): 98 (24 @ 11:15), Max: 98.4 (24 @ 21:00)  HR: 70 (24 @ 11:15) (62 - 81)  BP: 119/69 (24 @ 11:15) (119/69 - 175/90)  RR: 18 (24 @ 11:15) (17 - 20)  SpO2: 96% (24 @ 11:15) (95% - 98%)  Telemetry:   CAPILLARY BLOOD GLUCOSE      POCT Blood Glucose.: 103 mg/dL (04 Sep 2024 09:56)    I&O's Summary    04 Sep 2024 07:01  -  04 Sep 2024 17:34  --------------------------------------------------------  IN: 885 mL / OUT: 0 mL / NET: 885 mL        PHYSICAL EXAM:  GENERAL: NAD, well-developed  HEAD:  Atraumatic, Normocephalic  EYES: EOMI, PERRLA, conjunctiva and sclera clear  NECK: Supple, No JVD  CHEST/LUNG: Clear to auscultation bilaterally; No wheeze  HEART: Regular rate and rhythm; No murmurs, rubs, or gallops  ABDOMEN: Soft, Nontender, Nondistended; Bowel sounds present  EXTREMITIES:  2+ Peripheral Pulses, No clubbing, cyanosis, or edema  PSYCH: AAOx3  NEUROLOGY: non-focal  SKIN: No rashes or lesions    LABS:                        11.4   7.72  )-----------( 203      ( 03 Sep 2024 16:07 )             36.4         137  |  101  |  24<H>  ----------------------------<  100<H>  4.1   |  25  |  0.76    Ca    9.4      03 Sep 2024 16:07    TPro  7.4  /  Alb  3.7  /  TBili  0.4  /  DBili  x   /  AST  13  /  ALT  7<L>  /  AlkPhos  61  -03          Urinalysis Basic - ( 03 Sep 2024 16:17 )    Color: Yellow / Appearance: Clear / S.015 / pH: x  Gluc: x / Ketone: Negative mg/dL  / Bili: Negative / Urobili: 0.2 mg/dL   Blood: x / Protein: Negative mg/dL / Nitrite: Negative   Leuk Esterase: Moderate / RBC: 3 /HPF / WBC 30 /HPF   Sq Epi: x / Non Sq Epi: x / Bacteria: Moderate /HPF        RADIOLOGY & ADDITIONAL TESTS:    Imaging Personally Reviewed:    Consultant(s) Notes Reviewed:      Care Discussed with Consultants/Other Providers:

## 2024-09-05 ENCOUNTER — TRANSCRIPTION ENCOUNTER (OUTPATIENT)
Age: 89
End: 2024-09-05

## 2024-09-05 DIAGNOSIS — I35.0 NONRHEUMATIC AORTIC (VALVE) STENOSIS: ICD-10-CM

## 2024-09-05 LAB
CULTURE RESULTS: ABNORMAL
SPECIMEN SOURCE: SIGNIFICANT CHANGE UP

## 2024-09-05 PROCEDURE — 70450 CT HEAD/BRAIN W/O DYE: CPT | Mod: 26

## 2024-09-05 PROCEDURE — 99223 1ST HOSP IP/OBS HIGH 75: CPT

## 2024-09-05 PROCEDURE — 93880 EXTRACRANIAL BILAT STUDY: CPT | Mod: 26

## 2024-09-05 RX ORDER — PREDNISONE 10 MG
50 TABLET, DOSE PACK ORAL
Refills: 0 | Status: COMPLETED | OUTPATIENT
Start: 2024-09-05 | End: 2024-09-06

## 2024-09-05 RX ORDER — DIPHENHYDRAMINE HCL 50 MG
50 CAPSULE ORAL ONCE
Refills: 0 | Status: COMPLETED | OUTPATIENT
Start: 2024-09-06 | End: 2024-09-06

## 2024-09-05 RX ORDER — AMLODIPINE BESYLATE 10 MG/1
2.5 TABLET ORAL
Refills: 0 | Status: DISCONTINUED | OUTPATIENT
Start: 2024-09-05 | End: 2024-09-08

## 2024-09-05 RX ORDER — ACETAMINOPHEN 325 MG/1
1000 TABLET ORAL ONCE
Refills: 0 | Status: COMPLETED | OUTPATIENT
Start: 2024-09-05 | End: 2024-09-05

## 2024-09-05 RX ADMIN — Medication 100 MILLIGRAM(S): at 17:45

## 2024-09-05 RX ADMIN — ACETAMINOPHEN 400 MILLIGRAM(S): 325 TABLET ORAL at 11:30

## 2024-09-05 RX ADMIN — Medication 1 SPRAY(S): at 05:41

## 2024-09-05 RX ADMIN — Medication 3 MILLIGRAM(S): at 05:35

## 2024-09-05 RX ADMIN — Medication 1 SPRAY(S): at 13:45

## 2024-09-05 RX ADMIN — AMLODIPINE BESYLATE 2.5 MILLIGRAM(S): 10 TABLET ORAL at 05:36

## 2024-09-05 RX ADMIN — Medication 0.25 MILLIGRAM(S): at 21:39

## 2024-09-05 RX ADMIN — NAPHAZOLINE HYDROCHLORIDE AND PHENIRAMINE MALEATE 1 DROP(S): .25; 3 SOLUTION/ DROPS OPHTHALMIC at 13:45

## 2024-09-05 RX ADMIN — Medication 3 MILLIGRAM(S): at 17:45

## 2024-09-05 RX ADMIN — Medication 1 SPRAY(S): at 21:38

## 2024-09-05 RX ADMIN — Medication 1000 UNIT(S): at 11:29

## 2024-09-05 RX ADMIN — Medication 1 TABLET(S): at 11:29

## 2024-09-05 RX ADMIN — ENOXAPARIN SODIUM 30 MILLIGRAM(S): 100 INJECTION SUBCUTANEOUS at 05:39

## 2024-09-05 RX ADMIN — METOPROLOL TARTRATE 12.5 MILLIGRAM(S): 100 TABLET ORAL at 05:35

## 2024-09-05 RX ADMIN — ESCITALOPRAM OXALATE 10 MILLIGRAM(S): 10 TABLET ORAL at 11:30

## 2024-09-05 RX ADMIN — AMLODIPINE BESYLATE 2.5 MILLIGRAM(S): 10 TABLET ORAL at 18:23

## 2024-09-05 RX ADMIN — ACETAMINOPHEN 1000 MILLIGRAM(S): 325 TABLET ORAL at 12:00

## 2024-09-05 RX ADMIN — Medication 40 MILLIGRAM(S): at 05:35

## 2024-09-05 RX ADMIN — NAPHAZOLINE HYDROCHLORIDE AND PHENIRAMINE MALEATE 1 DROP(S): .25; 3 SOLUTION/ DROPS OPHTHALMIC at 06:09

## 2024-09-05 NOTE — DISCHARGE NOTE PROVIDER - NSDCFUSCHEDAPPT_GEN_ALL_CORE_FT
Homa Becker  [facility]  [address]  Scheduled Appointment: 09/30/2024     Homa Becker  [facility] [address] [facility]  Scheduled Appointment: 09/30/2024    Doctor, Unknown  [facility]  Scheduled Appointment: 10/02/2024    [facility]  CARDIOLOGY [address]  Scheduled Appointment: 10/02/2024

## 2024-09-05 NOTE — PROGRESS NOTE ADULT - ASSESSMENT
89 y/o F pmhx breast cx in remission, orthostatic hypotension (on midodrine prn), AS, PMR on chronic steroids, anxiety, Asthma presents with back pain for 8 days. unable to walk.  Patient states pain started while putting on a compression sock. Denies trauma. Denies numbness/tingling down posterior aspect of LE b/l, denies perianal numbness/tingling. Denies urinary incontinence, fecal incontinence. Hx of multiple compression fx in the past. At baseline ambulates with walker, now is unable to 2/2 pain. Pain radiates from R paraspinal region to R anterior abdomen wall. she is pain free when not moving and resting.     UTI, cannot R/O PN, ct A/P was non contrast. however ptn w no CVA tenderness on exam.. doubt she has a PN. she was given CTX in the ED , will cont for 2 more doses. last dose 9/5  MSK back pain. 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 she does not tolerate muscle relaxers nor Gabapentin. will get PT eval. treat w tylenol and has tolerated Morphine in the past. PT eval ordered  Anxiety will cont prn xanax and lexapro  PMR: cont daily Prednisone no exacebration  HTN: cont Metoprolol and Norvasc.   Orthostatic hypotension: occasionally needs Midodrine  h/o Mod AS:  rot TTE: severe AS. will call cardiology for consult  Dizziness: c/o chronic facial sinus pressure and dizziness. was supposed to see her ENT but due to back spasm couldn't keep the appnt. daughters requesting a head ct, all the head cts in the past have been c/w chronic sinusitis.    TTE : severe AS w increased LV filling pressures. ptn has a lot of anxiety and after she was seen by structural she is not sure if she wants to proceed w TAVR for severe AS. daughter at bedside. daughter is complaining she cannot manage her mothers anxiety much longer. the ptn agreed to take ativan and get pre medicated for cath and CT scan as pre -op work up for TAVR if she can be fit into the schedule tomorrow.     DVT ppx w sc Lovenox

## 2024-09-05 NOTE — DISCHARGE NOTE PROVIDER - NSDCMRMEDTOKEN_GEN_ALL_CORE_FT
amLODIPine 2.5 mg oral tablet: 1 tab(s) orally once a day NOTE: HAS BEEN TAKING AS NEEDED DUE TO LOW BP  calcium carbonate 1250 mg (500 mg elemental calcium) oral tablet: 1 tab(s) orally once a day  Clear Eyes 0.012% ophthalmic solution: 1 drop(s) to each affected eye 3 times a day, As Needed  Durezol 0.05% ophthalmic emulsion: 1 drop(s) in the left eye 2 times a day  erythromycin 0.5% ophthalmic ointment: 1 gram(s) in each affected eye 2 times a day as needed  Lexapro 10 mg oral tablet: 1 tab(s) orally once a day  metoprolol succinate 25 mg oral tablet, extended release: 0.5 tab(s) orally once a day  midodrine 2.5 mg oral tablet: 1 tab(s) orally as needed NOTE: HAS ONLY TAKEN ONCE  Nasacort Allergy 24HR 55 mcg/inh nasal spray: 1 spray(s) in each nostril once a day as needed  predniSONE 1 mg oral tablet: 3 tab(s) orally 2 times a day  Protonix 20 mg oral delayed release tablet: 1 tab(s) orally once a day  sodium chloride 0.65% nasal spray: 1 spray(s) in each nostril 3 times a day  Vitamin D3 25 mcg (1000 intl units) oral tablet: 1 tab(s) orally once a day  Xanax 1 mg oral tablet: 1/4 to 1/2 tablet orally as needed   amLODIPine 2.5 mg oral tablet: 1 tab(s) orally 2 times a day  calcium carbonate 1250 mg (500 mg elemental calcium) oral tablet: 1 tab(s) orally once a day  Clear Eyes 0.012% ophthalmic solution: 1 drop(s) to each affected eye 3 times a day, As Needed  Durezol 0.05% ophthalmic emulsion: 1 drop(s) in the left eye 2 times a day  erythromycin 0.5% ophthalmic ointment: 1 gram(s) in each affected eye 2 times a day as needed  Lexapro 10 mg oral tablet: 1 tab(s) orally once a day  metoprolol succinate 25 mg oral tablet, extended release: 0.5 tab(s) orally once a day  midodrine 2.5 mg oral tablet: 1 tab(s) orally prn as needed for hypotension  Nasacort Allergy 24HR 55 mcg/inh nasal spray: 1 spray(s) in each nostril once a day as needed  predniSONE 1 mg oral tablet: 3 tab(s) orally 2 times a day  Protonix 20 mg oral delayed release tablet: 1 tab(s) orally once a day  sodium chloride 0.65% nasal spray: 1 spray(s) in each nostril 3 times a day  Vitamin D3 25 mcg (1000 intl units) oral tablet: 1 tab(s) orally once a day  Xanax 1 mg oral tablet: 1/4 to 1/2 tablet orally as needed

## 2024-09-05 NOTE — CONSULT NOTE ADULT - PROBLEM SELECTOR RECOMMENDATION 9
Met with patient and daughter at bedside, reviewed progression of aortic stenosis to severe and discussed treatment options including TAVR as well as risks/benefits including stroke, vascular complication and possible need for PPM. She would require cardiac catheterization and cardiac CT to complete evaluation.  - Will proceed with [facility] with Dr. Whittaker tomorrow 9/6, pt. has an allergy to IV contrast (hx of rash). Will premedicate with Prednisone/Benadryl per protocol. Estimated time of cath is 2:30pm  - PFTs and carotid duplex ordered   - Cardiac CT and TAVR will be scheduled by our office as an outpt    ERIBERTO Naqvi NP  Available on TEAMS Met with patient and daughter at bedside, reviewed progression of aortic stenosis to severe and discussed treatment options including TAVR as well as risks/benefits including stroke, vascular complication and possible need for PPM. She would require cardiac catheterization and cardiac CT to complete evaluation.  - Will proceed with [facility] with Dr. Whittaker tomorrow 9/6, pt. has an allergy to IV contrast (hx of rash). Will premedicate with Prednisone/Benadryl per protocol. Estimated time of cath is 2:30pm  - Hold Lovenox in anticipation of cath tomorrow  - PFTs and carotid duplex ordered   - Cardiac CT and TAVR will be scheduled by our office as an outpt    ERIBERTO Naqvi NP  Available on TEAMS

## 2024-09-05 NOTE — PROGRESS NOTE ADULT - ASSESSMENT
A/p  89 y/o F pmhx breast cx in remission, orthostatic hypotension (on midodrine prn), AS, PMR on chronic steroids, anxiety, Asthma presents with back pain for 8 days.     #Back Pain   -CT L spine w/ old compression deformity T12, no acute findings  -Pain mgmt as per med  -PT for mobility    #UTI  -Abx, mgmt per med    #Hx Orthostatic Hypotension  -Dizziness worse in AM, however not new to pt   -Increase amlodipine to 2.5mg bid   -Continue toprol 12.5mg qd  -Allow some degree of permissive htn    #Aortic Stenosis  -Recent echo in office 5/2024 with mod-severe AS  -volume status stable on exam  -Cont med mgmt for now     #Hx Paroxysmal Tachycardia  -Cont metoprolol as above    #PMR  -On chronic steroids - cont as ordered       dvt ppx        A/p  88 y/o F pmhx breast cx in remission, orthostatic hypotension (on midodrine prn), AS, PMR on chronic steroids, anxiety, Asthma presents with back pain for 8 days.     #Back Pain   -CT L spine w/ old compression deformity T12, no acute findings  -Pain mgmt as per med  -PT for mobility    #UTI  -Abx, mgmt per med    #Hx Orthostatic Hypotension  -Dizziness worse in AM, however not new to pt   -Increase amlodipine to 2.5mg bid   -Continue toprol 12.5mg qd  -Allow some degree of permissive htn    #Aortic Stenosis  -Recent echo in office 5/2024 with mod-severe AS  -Echo 9/4 with severe AS   -volume status stable on exam  -Cont med mgmt for now     #Hx Paroxysmal Tachycardia  -Cont metoprolol as above      #PMR  -On chronic steroids - cont as ordered       dvt ppx        A/p  89 y/o F pmhx breast cx in remission, orthostatic hypotension (on midodrine prn), AS, PMR on chronic steroids, anxiety, Asthma presents with back pain for 8 days.     #Back Pain   -CT L spine w/ old compression deformity T12, no acute findings  -Pain mgmt as per med  -PT for mobility    #UTI  -Abx, mgmt per med    #Hx Orthostatic Hypotension  -Dizziness worse in AM, however not new to pt   -Increase amlodipine to 2.5mg bid   -Continue toprol 12.5mg qd  -Allow some degree of permissive htn    #Aortic Stenosis  -Recent echo in office 5/2024 with mod-severe AS  -Echo 9/4 with severe AS - will have structural eval w/ Dr. Whittaker   -volume status stable on exam  -Cont med mgmt for now     #Hx Paroxysmal Tachycardia  -Cont metoprolol as above      #PMR  -On chronic steroids - cont as ordered       dvt ppx

## 2024-09-05 NOTE — CONSULT NOTE ADULT - NS ATTEND AMEND GEN_ALL_CORE FT
Events that transpired leading to the patient's current hospitalization along with the patient's hospital course was gone over.  The patient's past medical history/surgical history family history social history was gone over.  Agree with 14 point review of systems and physical examination as noted above.    Discussion was had with the patient and her daughter who was at the patient's bedside concerning her clinical presentation and findings on imaging study that demonstrates severe aortic valve stenosis.  Explained what is severe aortic valve stenosis.  The associated signs and symptoms of severe aortic valve stenosis was reviewed.  The natural pathophysiology of untreated severe aortic valve stenosis was gone over.  The various treatment options were gone over including medical therapy, TAVR and SAVR.  The pros/cons and risk/benefits of the various treatment options were gone over.    Due to the patient age and comorbidities she is felt to be appropriate candidate to undergo TAVR.  Indications and details of the procedure reviewed.  Benefits and risks were discussed.  Necessary workup for the TAVR procedure was gone over including heart CTA, carotid ultrasound and coronary angiogram.  Indications and details of these procedures were reviewed.  Indications and details for the coronary angiogram procedure were explained.  Benefits and risks were gone over.  Risks include but not limited to infection, bleeding, arrhythmia/need for pacemaker, TIA/stroke, hemodynamic instability, worsening renal insufficiency, pericardial effusion/tamponade, vascular injury, need for urgent surgery and death.    The patient would like to proceed with her cardiac workup during her acute hospitalization.  Plan for cardiac catheterization to be performed on 9/6/2024.  The patient will be premedicated for her contrast allergy.     All question concerns of the patient and her daughter were addressed.    Findings and plan were discussed with cardiology/Dr. Lee, cardiac surgery/Dr. Winston and medicine/Dr. Bucio.    I have spent 75 minutes of time on the encounter which excludes teaching and separately reported services.

## 2024-09-05 NOTE — PROGRESS NOTE ADULT - SUBJECTIVE AND OBJECTIVE BOX
Patient is a 89y old  Female who presents with a chief complaint of UTI, PN, MSK back pain (05 Sep 2024 10:49)      SUBJECTIVE / OVERNIGHT EVENTS: ptn has a lot of anxiety and after she was seen by structural she is not sure if she wants to proceed w TAVR for severe AS. daughter at bedside. daughter is complaining she cannot manage her mothers anxiety much longer. the ptn agreed to take ativan and get pre medicated for cath and CT scan as pre -op work up for TAVR if she can be fit into the schedule tomorrow. she is completing her ABx for UTI today    MEDICATIONS  (STANDING):  ALPRAZolam 0.25 milliGRAM(s) Oral at bedtime  amLODIPine   Tablet 2.5 milliGRAM(s) Oral <User Schedule>  calcium carbonate   1250 mG (OsCal) 1 Tablet(s) Oral daily  cefTRIAXone   IVPB 1000 milliGRAM(s) IV Intermittent every 24 hours  cholecalciferol 1000 Unit(s) Oral daily  enoxaparin Injectable 30 milliGRAM(s) SubCutaneous every 24 hours  escitalopram 10 milliGRAM(s) Oral daily  metoprolol succinate ER 12.5 milliGRAM(s) Oral daily  naphazoline/pheniramine Solution 1 Drop(s) Both EYES three times a day  pantoprazole    Tablet 40 milliGRAM(s) Oral before breakfast  predniSONE   Tablet 3 milliGRAM(s) Oral two times a day  sodium chloride 0.45%. 1000 milliLiter(s) (75 mL/Hr) IV Continuous <Continuous>  sodium chloride 0.65% Nasal 1 Spray(s) Both Nostrils three times a day    MEDICATIONS  (PRN):  ALPRAZolam 0.25 milliGRAM(s) Oral every 6 hours PRN anxiety  midodrine. 2.5 milliGRAM(s) Oral three times a day PRN SBP <100  morphine  - Injectable 2 milliGRAM(s) IV Push every 6 hours PRN Severe Pain (7 - 10)      Vital Signs Last 24 Hrs  T(F): 98.6 (24 @ 13:15), Max: 98.6 (24 @ 13:15)  HR: 66 (24 @ 13:15) (66 - 69)  BP: 153/68 (24 @ 13:15) (144/79 - 166/81)  RR: 18 (24 @ 13:15) (18 - 18)  SpO2: 96% (24 @ 13:15) (93% - 97%)  Telemetry:   CAPILLARY BLOOD GLUCOSE        I&O's Summary    04 Sep 2024 07:01  -  05 Sep 2024 07:00  --------------------------------------------------------  IN: 1440 mL / OUT: 0 mL / NET: 1440 mL        PHYSICAL EXAM:  GENERAL: NAD, well-developed  HEAD:  Atraumatic, Normocephalic  EYES: EOMI, PERRLA, conjunctiva and sclera clear  NECK: Supple, No JVD  CHEST/LUNG: Clear to auscultation bilaterally; No wheeze  HEART: Regular rate and rhythm; No murmurs, rubs, or gallops  ABDOMEN: Soft, Nontender, Nondistended; Bowel sounds present  EXTREMITIES:  2+ Peripheral Pulses, No clubbing, cyanosis, or edema  PSYCH: AAOx3  NEUROLOGY: non-focal  SKIN: No rashes or lesions    LABS:                        11.4   7.72  )-----------( 203      ( 03 Sep 2024 16:07 )             36.4         137  |  101  |  24<H>  ----------------------------<  100<H>  4.1   |  25  |  0.76    Ca    9.4      03 Sep 2024 16:07    TPro  7.4  /  Alb  3.7  /  TBili  0.4  /  DBili  x   /  AST  13  /  ALT  7<L>  /  AlkPhos  61  09-03          Urinalysis Basic - ( 03 Sep 2024 16:17 )    Color: Yellow / Appearance: Clear / S.015 / pH: x  Gluc: x / Ketone: Negative mg/dL  / Bili: Negative / Urobili: 0.2 mg/dL   Blood: x / Protein: Negative mg/dL / Nitrite: Negative   Leuk Esterase: Moderate / RBC: 3 /HPF / WBC 30 /HPF   Sq Epi: x / Non Sq Epi: x / Bacteria: Moderate /HPF        RADIOLOGY & ADDITIONAL TESTS:    Imaging Personally Reviewed:    Consultant(s) Notes Reviewed:      Care Discussed with Consultants/Other Providers:

## 2024-09-05 NOTE — DISCHARGE NOTE PROVIDER - NSDCFUADDAPPT_GEN_ALL_CORE_FT
APPTS ARE READY TO BE MADE: [X] YES    Best Family or Patient Contact (if needed):    Additional Information about above appointments (if needed):    1: Follow up with PCP Dr. Cardona   2: Follow up with Cardiology Dr. Lee  3: Follow up with structural cards Dr. Whittaker    Other comments or requests:    APPTS ARE READY TO BE MADE: [X] YES    Best Family or Patient Contact (if needed):    Additional Information about above appointments (if needed):    1: Follow up with PCP Dr. Cardona   2: Follow up with Cardiology Dr. Lee  3: Follow up with structural cards Dr. Whittaker    Other comments or requests:     pcp-Prior to outreaching the patient, it was visible that the patient has secured a follow up appointment which was not scheduled by our team. Appt is on 1/30/245 at 1:00pm with  [address] [place] [address]    cardio-Prior to outreaching the patient, it was visible that the patient has secured a follow up appointment which was not scheduled by our team. Appt is on 10/2/24 at 11:00am [address] [place], [place] [id number]    cardiovasc-Provided patient with provider referral information, however patient prefers to schedule the appointments on their own.

## 2024-09-05 NOTE — DISCHARGE NOTE PROVIDER - CARE PROVIDERS DIRECT ADDRESSES
[email or web address]

## 2024-09-05 NOTE — PROGRESS NOTE ADULT - SUBJECTIVE AND OBJECTIVE BOX
CARDIOLOGY FOLLOW UP - Dr. Lee  DATE OF SERVICE: 9/5/24    CC  No cv complaints     REVIEW OF SYSTEMS:  CONSTITUTIONAL: No fever, weight loss, or fatigue  RESPIRATORY: No cough, wheezing, chills or hemoptysis; No Shortness of Breath  CARDIOVASCULAR: No chest pain, palpitations, passing out, dizziness, or leg swelling  GASTROINTESTINAL: No abdominal or epigastric pain. No nausea, vomiting, or hematemesis; No diarrhea or constipation. No melena or hematochezia.  VASCULAR: No edema     PHYSICAL EXAM:  T(C): 36.7 (09-05-24 @ 04:20), Max: 36.7 (09-04-24 @ 11:15)  HR: 66 (09-05-24 @ 04:20) (66 - 70)  BP: 150/76 (09-05-24 @ 04:20) (119/69 - 166/81)  RR: 18 (09-05-24 @ 04:20) (18 - 18)  SpO2: 93% (09-05-24 @ 04:20) (93% - 97%)  Wt(kg): --  I&O's Summary    04 Sep 2024 07:01  -  05 Sep 2024 07:00  --------------------------------------------------------  IN: 1440 mL / OUT: 0 mL / NET: 1440 mL        Appearance: Elderly female 	  Cardiovascular: Normal S1 S2,RRR, No JVD, No murmurs  Respiratory: Lungs clear to auscultation b/l   Gastrointestinal:  Soft, Non-tender, + BS	  Extremities: Normal range of motion, No clubbing, cyanosis or edema      Home Medications:  amLODIPine 2.5 mg oral tablet: 1 tab(s) orally once a day NOTE: HAS BEEN TAKING AS NEEDED DUE TO LOW BP (03 Sep 2024 18:23)  calcium carbonate 1250 mg (500 mg elemental calcium) oral tablet: 1 tab(s) orally once a day (03 Sep 2024 18:14)  Clear Eyes 0.012% ophthalmic solution: 1 drop(s) to each affected eye 3 times a day, As Needed (03 Sep 2024 18:16)  Durezol 0.05% ophthalmic emulsion: 1 drop(s) in the left eye 2 times a day (03 Sep 2024 18:16)  erythromycin 0.5% ophthalmic ointment: 1 gram(s) in each affected eye 2 times a day as needed (03 Sep 2024 18:39)  Lexapro 10 mg oral tablet: 1 tab(s) orally once a day (03 Sep 2024 18:39)  midodrine 2.5 mg oral tablet: 1 tab(s) orally as needed NOTE: HAS ONLY TAKEN ONCE (03 Sep 2024 18:39)  Nasacort Allergy 24HR 55 mcg/inh nasal spray: 1 spray(s) in each nostril once a day as needed (03 Sep 2024 18:39)  predniSONE 1 mg oral tablet: 3 tab(s) orally 2 times a day (03 Sep 2024 18:39)  Protonix 20 mg oral delayed release tablet: 1 tab(s) orally once a day (03 Sep 2024 18:39)  sodium chloride 0.65% nasal spray: 1 spray(s) in each nostril 3 times a day (03 Sep 2024 18:16)  Vitamin D3 25 mcg (1000 intl units) oral tablet: 1 tab(s) orally once a day (03 Sep 2024 18:16)  Xanax 1 mg oral tablet: 1/4 to 1/2 tablet orally as needed (03 Sep 2024 18:39)      MEDICATIONS  (STANDING):  acetaminophen   IVPB .. 1000 milliGRAM(s) IV Intermittent once  ALPRAZolam 0.25 milliGRAM(s) Oral at bedtime  amLODIPine   Tablet 2.5 milliGRAM(s) Oral daily  calcium carbonate   1250 mG (OsCal) 1 Tablet(s) Oral daily  cefTRIAXone   IVPB 1000 milliGRAM(s) IV Intermittent every 24 hours  cholecalciferol 1000 Unit(s) Oral daily  enoxaparin Injectable 30 milliGRAM(s) SubCutaneous every 24 hours  escitalopram 10 milliGRAM(s) Oral daily  metoprolol succinate ER 12.5 milliGRAM(s) Oral daily  naphazoline/pheniramine Solution 1 Drop(s) Both EYES three times a day  pantoprazole    Tablet 40 milliGRAM(s) Oral before breakfast  predniSONE   Tablet 3 milliGRAM(s) Oral two times a day  sodium chloride 0.45%. 1000 milliLiter(s) (75 mL/Hr) IV Continuous <Continuous>  sodium chloride 0.65% Nasal 1 Spray(s) Both Nostrils three times a day      TELEMETRY: 	    ECG:  	  RADIOLOGY:   DIAGNOSTIC TESTING:  [ ] Echocardiogram:  [ ]  Catheterization:  [ ] Stress Test:    OTHER: 	    LABS:	 	                            11.4   7.72  )-----------( 203      ( 03 Sep 2024 16:07 )             36.4     09-03    137  |  101  |  24<H>  ----------------------------<  100<H>  4.1   |  25  |  0.76    Ca    9.4      03 Sep 2024 16:07    TPro  7.4  /  Alb  3.7  /  TBili  0.4  /  DBili  x   /  AST  13  /  ALT  7<L>  /  AlkPhos  61  09-03             CARDIOLOGY FOLLOW UP - Dr. Lee  DATE OF SERVICE: 9/5/24    CC  No cv complaints   Endorsing LBP    REVIEW OF SYSTEMS:  CONSTITUTIONAL: No fever, weight loss, or fatigue  RESPIRATORY: No cough, wheezing, chills or hemoptysis; No Shortness of Breath  CARDIOVASCULAR: No chest pain, palpitations, passing out, dizziness, or leg swelling  GASTROINTESTINAL: No abdominal or epigastric pain. No nausea, vomiting, or hematemesis; No diarrhea or constipation. No melena or hematochezia.  VASCULAR: No edema     PHYSICAL EXAM:  T(C): 36.7 (09-05-24 @ 04:20), Max: 36.7 (09-04-24 @ 11:15)  HR: 66 (09-05-24 @ 04:20) (66 - 70)  BP: 150/76 (09-05-24 @ 04:20) (119/69 - 166/81)  RR: 18 (09-05-24 @ 04:20) (18 - 18)  SpO2: 93% (09-05-24 @ 04:20) (93% - 97%)  Wt(kg): --  I&O's Summary    04 Sep 2024 07:01  -  05 Sep 2024 07:00  --------------------------------------------------------  IN: 1440 mL / OUT: 0 mL / NET: 1440 mL        Appearance: Elderly female 	  Cardiovascular: Normal S1 S2,RRR, No JVD, No murmurs  Respiratory: Lungs clear to auscultation b/l   Gastrointestinal:  Soft, Non-tender, + BS	  Extremities: Normal range of motion, No clubbing, cyanosis or edema      Home Medications:  amLODIPine 2.5 mg oral tablet: 1 tab(s) orally once a day NOTE: HAS BEEN TAKING AS NEEDED DUE TO LOW BP (03 Sep 2024 18:23)  calcium carbonate 1250 mg (500 mg elemental calcium) oral tablet: 1 tab(s) orally once a day (03 Sep 2024 18:14)  Clear Eyes 0.012% ophthalmic solution: 1 drop(s) to each affected eye 3 times a day, As Needed (03 Sep 2024 18:16)  Durezol 0.05% ophthalmic emulsion: 1 drop(s) in the left eye 2 times a day (03 Sep 2024 18:16)  erythromycin 0.5% ophthalmic ointment: 1 gram(s) in each affected eye 2 times a day as needed (03 Sep 2024 18:39)  Lexapro 10 mg oral tablet: 1 tab(s) orally once a day (03 Sep 2024 18:39)  midodrine 2.5 mg oral tablet: 1 tab(s) orally as needed NOTE: HAS ONLY TAKEN ONCE (03 Sep 2024 18:39)  Nasacort Allergy 24HR 55 mcg/inh nasal spray: 1 spray(s) in each nostril once a day as needed (03 Sep 2024 18:39)  predniSONE 1 mg oral tablet: 3 tab(s) orally 2 times a day (03 Sep 2024 18:39)  Protonix 20 mg oral delayed release tablet: 1 tab(s) orally once a day (03 Sep 2024 18:39)  sodium chloride 0.65% nasal spray: 1 spray(s) in each nostril 3 times a day (03 Sep 2024 18:16)  Vitamin D3 25 mcg (1000 intl units) oral tablet: 1 tab(s) orally once a day (03 Sep 2024 18:16)  Xanax 1 mg oral tablet: 1/4 to 1/2 tablet orally as needed (03 Sep 2024 18:39)      MEDICATIONS  (STANDING):  acetaminophen   IVPB .. 1000 milliGRAM(s) IV Intermittent once  ALPRAZolam 0.25 milliGRAM(s) Oral at bedtime  amLODIPine   Tablet 2.5 milliGRAM(s) Oral daily  calcium carbonate   1250 mG (OsCal) 1 Tablet(s) Oral daily  cefTRIAXone   IVPB 1000 milliGRAM(s) IV Intermittent every 24 hours  cholecalciferol 1000 Unit(s) Oral daily  enoxaparin Injectable 30 milliGRAM(s) SubCutaneous every 24 hours  escitalopram 10 milliGRAM(s) Oral daily  metoprolol succinate ER 12.5 milliGRAM(s) Oral daily  naphazoline/pheniramine Solution 1 Drop(s) Both EYES three times a day  pantoprazole    Tablet 40 milliGRAM(s) Oral before breakfast  predniSONE   Tablet 3 milliGRAM(s) Oral two times a day  sodium chloride 0.45%. 1000 milliLiter(s) (75 mL/Hr) IV Continuous <Continuous>  sodium chloride 0.65% Nasal 1 Spray(s) Both Nostrils three times a day      TELEMETRY: 	    ECG:  	  RADIOLOGY:   DIAGNOSTIC TESTING:  [ ] Echocardiogram:  < from: TTE W or WO Ultrasound Enhancing Agent (09.04.24 @ 13:03) >  CONCLUSIONS:      1. Left ventricular cavity is normal in size. Left ventricular wall thickness is normal. Left ventricular systolic function is normal with an ejection fraction of 61 % by Lara's method of disks. There are no regional wall motion abnormalities seen.   2. There is moderate (grade 2) left ventricular diastolic dysfunction, with elevated left ventricular filling pressure.   3. Mildly enlarged right ventricular cavity size, with normal wall thickness, and normal right ventricular systolic function.   4. Left atrium is moderately dilated.   5. No pericardial effusion seen.   6. No prior echocardiogram is available for comparison.   7. There is increased LV mass and concentric hypertrophy.   8. Trileaflet aortic valve with reduced systolic excursion. There is severe calcification of the aortic valve leaflets.    < end of copied text >    [ ]  Catheterization:  [ ] Stress Test:    OTHER: 	    LABS:	 	                            11.4   7.72  )-----------( 203      ( 03 Sep 2024 16:07 )             36.4     09-03    137  |  101  |  24<H>  ----------------------------<  100<H>  4.1   |  25  |  0.76    Ca    9.4      03 Sep 2024 16:07    TPro  7.4  /  Alb  3.7  /  TBili  0.4  /  DBili  x   /  AST  13  /  ALT  7<L>  /  AlkPhos  61  09-03             CARDIOLOGY FOLLOW UP - Dr. Lee  DATE OF SERVICE: 9/5/24    CC  No cv complaints   Endorsing LBP    REVIEW OF SYSTEMS:  CONSTITUTIONAL: No fever, weight loss, or fatigue  RESPIRATORY: No cough, wheezing, chills or hemoptysis; No Shortness of Breath  CARDIOVASCULAR: No chest pain, palpitations, passing out, dizziness, or leg swelling  GASTROINTESTINAL: No abdominal or epigastric pain. No nausea, vomiting, or hematemesis; No diarrhea or constipation. No melena or hematochezia.  VASCULAR: No edema     PHYSICAL EXAM:  T(C): 36.7 (09-05-24 @ 04:20), Max: 36.7 (09-04-24 @ 11:15)  HR: 66 (09-05-24 @ 04:20) (66 - 70)  BP: 150/76 (09-05-24 @ 04:20) (119/69 - 166/81)  RR: 18 (09-05-24 @ 04:20) (18 - 18)  SpO2: 93% (09-05-24 @ 04:20) (93% - 97%)  Wt(kg): --  I&O's Summary    04 Sep 2024 07:01  -  05 Sep 2024 07:00  --------------------------------------------------------  IN: 1440 mL / OUT: 0 mL / NET: 1440 mL        Appearance: Elderly female 	  Cardiovascular: Normal S1 S2,RRR, No JVD,sm  Respiratory: Lungs clear to auscultation b/l   Gastrointestinal:  Soft, Non-tender, + BS	  Extremities: Normal range of motion, No clubbing, cyanosis or edema      Home Medications:  amLODIPine 2.5 mg oral tablet: 1 tab(s) orally once a day NOTE: HAS BEEN TAKING AS NEEDED DUE TO LOW BP (03 Sep 2024 18:23)  calcium carbonate 1250 mg (500 mg elemental calcium) oral tablet: 1 tab(s) orally once a day (03 Sep 2024 18:14)  Clear Eyes 0.012% ophthalmic solution: 1 drop(s) to each affected eye 3 times a day, As Needed (03 Sep 2024 18:16)  Durezol 0.05% ophthalmic emulsion: 1 drop(s) in the left eye 2 times a day (03 Sep 2024 18:16)  erythromycin 0.5% ophthalmic ointment: 1 gram(s) in each affected eye 2 times a day as needed (03 Sep 2024 18:39)  Lexapro 10 mg oral tablet: 1 tab(s) orally once a day (03 Sep 2024 18:39)  midodrine 2.5 mg oral tablet: 1 tab(s) orally as needed NOTE: HAS ONLY TAKEN ONCE (03 Sep 2024 18:39)  Nasacort Allergy 24HR 55 mcg/inh nasal spray: 1 spray(s) in each nostril once a day as needed (03 Sep 2024 18:39)  predniSONE 1 mg oral tablet: 3 tab(s) orally 2 times a day (03 Sep 2024 18:39)  Protonix 20 mg oral delayed release tablet: 1 tab(s) orally once a day (03 Sep 2024 18:39)  sodium chloride 0.65% nasal spray: 1 spray(s) in each nostril 3 times a day (03 Sep 2024 18:16)  Vitamin D3 25 mcg (1000 intl units) oral tablet: 1 tab(s) orally once a day (03 Sep 2024 18:16)  Xanax 1 mg oral tablet: 1/4 to 1/2 tablet orally as needed (03 Sep 2024 18:39)      MEDICATIONS  (STANDING):  acetaminophen   IVPB .. 1000 milliGRAM(s) IV Intermittent once  ALPRAZolam 0.25 milliGRAM(s) Oral at bedtime  amLODIPine   Tablet 2.5 milliGRAM(s) Oral daily  calcium carbonate   1250 mG (OsCal) 1 Tablet(s) Oral daily  cefTRIAXone   IVPB 1000 milliGRAM(s) IV Intermittent every 24 hours  cholecalciferol 1000 Unit(s) Oral daily  enoxaparin Injectable 30 milliGRAM(s) SubCutaneous every 24 hours  escitalopram 10 milliGRAM(s) Oral daily  metoprolol succinate ER 12.5 milliGRAM(s) Oral daily  naphazoline/pheniramine Solution 1 Drop(s) Both EYES three times a day  pantoprazole    Tablet 40 milliGRAM(s) Oral before breakfast  predniSONE   Tablet 3 milliGRAM(s) Oral two times a day  sodium chloride 0.45%. 1000 milliLiter(s) (75 mL/Hr) IV Continuous <Continuous>  sodium chloride 0.65% Nasal 1 Spray(s) Both Nostrils three times a day      TELEMETRY: 	    ECG:  	  RADIOLOGY:   DIAGNOSTIC TESTING:  [ ] Echocardiogram:  < from: TTE W or WO Ultrasound Enhancing Agent (09.04.24 @ 13:03) >  CONCLUSIONS:      1. Left ventricular cavity is normal in size. Left ventricular wall thickness is normal. Left ventricular systolic function is normal with an ejection fraction of 61 % by Lara's method of disks. There are no regional wall motion abnormalities seen.   2. There is moderate (grade 2) left ventricular diastolic dysfunction, with elevated left ventricular filling pressure.   3. Mildly enlarged right ventricular cavity size, with normal wall thickness, and normal right ventricular systolic function.   4. Left atrium is moderately dilated.   5. No pericardial effusion seen.   6. No prior echocardiogram is available for comparison.   7. There is increased LV mass and concentric hypertrophy.   8. Trileaflet aortic valve with reduced systolic excursion. There is severe calcification of the aortic valve leaflets.    < end of copied text >    [ ]  Catheterization:  [ ] Stress Test:    OTHER: 	    LABS:	 	                            11.4   7.72  )-----------( 203      ( 03 Sep 2024 16:07 )             36.4     09-03    137  |  101  |  24<H>  ----------------------------<  100<H>  4.1   |  25  |  0.76    Ca    9.4      03 Sep 2024 16:07    TPro  7.4  /  Alb  3.7  /  TBili  0.4  /  DBili  x   /  AST  13  /  ALT  7<L>  /  AlkPhos  61  09-03

## 2024-09-05 NOTE — DISCHARGE NOTE PROVIDER - NSDCCPCAREPLAN_GEN_ALL_CORE_FT
PRINCIPAL DISCHARGE DIAGNOSIS  Diagnosis: Acute UTI  Assessment and Plan of Treatment: You were treated for a urinary tract infection with antbiotics that were completed while in the hospital. Please continue to follow up with your primary doctor to monitor your urine.      SECONDARY DISCHARGE DIAGNOSES  Diagnosis: Severe aortic stenosis  Assessment and Plan of Treatment: You have a history of a narrow aortic valve that appears to have gotten more narrow. You may need to repair or replace this valve. Please continue to follow up with your cardiologist for monitoring and planning.     PRINCIPAL DISCHARGE DIAGNOSIS  Diagnosis: Acute UTI  Assessment and Plan of Treatment: You were treated for a urinary tract infection with antbiotics that were completed while in the hospital. Please continue to follow up with your primary doctor to monitor your urine.  if symptoms return or worsen, contact provider      SECONDARY DISCHARGE DIAGNOSES  Diagnosis: Severe aortic stenosis  Assessment and Plan of Treatment: You have a history of a narrow aortic valve that appears to have gotten more narrow. You may need to repair or replace this valve. Please continue to follow up with your cardiologist for monitoring and planning.  follow up with Dr. radha rodríguez for cardiac ct and tavr workup   continue medications as prescribed

## 2024-09-05 NOTE — DISCHARGE NOTE PROVIDER - CARE PROVIDER_API CALL
Kristian Lee  Cardiovascular Disease  [address], [address]  [place] [address]  Phone: [phone]  Fax: [phone]  Follow Up Time:    Kristian Lee  Cardiovascular Disease  [address], [address]  [place] [address]  Phone: [phone]  Fax: [phone]  Follow Up Time:     Criselda Cardona  Internal Medicine  [address] [facility] [address] [address]  [place] [address]  Phone: [phone]  Fax: [phone]  Follow Up Time:     Edi Whittaker  Interventional Cardiology  [address], [address] [place] [address]  Phone: [phone]  Fax: [phone]  Follow Up Time:

## 2024-09-05 NOTE — DISCHARGE NOTE PROVIDER - PROVIDER TOKENS
PROVIDER:[TOKEN:[[id number]]] PROVIDER:[TOKEN:[[id number]]],PROVIDER:[TOKEN:[[id number]]],PROVIDER:[TOKEN:[[id number]]]

## 2024-09-05 NOTE — DISCHARGE NOTE PROVIDER - HOSPITAL COURSE
HPI:  cc: ptn is well known to me from multiple previous admission and from office follow up.   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 unable to walk.  Patient states pain started while putting on a compression sock. Denies trauma. Denies numbness/tingling down posterior aspect of LE b/l, denies perianal numbness/tingling. Denies urinary incontinence, fecal incontinence. Hx of multiple compression fx in the past. At baseline ambulates with walker, now is unable to 2/2 pain. Pain radiates from R paraspinal region to R anterior abdomen wall. she is pain free when not moving and resting.  (03 Sep 2024 18:35)    Hospital Course:  Patient noted to have an equivocal UA, CT A/P without e/o stones of obstruction. Treated for UTI with CTX through 9/5.   TTE was performed due to history of mod-severe AS and complaint of dizziness, noted to have Severe AS with valve area of 0.67cm2.  Cardiology and structural cards were consulted ...   Midodrine PRN continued for orthostatic hypotension.     Important Medication Changes and Reason:    Active or Pending Issues Requiring Follow-up: Cardiology follow up for AS    Advanced Directives:   [ ] Full code  [ ] DNR  [ ] Hospice    Discharge Diagnoses: UTI  Severe Aortic Stenosis  Orthostatic hypotension  Breast Ca  Polymyalgia Rheumatica                HPI:  89 y/o F pmhx breast cx in remission, orthostatic hypotension (on midodrine prn), AS, PMR on chronic steroids, anxiety, Asthma presents with back pain for 8 days. unable to walk.  Patient states pain started while putting on a compression sock. Denies trauma. Denies numbness/tingling down posterior aspect of LE b/l, denies perianal numbness/tingling. Denies urinary incontinence, fecal incontinence. Hx of multiple compression fx in the past. At baseline ambulates with walker, now is unable to 2/2 pain. Pain radiates from R paraspinal region to R anterior abdomen wall. she is pain free when not moving and resting.  (03 Sep 2024 18:35)    Hospital Course:    Patient presented with back pain- CT L spine with old T12 comp fx. Per pt, does not toelrare muscle relaxers or marilynn- treated with tylenol. Pts UA pos for UTI, completed CTX abd, s/p IVF. CT abd neg for stone or obstruction of urinary tract. Pt with known hx of moderate AS, TTE noted for severe AS. Evaled per cardiology/structural heart and rec'd for cath. Pt s/p LHC on 9/6 via RRA with no sig CAD, ecchymosis to wrist, cath site and hgb stable. Pt has hx orthostatic hypotension, CT Head 9/5/24 no acute findings, s/p  b/l Carotid Duplex 9/5/24 shows No significant hemodynamic stenosis of either carotid artery. Pt to follow up with barrera Whittaker for outpt cardiac CT and TAVR.     Important Medication Changes and Reason:  -    Active or Pending Issues Requiring Follow-up:   -Cardiology follow up for AS  - PCP follow up    Advanced Directives:   [X] Full code  [ ] DNR  [ ] Hospice    Discharge Diagnoses:  -UTI  -Severe Aortic Stenosis  -Orthostatic hypotension  - Back Pain         Discharge/Dispo/Med rec discussed with attending Dr. Mustafa. Patient medically cleared for discharge Home with outpatient follow up with PCP and cardiology

## 2024-09-06 LAB
ANION GAP SERPL CALC-SCNC: 12 MMOL/L — SIGNIFICANT CHANGE UP (ref 5–17)
BUN SERPL-MCNC: 15 MG/DL — SIGNIFICANT CHANGE UP (ref 7–23)
CALCIUM SERPL-MCNC: 9 MG/DL — SIGNIFICANT CHANGE UP (ref 8.4–10.5)
CHLORIDE SERPL-SCNC: 105 MMOL/L — SIGNIFICANT CHANGE UP (ref 96–108)
CO2 SERPL-SCNC: 22 MMOL/L — SIGNIFICANT CHANGE UP (ref 22–31)
CREAT SERPL-MCNC: 0.64 MG/DL — SIGNIFICANT CHANGE UP (ref 0.5–1.3)
EGFR: 85 ML/MIN/1.73M2 — SIGNIFICANT CHANGE UP
GLUCOSE SERPL-MCNC: 123 MG/DL — HIGH (ref 70–99)
HCT VFR BLD CALC: 35.4 % — SIGNIFICANT CHANGE UP (ref 34.5–45)
HGB BLD-MCNC: 11.3 G/DL — LOW (ref 11.5–15.5)
MCHC RBC-ENTMCNC: 30 PG — SIGNIFICANT CHANGE UP (ref 27–34)
MCHC RBC-ENTMCNC: 31.9 GM/DL — LOW (ref 32–36)
MCV RBC AUTO: 93.9 FL — SIGNIFICANT CHANGE UP (ref 80–100)
NRBC # BLD: 0 /100 WBCS — SIGNIFICANT CHANGE UP (ref 0–0)
PLATELET # BLD AUTO: 199 K/UL — SIGNIFICANT CHANGE UP (ref 150–400)
POTASSIUM SERPL-MCNC: 4 MMOL/L — SIGNIFICANT CHANGE UP (ref 3.5–5.3)
POTASSIUM SERPL-SCNC: 4 MMOL/L — SIGNIFICANT CHANGE UP (ref 3.5–5.3)
RBC # BLD: 3.77 M/UL — LOW (ref 3.8–5.2)
RBC # FLD: 14 % — SIGNIFICANT CHANGE UP (ref 10.3–14.5)
SODIUM SERPL-SCNC: 139 MMOL/L — SIGNIFICANT CHANGE UP (ref 135–145)
WBC # BLD: 5.79 K/UL — SIGNIFICANT CHANGE UP (ref 3.8–10.5)
WBC # FLD AUTO: 5.79 K/UL — SIGNIFICANT CHANGE UP (ref 3.8–10.5)

## 2024-09-06 PROCEDURE — 93454 CORONARY ARTERY ANGIO S&I: CPT | Mod: 26

## 2024-09-06 PROCEDURE — 99152 MOD SED SAME PHYS/QHP 5/>YRS: CPT

## 2024-09-06 RX ORDER — ACETAMINOPHEN 325 MG/1
1000 TABLET ORAL ONCE
Refills: 0 | Status: COMPLETED | OUTPATIENT
Start: 2024-09-06 | End: 2024-09-06

## 2024-09-06 RX ORDER — CHLOROPROCAINE HYDROCHLORIDE 10 MG/ML
10 INJECTION, SOLUTION INFILTRATION; PERINEURAL ONCE
Refills: 0 | Status: DISCONTINUED | OUTPATIENT
Start: 2024-09-06 | End: 2024-09-08

## 2024-09-06 RX ORDER — PETROLATUM 865 MG/G
1 OINTMENT TOPICAL
Refills: 0 | Status: DISCONTINUED | OUTPATIENT
Start: 2024-09-06 | End: 2024-09-08

## 2024-09-06 RX ORDER — POVIDONE, PROPYLENE GLYCOL 6.8; 3 MG/ML; MG/ML
1 LIQUID OPHTHALMIC THREE TIMES A DAY
Refills: 0 | Status: DISCONTINUED | OUTPATIENT
Start: 2024-09-06 | End: 2024-09-08

## 2024-09-06 RX ADMIN — Medication 50 MILLIGRAM(S): at 08:37

## 2024-09-06 RX ADMIN — Medication 1 SPRAY(S): at 22:22

## 2024-09-06 RX ADMIN — ACETAMINOPHEN 1000 MILLIGRAM(S): 325 TABLET ORAL at 21:28

## 2024-09-06 RX ADMIN — POVIDONE, PROPYLENE GLYCOL 1 DROP(S): 6.8; 3 LIQUID OPHTHALMIC at 13:32

## 2024-09-06 RX ADMIN — Medication 0.25 MILLIGRAM(S): at 13:43

## 2024-09-06 RX ADMIN — METOPROLOL TARTRATE 12.5 MILLIGRAM(S): 100 TABLET ORAL at 05:36

## 2024-09-06 RX ADMIN — ESCITALOPRAM OXALATE 10 MILLIGRAM(S): 10 TABLET ORAL at 11:51

## 2024-09-06 RX ADMIN — Medication 50 MILLIGRAM(S): at 13:33

## 2024-09-06 RX ADMIN — AMLODIPINE BESYLATE 2.5 MILLIGRAM(S): 10 TABLET ORAL at 22:23

## 2024-09-06 RX ADMIN — Medication 40 MILLIGRAM(S): at 05:36

## 2024-09-06 RX ADMIN — Medication 3 MILLIGRAM(S): at 05:36

## 2024-09-06 RX ADMIN — Medication 1 SPRAY(S): at 13:33

## 2024-09-06 RX ADMIN — Medication 0.25 MILLIGRAM(S): at 22:22

## 2024-09-06 RX ADMIN — Medication 50 MILLIGRAM(S): at 00:05

## 2024-09-06 RX ADMIN — Medication 2 MILLIGRAM(S): at 00:05

## 2024-09-06 RX ADMIN — PETROLATUM 1 APPLICATION(S): 865 OINTMENT TOPICAL at 22:33

## 2024-09-06 RX ADMIN — Medication 1 TABLET(S): at 11:50

## 2024-09-06 RX ADMIN — Medication 50 MILLIGRAM(S): at 14:05

## 2024-09-06 RX ADMIN — ACETAMINOPHEN 400 MILLIGRAM(S): 325 TABLET ORAL at 21:16

## 2024-09-06 RX ADMIN — Medication 1000 UNIT(S): at 11:51

## 2024-09-06 RX ADMIN — Medication 1 SPRAY(S): at 05:36

## 2024-09-06 RX ADMIN — Medication 3 MILLIGRAM(S): at 22:23

## 2024-09-06 NOTE — PROGRESS NOTE ADULT - SUBJECTIVE AND OBJECTIVE BOX
Patient is a 89y old  Female who presents with a chief complaint of UTI, PN, MSK back pain (06 Sep 2024 12:24)      SUBJECTIVE / OVERNIGHT EVENTS: ptn is stable awaiting card cath today, got prednisone pre med for contrast. need xanax bc feels very nervous. daughters at bedside.      MEDICATIONS  (STANDING):  ALPRAZolam 0.25 milliGRAM(s) Oral at bedtime  amLODIPine   Tablet 2.5 milliGRAM(s) Oral <User Schedule>  artificial tears (preservative free) Ophthalmic Solution 1 Drop(s) Both EYES three times a day  calcium carbonate   1250 mG (OsCal) 1 Tablet(s) Oral daily  chloroprocaine 3% Injectable 10 milliLiter(s) Local Injection once  cholecalciferol 1000 Unit(s) Oral daily  escitalopram 10 milliGRAM(s) Oral daily  metoprolol succinate ER 12.5 milliGRAM(s) Oral daily  naphazoline/pheniramine Solution 1 Drop(s) Both EYES three times a day  pantoprazole    Tablet 40 milliGRAM(s) Oral before breakfast  petrolatum white Ointment 1 Application(s) Topical two times a day  predniSONE   Tablet 3 milliGRAM(s) Oral two times a day  sodium chloride 0.45%. 1000 milliLiter(s) (75 mL/Hr) IV Continuous <Continuous>  sodium chloride 0.65% Nasal 1 Spray(s) Both Nostrils three times a day    MEDICATIONS  (PRN):  ALPRAZolam 0.25 milliGRAM(s) Oral every 6 hours PRN anxiety  midodrine. 2.5 milliGRAM(s) Oral three times a day PRN SBP <100  morphine  - Injectable 2 milliGRAM(s) IV Push every 6 hours PRN Severe Pain (7 - 10)      Vital Signs Last 24 Hrs  T(F): 98 (09-06-24 @ 11:50), Max: 98.1 (09-05-24 @ 20:45)  HR: 67 (09-06-24 @ 11:50) (67 - 73)  BP: 150/62 (09-06-24 @ 11:50) (123/71 - 161/77)  RR: 18 (09-06-24 @ 11:50) (18 - 18)  SpO2: 97% (09-06-24 @ 11:50) (96% - 97%)  Telemetry:   CAPILLARY BLOOD GLUCOSE        I&O's Summary      PHYSICAL EXAM:  GENERAL: NAD, well-developed  HEAD:  Atraumatic, Normocephalic  EYES: EOMI, PERRLA, conjunctiva and sclera clear  NECK: Supple, No JVD  CHEST/LUNG: Clear to auscultation bilaterally; No wheeze  HEART: Regular rate and rhythm; No murmurs, rubs, or gallops  ABDOMEN: Soft, Nontender, Nondistended; Bowel sounds present  EXTREMITIES:  2+ Peripheral Pulses, No clubbing, cyanosis, or edema  PSYCH: AAOx3  NEUROLOGY: non-focal  SKIN: No rashes or lesions    LABS:                        11.3   5.79  )-----------( 199      ( 06 Sep 2024 07:09 )             35.4     09-06    139  |  105  |  15  ----------------------------<  123<H>  4.0   |  22  |  0.64    Ca    9.0      06 Sep 2024 07:10            Urinalysis Basic - ( 06 Sep 2024 07:10 )    Color: x / Appearance: x / SG: x / pH: x  Gluc: 123 mg/dL / Ketone: x  / Bili: x / Urobili: x   Blood: x / Protein: x / Nitrite: x   Leuk Esterase: x / RBC: x / WBC x   Sq Epi: x / Non Sq Epi: x / Bacteria: x        RADIOLOGY & ADDITIONAL TESTS:    Imaging Personally Reviewed:    Consultant(s) Notes Reviewed:      Care Discussed with Consultants/Other Providers:

## 2024-09-06 NOTE — PROGRESS NOTE ADULT - TIME BILLING
Patient seen and examined.  Agree with above ACP note.  cv stable  await pre tavr cath today   outpt tavr ct    dcp this weekend  cont current tx  d/w dtr at bedside
Agree with above ACP note.  A/p  89 y/o F pmhx breast cx in remission, orthostatic hypotension (on midodrine prn), AS, PMR on chronic steroids, anxiety, Asthma presents with back pain for 8 days.     #Back Pain   -CT L spine w/ old compression deformity T12, no acute findings  -Pain mgmt as per med  -PT for mobility  #UTI  -Abx, mgmt per med  #Hx Orthostatic Hypotension  -cont amlo, bb  -Allow some degree of permissive htn  #Aortic Stenosis  -Echo 9/4 with severe AS - will have structural eval w/ Dr. Whittaker   -volume status stable on exam  -structural f/u for tavr   -await pre tavr cath flex, premedicate for contrast allergy  #Hx Paroxysmal Tachycardia  -Cont metoprolol as above  #PMR  -On chronic steroids - cont as ordered   dvt ppx

## 2024-09-06 NOTE — PROGRESS NOTE ADULT - SUBJECTIVE AND OBJECTIVE BOX
CARDIOLOGY FOLLOW UP - Dr. Lee  DATE OF SERVICE: 9/6/24    CC: no CV complaints; pt states she is "feeling better"      REVIEW OF SYSTEMS:  CONSTITUTIONAL: No fever, weight loss, or fatigue  RESPIRATORY: No cough, wheezing, chills or hemoptysis; No Shortness of Breath  CARDIOVASCULAR: No chest pain, palpitations, passing out, dizziness  GASTROINTESTINAL: No abdominal or epigastric pain. No nausea, vomiting, or hematemesis; No diarrhea or constipation. No melena or hematochezia.      PHYSICAL EXAM:  T(C): 36.7 (09-06-24 @ 11:50), Max: 37 (09-05-24 @ 13:15)  HR: 67 (09-06-24 @ 11:50) (66 - 73)  BP: 150/62 (09-06-24 @ 11:50) (123/71 - 161/77)  RR: 18 (09-06-24 @ 11:50) (18 - 18)  SpO2: 97% (09-06-24 @ 11:50) (96% - 97%)  Wt(kg): --  I&O's Summary      Appearance: Normal	  Cardiovascular: Normal S1 S2,RRR, No JVD, +murmur  Respiratory: Lungs clear to auscultation b/l  Gastrointestinal:  Soft, Non-tender, + BS	  Extremities: Normal range of motion, No clubbing, cyanosis or edema      Home Medications:  amLODIPine 2.5 mg oral tablet: 1 tab(s) orally once a day NOTE: HAS BEEN TAKING AS NEEDED DUE TO LOW BP (03 Sep 2024 18:23)  calcium carbonate 1250 mg (500 mg elemental calcium) oral tablet: 1 tab(s) orally once a day (03 Sep 2024 18:14)  Clear Eyes 0.012% ophthalmic solution: 1 drop(s) to each affected eye 3 times a day, As Needed (03 Sep 2024 18:16)  Durezol 0.05% ophthalmic emulsion: 1 drop(s) in the left eye 2 times a day (03 Sep 2024 18:16)  erythromycin 0.5% ophthalmic ointment: 1 gram(s) in each affected eye 2 times a day as needed (03 Sep 2024 18:39)  Lexapro 10 mg oral tablet: 1 tab(s) orally once a day (03 Sep 2024 18:39)  midodrine 2.5 mg oral tablet: 1 tab(s) orally as needed NOTE: HAS ONLY TAKEN ONCE (03 Sep 2024 18:39)  Nasacort Allergy 24HR 55 mcg/inh nasal spray: 1 spray(s) in each nostril once a day as needed (03 Sep 2024 18:39)  predniSONE 1 mg oral tablet: 3 tab(s) orally 2 times a day (03 Sep 2024 18:39)  Protonix 20 mg oral delayed release tablet: 1 tab(s) orally once a day (03 Sep 2024 18:39)  sodium chloride 0.65% nasal spray: 1 spray(s) in each nostril 3 times a day (03 Sep 2024 18:16)  Vitamin D3 25 mcg (1000 intl units) oral tablet: 1 tab(s) orally once a day (03 Sep 2024 18:16)  Xanax 1 mg oral tablet: 1/4 to 1/2 tablet orally as needed (03 Sep 2024 18:39)      MEDICATIONS  (STANDING):  ALPRAZolam 0.25 milliGRAM(s) Oral at bedtime  amLODIPine   Tablet 2.5 milliGRAM(s) Oral <User Schedule>  artificial tears (preservative free) Ophthalmic Solution 1 Drop(s) Both EYES three times a day  calcium carbonate   1250 mG (OsCal) 1 Tablet(s) Oral daily  chloroprocaine 3% Injectable 10 milliLiter(s) Local Injection once  cholecalciferol 1000 Unit(s) Oral daily  diphenhydrAMINE 50 milliGRAM(s) Oral once  escitalopram 10 milliGRAM(s) Oral daily  metoprolol succinate ER 12.5 milliGRAM(s) Oral daily  naphazoline/pheniramine Solution 1 Drop(s) Both EYES three times a day  pantoprazole    Tablet 40 milliGRAM(s) Oral before breakfast  petrolatum white Ointment 1 Application(s) Topical two times a day  predniSONE   Tablet 3 milliGRAM(s) Oral two times a day  predniSONE   Tablet 50 milliGRAM(s) Oral <User Schedule>  sodium chloride 0.45%. 1000 milliLiter(s) (75 mL/Hr) IV Continuous <Continuous>  sodium chloride 0.65% Nasal 1 Spray(s) Both Nostrils three times a day      TELEMETRY: 	    ECG:  	  RADIOLOGY:   DIAGNOSTIC TESTING:  [ ] Echocardiogram:  [ ]  Catheterization:  [ ] Stress Test:    OTHER: 	    < from: CT Head No Cont (09.05.24 @ 11:51) >  Multiple axial sections were performed from the base of skull to vertex  without contrast enhancement. Coronal and sagittal reconstructed images   were performed    Parenchymal volume loss and chronic microvessel ischemic changes are   again seen.    Mineralization involving the bilateral basal ganglia and bilateral   cerebellar region is again seen and unchanged.    There is no acute hemorrhage mass or mass effect seen    Evaluation of the osseous structures with the appropriate window appears   normal    Bilateral nasal polyps are again suspected. Direct visualization can be   done for further evaluation.    Complete opacification of the right maxillary sinus is seen. Left   maxillary sinus mucosal thickening is again seen.    Both mastoid and middle ear regions appear clear.    IMPRESSION: Stable head CT.    < end of copied text >    < from: [facility] Duplex Carotid, Bilat (09.05.24 @ 20:17) >  IMPRESSION: No significant hemodynamic stenosis of either carotid artery.    Measurement of carotid stenosis is based on updated recommendations for   carotid stenosis interpretation criteria from the Intersocietal   Accreditation Commission (IAC) Vascular Testing Board, modified from the   Society of Radiologists in Ultrasound (SRU) Consensus Conference Criteria   for Internal Carotid Artery Stenosis.    < end of copied text >  LABS:	 	                            11.3   5.79  )-----------( 199      ( 06 Sep 2024 07:09 )             35.4     09-06    139  |  105  |  15  ----------------------------<  123<H>  4.0   |  22  |  0.64    Ca    9.0      06 Sep 2024 07:10

## 2024-09-06 NOTE — CONSULT NOTE ADULT - ASSESSMENT
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     #Back Pain   -CT L spine w/ old compression deformity T12, no acute findings  -Pain mgmt as per med  -PT for mobility    #UTI  -Abx, mgmt per med    #Hx Orthostatic Hypotension  -Dizziness worse in AM, however not new to pt   -Currently taking amlodipine 2.5mg bid - resume  -Continue toprol 12.5mg qd  -Allow some degree of permissive htn    #Aortic Stenosis  -Recent echo in office 5/2024 with mod-severe AS  -volume status stable on exam  -Cont med mgmt for now     #Hx Paroxysmal Tachycardia  -Cont metoprolol as above    #PMR  -On chronic steroids - cont as ordered       dvt ppx   d/w daughter at bedside  
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 She was seen by structural heart team and is undergoing work up for TAVR. Dr. Winston consulted and CT Surgery following.  CT surgery also onboard if she is not TAVR candidate and may require SAVR.      
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 unable to walk.  Patient states pain started while putting on a compression sock. Denies trauma. Denies numbness/tingling down posterior aspect of LE b/l, denies perianal numbness/tingling. Denies urinary incontinence, fecal incontinence. Hx of multiple compression fx in the past. At baseline ambulates with walker, now is unable to 2/2 pain. Pain radiates from R paraspinal region to R anterior abdomen wall. she is pain free when not moving and resting.  (03 Sep 2024 18:35)    The Structural Team was consulted for evaluation of aortic stenosis. She has been followed by Dr. Lee for many years and has known of her AS which was reported as moderate on her last echocardiogram. She is admitted now with back pain and found to have progression of aortic stenosis to severe on TTE. She has chronic fatigue and exertional dyspnea that she feels has progressed over the past year. She has some intermittent dizziness that has been attributed to chronic sinusitis. She has no edema, PND, orthopnea, or syncopal episodes.     Seen and evaluated for consideration of TAVR

## 2024-09-06 NOTE — CONSULT NOTE ADULT - PROBLEM SELECTOR RECOMMENDATION 9
Discussed w/ pt and daughters regarding CT surgery following along and possibility of open heart surgery if patient not TAVR candidate.   -[facility] today  -PFTs ordered   -Carotid Duplex show no obstructive disease  -Cardiac CT  -TAVR planning per Structural heart Discussed w/ pt and daughters regarding CT surgery following along and possibility of open heart surgery if patient not TAVR candidate.   -[facility] [place] today  -Carotid Duplex show no obstructive disease  -Cardiac CT  -TAVR planning per Structural heart

## 2024-09-06 NOTE — CONSULT NOTE ADULT - SUBJECTIVE AND OBJECTIVE BOX
CARDIOLOGY CONSULT - Dr. Lee         HPI:  cc: ptn is well known to me from multiple previous admission and from office follow up.   89 y/o F pmhx breast cx in remission, orthostatic hypotension (on midodrine prn), AS, PMR on chronic steroids, anxiety, Asthma presents with back pain for 8 days. unable to walk.  Patient states pain started while putting on a compression sock. Denies trauma. Denies numbness/tingling down posterior aspect of LE b/l, denies perianal numbness/tingling. Denies urinary incontinence, fecal incontinence. Hx of multiple compression fx in the past. At baseline ambulates with walker, now is unable to 2/2 pain. Pain radiates from R paraspinal region to R anterior abdomen wall. she is pain free when not moving and resting.  (03 Sep 2024 18:35)      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      GERD (Gastroesophageal Reflux Disease)      Irritable Bowel Syndrome      Mitral Valve Prolapse      Anxiety      Clinical Depression      Asthma      Uveitis      Irritable Bowel Syndrome      Hiatal Hernia      Nasal Polyp      Hypertension      COVID-19 vaccine series completed  Moderna      2019 novel coronavirus disease (COVID-19)  5/27/2022      PMR (polymyalgia rheumatica)      S/P Breast Lumpectomy      S/P Lumpectomy of Breast  Left Breast      Status Post Tonsillectomy      S/P Nasal Polypectomy      History of Cataract Surgery  Left eye      History of Breast Biopsy  [place] lymph node biopsy      PREVIOUS DIAGNOSTIC TESTING:    [x] Echocardiogram:  < from: Transthoracic Echocardiogram (07.05.22 @ 14:22) >  Conclusions:  1. Calcified aortic valve with decreased opening. Peak  transaortic valve gradient equals 21 mm Hg, mean  transaortic valve gradient equals 10 mm Hg, estimated  aortic valve area equals 1.8 sqcm (by continuity equation),  aortic valve velocity time integral equals 50 cm,  consistent with mild aortic stenosis.  2. Basal septal hypertrophy.  3. Normal left ventricular systolic function. No segmental  wall motion abnormalities.  4. Moderate right atrial enlargement.  5. Normal right ventricular size and function.    < end of copied text >    [ ]  Catheterization:  [ ] Stress Test:  	    MEDICATIONS:  Home Medications:  amLODIPine 2.5 mg oral tablet: 1 tab(s) orally once a day NOTE: HAS BEEN TAKING AS NEEDED DUE TO LOW BP (03 Sep 2024 18:23)  calcium carbonate 1250 mg (500 mg elemental calcium) oral tablet: 1 tab(s) orally once a day (03 Sep 2024 18:14)  Clear Eyes 0.012% ophthalmic solution: 1 drop(s) to each affected eye 3 times a day, As Needed (03 Sep 2024 18:16)  Durezol 0.05% ophthalmic emulsion: 1 drop(s) in the left eye 2 times a day (03 Sep 2024 18:16)  erythromycin 0.5% ophthalmic ointment: 1 gram(s) in each affected eye 2 times a day as needed (03 Sep 2024 18:39)  Lexapro 10 mg oral tablet: 1 tab(s) orally once a day (03 Sep 2024 18:39)  midodrine 2.5 mg oral tablet: 1 tab(s) orally as needed NOTE: HAS ONLY TAKEN ONCE (03 Sep 2024 18:39)  Nasacort Allergy 24HR 55 mcg/inh nasal spray: 1 spray(s) in each nostril once a day as needed (03 Sep 2024 18:39)  predniSONE 1 mg oral tablet: 3 tab(s) orally 2 times a day (03 Sep 2024 18:39)  Protonix 20 mg oral delayed release tablet: 1 tab(s) orally once a day (03 Sep 2024 18:39)  sodium chloride 0.65% nasal spray: 1 spray(s) in each nostril 3 times a day (03 Sep 2024 18:16)  Vitamin D3 25 mcg (1000 intl units) oral tablet: 1 tab(s) orally once a day (03 Sep 2024 18:16)  Xanax 1 mg oral tablet: 1/4 to 1/2 tablet orally as needed (03 Sep 2024 18:39)      MEDICATIONS  (STANDING):  ALPRAZolam 0.25 milliGRAM(s) Oral at bedtime  amLODIPine   Tablet 2.5 milliGRAM(s) Oral daily  calcium carbonate   1250 mG (OsCal) 1 Tablet(s) Oral daily  cefTRIAXone   IVPB 1000 milliGRAM(s) IV Intermittent every 24 hours  cholecalciferol 1000 Unit(s) Oral daily  enoxaparin Injectable 30 milliGRAM(s) SubCutaneous every 24 hours  escitalopram 10 milliGRAM(s) Oral daily  metoprolol succinate ER 12.5 milliGRAM(s) Oral daily  naphazoline/pheniramine Solution 1 Drop(s) Both EYES three times a day  pantoprazole    Tablet 40 milliGRAM(s) Oral before breakfast  predniSONE   Tablet 3 milliGRAM(s) Oral two times a day  sodium chloride 0.45%. 1000 milliLiter(s) (75 mL/Hr) IV Continuous <Continuous>  sodium chloride 0.65% Nasal 1 Spray(s) Both Nostrils three times a day      FAMILY HISTORY:  FH: CAD (coronary artery disease) (Father, Mother, Sibling, Aunt)    FH: lung cancer (Mother)        SOCIAL HISTORY:    [x] Non-smoker  [ ] Smoker  [ ] Alcohol    Allergies    lactose (Unknown)  Levaquin (Nausea; Other)  tetracycline (Anaphylaxis)  latex (Anaphylaxis)  Zofran (Anaphylaxis)  codeine (Nausea; Other)  contrast media (iodine-based) (Angioedema)  lidocaine (Unknown)  Solu-Medrol (Pruritus; Flushing; Short breath)  SULFA, but tolerates BACTRIM (Anaphylaxis)  cefdinir (Unknown)  fluorescein ophthalmic (Hives; Rash; Flushing)  ciprofloxacin (Other)    Intolerances    Augmentin (Stomach Upset)  	    REVIEW OF SYSTEMS:  CONSTITUTIONAL: No fever, weight loss, or fatigue  EYES: No eye pain, visual disturbances, or discharge  ENMT:  No difficulty hearing, tinnitus, vertigo; No sinus or throat pain  NECK: No pain or stiffness  RESPIRATORY: No cough, wheezing, chills or hemoptysis; No Shortness of Breath  CARDIOVASCULAR: No chest pain, palpitations, passing out, +Chronic dizziness, No leg swelling  GASTROINTESTINAL: No abdominal or epigastric pain. No nausea, vomiting, or hematemesis; No diarrhea or constipation. No melena or hematochezia.  GENITOURINARY: No dysuria, frequency, hematuria, or incontinence  NEUROLOGICAL: No headaches, memory loss, loss of strength, numbness, or tremors. + back pain  SKIN: No itching, burning, rashes, or lesions   	    [x] All others negative	  [ ] Unable to obtain    PHYSICAL EXAM:  T(C): 36.7 (09-04-24 @ 11:15), Max: 36.9 (09-03-24 @ 21:00)  HR: 70 (09-04-24 @ 11:15) (62 - 81)  BP: 119/69 (09-04-24 @ 11:15) (119/69 - 180/84)  RR: 18 (09-04-24 @ 11:15) (17 - 20)  SpO2: 96% (09-04-24 @ 11:15) (95% - 99%)  Wt(kg): --  I&O's Summary      Appearance: Elderly female 	  Psychiatry: A & O x 3, Mood & affect appropriate  HEENT:   Normal oral mucosa, PERRL, EOMI	  Lymphatic: No lymphadenopathy  Cardiovascular: Normal S1 S2,RRR, No JVD, +Systolic murmur   Respiratory: Lungs clear to auscultation b/l   Gastrointestinal:  Soft, Non-tender, + BS	  Skin: No rashes, No ecchymoses, No cyanosis	  Neurologic: Non-focal  Extremities: Normal range of motion, No clubbing, cyanosis or edema  Vascular: Peripheral pulses palpable 2+ bilaterally    TELEMETRY: 	    ECG:  No ekg in chart	  RADIOLOGY:  < from: CT Lumbar Spine Reform No Cont (09.03.24 @ 16:46) >  IMPRESSION: Generalized osteopenia. Old superior endplate compression   deformity T12. No acute fractures or dislocations.    --- End of Report ---    < end of copied text >    < from: CT Abdomen and Pelvis No Cont (09.03.24 @ 16:46) >    IMPRESSION:  No evidence of urinary tract stone or obstruction        --- End of Report ---    < end of copied text >      OTHER: 	  	  LABS:	 	    CARDIAC MARKERS:               11.4   7.72  )-----------( 203      ( 03 Sep 2024 16:07 )             36.4     09-03    137  |  101  |  24<H>  ----------------------------<  100<H>  4.1   |  25  |  0.76    Ca    9.4      03 Sep 2024 16:07    TPro  7.4  /  Alb  3.7  /  TBili  0.4  /  DBili  x   /  AST  13  /  ALT  7<L>  /  AlkPhos  61  09-03      proBNP:   Lipid Profile:   HgA1c:   TSH:       
HPI:  cc: ptn is well known to me from multiple previous admission and from office follow up.   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 unable to walk.  Patient states pain started while putting on a compression sock. Denies trauma. Denies numbness/tingling down posterior aspect of LE b/l, denies perianal numbness/tingling. Denies urinary incontinence, fecal incontinence. Hx of multiple compression fx in the past. At baseline ambulates with walker, now is unable to 2/2 pain. Pain radiates from R paraspinal region to R anterior abdomen wall. she is pain free when not moving and resting.  (03 Sep 2024 18:35)    The Structural Team was consulted for evaluation of aortic stenosis and consideration for TAVR. She has been followed by Dr. Lee for many years and has known of her AS which was reported as moderate on her last echocardiogram. She is admitted now with back pain and found to have progression of aortic stenosis to severe on TTE. She has chronic fatigue and exertional dyspnea that she feels has progressed over the past year. She has some intermittent dizziness that has been attributed to chronic sinusitis. She has no edema, PND, orthopnea, or syncopal episodes.       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      GERD (Gastroesophageal Reflux Disease)      Irritable Bowel Syndrome      Mitral Valve Prolapse      Anxiety      Clinical Depression      Asthma      Uveitis      Irritable Bowel Syndrome      Hiatal Hernia      Nasal Polyp      Hypertension      COVID-19 vaccine series completed  Moderna      2019 novel coronavirus disease (COVID-19)  2022      PMR (polymyalgia rheumatica)      S/P Breast Lumpectomy      S/P Lumpectomy of Breast  Left Breast      Status Post Tonsillectomy      S/P Nasal Polypectomy      History of Cataract Surgery  Left eye      History of Breast Biopsy  [place] lymph node biopsy          REVIEW OF SYSTEMS:    CONSTITUTIONAL: No weakness, fevers or chills  EYES/ENT: No visual changes;  No vertigo or throat pain   NECK: No pain or stiffness  RESPIRATORY: No cough, wheezing, hemoptysis; No shortness of breath at rest  CARDIOVASCULAR: No chest pain, PND, orthopnea, or edema, (+) exertional dyspnea, (+) occasional palpitations  GASTROINTESTINAL: No abdominal or epigastric pain. No nausea, vomiting, or hematemesis; No diarrhea or constipation. No melena or hematochezia.  GENITOURINARY: No dysuria, frequency or hematuria  NEUROLOGICAL: No numbness or weakness, chronic sinusitis with dizziness  SKIN: No itching, rashes      MEDICATIONS  (STANDING):  ALPRAZolam 0.25 milliGRAM(s) Oral at bedtime  amLODIPine   Tablet 2.5 milliGRAM(s) Oral <User Schedule>  calcium carbonate   1250 mG (OsCal) 1 Tablet(s) Oral daily  cefTRIAXone   IVPB 1000 milliGRAM(s) IV Intermittent every 24 hours  cholecalciferol 1000 Unit(s) Oral daily  enoxaparin Injectable 30 milliGRAM(s) SubCutaneous every 24 hours  escitalopram 10 milliGRAM(s) Oral daily  metoprolol succinate ER 12.5 milliGRAM(s) Oral daily  naphazoline/pheniramine Solution 1 Drop(s) Both EYES three times a day  pantoprazole    Tablet 40 milliGRAM(s) Oral before breakfast  predniSONE   Tablet 3 milliGRAM(s) Oral two times a day  sodium chloride 0.45%. 1000 milliLiter(s) (75 mL/Hr) IV Continuous <Continuous>  sodium chloride 0.65% Nasal 1 Spray(s) Both Nostrils three times a day    MEDICATIONS  (PRN):  ALPRAZolam 0.25 milliGRAM(s) Oral every 6 hours PRN anxiety  midodrine. 2.5 milliGRAM(s) Oral three times a day PRN SBP <100  morphine  - Injectable 2 milliGRAM(s) IV Push every 6 hours PRN Severe Pain (7 - 10)      Allergies    lactose (Unknown)  Levaquin (Nausea; Other)  tetracycline (Anaphylaxis)  latex (Anaphylaxis)  Zofran (Anaphylaxis)  codeine (Nausea; Other)  contrast media (iodine-based) (Angioedema)  lidocaine (Unknown)  Solu-Medrol (Pruritus; Flushing; Short breath)  SULFA, but tolerates BACTRIM (Anaphylaxis)  cefdinir (Unknown)  fluorescein ophthalmic (Hives; Rash; Flushing)  ciprofloxacin (Other)    Intolerances    Augmentin (Stomach Upset)      SOCIAL HISTORY: , lives with daughter, has HHA    FAMILY HISTORY:  FH: CAD (coronary artery disease) (Father, Mother, Sibling, Aunt)    FH: lung cancer (Mother)        Vital Signs Last 24 Hrs  T(C): 37 (05 Sep 2024 13:15), Max: 37 (05 Sep 2024 13:15)  T(F): 98.6 (05 Sep 2024 13:15), Max: 98.6 (05 Sep 2024 13:15)  HR: 66 (05 Sep 2024 13:15) (66 - 69)  BP: 153/68 (05 Sep 2024 13:15) (144/79 - 166/81)  BP(mean): --  RR: 18 (05 Sep 2024 13:15) (18 - 18)  SpO2: 96% (05 Sep 2024 13:15) (93% - 97%)    Parameters below as of 05 Sep 2024 13:15  Patient On (Oxygen Delivery Method): room air        Physical Exam  General: A/ox 3, No acute Distress  Neck: Supple, NO JVD  Cardiac: S1 S2, II/VI RUSB murmur  Pulmonary: CTAB, Breathing unlabored, No Rhonchi/Rales/Wheezing  Abdomen: Soft, Non -tender, +BS x 4 quads  Extremities: No Rashes, No edema  Neuro: A/o x 3, No focal deficits    LABS:                        11.4   7.72  )-----------( 203      ( 03 Sep 2024 16:07 )             36.4     09-03    137  |  101  |  24<H>  ----------------------------<  100<H>  4.1   |  25  |  0.76    Ca    9.4      03 Sep 2024 16:07    TPro  7.4  /  Alb  3.7  /  TBili  0.4  /  DBili  x   /  AST  13  /  ALT  7<L>  /  AlkPhos  61  09-03      Urinalysis Basic - ( 03 Sep 2024 16:17 )    Color: Yellow / Appearance: Clear / S.015 / pH: x  Gluc: x / Ketone: Negative mg/dL  / Bili: Negative / Urobili: 0.2 mg/dL   Blood: x / Protein: Negative mg/dL / Nitrite: Negative   Leuk Esterase: Moderate / RBC: 3 /HPF / WBC 30 /HPF   Sq Epi: x / Non Sq Epi: x / Bacteria: Moderate /HPF        RADIOLOGY & ADDITIONAL STUDIES:  < from: TTE W or WO Ultrasound Enhancing Agent (24 @ 13:03) >  TRANSTHORACIC ECHOCARDIOGRAM REPORT  ________________________________________________________________________________                                      _______       Pt. Name:       ADA ROLLE Study Date:    2024  MRN:            [id number]     YOB: 1935  Accession #:    [id number]     Age:           89 years  Account#:       [id number]  Gender:        F  Heart Rate:                   Height:        67.00 in (170.18 cm)  Rhythm:                       Weight:        147.00 lb (66.68 kg)  Blood Pressure: 119/69 mmHg   BSA/BMI:       1.77 m² / 23.02 kg/m²  ________________________________________________________________________________________  Referring Physician:    [id number] Jennifer Mustafa  Interpreting Physician: Arpit Obregon M.D.  Primary Sonographer:    Elsa Mahmood [facility]    CPT:               ECHO TTE WO CON COMP W DOPP - 97143.m  Indication(s):     Aortic aneurysm of unspecified site, without rupture - I71.9  Procedure:         Transthoracic echocardiogram with 2-D, M-mode and complete                     spectral and color flow Doppler.  Ordering Location: [facility]  Admission Status:  Inpatient  Study Information: Image quality for this study is adequate.    _______________________________________________________________________________________     CONCLUSIONS:      1. Left ventricular cavity is normal in size. Left ventricular wall thickness is normal. Left ventricular systolic function is normal with an ejection fraction of 61 % by Lara's method of disks. There are no regional wall motion abnormalities seen.   2. There is moderate (grade 2) left ventricular diastolic dysfunction, with elevated left ventricular filling pressure.   3. Mildly enlarged right ventricular cavity size, with normal wall thickness, and normal right ventricular systolic function.   4. Left atrium is moderately dilated.   5. No pericardial effusion seen.   6. No prior echocardiogram is available for comparison.   7. There is increased LV mass and concentric hypertrophy.   8. Trileaflet aortic valve with reduced systolic excursion. There is severe calcification of the aortic valve leaflets.    ________________________________________________________________________________________  FINDINGS:     Left Ventricle:  The left ventricular cavity is normal in size. Left ventricular wall thickness is normal. Left ventricular systolic function is normal with a calculated ejection fraction of 61 % by the Lara's biplane method of disks. There are no regional wall motion abnormalities seen. Moderate left ventricular hypertrophy. There is increased LV mass and concentric hypertrophy. There is moderate (grade 2) left ventricular diastolic dysfunction, with elevated left ventricular filling pressure.  LV Wall Scoring: All segments are normal.          Right Ventricle:  The right ventricular cavity is mildly enlarged in size, with normal wall thickness and right ventricular systolic function is normal. Tricuspid annular plane systolic excursion (TAPSE) is 2.0 cm (normal >=1.7 cm).     Left Atrium:  The left atrium is moderately dilated with an indexed volume of 46.95 ml/m².     Right Atrium:  The right atrium is mildly dilated with an indexed volume of 33.60 ml/m².     Interatrial Septum:  The interatrial septum appears intact.     Aortic Valve:  The aortic valve appears trileaflet with reduced systolic excursion. There is severe calcification of the aortic valve leaflets. There is severe aortic stenosis. The aortic valve acceleration time is 115msec. The peak transaortic velocity is 4.72 m/s, peak transaortic gradient is 89.1 mmHg and mean transaortic gradient is 34.7 mmHg with an LVOT/aortic valve VTI ratio of 0.19. The aortic valve acceleration time is 115 msec. The aortic valve area is estimated at 0.67 cm² by the continuity equation. There is trace aortic regurgitation.     Mitral Valve:  Structurally normal mitral valve with normal leaflet excursion. There is calcification of the mitral valve annulus. There is no mitral valve stenosis. There is trace mitral regurgitation.     Tricuspid Valve:  Structurally normal tricuspid valve with normal leaflet excursion. There is mild tricuspid regurgitation. Estimated pulmonary artery systolic pressure is 32 mmHg.     Pulmonic Valve:  Structurally normal pulmonic valve with normal leaflet excursion. There is trace pulmonic regurgitation.     Aorta:  The aortic root appears normal in size. The aortic root at the sinuses of Valsalva is normal in size, measuring 3.50 cm (indexed 1.97 cm/m²). The ascending aorta is dilated, measuring 3.70 cm (indexed 2.09 cm/m²). The aortic arch diameter is normal in size, measuring 2.9 cm (indexed 1.63 cm/m²).     Pericardium:  No pericardial effusion seen.     Systemic Veins:  The inferior vena cavais normal in size (normal <2.1cm) with normal inspiratory collapse (normal >50%) consistent with normal right atrial pressure (~3, range 0-5mmHg).  ____________________________________________________________________    < end of copied text >  
History of Present Illness:  89y Female w/ Hx L breast cancer s/p lumpectomy and chemoradiation in 2012, polymyalgia rheumatica on prednisone, orthostatic hypotension on PRN midodrine, Mycobacterium avium complex, IBS, GERD, asthma, HTN, and Aortic stenosis presenting to [facility] for back pain found to have UTI. Pt found to have severe aortic stenosis on TTE 9/4. Pt reports anxiety associated with shortness of breath but denies chest pain or dyspnea on exertion. Also reports feeling fatigued in last several weeks. Pt also reports abdominal tightness but denies pain, diarrhea, or constipation. Denies edema, orthopnea, or syncope.     Structural heart team was consulted for eval and possible TAVR, CT surgery consulted to follow along evaluation if pt not candidate for TAVR.       Past Medical History  Breast Cancer  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    GERD (Gastroesophageal Reflux Disease)    Irritable Bowel Syndrome    Mitral Valve Prolapse    Anxiety    Clinical Depression    Asthma    Uveitis    Irritable Bowel Syndrome    Hiatal Hernia    Nasal Polyp    History of Left Breast Biopsy  [place] lymph node biopsy    History of Cataract Surgery  Left eye    S/P Nasal Polypectomy    Status Post Tonsillectomy    Hypertension    COVID-19 vaccine series completed  Moderna    2019 novel coronavirus disease (COVID-19)  5/27/2022    PMR (polymyalgia rheumatica)        Past Surgical History  S/P Breast Lumpectomy    S/P Lumpectomy of Breast  Left Breast    Status Post Tonsillectomy    S/P Nasal Polypectomy    History of Cataract Surgery  Left eye    History of Breast Biopsy  [place] lymph node biopsy        MEDICATIONS  (STANDING):  ALPRAZolam 0.25 milliGRAM(s) Oral at bedtime  amLODIPine   Tablet 2.5 milliGRAM(s) Oral <User Schedule>  artificial tears (preservative free) Ophthalmic Solution 1 Drop(s) Both EYES three times a day  calcium carbonate   1250 mG (OsCal) 1 Tablet(s) Oral daily  chloroprocaine 3% Injectable 10 milliLiter(s) Local Injection once  cholecalciferol 1000 Unit(s) Oral daily  escitalopram 10 milliGRAM(s) Oral daily  metoprolol succinate ER 12.5 milliGRAM(s) Oral daily  naphazoline/pheniramine Solution 1 Drop(s) Both EYES three times a day  pantoprazole    Tablet 40 milliGRAM(s) Oral before breakfast  petrolatum white Ointment 1 Application(s) Topical two times a day  predniSONE   Tablet 3 milliGRAM(s) Oral two times a day  sodium chloride 0.45%. 1000 milliLiter(s) (75 mL/Hr) IV Continuous <Continuous>  sodium chloride 0.65% Nasal 1 Spray(s) Both Nostrils three times a day    MEDICATIONS  (PRN):  ALPRAZolam 0.25 milliGRAM(s) Oral every 6 hours PRN anxiety  midodrine. 2.5 milliGRAM(s) Oral three times a day PRN SBP <100  morphine  - Injectable 2 milliGRAM(s) IV Push every 6 hours PRN Severe Pain (7 - 10)    Antiplatelet therapy:                           Last dose/amt:    Allergies: lactose (Unknown)  Levaquin (Nausea; Other)  tetracycline (Anaphylaxis)  latex (Anaphylaxis)  Zofran (Anaphylaxis)  codeine (Nausea; Other)  contrast media (iodine-based) (Angioedema)  lidocaine (Unknown)  Solu-Medrol (Pruritus; Flushing; Short breath)  SULFA, but tolerates BACTRIM (Anaphylaxis)  Augmentin (Stomach Upset)  cefdinir (Unknown)  fluorescein ophthalmic (Hives; Rash; Flushing)  ciprofloxacin (Other)      SOCIAL HISTORY:  Smoker: [ ] Yes  [ ] No        PACK YEARS:                         WHEN QUIT?  ETOH use: [ ] Yes  [ ] No              FREQUENCY / QUANTITY:  Ilicit Drug use:  [ ] Yes  [ ] No  Occupation:  Live with:  Assist device use:    Relevant Family History  FAMILY HISTORY:  FH: CAD (coronary artery disease) (Father, Mother, Sibling, Aunt)    FH: lung cancer (Mother)        Review of Systems  GENERAL:  Fevers[] chills[] sweats[] fatigue[X] weight loss[] weight gain []                                        NEURO:  parathesias[] seizures []  syncope []  confusion []                                                                                  EYES: glasses[]  blurry vision[]  discharge[] pain[] glaucoma []                                                                            ENMT:  difficulty hearing []  vertigo[]  dysphagia[] epistaxis[] recent dental work []                                      CV:  chest pain[] palpitations[] TIDWELL [] diaphoresis [] edema[]  (Dyspnea with anxiety)                                                                                           RESPIRATORY:  wheezing[] SOB[] cough [] sputum[] hemoptysis[]                                                                    GI:  nausea[]  vomiting []  diarrhea[] constipation [] melena []  (Abdominal tightness)                                                                      : hematuria[ ]  dysuria[ ] urgency[] incontinence[]                                                                                              MUSKULOSKELETAL:  arthritis[ ]  joint swelling [ ] muscle weakness [ ]                                                                  SKIN/BREAST:  rash[ ] itching [ ]  hair loss[ ] masses[ ]                                                                                                PSYCH:  dementia [ ] depresion [ ] anxiety[X ]                                                                                                                                                                                                                               PHYSICAL EXAM  Vital Signs Last 24 Hrs  T(C): 36.7 (06 Sep 2024 11:50), Max: 36.7 (05 Sep 2024 20:45)  T(F): 98 (06 Sep 2024 11:50), Max: 98.1 (05 Sep 2024 20:45)  HR: 81 (06 Sep 2024 14:17) (67 - 81)  BP: 150/76 (06 Sep 2024 14:17) (123/71 - 161/77)  BP(mean): --  RR: 18 (06 Sep 2024 11:50) (18 - 18)  SpO2: 96% (06 Sep 2024 14:17) (96% - 97%)    Parameters below as of 06 Sep 2024 14:17  Patient On (Oxygen Delivery Method): room air        General: Well nourished, no acute distress.                                                         Neuro: Normal exam oriented to person/place & time with no focal motor or sensory  deficits.                    Eyes: Normal exam of conjunctiva & lids, pupils equally reactive.   ENT: Normal exam of nasal/oral mucosa with absence of cyanosis.   Neck: Normal exam of jugular veins, trachea & thyroid.   Chest: CTA b/l                                                              CV:  Auscultation: S1S2, Irregular rhythm  Carotids: No Bruits                                                                 GI: Normal exam of abdomen, liver & spleen with no noted masses or tenderness.                                                                                              Extremities: Normal no evidence of cyanosis or deformity   Lower Extremity Pulses: 2+ PT, no LE edema, pronounced non-tortuous veins  SKIN : Normal exam to inspection & palpation.                                                           LABS:                        11.3   5.79  )-----------( 199      ( 06 Sep 2024 07:09 )             35.4     09-06    139  |  105  |  15  ----------------------------<  123<H>  4.0   |  22  |  0.64    Ca    9.0      06 Sep 2024 07:10        Urinalysis Basic - ( 06 Sep 2024 07:10 )    Color: x / Appearance: x / SG: x / pH: x  Gluc: 123 mg/dL / Ketone: x  / Bili: x / Urobili: x   Blood: x / Protein: x / Nitrite: x   Leuk Esterase: x / RBC: x / WBC x   Sq Epi: x / Non Sq Epi: x / Bacteria: x              Cardiac Cath: Pending today     TTE / KOMAL:   CONCLUSIONS:      1. Left ventricular cavity is normal in size. Left ventricular wall thickness is normal. Left ventricular systolic function is normal with an ejection fraction of 61 % by Lara's method of disks. There are no regional wall motion abnormalities seen.   2. There is moderate (grade 2) left ventricular diastolic dysfunction, with elevated left ventricular filling pressure.   3. Mildly enlarged right ventricular cavity size, with normal wall thickness, and normal right ventricular systolic function.   4. Left atrium is moderately dilated.   5. No pericardial effusion seen.   6. No prior echocardiogram is available for comparison.   7. There is increased LV mass and concentric hypertrophy.   8. Trileaflet aortic valve with reduced systolic excursion. There is severe calcification of the aortic valve leaflets

## 2024-09-06 NOTE — PROGRESS NOTE ADULT - ASSESSMENT
89 y/o F pmhx breast cx in remission, orthostatic hypotension (on midodrine prn), AS, PMR on chronic steroids, anxiety, Asthma presents with back pain for 8 days. unable to walk.  Patient states pain started while putting on a compression sock. Denies trauma. Denies numbness/tingling down posterior aspect of LE b/l, denies perianal numbness/tingling. Denies urinary incontinence, fecal incontinence. Hx of multiple compression fx in the past. At baseline ambulates with walker, now is unable to 2/2 pain. Pain radiates from R paraspinal region to R anterior abdomen wall. she is pain free when not moving and resting.     UTI, cannot R/O PN, ct A/P was non contrast. however ptn w no CVA tenderness on exam.. doubt she has a PN. she was given CTX in the ED , will cont for 2 more doses. last dose 9/5  MSK back pain. 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 she does not tolerate muscle relaxers nor Gabapentin. will get PT eval. treat w tylenol and has tolerated Morphine in the past. PT eval ordered  Anxiety will cont prn xanax and lexapro. need xanax bc feels very nervous pre cath  PMR: cont daily Prednisone no exacebration  HTN: cont Metoprolol and Norvasc.   Orthostatic hypotension: occasionally needs Midodrine  Severe AS: awaiting card cath today, got prednisone as  pre med for contrast.   Dizziness: c/o chronic facial sinus pressure and dizziness. was supposed to see her ENT but due to back spasm couldn't keep the appnt. daughters requesting a head ct, all the head cts in the past have been c/w chronic sinusitis.    DVT ppx w sc Lovenox

## 2024-09-06 NOTE — PROGRESS NOTE ADULT - ASSESSMENT
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     #Back Pain   -CT L spine w/ old compression deformity T12, no acute findings  -Pain mgmt as per med  -PT for mobility    #UTI  -Abx, mgmt per med    #Hx Orthostatic Hypotension  -Dizziness worse in AM, however not new to pt   -Continue amlodipine BID   -Continue toprol 12.5mg qd  -Allow some degree of permissive htn  -CT Head 9/5/24 no acute findings  -B/l Carotid Duplex 9/5/24 shows No significant hemodynamic stenosis of either carotid artery.    #Aortic Stenosis  -Recent echo in office 5/2024 with mod-severe AS  -Echo 9/4 with severe AS - will have structural eval w/ Dr. Whittaker   -volume status stable on exam  -Cont med mgmt for now     #Hx Paroxysmal Tachycardia  -Cont metoprolol as above    #PMR  -On chronic steroids - cont as ordered       dvt ppx        A/p  89 y/o F pmhx breast cx in remission, orthostatic hypotension (on midodrine prn), AS, PMR on chronic steroids, anxiety, Asthma presents with back pain for 8 days.     #Back Pain   -CT L spine w/ old compression deformity T12, no acute findings  -Pain mgmt as per med  -PT for mobility    #UTI  -Abx, mgmt per med    #Hx Orthostatic Hypotension  -Dizziness worse in AM, however not new to pt   -Continue amlodipine BID   -Continue toprol 12.5mg qd  -Allow some degree of permissive htn  -CT Head 9/5/24 no acute findings  -B/l Carotid Duplex 9/5/24 shows No significant hemodynamic stenosis of either carotid artery.    #Aortic Stenosis  -Recent echo in office 5/2024 with mod-severe AS  -Echo 9/4 with severe AS - Cardiac CT and TAVR to be scheduled outpt per Dr. Whittaker  -pre TAVR cath today   -volume status stable on exam  -Cont med mgmt for now     #Hx Paroxysmal Tachycardia  -Cont metoprolol as above    #PMR  -On chronic steroids - cont as ordered       dvt ppx

## 2024-09-06 NOTE — CHART NOTE - NSCHARTNOTEFT_GEN_A_CORE
Notified by primary RN that patient developed hematoma to right wrist after radial band removal. Pt assessed at bedside. Given IV Tylenol for pain control, dressing removed and manual compression x20 minutes w/ elevation of arm w/ improvement of hematoma. Arm ecchymotic, however nontender and hematoma resolved, pulses intact. Monitor overnight.     Yaritza Foster PA-C  Invasive cardiology  [phone]

## 2024-09-07 RX ORDER — ACETAMINOPHEN 325 MG/1
1000 TABLET ORAL ONCE
Refills: 0 | Status: COMPLETED | OUTPATIENT
Start: 2024-09-07 | End: 2024-09-07

## 2024-09-07 RX ADMIN — Medication 1 SPRAY(S): at 13:21

## 2024-09-07 RX ADMIN — POVIDONE, PROPYLENE GLYCOL 1 DROP(S): 6.8; 3 LIQUID OPHTHALMIC at 13:19

## 2024-09-07 RX ADMIN — Medication 0.25 MILLIGRAM(S): at 22:33

## 2024-09-07 RX ADMIN — METOPROLOL TARTRATE 12.5 MILLIGRAM(S): 100 TABLET ORAL at 08:39

## 2024-09-07 RX ADMIN — Medication 1000 UNIT(S): at 11:16

## 2024-09-07 RX ADMIN — ACETAMINOPHEN 1000 MILLIGRAM(S): 325 TABLET ORAL at 10:47

## 2024-09-07 RX ADMIN — Medication 40 MILLIGRAM(S): at 05:20

## 2024-09-07 RX ADMIN — AMLODIPINE BESYLATE 2.5 MILLIGRAM(S): 10 TABLET ORAL at 08:39

## 2024-09-07 RX ADMIN — PETROLATUM 1 APPLICATION(S): 865 OINTMENT TOPICAL at 18:38

## 2024-09-07 RX ADMIN — PETROLATUM 1 APPLICATION(S): 865 OINTMENT TOPICAL at 05:27

## 2024-09-07 RX ADMIN — Medication 1 SPRAY(S): at 22:34

## 2024-09-07 RX ADMIN — ACETAMINOPHEN 400 MILLIGRAM(S): 325 TABLET ORAL at 10:02

## 2024-09-07 RX ADMIN — Medication 1 SPRAY(S): at 05:20

## 2024-09-07 RX ADMIN — Medication 3 MILLIGRAM(S): at 05:20

## 2024-09-07 RX ADMIN — ESCITALOPRAM OXALATE 10 MILLIGRAM(S): 10 TABLET ORAL at 11:16

## 2024-09-07 RX ADMIN — Medication 3 MILLIGRAM(S): at 17:19

## 2024-09-07 RX ADMIN — Medication 1 TABLET(S): at 11:15

## 2024-09-07 RX ADMIN — Medication 75 MILLILITER(S): at 13:33

## 2024-09-07 NOTE — PROGRESS NOTE ADULT - ASSESSMENT
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 unable to walk.  Patient states pain started while putting on a compression sock. Denies trauma. Denies numbness/tingling down posterior aspect of LE b/l, denies perianal numbness/tingling. Denies urinary incontinence, fecal incontinence. Hx of multiple compression fx in the past. At baseline ambulates with walker, now is unable to 2/2 pain. Pain radiates from R paraspinal region to R anterior abdomen wall. she is pain free when not moving and resting.     UTI, cannot R/O PN, ct A/P was non contrast. however ptn w no CVA tenderness on exam.. doubt she has a PN. she was given CTX in the ED , will cont for 2 more doses. last dose 9/5  MSK back pain. 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 she does not tolerate muscle relaxers nor Gabapentin. will get PT eval. treat w tylenol and has tolerated Morphine in the past. PT eval ordered  Anxiety will cont prn xanax and lexapro. need xanax bc feels very nervous pre cath  PMR: cont daily Prednisone no exacebration  HTN: cont Metoprolol and Norvasc.   Orthostatic hypotension: occasionally needs Midodrine  Severe AS: s/p LHC in preparation for TAVR, has 3 vess non obstr CAD.  ptn w hematoma/echymosis at the right wrist post LHC. HGB is stable today, if remains stable in am, will dc home  Dizziness: c/o chronic facial sinus pressure and dizziness. was supposed to see her ENT but due to back spasm couldn't keep the appnt. daughters requesting a head ct, all the head cts in the past have been c/w chronic sinusitis.    DVT ppx w sc Lovenox

## 2024-09-07 NOTE — PROGRESS NOTE ADULT - SUBJECTIVE AND OBJECTIVE BOX
Patient is a 89y old  Female who presents with a chief complaint of UTI, PN, MSK back pain (07 Sep 2024 10:37)      SUBJECTIVE / OVERNIGHT EVENTS: ptn w hematoma/echymosis at the right wrist post LHC. HGB is stable today, if remains stable in am, will dc home    MEDICATIONS  (STANDING):  ALPRAZolam 0.25 milliGRAM(s) Oral at bedtime  amLODIPine   Tablet 2.5 milliGRAM(s) Oral <User Schedule>  artificial tears (preservative free) Ophthalmic Solution 1 Drop(s) Both EYES three times a day  calcium carbonate   1250 mG (OsCal) 1 Tablet(s) Oral daily  chloroprocaine 3% Injectable 10 milliLiter(s) Local Injection once  cholecalciferol 1000 Unit(s) Oral daily  escitalopram 10 milliGRAM(s) Oral daily  metoprolol succinate ER 12.5 milliGRAM(s) Oral daily  naphazoline/pheniramine Solution 1 Drop(s) Both EYES three times a day  pantoprazole    Tablet 40 milliGRAM(s) Oral before breakfast  petrolatum white Ointment 1 Application(s) Topical two times a day  predniSONE   Tablet 3 milliGRAM(s) Oral two times a day  sodium chloride 0.45%. 1000 milliLiter(s) (75 mL/Hr) IV Continuous <Continuous>  sodium chloride 0.65% Nasal 1 Spray(s) Both Nostrils three times a day    MEDICATIONS  (PRN):  ALPRAZolam 0.25 milliGRAM(s) Oral every 6 hours PRN anxiety  midodrine. 2.5 milliGRAM(s) Oral three times a day PRN SBP <100  morphine  - Injectable 2 milliGRAM(s) IV Push every 6 hours PRN Severe Pain (7 - 10)      Vital Signs Last 24 Hrs  T(F): 97.5 (09-07-24 @ 21:20), Max: 97.6 (09-07-24 @ 04:39)  HR: 72 (09-07-24 @ 21:20) (57 - 93)  BP: 114/60 (09-07-24 @ 21:20) (112/50 - 150/74)  RR: 18 (09-07-24 @ 21:20) (18 - 18)  SpO2: 95% (09-07-24 @ 21:20) (94% - 97%)  Telemetry:   CAPILLARY BLOOD GLUCOSE        I&O's Summary    07 Sep 2024 07:01  -  07 Sep 2024 22:06  --------------------------------------------------------  IN: 300 mL / OUT: 0 mL / NET: 300 mL        PHYSICAL EXAM:  GENERAL: NAD, well-developed  HEAD:  Atraumatic, Normocephalic  EYES: EOMI, PERRLA, conjunctiva and sclera clear  NECK: Supple, No JVD  CHEST/LUNG: Clear to auscultation bilaterally; No wheeze  HEART: Regular rate and rhythm; No murmurs, rubs, or gallops  ABDOMEN: Soft, Nontender, Nondistended; Bowel sounds present  EXTREMITIES:  2+ Peripheral Pulses, No clubbing, cyanosis, or edema  PSYCH: AAOx3  NEUROLOGY: non-focal  SKIN: No rashes or lesions    LABS:                        11.3   5.79  )-----------( 199      ( 06 Sep 2024 07:09 )             35.4     09-06    139  |  105  |  15  ----------------------------<  123<H>  4.0   |  22  |  0.64    Ca    9.0      06 Sep 2024 07:10            Urinalysis Basic - ( 06 Sep 2024 07:10 )    Color: x / Appearance: x / SG: x / pH: x  Gluc: 123 mg/dL / Ketone: x  / Bili: x / Urobili: x   Blood: x / Protein: x / Nitrite: x   Leuk Esterase: x / RBC: x / WBC x   Sq Epi: x / Non Sq Epi: x / Bacteria: x        RADIOLOGY & ADDITIONAL TESTS:    Imaging Personally Reviewed:    Consultant(s) Notes Reviewed:      Care Discussed with Consultants/Other Providers:

## 2024-09-07 NOTE — PROGRESS NOTE ADULT - SUBJECTIVE AND OBJECTIVE BOX
CARDIOLOGY FOLLOW UP NOTE - DR. BRANTLEY    Patient Name: ADA ROLLE    Date of Service: 09-07-24 @ 10:38    Patient seen and examined  events noted  right wrist hematoma    Subjective:    cv: denies chest pain, dyspnea, palpitations, dizziness  pulmonary: denies cough  GI: denies abdominal pain, nausea, vomiting  vascular/legs: no edema   skin: no rash  ROS: otherwise negative   overnight events:      PHYSICAL EXAM:  T(C): 36.4 (09-07-24 @ 04:39), Max: 36.9 (09-06-24 @ 15:30)  HR: 93 (09-07-24 @ 08:39) (63 - 93)  BP: 150/74 (09-07-24 @ 08:39) (114/81 - 175/77)  RR: 18 (09-07-24 @ 08:39) (16 - 18)  SpO2: 94% (09-07-24 @ 08:39) (93% - 98%)  Wt(kg): --  I&O's Summary    Daily Height in cm: 172.7 (06 Sep 2024 14:54)    Daily     Appearance: Normal	  Cardiovascular: Normal S1 S2,RRR, No JVD, No murmurs  Respiratory: Lungs clear to auscultation	  Gastrointestinal:  Soft, Non-tender, + BS	  Extremities: Normal range of motion, No clubbing, cyanosis or edema  right radial ecchymoses no signs of compartment syndrome         Home Medications:  amLODIPine 2.5 mg oral tablet: 1 tab(s) orally once a day NOTE: HAS BEEN TAKING AS NEEDED DUE TO LOW BP (03 Sep 2024 18:23)  calcium carbonate 1250 mg (500 mg elemental calcium) oral tablet: 1 tab(s) orally once a day (03 Sep 2024 18:14)  Clear Eyes 0.012% ophthalmic solution: 1 drop(s) to each affected eye 3 times a day, As Needed (03 Sep 2024 18:16)  Durezol 0.05% ophthalmic emulsion: 1 drop(s) in the left eye 2 times a day (03 Sep 2024 18:16)  erythromycin 0.5% ophthalmic ointment: 1 gram(s) in each affected eye 2 times a day as needed (03 Sep 2024 18:39)  Lexapro 10 mg oral tablet: 1 tab(s) orally once a day (03 Sep 2024 18:39)  midodrine 2.5 mg oral tablet: 1 tab(s) orally as needed NOTE: HAS ONLY TAKEN ONCE (03 Sep 2024 18:39)  Nasacort Allergy 24HR 55 mcg/inh nasal spray: 1 spray(s) in each nostril once a day as needed (03 Sep 2024 18:39)  predniSONE 1 mg oral tablet: 3 tab(s) orally 2 times a day (03 Sep 2024 18:39)  Protonix 20 mg oral delayed release tablet: 1 tab(s) orally once a day (03 Sep 2024 18:39)  sodium chloride 0.65% nasal spray: 1 spray(s) in each nostril 3 times a day (03 Sep 2024 18:16)  Vitamin D3 25 mcg (1000 intl units) oral tablet: 1 tab(s) orally once a day (03 Sep 2024 18:16)  Xanax 1 mg oral tablet: 1/4 to 1/2 tablet orally as needed (03 Sep 2024 18:39)      MEDICATIONS  (STANDING):  ALPRAZolam 0.25 milliGRAM(s) Oral at bedtime  amLODIPine   Tablet 2.5 milliGRAM(s) Oral <User Schedule>  artificial tears (preservative free) Ophthalmic Solution 1 Drop(s) Both EYES three times a day  calcium carbonate   1250 mG (OsCal) 1 Tablet(s) Oral daily  chloroprocaine 3% Injectable 10 milliLiter(s) Local Injection once  cholecalciferol 1000 Unit(s) Oral daily  escitalopram 10 milliGRAM(s) Oral daily  metoprolol succinate ER 12.5 milliGRAM(s) Oral daily  naphazoline/pheniramine Solution 1 Drop(s) Both EYES three times a day  pantoprazole    Tablet 40 milliGRAM(s) Oral before breakfast  petrolatum white Ointment 1 Application(s) Topical two times a day  predniSONE   Tablet 3 milliGRAM(s) Oral two times a day  sodium chloride 0.45%. 1000 milliLiter(s) (75 mL/Hr) IV Continuous <Continuous>  sodium chloride 0.65% Nasal 1 Spray(s) Both Nostrils three times a day      TELEMETRY: 	    ECG:  	  RADIOLOGY:   DIAGNOSTIC TESTING:  [ ] Echocardiogram:  [ ] Catheterization:  [ ] Stress Test:    OTHER: 	    LABS:	 	    CARDIAC MARKERS:                                      11.3   5.79  )-----------( 199      ( 06 Sep 2024 07:09 )             35.4     09-06    139  |  105  |  15  ----------------------------<  123<H>  4.0   |  22  |  0.64    Ca    9.0      06 Sep 2024 07:10      proBNP:     Lipid Profile:   HgA1c:     Creatinine: 0.64 mg/dL (09-06-24 @ 07:10)

## 2024-09-07 NOTE — PROGRESS NOTE ADULT - ASSESSMENT
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     #Back Pain   -CT L spine w/ old compression deformity T12, no acute findings  -Pain mgmt as per med  -PT for mobility    #UTI  -Abx, mgmt per med    #Hx Orthostatic Hypotension  -Dizziness worse in AM, however not new to pt   -Continue amlodipine BID   -Continue toprol 12.5mg qd  -Allow some degree of permissive htn  -CT Head 9/5/24 no acute findings  -B/l Carotid Duplex 9/5/24 shows No significant hemodynamic stenosis of either carotid artery.    #Aortic Stenosis  -Recent echo in office 5/2024 with mod-severe AS  -Echo 9/4 with severe AS - Cardiac CT and TAVR to be scheduled outpt per Dr. Whittaker  -cath no sig cad  -volume status stable on exam  -Cont med mgmt for now     #Hx Paroxysmal Tachycardia  -Cont metoprolol as above    #PMR  -On chronic steroids - cont as ordered       dvt ppx       dcp if wrist stable       40 minutes spent on total encounter; more than 50% of the visit was spent counseling and/or coordinating care by the attending physician.

## 2024-09-08 VITALS
RESPIRATION RATE: 18 BRPM | OXYGEN SATURATION: 97 % | HEART RATE: 60 BPM | TEMPERATURE: 99 F | DIASTOLIC BLOOD PRESSURE: 55 MMHG | SYSTOLIC BLOOD PRESSURE: 131 MMHG

## 2024-09-08 LAB
HCT VFR BLD CALC: 35.1 % — SIGNIFICANT CHANGE UP (ref 34.5–45)
HGB BLD-MCNC: 11.1 G/DL — LOW (ref 11.5–15.5)
MCHC RBC-ENTMCNC: 29.1 PG — SIGNIFICANT CHANGE UP (ref 27–34)
MCHC RBC-ENTMCNC: 31.6 GM/DL — LOW (ref 32–36)
MCV RBC AUTO: 92.1 FL — SIGNIFICANT CHANGE UP (ref 80–100)
NRBC # BLD: 0 /100 WBCS — SIGNIFICANT CHANGE UP (ref 0–0)
PLATELET # BLD AUTO: 188 K/UL — SIGNIFICANT CHANGE UP (ref 150–400)
RBC # BLD: 3.81 M/UL — SIGNIFICANT CHANGE UP (ref 3.8–5.2)
RBC # FLD: 14.5 % — SIGNIFICANT CHANGE UP (ref 10.3–14.5)
WBC # BLD: 7.55 K/UL — SIGNIFICANT CHANGE UP (ref 3.8–10.5)
WBC # FLD AUTO: 7.55 K/UL — SIGNIFICANT CHANGE UP (ref 3.8–10.5)

## 2024-09-08 PROCEDURE — 97116 GAIT TRAINING THERAPY: CPT

## 2024-09-08 PROCEDURE — 82435 ASSAY OF BLOOD CHLORIDE: CPT

## 2024-09-08 PROCEDURE — 93454 CORONARY ARTERY ANGIO S&I: CPT

## 2024-09-08 PROCEDURE — 96374 THER/PROPH/DIAG INJ IV PUSH: CPT

## 2024-09-08 PROCEDURE — 82330 ASSAY OF CALCIUM: CPT

## 2024-09-08 PROCEDURE — 84132 ASSAY OF SERUM POTASSIUM: CPT

## 2024-09-08 PROCEDURE — 84295 ASSAY OF SERUM SODIUM: CPT

## 2024-09-08 PROCEDURE — 85014 HEMATOCRIT: CPT

## 2024-09-08 PROCEDURE — 85025 COMPLETE CBC W/AUTO DIFF WBC: CPT

## 2024-09-08 PROCEDURE — 80053 COMPREHEN METABOLIC PANEL: CPT

## 2024-09-08 PROCEDURE — 93306 TTE W/DOPPLER COMPLETE: CPT

## 2024-09-08 PROCEDURE — C1887: CPT

## 2024-09-08 PROCEDURE — 83036 HEMOGLOBIN GLYCOSYLATED A1C: CPT

## 2024-09-08 PROCEDURE — 85027 COMPLETE CBC AUTOMATED: CPT

## 2024-09-08 PROCEDURE — C1894: CPT

## 2024-09-08 PROCEDURE — 94010 BREATHING CAPACITY TEST: CPT

## 2024-09-08 PROCEDURE — 70450 CT HEAD/BRAIN W/O DYE: CPT | Mod: MC

## 2024-09-08 PROCEDURE — 36415 COLL VENOUS BLD VENIPUNCTURE: CPT

## 2024-09-08 PROCEDURE — 93880 EXTRACRANIAL BILAT STUDY: CPT

## 2024-09-08 PROCEDURE — 81001 URINALYSIS AUTO W/SCOPE: CPT

## 2024-09-08 PROCEDURE — 83540 ASSAY OF IRON: CPT

## 2024-09-08 PROCEDURE — 82947 ASSAY GLUCOSE BLOOD QUANT: CPT

## 2024-09-08 PROCEDURE — 97161 PT EVAL LOW COMPLEX 20 MIN: CPT

## 2024-09-08 PROCEDURE — 87077 CULTURE AEROBIC IDENTIFY: CPT

## 2024-09-08 PROCEDURE — C1769: CPT

## 2024-09-08 PROCEDURE — 74176 CT ABD & PELVIS W/O CONTRAST: CPT | Mod: MC

## 2024-09-08 PROCEDURE — 84480 ASSAY TRIIODOTHYRONINE (T3): CPT

## 2024-09-08 PROCEDURE — 87086 URINE CULTURE/COLONY COUNT: CPT

## 2024-09-08 PROCEDURE — 80048 BASIC METABOLIC PNL TOTAL CA: CPT

## 2024-09-08 PROCEDURE — 99285 EMERGENCY DEPT VISIT HI MDM: CPT

## 2024-09-08 PROCEDURE — 84436 ASSAY OF TOTAL THYROXINE: CPT

## 2024-09-08 PROCEDURE — 83605 ASSAY OF LACTIC ACID: CPT

## 2024-09-08 PROCEDURE — 97530 THERAPEUTIC ACTIVITIES: CPT

## 2024-09-08 PROCEDURE — 85018 HEMOGLOBIN: CPT

## 2024-09-08 PROCEDURE — 82962 GLUCOSE BLOOD TEST: CPT

## 2024-09-08 PROCEDURE — 84443 ASSAY THYROID STIM HORMONE: CPT

## 2024-09-08 PROCEDURE — 82803 BLOOD GASES ANY COMBINATION: CPT

## 2024-09-08 PROCEDURE — 96375 TX/PRO/DX INJ NEW DRUG ADDON: CPT

## 2024-09-08 RX ORDER — ACETAMINOPHEN 325 MG/1
1000 TABLET ORAL ONCE
Refills: 0 | Status: COMPLETED | OUTPATIENT
Start: 2024-09-08 | End: 2024-09-08

## 2024-09-08 RX ORDER — AMLODIPINE BESYLATE 10 MG/1
1 TABLET ORAL
Qty: 60 | Refills: 0
Start: 2024-09-08 | End: 2024-10-07

## 2024-09-08 RX ORDER — AMLODIPINE BESYLATE 10 MG/1
1 TABLET ORAL
Refills: 0 | DISCHARGE

## 2024-09-08 RX ADMIN — Medication 3 MILLIGRAM(S): at 06:03

## 2024-09-08 RX ADMIN — Medication 1 SPRAY(S): at 06:03

## 2024-09-08 RX ADMIN — ACETAMINOPHEN 1000 MILLIGRAM(S): 325 TABLET ORAL at 01:25

## 2024-09-08 RX ADMIN — Medication 1 TABLET(S): at 13:15

## 2024-09-08 RX ADMIN — ESCITALOPRAM OXALATE 10 MILLIGRAM(S): 10 TABLET ORAL at 13:15

## 2024-09-08 RX ADMIN — Medication 40 MILLIGRAM(S): at 06:03

## 2024-09-08 RX ADMIN — Medication 1000 UNIT(S): at 13:15

## 2024-09-08 RX ADMIN — POVIDONE, PROPYLENE GLYCOL 1 DROP(S): 6.8; 3 LIQUID OPHTHALMIC at 06:03

## 2024-09-08 RX ADMIN — METOPROLOL TARTRATE 12.5 MILLIGRAM(S): 100 TABLET ORAL at 06:02

## 2024-09-08 RX ADMIN — ACETAMINOPHEN 400 MILLIGRAM(S): 325 TABLET ORAL at 00:45

## 2024-09-08 RX ADMIN — AMLODIPINE BESYLATE 2.5 MILLIGRAM(S): 10 TABLET ORAL at 06:04

## 2024-09-08 RX ADMIN — PETROLATUM 1 APPLICATION(S): 865 OINTMENT TOPICAL at 06:03

## 2024-09-08 NOTE — PROGRESS NOTE ADULT - REASON FOR ADMISSION
UTI, PN, MSK back pain

## 2024-09-08 NOTE — PROGRESS NOTE ADULT - ASSESSMENT
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 unable to walk.  Patient states pain started while putting on a compression sock. Denies trauma. Denies numbness/tingling down posterior aspect of LE b/l, denies perianal numbness/tingling. Denies urinary incontinence, fecal incontinence. Hx of multiple compression fx in the past. At baseline ambulates with walker, now is unable to 2/2 pain. Pain radiates from R paraspinal region to R anterior abdomen wall. she is pain free when not moving and resting.     UTI, cannot R/O PN, ct A/P was non contrast. however ptn w no CVA tenderness on exam.. doubt she has a PN. she was given CTX in the ED , will cont for 2 more doses. last dose 9/5  MSK back pain. 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 she does not tolerate muscle relaxers nor Gabapentin. will get PT eval. treat w tylenol and has tolerated Morphine in the past. PT eval ordered  Anxiety will cont prn xanax and lexapro. need xanax bc feels very nervous pre cath  PMR: cont daily Prednisone no exacebration  HTN: cont Metoprolol and Norvasc.   Orthostatic hypotension: occasionally needs Midodrine  Severe AS: s/p LHC in preparation for TAVR, has 3 vess non obstr CAD.  ptn w hematoma/echymosis at the right wrist post LHC.cleared by cardiology for DC, HGB remains stable. dc home w outptn F/U    Dizziness: c/o chronic facial sinus pressure and dizziness. was supposed to see her ENT but due to back spasm couldn't keep the appnt. daughters requesting a head ct, all the head cts in the past have been c/w chronic sinusitis.    DVT ppx w sc Lovenox

## 2024-09-08 NOTE — DISCHARGE NOTE NURSING/CASE MANAGEMENT/SOCIAL WORK - PATIENT PORTAL LINK FT
You can access the FollowMyHealth Patient Portal offered by [facility] by registering at the following website: [email or web address]. By joining [facility] [employer]’s FollowMyHealth portal, you will also be able to view your health information using other applications (apps) compatible with our system.

## 2024-09-08 NOTE — PROGRESS NOTE ADULT - SUBJECTIVE AND OBJECTIVE BOX
Patient is a 89y old  Female who presents with a chief complaint of UTI, PN, MSK back pain (08 Sep 2024 13:28)      SUBJECTIVE / OVERNIGHT EVENTS: cleared by cardiology for DC, HGB remains stable. dc home w outptn F/U    MEDICATIONS  (STANDING):  ALPRAZolam 0.25 milliGRAM(s) Oral at bedtime  amLODIPine   Tablet 2.5 milliGRAM(s) Oral <User Schedule>  artificial tears (preservative free) Ophthalmic Solution 1 Drop(s) Both EYES three times a day  calcium carbonate   1250 mG (OsCal) 1 Tablet(s) Oral daily  chloroprocaine 3% Injectable 10 milliLiter(s) Local Injection once  cholecalciferol 1000 Unit(s) Oral daily  escitalopram 10 milliGRAM(s) Oral daily  metoprolol succinate ER 12.5 milliGRAM(s) Oral daily  naphazoline/pheniramine Solution 1 Drop(s) Both EYES three times a day  pantoprazole    Tablet 40 milliGRAM(s) Oral before breakfast  petrolatum white Ointment 1 Application(s) Topical two times a day  predniSONE   Tablet 3 milliGRAM(s) Oral two times a day  sodium chloride 0.45%. 1000 milliLiter(s) (75 mL/Hr) IV Continuous <Continuous>  sodium chloride 0.65% Nasal 1 Spray(s) Both Nostrils three times a day    MEDICATIONS  (PRN):  ALPRAZolam 0.25 milliGRAM(s) Oral every 6 hours PRN anxiety  midodrine. 2.5 milliGRAM(s) Oral three times a day PRN SBP <100  morphine  - Injectable 2 milliGRAM(s) IV Push every 6 hours PRN Severe Pain (7 - 10)      Vital Signs Last 24 Hrs  T(F): 98.6 (09-08-24 @ 16:00), Max: 98.6 (09-08-24 @ 16:00)  HR: 60 (09-08-24 @ 16:00) (58 - 64)  BP: 131/55 (09-08-24 @ 16:00) (121/60 - 146/69)  RR: 18 (09-08-24 @ 16:00) (18 - 18)  SpO2: 97% (09-08-24 @ 16:00) (97% - 97%)  Telemetry:   CAPILLARY BLOOD GLUCOSE        I&O's Summary    07 Sep 2024 07:01  -  08 Sep 2024 07:00  --------------------------------------------------------  IN: 300 mL / OUT: 301 mL / NET: -1 mL        PHYSICAL EXAM:  GENERAL: NAD, well-developed  HEAD:  Atraumatic, Normocephalic  EYES: EOMI, PERRLA, conjunctiva and sclera clear  NECK: Supple, No JVD  CHEST/LUNG: Clear to auscultation bilaterally; No wheeze  HEART: Regular rate and rhythm; No murmurs, rubs, or gallops  ABDOMEN: Soft, Nontender, Nondistended; Bowel sounds present  EXTREMITIES:  2+ Peripheral Pulses, No clubbing, cyanosis, or edema  PSYCH: AAOx3  NEUROLOGY: non-focal  SKIN: No rashes or lesions    LABS:                        11.1   7.55  )-----------( 188      ( 08 Sep 2024 08:10 )             35.1                     RADIOLOGY & ADDITIONAL TESTS:    Imaging Personally Reviewed:    Consultant(s) Notes Reviewed:      Care Discussed with Consultants/Other Providers:

## 2024-09-08 NOTE — PROGRESS NOTE ADULT - SUBJECTIVE AND OBJECTIVE BOX
CARDIOLOGY FOLLOW UP NOTE - DR. BRANTLEY    Patient Name: ADA ROLLE    Date of Service: 09-08-24 @ 13:28    Patient seen and examined    Subjective:    cv: denies chest pain, dyspnea, palpitations, dizziness  pulmonary: denies cough  GI: denies abdominal pain, nausea, vomiting  vascular/legs: no edema   skin: no rash  ROS: otherwise negative   overnight events:      PHYSICAL EXAM:  T(C): 36.6 (09-08-24 @ 11:52), Max: 36.6 (09-08-24 @ 11:52)  HR: 58 (09-08-24 @ 11:52) (58 - 72)  BP: 121/60 (09-08-24 @ 11:52) (114/60 - 146/69)  RR: 18 (09-08-24 @ 11:52) (18 - 18)  SpO2: 97% (09-08-24 @ 11:52) (95% - 97%)  Wt(kg): --  I&O's Summary    07 Sep 2024 07:01  -  08 Sep 2024 07:00  --------------------------------------------------------  IN: 300 mL / OUT: 301 mL / NET: -1 mL      Daily     Daily     Appearance: Normal	  Cardiovascular: Normal S1 S2,RRR, No JVD, No murmurs  Respiratory: Lungs clear to auscultation	  Gastrointestinal:  Soft, Non-tender, + BS	  Extremities: Normal range of motion, No clubbing, cyanosis or edema    right arm  + 2+ radial pulse  stable echhymoses right forearm  no hematoma   rom ok, sensory intact  motor 5/5 hand       Home Medications:  amLODIPine 2.5 mg oral tablet: 1 tab(s) orally once a day NOTE: HAS BEEN TAKING AS NEEDED DUE TO LOW BP (03 Sep 2024 18:23)  calcium carbonate 1250 mg (500 mg elemental calcium) oral tablet: 1 tab(s) orally once a day (03 Sep 2024 18:14)  Clear Eyes 0.012% ophthalmic solution: 1 drop(s) to each affected eye 3 times a day, As Needed (03 Sep 2024 18:16)  Durezol 0.05% ophthalmic emulsion: 1 drop(s) in the left eye 2 times a day (03 Sep 2024 18:16)  erythromycin 0.5% ophthalmic ointment: 1 gram(s) in each affected eye 2 times a day as needed (03 Sep 2024 18:39)  Lexapro 10 mg oral tablet: 1 tab(s) orally once a day (03 Sep 2024 18:39)  midodrine 2.5 mg oral tablet: 1 tab(s) orally as needed NOTE: HAS ONLY TAKEN ONCE (03 Sep 2024 18:39)  Nasacort Allergy 24HR 55 mcg/inh nasal spray: 1 spray(s) in each nostril once a day as needed (03 Sep 2024 18:39)  predniSONE 1 mg oral tablet: 3 tab(s) orally 2 times a day (03 Sep 2024 18:39)  Protonix 20 mg oral delayed release tablet: 1 tab(s) orally once a day (03 Sep 2024 18:39)  sodium chloride 0.65% nasal spray: 1 spray(s) in each nostril 3 times a day (03 Sep 2024 18:16)  Vitamin D3 25 mcg (1000 intl units) oral tablet: 1 tab(s) orally once a day (03 Sep 2024 18:16)  Xanax 1 mg oral tablet: 1/4 to 1/2 tablet orally as needed (03 Sep 2024 18:39)      MEDICATIONS  (STANDING):  ALPRAZolam 0.25 milliGRAM(s) Oral at bedtime  amLODIPine   Tablet 2.5 milliGRAM(s) Oral <User Schedule>  artificial tears (preservative free) Ophthalmic Solution 1 Drop(s) Both EYES three times a day  calcium carbonate   1250 mG (OsCal) 1 Tablet(s) Oral daily  chloroprocaine 3% Injectable 10 milliLiter(s) Local Injection once  cholecalciferol 1000 Unit(s) Oral daily  escitalopram 10 milliGRAM(s) Oral daily  metoprolol succinate ER 12.5 milliGRAM(s) Oral daily  naphazoline/pheniramine Solution 1 Drop(s) Both EYES three times a day  pantoprazole    Tablet 40 milliGRAM(s) Oral before breakfast  petrolatum white Ointment 1 Application(s) Topical two times a day  predniSONE   Tablet 3 milliGRAM(s) Oral two times a day  sodium chloride 0.45%. 1000 milliLiter(s) (75 mL/Hr) IV Continuous <Continuous>  sodium chloride 0.65% Nasal 1 Spray(s) Both Nostrils three times a day      TELEMETRY: 	    ECG:  	  RADIOLOGY:   DIAGNOSTIC TESTING:  [ ] Echocardiogram:  [ ] Catheterization:  [ ] Stress Test:    OTHER: 	    LABS:	 	    CARDIAC MARKERS:                                      11.1   7.55  )-----------( 188      ( 08 Sep 2024 08:10 )             35.1           proBNP:     Lipid Profile:   HgA1c:     Creatinine: 0.64 mg/dL (09-06-24 @ 07:10)

## 2024-09-08 NOTE — PROGRESS NOTE ADULT - ASSESSMENT
A/p  89 y/o F pmhx breast cx in remission, orthostatic hypotension (on midodrine prn), AS, PMR on chronic steroids, anxiety, Asthma presents with back pain for 8 days.     #Back Pain   -CT L spine w/ old compression deformity T12, no acute findings  -Pain mgmt as per med  -PT for mobility    #UTI  -Abx, mgmt per med    #Hx Orthostatic Hypotension  -Dizziness worse in AM, however not new to pt   -Continue amlodipine BID   -Continue toprol 12.5mg qd  -Allow some degree of permissive htn  -CT Head 9/5/24 no acute findings  -B/l Carotid Duplex 9/5/24 shows No significant hemodynamic stenosis of either carotid artery.    #Aortic Stenosis  -Recent echo in office 5/2024 with mod-severe AS  -Echo 9/4 with severe AS - Cardiac CT and TAVR to be scheduled outpt per Dr. Whittaker  -cath no sig cad  -volume status stable on exam  -Cont med mgmt for now     #Hx Paroxysmal Tachycardia  -Cont metoprolol as above    #PMR  -On chronic steroids - cont as ordered       dvt ppx       dcp  no CV CI to dcp  cath site stable, wrist elevation  instructions given to patient        40 minutes spent on total encounter; more than 50% of the visit was spent counseling and/or coordinating care by the attending physician.

## 2024-09-08 NOTE — DISCHARGE NOTE NURSING/CASE MANAGEMENT/SOCIAL WORK - NSDCFUADDAPPT_GEN_ALL_CORE_FT
APPTS ARE READY TO BE MADE: [X] YES    Best Family or Patient Contact (if needed):    Additional Information about above appointments (if needed):    1: Follow up with PCP Dr. Cardona   2: Follow up with Cardiology Dr. Lee  3: Follow up with structural cards Dr. Whittaker    Other comments or requests:

## 2024-09-08 NOTE — PROGRESS NOTE ADULT - PROVIDER SPECIALTY LIST ADULT
Internal Medicine
Cardiology
Internal Medicine
Internal Medicine

## 2024-09-09 ENCOUNTER — NON-APPOINTMENT (OUTPATIENT)
Age: 89
End: 2024-09-09

## 2024-09-09 DIAGNOSIS — I35.0 NONRHEUMATIC AORTIC (VALVE) STENOSIS: ICD-10-CM

## 2024-09-09 RX ORDER — PREDNISONE 50 MG/1
50 TABLET ORAL
Qty: 3 | Refills: 2 | Status: ACTIVE | COMMUNITY
Start: 2024-09-09 | End: 1900-01-01

## 2024-09-10 ENCOUNTER — NON-APPOINTMENT (OUTPATIENT)
Age: 89
End: 2024-09-10

## 2024-09-10 NOTE — DISCHARGE NOTE PROVIDER - NSRESEARCHGRANT_HIDDEN_GEN_A_CORE
[FreeTextEntry1] : Sep 10, 2024     in person  PCP: Dr. Leyla Graves             Pulm:  Dr. Joe Teixeira             Card: Patricia Calle (Hx angina, elevated lipids)              GI: Dr. Damir Lopez (Hx anemia) -              Ophthalmology: Santiago Valles p [phone] f [phone]             Pod: Dr. KARIME Cabrerar - available             Ortho - Dr. Dee at [facility]           Pain:  Dr. Mariah Murguia  - hip, knee pain.              .            CC: Diabetes since 2007 (, A1c 11.9 %) ***    3/2019  5.9;  10/2019  5.9  7/20  5.6 %  8/2023 7.4;  6/2024 6.6             (anemia)              (L knee replaced - Dr. 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   He previously tried Libre2 kae on his phone    Diet controlled diabetes. August 2023  A1c 6.7. 8/2023 A1c 7.4  Feb 1, 2024A1c 6.3 %  He tried Ruthy but was not enthralled.  : : Constitutional:  Alert, well nourished, healthy appearance, no acute distress  Eyes:  No proptosis, no stare Thyroid: Pulmonary:  No respiratory distress, no accessory muscle use; normal rate and effort Cardiac:  Normal rate Vascular:  Endocrine:  No stigmata of Cushings Syndrome Musculoskeletal:  Normal gait, no involuntary movements Neurology:  No tremors Affect/Mood/Psych:  Oriented x 3; normal affect, normal insight/judgement, normal mood  . Impression:  Diabetes under very good control, especially for 81 yo, by attention to diet. Plan:  Same Rx.   Should  "dual use" medications such as SGLT-2 inhibitor be  requested by other disciplines, such as cardiology, that should not be a problem for diabetes control.         Roshan 10, 2024     in person  PCP: Dr. Leyla Graves             Card: Patricia Calle (Hx angina, elevated lipids)              GI: Dr. Damir Lopez (Hx anemia) -              Ophthalmology: Santiago Valles p [phone] f [phone]             Pod: Dr. KARIME Callahan - available             Ortho - Dr. Dee at [facility]           Pain:  Dr. Mariah Murguia  - hip, knee pain.              .            CC: Diabetes since 2007 (, A1c 11.9 %) ***    3/2019  5.9;  10/2019  5.9  7/20  5.6 %             (anemia)              (L knee replaced - Dr. 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   He previously tried Libre2 kae on his phone    Diet controlled diabetes. August 2023  A1c 6.7. 8/2023 A1c 7.4  Feb 1, 2024A1c 6.3 %  He tried Ruthy but was not enthralled.  : : Constitutional:  Alert, well nourished, healthy appearance, no acute distress  Eyes:  No proptosis, no stare Thyroid: Pulmonary:  No respiratory distress, no accessory muscle use; normal rate and effort Cardiac:  Normal rate Vascular:  Endocrine:  No stigmata of Cushings Syndrome Musculoskeletal:  Normal gait, no involuntary movements Neurology:  No tremors Affect/Mood/Psych:  Oriented x 3; normal affect, normal insight/judgement, normal mood  . Impression:  Diabetes under very good control.essentially by attention to diet and activity.  Plan: Same Rx.   A1c today.  -    Dec 11, 2023     in person  PCP: Dr. Joe Teixeira              Card: Patricia Calle (Hx angina, elevated lipids)              GI: Dr. Damir Lopez (Hx anemia) -              Ophthalmology: Santiago Valles p  f [phone]             Pod: Dr. KARIME Cabrerar - available             Ortho - Dr. Dee at [facility]           Pain:  Dr. Mariah Murguia  - hip, knee pain.              .            CC: Diabetes since 2007 (, A1c 11.9 %) ***    3/2019  5.9;  10/2019  5.9  7/20  5.6 %             (anemia)              (L knee replaced - Dr. 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   He has Libre2 kae on his phone    Diet controlled diabetes. August A1c 6.7.  He tried Ruthy but was not enthralled. Today 12/11/2023 he reports sugars by fingerstick in acceptable range.  : : Constitutional:  Alert, well nourished, healthy appearance, no acute distress  Eyes:  No proptosis, no stare Thyroid: Pulmonary:  No respiratory distress, no accessory muscle use; normal rate and effort Cardiac:  Normal rate Vascular:  Endocrine:  No stigmata of Cushings Syndrome Musculoskeletal:  Normal gait, no involuntary movements Neurology:  No tremors Affect/Mood/Psych:  Oriented x 3; normal affect, normal insight/judgement, normal mood  . Impression:  Diet has been very helpful in controlling sugars. Last eye exam a few months ago - sees annually - given a good report.   Will try Libre2 trial again     Aug 14, 2023      in person  PCP: Dr. Joe Teixeira              Card: Patricia Calle (Hx angina, elevated lipids)              GI: Dr. Damir Lopez (Hx anemia) -              Ophthalmology: Santiago Valles p  f [phone]             Pod: Dr. SCHAFFER Proner - available             Ortho - Dr. Dee at  -  [facility]           Pain:  Dr. Mariah Murguia  - hip, knee pain.              .            CC: Diabetes since 2007 (, A1c 11.9 %) ***    3/2019  5.9;  10/2019  5.9  7/20  5.6 %             (anemia)              (L knee replaced - Dr. 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   Recent A1c in August 6.7   Diet controlled diabetes. August A1c 6.7.  : : Constitutional:  Alert, well nourished, healthy appearance, no acute distress  Eyes:  No proptosis, no stare Thyroid: Pulmonary:  No respiratory distress, no accessory muscle use; normal rate and effort Cardiac:  Normal rate Vascular:  Endocrine:  No stigmata of Cushings Syndrome Musculoskeletal:  Normal gait, no involuntary movements Neurology:  No tremors Affect/Mood/Psych:  Oriented x 3; normal affect, normal insight/judgement, normal mood  .  Imp/Plan:  FBS around 120   Diabetes currently controlled by diet.  He briefly tried out a Ruthy. Not interested in insulin     Feb 06, 2023     in person  PCP: Dr. Joe Teixeira              Card: Patricia Calle (Hx angina, elevated lipids)              GI: Dr. Damir Lopez (Hx anemia) -              Ophthalmology: Santiago Valles p  f [phone]             Pod: Dr. KARIME Callahan - available             Ortho - Dr. Dee at [facility]            .            CC: Diabetes since 2007 (, A1c 11.9 %) ***    3/2019  5.9;  10/2019  5.9  7/20  5.6 %             (anemia)              (L knee replaced - Dr. 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   Recent A1c in August 6.7   Diet controlled diabetes. August A1c 6.7.  His fingerstick BS checked regularly, but not available today.  Acquiesces to a trial of Ruthy 2  : : Constitutional:  Alert, well nourished, healthy appearance, no acute distress  Eyes:  No proptosis, no stare Thyroid: Pulmonary:  No respiratory distress, no accessory muscle use; normal rate and effort Cardiac:  Normal rate Vascular:  Endocrine:  No stigmata of Cushing's Syndrome Musculoskeletal:  Normal gait, no involuntary movements Neurology:  No tremors Affect/Mood/Psych:  Oriented x 3; normal affect, normal insight/judgement, normal mood  .       Sep 21, 2022     in person  PCP: Dr. Joe Teixeira              Card: Patricia Calle (Hx angina, elevated lipids)              GI: Dr. Damir Lopez (Hx anemia) -              Ophthalmology: Santiago Valles p  f [phone]             Pod: Dr. KARIME Callahan - available             Ortho - Dr. Dee at [facility]            .            CC: Diabetes since 2007 (, A1c 11.9 %) ***    3/2019  5.9;  10/2019  5.9  7/20  5.6 %             (anemia)              (L knee replaced - Dr. Lopze at [facility])              (sleep challenges)             (ASHD)             ( 6/2018: back pain: scoliosis)             (6/2018: hepatic cyst)  Diet controlled diabetes. August A1c 6.3  : : Constitutional:  Alert, well nourished, healthy appearance, no acute distress  Eyes:  No proptosis, no stare Thyroid:  normal to palpation Pulmonary:  No respiratory distress, no accessory muscle use; normal rate and effort Cardiac:  Normal rate Vascular:  Endocrine:  No stigmata of Cushing's Syndrome Musculoskeletal:  Normal gait, no involuntary movements Neurology:  No tremors Affect/Mood/Psych:  Oriented x 3; normal affect, normal insight/judgement, normal mood  .   Impression:  He would find CGM educational. Needs password to install kae and he is not enthusiatic about a trial.     Karsten 10, 2022    in person  PCP: Dr. Joe Teixeira              Card: Patricia Calle (Hx angina, elevated lipids)              GI: Dr. Damir Lopez (Hx anemia) -              Ophthalmology: Santiago Valles p [phone] f [phone]             Pod: Dr. KARIME Callahan - available             Ortho - Dr. Dee at [facility]            .            CC: Diabetes since 2007 (, A1c 11.9 %) ***    3/2019  5.9;  10/2019  5.9  7/20  5.6 %             (anemia)              (L knee replaced - Dr. Lopez at [facility])              (sleep challenges)             (ASHD)             ( 6/2018: back pain: scoliosis)             (6/2018: hepatic cyst)  Diet controlled diabetes. August A1c 6.3 Back bothers him and he found PT helpful. Symbicort helps with breathing.  Impression:  Doing excellent job of controlling diabetes  Plan:  A1c today ROv in six months.     : : Constitutional:  Alert, well nourished, healthy appearance, no acute distress  Eyes:  No proptosis, no stare Thyroid: Pulmonary:  No respiratory distress, no accessory muscle use; normal rate and effort Cardiac:  Normal rate Vascular:  Endocrine:  No stigmata of Cushing's Syndrome Musculoskeletal:  Normal gait, no involuntary movements Neurology:  No tremors Affect/Mood/Psych:  Oriented x 3; normal affect, normal insight/judgement, normal mood  .  Impression:   Controlling sugars. Endocrine contribution to weight loss not detected thus far..  Plan:  A1c today and ROV in 5-6 months.    Jul 26, 2021     in person  PCP: Dr. Joe Teixeira              Card: Patricia Calle (Hx angina, elevated lipids              GI: Dr. Damir Lopez (Hx anemia) -              Ophthalmology: Santiago Valles p [phone] f [phone]             Pod: Dr. KARIME Callahan - available             Ortho - Dr. Dee at [facility]            .            CC: Diabetes since 2007 (, A1c 11.9 %) ***    3/2019  5.9;  10/2019  5.9  7/20  5.6 %             (anemia)              (L knee replaced - Dr. Lopez at [facility])              (sleep challenges)             (ASHD)             ( 6/2018: back pain: scoliosis)             (6/2018: hepatic cyst)  Because of weight loss, switched from JanuMet to Januvia.   When he ran out of Januvia, that was discontinued, so  currently the diabetes is being  controlled by diet.  Had recent Cologuard test. Feels well. Brings in fingerstick BS, mostly fasting. Fasting sugars tend to run in range of 104 mg/dl   : :                5 ft 10, 145 lb    (keeping to seafood and veggies)             Constitutional:  Alert, well nourished, healthy appearance, no acute distress  Eyes:  No proptosis, no stare Thyroid: Pulmonary:  No respiratory distress, no accessory muscle use; normal rate and effort Cardiac:  Normal rate Vascular:  Endocrine:  No stigmata of Cushing's Syndrome Musculoskeletal:  Normal gait, no involuntary movements Neurology:  No tremors Affect/Mood/Psych:  Oriented x 3; normal affect, normal insight/judgement, normal mood  .  Impression:  Diabetes appears to be well controlled.  Plan:  Records reviewed. A1c today. ROV six months.   Annual eye exam    Jan 25, 2021    in person  PCP: Dr. Joe Teixeira              Card: Patricia Calle (Hx angina, elevated lipids              GI: Dr. Damir Lopez (Hx anemia) -              Ophthalmology: Santiago Valles p  f [phone]             Pod: Dr. KARIME Callahan - available             Ortho - Dr. Dee at  -  [facility]            .            CC: Diabetes since 2007 (, A1c 11.9 %) ***    3/2019  5.9;  10/2019  5.9  7/20  5.6 %             (anemia)              (L knee replaced - Dr. Lopez at [facility])              (sleep challenges)             (ASHD)             ( 6/2018: back pain: scoliosis)             (6/2018: hepatic cyst)  Because of weight loss, switched from JanuMet to Januvia.   When he ran out of Januvia, that was discontinued, so  currently the diabetes is being  controlled by diet.  : : Constitutional:  Alert, well nourished, healthy appearance, no acute distress  Eyes:  No proptosis, no stare Thyroid: Pulmonary:  No respiratory distress, no accessory muscle use; normal rate and effort Cardiac:  Normal rate Vascular:  Endocrine:  No stigmata of Cushing's Syndrome Musculoskeletal:  Normal gait, no involuntary movements Neurology:  No tremors Affect/Mood/Psych:  Oriented x 3; normal affect, normal insight/judgement, normal mood  .  Impression  Diabetes well controlled by attention to diet.   Plan:  Updated labs today.      Yes

## 2024-09-26 ENCOUNTER — RX RENEWAL (OUTPATIENT)
Age: 89
End: 2024-09-26

## 2024-09-30 ENCOUNTER — APPOINTMENT (OUTPATIENT)
Dept: RHEUMATOLOGY | Facility: CLINIC | Age: 89
End: 2024-09-30

## 2024-09-30 VITALS
WEIGHT: 147 LBS | RESPIRATION RATE: 16 BRPM | BODY MASS INDEX: 26.05 KG/M2 | HEIGHT: 63 IN | DIASTOLIC BLOOD PRESSURE: 77 MMHG | HEART RATE: 79 BPM | SYSTOLIC BLOOD PRESSURE: 137 MMHG | OXYGEN SATURATION: 95 %

## 2024-09-30 NOTE — HISTORY OF PRESENT ILLNESS
[FreeTextEntry1] : 89 yo F PMH Breast CA (2011) , MAC (untreated), MVP, asthma, osteopenia, AS, PMR (dx 7/2022 on steroid taper ), fibromyalgia, spinal stenosis, uveitis(~ 32 yo and again in 2011) presenting w/ back pain found to have non-traumatic compression fracture of L3 spine and older compression fractures.    today  - not a good day  on prednisone 4mg in the morning and 3 mg in the evening.  feels very off but not dizzy but feels off balance and light headed.  has some pain in the temples and eyes feel heavy and under eyes look puffy  sleeps well only when takes Xanax.  hasn't had a good enough day to go out.  working with cardiology for blood pressure and has been very variable - planning to go back to cardiology  she isn't taking her amlodipine daily - she doesn't take it unless sbp is over 130.  Cards - Rosa   # PMR - on prednisone 4mg in the morning and 3 mg in the evening  notes that she has increased fatigue and some shoulder pain  self d/c Lyrica - didn't agree with her.   #Osteoporosis: Hx of osteopenia and found to have non-traumatic L3 compression fracture and now with T12 endplate deformity seen on ct abdomen   Vitamin D level wnl and on calcium 500mg daily + dietary sources was never treated for osteoporosis/osteopenia.  She has started prolia with Dr. Bucio for bone health. (May/November) - last Prolia with Dr. 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  ->  tried Lyrica and didn't agree  with her  ->   she couldn't tolerate tramadol that was tried by PMD

## 2024-09-30 NOTE — PHYSICAL EXAM
[General Appearance - Alert] : alert [General Appearance - In No Acute Distress] : in no acute distress [Sclera] : the sclera and conjunctiva were normal [Outer Ear] : the ears and nose were normal in appearance [FreeTextEntry1] : mild redness over left cheek.

## 2024-09-30 NOTE — ASSESSMENT
[FreeTextEntry1] : ADA ROLLE is a 89 year old female, seen on today for seen on today for PMR/GCA, Osteoporosis, Fibromyalgia, neuropathy, OA   #PMR: Diagnosed 7/2022 w/ symptoms of shoulder/hip stiffness and pain that rapidly improved w/ steroids. Was started on prednisone 60mg 7/2022 which has been tapered down. -> current steroid 4 mg in the morning and 3 mg in the evening and will lower to 3mg PO BID ->  check blood work today  -> continue Prilosec   # facial rash  ->  pictures reviewed - not SLE   #Osteoporosis: Hx of osteopenia and found to have non-traumatic L3 compression fracture. now with T12 endplate deformity seen on ct abdomen in 1-2023. Vitamin D level wnl. -would have orthopedic evaluation for any possible intervention such as kyphoplasty -> she started Prolia with Dr. Bucio (May 2023 # 1 , Nov 2023 # 2  Prolia # 3 on 5-.  -> c/w calcium 500 mg qd in addition to her dietary intake for goal of 1200mg daily -> c/w vitamin D 1000 mg qd  Opth: obtain records from Dr. Goodman [phone]  Chronic anemia - follows with heme.    Neck pain  severe OA recommend seeing orthopedics for injections if willing to try injections.  ortho - spine eval   Psych - anxiety -  recommend geriatric psych  extensive d/w patient and daughter today about importantce of addressing anxiety   More than 50% of the encounter was spent counseling ADA ROLLE on the differential, workup, disease course, and treatment/management.   Education was provided to ADA ROLLE during this encounter. All questions and concerns were answered and addressed in detail.  ADA ROLLE verbalized understanding and agreed to the plan.   ADA ROLLE has been instructed to call for an earlier appointment if new symptoms develop in the interim. ADA ROLLE has been instructed to make a follow-up appointment in 2 months (visit and prolia)

## 2024-09-30 NOTE — ASSESSMENT
[FreeTextEntry1] : ADA ROLLE is a 89 year old female, seen on today for seen on today for PMR/GCA, Osteoporosis, Fibromyalgia, neuropathy, OA   #PMR: Diagnosed 7/2022 w/ symptoms of shoulder/hip stiffness and pain that rapidly improved w/ steroids. Was started on prednisone 60mg 7/2022 which has been tapered down. -> current steroid 4 mg in the morning and 3 mg in the evening and will lower to 3mg PO BID ->  check blood work today  -> continue Prilosec   # facial rash  ->  pictures reviewed - not SLE   #Osteoporosis: Hx of osteopenia and found to have non-traumatic L3 compression fracture. now with T12 endplate deformity seen on ct abdomen in 1-2023. Vitamin D level wnl. -would have orthopedic evaluation for any possible intervention such as kyphoplasty -> she started Prolia with Dr. Bucio (May 2023 # 1 , Nov 2023 # 2  Prolia # 3 on 5-.  -> c/w calcium 500 mg qd in addition to her dietary intake for goal of 1200mg daily -> c/w vitamin D 1000 mg qd  Opth: obtain records from Dr. Goodman [phone]  Chronic anemia - follows with heme.    Neck pain  severe OA recommend seeing orthopedics for injections if willing to try injections.  ortho - spine eval   Psych - anxiety -  recommend geriatric psych  extensive d/w patient and daughter today about importantce of addressing anxiety   More than 50% of the encounter was spent counseling ADA ROLLE on the differential, workup, disease course, and treatment/management.   Education was provided to ADA ROLLE during this encounter. All questions and concerns were answered and addressed in detail.  ADA ROLLE verbalized understanding and agreed to the plan.   ADA ROLLE has been instructed to call for an earlier appointment if new symptoms develop in the interim. ADA ROLLE has been instructed to make a follow-up appointment in 2 months (visit and prolia)

## 2024-10-02 ENCOUNTER — APPOINTMENT (OUTPATIENT)
Dept: CARDIOLOGY | Facility: CLINIC | Age: 89
End: 2024-10-02

## 2024-10-02 PROBLEM — Z92.29 PERSONAL HISTORY OF OTHER DRUG THERAPY: Chronic | Status: INACTIVE | Noted: 2022-05-28 | Resolved: 2024-10-02

## 2024-10-02 RX ORDER — MIDODRINE HYDROCHLORIDE 5 MG/1
1 TABLET ORAL
Qty: 0 | Refills: 0 | DISCHARGE

## 2024-10-02 RX ORDER — ESCITALOPRAM OXALATE 10 MG/1
1 TABLET ORAL
Refills: 0 | DISCHARGE

## 2024-10-02 RX ORDER — ALPRAZOLAM 0.25 MG
0 TABLET ORAL
Refills: 0 | DISCHARGE

## 2024-10-02 RX ORDER — ERYTHROMYCIN 5 MG/G
1 OINTMENT OPHTHALMIC
Refills: 0 | DISCHARGE

## 2024-10-02 RX ORDER — TRIAMCINOLONE ACETONIDE 55 MCG
1 AEROSOL, SPRAY (GRAM) NASAL
Refills: 0 | DISCHARGE

## 2024-10-02 RX ORDER — PANTOPRAZOLE SODIUM 40 MG
1 TABLET, DELAYED RELEASE (ENTERIC COATED) ORAL
Refills: 0 | DISCHARGE

## 2024-10-02 RX ORDER — CALCIUM CARBONATE 500(1250)
1 TABLET ORAL
Qty: 30 | Refills: 0 | DISCHARGE

## 2024-10-02 RX ORDER — PREDNISONE 10 MG
3 TABLET, DOSE PACK ORAL
Refills: 0 | DISCHARGE

## 2024-10-10 PROBLEM — I35.0 NONRHEUMATIC AORTIC (VALVE) STENOSIS: Chronic | Status: ACTIVE | Noted: 2024-01-01

## 2024-10-10 PROBLEM — Z86.19 PERSONAL HISTORY OF OTHER INFECTIOUS AND PARASITIC DISEASES: Chronic | Status: ACTIVE | Noted: 2024-01-01

## 2024-10-11 NOTE — PATIENT PROFILE ADULT - NS PRO AD ANY ON CHART
Chief Complaints and History of Present Illnesses   Patient presents with    Cataract Evaluation     Here for cataract evaluation each eye      Chief Complaint(s) and History of Present Illness(es)       Cataract Evaluation              Associated symptoms: glare.  Negative for haloes and double vision    Treatments tried: artificial tears    Pain scale: 0/10    Comments: Here for cataract evaluation each eye               Comments    Pt states [facility] has been stable lately.   She tells me she gets glare from sunlight while driving.   Denies eye pain, or double vision.   Occasional itching both eyes, uses AT PRN for relief.     Alfredo Rivas 1:02 PM August 2, 2024                    
Yes

## 2024-10-11 NOTE — BRIEF OPERATIVE NOTE - NSICDXBRIEFPROCEDURE_GEN_ALL_CORE_FT
PROCEDURES:  TAVR, percutaneous 11-Oct-2024 18:19:56  Alexey NGUYEN  Release, cardiac tamponade 11-Oct-2024 18:20:15  Alexey NGUYEN  Repair, tear, cardiac ventricle 11-Oct-2024 18:20:39  Alexey NGUYEN  Aortic valve replacement, tissue 11-Oct-2024 18:22:49  Alexey NGUYEN

## 2024-10-11 NOTE — PATIENT PROFILE ADULT - FALL HARM RISK - HARM RISK INTERVENTIONS

## 2024-10-11 NOTE — PRE-ANESTHESIA EVALUATION ADULT - NSANTHPMHFT_GEN_ALL_CORE
hx contrast allergy, took steroid 13/7/1 and benadryl this AM. Many listed allergies with unknown/unclear reactions - lidocaine rxn was a rash to a topical cream with lidocaine in it; cefdinir rxn unknown, contrast rxn was angioedema; solumedrol rxn unknown, etc.    ET<4 mets, npo today other than meds.

## 2024-10-11 NOTE — PATIENT PROFILE ADULT - OVER THE PAST TWO WEEKS HAVE YOU FELT DOWN, DEPRESSED OR HOPELESS?
Medical Progress Note      Date Of Service: 11/17/2021    Reason for F/U: Hypoxia    Subjective:  Subjective   Ms. Darnell wore her BiPAP overnight. She remains on milrinone. She has no complaints. Nursing does feel like she has become more somnolent off  BiPAP    ROS:   As per subjective otherwise negative.     Objective     I/O's    Intake/Output Summary (Last 24 hours) at 11/17/2021 0950  Last data filed at 11/17/2021 0800  Gross per 24 hour   Intake 628.83 ml   Output --   Net 628.83 ml       Last Recorded Vitals  Blood pressure 130/47, pulse 75, temperature 96.6 °F (35.9 °C), temperature source Axillary, resp. rate 18, height 5' 5\" (1.651 m), weight 63.8 kg (140 lb 10.5 oz), SpO2 92 %.  Body mass index is 23.41 kg/m².  Temp:  [96.6 °F (35.9 °C)-97.2 °F (36.2 °C)] 96.6 °F (35.9 °C)  Heart Rate:  [64-81] 75  Resp:  [13-21] 18  BP: ()/(39-62) 130/47  FiO2 (%):  [50 %-60 %] 50 %     SpO2 Readings from Last 3 Encounters:   11/17/21 92%        Physical Exam  Constitutional:       General: She is not in acute distress.     Comments: Sitting up in bed, responsive   HENT:      Head: Normocephalic.      Mouth/Throat:      Mouth: Mucous membranes are moist.      Pharynx: Oropharynx is clear.   Eyes:      Extraocular Movements: Extraocular movements intact.      Conjunctiva/sclera: Conjunctivae normal.   Cardiovascular:      Rate and Rhythm: Normal rate and regular rhythm.      Heart sounds: No murmur heard.      Pulmonary:      Comments: Lungs are coarse to auscultations, respirations are non-labored, breath sounds equal, wearing Optiflow 25L at 60%  Abdominal:      General: Bowel sounds are normal. There is no distension.      Palpations: Abdomen is soft.      Tenderness: There is no abdominal tenderness.   Skin:     General: Skin is warm and moist.      Findings: No rash.   Neurological:      General: No focal deficit present.      Mental Status: Mental status is at baseline.      Comments: Speech clear and  coherent   Psychiatric:      Comments: Cooperative, appropriate mood and affect         Labs   Recent Results (from the past 24 hour(s))   GLUCOSE, BEDSIDE - POINT OF CARE    Collection Time: 11/16/21 11:30 AM   Result Value Ref Range    GLUCOSE, BEDSIDE - POINT OF CARE 175 (H) 70 - 99 mg/dL   GLUCOSE, BEDSIDE - POINT OF CARE    Collection Time: 11/16/21  5:33 PM   Result Value Ref Range    GLUCOSE, BEDSIDE - POINT OF CARE 123 (H) 70 - 99 mg/dL   CBC No Differential    Collection Time: 11/17/21  4:22 AM   Result Value Ref Range    WBC 8.9 4.2 - 11.0 K/mcL    RBC 2.57 (L) 4.00 - 5.20 mil/mcL    HGB 7.6 (L) 12.0 - 15.5 g/dL    HCT 25.1 (L) 36.0 - 46.5 %    MCV 97.7 78.0 - 100.0 fl    MCH 29.6 26.0 - 34.0 pg    MCHC 30.3 (L) 32.0 - 36.5 g/dL     (L) 140 - 450 K/mcL    RDW-CV 15.0 11.0 - 15.0 %    RDW-SD 53.8 (H) 39.0 - 50.0 fL    NRBC 0 <=0 /100 WBC   Basic Metabolic Panel    Collection Time: 11/17/21  4:23 AM   Result Value Ref Range    Fasting Status      Sodium 139 135 - 145 mmol/L    Potassium 4.1 3.4 - 5.1 mmol/L    Chloride 99 98 - 107 mmol/L    Carbon Dioxide 36 (H) 21 - 32 mmol/L    Anion Gap 8 (L) 10 - 20 mmol/L    Glucose 125 (H) 70 - 99 mg/dL    BUN 99 (H) 6 - 20 mg/dL    Creatinine 4.11 (H) 0.51 - 0.95 mg/dL    Glomerular Filtration Rate 11 (L) >=60    BUN/ Creatinine Ratio 24 7 - 25    Calcium 9.4 8.4 - 10.2 mg/dL       Imaging  XR CHEST PA OR AP 1 VIEW   Final Result      1.  Residual cardiac decompensation, volume overload, etc., unchanged.      2.  Residual superimposed bilateral (left side more extensive than right)   basilar infiltrative/atelectatic changes, unchanged.      3.  Right upper lobe nodule with metallic fiducial markers observed   therein, unchanged.            Electronically Signed by: ANGELITA DASILVA MD    Signed on: 11/17/2021 7:29 AM          US VASC LOWER EXTREMITY VENOUS DUPLEX BILATERAL   Final Result      Normal Doppler venous ultrasound bilateral lower extremities.       Electronically Signed by: ANGELITA DASILVA MD    Signed on: 11/16/2021 12:38 PM          XR CHEST PA OR AP 1 VIEW   Final Result   FINDINGS/IMPRESSION:      Since the examination of 11/14/2021, there has been the apparent   development of left basilar infiltrative/atelectatic changes.      There as well as accentuation of the upper lobe pulmonary vascularity with   associated small bilateral pleural effusions, consistent with appearance of   residual cardiac decompensation, volume overload, etc., which appears   unchanged.      There are degenerative changes of the thoracic spine and bilateral   acromioclavicular joints.  The heart size is normal.  There is   calcification of the thoracic aorta.      There is no demonstrable pneumothorax.      Electronically Signed by: ANGELITA DASILVA MD    Signed on: 11/16/2021 7:30 AM          CT CHEST WO CONTRAST   Final Result      1.  Interval development of more pronounced accentuation of the bilateral   interstitial markings and associated bilateral (right side larger than   left) pleural effusions, suggestive of the development of cardiac   decompensation, volume overload or increased right heart pressure of other   etiology.      2.  Interval development of associated dependent bilateral basilar lower   lobe infiltrative/atelectatic changes, quite possibly compressive.      3.  Mediastinal goiter, as described, with associated slight attenuation in   the caliber of the adjacent trachea..      4.  Slight dilatation of the main pulmonary artery; the possibility of   pulmonary arterial hypertension is considered.      5.  Slight dilatation of a retrocaval pretracheal middle mediastinal lymph   node, unchanged.          Slight prominence of other middle mediastinal lymph nodes, likewise   unchanged.      6.  Partially calcified right upper lobe nodule, unchanged.      7.  Posterior segmental right lobe of liver \"mass\", unchanged; if   indicated, ultrasound is suggested for more  precise characterization.      8.  Probable simple and hemorrhagic bilateral renal cysts; ultrasound is   suggested for more precise characterization.      9.  Other findings, as noted.      Electronically Signed by: ANGELITA DASILVA MD    Signed on: 11/14/2021 2:19 PM          XR CHEST AP OR PA 1 VIEW   Final Result      1.  Suspected residual cardiac decompensation, volume overload, etc.,   unchanged.      2.  Slight to moderate bilateral hyperaeration, likewise unchanged.      4.  Widening of the upper right mediastinum, also unchanged.      Electronically Signed by: ANGELITA DASILVA MD    Signed on: 11/14/2021 1:09 PM          XR CHEST AP OR PA 1 VIEW   Final Result   FINDINGS/IMPRESSION:        Again noted is a right upper lobe nodule with surgical clips unchanged   from the prior CT chest from 2016.  There is pulmonary vascular congestion,   small bilateral pleural effusions and bilateral basilar opacities.  There   is masslike enlargement of the right hilar region which could be suggestive   of lymphadenopathy, right hilar mass versus vascular congestion.  Further   evaluation with CT chest with contrast recommended.  There is cardiomegaly   and aortic tortuosity.  No gross pneumothorax.  Degenerative changes in the   thoracic spine.      Electronically Signed by: KENIA MOSS M.D.    Signed on: 11/13/2021 8:33 AM          NM LUNG PERFUSION IMAGING   Final Result   1. Findings suggest high nuclear medicine probability for pulmonary   embolism.      Electronically Signed by: KENIA MOSS M.D.    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 Darnell is a 80 y/o with a history of Pulm HTN with chronic hypoxic respiratory failure (2L), Diastolic CHF (EF 69%), and CKD stage 4 who presented with dyspnea due to acute exacerbation of CHF.      Acute Bilateral PE with acute hypoxic respiratory failure  - VQ 11/13: High probability for bilateral PE  - Unable to do CT PE given severe CKD with risk of progression to ESRD  - Elevated D-dimer  - Resumed Eliquis  - US LE 11/16: No DVT     Acute exacerbation of chronic diastolic CHF with acute on chronic hypoxic respiratory failure  - Consult Cardiology, Dr. Burkett, appreciate recs  - Consult ICU, Dr. Ibarra, appreciate recs  - Echo 11/11: Concentric hypertrophy, Diastolic Dysfunction, EF 69%, Pulmonary HTN, Moderate-severe tricuspid regurg  - CT chest 11/14:  Interval development of more pronounced accentuation of the bilateral interstitial markings and associated bilateral (right side larger than left) pleural effusions, suggestive of the development of cardiac  decompensation, volume overload or increased right heart pressure of other  etiology.  - Continue IV Lasix   - Continue Coreg, decrease hydralazine  - Continue on Milrinone due to RV failure from pulmonary HTN    Thrombocytopenia  - Mild  - Improving       Acute Mild Exacerbation of COPD  - Hold Breo  - Continue Pulmicort BID  - Continue Duo-nebs  - Methylprednisolone changed to Prednisone    Stage IV CKD  - Consult Nephrology, Dr. Hua, appreciate recs  - Avoid nephrotoxins.  - Continue Calcitriol and Vit D  - Bilateral renal cysts on CT     Goiter  - TSH reduced with normal T4  - Possibly sick euthyroid or due to steroids.  - Repeat TSH in 4-6 weeks     Hypernatremia: Consult Nephrology, Dr. Hua, appreciate recs  Anemia of CKD: Stable around 9. Normal B12/folate. Iron  studies consistent with anemia of chronic disease  Non-small cell lung cancer RUL:  Procedure 11/2012- Cyberknife radiosurgery  Severe Pulmonary HTN with DOROTA; Consult Cardiology, Dr. Burkett, appreciate recs. Needs outpatient follow up in pulmonary HTN clinic.   HTN, HLD: Hold Hydralazine. Continue Pravastatin  Liver Hemangioma: CT 11/14- Posterior segmental right lobe of liver \"mass\"; prior US consistent with hemangioma  Gout: Continue Allopurinol  Other: Continue FML ophthalmic suspension     Current Plan  - Continue Milrinone  - Continue diuresis, may require dialysis      Cardiac, Renal; SLIV  Eliquis  Dispo: Continue inpatient care, attempt to wean O2 further and monitor cardiac function with milrinone         Code Status    Code Status: Full Resuscitation      Tanika Solis MD  [facility] Hospitalist  11/17/2021 9:50 AM    Primary Care Physician  Jose Aguilera MD           no

## 2024-10-11 NOTE — BRIEF OPERATIVE NOTE - NSICDXBRIEFPOSTOP_GEN_ALL_CORE_FT
POST-OP DIAGNOSIS:  Aortic stenosis 11-Oct-2024 18:23:19  Alexey NGUYEN  Death due to cardiac arrest 11-Oct-2024 18:25:34  Alexey NGUYEN

## 2024-10-11 NOTE — PATIENT PROFILE ADULT - FUNCTIONAL SCREEN CURRENT LEVEL: SWALLOWING (IF SCORE 2 OR MORE FOR ANY ITEM, CONSULT REHAB SERVICES), MLM)
0 = swallows foods/liquids without difficulty Gen: NAD, AOx3, able to make needs known, non-toxic  HEENT: EOMI, oral mucosa moist, normal conjunctiva. No head trauma visualized, no cervical spine tenderness.   CV: pulses bilaterally, no sternal tenderness, no clavicle tenderness   Abd: soft, NTND, no guarding, no CVA tenderness   MSK: no visible bony deformities, no spinal tenderness, no tenderness with palpation of the bilateral UE and LE, no hip or pelvis tenderness.   Neuro: No focal sensory or motor deficits  Skin: Warm, well perfused, no rash. +1in x 0.5in wound to the left elbow, mildly swollen, ROM of left arm intact    Psych: normal affect Gen: NAD, AOx3, able to make needs known, non-toxic  HEENT: EOMI, oral mucosa moist, normal conjunctiva. No head trauma visualized, no cervical spine tenderness.   CV: pulses bilaterally, no sternal tenderness, no clavicle tenderness   Abd: soft, NTND, no guarding, no CVA tenderness   MSK: no visible bony deformities, no spinal tenderness, no tenderness with palpation of the bilateral UE and LE, no hip or pelvis tenderness.   Neuro: No focal sensory or motor deficits  Skin: Warm, well perfused, no rash. +1in x 0.5in wound to the left elbow, mildly swollen, ROM of left arm intact without obvious deformity  Psych: normal affect

## 2024-10-11 NOTE — PATIENT PROFILE ADULT - HOME ACCESSIBILITY CONCERNS
Electrodesiccation Text: The wound bed was treated with electrodesiccation after the biopsy was performed. none

## 2024-10-15 LAB — SURGICAL PATHOLOGY STUDY: SIGNIFICANT CHANGE UP

## 2024-11-26 PROCEDURE — 88305 TISSUE EXAM BY PATHOLOGIST: CPT

## 2024-11-26 PROCEDURE — C1894: CPT

## 2024-11-26 PROCEDURE — P9016: CPT

## 2024-11-26 PROCEDURE — C1769: CPT

## 2024-11-26 PROCEDURE — 71045 X-RAY EXAM CHEST 1 VIEW: CPT

## 2024-11-26 PROCEDURE — 86891 AUTOLOGOUS BLOOD OP SALVAGE: CPT

## 2024-11-26 PROCEDURE — 76000 FLUOROSCOPY <1 HR PHYS/QHP: CPT

## 2024-11-26 PROCEDURE — C1760: CPT

## 2024-11-26 PROCEDURE — 88300 SURGICAL PATH GROSS: CPT

## 2024-11-26 PROCEDURE — C1889: CPT

## 2024-11-26 PROCEDURE — 82435 ASSAY OF BLOOD CHLORIDE: CPT

## 2024-11-26 PROCEDURE — 36430 TRANSFUSION BLD/BLD COMPNT: CPT

## 2024-11-26 PROCEDURE — 82330 ASSAY OF CALCIUM: CPT

## 2024-11-26 PROCEDURE — 82962 GLUCOSE BLOOD TEST: CPT

## 2024-11-26 PROCEDURE — 82803 BLOOD GASES ANY COMBINATION: CPT

## 2024-11-26 PROCEDURE — 83605 ASSAY OF LACTIC ACID: CPT

## 2024-11-26 PROCEDURE — 86923 COMPATIBILITY TEST ELECTRIC: CPT

## 2024-11-26 PROCEDURE — L8699: CPT

## 2024-11-26 PROCEDURE — C1887: CPT

## 2024-11-26 PROCEDURE — 85018 HEMOGLOBIN: CPT

## 2024-11-26 PROCEDURE — 85014 HEMATOCRIT: CPT

## 2024-11-26 PROCEDURE — 84295 ASSAY OF SERUM SODIUM: CPT

## 2024-11-26 PROCEDURE — 84132 ASSAY OF SERUM POTASSIUM: CPT

## 2024-11-26 PROCEDURE — 82947 ASSAY GLUCOSE BLOOD QUANT: CPT

## 2025-01-30 ENCOUNTER — APPOINTMENT (OUTPATIENT)
Dept: INTERNAL MEDICINE | Facility: CLINIC | Age: 89
End: 2025-01-30

## 2025-03-09 NOTE — ED ADULT NURSE NOTE - NS ED NURSE RECORD ANOTHER HT AND WT
No care due was identified.  [facility] Embedded Care Due Messages. Reference number: [id number].   3/09/2025 9:51:27 AM CDT   Yes

## 2025-05-20 NOTE — ED ADULT TRIAGE NOTE - ESI TRIAGE ACUITY LEVEL, MLM
3 Quality 226: Preventive Care And Screening: Tobacco Use: Screening And Cessation Intervention: Patient screened for tobacco use and is an ex/non-smoker Detail Level: Detailed Quality 47: Advance Care Plan: Advance Care Planning discussed and documented; advance care plan or surrogate decision maker documented in the medical record.

## 2025-06-10 NOTE — DISCHARGE NOTE NURSING/CASE MANAGEMENT/SOCIAL WORK - HISTORY OF COVID-19 VACCINATION
Please review the summary from your postoperative appointment following your procedure with Dr. Martell.      Please follow up in 6 weeks with CAROL LOPEZ. An X-ray will be done the same day of your next appointment.     Please continue to advance activity as tolerated.       Physical Therapy typically consists 2-3x a week for 4-6 weeks.      Please continue to avoid NSAIDs (i.e. Ibuprofen, aleve, meloxicam, mobic) for 6 months postoperatively.     To request a refill of your prescription please call the office 2-3 days before your medication will run out. Our office hours are Monday-Friday 8:30-5:00pm.   All prescriptions will be filled within 48 hours. If a refill request is received on a Friday after 4:00 PM it will be completed by 12:00 PM the following Monday.     If there are any questions, concerns, or changes in symptoms please notify your RN, Carol Amador, at [phone]     We would love to hear from you! The most valuable feedback comes from our patients.      
Yes

## (undated) DEVICE — BLADE SCALPEL SAFETYLOCK #15

## (undated) DEVICE — WARMING BLANKET DUO-THERM HYPER/HYPOTHERM ADULT

## (undated) DEVICE — SAW BLADE STRYKER STERNUM 31MM X 6.27 X .79

## (undated) DEVICE — NDL HYPO SAFE 25G X 5/8" (ORANGE)

## (undated) DEVICE — DRAPE 1/2 SHEET 40X57"

## (undated) DEVICE — SUMP PERICARDIAL 20FR 1/4" ADULT

## (undated) DEVICE — SOL IRR POUR H2O 250ML

## (undated) DEVICE — DRAPE OVERHEAD TABLE COVER 80X90"

## (undated) DEVICE — GLV 7 PROTEXIS (WHITE)

## (undated) DEVICE — SUT TICRON 2-0 36" CV-316 DA

## (undated) DEVICE — SUT POLYSORB 2-0 30" GS-21 UNDYED

## (undated) DEVICE — GLV 6.5 PROTEXIS (WHITE)

## (undated) DEVICE — DRAPE INSTRUMENT POUCH 6.75" X 11"

## (undated) DEVICE — VESSEL LOOP MAXI-RED  0.120" X 16"

## (undated) DEVICE — SUT SOFSILK 0 30" V-20

## (undated) DEVICE — VENODYNE/SCD SLEEVE CALF MEDIUM

## (undated) DEVICE — FOLEY TRAY 16FR 5CC LF LUBRISIL ADVANCE TEMP CLOSED

## (undated) DEVICE — DRAPE TOWEL BLUE 17" X 24"

## (undated) DEVICE — ELCTR BOVIE PENCIL HANDPIECE ROCKER SWITCH 15FT

## (undated) DEVICE — MEDICATION LABELS W MARKER

## (undated) DEVICE — DRSG TEGADERM 6"X8"

## (undated) DEVICE — ULTRASOUND TRANSDUCER COVER

## (undated) DEVICE — SET PERF Y TYPE 13.5IN STRL

## (undated) DEVICE — GOWN TRIMAX LG

## (undated) DEVICE — BLADE SCALPEL SAFETYLOCK #11

## (undated) DEVICE — GLV 8.5 PROTEXIS (WHITE)

## (undated) DEVICE — DRAPE 3/4 SHEET W REINFORCEMENT 56X77"

## (undated) DEVICE — SUCTION YANKAUER NO CONTROL VENT

## (undated) DEVICE — PREP CHLORAPREP HI-LITE ORANGE 26ML

## (undated) DEVICE — VISITEC 4X4

## (undated) DEVICE — PACING CABLE BI-V TEMP ALLIGATOR CLIP 8FR

## (undated) DEVICE — SPECIMEN CONTAINER 100ML

## (undated) DEVICE — MARKING PEN W RULER

## (undated) DEVICE — WARMING BLANKET LOWER ADULT

## (undated) DEVICE — LAP PAD 18 X 18"

## (undated) DEVICE — SUT SILK 0 30" TIES

## (undated) DEVICE — SYR ASEPTO

## (undated) DEVICE — DRAPE FEMORAL ANGIOGRAPHY W TROUGH

## (undated) DEVICE — BLADE SCALPEL SAFETYLOCK #10

## (undated) DEVICE — GLV 8 PROTEXIS (WHITE)

## (undated) DEVICE — GLV 7.5 PROTEXIS (WHITE)

## (undated) DEVICE — PACK CARDIAC MINOR

## (undated) DEVICE — GLV 6 PROTEXIS (WHITE)

## (undated) DEVICE — SOL NORMOSOL-R PH7.4 1000ML

## (undated) DEVICE — SUT BIOSYN 4-0 18" P-12

## (undated) DEVICE — ELCTR HEX BLADE

## (undated) DEVICE — TOURNIQUET SET 12FR (1 RED, 1 BLUE, 1 SNARE) 7"

## (undated) DEVICE — PACK GENERAL MINOR

## (undated) DEVICE — SUT PROLENE 4-0 36" BB

## (undated) DEVICE — PREP DURAPREP 26CC

## (undated) DEVICE — NDL COUNTER FOAM AND MAGNET 40-70

## (undated) DEVICE — SUT TICRON 5 30" KV-40

## (undated) DEVICE — DRAPE LIGHT HANDLE COVER (BLUE)

## (undated) DEVICE — CONNECTOR STRAIGHT 3/8 X 1/2"

## (undated) DEVICE — POSITIONER FOAM EGG CRATE ULNAR 2PCS (PINK)

## (undated) DEVICE — SUT SOFSILK 0 30" TIES

## (undated) DEVICE — TUBING TRUWAVE PRESSURE MALE/FEMALE 72"

## (undated) DEVICE — STAPLER SKIN VISI-STAT 35 WIDE

## (undated) DEVICE — Device

## (undated) DEVICE — SUT SOFSILK 4-0 18" TIES

## (undated) DEVICE — SUT TICRON 4-0 36" CV-331 DA

## (undated) DEVICE — DRSG OPSITE 2.5 X 2"

## (undated) DEVICE — SOL IRR POUR NS 0.9% 500ML

## (undated) DEVICE — SHIELD CATH CONTAMINATION 7.5-8.5FR TWISTLOCK ADAPT

## (undated) DEVICE — GOWN TRIMAX XXL

## (undated) DEVICE — ELCTR BOVIE TIP BLADE VALLEYLAB 6.5"

## (undated) DEVICE — SET PERF CARDIOPLEGIA 4 ARM STRL

## (undated) DEVICE — DRSG OPSITE 13.75 X 4"

## (undated) DEVICE — SUT PROLENE 5-0 36" RB-1

## (undated) DEVICE — SUT PROLENE 5-0 30" RB-2

## (undated) DEVICE — SUT SOFSILK 3-0 18" TIES

## (undated) DEVICE — SYR LUER LOK 10CC

## (undated) DEVICE — STOPCOCK 4-WAY EXT LF

## (undated) DEVICE — PACK CARDIAC YELLOW

## (undated) DEVICE — DRAPE MAYO STAND 30"

## (undated) DEVICE — CONNECTOR STRAIGHT 3/8 X 3/8" W LUER LOCK

## (undated) DEVICE — ELCTR REM POLYHESIVE ADULT PT RETURN 15FT

## (undated) DEVICE — DRAIN RESERVOIR FOR JACKSON PRATT 100CC CARDINAL

## (undated) DEVICE — DRAPE MINOR PROCEDURE

## (undated) DEVICE — STOPCOCK 3-WAY

## (undated) DEVICE — SUT POLYSORB 3-0 30" V-20 UNDYED

## (undated) DEVICE — DRAPE MAGNETIC INSTRUMENT MEDIUM

## (undated) DEVICE — TUBING CONTRAST INJECTION HIGH PRESSURE 1200PSI 72"

## (undated) DEVICE — VENODYNE/SCD SLEEVE CALF LARGE

## (undated) DEVICE — DRAPE C ARM UNIVERSAL

## (undated) DEVICE — PACING CABLE TEMP MEDTRONIC WITH PAC-LOC

## (undated) DEVICE — SUT DOUBLE 6 WIRE STERNAL

## (undated) DEVICE — SPECIMEN CONTAINER 4OZ

## (undated) DEVICE — MNFLD SETUP

## (undated) DEVICE — DRSG STERISTRIPS 0.5 X 4"